# Patient Record
Sex: MALE | Race: WHITE | NOT HISPANIC OR LATINO | Employment: FULL TIME | URBAN - METROPOLITAN AREA
[De-identification: names, ages, dates, MRNs, and addresses within clinical notes are randomized per-mention and may not be internally consistent; named-entity substitution may affect disease eponyms.]

---

## 2017-01-10 RX ORDER — HYDROCODONE BITARTRATE AND ACETAMINOPHEN 5; 325 MG/1; MG/1
1 TABLET ORAL EVERY 6 HOURS PRN
Qty: 120 TABLET | Refills: 0 | Status: SHIPPED | OUTPATIENT
Start: 2017-01-10 | End: 2017-02-24 | Stop reason: SDUPTHER

## 2017-02-24 RX ORDER — HYDROCODONE BITARTRATE AND ACETAMINOPHEN 5; 325 MG/1; MG/1
1 TABLET ORAL EVERY 6 HOURS PRN
Qty: 120 TABLET | Refills: 0 | Status: SHIPPED | OUTPATIENT
Start: 2017-02-24 | End: 2017-04-04 | Stop reason: SDUPTHER

## 2017-02-28 RX ORDER — ZOLPIDEM TARTRATE 10 MG/1
TABLET ORAL
Qty: 30 TABLET | Refills: 1 | Status: SHIPPED | OUTPATIENT
Start: 2017-02-28 | End: 2017-04-27

## 2017-04-03 RX ORDER — ESCITALOPRAM OXALATE 20 MG/1
TABLET ORAL
Qty: 90 TABLET | Refills: 1 | Status: SHIPPED | OUTPATIENT
Start: 2017-04-03 | End: 2017-04-27 | Stop reason: SDUPTHER

## 2017-04-04 RX ORDER — HYDROCODONE BITARTRATE AND ACETAMINOPHEN 5; 325 MG/1; MG/1
1 TABLET ORAL EVERY 6 HOURS PRN
Qty: 120 TABLET | Refills: 0 | Status: SHIPPED | OUTPATIENT
Start: 2017-04-04 | End: 2017-04-27 | Stop reason: SDUPTHER

## 2017-04-27 ENCOUNTER — OFFICE VISIT (OUTPATIENT)
Dept: INTERNAL MEDICINE | Facility: CLINIC | Age: 42
End: 2017-04-27
Payer: COMMERCIAL

## 2017-04-27 VITALS
WEIGHT: 246.94 LBS | SYSTOLIC BLOOD PRESSURE: 110 MMHG | HEIGHT: 70 IN | BODY MASS INDEX: 35.35 KG/M2 | DIASTOLIC BLOOD PRESSURE: 74 MMHG | TEMPERATURE: 97 F | HEART RATE: 91 BPM

## 2017-04-27 DIAGNOSIS — F32.5 MAJOR DEPRESSIVE DISORDER WITH SINGLE EPISODE, IN REMISSION: Primary | ICD-10-CM

## 2017-04-27 DIAGNOSIS — R09.81 SINUS CONGESTION: ICD-10-CM

## 2017-04-27 DIAGNOSIS — G47.00 INSOMNIA, UNSPECIFIED TYPE: ICD-10-CM

## 2017-04-27 DIAGNOSIS — G89.4 CHRONIC PAIN SYNDROME: ICD-10-CM

## 2017-04-27 PROCEDURE — 1160F RVW MEDS BY RX/DR IN RCRD: CPT | Mod: S$GLB,,, | Performed by: FAMILY MEDICINE

## 2017-04-27 PROCEDURE — 99999 PR PBB SHADOW E&M-EST. PATIENT-LVL III: CPT | Mod: PBBFAC,,, | Performed by: FAMILY MEDICINE

## 2017-04-27 PROCEDURE — 99214 OFFICE O/P EST MOD 30 MIN: CPT | Mod: S$GLB,,, | Performed by: FAMILY MEDICINE

## 2017-04-27 RX ORDER — HYDROCODONE BITARTRATE AND ACETAMINOPHEN 5; 325 MG/1; MG/1
1 TABLET ORAL EVERY 6 HOURS PRN
Qty: 120 TABLET | Refills: 0 | Status: SHIPPED | OUTPATIENT
Start: 2017-04-27 | End: 2017-05-30 | Stop reason: SDUPTHER

## 2017-04-27 RX ORDER — ESCITALOPRAM OXALATE 20 MG/1
20 TABLET ORAL DAILY
Qty: 90 TABLET | Refills: 1 | Status: SHIPPED | OUTPATIENT
Start: 2017-04-27 | End: 2018-03-23 | Stop reason: SDUPTHER

## 2017-04-27 RX ORDER — ZOLPIDEM TARTRATE 10 MG/1
10 TABLET ORAL NIGHTLY PRN
Qty: 30 TABLET | Refills: 1 | Status: SHIPPED | OUTPATIENT
Start: 2017-04-27 | End: 2017-06-26 | Stop reason: SDUPTHER

## 2017-04-27 RX ORDER — ARIPIPRAZOLE 5 MG/1
5 TABLET ORAL DAILY
Qty: 90 TABLET | Refills: 1 | Status: SHIPPED | OUTPATIENT
Start: 2017-04-27 | End: 2017-10-23 | Stop reason: SDUPTHER

## 2017-04-27 RX ORDER — METHYLPREDNISOLONE 4 MG/1
TABLET ORAL
Qty: 1 PACKAGE | Refills: 0 | Status: SHIPPED | OUTPATIENT
Start: 2017-04-27 | End: 2017-05-18

## 2017-04-27 NOTE — MR AVS SNAPSHOT
[facility]  [address] [facility] [address]  [place] [address]  Phone: [phone]  Fax: [phone]                  Dax Carlisle   2017 8:40 AM   Office Visit    Description:  Male : 1975   Provider:  Pramod Nuñez MD   Department:  [facility]           Reason for Visit     Follow-up     Medication Refill           Diagnoses this Visit        Comments    Major depressive disorder with single episode, in remission    -  Primary will continue with the Lexapro 20 mg and abilify 5 mg    Insomnia, unspecified type     Will continue with the ambien 10mg     Chronic pain syndrome     Will continue with the hydrocodone as needed    Sinus congestion     Will start pt on medrol dose pack           To Do List           Goals (5 Years of Data)     None      Follow-Up and Disposition     Return in about 4 months (around 2017).       These Medications        Disp Refills Start End    aripiprazole (ABILIFY) 5 MG Tab 90 tablet 1 2017     Take 1 tablet (5 mg total) by mouth once daily. - Oral    Pharmacy: [facility]/pharmacy #[facility]I P[facility]an - [address] [address] Ph #: [phone]       hydrocodone-acetaminophen 5-325mg (NORCO) 5-325 mg per tablet 120 tablet 0 2017     Take 1 tablet by mouth every 6 (six) hours as needed for Pain. - Oral    Pharmacy: [facility]/pharmacy #[facility]I P[facility]an - [id number] [address] [facility] Ph #: [phone]       Notes to Pharmacy: Last script sent on  was not enough this is 1 month    escitalopram oxalate (LEXAPRO) 20 MG tablet 90 tablet 1 2017     Take 1 tablet (20 mg total) by mouth once daily. - Oral    Pharmacy: [facility]/pharmacy #[facility] SAMI Phelan - [id number] [address] Ph #: [phone]       zolpidem (AMBIEN) 10 mg Tab 30 tablet 1 2017 10/26/2017    Take 1 tablet (10 mg total) by mouth nightly as needed. - Oral    Pharmacy: [facility]/pharmacy #[facility]I Phelan - [address] [address] [facility] Ph #: [phone]        methylPREDNISolone (MEDROL DOSEPACK) 4 mg tablet 1 Package 0 4/27/2017 5/18/2017    use as directed    Pharmacy: [facility]/pharmacy #[id number] - [place], [place] - [address] [address] #: [phone]         [facility] On Call     [facility] On Call Nurse Care Line - 24/7 Assistance  Unless otherwise directed by your provider, please contact [facility] On[facility], our nurse care line that is available for 24/7 assistance.     Registered nurses in the [facility] provide: appointment scheduling, clinical advisement, health education, and other advisory services.  Call: [phone] (toll free)               Medications           Message regarding Medications     Verify the changes and/or additions to your medication regime listed below are the same as discussed with your clinician today.  If any of these changes or additions are incorrect, please notify your healthcare provider.        START taking these NEW medications        Refills    zolpidem (AMBIEN) 10 mg Tab 1    Sig: Take 1 tablet (10 mg total) by mouth nightly as needed.    Class: Normal    Route: Oral    methylPREDNISolone (MEDROL DOSEPACK) 4 mg tablet 0    Sig: use as directed    Class: Normal      CHANGE how you are taking these medications     Start Taking Instead of    aripiprazole (ABILIFY) 5 MG Tab aripiprazole (ABILIFY) 5 MG Tab    Dosage:  Take 1 tablet (5 mg total) by mouth once daily. Dosage:  TAKE 1 TABLET (5 MG TOTAL) BY MOUTH ONCE DAILY.    Reason for Change:  Reorder     escitalopram oxalate (LEXAPRO) 20 MG tablet escitalopram oxalate (LEXAPRO) 20 MG tablet    Dosage:  Take 1 tablet (20 mg total) by mouth once daily. Dosage:  TAKE 1 TABLET (20 MG TOTAL) BY MOUTH ONCE DAILY.    Reason for Change:  Reorder            Verify that the below list of medications is an accurate representation of the medications you are currently taking.  If none reported, the list may be blank. If incorrect, please contact your healthcare provider. Carry this list with you in  "case of emergency.           Current Medications     aripiprazole (ABILIFY) 5 MG Tab Take 1 tablet (5 mg total) by mouth once daily.    azelastine (ASTELIN) 137 mcg (0.1 %) nasal spray 1 spray by Nasal route 2 (two) times daily.    escitalopram oxalate (LEXAPRO) 20 MG tablet Take 1 tablet (20 mg total) by mouth once daily.    hydrocodone-acetaminophen 5-325mg (NORCO) 5-325 mg per tablet Take 1 tablet by mouth every 6 (six) hours as needed for Pain.    linaclotide 290 mcg Cap Take 1 capsule (290 mcg total) by mouth once daily.    loratadine (CLARITIN) 10 mg tablet Take 10 mg by mouth once daily.    methylPREDNISolone (MEDROL DOSEPACK) 4 mg tablet use as directed    zolpidem (AMBIEN) 10 mg Tab Take 1 tablet (10 mg total) by mouth nightly as needed.           Clinical Reference Information           Your Vitals Were     BP Pulse Temp    110/74 (BP Location: Right arm, Patient Position: Sitting, BP Method: Manual) 91 96.5 °F (35.8 °C) (Tympanic)    Height Weight BMI    5' 10" (1.778 m) 112 kg (246 lb 14.6 oz) 35.43 kg/m2      Blood Pressure          Most Recent Value    BP  110/74      Allergies as of 4/27/2017     Iodinated Contrast Media - Oral And Iv Dye    Codeine    Dairy Aid [Lactase]    Morphine    Nuts [Tree Nut]      Immunizations Administered on Date of Encounter - 4/27/2017     None      Language Assistance Services     ATTENTION: Language assistance services are available, free of charge. Please call [phone].      ATENCIÓN: Si audrey jamie, tiene a benson disposición servicios gratuitos de asistencia lingüística. Llame al [phone].     [facility] Ý: N?u b?n nói Ti?ng Vi?t, có các d?ch v? h? tr? ngôn ng? mi?n phí dành cho b?n. G?i s? [phone].         [facility] complies with applicable Federal civil rights laws and does not discriminate on the basis of race, color, national origin, age, disability, or sex.        "

## 2017-04-27 NOTE — PROGRESS NOTES
Subjective:       Patient ID: Dax Carlisle is a 41 y.o. male.    Chief Complaint: Follow-up and Medication Refill    Medication Refill   This is a chronic problem. The current episode started more than 1 year ago. The problem has been gradually improving. Pertinent negatives include no change in bowel habit, congestion, coughing, nausea, neck pain or sore throat. Nothing aggravates the symptoms. He has tried nothing for the symptoms. The treatment provided moderate relief.     Review of Systems   Constitutional: Negative.    HENT: Positive for postnasal drip, rhinorrhea and sinus pressure. Negative for congestion and sore throat.    Respiratory: Negative.  Negative for cough.    Cardiovascular: Negative.    Gastrointestinal: Negative.  Negative for change in bowel habit and nausea.   Musculoskeletal: Negative for neck pain.   Skin: Negative.    Neurological: Negative.    Psychiatric/Behavioral: Negative.        Objective:      Physical Exam   HENT:   Right Ear: Tympanic membrane is erythematous. A middle ear effusion is present.   Left Ear: Tympanic membrane is erythematous. A middle ear effusion is present.   Nose: Mucosal edema and rhinorrhea present. Right sinus exhibits no maxillary sinus tenderness and no frontal sinus tenderness. Left sinus exhibits no maxillary sinus tenderness and no frontal sinus tenderness.   Mouth/Throat: Posterior oropharyngeal erythema present.   Pulmonary/Chest: Effort normal and breath sounds normal.   Abdominal: Soft. Bowel sounds are normal.       Assessment:       1. Major depressive disorder with single episode, in remission    2. Insomnia, unspecified type    3. Chronic pain syndrome    4. Sinus congestion        Plan:       Major depressive disorder with single episode, in remission  Comments:  will continue with the Lexapro 20 mg and abilify 5 mg    Insomnia, unspecified type  Comments:  Will continue with the ambien 10mg     Chronic pain syndrome  Comments:  Will  continue with the hydrocodone as needed    Sinus congestion  Comments:  Will start pt on medrol dose pack    Other orders  -     aripiprazole (ABILIFY) 5 MG Tab; Take 1 tablet (5 mg total) by mouth once daily.  Dispense: 90 tablet; Refill: 1  -     hydrocodone-acetaminophen 5-325mg (NORCO) 5-325 mg per tablet; Take 1 tablet by mouth every 6 (six) hours as needed for Pain.  Dispense: 120 tablet; Refill: 0  -     escitalopram oxalate (LEXAPRO) 20 MG tablet; Take 1 tablet (20 mg total) by mouth once daily.  Dispense: 90 tablet; Refill: 1  -     zolpidem (AMBIEN) 10 mg Tab; Take 1 tablet (10 mg total) by mouth nightly as needed.  Dispense: 30 tablet; Refill: 1  -     methylPREDNISolone (MEDROL DOSEPACK) 4 mg tablet; use as directed  Dispense: 1 Package; Refill: 0

## 2017-05-30 RX ORDER — HYDROCODONE BITARTRATE AND ACETAMINOPHEN 5; 325 MG/1; MG/1
1 TABLET ORAL EVERY 6 HOURS PRN
Qty: 30 TABLET | Refills: 0 | Status: SHIPPED | OUTPATIENT
Start: 2017-05-30 | End: 2017-06-12 | Stop reason: SDUPTHER

## 2017-06-13 RX ORDER — HYDROCODONE BITARTRATE AND ACETAMINOPHEN 5; 325 MG/1; MG/1
1 TABLET ORAL EVERY 6 HOURS PRN
Qty: 30 TABLET | Refills: 0 | Status: SHIPPED | OUTPATIENT
Start: 2017-06-13 | End: 2017-06-20 | Stop reason: SDUPTHER

## 2017-06-21 RX ORDER — HYDROCODONE BITARTRATE AND ACETAMINOPHEN 5; 325 MG/1; MG/1
1 TABLET ORAL EVERY 6 HOURS PRN
Qty: 30 TABLET | Refills: 0 | Status: SHIPPED | OUTPATIENT
Start: 2017-06-21 | End: 2017-07-01 | Stop reason: SDUPTHER

## 2017-06-21 RX ORDER — HYDROCODONE BITARTRATE AND ACETAMINOPHEN 5; 325 MG/1; MG/1
1 TABLET ORAL EVERY 6 HOURS PRN
Qty: 30 TABLET | Refills: 0 | OUTPATIENT
Start: 2017-06-21

## 2017-06-27 RX ORDER — ZOLPIDEM TARTRATE 10 MG/1
TABLET ORAL
Qty: 30 TABLET | Refills: 1 | Status: SHIPPED | OUTPATIENT
Start: 2017-06-27 | End: 2017-08-25 | Stop reason: SDUPTHER

## 2017-07-01 ENCOUNTER — PATIENT MESSAGE (OUTPATIENT)
Dept: INTERNAL MEDICINE | Facility: CLINIC | Age: 42
End: 2017-07-01

## 2017-07-01 RX ORDER — HYDROCODONE BITARTRATE AND ACETAMINOPHEN 5; 325 MG/1; MG/1
1 TABLET ORAL EVERY 6 HOURS PRN
Qty: 120 TABLET | Refills: 0 | Status: SHIPPED | OUTPATIENT
Start: 2017-07-01 | End: 2017-07-03 | Stop reason: SDUPTHER

## 2017-07-03 RX ORDER — HYDROCODONE BITARTRATE AND ACETAMINOPHEN 5; 325 MG/1; MG/1
1 TABLET ORAL EVERY 6 HOURS PRN
Qty: 120 TABLET | Refills: 0 | Status: SHIPPED | OUTPATIENT
Start: 2017-07-03 | End: 2017-08-01 | Stop reason: SDUPTHER

## 2017-08-01 RX ORDER — HYDROCODONE BITARTRATE AND ACETAMINOPHEN 5; 325 MG/1; MG/1
1 TABLET ORAL EVERY 6 HOURS PRN
Qty: 120 TABLET | Refills: 0 | Status: SHIPPED | OUTPATIENT
Start: 2017-08-01 | End: 2017-08-31 | Stop reason: SDUPTHER

## 2017-08-27 RX ORDER — ZOLPIDEM TARTRATE 10 MG/1
TABLET ORAL
Qty: 30 TABLET | Refills: 1 | Status: SHIPPED | OUTPATIENT
Start: 2017-08-27 | End: 2017-09-26 | Stop reason: SDUPTHER

## 2017-09-01 RX ORDER — HYDROCODONE BITARTRATE AND ACETAMINOPHEN 5; 325 MG/1; MG/1
1 TABLET ORAL EVERY 6 HOURS PRN
Qty: 120 TABLET | Refills: 0 | Status: SHIPPED | OUTPATIENT
Start: 2017-09-01 | End: 2017-10-02 | Stop reason: SDUPTHER

## 2017-09-26 RX ORDER — ZOLPIDEM TARTRATE 10 MG/1
10 TABLET ORAL NIGHTLY
Qty: 90 TABLET | Refills: 0 | Status: SHIPPED | OUTPATIENT
Start: 2017-09-26 | End: 2018-01-16 | Stop reason: SDUPTHER

## 2017-10-03 RX ORDER — HYDROCODONE BITARTRATE AND ACETAMINOPHEN 5; 325 MG/1; MG/1
1 TABLET ORAL EVERY 6 HOURS PRN
Qty: 120 TABLET | Refills: 0 | Status: SHIPPED | OUTPATIENT
Start: 2017-10-03 | End: 2017-11-07 | Stop reason: SDUPTHER

## 2017-10-23 RX ORDER — ARIPIPRAZOLE 5 MG/1
TABLET ORAL
Qty: 90 TABLET | Refills: 1 | Status: SHIPPED | OUTPATIENT
Start: 2017-10-23 | End: 2018-04-18 | Stop reason: SDUPTHER

## 2017-11-07 RX ORDER — HYDROCODONE BITARTRATE AND ACETAMINOPHEN 5; 325 MG/1; MG/1
1 TABLET ORAL EVERY 6 HOURS PRN
Qty: 120 TABLET | Refills: 0 | Status: SHIPPED | OUTPATIENT
Start: 2017-11-07 | End: 2017-12-20 | Stop reason: SDUPTHER

## 2017-11-16 ENCOUNTER — LAB VISIT (OUTPATIENT)
Dept: LAB | Facility: HOSPITAL | Age: 42
End: 2017-11-16
Attending: FAMILY MEDICINE
Payer: COMMERCIAL

## 2017-11-16 ENCOUNTER — OFFICE VISIT (OUTPATIENT)
Dept: INTERNAL MEDICINE | Facility: CLINIC | Age: 42
End: 2017-11-16
Payer: COMMERCIAL

## 2017-11-16 ENCOUNTER — TELEPHONE (OUTPATIENT)
Dept: INTERNAL MEDICINE | Facility: CLINIC | Age: 42
End: 2017-11-16

## 2017-11-16 VITALS
SYSTOLIC BLOOD PRESSURE: 116 MMHG | WEIGHT: 254.88 LBS | HEIGHT: 70 IN | TEMPERATURE: 98 F | HEART RATE: 91 BPM | BODY MASS INDEX: 36.49 KG/M2 | DIASTOLIC BLOOD PRESSURE: 80 MMHG

## 2017-11-16 DIAGNOSIS — Z00.00 ANNUAL PHYSICAL EXAM: Primary | ICD-10-CM

## 2017-11-16 DIAGNOSIS — Z00.00 ANNUAL PHYSICAL EXAM: ICD-10-CM

## 2017-11-16 PROCEDURE — 99999 PR PBB SHADOW E&M-EST. PATIENT-LVL III: CPT | Mod: PBBFAC,,, | Performed by: FAMILY MEDICINE

## 2017-11-16 PROCEDURE — 80061 LIPID PANEL: CPT

## 2017-11-16 PROCEDURE — 36415 COLL VENOUS BLD VENIPUNCTURE: CPT | Mod: PO

## 2017-11-16 PROCEDURE — 99396 PREV VISIT EST AGE 40-64: CPT | Mod: S$GLB,,, | Performed by: FAMILY MEDICINE

## 2017-11-16 PROCEDURE — 80053 COMPREHEN METABOLIC PANEL: CPT

## 2017-11-16 NOTE — PROGRESS NOTES
Subjective:       Patient ID: Dax Carlisle is a 42 y.o. male.    Chief Complaint: Medication Refill    Annual Exam:       Pt is a 42 year old who is here for annual work-up. Pt is doing well on his general medications.       Review of Systems   Constitutional: Negative.    Respiratory: Negative.    Cardiovascular: Negative.    Gastrointestinal: Negative.    Genitourinary: Negative.    Musculoskeletal: Negative.    Skin: Negative.    Neurological: Negative.    Hematological: Negative.    Psychiatric/Behavioral: Negative.        Objective:      Physical Exam   Constitutional: He is oriented to person, place, and time. He appears well-developed and well-nourished.   HENT:   Head: Normocephalic.   Eyes: EOM are normal. Pupils are equal, round, and reactive to light.   Neck: Normal range of motion. Neck supple. No JVD present. No thyromegaly present.   Cardiovascular: Normal rate and regular rhythm.    Pulmonary/Chest: Effort normal and breath sounds normal.   Abdominal: Soft. Bowel sounds are normal.   Musculoskeletal: Normal range of motion.   Lymphadenopathy:     He has no cervical adenopathy.   Neurological: He is alert and oriented to person, place, and time. He has normal reflexes.   Skin: Skin is warm and dry.   Psychiatric: He has a normal mood and affect. His behavior is normal.       Assessment:       1. Annual physical exam        Plan:       Annual physical exam  Comments:  Will do Lipid and CMP  Orders:  -     Comprehensive metabolic panel; Future; Expected date: 11/16/2017  -     Lipid panel; Future; Expected date: 11/16/2017

## 2017-11-16 NOTE — TELEPHONE ENCOUNTER
Per [facility] compound pharm:      The highest gabapentin written can be 10% which can be equated to 100 mg per pump.  Maximum of 5 pumps applied to affected area per day.

## 2017-11-17 LAB
ALBUMIN SERPL BCP-MCNC: 4 G/DL
ALP SERPL-CCNC: 75 U/L
ALT SERPL W/O P-5'-P-CCNC: 74 U/L
ANION GAP SERPL CALC-SCNC: 9 MMOL/L
AST SERPL-CCNC: 46 U/L
BILIRUB SERPL-MCNC: 0.6 MG/DL
BUN SERPL-MCNC: 9 MG/DL
CALCIUM SERPL-MCNC: 9.5 MG/DL
CHLORIDE SERPL-SCNC: 106 MMOL/L
CHOLEST SERPL-MCNC: 230 MG/DL
CHOLEST/HDLC SERPL: 4.7 {RATIO}
CO2 SERPL-SCNC: 25 MMOL/L
CREAT SERPL-MCNC: 0.9 MG/DL
EST. GFR  (AFRICAN AMERICAN): >60 ML/MIN/1.73 M^2
EST. GFR  (NON AFRICAN AMERICAN): >60 ML/MIN/1.73 M^2
GLUCOSE SERPL-MCNC: 87 MG/DL
HDLC SERPL-MCNC: 49 MG/DL
HDLC SERPL: 21.3 %
LDLC SERPL CALC-MCNC: 156 MG/DL
NONHDLC SERPL-MCNC: 181 MG/DL
POTASSIUM SERPL-SCNC: 4.1 MMOL/L
PROT SERPL-MCNC: 7.2 G/DL
SODIUM SERPL-SCNC: 140 MMOL/L
TRIGL SERPL-MCNC: 125 MG/DL

## 2017-12-20 RX ORDER — HYDROCODONE BITARTRATE AND ACETAMINOPHEN 5; 325 MG/1; MG/1
1 TABLET ORAL EVERY 6 HOURS PRN
Qty: 120 TABLET | Refills: 0 | Status: SHIPPED | OUTPATIENT
Start: 2017-12-20 | End: 2018-01-16 | Stop reason: SDUPTHER

## 2018-01-04 ENCOUNTER — OFFICE VISIT (OUTPATIENT)
Dept: INTERNAL MEDICINE | Facility: CLINIC | Age: 43
End: 2018-01-04
Payer: COMMERCIAL

## 2018-01-04 ENCOUNTER — PATIENT OUTREACH (OUTPATIENT)
Dept: ADMINISTRATIVE | Facility: HOSPITAL | Age: 43
End: 2018-01-04

## 2018-01-04 VITALS
SYSTOLIC BLOOD PRESSURE: 112 MMHG | TEMPERATURE: 97 F | DIASTOLIC BLOOD PRESSURE: 86 MMHG | HEART RATE: 88 BPM | HEIGHT: 70 IN | BODY MASS INDEX: 35.92 KG/M2 | WEIGHT: 250.88 LBS

## 2018-01-04 DIAGNOSIS — H92.02 LEFT EAR PAIN: Primary | ICD-10-CM

## 2018-01-04 PROCEDURE — 99213 OFFICE O/P EST LOW 20 MIN: CPT | Mod: S$GLB,,, | Performed by: FAMILY MEDICINE

## 2018-01-04 PROCEDURE — 99999 PR PBB SHADOW E&M-EST. PATIENT-LVL III: CPT | Mod: PBBFAC,,, | Performed by: FAMILY MEDICINE

## 2018-01-04 NOTE — PROGRESS NOTES
Subjective:       Patient ID: Dax Carlisle is a 42 y.o. male.    Chief Complaint: Otalgia    Left ear pain:      Pt is a 42 year old who reports chronic ear infections. Pt has been seen only x1 in the last few years but has gone to urgent care off and on every 3-4 months.      Otalgia    There is pain in the left ear. This is a recurrent problem. The current episode started more than 1 year ago. The problem occurs constantly. The problem has been waxing and waning. There has been no fever. The fever has been present for less than 1 day. The pain is at a severity of 7/10. Associated symptoms include coughing. Pertinent negatives include no abdominal pain or sore throat. He has tried nothing for the symptoms. The treatment provided moderate relief. His past medical history is significant for a chronic ear infection.     Review of Systems   Constitutional: Negative.    HENT: Positive for ear pain (left greater than right), sinus pain and sinus pressure. Negative for postnasal drip, sore throat, tinnitus and trouble swallowing.    Respiratory: Positive for cough.    Cardiovascular: Negative.    Gastrointestinal: Negative.  Negative for abdominal pain.   Musculoskeletal: Negative.    Hematological: Negative.    Psychiatric/Behavioral: Negative.        Objective:      Physical Exam   Constitutional: He is oriented to person, place, and time. He appears well-developed and well-nourished.   HENT:   Right Ear: Hearing, external ear and ear canal normal.   Left Ear: Tympanic membrane is erythematous. Tympanic membrane is not scarred. A middle ear effusion is present.   Cardiovascular: Normal rate and regular rhythm.    Pulmonary/Chest: Effort normal and breath sounds normal.   Abdominal: Soft. Bowel sounds are normal.   Neurological: He is alert and oriented to person, place, and time.       Assessment:       1. Left ear pain        Plan:       Left ear pain  Comments:  Will send to ENT  Orders:  -     Ambulatory  referral to ENT

## 2018-01-05 ENCOUNTER — OFFICE VISIT (OUTPATIENT)
Dept: OTOLARYNGOLOGY | Facility: CLINIC | Age: 43
End: 2018-01-05
Payer: COMMERCIAL

## 2018-01-05 VITALS
HEART RATE: 95 BPM | BODY MASS INDEX: 35.85 KG/M2 | RESPIRATION RATE: 18 BRPM | HEIGHT: 70 IN | DIASTOLIC BLOOD PRESSURE: 82 MMHG | WEIGHT: 250.44 LBS | SYSTOLIC BLOOD PRESSURE: 116 MMHG

## 2018-01-05 DIAGNOSIS — H92.02 LEFT EAR PAIN: Primary | ICD-10-CM

## 2018-01-05 PROCEDURE — 92567 TYMPANOMETRY: CPT | Mod: S$GLB,,, | Performed by: OTOLARYNGOLOGY

## 2018-01-05 PROCEDURE — 99999 PR PBB SHADOW E&M-EST. PATIENT-LVL III: CPT | Mod: PBBFAC,,, | Performed by: OTOLARYNGOLOGY

## 2018-01-05 PROCEDURE — 92504 EAR MICROSCOPY EXAMINATION: CPT | Mod: S$GLB,,, | Performed by: OTOLARYNGOLOGY

## 2018-01-05 PROCEDURE — 99243 OFF/OP CNSLTJ NEW/EST LOW 30: CPT | Mod: 25,S$GLB,, | Performed by: OTOLARYNGOLOGY

## 2018-01-05 NOTE — PROGRESS NOTES
"Referring Provider:    Pramod Nuñez Md  [address] [place] [address]  Subjective:   Patient: Dax Carlisle [id number], :1975   Visit date:2018 9:25 AM    Chief Complaint:  Otalgia (left ear/patient has had 6 ear infections within the last year)    HPI:  Dax is a 42 y.o. male who I was asked to see in consultation for evaluation of the following issue(s):     6 infections of left ear over the past year.  Improves with abx and steroids.  Sx typically pain and HL.  No major allergy sx.  + Qtip use.  Usually has cerumen impaction when he has an "infection".     Review of Systems:  -     Allergic/Immunologic: is allergic to iodinated contrast- oral and iv dye; codeine; dairy aid [lactase]; morphine; and nuts [tree nut]..  -     Constitutional: Current temp:        His meds, allergies, medical, surgical, social & family histories were reviewed & updated:  -     He has a current medication list which includes the following prescription(s): aripiprazole, azelastine, escitalopram oxalate, hydrocodone-acetaminophen 5-325mg, linaclotide, loratadine, and zolpidem.  -     He  has a past medical history of Angioedema of lips (3/13/2015); Depression; and Kidney stones.   -     He  does not have any pertinent problems on file.   -     He  has a past surgical history that includes Back surgery; Pilonidal cyst drainage; Knee surgery (Right); Knee surgery (Left); Appendectomy; Cholecystectomy; anal fistula repair; and Colonoscopy (N/A, 3/10/2016).  -     He  reports that he has never smoked. He does not have any smokeless tobacco history on file. He reports that he does not drink alcohol or use drugs.  -     His family history includes Cancer in his father; Colon cancer in his maternal grandmother; Colon polyps in his mother and sister; Diabetes in his father, maternal grandfather, maternal grandmother, mother, paternal grandfather, and paternal grandmother; Heart disease in his father; Stroke in his " "paternal grandmother.  -     He is allergic to iodinated contrast- oral and iv dye; codeine; dairy aid [lactase]; morphine; and nuts [tree nut].    Objective:     Physical Exam:  Vitals:  /82   Pulse 95   Resp 18   Ht 5' 10" (1.778 m)   Wt 113.6 kg (250 lb 7.1 oz)   BMI 35.93 kg/m²   General appearance:  Well developed, well nourished    Eyes:  Extraocular motions intact, PERRL    Communication:  no hoarseness, no dysphonia    Ear exam was performed utilizing binocular otomicroscopy with the findings:  Right Ear:  No mass/lesion of auricle. The external auditory canals is without erythema or discharge. The tympanic membrane revealed no perforation and good mobility, with no fluid in middle ear. Clinical speech reception thresholds grossly normal.  Left Ear:  No mass/lesion of auricle. The external auditory canals is without erythema or discharge. The tympanic membrane revealed no perforation and good mobility, with no fluid in middle ear. Clinical speech reception thresholds grossly normal. Cerumen pressed against TM, improved comfort after removal      Nose:  No masses/lesions of external nose, nasal mucosa, septum, and turbinates were within normal limits.  Mouth:  No mass/lesion of lips, teeth, gums, hard/soft palate, tongue, tonsils, or oropharynx.  Significant pain to palpation of left TMJ    Cardiovascular:  No pedal edema; Radial Pulses +2     Neck & Lymphatics:  No cervical lymphadenopathy, no neck mass/crepitus/ asymmetry, trachea is midline, no thyroid enlargement/tenderness/mass.    Psych: Oriented x3,  Alert with normal mood and affect.     Respiration/Chest:  Symmetric expansion during respiration, normal respiratory effort.    Skin:  Warm and intact. No ulcerations of face, scalp, neck.            TYMPANOMETRY    Tympanometry revealed Type A in the right ear, and Type As in the left ear.     Right Ear: 0.7 ml  -15 daPa, with ear canal volume of: 1.4    Left Ear:  0.4 ml  -52 daPa, with ear " canal volume of: 1.5    Middle ear pressure values ranging from +50 daPa to -200 daPa for children, and +50  daPa to -50 daPa for adults is generally considered normal.    Normative ear canal volumes vary as a function of age. Typically for children a volume  range of 0.5 to 1.5 cc is typically considered normal, while for adults the range is 0.5 to  2.00 cc.     Type A tympanograms: normal middle ear pressure;  Peak between +50 daPa to -200 daPa for children & +50 daPa to -50 daPa      Type C tympanograms:   abnormally low middle ear pressure;  Peak less than -200daPa for children/-50 daPa for adults      Type B tympanograms no pressure peak; Peak less than -200daPa for children/-50 daPa for adults      Patient was counseled with results.      Assessment & Plan:   Unclear if he has recurrent OME, OE or cerumen + TMJ arthralgia.  Suggested means of safely cleaning of the ears.  Return if he has pain and HL to determine etiology.     We discussed his medical conditions, treatments and plan.  Dax should return to clinic if any issues arise (symptoms worsen or persist), otherwise we will see him back in the clinic only as needed.      Thank you for allowing me to participate in the care of Dax.    Neftali Neri MD

## 2018-01-05 NOTE — LETTER
January 5, 2018      Pramod Nuñez MD  [address] [place] [address]           [facility]  [address]  [place] [address]  Phone: [phone]  Fax: [phone]          Patient: Dax Carlisle   MR Number: [id number]   YOB: 1975   Date of Visit: 1/5/2018       Dear Dr. Pramod Nuñez:    Thank you for referring Dax Carlisle to me for evaluation. Attached you will find relevant portions of my assessment and plan of care.    If you have questions, please do not hesitate to call me. I look forward to following Dax Carlisle along with you.    Sincerely,    Neftali Neri MD    Enclosure  CC:  No Recipients    If you would like to receive this communication electronically, please contact [email or web address] or [phone] to request more information on [employer] Link access.    For providers and/or their staff who would like to refer a patient to [facility], please contact us through our one-stop-shop provider referral line, [facility] , at [phone].    If you feel you have received this communication in error or would no longer like to receive these types of communications, please e-mail [email or web address]

## 2018-01-16 RX ORDER — ZOLPIDEM TARTRATE 10 MG/1
10 TABLET ORAL NIGHTLY
Qty: 90 TABLET | Refills: 0 | Status: SHIPPED | OUTPATIENT
Start: 2018-01-16 | End: 2018-04-19 | Stop reason: SDUPTHER

## 2018-01-16 RX ORDER — HYDROCODONE BITARTRATE AND ACETAMINOPHEN 5; 325 MG/1; MG/1
1 TABLET ORAL EVERY 6 HOURS PRN
Qty: 120 TABLET | Refills: 0 | Status: SHIPPED | OUTPATIENT
Start: 2018-01-16 | End: 2018-02-22 | Stop reason: SDUPTHER

## 2018-02-06 ENCOUNTER — OFFICE VISIT (OUTPATIENT)
Dept: PSYCHIATRY | Facility: CLINIC | Age: 43
End: 2018-02-06
Payer: COMMERCIAL

## 2018-02-06 DIAGNOSIS — F33.1 MAJOR DEPRESSIVE DISORDER, RECURRENT EPISODE, MODERATE: Primary | ICD-10-CM

## 2018-02-06 PROCEDURE — 90834 PSYTX W PT 45 MINUTES: CPT | Mod: S$GLB,,, | Performed by: SOCIAL WORKER

## 2018-02-06 NOTE — PROGRESS NOTES
"Individual Psychotherapy (PhD/LCSW)    2018    Site:  [facility]         Therapeutic Intervention: Met with patient.  Outpatient - Insight oriented psychotherapy 45 min - CPT code 42361    Chief complaint/reason for encounter: depression     Interval history and content of current session:  Patient presents to ongoing individual therapy due to depression.  He has not been to session since 16.  He notes that for a year he and his wife tried to investigate fertility issues.  They discovered that they both had physical problems.  She has PCOS and his sperm does not swim correctly.  They decided to purchase a home in [place].  He feels that they were nena to get a "good deal."  A couple of months ago, he discovered that a friend from [employer]  in a murder/suicide.  His friend had been dating a girl that was "using him" for six months.  His friend and he had a connection through [employer] and fishing.  His friend had pawned all of his speakers and most of his fishing rods.  His friend was seldom angry, but when he became angry, it was intense.  He admits that after finding out about the death, he became severely depressed.  He "curled up in a ball" and he was not completing his work.  His supervisor had a discussion about the decrease in his work productivity.  He feels that he is beginning to "crawl out of the hole."  He remains frustrated with his job.  He did interview for a position in [place].  He feels he sabotaged the interview because he talked about too much negativity.  He enjoys fishing, but he has not been able to do much fishing due to the winter.  He had considered getting a boat, but his wife was not supportive.  She is considering becoming a writer, but she has not been writing.  She spends a good deal of her time reading.  He is tearful in session sharing about the death of his friend.    Treatment plan:  · Target symptoms: depression  · Why chosen therapy is appropriate versus another " modality: relevant to diagnosis  · Outcome monitoring methods: self-report, observation  · Therapeutic intervention type: insight oriented psychotherapy, behavior modifying psychotherapy, supportive psychotherapy, interactive psychotherapy    Risk parameters:  Patient reports no suicidal ideation  Patient reports no homicidal ideation  Patient reports no self-injurious behavior  Patient reports no violent behavior    Verbal deficits: None    Patient's response to intervention:  The patient's response to intervention is accepting, motivated.    Progress toward goals and other mental status changes:  The patient's progress toward goals is fair .    Diagnosis:   Major depression recurrent moderate    Plan:  individual psychotherapy and medication management by physician    Return to clinic: as scheduled    Length of Service (minutes): 45

## 2018-02-22 RX ORDER — HYDROCODONE BITARTRATE AND ACETAMINOPHEN 5; 325 MG/1; MG/1
1 TABLET ORAL EVERY 6 HOURS PRN
Qty: 120 TABLET | Refills: 0 | Status: SHIPPED | OUTPATIENT
Start: 2018-02-22 | End: 2018-03-19 | Stop reason: SDUPTHER

## 2018-03-14 ENCOUNTER — OFFICE VISIT (OUTPATIENT)
Dept: PSYCHIATRY | Facility: CLINIC | Age: 43
End: 2018-03-14
Payer: COMMERCIAL

## 2018-03-14 DIAGNOSIS — F33.1 MAJOR DEPRESSIVE DISORDER, RECURRENT EPISODE, MODERATE: Primary | ICD-10-CM

## 2018-03-14 PROCEDURE — 90834 PSYTX W PT 45 MINUTES: CPT | Mod: S$GLB,,, | Performed by: SOCIAL WORKER
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
## 2018-03-20 RX ORDER — HYDROCODONE BITARTRATE AND ACETAMINOPHEN 5; 325 MG/1; MG/1
1 TABLET ORAL EVERY 6 HOURS PRN
Qty: 120 TABLET | Refills: 0 | Status: SHIPPED | OUTPATIENT
Start: 2018-03-20 | End: 2018-04-19 | Stop reason: SDUPTHER

## 2018-03-23 RX ORDER — ESCITALOPRAM OXALATE 20 MG/1
20 TABLET ORAL DAILY
Qty: 90 TABLET | Refills: 1 | Status: SHIPPED | OUTPATIENT
Start: 2018-03-23 | End: 2018-05-18 | Stop reason: SDUPTHER

## 2018-04-09 ENCOUNTER — OFFICE VISIT (OUTPATIENT)
Dept: OTOLARYNGOLOGY | Facility: CLINIC | Age: 43
End: 2018-04-09
Payer: COMMERCIAL

## 2018-04-09 VITALS
DIASTOLIC BLOOD PRESSURE: 78 MMHG | HEART RATE: 76 BPM | WEIGHT: 249 LBS | SYSTOLIC BLOOD PRESSURE: 114 MMHG | BODY MASS INDEX: 35.65 KG/M2 | HEIGHT: 70 IN

## 2018-04-09 DIAGNOSIS — H65.492: Primary | ICD-10-CM

## 2018-04-09 PROCEDURE — 99999 PR PBB SHADOW E&M-EST. PATIENT-LVL III: CPT | Mod: PBBFAC,,, | Performed by: OTOLARYNGOLOGY

## 2018-04-09 PROCEDURE — 99214 OFFICE O/P EST MOD 30 MIN: CPT | Mod: 25,S$GLB,, | Performed by: OTOLARYNGOLOGY

## 2018-04-09 PROCEDURE — 69433 CREATE EARDRUM OPENING: CPT | Mod: LT,S$GLB,, | Performed by: OTOLARYNGOLOGY

## 2018-04-09 RX ORDER — OFLOXACIN 3 MG/ML
SOLUTION/ DROPS OPHTHALMIC
Qty: 10 ML | Refills: 0 | Status: SHIPPED | OUTPATIENT
Start: 2018-04-09 | End: 2018-04-27

## 2018-04-11 ENCOUNTER — PATIENT MESSAGE (OUTPATIENT)
Dept: OTOLARYNGOLOGY | Facility: CLINIC | Age: 43
End: 2018-04-11

## 2018-04-18 RX ORDER — ARIPIPRAZOLE 5 MG/1
TABLET ORAL
Qty: 90 TABLET | Refills: 1 | Status: SHIPPED | OUTPATIENT
Start: 2018-04-18 | End: 2018-05-18 | Stop reason: SDUPTHER

## 2018-04-20 RX ORDER — HYDROCODONE BITARTRATE AND ACETAMINOPHEN 5; 325 MG/1; MG/1
1 TABLET ORAL EVERY 6 HOURS PRN
Qty: 120 TABLET | Refills: 0 | Status: SHIPPED | OUTPATIENT
Start: 2018-04-20 | End: 2018-05-18 | Stop reason: SDUPTHER

## 2018-04-20 RX ORDER — ZOLPIDEM TARTRATE 10 MG/1
10 TABLET ORAL NIGHTLY
Qty: 90 TABLET | Refills: 0 | Status: SHIPPED | OUTPATIENT
Start: 2018-04-20 | End: 2018-05-18 | Stop reason: SDUPTHER

## 2018-04-23 NOTE — PROGRESS NOTES
Subjective:   Patient: Dax Carlisle [id number], :1975   Visit date:2018 8:45 AM    Chief Complaint:  Ear Fullness (left ear is not draining since middle of Feb)    HPI:  Dax is a 42 y.o. male who is here for follow-up. He was last here in January    Location: left ear  Severity: moderate  Quality: crampy  Duration: 2 month(s)  Modifying factors:  None  Associated signs and symptoms:  Hearing loss as          Review of Systems:  -     Allergic/Immunologic: is allergic to iodinated contrast- oral and iv dye; codeine; dairy aid [lactase]; morphine; and nuts [tree nut]..  -     Constitutional: Current temp:      His meds, allergies, medical, surgical, social & family histories were reviewed & updated:  -     He has a current medication list which includes the following prescription(s): azelastine, escitalopram oxalate, linaclotide, loratadine, aripiprazole, hydrocodone-acetaminophen 5-325mg, ofloxacin, and zolpidem.  -     He  has a past medical history of Angioedema of lips (3/13/2015); Depression; and Kidney stones.   -     He  does not have any pertinent problems on file.   -     He  has a past surgical history that includes Back surgery; Pilonidal cyst drainage; Knee surgery (Right); Knee surgery (Left); Appendectomy; Cholecystectomy; anal fistula repair; and Colonoscopy (N/A, 3/10/2016).  -     He  reports that he has never smoked. He does not have any smokeless tobacco history on file. He reports that he does not drink alcohol or use drugs.  -     His family history includes Cancer in his father; Colon cancer in his maternal grandmother; Colon polyps in his mother and sister; Diabetes in his father, maternal grandfather, maternal grandmother, mother, paternal grandfather, and paternal grandmother; Heart disease in his father; Stroke in his paternal grandmother.  -     He is allergic to iodinated contrast- oral and iv dye; codeine; dairy aid [lactase]; morphine; and nuts [tree  "nut].    Objective:     Physical Exam:  Vitals:  /78   Pulse 76   Ht 5' 10" (1.778 m)   Wt 112.9 kg (249 lb)   BMI 35.73 kg/m²   Communication:  Able to communicate, no hoarseness.  Head & Face:  Normocephalic, atraumatic, no sinus tenderness.  Eyes:  Extraocular motions intact.  Ears:  ABIMAEL AS, EAC's wnl.  RIght TM WNL  Nose:  No masses/lesions of external nose, nasal mucosa, septum, and turbinates were within normal limits.  Mouth:  No mass/lesion of lips, teeth, gums, hard/soft palate, tongue, tonsils, or oropharynx.  Neck & Lymphatics:  No cervical lymphadenopathy, no neck mass/crepitus/ asymmetry, trachea is midline, no thyroid enlargement/tenderness/mass.  Neuro/Psych: Alert with normal mood and affect.   Respiration/Chest:  Symmetric expansion during respiration, normal respiratory effort.  Skin:  Warm and intact.    Assessment & Plan:   Dax was seen today for ear fullness.    Diagnoses and all orders for this visit:    Chronic exudative otitis media, left    Other orders  -     ofloxacin (OCUFLOX) 0.3 % ophthalmic solution; 5 drops in left ear twice daily for 7 days      Tube AS. Follow up 3 months with audio        Patient: Dax Carlisle [id number], :1975  Procedure date:2018  Patient's medications, allergies, past medical, surgical, social and family histories were reviewed and updated as appropriate.  Chief Complaint:  Ear Fullness (left ear is not draining since middle of Feb)    HPI:  Dax is a 42 y.o. male with history of chronic otitis media with effusion on the left.      Procedure: Risks, benefits, and alternatives of the procedure were discussed with the patient, and consent was obtained to perform a tympanostomy, with ventilation tube placement for the left ear.  The procedure required a high level of expertise and use of an operating microscope and multiple micro-instruments.     With the patient in the supine position, the operating microscope was used to examine " both ears with the appropriate sized ear speculum.  A variety of sterile, micro-instruments were utilized to remove the cerumen atraumatically.  We applied phenol topically (local analgesic) to the tympanic membrane.  After adequate anesthesia was obtained, the tympanic membrane was incised radially in the appropriate location.  We suctioned the middle ear through the incision. A ventilation tube was placed (position & patency confirmed) and antibiotic ear drops were placed.  I performed the procedure. The patient tolerated the procedure well and there were no complications.    Findings:     Left ear  :   EAC was normal, and the middle ear had copious thin fluid present.

## 2018-04-27 ENCOUNTER — OFFICE VISIT (OUTPATIENT)
Dept: OTOLARYNGOLOGY | Facility: CLINIC | Age: 43
End: 2018-04-27
Payer: COMMERCIAL

## 2018-04-27 ENCOUNTER — CLINICAL SUPPORT (OUTPATIENT)
Dept: AUDIOLOGY | Facility: CLINIC | Age: 43
End: 2018-04-27
Payer: COMMERCIAL

## 2018-04-27 VITALS
WEIGHT: 249.56 LBS | HEART RATE: 76 BPM | DIASTOLIC BLOOD PRESSURE: 89 MMHG | BODY MASS INDEX: 35.81 KG/M2 | TEMPERATURE: 98 F | SYSTOLIC BLOOD PRESSURE: 124 MMHG

## 2018-04-27 DIAGNOSIS — Z96.22 PATENT PRESSURE EQUALIZATION (PE) TUBE, LEFT: Primary | ICD-10-CM

## 2018-04-27 DIAGNOSIS — H65.492: Primary | ICD-10-CM

## 2018-04-27 PROCEDURE — 99999 PR PBB SHADOW E&M-EST. PATIENT-LVL III: CPT | Mod: PBBFAC,,, | Performed by: OTOLARYNGOLOGY

## 2018-04-27 PROCEDURE — 99213 OFFICE O/P EST LOW 20 MIN: CPT | Mod: S$GLB,,, | Performed by: OTOLARYNGOLOGY

## 2018-04-27 PROCEDURE — 92557 COMPREHENSIVE HEARING TEST: CPT | Mod: S$GLB,,, | Performed by: AUDIOLOGIST

## 2018-04-27 PROCEDURE — 92567 TYMPANOMETRY: CPT | Mod: S$GLB,,, | Performed by: AUDIOLOGIST

## 2018-04-27 NOTE — PROGRESS NOTES
Subjective:   Patient: Dax Carlisle [id number], :1975   Visit date:2018 8:45 AM    Chief Complaint:  Follow-up    HPI:  Dax is a 42 y.o. male who is here for follow-up. He was last here in January:    Location: left ear  Severity: moderate  Quality: crampy  Duration: 2 month(s)  Modifying factors:  None  Associated signs and symptoms:  Hearing loss as    Today, pt returns to clinic after completing an audiogram which reveals normal hearing and a Type A TM on the R, PE tube present on the L.       Review of Systems:  -     Allergic/Immunologic: is allergic to iodinated contrast- oral and iv dye; codeine; dairy aid [lactase]; morphine; and nuts [tree nut]..  -     Constitutional: Current temp: 97.6 °F (36.4 °C) (Tympanic)    His meds, allergies, medical, surgical, social & family histories were reviewed & updated:  -     He has a current medication list which includes the following prescription(s): aripiprazole, azelastine, escitalopram oxalate, hydrocodone-acetaminophen 5-325mg, linaclotide, loratadine, and zolpidem.  -     He  has a past medical history of Angioedema of lips (3/13/2015); Depression; and Kidney stones.   -     He  does not have any pertinent problems on file.   -     He  has a past surgical history that includes Back surgery; Pilonidal cyst drainage; Knee surgery (Right); Knee surgery (Left); Appendectomy; Cholecystectomy; anal fistula repair; and Colonoscopy (N/A, 3/10/2016).  -     He  reports that he has never smoked. He does not have any smokeless tobacco history on file. He reports that he does not drink alcohol or use drugs.  -     His family history includes Cancer in his father; Colon cancer in his maternal grandmother; Colon polyps in his mother and sister; Diabetes in his father, maternal grandfather, maternal grandmother, mother, paternal grandfather, and paternal grandmother; Heart disease in his father; Stroke in his paternal grandmother.  -     He is allergic to  iodinated contrast- oral and iv dye; codeine; dairy aid [lactase]; morphine; and nuts [tree nut].    Audiogram:     Results reveal normal hearing sensitivity 250-8000 Hz bilaterally.  Speech Reception Thresholds were  10 dBHL for the right ear and 20 dBHL for the left ear.   Word recognition scores were excellent bilaterally.  Tympanograms were Type A, normal for the right ear and unable to obtain, PE tube for the left ear.     Patient was counseled on the above findings.     Recommendations include:     1.  ENT followup  2.  Wear hearing protective devices around loud noise            Objective:     Physical Exam:  Vitals:  /89   Pulse 76   Temp 97.6 °F (36.4 °C) (Tympanic)   Wt 113.2 kg (249 lb 9 oz)   BMI 35.81 kg/m²   Communication:  Able to communicate, no hoarseness.  Head & Face:  Normocephalic, atraumatic, no sinus tenderness.  Eyes:  Extraocular motions intact.  Ears:  ABIMAEL AS, EAC's wnl.  RIght TM WNL. L PE tube intact.   Nose:  No masses/lesions of external nose, nasal mucosa, septum, and turbinates were within normal limits.  Mouth:  No mass/lesion of lips, teeth, gums, hard/soft palate, tongue, tonsils, or oropharynx.  Neck & Lymphatics:  No cervical lymphadenopathy, no neck mass/crepitus/ asymmetry, trachea is midline, no thyroid enlargement/tenderness/mass.  Neuro/Psych: Alert with normal mood and affect.   Respiration/Chest:  Symmetric expansion during respiration, normal respiratory effort.  Skin:  Warm and intact.    Assessment & Plan:   Dax was seen today for follow-up.    Diagnoses and all orders for this visit:    Chronic exudative otitis media, left      Hearing has returned to normal bilaterally.  RTC within 6 months.

## 2018-04-27 NOTE — PROGRESS NOTES
Dax Carlisle was seen 04/27/2018 for an audiological evaluation.  Patient here for audiogram post LEFT PE tube placement 3 weeks ago.  He reports improvement in his hearing, but not quite to his baseline.    Results reveal normal hearing sensitivity 250-8000 Hz bilaterally.  Speech Reception Thresholds were  10 dBHL for the right ear and 20 dBHL for the left ear.   Word recognition scores were excellent bilaterally.  Tympanograms were Type A, normal for the right ear and unable to obtain, PE tube for the left ear.    Patient was counseled on the above findings.    Recommendations include:    1.  ENT followup  2.  Wear hearing protective devices around loud noise

## 2018-05-07 ENCOUNTER — PATIENT OUTREACH (OUTPATIENT)
Dept: ADMINISTRATIVE | Facility: HOSPITAL | Age: 43
End: 2018-05-07

## 2018-05-15 RX ORDER — ARIPIPRAZOLE 5 MG/1
5 TABLET ORAL DAILY
Qty: 90 TABLET | Refills: 1 | Status: CANCELLED | OUTPATIENT
Start: 2018-05-15

## 2018-05-18 ENCOUNTER — OFFICE VISIT (OUTPATIENT)
Dept: INTERNAL MEDICINE | Facility: CLINIC | Age: 43
End: 2018-05-18
Payer: COMMERCIAL

## 2018-05-18 VITALS
DIASTOLIC BLOOD PRESSURE: 86 MMHG | WEIGHT: 251.75 LBS | SYSTOLIC BLOOD PRESSURE: 116 MMHG | HEIGHT: 70 IN | OXYGEN SATURATION: 97 % | BODY MASS INDEX: 36.04 KG/M2 | HEART RATE: 90 BPM | TEMPERATURE: 97 F

## 2018-05-18 DIAGNOSIS — G89.4 CHRONIC PAIN SYNDROME: ICD-10-CM

## 2018-05-18 DIAGNOSIS — E66.9 OBESITY (BMI 30-39.9): ICD-10-CM

## 2018-05-18 DIAGNOSIS — G47.00 INSOMNIA, UNSPECIFIED TYPE: ICD-10-CM

## 2018-05-18 DIAGNOSIS — F32.5 MAJOR DEPRESSIVE DISORDER WITH SINGLE EPISODE, IN REMISSION: Primary | ICD-10-CM

## 2018-05-18 PROCEDURE — 99999 PR PBB SHADOW E&M-EST. PATIENT-LVL III: CPT | Mod: PBBFAC,,, | Performed by: FAMILY MEDICINE

## 2018-05-18 PROCEDURE — 99214 OFFICE O/P EST MOD 30 MIN: CPT | Mod: S$GLB,,, | Performed by: FAMILY MEDICINE

## 2018-05-18 PROCEDURE — 3008F BODY MASS INDEX DOCD: CPT | Mod: CPTII,S$GLB,, | Performed by: FAMILY MEDICINE

## 2018-05-18 RX ORDER — AZELASTINE 1 MG/ML
1 SPRAY, METERED NASAL 2 TIMES DAILY
Qty: 30 ML | Refills: 3 | Status: SHIPPED | OUTPATIENT
Start: 2018-05-18 | End: 2020-11-16

## 2018-05-18 RX ORDER — ZOLPIDEM TARTRATE 10 MG/1
10 TABLET ORAL NIGHTLY
Qty: 90 TABLET | Refills: 2 | Status: SHIPPED | OUTPATIENT
Start: 2018-05-18 | End: 2018-07-18 | Stop reason: SDUPTHER

## 2018-05-18 RX ORDER — BROMPHENIRAMINE MALEATE, PSEUDOEPHEDRINE HYDROCHLORIDE, AND DEXTROMETHORPHAN HYDROBROMIDE 2; 30; 10 MG/5ML; MG/5ML; MG/5ML
SYRUP ORAL
Refills: 0 | COMMUNITY
Start: 2018-04-15 | End: 2018-05-18

## 2018-05-18 RX ORDER — ARIPIPRAZOLE 5 MG/1
5 TABLET ORAL DAILY
Qty: 90 TABLET | Refills: 1 | Status: SHIPPED | OUTPATIENT
Start: 2018-05-18 | End: 2019-02-12 | Stop reason: SDUPTHER

## 2018-05-18 RX ORDER — AMOXICILLIN AND CLAVULANATE POTASSIUM 875; 125 MG/1; MG/1
TABLET, FILM COATED ORAL
Refills: 0 | COMMUNITY
Start: 2018-04-15 | End: 2018-05-18

## 2018-05-18 RX ORDER — HYDROCODONE BITARTRATE AND ACETAMINOPHEN 5; 325 MG/1; MG/1
1 TABLET ORAL EVERY 6 HOURS PRN
Qty: 120 TABLET | Refills: 0 | Status: SHIPPED | OUTPATIENT
Start: 2018-05-18 | End: 2018-06-19 | Stop reason: SDUPTHER

## 2018-05-18 RX ORDER — ESCITALOPRAM OXALATE 20 MG/1
20 TABLET ORAL DAILY
Qty: 90 TABLET | Refills: 1 | Status: SHIPPED | OUTPATIENT
Start: 2018-05-18 | End: 2018-09-19 | Stop reason: SDUPTHER

## 2018-06-21 RX ORDER — HYDROCODONE BITARTRATE AND ACETAMINOPHEN 5; 325 MG/1; MG/1
1 TABLET ORAL EVERY 6 HOURS PRN
Qty: 120 TABLET | Refills: 0 | Status: SHIPPED | OUTPATIENT
Start: 2018-06-21 | End: 2018-07-23 | Stop reason: SDUPTHER

## 2018-07-18 RX ORDER — ZOLPIDEM TARTRATE 10 MG/1
10 TABLET ORAL NIGHTLY
Qty: 90 TABLET | Refills: 2 | Status: SHIPPED | OUTPATIENT
Start: 2018-07-18 | End: 2018-10-11 | Stop reason: SDUPTHER

## 2018-07-23 RX ORDER — HYDROCODONE BITARTRATE AND ACETAMINOPHEN 5; 325 MG/1; MG/1
1 TABLET ORAL EVERY 6 HOURS PRN
Qty: 120 TABLET | Refills: 0 | Status: SHIPPED | OUTPATIENT
Start: 2018-07-23 | End: 2018-08-20 | Stop reason: SDUPTHER

## 2018-08-20 RX ORDER — HYDROCODONE BITARTRATE AND ACETAMINOPHEN 5; 325 MG/1; MG/1
1 TABLET ORAL EVERY 6 HOURS PRN
Qty: 120 TABLET | Refills: 0 | Status: SHIPPED | OUTPATIENT
Start: 2018-08-20 | End: 2018-09-20 | Stop reason: SDUPTHER

## 2018-09-16 RX ORDER — ESCITALOPRAM OXALATE 20 MG/1
20 TABLET ORAL DAILY
Qty: 90 TABLET | Refills: 1 | Status: SHIPPED | OUTPATIENT
Start: 2018-09-16 | End: 2019-03-15 | Stop reason: SDUPTHER

## 2018-09-19 RX ORDER — ESCITALOPRAM OXALATE 20 MG/1
20 TABLET ORAL DAILY
Qty: 90 TABLET | Refills: 1 | Status: SHIPPED | OUTPATIENT
Start: 2018-09-19 | End: 2018-10-26 | Stop reason: SDUPTHER

## 2018-09-21 RX ORDER — HYDROCODONE BITARTRATE AND ACETAMINOPHEN 5; 325 MG/1; MG/1
1 TABLET ORAL EVERY 6 HOURS PRN
Qty: 120 TABLET | Refills: 0 | Status: SHIPPED | OUTPATIENT
Start: 2018-09-21 | End: 2018-10-19 | Stop reason: SDUPTHER

## 2018-10-12 RX ORDER — ZOLPIDEM TARTRATE 10 MG/1
10 TABLET ORAL NIGHTLY
Qty: 90 TABLET | Refills: 2 | Status: SHIPPED | OUTPATIENT
Start: 2018-10-12 | End: 2019-06-18 | Stop reason: ALTCHOICE

## 2018-10-19 RX ORDER — HYDROCODONE BITARTRATE AND ACETAMINOPHEN 5; 325 MG/1; MG/1
1 TABLET ORAL EVERY 6 HOURS PRN
Qty: 120 TABLET | Refills: 0 | Status: SHIPPED | OUTPATIENT
Start: 2018-10-19 | End: 2018-11-20 | Stop reason: SDUPTHER

## 2018-10-26 ENCOUNTER — OFFICE VISIT (OUTPATIENT)
Dept: OTOLARYNGOLOGY | Facility: CLINIC | Age: 43
End: 2018-10-26
Payer: COMMERCIAL

## 2018-10-26 VITALS
HEART RATE: 78 BPM | SYSTOLIC BLOOD PRESSURE: 114 MMHG | BODY MASS INDEX: 32.65 KG/M2 | DIASTOLIC BLOOD PRESSURE: 70 MMHG | WEIGHT: 227.5 LBS | TEMPERATURE: 97 F

## 2018-10-26 DIAGNOSIS — H92.02 LEFT EAR PAIN: Primary | ICD-10-CM

## 2018-10-26 PROCEDURE — 99213 OFFICE O/P EST LOW 20 MIN: CPT | Mod: S$GLB,,, | Performed by: OTOLARYNGOLOGY

## 2018-10-26 PROCEDURE — 3008F BODY MASS INDEX DOCD: CPT | Mod: CPTII,S$GLB,, | Performed by: OTOLARYNGOLOGY

## 2018-10-26 PROCEDURE — 99999 PR PBB SHADOW E&M-EST. PATIENT-LVL II: CPT | Mod: PBBFAC,,, | Performed by: OTOLARYNGOLOGY

## 2018-10-26 NOTE — PROGRESS NOTES
Subjective:   Patient: Dax Carlisle [id number], :1975   Visit date:10/26/2018 9:11 AM    Chief Complaint:  Other (Pt is here for a follow up but also has complaints of itching in the left ear where the tube was placed.)    HPI:  Dax is a 42 y.o. male who is here for follow-up for a follow up. Hx of PET L in 2018. Denies otalgia, intermittent discomfort at times as well as drainage, this is clear. Denies recent fever/ illness. No other new symptoms.        Review of Systems:  -     Allergic/Immunologic: is allergic to iodinated contrast- oral and iv dye; codeine; dairy aid [lactase]; morphine; and nuts [tree nut]..  -     Constitutional: Current temp: 96.8 °F (36 °C) (Tympanic)    His meds, allergies, medical, surgical, social & family histories were reviewed & updated:  -     He has a current medication list which includes the following prescription(s): aripiprazole, azelastine, escitalopram oxalate, hydrocodone-acetaminophen, linaclotide, loratadine, and zolpidem.  -     He  has a past medical history of Angioedema of lips (3/13/2015), Depression, and Kidney stones.   -     He does not have any pertinent problems on file.   -     He  has a past surgical history that includes Back surgery; Pilonidal cyst drainage; Knee surgery (Right); Knee surgery (Left); Appendectomy; Cholecystectomy; anal fistula repair; and COLONOSCOPY (N/A, 3/10/2016).  -     He  reports that  has never smoked. he has never used smokeless tobacco. He reports that he does not drink alcohol or use drugs.  -     His family history includes Cancer in his father; Colon cancer in his maternal grandmother; Colon polyps in his mother and sister; Diabetes in his father, maternal grandfather, maternal grandmother, mother, paternal grandfather, and paternal grandmother; Heart disease in his father; Stroke in his paternal grandmother.  -     He is allergic to iodinated contrast- oral and iv dye; codeine; dairy aid [lactase]; morphine;  and nuts [tree nut].    Objective:     Physical Exam:  Vitals:  /70   Pulse 78   Temp 96.8 °F (36 °C) (Tympanic)   Wt 103.2 kg (227 lb 8.2 oz)   BMI 32.65 kg/m²   Appearance:  Well-developed, well-nourished.  Communication:  Able to communicate, no hoarseness.  Head & Face:  Normocephalic, atraumatic, no sinus tenderness, normal facial strength.  Eyes:  Extraocular motions intact.  Ears:   L TM PET in place,EAC WNL. R TM and EAC WNL.   Nose:  No masses/lesions of external nose, nasal mucosa, septum, and turbinates were within normal limits.  Mouth:  No mass/lesion of lips, teeth, gums, hard/soft palate, tongue, tonsils, or oropharynx.  Neck & Lymphatics:  No cervical lymphadenopathy, no neck mass/crepitus/ asymmetry, trachea is midline, no thyroid enlargement/tenderness/mass.  Neuro/Psych: Alert with normal mood and affect.   Abdominal: Normal appearance.   Respiration/Chest:  Symmetric expansion during respiration, normal respiratory effort.  Skin:  Warm and intact  Cardiovascular:  No peripheral vascular edema or varicosities.    Assessment & Plan:   Dax was seen today for other.    Diagnoses and all orders for this visit:    Left ear pain    Significantly improved since PET placement in April 2018.   No infection.   Will recheck in 1 year, advised pt to please contact us sooner if any new symptoms.     Neftali Neri MD.

## 2018-10-29 ENCOUNTER — OFFICE VISIT (OUTPATIENT)
Dept: INTERNAL MEDICINE | Facility: CLINIC | Age: 43
End: 2018-10-29
Payer: COMMERCIAL

## 2018-10-29 VITALS
OXYGEN SATURATION: 97 % | WEIGHT: 228.81 LBS | SYSTOLIC BLOOD PRESSURE: 120 MMHG | BODY MASS INDEX: 32.76 KG/M2 | HEART RATE: 64 BPM | DIASTOLIC BLOOD PRESSURE: 78 MMHG | HEIGHT: 70 IN | TEMPERATURE: 97 F

## 2018-10-29 DIAGNOSIS — G89.4 CHRONIC PAIN SYNDROME: Primary | ICD-10-CM

## 2018-10-29 DIAGNOSIS — S39.012A STRAIN OF LUMBAR REGION, INITIAL ENCOUNTER: ICD-10-CM

## 2018-10-29 PROCEDURE — 96372 THER/PROPH/DIAG INJ SC/IM: CPT | Mod: S$GLB,,, | Performed by: NURSE PRACTITIONER

## 2018-10-29 PROCEDURE — 99214 OFFICE O/P EST MOD 30 MIN: CPT | Mod: 25,S$GLB,, | Performed by: NURSE PRACTITIONER

## 2018-10-29 PROCEDURE — 3008F BODY MASS INDEX DOCD: CPT | Mod: CPTII,S$GLB,, | Performed by: NURSE PRACTITIONER

## 2018-10-29 PROCEDURE — 99999 PR PBB SHADOW E&M-EST. PATIENT-LVL IV: CPT | Mod: PBBFAC,,, | Performed by: NURSE PRACTITIONER

## 2018-10-29 RX ORDER — CYCLOBENZAPRINE HCL 10 MG
10 TABLET ORAL NIGHTLY PRN
Qty: 20 TABLET | Refills: 0 | Status: SHIPPED | OUTPATIENT
Start: 2018-10-29 | End: 2018-11-08

## 2018-10-29 RX ORDER — METHYLPREDNISOLONE ACETATE 40 MG/ML
60 INJECTION, SUSPENSION INTRA-ARTICULAR; INTRALESIONAL; INTRAMUSCULAR; SOFT TISSUE
Status: COMPLETED | OUTPATIENT
Start: 2018-10-29 | End: 2018-10-29

## 2018-10-29 RX ADMIN — METHYLPREDNISOLONE ACETATE 60 MG: 40 INJECTION, SUSPENSION INTRA-ARTICULAR; INTRALESIONAL; INTRAMUSCULAR; SOFT TISSUE at 02:10

## 2018-10-29 NOTE — PROGRESS NOTES
Subjective:       Patient ID: Dax Carlisle is a 42 y.o. male.    Chief Complaint: Back Pain    HPI    Pt reports working on a vehicle when he turned suddenly pulling a muscle in his left lower back. He reports that pain is 10/10. He has been taking hydrocodone, using ice/heat, and went to a chiropractor with little improvement. There is no leg weakness/numbness/tingling, no incont of bowel/bladder, no fever.    Review of Systems   Constitutional: Negative for activity change, appetite change, chills, diaphoresis, fatigue, fever and unexpected weight change.   HENT: Negative for congestion, ear pain, postnasal drip, rhinorrhea, sinus pressure, sinus pain, sneezing, sore throat, tinnitus, trouble swallowing and voice change.    Eyes: Negative for photophobia, pain and visual disturbance.   Respiratory: Negative for cough, chest tightness, shortness of breath and wheezing.    Cardiovascular: Negative for chest pain, palpitations and leg swelling.   Gastrointestinal: Negative for abdominal distention, abdominal pain, constipation, diarrhea, nausea and vomiting.   Genitourinary: Negative for decreased urine volume, difficulty urinating, dysuria, flank pain, frequency, hematuria and urgency.   Musculoskeletal: Positive for back pain. Negative for arthralgias, joint swelling, neck pain and neck stiffness.   Allergic/Immunologic: Negative for immunocompromised state.   Neurological: Negative for dizziness, tremors, seizures, syncope, facial asymmetry, speech difficulty, weakness, light-headedness, numbness and headaches.   Hematological: Negative for adenopathy. Does not bruise/bleed easily.   Psychiatric/Behavioral: Negative for confusion and sleep disturbance.       Objective:      Physical Exam   Constitutional: He is oriented to person, place, and time.   HENT:   Head: Normocephalic and atraumatic.   Right Ear: Tympanic membrane normal.   Left Ear: Tympanic membrane normal.   Eyes: Conjunctivae and EOM are  normal.   Neck: Normal range of motion. Neck supple.   Cardiovascular: Normal rate, regular rhythm, normal heart sounds and intact distal pulses.   Pulmonary/Chest: Effort normal and breath sounds normal.   Abdominal: Soft. Bowel sounds are normal.   Musculoskeletal:        Lumbar back: He exhibits decreased range of motion, tenderness, pain and spasm.        Back:    Neurological: He is alert and oriented to person, place, and time.   Skin: Skin is warm and dry.       Assessment:     Vitals:    10/29/18 1415   BP: 120/78   Pulse: 64   Temp: 97.1 °F (36.2 °C)         1. Chronic pain syndrome    2. Strain of lumbar region, initial encounter        Plan:   Chronic pain syndrome    Strain of lumbar region, initial encounter  -     cyclobenzaprine (FLEXERIL) 10 MG tablet; Take 1 tablet (10 mg total) by mouth nightly as needed for Muscle spasms.  Dispense: 20 tablet; Refill: 0  -     methylPREDNISolone acetate injection 60 mg    -heat/ice  -c/w norco, caution -take a few hours apart from flexeril due to CNS depression   -steroid IM now- SE discussed  -F/u with PCP if no improvement

## 2018-11-11 RX ORDER — ARIPIPRAZOLE 5 MG/1
TABLET ORAL
Qty: 90 TABLET | Refills: 1 | Status: SHIPPED | OUTPATIENT
Start: 2018-11-11 | End: 2018-11-20

## 2018-11-20 ENCOUNTER — LAB VISIT (OUTPATIENT)
Dept: LAB | Facility: HOSPITAL | Age: 43
End: 2018-11-20
Attending: FAMILY MEDICINE
Payer: COMMERCIAL

## 2018-11-20 ENCOUNTER — OFFICE VISIT (OUTPATIENT)
Dept: INTERNAL MEDICINE | Facility: CLINIC | Age: 43
End: 2018-11-20
Payer: COMMERCIAL

## 2018-11-20 VITALS
HEART RATE: 91 BPM | BODY MASS INDEX: 31.5 KG/M2 | SYSTOLIC BLOOD PRESSURE: 102 MMHG | HEIGHT: 70 IN | DIASTOLIC BLOOD PRESSURE: 80 MMHG | WEIGHT: 220 LBS | TEMPERATURE: 97 F

## 2018-11-20 DIAGNOSIS — Z00.00 ANNUAL PHYSICAL EXAM: ICD-10-CM

## 2018-11-20 DIAGNOSIS — Z00.00 ANNUAL PHYSICAL EXAM: Primary | ICD-10-CM

## 2018-11-20 DIAGNOSIS — G89.4 CHRONIC PAIN SYNDROME: ICD-10-CM

## 2018-11-20 LAB
ALBUMIN SERPL BCP-MCNC: 4.3 G/DL
ALP SERPL-CCNC: 76 U/L
ALT SERPL W/O P-5'-P-CCNC: 68 U/L
ANION GAP SERPL CALC-SCNC: 7 MMOL/L
AST SERPL-CCNC: 29 U/L
BASOPHILS # BLD AUTO: 0.05 K/UL
BASOPHILS NFR BLD: 0.7 %
BILIRUB SERPL-MCNC: 0.5 MG/DL
BUN SERPL-MCNC: 17 MG/DL
CALCIUM SERPL-MCNC: 9.8 MG/DL
CHLORIDE SERPL-SCNC: 106 MMOL/L
CHOLEST SERPL-MCNC: 185 MG/DL
CHOLEST/HDLC SERPL: 4.3 {RATIO}
CO2 SERPL-SCNC: 28 MMOL/L
CREAT SERPL-MCNC: 0.9 MG/DL
DIFFERENTIAL METHOD: ABNORMAL
EOSINOPHIL # BLD AUTO: 0.2 K/UL
EOSINOPHIL NFR BLD: 3.1 %
ERYTHROCYTE [DISTWIDTH] IN BLOOD BY AUTOMATED COUNT: 13.8 %
EST. GFR  (AFRICAN AMERICAN): >60 ML/MIN/1.73 M^2
EST. GFR  (NON AFRICAN AMERICAN): >60 ML/MIN/1.73 M^2
GLUCOSE SERPL-MCNC: 89 MG/DL
HCT VFR BLD AUTO: 43.4 %
HDLC SERPL-MCNC: 43 MG/DL
HDLC SERPL: 23.2 %
HGB BLD-MCNC: 14.2 G/DL
IMM GRANULOCYTES # BLD AUTO: 0.01 K/UL
IMM GRANULOCYTES NFR BLD AUTO: 0.1 %
LDLC SERPL CALC-MCNC: 125 MG/DL
LYMPHOCYTES # BLD AUTO: 1.8 K/UL
LYMPHOCYTES NFR BLD: 25.7 %
MCH RBC QN AUTO: 28.5 PG
MCHC RBC AUTO-ENTMCNC: 32.7 G/DL
MCV RBC AUTO: 87 FL
MONOCYTES # BLD AUTO: 0.6 K/UL
MONOCYTES NFR BLD: 9 %
NEUTROPHILS # BLD AUTO: 4.3 K/UL
NEUTROPHILS NFR BLD: 61.4 %
NONHDLC SERPL-MCNC: 142 MG/DL
NRBC BLD-RTO: 0 /100 WBC
PLATELET # BLD AUTO: 153 K/UL
PMV BLD AUTO: 13.1 FL
POTASSIUM SERPL-SCNC: 4.2 MMOL/L
PROT SERPL-MCNC: 7.3 G/DL
RBC # BLD AUTO: 4.99 M/UL
SODIUM SERPL-SCNC: 141 MMOL/L
TRIGL SERPL-MCNC: 85 MG/DL
VIT B12 SERPL-MCNC: >2000 PG/ML
WBC # BLD AUTO: 7.01 K/UL

## 2018-11-20 PROCEDURE — 80307 DRUG TEST PRSMV CHEM ANLYZR: CPT

## 2018-11-20 PROCEDURE — 99999 PR PBB SHADOW E&M-EST. PATIENT-LVL III: CPT | Mod: PBBFAC,,, | Performed by: FAMILY MEDICINE

## 2018-11-20 PROCEDURE — 82607 VITAMIN B-12: CPT

## 2018-11-20 PROCEDURE — 36415 COLL VENOUS BLD VENIPUNCTURE: CPT | Mod: PO

## 2018-11-20 PROCEDURE — 80061 LIPID PANEL: CPT

## 2018-11-20 PROCEDURE — 80053 COMPREHEN METABOLIC PANEL: CPT

## 2018-11-20 PROCEDURE — 85025 COMPLETE CBC W/AUTO DIFF WBC: CPT

## 2018-11-20 PROCEDURE — 99396 PREV VISIT EST AGE 40-64: CPT | Mod: S$GLB,,, | Performed by: FAMILY MEDICINE

## 2018-11-20 RX ORDER — HYDROCODONE BITARTRATE AND ACETAMINOPHEN 5; 325 MG/1; MG/1
1 TABLET ORAL EVERY 6 HOURS PRN
Qty: 120 TABLET | Refills: 0 | Status: SHIPPED | OUTPATIENT
Start: 2018-11-20 | End: 2018-11-20 | Stop reason: SDUPTHER

## 2018-11-20 RX ORDER — HYDROCODONE BITARTRATE AND ACETAMINOPHEN 5; 325 MG/1; MG/1
1 TABLET ORAL EVERY 6 HOURS PRN
Qty: 120 TABLET | Refills: 0 | Status: SHIPPED | OUTPATIENT
Start: 2018-12-20 | End: 2019-01-20 | Stop reason: SDUPTHER

## 2018-11-20 NOTE — PROGRESS NOTES
Subjective:       Patient ID: Dax Carlisle is a 43 y.o. male.    Chief Complaint: Follow-up    Annual exam:      Pt is a 43 year old here for his annual exam. Pt does have MDD and chronic pain syndrome all of which are controlled. He is up      Review of Systems   Constitutional: Negative.    HENT: Negative.    Respiratory: Negative.    Cardiovascular: Negative.    Gastrointestinal: Negative.    Skin: Negative.    Neurological: Negative.    Psychiatric/Behavioral: Negative.        Objective:      Physical Exam   Constitutional: He is oriented to person, place, and time. He appears well-developed and well-nourished.   HENT:   Head: Normocephalic.   Eyes: EOM are normal. Pupils are equal, round, and reactive to light.   Neck: Normal range of motion. Neck supple. No JVD present. No thyromegaly present.   Cardiovascular: Normal rate and regular rhythm.   Pulmonary/Chest: Effort normal and breath sounds normal.   Abdominal: Soft. Bowel sounds are normal.   Musculoskeletal: Normal range of motion.   Lymphadenopathy:     He has no cervical adenopathy.   Neurological: He is alert and oriented to person, place, and time. He has normal reflexes.   Skin: Skin is warm and dry.   Psychiatric: He has a normal mood and affect. His behavior is normal.       Assessment:       1. Annual physical exam    2. Chronic pain syndrome        Plan:       Annual physical exam  Comments:  Will do CBC, CMP and lipid  Orders:  -     CBC auto differential; Future; Expected date: 11/20/2018  -     Comprehensive metabolic panel; Future; Expected date: 11/20/2018  -     Lipid panel; Future; Expected date: 11/20/2018  -     Vitamin B12; Future; Expected date: 11/20/2018    Chronic pain syndrome  Comments:  Will continue on the norco and get drug testing  Orders:  -     [facility] Drug Screen    Other orders  -     Discontinue: HYDROcodone-acetaminophen (NORCO) 5-325 mg per tablet; Take 1 tablet by mouth every 6 (six) hours as needed for  Pain.  Dispense: 120 tablet; Refill: 0  -     HYDROcodone-acetaminophen (NORCO) 5-325 mg per tablet; Take 1 tablet by mouth every 6 (six) hours as needed for Pain.  Dispense: 120 tablet; Refill: 0

## 2018-11-24 LAB
6MAM UR QL: NOT DETECTED
7AMINOCLONAZEPAM UR QL: NOT DETECTED
A-OH ALPRAZ UR QL: NOT DETECTED
ALPRAZ UR QL: NOT DETECTED
AMPHET UR QL SCN: NOT DETECTED
ANNOTATION COMMENT IMP: NORMAL
ANNOTATION COMMENT IMP: NORMAL
BARBITURATES UR QL: NOT DETECTED
BUPRENORPHINE UR QL: NOT DETECTED
BZE UR QL: NOT DETECTED
CARBOXYTHC UR QL: NOT DETECTED
CARISOPRODOL UR QL: NOT DETECTED
CLONAZEPAM UR QL: NOT DETECTED
CODEINE UR QL: NOT DETECTED
CREAT UR-MCNC: 213.3 MG/DL (ref 20–400)
DIAZEPAM UR QL: NOT DETECTED
ETHYL GLUCURONIDE UR QL: NOT DETECTED
FENTANYL UR QL: NOT DETECTED
HYDROCODONE UR QL: PRESENT
HYDROMORPHONE UR QL: NOT DETECTED
LORAZEPAM UR QL: NOT DETECTED
MDA UR QL: NOT DETECTED
MDEA UR QL: NOT DETECTED
MDMA UR QL: NOT DETECTED
ME-PHENIDATE UR QL: NOT DETECTED
MEPERIDINE UR QL: NOT DETECTED
METHADONE UR QL: NOT DETECTED
METHAMPHET UR QL: NOT DETECTED
MIDAZOLAM UR QL SCN: NOT DETECTED
MORPHINE UR QL: NOT DETECTED
NORBUPRENORPHINE UR QL CFM: NOT DETECTED
NORDIAZEPAM UR QL: NOT DETECTED
NORFENTANYL UR QL: NOT DETECTED
NORHYDROCODONE UR QL CFM: PRESENT
NOROXYCODONE UR QL CFM: NOT DETECTED
NOROXYMORPHONE: NOT DETECTED
OXAZEPAM UR QL: NOT DETECTED
OXYCODONE UR QL: NOT DETECTED
OXYMORPHONE UR QL: NOT DETECTED
PATHOLOGY STUDY: NORMAL
PCP UR QL: NOT DETECTED
PHENTERMINE UR QL: NOT DETECTED
PROPOXYPH UR QL: NOT DETECTED
SERVICE CMNT-IMP: NORMAL
TAPENTADOL UR QL SCN: NOT DETECTED
TAPENTADOL-O-SULF: NOT DETECTED
TEMAZEPAM UR QL: NOT DETECTED
TRAMADOL UR QL: NOT DETECTED
ZOLPIDEM UR QL: PRESENT

## 2019-01-21 RX ORDER — HYDROCODONE BITARTRATE AND ACETAMINOPHEN 5; 325 MG/1; MG/1
1 TABLET ORAL EVERY 6 HOURS PRN
Qty: 120 TABLET | Refills: 0 | Status: SHIPPED | OUTPATIENT
Start: 2019-01-21 | End: 2019-02-21 | Stop reason: SDUPTHER

## 2019-02-13 RX ORDER — ARIPIPRAZOLE 5 MG/1
5 TABLET ORAL DAILY
Qty: 90 TABLET | Refills: 1 | Status: SHIPPED | OUTPATIENT
Start: 2019-02-13 | End: 2019-09-03 | Stop reason: SDUPTHER

## 2019-02-22 RX ORDER — HYDROCODONE BITARTRATE AND ACETAMINOPHEN 5; 325 MG/1; MG/1
1 TABLET ORAL EVERY 6 HOURS PRN
Qty: 120 TABLET | Refills: 0 | Status: SHIPPED | OUTPATIENT
Start: 2019-02-22 | End: 2019-03-20 | Stop reason: SDUPTHER

## 2019-03-15 RX ORDER — ESCITALOPRAM OXALATE 20 MG/1
20 TABLET ORAL DAILY
Qty: 90 TABLET | Refills: 1 | Status: SHIPPED | OUTPATIENT
Start: 2019-03-15 | End: 2019-09-12 | Stop reason: SDUPTHER

## 2019-03-20 RX ORDER — HYDROCODONE BITARTRATE AND ACETAMINOPHEN 5; 325 MG/1; MG/1
1 TABLET ORAL EVERY 6 HOURS PRN
Qty: 120 TABLET | Refills: 0 | Status: SHIPPED | OUTPATIENT
Start: 2019-03-20 | End: 2019-04-22 | Stop reason: SDUPTHER

## 2019-04-09 RX ORDER — ZOLPIDEM TARTRATE 10 MG/1
TABLET ORAL
Qty: 90 TABLET | Refills: 0 | Status: SHIPPED | OUTPATIENT
Start: 2019-04-09 | End: 2019-06-18 | Stop reason: ALTCHOICE

## 2019-04-23 RX ORDER — HYDROCODONE BITARTRATE AND ACETAMINOPHEN 5; 325 MG/1; MG/1
1 TABLET ORAL EVERY 6 HOURS PRN
Qty: 120 TABLET | Refills: 0 | Status: SHIPPED | OUTPATIENT
Start: 2019-04-23 | End: 2019-05-20 | Stop reason: SDUPTHER

## 2019-05-01 ENCOUNTER — PATIENT MESSAGE (OUTPATIENT)
Dept: INTERNAL MEDICINE | Facility: CLINIC | Age: 44
End: 2019-05-01

## 2019-05-01 ENCOUNTER — OFFICE VISIT (OUTPATIENT)
Dept: INTERNAL MEDICINE | Facility: CLINIC | Age: 44
End: 2019-05-01
Payer: COMMERCIAL

## 2019-05-01 VITALS
WEIGHT: 210.13 LBS | BODY MASS INDEX: 30.58 KG/M2 | DIASTOLIC BLOOD PRESSURE: 78 MMHG | SYSTOLIC BLOOD PRESSURE: 100 MMHG | TEMPERATURE: 97 F | HEART RATE: 95 BPM | OXYGEN SATURATION: 97 %

## 2019-05-01 DIAGNOSIS — M54.50 ACUTE BILATERAL LOW BACK PAIN WITHOUT SCIATICA: ICD-10-CM

## 2019-05-01 DIAGNOSIS — G89.4 CHRONIC PAIN SYNDROME: ICD-10-CM

## 2019-05-01 DIAGNOSIS — G47.30 SLEEP APNEA, UNSPECIFIED TYPE: ICD-10-CM

## 2019-05-01 DIAGNOSIS — F32.5 MAJOR DEPRESSIVE DISORDER WITH SINGLE EPISODE, IN REMISSION: Primary | ICD-10-CM

## 2019-05-01 DIAGNOSIS — E66.9 OBESITY (BMI 30-39.9): ICD-10-CM

## 2019-05-01 PROCEDURE — 99214 OFFICE O/P EST MOD 30 MIN: CPT | Mod: 25,S$GLB,, | Performed by: FAMILY MEDICINE

## 2019-05-01 PROCEDURE — 3008F BODY MASS INDEX DOCD: CPT | Mod: CPTII,S$GLB,, | Performed by: FAMILY MEDICINE

## 2019-05-01 PROCEDURE — 99999 PR PBB SHADOW E&M-EST. PATIENT-LVL IV: CPT | Mod: PBBFAC,,, | Performed by: FAMILY MEDICINE

## 2019-05-01 PROCEDURE — 99999 PR PBB SHADOW E&M-EST. PATIENT-LVL IV: ICD-10-PCS | Mod: PBBFAC,,, | Performed by: FAMILY MEDICINE

## 2019-05-01 PROCEDURE — 96372 THER/PROPH/DIAG INJ SC/IM: CPT | Mod: S$GLB,,, | Performed by: FAMILY MEDICINE

## 2019-05-01 PROCEDURE — 96372 PR INJECTION,THERAP/PROPH/DIAG2ST, IM OR SUBCUT: ICD-10-PCS | Mod: S$GLB,,, | Performed by: FAMILY MEDICINE

## 2019-05-01 PROCEDURE — 99214 PR OFFICE/OUTPT VISIT, EST, LEVL IV, 30-39 MIN: ICD-10-PCS | Mod: 25,S$GLB,, | Performed by: FAMILY MEDICINE

## 2019-05-01 PROCEDURE — 3008F PR BODY MASS INDEX (BMI) DOCUMENTED: ICD-10-PCS | Mod: CPTII,S$GLB,, | Performed by: FAMILY MEDICINE

## 2019-05-01 RX ORDER — DOXYLAMINE SUCCINATE AND PHENYLEPHRINE HYDROCHLORIDE 7.5; 1 MG/1; MG/1
TABLET ORAL
Refills: 0 | COMMUNITY
Start: 2019-03-17 | End: 2019-05-01

## 2019-05-01 RX ORDER — CYCLOBENZAPRINE HCL 10 MG
10 TABLET ORAL NIGHTLY
Qty: 30 TABLET | Refills: 0 | Status: SHIPPED | OUTPATIENT
Start: 2019-05-01 | End: 2019-05-11

## 2019-05-01 RX ORDER — AMOXICILLIN AND CLAVULANATE POTASSIUM 875; 125 MG/1; MG/1
TABLET, FILM COATED ORAL
Refills: 0 | COMMUNITY
Start: 2019-03-17 | End: 2019-09-03

## 2019-05-01 RX ORDER — FLUTICASONE PROPIONATE 50 MCG
SPRAY, SUSPENSION (ML) NASAL
Refills: 0 | COMMUNITY
Start: 2019-03-17 | End: 2019-05-01

## 2019-05-01 RX ORDER — TRIAMCINOLONE ACETONIDE 40 MG/ML
40 INJECTION, SUSPENSION INTRA-ARTICULAR; INTRAMUSCULAR ONCE
Status: COMPLETED | OUTPATIENT
Start: 2019-05-01 | End: 2019-05-01

## 2019-05-01 RX ORDER — METHOCARBAMOL 750 MG/1
750 TABLET, FILM COATED ORAL 3 TIMES DAILY
Qty: 45 TABLET | Refills: 0 | Status: SHIPPED | OUTPATIENT
Start: 2019-05-01 | End: 2019-05-11

## 2019-05-01 RX ADMIN — TRIAMCINOLONE ACETONIDE 40 MG: 40 INJECTION, SUSPENSION INTRA-ARTICULAR; INTRAMUSCULAR at 08:05

## 2019-05-01 NOTE — PROGRESS NOTES
Subjective:       Patient ID: Dax Carlisle is a 43 y.o. male.    Chief Complaint: Follow-up and Low-back Pain (x3 days)    F/U:      Pt is a 43 year old who is here for chronic pain. Pt is doing well on his medications. He is having difficulties with sleep getting to sleep and staying asleep. Pt was on CPAP machine for about 2 years. Pt epworth score is 12.Pt has had sleep issues of several years    Review of Systems   Constitutional: Negative for activity change and unexpected weight change.   HENT: Negative for hearing loss, rhinorrhea and trouble swallowing.    Eyes: Negative for discharge and visual disturbance.   Respiratory: Negative for chest tightness and wheezing.    Cardiovascular: Negative for chest pain and palpitations.   Gastrointestinal: Negative for blood in stool, constipation, diarrhea and vomiting.   Endocrine: Negative for polydipsia and polyuria.   Genitourinary: Negative for difficulty urinating, hematuria and urgency.   Musculoskeletal: Negative for arthralgias, joint swelling and neck pain.   Neurological: Negative for weakness and headaches.   Psychiatric/Behavioral: Negative for confusion and dysphoric mood.       Objective:      Physical Exam   Constitutional: He is oriented to person, place, and time. He appears well-developed and well-nourished.   Cardiovascular: Normal rate and regular rhythm. Exam reveals no friction rub.   No murmur heard.  Pulmonary/Chest: Effort normal and breath sounds normal. No stridor. No respiratory distress. He has no wheezes.   Abdominal: Soft. Bowel sounds are normal. There is no tenderness. There is no guarding.   Neurological: He is alert and oriented to person, place, and time.   Skin: Skin is warm and dry.   Psychiatric: He has a normal mood and affect. His behavior is normal.       Assessment:       1. Major depressive disorder with single episode, in remission    2. Chronic pain syndrome    3. Obesity (BMI 30-39.9)    4. Sleep apnea,  unspecified type        Plan:       Major depressive disorder with single episode, in remission  Comments:  Will continue with the lexapro and abilify    Chronic pain syndrome  Comments:  Will continue on norco    Obesity (BMI 30-39.9)  Comments:  encourage exericse and diet    Sleep apnea, unspecified type  Comments:  will do sleep study and pulmonary.  Orders:  -     Ambulatory referral to Sleep Disorders  -     Ambulatory consult to Pulmonology

## 2019-05-13 RX ORDER — ARIPIPRAZOLE 5 MG/1
TABLET ORAL
Qty: 90 TABLET | Refills: 1 | Status: SHIPPED | OUTPATIENT
Start: 2019-05-13 | End: 2019-10-31 | Stop reason: SDUPTHER

## 2019-05-20 NOTE — TELEPHONE ENCOUNTER
Confirmed patient's preferred pharmacy, allergies, and current medications in Epic.    Forwarded to Dr. Nuñez to review.

## 2019-05-21 RX ORDER — HYDROCODONE BITARTRATE AND ACETAMINOPHEN 5; 325 MG/1; MG/1
1 TABLET ORAL EVERY 6 HOURS PRN
Qty: 120 TABLET | Refills: 0 | Status: SHIPPED | OUTPATIENT
Start: 2019-05-21 | End: 2019-06-20 | Stop reason: SDUPTHER

## 2019-06-18 ENCOUNTER — OFFICE VISIT (OUTPATIENT)
Dept: PULMONOLOGY | Facility: CLINIC | Age: 44
End: 2019-06-18
Payer: COMMERCIAL

## 2019-06-18 ENCOUNTER — TELEPHONE (OUTPATIENT)
Dept: PULMONOLOGY | Facility: HOSPITAL | Age: 44
End: 2019-06-18

## 2019-06-18 ENCOUNTER — PATIENT MESSAGE (OUTPATIENT)
Dept: PULMONOLOGY | Facility: CLINIC | Age: 44
End: 2019-06-18

## 2019-06-18 ENCOUNTER — HOSPITAL ENCOUNTER (OUTPATIENT)
Dept: RADIOLOGY | Facility: HOSPITAL | Age: 44
Discharge: HOME | End: 2019-06-18
Attending: INTERNAL MEDICINE
Payer: COMMERCIAL

## 2019-06-18 VITALS
HEART RATE: 97 BPM | WEIGHT: 209.69 LBS | RESPIRATION RATE: 17 BRPM | OXYGEN SATURATION: 96 % | DIASTOLIC BLOOD PRESSURE: 70 MMHG | BODY MASS INDEX: 30.02 KG/M2 | SYSTOLIC BLOOD PRESSURE: 110 MMHG | HEIGHT: 70 IN

## 2019-06-18 DIAGNOSIS — R29.818 SUSPECTED SLEEP APNEA: Primary | ICD-10-CM

## 2019-06-18 DIAGNOSIS — R91.8 GROUND GLASS OPACITY PRESENT ON IMAGING OF LUNG: ICD-10-CM

## 2019-06-18 DIAGNOSIS — R06.83 SNORING: ICD-10-CM

## 2019-06-18 DIAGNOSIS — G47.8 NON-RESTORATIVE SLEEP: ICD-10-CM

## 2019-06-18 DIAGNOSIS — G47.19 EXCESSIVE DAYTIME SLEEPINESS: ICD-10-CM

## 2019-06-18 PROCEDURE — 71046 XR CHEST PA AND LATERAL: ICD-10-PCS | Mod: 26,,, | Performed by: RADIOLOGY

## 2019-06-18 PROCEDURE — 99204 OFFICE O/P NEW MOD 45 MIN: CPT | Mod: S$GLB,,, | Performed by: INTERNAL MEDICINE

## 2019-06-18 PROCEDURE — 71046 X-RAY EXAM CHEST 2 VIEWS: CPT | Mod: 26,,, | Performed by: RADIOLOGY

## 2019-06-18 PROCEDURE — 3008F BODY MASS INDEX DOCD: CPT | Mod: CPTII,S$GLB,, | Performed by: INTERNAL MEDICINE

## 2019-06-18 PROCEDURE — 99204 PR OFFICE/OUTPT VISIT, NEW, LEVL IV, 45-59 MIN: ICD-10-PCS | Mod: S$GLB,,, | Performed by: INTERNAL MEDICINE

## 2019-06-18 PROCEDURE — 71046 X-RAY EXAM CHEST 2 VIEWS: CPT | Mod: TC

## 2019-06-18 PROCEDURE — 3008F PR BODY MASS INDEX (BMI) DOCUMENTED: ICD-10-PCS | Mod: CPTII,S$GLB,, | Performed by: INTERNAL MEDICINE

## 2019-06-18 PROCEDURE — 99999 PR PBB SHADOW E&M-EST. PATIENT-LVL III: ICD-10-PCS | Mod: PBBFAC,,, | Performed by: INTERNAL MEDICINE

## 2019-06-18 PROCEDURE — 99999 PR PBB SHADOW E&M-EST. PATIENT-LVL III: CPT | Mod: PBBFAC,,, | Performed by: INTERNAL MEDICINE

## 2019-06-18 NOTE — PATIENT INSTRUCTIONS
Your provider has scheduled you for a sleep study.   You should be receiving a phone call from the sleep lab shortly after your sleep study had been approved by your insurance. Please make sure you have your current phone number in the [facility] system. If you do not hear from anyone in the next 10 business days, please call the sleep lab at [phone] to schedule your sleep study with [facility].   The sleep studies are performed at [facility] seven nights a week. When you are scheduling your sleep study, they will also make you a follow up appointment with your provider. This follow up appointment will be 10-14 days after your sleep study to review the results. If it is noted that you do not have sleep apnea on your initial sleep study, you may receive a call back for a second night sleep study with the CPAP before you come back to the office.

## 2019-06-18 NOTE — PROGRESS NOTES
Initial Outpatient Pulmonary Evaluation       SUBJECTIVE:     Chief Complaint   Patient presents with    Snoring       History of Present Illness:    Patient is a 43 y.o. male referred for evaluation of obstructive sleep apnea.    Patient states that he did have a sleep study more than a decade ago in a was diagnosed with slight obstructive sleep apnea.    Today he is referred for excessive daytime sleepiness, [place] Sleepiness Scale score today of 15, snoring, chronic fatigue and nonrestorative sleep.    He does work as an .    He does go to bed at 8:00 p.m. due to severe fatigue, falls asleep within 30 min.  Wakes up between 5 and 6:30 a.m..    Patient has history of childhood asthma that he outgrew.    Does have chronic history of depression currently on Lexapro for about 20 years, Abilify was added 5 years ago.    He has a history of chronic back pain status post back surgery for pilonidal cysts.        Review of Systems   Constitutional: Positive for weight loss. Negative for fever and chills.        Lost 55 lb over the last few months with diet   HENT: Negative for nosebleeds.    Eyes: Negative for redness.   Respiratory: Positive for apnea, snoring and somnolence. Negative for cough, sputum production, choking, shortness of breath and wheezing.    Cardiovascular: Negative for chest pain.   Genitourinary: Negative for hematuria.   Endocrine: Negative for cold intolerance.    Musculoskeletal: Positive for arthralgias and back pain.   Skin: Negative for rash.        History of angioedema   Gastrointestinal: Negative for vomiting.        History of esophageal ulcer   Neurological: Negative for syncope.   Hematological: Negative for adenopathy.   Psychiatric/Behavioral: Positive for sleep disturbance. Negative for confusion. The patient is nervous/anxious.        Review of patient's allergies indicates:   Allergen Reactions    Iodinated contrast- oral  and iv dye Swelling     Tongue, right eye, lips swelling. Rash on abdomen and axilla    Codeine     Dairy aid [lactase]     Morphine     Nuts [tree nut]        Current Outpatient Medications   Medication Sig Dispense Refill    ARIPiprazole (ABILIFY) 5 MG Tab Take 1 tablet (5 mg total) by mouth once daily. 90 tablet 1    azelastine (ASTELIN) 137 mcg (0.1 %) nasal spray 1 spray (137 mcg total) by Nasal route 2 (two) times daily. 30 mL 3    escitalopram oxalate (LEXAPRO) 20 MG tablet TAKE 1 TABLET (20 MG TOTAL) BY MOUTH ONCE DAILY. 90 tablet 1    HYDROcodone-acetaminophen (NORCO) 5-325 mg per tablet Take 1 tablet by mouth every 6 (six) hours as needed for Pain. 120 tablet 0    linaclotide 290 mcg Cap Take 1 capsule (290 mcg total) by mouth once daily. 90 capsule 1    loratadine (CLARITIN) 10 mg tablet Take 10 mg by mouth once daily.      amoxicillin-clavulanate 875-125mg (AUGMENTIN) 875-125 mg per tablet TK 1 T PO BID FOR 10 DAYS  0    ARIPiprazole (ABILIFY) 5 MG Tab TAKE 1 TABLET BY MOUTH DAILY 90 tablet 1     No current facility-administered medications for this visit.        Past Medical History:   Diagnosis Date    Angioedema of lips 3/13/2015    IVP dye allergy - swelling lips, eye, tongue    Depression     Kidney stones      Past Surgical History:   Procedure Laterality Date    anal fistula repair      APPENDECTOMY      BACK SURGERY      CHOLECYSTECTOMY      COLONOSCOPY N/A 3/10/2016    [facility]    gall bladder removal  2005    KNEE SURGERY Right     KNEE SURGERY Left     PILONIDAL CYST DRAINAGE      pt has had 6 of these adn has had revisions     Family History   Problem Relation Age of Onset    Heart disease Father         CABG age 66    Cancer Father         melanoma    Diabetes Father     Diabetes Mother     Colon polyps Mother     Colon polyps Sister     Diabetes Maternal Grandmother     Colon cancer Maternal Grandmother     Diabetes  "Maternal Grandfather     Diabetes Paternal Grandmother     Stroke Paternal Grandmother     Diabetes Paternal Grandfather      Social History     Tobacco Use    Smoking status: Never Smoker    Smokeless tobacco: Never Used   Substance Use Topics    Alcohol use: No     Frequency: Never     Binge frequency: Never    Drug use: No          OBJECTIVE:     Vital Signs (Most Recent)  Vital Signs  Pulse: 97  Resp: 17  SpO2: 96 %  BP: 110/70  Height and Weight  Height: 5' 9.5" (176.5 cm)  Weight: 95.1 kg (209 lb 10.5 oz)  BSA (Calculated - sq m): 2.16 sq meters  BMI (Calculated): 30.6  Weight in (lb) to have BMI = 25: 171.4]  Wt Readings from Last 2 Encounters:   06/18/19 95.1 kg (209 lb 10.5 oz)   05/01/19 95.3 kg (210 lb 1.6 oz)         Physical Exam:  Physical Exam   Constitutional: He is oriented to person, place, and time. He appears well-developed.   HENT:   Head: Normocephalic.   Mouth/Throat: Mallampati Score: I.   Neck: Neck supple.   Neck circumference 16 in   Cardiovascular: Normal rate, regular rhythm and normal heart sounds.   Pulmonary/Chest: Normal expansion and effort normal. No stridor. No respiratory distress. He has rhonchi. He exhibits no tenderness.   Right-sided rhonchi   Abdominal: Soft.   Musculoskeletal: He exhibits no tenderness.   Lymphadenopathy:     He has no cervical adenopathy.   Neurological: He is alert and oriented to person, place, and time. Gait normal.   Skin: Skin is warm. No cyanosis. Nails show no clubbing.   Psychiatric: He has a normal mood and affect. His behavior is normal. Judgment and thought content normal.   Nursing note and vitals reviewed.      Laboratory  Lab Results   Component Value Date    WBC 7.01 11/20/2018    RBC 4.99 11/20/2018    HGB 14.2 11/20/2018    HCT 43.4 11/20/2018    MCV 87 11/20/2018    MCH 28.5 11/20/2018    MCHC 32.7 11/20/2018    RDW 13.8 11/20/2018     11/20/2018    MPV 13.1 (H) 11/20/2018    GRAN 4.3 11/20/2018    GRAN 61.4 11/20/2018    " LYMPH 1.8 11/20/2018    LYMPH 25.7 11/20/2018    MONO 0.6 11/20/2018    MONO 9.0 11/20/2018    EOS 0.2 11/20/2018    BASO 0.05 11/20/2018    EOSINOPHIL 3.1 11/20/2018    BASOPHIL 0.7 11/20/2018       BMP  Lab Results   Component Value Date     11/20/2018    K 4.2 11/20/2018     11/20/2018    CO2 28 11/20/2018    BUN 17 11/20/2018    CREATININE 0.9 11/20/2018    CALCIUM 9.8 11/20/2018    ANIONGAP 7 (L) 11/20/2018    ESTGFRAFRICA >60.0 11/20/2018    EGFRNONAA >60.0 11/20/2018    AST 29 11/20/2018    ALT 68 (H) 11/20/2018    PROT 7.3 11/20/2018       No results found for: BNP    No results found for: TSH    No results found for: SEDRATE    Lab Results   Component Value Date    CRP 1.0 02/26/2016         Diagnostic Results:    I have personally reviewed today the following studies :    CT chest PE protocol 2015 showed ground-glass opacities in the lower lobes.    ASSESSMENT/PLAN:     Suspected sleep apnea  -     Home Sleep Studies; Future    Snoring  -     Home Sleep Studies; Future    Excessive daytime sleepiness  -     Home Sleep Studies; Future    Non-restorative sleep  -     Home Sleep Studies; Future    Ground glass opacity present on imaging of lung  -     X-Ray Chest PA And Lateral; Future; Expected date: 06/18/2019    Further recommendations to follow above workup.    Follow up in about 2 months (around 8/18/2019).    This note was prepared using voice recognition system and is likely to have sound alike errors that may have been overlooked even after proof reading.  Please call me with any questions    Discussed diagnosis, its evaluation, treatment and usual course. All questions answered.    Thank you for the courtesy of participating in the care of this patient    Leighann Ruffin MD

## 2019-06-18 NOTE — LETTER
June 18, 2019      Pramod Nuñez MD  [address]  [place] [address]           [facility]  [address] [facility] [address]  Phone: [phone]  Fax: [phone]          Patient: Dax Carlisle   MR Number: [id number]   YOB: 1975   Date of Visit: 6/18/2019       Dear Dr. Pramod Nuñez:    Thank you for referring Dax Carlisle to me for evaluation. Attached you will find relevant portions of my assessment and plan of care.    If you have questions, please do not hesitate to call me. I look forward to following Dax Carlisle along with you.    Sincerely,    Leighann Ruffin MD    Enclosure  CC:  No Recipients    If you would like to receive this communication electronically, please contact [email or web address] or [phone] to request more information on [employer] Link access.    For providers and/or their staff who would like to refer a patient to [facility], please contact us through our one-stop-shop provider referral line, [facility] [place], at [phone].    If you feel you have received this communication in error or would no longer like to receive these types of communications, please e-mail [email or web address]

## 2019-06-20 RX ORDER — HYDROCODONE BITARTRATE AND ACETAMINOPHEN 5; 325 MG/1; MG/1
1 TABLET ORAL EVERY 6 HOURS PRN
Qty: 120 TABLET | Refills: 0 | Status: SHIPPED | OUTPATIENT
Start: 2019-06-20 | End: 2019-07-19 | Stop reason: SDUPTHER

## 2019-07-01 ENCOUNTER — PROCEDURE VISIT (OUTPATIENT)
Dept: SLEEP MEDICINE | Facility: CLINIC | Age: 44
End: 2019-07-01
Payer: COMMERCIAL

## 2019-07-01 DIAGNOSIS — G47.8 NON-RESTORATIVE SLEEP: ICD-10-CM

## 2019-07-01 DIAGNOSIS — R06.83 PRIMARY SNORING: Primary | ICD-10-CM

## 2019-07-01 DIAGNOSIS — R29.818 SUSPECTED SLEEP APNEA: ICD-10-CM

## 2019-07-01 DIAGNOSIS — G47.19 EXCESSIVE DAYTIME SLEEPINESS: ICD-10-CM

## 2019-07-01 PROCEDURE — 95800 SLP STDY UNATTENDED: CPT | Mod: 26,,, | Performed by: INTERNAL MEDICINE

## 2019-07-01 PROCEDURE — 95800 PR SLEEP STUDY, UNATTENDED, RECORD HEART RATE/O2 SAT/RESP ANAL/SLEEP TIME: ICD-10-PCS | Mod: 26,,, | Performed by: INTERNAL MEDICINE

## 2019-07-01 PROCEDURE — 99499 UNLISTED E&M SERVICE: CPT | Mod: S$GLB,,, | Performed by: INTERNAL MEDICINE

## 2019-07-01 PROCEDURE — 95800 SLP STDY UNATTENDED: CPT

## 2019-07-01 PROCEDURE — 99499 NO LOS: ICD-10-PCS | Mod: S$GLB,,, | Performed by: INTERNAL MEDICINE

## 2019-07-01 NOTE — PROCEDURES
Home Sleep Studies  Date/Time: 7/1/2019 5:53 PM  Performed by: Patrice Connor MD  Authorized by: Leighann Ruffin MD       PHYSICIAN INTERPRETATION AND COMMENTS: Findings are consistent with Primary snoring. overall AHI 2.0  events per hour. Total sleep time was 6.2 hr. Moderate snoring was recorded. Please consider repeating study if suspicion for  sleep disorder breathing is high.  CLINICAL HISTORY: 43 year old male presented with: Snoring and daytime sleepiness. Clinical notes indicate patient has  had sleep evaluation in the past. This report is not available. Taking Lexapro and Abilify. Recent 55 lb weight loss through diet.  16.3 inch neck, BMI of 29, an [place] sleepiness score of 15, history of depression and symptoms of nocturnal snoring and  waking up choking. Based on the clinical history, the patient has a high pre-test probability of having moderate LUNA.  SLEEP STUDY FINDINGS: Patient underwent a one night Home Sleep Test and by behavioral criteria, slept for  approximately 6.2 hours, with a sleep latency of 5 minutes and a sleep efficiency of 88.9%. The patient slept supine 7.4% of the  night based on valid recording time of 6.23 hours. Snoring occurs for 22.4% (30 dB) of the study, 15.7% is very loud. The mean  pulse rate is 70 BPM, with frequent pulse rate variability (50 events with >= 6 BPM increase/decrease per hour).  TREATMENT CONSIDERATIONS: The high pre-test probability is inconsistent with the AHI severity. Consider further  clinical evaluation. A Mandibular Advancement Splint (MAS) will likely provide treatment benefit independent of LUNA severity.  DISEASE MANAGEMENT CONSIDERATIONS: None.

## 2019-07-01 NOTE — PROGRESS NOTES
PHYSICIAN INTERPRETATION AND COMMENTS: Findings are consistent with Primary snoring. overall AHI 2.0  events per hour. Total sleep time was 6.2 hr. Moderate snoring was recorded. Please consider repeating study if suspicion for  sleep disorder breathing is high.  CLINICAL HISTORY: 43 year old male presented with: Snoring and daytime sleepiness. Clinical notes indicate patient has  had sleep evaluation in the past. This report is not available. Taking Lexapro and Abilify. Recent 55 lb weight loss through diet.  16.3 inch neck, BMI of 29, an [place] sleepiness score of 15, history of depression and symptoms of nocturnal snoring and  waking up choking. Based on the clinical history, the patient has a high pre-test probability of having moderate LUNA.  SLEEP STUDY FINDINGS: Patient underwent a one night Home Sleep Test and by behavioral criteria, slept for  approximately 6.2 hours, with a sleep latency of 5 minutes and a sleep efficiency of 88.9%. The patient slept supine 7.4% of the  night based on valid recording time of 6.23 hours. Snoring occurs for 22.4% (30 dB) of the study, 15.7% is very loud. The mean  pulse rate is 70 BPM, with frequent pulse rate variability (50 events with >= 6 BPM increase/decrease per hour).  TREATMENT CONSIDERATIONS: The high pre-test probability is inconsistent with the AHI severity. Consider further  clinical evaluation. A Mandibular Advancement Splint (MAS) will likely provide treatment benefit independent of LUNA severity.  DISEASE MANAGEMENT CONSIDERATIONS: None.

## 2019-07-01 NOTE — Clinical Note
PHYSICIAN INTERPRETATION AND COMMENTS: Findings are consistent with Primary snoring. overall AHI 2.0events per hour. Total sleep time was 6.2 hr. Moderate snoring was recorded. Please consider repeating study if suspicion forsleep disorder breathing is high.CLINICAL HISTORY: 43 year old male presented with: Snoring and daytime sleepiness. Clinical notes indicate patient hashad sleep evaluation in the past. This report is not available. Taking Lexapro and Abilify. Recent 55 lb weight loss through diet.16.3 inch neck, BMI of 29, an [place] sleepiness score of 15, history of depression and symptoms of nocturnal snoring andwaking up choking. Based on the clinical history, the patient has a high pre-test probability of having moderate LUNA.SLEEP STUDY FINDINGS: Patient underwent a one night Home Sleep Test and by behavioral criteria, slept forapproximately 6.2 hours, with a sleep latency of 5 minutes and a sleep efficiency of 88.9%. The patient slept supine 7.4% of thenight based on valid recordin

## 2019-07-09 DIAGNOSIS — R06.83 SNORING: Primary | ICD-10-CM

## 2019-07-10 ENCOUNTER — PATIENT MESSAGE (OUTPATIENT)
Dept: PULMONOLOGY | Facility: CLINIC | Age: 44
End: 2019-07-10

## 2019-07-22 RX ORDER — HYDROCODONE BITARTRATE AND ACETAMINOPHEN 5; 325 MG/1; MG/1
1 TABLET ORAL EVERY 6 HOURS PRN
Qty: 120 TABLET | Refills: 0 | Status: SHIPPED | OUTPATIENT
Start: 2019-07-22 | End: 2019-08-20 | Stop reason: SDUPTHER

## 2019-08-20 RX ORDER — HYDROCODONE BITARTRATE AND ACETAMINOPHEN 5; 325 MG/1; MG/1
1 TABLET ORAL EVERY 6 HOURS PRN
Qty: 120 TABLET | Refills: 0 | Status: SHIPPED | OUTPATIENT
Start: 2019-08-20 | End: 2019-09-20 | Stop reason: SDUPTHER

## 2019-09-03 ENCOUNTER — OFFICE VISIT (OUTPATIENT)
Dept: INTERNAL MEDICINE | Facility: CLINIC | Age: 44
End: 2019-09-03
Payer: COMMERCIAL

## 2019-09-03 ENCOUNTER — HOSPITAL ENCOUNTER (OUTPATIENT)
Dept: RADIOLOGY | Facility: HOSPITAL | Age: 44
Discharge: HOME | End: 2019-09-03
Attending: FAMILY MEDICINE
Payer: COMMERCIAL

## 2019-09-03 VITALS
HEART RATE: 94 BPM | TEMPERATURE: 98 F | WEIGHT: 216.5 LBS | OXYGEN SATURATION: 98 % | SYSTOLIC BLOOD PRESSURE: 110 MMHG | DIASTOLIC BLOOD PRESSURE: 80 MMHG | BODY MASS INDEX: 30.99 KG/M2 | HEIGHT: 70 IN

## 2019-09-03 DIAGNOSIS — G89.4 CHRONIC PAIN SYNDROME: ICD-10-CM

## 2019-09-03 DIAGNOSIS — M54.6 ACUTE MIDLINE THORACIC BACK PAIN: ICD-10-CM

## 2019-09-03 DIAGNOSIS — F32.5 MAJOR DEPRESSIVE DISORDER WITH SINGLE EPISODE, IN REMISSION: Primary | ICD-10-CM

## 2019-09-03 PROCEDURE — 99214 PR OFFICE/OUTPT VISIT, EST, LEVL IV, 30-39 MIN: ICD-10-PCS | Mod: S$GLB,,, | Performed by: FAMILY MEDICINE

## 2019-09-03 PROCEDURE — 99999 PR PBB SHADOW E&M-EST. PATIENT-LVL III: ICD-10-PCS | Mod: PBBFAC,,, | Performed by: FAMILY MEDICINE

## 2019-09-03 PROCEDURE — 99214 OFFICE O/P EST MOD 30 MIN: CPT | Mod: S$GLB,,, | Performed by: FAMILY MEDICINE

## 2019-09-03 PROCEDURE — 99999 PR PBB SHADOW E&M-EST. PATIENT-LVL III: CPT | Mod: PBBFAC,,, | Performed by: FAMILY MEDICINE

## 2019-09-03 PROCEDURE — 72070 XR THORACIC SPINE AP LATERAL: ICD-10-PCS | Mod: 26,,, | Performed by: RADIOLOGY

## 2019-09-03 PROCEDURE — 3008F PR BODY MASS INDEX (BMI) DOCUMENTED: ICD-10-PCS | Mod: CPTII,S$GLB,, | Performed by: FAMILY MEDICINE

## 2019-09-03 PROCEDURE — 72070 X-RAY EXAM THORAC SPINE 2VWS: CPT | Mod: 26,,, | Performed by: RADIOLOGY

## 2019-09-03 PROCEDURE — 72070 X-RAY EXAM THORAC SPINE 2VWS: CPT | Mod: TC

## 2019-09-03 PROCEDURE — 3008F BODY MASS INDEX DOCD: CPT | Mod: CPTII,S$GLB,, | Performed by: FAMILY MEDICINE

## 2019-09-03 RX ORDER — ARIPIPRAZOLE 5 MG/1
5 TABLET ORAL DAILY
Qty: 90 TABLET | Refills: 1 | Status: SHIPPED | OUTPATIENT
Start: 2019-09-03 | End: 2020-11-16

## 2019-09-03 RX ORDER — MELOXICAM 15 MG/1
15 TABLET ORAL DAILY
Qty: 30 TABLET | Refills: 1 | Status: SHIPPED | OUTPATIENT
Start: 2019-09-03 | End: 2020-02-17 | Stop reason: SDUPTHER

## 2019-09-03 RX ORDER — CYCLOBENZAPRINE HCL 10 MG
10 TABLET ORAL NIGHTLY
Qty: 30 TABLET | Refills: 0 | Status: SHIPPED | OUTPATIENT
Start: 2019-09-03 | End: 2019-09-13

## 2019-09-03 RX ORDER — METHOCARBAMOL 750 MG/1
750 TABLET, FILM COATED ORAL 3 TIMES DAILY
Qty: 90 TABLET | Refills: 1 | Status: SHIPPED | OUTPATIENT
Start: 2019-09-03 | End: 2019-09-13

## 2019-09-03 NOTE — PROGRESS NOTES
Subjective:       Patient ID: Dax Carlisle is a 43 y.o. male.    Chief Complaint: Follow-up    F/U:      Pt is a 43 year old who pulled his back a few weeks ago not back to normal. [place] a pop. Pt went to [facility] and was placed on muscle relaxer. Pt reports it has hurt mid back pain    Review of Systems   Constitutional: Negative.    Respiratory: Negative.    Cardiovascular: Negative.    Genitourinary: Negative.    Musculoskeletal: Positive for back pain.   Neurological: Negative.    Psychiatric/Behavioral: Negative.        Objective:      Physical Exam   Constitutional: He is oriented to person, place, and time. He appears well-developed.   Cardiovascular: Normal rate and regular rhythm. Exam reveals no friction rub.   No murmur heard.  Pulmonary/Chest: Effort normal and breath sounds normal. No stridor. He has no wheezes.   Neurological: He is alert and oriented to person, place, and time.   Skin: Skin is warm and dry.       Assessment:       1. Major depressive disorder with single episode, in remission    2. Chronic pain syndrome    3. Acute midline thoracic back pain        Plan:       Major depressive disorder with single episode, in remission  Comments:  Continue with the abilify    Chronic pain syndrome  Comments:  Continue with the norco    Acute midline thoracic back pain  Comments:  Will get thoracic xray  Orders:  -     X-Ray Thoracic Spine AP Lateral; Future; Expected date: 09/03/2019  -     Ambulatory consult to Pain Clinic    Other orders  -     methocarbamol (ROBAXIN) 750 MG Tab; Take 1 tablet (750 mg total) by mouth 3 (three) times daily. for 10 days  Dispense: 90 tablet; Refill: 1  -     meloxicam (MOBIC) 15 MG tablet; Take 1 tablet (15 mg total) by mouth once daily.  Dispense: 30 tablet; Refill: 1  -     cyclobenzaprine (FLEXERIL) 10 MG tablet; Take 1 tablet (10 mg total) by mouth every evening. for 10 days  Dispense: 30 tablet; Refill: 0  -     ARIPiprazole (ABILIFY) 5 MG Tab; Take 1 tablet  (5 mg total) by mouth once daily.  Dispense: 90 tablet; Refill: 1

## 2019-09-05 ENCOUNTER — PATIENT MESSAGE (OUTPATIENT)
Dept: INTERNAL MEDICINE | Facility: CLINIC | Age: 44
End: 2019-09-05

## 2019-09-06 DIAGNOSIS — S22.000A CLOSED COMPRESSION FRACTURE OF THORACIC VERTEBRA, INITIAL ENCOUNTER: Primary | ICD-10-CM

## 2019-09-12 RX ORDER — ESCITALOPRAM OXALATE 20 MG/1
20 TABLET ORAL DAILY
Qty: 90 TABLET | Refills: 1 | Status: SHIPPED | OUTPATIENT
Start: 2019-09-12 | End: 2020-03-10 | Stop reason: SDUPTHER

## 2019-09-19 ENCOUNTER — OFFICE VISIT (OUTPATIENT)
Dept: PAIN MEDICINE | Facility: CLINIC | Age: 44
End: 2019-09-19
Payer: COMMERCIAL

## 2019-09-19 VITALS
WEIGHT: 216.5 LBS | SYSTOLIC BLOOD PRESSURE: 118 MMHG | DIASTOLIC BLOOD PRESSURE: 83 MMHG | BODY MASS INDEX: 30.99 KG/M2 | HEART RATE: 102 BPM | HEIGHT: 70 IN

## 2019-09-19 DIAGNOSIS — R10.2 PELVIC PAIN IN MALE: Primary | ICD-10-CM

## 2019-09-19 DIAGNOSIS — L05.91 PILONIDAL CYST: ICD-10-CM

## 2019-09-19 DIAGNOSIS — R93.7 ABNORMAL X-RAY OF THORACIC SPINE: ICD-10-CM

## 2019-09-19 DIAGNOSIS — S22.000A CLOSED COMPRESSION FRACTURE OF THORACIC VERTEBRA, INITIAL ENCOUNTER: ICD-10-CM

## 2019-09-19 PROCEDURE — 99204 PR OFFICE/OUTPT VISIT, NEW, LEVL IV, 45-59 MIN: ICD-10-PCS | Mod: S$GLB,,, | Performed by: ANESTHESIOLOGY

## 2019-09-19 PROCEDURE — 99999 PR PBB SHADOW E&M-EST. PATIENT-LVL IV: ICD-10-PCS | Mod: PBBFAC,,, | Performed by: ANESTHESIOLOGY

## 2019-09-19 PROCEDURE — 99999 PR PBB SHADOW E&M-EST. PATIENT-LVL IV: CPT | Mod: PBBFAC,,, | Performed by: ANESTHESIOLOGY

## 2019-09-19 PROCEDURE — 99204 OFFICE O/P NEW MOD 45 MIN: CPT | Mod: S$GLB,,, | Performed by: ANESTHESIOLOGY

## 2019-09-19 RX ORDER — CYCLOBENZAPRINE HCL 10 MG
10 TABLET ORAL 3 TIMES DAILY PRN
Qty: 90 TABLET | Refills: 0 | Status: CANCELLED
Start: 2019-09-19 | End: 2019-10-19

## 2019-09-19 RX ORDER — CYCLOBENZAPRINE HCL 10 MG
10 TABLET ORAL 3 TIMES DAILY PRN
Qty: 90 TABLET | Refills: 1 | Status: SHIPPED | OUTPATIENT
Start: 2019-09-19 | End: 2019-10-19

## 2019-09-19 NOTE — PROGRESS NOTES
Chief Pain Complaint:  Thoracic Back Pain   Sacral Pain       History of Present Illness:   Dax Carlisle is a 43 y.o. male  who is presenting with a chief complaint of Back Pain  . The patient began experiencing this problem abruptly, and the pain has been gradually worsening over the past 3 month(s). The pain is described as throbbing, cramping, aching and heavy and is located in the bilateral thoracic spine. Pain is intermittent and lasts hours. The  pain is nonradiating. The patient rates his pain a 8 out of ten and interferes with activities of daily living a 7 out of ten. Pain is exacerbated by extension/rotation of thoracic spine , and is improved by rest. Patient reports prior trauma, no prior spinal surgery. Recurrent Pilonidal cyst with multiple surgeries (>10).      - pertinent negatives: No fever, No chills, No weight loss, No bladder dysfunction, No bowel dysfunction, No saddle anesthesia  - pertinent positives: none    - medications, other therapies tried (physical therapy, injections):     >> NSAIDs, Tylenol, Tramadol, Norco, gabapentin and flexeril    >> Has previously undergone Physical Therapy    >> Has previously undergone spinal injection/s          Prior Ganglion Impar Block with excellent relief for 2 year       Imaging / Labs / Studies (reviewed on 9/19/2019):      Results for orders placed during the hospital encounter of 03/12/15   CT Cervical Spine Without Contrast    Narrative Exam: CT of the cervical spine without contrast.    History:  Neck pain.    Findings: Thin section axial images were acquired. Reformatted images were generated in the sagittal and coronal planes.    No fracture, precervical soft tissue swelling, or subluxation identified. No CT evidence of central canal stenosis or traumatic disc herniation.    Impression      Unremarkable exam.      Electronically signed by: DOLORES HERNANDEZ MD  Date:     03/12/15  Time:    18:56          Review of Systems:  CONSTITUTIONAL:  "patient denies any fever, chills, or weight loss  SKIN: patient denies any rash or itching  RESPIRATORY: patient denies having any shortness of breath  GASTROINTESTINAL: patient denies having any diarrhea, constipation, or bowel incontinence  GENITOURINARY: patient denies having any abnormal bladder function    MUSCULOSKELETAL:  - patient complains of the above noted pain/s (see chief pain complaint)    NEUROLOGICAL:   - pain as above  - strength in Lower extremities is intact, BILATERALLY  - sensation in Lower extremities is intact, BILATERALLY  - patient denies any loss of bowel or bladder control      PSYCHIATRIC: patient denies any change in mood    Other:  All other systems reviewed and are negative      Physical Exam:  /83 (BP Location: Right arm, Patient Position: Sitting)   Pulse 102   Ht 5' 9.5" (1.765 m)   Wt 98.2 kg (216 lb 7.9 oz)   BMI 31.51 kg/m²  (reviewed on 9/19/2019)  General: Alert and oriented, in no apparent distress.  Gait: normal gait.  Skin: No rashes, No discoloration, No obvious lesions  HEENT: Normocephalic, atraumatic. Pupils equal and round.  Cardiovascular: Regular rate and rhythm , no significant peripheral edema present  Respiratory: Without audible wheezing, without use of accessory muscles of respiration.    Musculoskeletal:    Cervical Spine    - Pain on flexion of cervical spine Absent  - Spurling's Test:  Absent    - Pain on extension of cervical spine Absent  - TTP over the cervical facet joints Absent  - Cervical facet loading Absent    Thoracic Spine     - Pain on extension of Thoracic spine Present  - TTP over the Thoracic facet joints Present T8   - Thoracic facet loading Present    Lumbar Spine    - Pain on flexion of lumbar spine Absent  - Straight Leg Raise:  Absent    - Pain on extension of lumbar spine Absent  - TTP over the lumbar facet joints Absent  - Lumbar facet loading Absent    -Pain on palpation over the SI joint  Absent  - MINOR: Absent    TTP over " coccyx       Neuro:    Strength:  UE R/L: D: 5/5; B: 5/5; T: 5/5; WF: 5/5; WE: 5/5; IO: 5/5;  LE R/L: HF: 5/5, HE: 5/5, KF: 5/5; KE: 5/5; FE: 5/5; FF: 5/5    Extremity Reflexes: Brisk and symmetric throughout.      Extremity Sensory: Sensation to pinprick and temperature symmetric. Proprioception intact.      Psych:  Mood and affect is appropriate      Assessment:    Dax Carlisle is a 43 y.o. year old male who is presenting with     Encounter Diagnoses   Name Primary?    Closed compression fracture of thoracic vertebra, initial encounter     Abnormal x-ray of thoracic spine     Pilonidal cyst     Pelvic pain in male Yes       Plan:    1. Interventional: Schedule the patient for Sacrococcygeal ligament injection with RN IV sedation.    2. Pharmacologic: Continue Flexeril 10 mg PO TID PRN. Patient on [place] from primary.     3. Rehabilitative:TSLO Brace.    4. Diagnostic: Thoracic MRI.     5. Consult: Neurosurgery for Thoracic compression fracture.     6. Follow up: Post Injection.    20 minutes were spent in this encounter with more than 50% of the time used for counseling and review of the plan.  Imaging / studies reviewed, detailed above.  I discussed in detail the risks, benefits, and alternatives to any and all potential treatment options.  All questions and concerns were fully addressed today in clinic. Medical decision making moderate.    Thank you for the opportunity to assist in the care of this patient.    Best wishes,    Signed:    Ap Joel MD          Disclaimer:  This note may have been prepared using voice recognition software, it may have not been extensively proofed, as such there could be errors within the text such as sound alike errors.

## 2019-09-19 NOTE — LETTER
September 19, 2019      Pramod Nuñez MD  [address] [facility] [address]           [facility]  [address] [facility] [address]  Phone: [phone]  Fax: [phone]          Patient: Dax Carlisle   MR Number: [id number]   YOB: 1975   Date of Visit: 9/19/2019       Dear Dr. Pramod Nuñez:    Thank you for referring Dax Carlisle to me for evaluation. Attached you will find relevant portions of my assessment and plan of care.    If you have questions, please do not hesitate to call me. I look forward to following Dax Carlisle along with you.    Sincerely,    Ap Joel MD    Enclosure  CC:  No Recipients    If you would like to receive this communication electronically, please contact [email or web address] or [phone] to request more information on [employer] Link access.    For providers and/or their staff who would like to refer a patient to [facility], please contact us through our one-stop-shop provider referral line, [facility], at [phone].    If you feel you have received this communication in error or would no longer like to receive these types of communications, please e-mail [email or web address]

## 2019-09-20 ENCOUNTER — HOSPITAL ENCOUNTER (OUTPATIENT)
Dept: RADIOLOGY | Facility: HOSPITAL | Age: 44
Discharge: HOME | End: 2019-09-20
Attending: ANESTHESIOLOGY
Payer: COMMERCIAL

## 2019-09-20 DIAGNOSIS — R93.7 ABNORMAL X-RAY OF THORACIC SPINE: ICD-10-CM

## 2019-09-20 PROCEDURE — 72146 MRI CHEST SPINE W/O DYE: CPT | Mod: TC

## 2019-09-23 RX ORDER — HYDROCODONE BITARTRATE AND ACETAMINOPHEN 5; 325 MG/1; MG/1
1 TABLET ORAL EVERY 6 HOURS PRN
Qty: 120 TABLET | Refills: 0 | Status: SHIPPED | OUTPATIENT
Start: 2019-09-23 | End: 2019-10-21 | Stop reason: SDUPTHER

## 2019-09-24 ENCOUNTER — OFFICE VISIT (OUTPATIENT)
Dept: NEUROSURGERY | Facility: CLINIC | Age: 44
End: 2019-09-24
Payer: COMMERCIAL

## 2019-09-24 VITALS
HEART RATE: 81 BPM | HEIGHT: 70 IN | WEIGHT: 216 LBS | BODY MASS INDEX: 30.92 KG/M2 | DIASTOLIC BLOOD PRESSURE: 84 MMHG | SYSTOLIC BLOOD PRESSURE: 112 MMHG

## 2019-09-24 DIAGNOSIS — S22.000A CLOSED COMPRESSION FRACTURE OF THORACIC VERTEBRA, INITIAL ENCOUNTER: Primary | ICD-10-CM

## 2019-09-24 PROCEDURE — 99999 PR PBB SHADOW E&M-EST. PATIENT-LVL III: CPT | Mod: PBBFAC,,, | Performed by: NEUROLOGICAL SURGERY

## 2019-09-24 PROCEDURE — 3008F PR BODY MASS INDEX (BMI) DOCUMENTED: ICD-10-PCS | Mod: CPTII,S$GLB,, | Performed by: NEUROLOGICAL SURGERY

## 2019-09-24 PROCEDURE — 99999 PR PBB SHADOW E&M-EST. PATIENT-LVL III: ICD-10-PCS | Mod: PBBFAC,,, | Performed by: NEUROLOGICAL SURGERY

## 2019-09-24 PROCEDURE — 3008F BODY MASS INDEX DOCD: CPT | Mod: CPTII,S$GLB,, | Performed by: NEUROLOGICAL SURGERY

## 2019-09-24 PROCEDURE — 99203 PR OFFICE/OUTPT VISIT, NEW, LEVL III, 30-44 MIN: ICD-10-PCS | Mod: S$GLB,,, | Performed by: NEUROLOGICAL SURGERY

## 2019-09-24 PROCEDURE — 99203 OFFICE O/P NEW LOW 30 MIN: CPT | Mod: S$GLB,,, | Performed by: NEUROLOGICAL SURGERY

## 2019-09-24 NOTE — LETTER
November 6, 2019      Ap Joel MD  [address] [facility] [address]           [facility]  [address]  [place] [address]  Phone: [phone]  Fax: [phone]          Patient: Dax Carlisle   MR Number: [id number]   YOB: 1975   Date of Visit: 9/24/2019       Dear Dr. Ap Joel:    Thank you for referring Dax Carlisle to me for evaluation. Attached you will find relevant portions of my assessment and plan of care.    If you have questions, please do not hesitate to call me. I look forward to following Dax Carlisle along with you.    Sincerely,    Mando Stephen MD    Enclosure  CC:  No Recipients    If you would like to receive this communication electronically, please contact [email or web address] or [phone] to request more information on [employer] Link access.    For providers and/or their staff who would like to refer a patient to [facility], please contact us through our one-stop-shop provider referral line, [facility], at [phone].    If you feel you have received this communication in error or would no longer like to receive these types of communications, please e-mail [email or web address]

## 2019-10-04 ENCOUNTER — HOSPITAL ENCOUNTER (OUTPATIENT)
Dept: RADIOLOGY | Facility: HOSPITAL | Age: 44
Discharge: HOME | DRG: 329 | End: 2019-10-04
Attending: FAMILY MEDICINE
Payer: COMMERCIAL

## 2019-10-04 ENCOUNTER — OFFICE VISIT (OUTPATIENT)
Dept: INTERNAL MEDICINE | Facility: CLINIC | Age: 44
End: 2019-10-04
Payer: COMMERCIAL

## 2019-10-04 VITALS
HEIGHT: 70 IN | HEART RATE: 126 BPM | TEMPERATURE: 100 F | WEIGHT: 211.88 LBS | BODY MASS INDEX: 30.33 KG/M2 | DIASTOLIC BLOOD PRESSURE: 84 MMHG | OXYGEN SATURATION: 95 % | SYSTOLIC BLOOD PRESSURE: 132 MMHG

## 2019-10-04 DIAGNOSIS — R50.9 FEVER, UNSPECIFIED FEVER CAUSE: ICD-10-CM

## 2019-10-04 DIAGNOSIS — R10.30 LOWER ABDOMINAL PAIN: Primary | ICD-10-CM

## 2019-10-04 DIAGNOSIS — R10.30 LOWER ABDOMINAL PAIN: ICD-10-CM

## 2019-10-04 PROCEDURE — 99999 PR PBB SHADOW E&M-EST. PATIENT-LVL III: ICD-10-PCS | Mod: PBBFAC,,, | Performed by: FAMILY MEDICINE

## 2019-10-04 PROCEDURE — 3008F PR BODY MASS INDEX (BMI) DOCUMENTED: ICD-10-PCS | Mod: CPTII,S$GLB,, | Performed by: FAMILY MEDICINE

## 2019-10-04 PROCEDURE — 99999 PR PBB SHADOW E&M-EST. PATIENT-LVL III: CPT | Mod: PBBFAC,,, | Performed by: FAMILY MEDICINE

## 2019-10-04 PROCEDURE — 3008F BODY MASS INDEX DOCD: CPT | Mod: CPTII,S$GLB,, | Performed by: FAMILY MEDICINE

## 2019-10-04 PROCEDURE — 74019 RADEX ABDOMEN 2 VIEWS: CPT | Mod: 26,,, | Performed by: RADIOLOGY

## 2019-10-04 PROCEDURE — 74019 RADEX ABDOMEN 2 VIEWS: CPT | Mod: TC

## 2019-10-04 PROCEDURE — 99215 PR OFFICE/OUTPT VISIT, EST, LEVL V, 40-54 MIN: ICD-10-PCS | Mod: S$GLB,,, | Performed by: FAMILY MEDICINE

## 2019-10-04 PROCEDURE — 74019 XR ABDOMEN FLAT AND ERECT: ICD-10-PCS | Mod: 26,,, | Performed by: RADIOLOGY

## 2019-10-04 PROCEDURE — 99215 OFFICE O/P EST HI 40 MIN: CPT | Mod: S$GLB,,, | Performed by: FAMILY MEDICINE

## 2019-10-04 RX ORDER — METRONIDAZOLE 500 MG/1
500 TABLET ORAL 3 TIMES DAILY
Qty: 30 TABLET | Refills: 0 | Status: ON HOLD | OUTPATIENT
Start: 2019-10-04 | End: 2019-10-17 | Stop reason: HOSPADM

## 2019-10-04 RX ORDER — CIPROFLOXACIN 500 MG/1
500 TABLET ORAL 2 TIMES DAILY
Qty: 20 TABLET | Refills: 0 | Status: ON HOLD | OUTPATIENT
Start: 2019-10-04 | End: 2019-10-17 | Stop reason: HOSPADM

## 2019-10-04 NOTE — PROGRESS NOTES
Subjective:   Patient ID: Dax Carlisle is a 43 y.o. male.  Chief Complaint:  Abdominal Pain and Fever      PCP Dr. Nuñez.    Presents for evaluation of abdominal pain.  Suprapubic and left lower quadrant.  Low-grade fever.    No nausea, vomiting, diarrhea.  Increased frequency of urination, but denies any dysuria or abnormal color / abnormal odor to urine.  2-3 day duration.  Has had increased constipation.  Similar episode 2016.  Colonoscopy with nonspecific inflammation.  Questionably resolved spontaneously.  Treated with antibiotics.  No obvious diverticulosis or diverticulitis on exam.    Abdominal Pain   This is a new problem. The current episode started in the past 7 days. The onset quality is gradual. The problem occurs constantly. The problem has been unchanged. The pain is located in the suprapubic region and LLQ. The pain is at a severity of 5/10. The pain is moderate. The quality of the pain is aching, cramping and sharp. The abdominal pain does not radiate. Associated symptoms include constipation, a fever and flatus. Pertinent negatives include no anorexia, belching, diarrhea, dysuria, frequency, headaches, hematochezia, hematuria, melena, myalgias, nausea, vomiting or weight loss. Nothing aggravates the pain. The pain is relieved by nothing. He has tried nothing for the symptoms. His past medical history is significant for abdominal surgery, gallstones and irritable bowel syndrome. There is no history of colon cancer, Crohn's disease, GERD, pancreatitis, PUD or ulcerative colitis. Patient's medical history does not include kidney stones and UTI.     Review of Systems   Constitutional: Positive for fever. Negative for chills, fatigue and weight loss.   HENT: Negative for congestion, ear pain, postnasal drip, rhinorrhea, sinus pressure, sneezing, sore throat, trouble swallowing and voice change.    Eyes: Negative for visual disturbance.   Respiratory: Negative for cough, shortness of breath and  "wheezing.    Cardiovascular: Negative for chest pain, palpitations and leg swelling.   Gastrointestinal: Positive for abdominal pain, constipation and flatus. Negative for anorexia, blood in stool, diarrhea, hematochezia, melena, nausea and vomiting.   Endocrine: Positive for polyuria.   Genitourinary: Negative for decreased urine volume, difficulty urinating, dysuria, flank pain, frequency, hematuria, testicular pain and urgency.   Musculoskeletal: Negative for myalgias.   Skin: Negative for rash.   Neurological: Negative for dizziness, weakness, light-headedness and headaches.     Objective:   /84 (BP Location: Left arm, Patient Position: Sitting, BP Method: Large (Manual))   Pulse (!) 126   Temp (!) 100.4 °F (38 °C) (Tympanic)   Ht 5' 9.5" (1.765 m)   Wt 96.1 kg (211 lb 13.8 oz)   SpO2 95%   BMI 30.84 kg/m²     Physical Exam   Constitutional: Vital signs are normal. He appears well-developed and well-nourished.  Non-toxic appearance. He does not have a sickly appearance. He does not appear ill. No distress.   HENT:   Mouth/Throat: Oropharynx is clear and moist.   Eyes: No scleral icterus.   Neck: Normal range of motion. Neck supple.   Cardiovascular: Regular rhythm and normal heart sounds. Tachycardia present. Exam reveals no gallop and no friction rub.   No murmur heard.  Pulmonary/Chest: Effort normal and breath sounds normal. He has no wheezes. He has no rhonchi. He has no rales.   Abdominal: Soft. Normal appearance and bowel sounds are normal. He exhibits no distension. There is no hepatosplenomegaly. There is generalized tenderness and tenderness in the suprapubic area. There is no rebound, no guarding and no CVA tenderness. No hernia.   Lymphadenopathy:     He has no cervical adenopathy.        Right: No inguinal adenopathy present.        Left: No inguinal adenopathy present.   Skin: Skin is warm, dry and intact. Capillary refill takes less than 2 seconds. No rash noted.   Psychiatric: His mood " appears anxious.   Nursing note and vitals reviewed.    Assessment:       ICD-10-CM ICD-9-CM   1. Lower abdominal pain R10.30 789.09   2. Fever, unspecified fever cause R50.9 780.60     Plan:   Lower abdominal pain  Fever, unspecified fever cause  -     Urinalysis; Future; Expected date: 10/04/2019  -     X-Ray Abdomen Flat And Erect; Future; Expected date: 10/04/2019  -     CBC auto differential; Future; Expected date: 10/04/2019  -     Comprehensive metabolic panel; Future; Expected date: 10/04/2019  -     ciprofloxacin HCl (CIPRO) 500 MG tablet; Take 1 tablet (500 mg total) by mouth 2 (two) times daily. for 10 days  Dispense: 20 tablet; Refill: 0  -     metroNIDAZOLE (FLAGYL) 500 MG tablet; Take 1 tablet (500 mg total) by mouth 3 (three) times daily. for 10 days  Dispense: 30 tablet; Refill: 0    Test results  CBC with elevated white blood cell count, mildly decreased hemoglobin hematocrit.    CMP with normal glucose, kidney, liver, electrolytes.    Urinalysis without suspicion for infection.    Abdomen flat and upright with no suspicion for obstruction.      Empiric treatment for diverticulitis / nonspecific colitis with Cipro and Flagyl.    If any worsening despite above go to the ER  Otherwise follow-up with all specialists and PCP as scheduled.

## 2019-10-07 ENCOUNTER — HOSPITAL ENCOUNTER (INPATIENT)
Facility: HOSPITAL | Age: 44
LOS: 10 days | Discharge: HOME HEALTH | DRG: 329 | End: 2019-10-17
Attending: EMERGENCY MEDICINE | Admitting: SURGERY
Payer: COMMERCIAL

## 2019-10-07 ENCOUNTER — ANESTHESIA EVENT (OUTPATIENT)
Dept: SURGERY | Facility: HOSPITAL | Age: 44
DRG: 329 | End: 2019-10-07
Payer: COMMERCIAL

## 2019-10-07 ENCOUNTER — ANESTHESIA (OUTPATIENT)
Dept: SURGERY | Facility: HOSPITAL | Age: 44
DRG: 329 | End: 2019-10-07
Payer: COMMERCIAL

## 2019-10-07 DIAGNOSIS — K63.1 BOWEL PERFORATION: Primary | ICD-10-CM

## 2019-10-07 DIAGNOSIS — K57.20 DIVERTICULITIS OF LARGE INTESTINE WITH PERFORATION WITHOUT ABSCESS OR BLEEDING: ICD-10-CM

## 2019-10-07 DIAGNOSIS — R00.0 TACHYCARDIA: ICD-10-CM

## 2019-10-07 DIAGNOSIS — R19.8 PERFORATED VISCUS: ICD-10-CM

## 2019-10-07 LAB
ALBUMIN SERPL BCP-MCNC: 3.5 G/DL (ref 3.5–5.2)
ALP SERPL-CCNC: 102 U/L (ref 55–135)
ALT SERPL W/O P-5'-P-CCNC: 34 U/L (ref 10–44)
ANION GAP SERPL CALC-SCNC: 12 MMOL/L (ref 8–16)
AST SERPL-CCNC: 15 U/L (ref 10–40)
BASOPHILS # BLD AUTO: 0.03 K/UL (ref 0–0.2)
BASOPHILS NFR BLD: 0.2 % (ref 0–1.9)
BILIRUB SERPL-MCNC: 0.8 MG/DL (ref 0.1–1)
BILIRUB UR QL STRIP: ABNORMAL
BUN SERPL-MCNC: 10 MG/DL (ref 6–20)
CALCIUM SERPL-MCNC: 10.1 MG/DL (ref 8.7–10.5)
CHLORIDE SERPL-SCNC: 95 MMOL/L (ref 95–110)
CLARITY UR: CLEAR
CO2 SERPL-SCNC: 28 MMOL/L (ref 23–29)
COLOR UR: YELLOW
CREAT SERPL-MCNC: 0.9 MG/DL (ref 0.5–1.4)
DIFFERENTIAL METHOD: ABNORMAL
EOSINOPHIL # BLD AUTO: 0.2 K/UL (ref 0–0.5)
EOSINOPHIL NFR BLD: 1.1 % (ref 0–8)
ERYTHROCYTE [DISTWIDTH] IN BLOOD BY AUTOMATED COUNT: 12 % (ref 11.5–14.5)
EST. GFR  (AFRICAN AMERICAN): >60 ML/MIN/1.73 M^2
EST. GFR  (NON AFRICAN AMERICAN): >60 ML/MIN/1.73 M^2
GLUCOSE SERPL-MCNC: 107 MG/DL (ref 70–110)
GLUCOSE UR QL STRIP: NEGATIVE
HCT VFR BLD AUTO: 40.1 % (ref 40–54)
HGB BLD-MCNC: 13.8 G/DL (ref 14–18)
HGB UR QL STRIP: ABNORMAL
HIV 1+2 AB+HIV1 P24 AG SERPL QL IA: NEGATIVE
HYALINE CASTS #/AREA URNS LPF: 9 /LPF
IMM GRANULOCYTES # BLD AUTO: 0.06 K/UL (ref 0–0.04)
IMM GRANULOCYTES NFR BLD AUTO: 0.4 % (ref 0–0.5)
KETONES UR QL STRIP: ABNORMAL
LACTATE SERPL-SCNC: 1 MMOL/L (ref 0.5–2.2)
LEUKOCYTE ESTERASE UR QL STRIP: NEGATIVE
LIPASE SERPL-CCNC: 15 U/L (ref 4–60)
LYMPHOCYTES # BLD AUTO: 1 K/UL (ref 1–4.8)
LYMPHOCYTES NFR BLD: 6.8 % (ref 18–48)
MCH RBC QN AUTO: 29.6 PG (ref 27–31)
MCHC RBC AUTO-ENTMCNC: 34.4 G/DL (ref 32–36)
MCV RBC AUTO: 86 FL (ref 82–98)
MICROSCOPIC COMMENT: ABNORMAL
MONOCYTES # BLD AUTO: 1.7 K/UL (ref 0.3–1)
MONOCYTES NFR BLD: 11.4 % (ref 4–15)
NEUTROPHILS # BLD AUTO: 11.6 K/UL (ref 1.8–7.7)
NEUTROPHILS NFR BLD: 80.1 % (ref 38–73)
NITRITE UR QL STRIP: NEGATIVE
NRBC BLD-RTO: 0 /100 WBC
PH UR STRIP: 6 [PH] (ref 5–8)
PLATELET # BLD AUTO: 209 K/UL (ref 150–350)
PMV BLD AUTO: 11.1 FL (ref 9.2–12.9)
POTASSIUM SERPL-SCNC: 3.8 MMOL/L (ref 3.5–5.1)
PROT SERPL-MCNC: 7.5 G/DL (ref 6–8.4)
PROT UR QL STRIP: NEGATIVE
RBC # BLD AUTO: 4.66 M/UL (ref 4.6–6.2)
RBC #/AREA URNS HPF: 3 /HPF (ref 0–4)
SODIUM SERPL-SCNC: 135 MMOL/L (ref 136–145)
SP GR UR STRIP: >=1.03 (ref 1–1.03)
URN SPEC COLLECT METH UR: ABNORMAL
UROBILINOGEN UR STRIP-ACNC: 1 EU/DL
WBC # BLD AUTO: 14.43 K/UL (ref 3.9–12.7)

## 2019-10-07 PROCEDURE — 94770 HC EXHALED C02 TEST: CPT

## 2019-10-07 PROCEDURE — 88307 TISSUE EXAM BY PATHOLOGIST: CPT | Mod: 26,,, | Performed by: PATHOLOGY

## 2019-10-07 PROCEDURE — 63600175 PHARM REV CODE 636 W HCPCS: Performed by: NURSE PRACTITIONER

## 2019-10-07 PROCEDURE — 88307 TISSUE SPECIMEN TO PATHOLOGY - SURGERY: ICD-10-PCS | Mod: 26,,, | Performed by: PATHOLOGY

## 2019-10-07 PROCEDURE — 96372 THER/PROPH/DIAG INJ SC/IM: CPT

## 2019-10-07 PROCEDURE — 80053 COMPREHEN METABOLIC PANEL: CPT

## 2019-10-07 PROCEDURE — 63600175 PHARM REV CODE 636 W HCPCS: Performed by: PHYSICIAN ASSISTANT

## 2019-10-07 PROCEDURE — 36000709 HC OR TIME LEV III EA ADD 15 MIN: Performed by: SURGERY

## 2019-10-07 PROCEDURE — 63600175 PHARM REV CODE 636 W HCPCS: Performed by: SURGERY

## 2019-10-07 PROCEDURE — 99285 EMERGENCY DEPT VISIT HI MDM: CPT | Mod: 25

## 2019-10-07 PROCEDURE — C1729 CATH, DRAINAGE: HCPCS | Performed by: SURGERY

## 2019-10-07 PROCEDURE — 25000003 PHARM REV CODE 250: Performed by: PHYSICIAN ASSISTANT

## 2019-10-07 PROCEDURE — 21400001 HC TELEMETRY ROOM

## 2019-10-07 PROCEDURE — 99222 PR INITIAL HOSPITAL CARE,LEVL II: ICD-10-PCS | Mod: 57,,, | Performed by: SURGERY

## 2019-10-07 PROCEDURE — 81000 URINALYSIS NONAUTO W/SCOPE: CPT

## 2019-10-07 PROCEDURE — 87040 BLOOD CULTURE FOR BACTERIA: CPT | Mod: 59

## 2019-10-07 PROCEDURE — 25000003 PHARM REV CODE 250: Performed by: SURGERY

## 2019-10-07 PROCEDURE — S0028 INJECTION, FAMOTIDINE, 20 MG: HCPCS | Performed by: SURGERY

## 2019-10-07 PROCEDURE — 99222 1ST HOSP IP/OBS MODERATE 55: CPT | Mod: 57,,, | Performed by: SURGERY

## 2019-10-07 PROCEDURE — 96365 THER/PROPH/DIAG IV INF INIT: CPT

## 2019-10-07 PROCEDURE — 63600175 PHARM REV CODE 636 W HCPCS: Performed by: NURSE ANESTHETIST, CERTIFIED REGISTERED

## 2019-10-07 PROCEDURE — 88307 TISSUE EXAM BY PATHOLOGIST: CPT | Performed by: PATHOLOGY

## 2019-10-07 PROCEDURE — 37000009 HC ANESTHESIA EA ADD 15 MINS: Performed by: SURGERY

## 2019-10-07 PROCEDURE — 36000708 HC OR TIME LEV III 1ST 15 MIN: Performed by: SURGERY

## 2019-10-07 PROCEDURE — 37000008 HC ANESTHESIA 1ST 15 MINUTES: Performed by: SURGERY

## 2019-10-07 PROCEDURE — 44143 PR PART REMOVAL COLON W END COLOSTOMY: ICD-10-PCS | Mod: ,,, | Performed by: SURGERY

## 2019-10-07 PROCEDURE — 83690 ASSAY OF LIPASE: CPT

## 2019-10-07 PROCEDURE — 71000033 HC RECOVERY, INTIAL HOUR: Performed by: SURGERY

## 2019-10-07 PROCEDURE — S0030 INJECTION, METRONIDAZOLE: HCPCS | Performed by: PHYSICIAN ASSISTANT

## 2019-10-07 PROCEDURE — 27201423 OPTIME MED/SURG SUP & DEVICES STERILE SUPPLY: Performed by: SURGERY

## 2019-10-07 PROCEDURE — 11000001 HC ACUTE MED/SURG PRIVATE ROOM

## 2019-10-07 PROCEDURE — 44143 PARTIAL REMOVAL OF COLON: CPT | Mod: ,,, | Performed by: SURGERY

## 2019-10-07 PROCEDURE — 99900035 HC TECH TIME PER 15 MIN (STAT)

## 2019-10-07 PROCEDURE — 71000039 HC RECOVERY, EACH ADD'L HOUR: Performed by: SURGERY

## 2019-10-07 PROCEDURE — 94799 UNLISTED PULMONARY SVC/PX: CPT

## 2019-10-07 PROCEDURE — 86703 HIV-1/HIV-2 1 RESULT ANTBDY: CPT

## 2019-10-07 PROCEDURE — 25000003 PHARM REV CODE 250: Performed by: NURSE ANESTHETIST, CERTIFIED REGISTERED

## 2019-10-07 PROCEDURE — 96375 TX/PRO/DX INJ NEW DRUG ADDON: CPT

## 2019-10-07 PROCEDURE — 85025 COMPLETE CBC W/AUTO DIFF WBC: CPT

## 2019-10-07 PROCEDURE — 83605 ASSAY OF LACTIC ACID: CPT

## 2019-10-07 RX ORDER — PROPOFOL 10 MG/ML
VIAL (ML) INTRAVENOUS
Status: DISCONTINUED | OUTPATIENT
Start: 2019-10-07 | End: 2019-10-07

## 2019-10-07 RX ORDER — HYDROMORPHONE HYDROCHLORIDE 2 MG/ML
0.2 INJECTION, SOLUTION INTRAMUSCULAR; INTRAVENOUS; SUBCUTANEOUS EVERY 5 MIN PRN
Status: DISCONTINUED | OUTPATIENT
Start: 2019-10-07 | End: 2019-10-07 | Stop reason: HOSPADM

## 2019-10-07 RX ORDER — ROCURONIUM BROMIDE 10 MG/ML
INJECTION, SOLUTION INTRAVENOUS
Status: DISCONTINUED | OUTPATIENT
Start: 2019-10-07 | End: 2019-10-07

## 2019-10-07 RX ORDER — CHLORHEXIDINE GLUCONATE ORAL RINSE 1.2 MG/ML
10 SOLUTION DENTAL 2 TIMES DAILY
Status: COMPLETED | OUTPATIENT
Start: 2019-10-07 | End: 2019-10-12

## 2019-10-07 RX ORDER — LIDOCAINE HYDROCHLORIDE 10 MG/ML
INJECTION, SOLUTION EPIDURAL; INFILTRATION; INTRACAUDAL; PERINEURAL
Status: DISCONTINUED | OUTPATIENT
Start: 2019-10-07 | End: 2019-10-07

## 2019-10-07 RX ORDER — ONDANSETRON 2 MG/ML
INJECTION INTRAMUSCULAR; INTRAVENOUS
Status: DISCONTINUED | OUTPATIENT
Start: 2019-10-07 | End: 2019-10-07

## 2019-10-07 RX ORDER — ONDANSETRON 2 MG/ML
4 INJECTION INTRAMUSCULAR; INTRAVENOUS
Status: COMPLETED | OUTPATIENT
Start: 2019-10-07 | End: 2019-10-07

## 2019-10-07 RX ORDER — DIPHENHYDRAMINE HYDROCHLORIDE 50 MG/ML
25 INJECTION INTRAMUSCULAR; INTRAVENOUS EVERY 4 HOURS PRN
Status: DISCONTINUED | OUTPATIENT
Start: 2019-10-07 | End: 2019-10-17 | Stop reason: HOSPADM

## 2019-10-07 RX ORDER — FAMOTIDINE 20 MG/50ML
20 INJECTION, SOLUTION INTRAVENOUS 2 TIMES DAILY
Status: DISCONTINUED | OUTPATIENT
Start: 2019-10-07 | End: 2019-10-17 | Stop reason: HOSPADM

## 2019-10-07 RX ORDER — SODIUM CHLORIDE 0.9 % (FLUSH) 0.9 %
3 SYRINGE (ML) INJECTION EVERY 8 HOURS
Status: DISCONTINUED | OUTPATIENT
Start: 2019-10-07 | End: 2019-10-07 | Stop reason: HOSPADM

## 2019-10-07 RX ORDER — CLONIDINE HYDROCHLORIDE 0.1 MG/1
0.1 TABLET ORAL EVERY 6 HOURS PRN
Status: DISCONTINUED | OUTPATIENT
Start: 2019-10-07 | End: 2019-10-17 | Stop reason: HOSPADM

## 2019-10-07 RX ORDER — CIPROFLOXACIN 2 MG/ML
400 INJECTION, SOLUTION INTRAVENOUS
Status: DISCONTINUED | OUTPATIENT
Start: 2019-10-07 | End: 2019-10-12

## 2019-10-07 RX ORDER — DIPHENHYDRAMINE HYDROCHLORIDE 50 MG/ML
25 INJECTION INTRAMUSCULAR; INTRAVENOUS EVERY 6 HOURS PRN
Status: DISCONTINUED | OUTPATIENT
Start: 2019-10-07 | End: 2019-10-07 | Stop reason: HOSPADM

## 2019-10-07 RX ORDER — MEPERIDINE HYDROCHLORIDE 50 MG/ML
12.5 INJECTION INTRAMUSCULAR; INTRAVENOUS; SUBCUTANEOUS ONCE AS NEEDED
Status: DISCONTINUED | OUTPATIENT
Start: 2019-10-07 | End: 2019-10-07 | Stop reason: HOSPADM

## 2019-10-07 RX ORDER — SODIUM CHLORIDE 0.9 % (FLUSH) 0.9 %
10 SYRINGE (ML) INJECTION
Status: DISCONTINUED | OUTPATIENT
Start: 2019-10-07 | End: 2019-10-17 | Stop reason: HOSPADM

## 2019-10-07 RX ORDER — METRONIDAZOLE 500 MG/100ML
500 INJECTION, SOLUTION INTRAVENOUS
Status: COMPLETED | OUTPATIENT
Start: 2019-10-07 | End: 2019-10-07

## 2019-10-07 RX ORDER — FENTANYL CITRATE 50 UG/ML
INJECTION, SOLUTION INTRAMUSCULAR; INTRAVENOUS
Status: DISCONTINUED | OUTPATIENT
Start: 2019-10-07 | End: 2019-10-07

## 2019-10-07 RX ORDER — ONDANSETRON 2 MG/ML
4 INJECTION INTRAMUSCULAR; INTRAVENOUS EVERY 12 HOURS PRN
Status: DISCONTINUED | OUTPATIENT
Start: 2019-10-07 | End: 2019-10-17 | Stop reason: HOSPADM

## 2019-10-07 RX ORDER — FAMOTIDINE 10 MG/ML
20 INJECTION INTRAVENOUS EVERY 12 HOURS
Status: DISCONTINUED | OUTPATIENT
Start: 2019-10-07 | End: 2019-10-07

## 2019-10-07 RX ORDER — NALOXONE HCL 0.4 MG/ML
0.02 VIAL (ML) INJECTION
Status: DISCONTINUED | OUTPATIENT
Start: 2019-10-07 | End: 2019-10-17 | Stop reason: HOSPADM

## 2019-10-07 RX ORDER — SODIUM CHLORIDE, SODIUM LACTATE, POTASSIUM CHLORIDE, CALCIUM CHLORIDE 600; 310; 30; 20 MG/100ML; MG/100ML; MG/100ML; MG/100ML
INJECTION, SOLUTION INTRAVENOUS CONTINUOUS
Status: DISCONTINUED | OUTPATIENT
Start: 2019-10-07 | End: 2019-10-17 | Stop reason: HOSPADM

## 2019-10-07 RX ORDER — HYDROMORPHONE HYDROCHLORIDE 2 MG/ML
1 INJECTION, SOLUTION INTRAMUSCULAR; INTRAVENOUS; SUBCUTANEOUS
Status: COMPLETED | OUTPATIENT
Start: 2019-10-07 | End: 2019-10-07

## 2019-10-07 RX ORDER — ACETAMINOPHEN 10 MG/ML
1000 INJECTION, SOLUTION INTRAVENOUS ONCE
Status: COMPLETED | OUTPATIENT
Start: 2019-10-07 | End: 2019-10-07

## 2019-10-07 RX ORDER — BUPIVACAINE HYDROCHLORIDE 2.5 MG/ML
INJECTION, SOLUTION EPIDURAL; INFILTRATION; INTRACAUDAL
Status: DISCONTINUED | OUTPATIENT
Start: 2019-10-07 | End: 2019-10-07 | Stop reason: HOSPADM

## 2019-10-07 RX ORDER — ACETAMINOPHEN 325 MG/1
325 TABLET ORAL EVERY 6 HOURS PRN
COMMUNITY

## 2019-10-07 RX ORDER — MIDAZOLAM HYDROCHLORIDE 1 MG/ML
INJECTION, SOLUTION INTRAMUSCULAR; INTRAVENOUS
Status: DISCONTINUED | OUTPATIENT
Start: 2019-10-07 | End: 2019-10-07

## 2019-10-07 RX ORDER — HYDROMORPHONE HCL IN 0.9% NACL 6 MG/30 ML
PATIENT CONTROLLED ANALGESIA SYRINGE INTRAVENOUS CONTINUOUS
Status: DISCONTINUED | OUTPATIENT
Start: 2019-10-07 | End: 2019-10-11

## 2019-10-07 RX ORDER — METOCLOPRAMIDE HYDROCHLORIDE 5 MG/ML
10 INJECTION INTRAMUSCULAR; INTRAVENOUS EVERY 10 MIN PRN
Status: DISCONTINUED | OUTPATIENT
Start: 2019-10-07 | End: 2019-10-07 | Stop reason: HOSPADM

## 2019-10-07 RX ORDER — ENOXAPARIN SODIUM 100 MG/ML
40 INJECTION SUBCUTANEOUS EVERY 24 HOURS
Status: DISCONTINUED | OUTPATIENT
Start: 2019-10-07 | End: 2019-10-14

## 2019-10-07 RX ADMIN — SODIUM CHLORIDE, SODIUM LACTATE, POTASSIUM CHLORIDE, AND CALCIUM CHLORIDE: .6; .31; .03; .02 INJECTION, SOLUTION INTRAVENOUS at 03:10

## 2019-10-07 RX ADMIN — CIPROFLOXACIN 400 MG: 2 INJECTION, SOLUTION INTRAVENOUS at 09:10

## 2019-10-07 RX ADMIN — Medication: at 05:10

## 2019-10-07 RX ADMIN — Medication: at 09:10

## 2019-10-07 RX ADMIN — DIPHENHYDRAMINE HYDROCHLORIDE 25 MG: 50 INJECTION, SOLUTION INTRAMUSCULAR; INTRAVENOUS at 09:10

## 2019-10-07 RX ADMIN — HYDROMORPHONE HYDROCHLORIDE 1 MG: 2 INJECTION INTRAMUSCULAR; INTRAVENOUS; SUBCUTANEOUS at 09:10

## 2019-10-07 RX ADMIN — ONDANSETRON 4 MG: 2 INJECTION INTRAMUSCULAR; INTRAVENOUS at 09:10

## 2019-10-07 RX ADMIN — MIDAZOLAM 2 MG: 1 INJECTION INTRAMUSCULAR; INTRAVENOUS at 10:10

## 2019-10-07 RX ADMIN — SODIUM CHLORIDE 500 ML: 0.9 INJECTION, SOLUTION INTRAVENOUS at 08:10

## 2019-10-07 RX ADMIN — PROPOFOL 150 MG: 10 INJECTION, EMULSION INTRAVENOUS at 10:10

## 2019-10-07 RX ADMIN — ROCURONIUM BROMIDE 50 MG: 10 INJECTION, SOLUTION INTRAVENOUS at 10:10

## 2019-10-07 RX ADMIN — ACETAMINOPHEN 1000 MG: 10 INJECTION, SOLUTION INTRAVENOUS at 03:10

## 2019-10-07 RX ADMIN — Medication: at 01:10

## 2019-10-07 RX ADMIN — CHLORHEXIDINE GLUCONATE 0.12% ORAL RINSE 10 ML: 1.2 LIQUID ORAL at 09:10

## 2019-10-07 RX ADMIN — ENOXAPARIN SODIUM 40 MG: 100 INJECTION SUBCUTANEOUS at 03:10

## 2019-10-07 RX ADMIN — CIPROFLOXACIN 400 MG: 2 INJECTION, SOLUTION INTRAVENOUS at 10:10

## 2019-10-07 RX ADMIN — SODIUM CHLORIDE 1000 ML: 0.9 INJECTION, SOLUTION INTRAVENOUS at 08:10

## 2019-10-07 RX ADMIN — FENTANYL CITRATE 100 MCG: 50 INJECTION, SOLUTION INTRAMUSCULAR; INTRAVENOUS at 10:10

## 2019-10-07 RX ADMIN — LIDOCAINE HYDROCHLORIDE 50 MG: 10 INJECTION, SOLUTION EPIDURAL; INFILTRATION; INTRACAUDAL; PERINEURAL at 10:10

## 2019-10-07 RX ADMIN — ONDANSETRON 4 MG: 2 INJECTION, SOLUTION INTRAMUSCULAR; INTRAVENOUS at 11:10

## 2019-10-07 RX ADMIN — METRONIDAZOLE 500 MG: 500 INJECTION, SOLUTION INTRAVENOUS at 09:10

## 2019-10-07 RX ADMIN — FAMOTIDINE 20 MG: 20 INJECTION, SOLUTION INTRAVENOUS at 09:10

## 2019-10-07 NOTE — TRANSFER OF CARE
"Anesthesia Transfer of Care Note    Patient: Dax Carlisle    Procedure(s) Performed: Procedure(s) (LRB):  LAPAROTOMY, EXPLORATORY (N/A)  LAVAGE, PERITONEAL, THERAPEUTIC  SIVAKUMAR PROCEDURE    Patient location: PACU    Anesthesia Type: general    Transport from OR: Transported from OR on room air with adequate spontaneous ventilation    Post pain: adequate analgesia    Post assessment: no apparent anesthetic complications    Post vital signs: stable    Level of consciousness: awake    Nausea/Vomiting: no nausea/vomiting    Complications: none    Transfer of care protocol was followed      Last vitals:   Visit Vitals  /78 (BP Location: Left arm, Patient Position: Lying)   Pulse 103   Temp 37.6 °C (99.6 °F) (Oral)   Resp (!) 22   Ht 5' 10" (1.778 m)   Wt 101.3 kg (223 lb 5.2 oz)   SpO2 99%   BMI 32.04 kg/m²     "

## 2019-10-07 NOTE — ANESTHESIA POSTPROCEDURE EVALUATION
Anesthesia Post Evaluation    Patient: Dax Carlisle    Procedure(s) Performed: Procedure(s) (LRB):  LAPAROTOMY, EXPLORATORY (N/A)  LAVAGE, PERITONEAL, THERAPEUTIC  SIVAKUMAR PROCEDURE  CREATION, COLOSTOMY    Final Anesthesia Type: general  Patient location during evaluation: PACU  Patient participation: Yes- Able to Participate  Level of consciousness: awake and alert, oriented and awake  Post-procedure vital signs: reviewed and stable  Pain management: adequate  Airway patency: patent  PONV status at discharge: No PONV  Anesthetic complications: no      Cardiovascular status: blood pressure returned to baseline  Respiratory status: unassisted and spontaneous ventilation  Hydration status: euvolemic  Follow-up not needed.          Vitals Value Taken Time   /73 10/7/2019  1:51 PM   Temp 36.8 °C (98.2 °F) 10/7/2019 12:30 PM   Pulse 119 10/7/2019  1:54 PM   Resp 29 10/7/2019  1:54 PM   SpO2 97 % 10/7/2019  1:54 PM   Vitals shown include unvalidated device data.      No case tracking events are documented in the log.      Pain/Corie Score: Pain Rating Prior to Med Admin: 10 (10/7/2019  1:02 PM)  Pain Rating Post Med Admin: 8 (10/7/2019  1:15 PM)  Corie Score: 10 (10/7/2019  1:15 PM)

## 2019-10-07 NOTE — ASSESSMENT & PLAN NOTE
IV antibiotics initiated.  Admit to surgery.  To OR urgently for exploratory laparotomy with possible bowel resection and possible ostomy.  Discuss risks and benefits including bleeding, scarring, infection, hematoma, seroma, damage to surrounding structures, bowel leak, or need for further procedures.

## 2019-10-07 NOTE — H&P
[facility] -   General Surgery  History & Physical    Patient Name: Dax Carlisle  MRN: [id number]  Admission Date: 10/7/2019  Attending Physician: Zully Bowers MD   Primary Care Provider: Pramod Nuñez MD    Patient information was obtained from patient, spouse/SO and ER records.     Subjective:     Chief Complaint/Reason for Admission: abdominal pain    History of Present Illness: 43-year-old male with history of opioid induced chronic constipation presented to the ER with complaints of lower abdominal pain and fever with nausea and emesis.  He was seen in the ER 3 days ago and treated with Cipro and Flagyl.  The pain persisted and he returned to the ER today with continued complaints.  He has a pertinent family history of colon cancer, his paternal grandmother  of colon cancer in her 50s.  Last colonoscopy in  showed no acute findings. He attempted an enema over the weekend with acute worsening of his pain. Workup showed leukocytosis and CT abdomen and pelvis confirmed pneumoperitoneum with evidence of perforated sigmoid colon.       No current facility-administered medications on file prior to encounter.      Current Outpatient Medications on File Prior to Encounter   Medication Sig    ARIPiprazole (ABILIFY) 5 MG Tab Take 1 tablet (5 mg total) by mouth once daily.    ciprofloxacin HCl (CIPRO) 500 MG tablet Take 1 tablet (500 mg total) by mouth 2 (two) times daily. for 10 days    cyclobenzaprine (FLEXERIL) 10 MG tablet Take 1 tablet (10 mg total) by mouth 3 (three) times daily as needed for Muscle spasms.    escitalopram oxalate (LEXAPRO) 20 MG tablet TAKE 1 TABLET (20 MG TOTAL) BY MOUTH ONCE DAILY.    HYDROcodone-acetaminophen (NORCO) 5-325 mg per tablet Take 1 tablet by mouth every 6 (six) hours as needed for Pain.    linaclotide 290 mcg Cap Take 1 capsule (290 mcg total) by mouth once daily.    loratadine (CLARITIN) 10 mg tablet Take 10 mg by mouth once daily.     metroNIDAZOLE (FLAGYL) 500 MG tablet Take 1 tablet (500 mg total) by mouth 3 (three) times daily. for 10 days    promethazine HCl (PHENERGAN ORAL) Take by mouth.    ARIPiprazole (ABILIFY) 5 MG Tab TAKE 1 TABLET BY MOUTH DAILY    azelastine (ASTELIN) 137 mcg (0.1 %) nasal spray 1 spray (137 mcg total) by Nasal route 2 (two) times daily.    meloxicam (MOBIC) 15 MG tablet Take 1 tablet (15 mg total) by mouth once daily.       Review of patient's allergies indicates:   Allergen Reactions    Iodinated contrast media Swelling     Tongue, right eye, lips swelling. Rash on abdomen and axilla    Codeine     Dairy aid [lactase]     Morphine     Nuts [tree nut]        Past Medical History:   Diagnosis Date    Angioedema of lips 3/13/2015    IVP dye allergy - swelling lips, eye, tongue    Depression     Kidney stones      Past Surgical History:   Procedure Laterality Date    anal fistula repair      APPENDECTOMY      BACK SURGERY      CHOLECYSTECTOMY      COLONOSCOPY N/A 3/10/2016    Procedure: COLONOSCOPY;  Surgeon: Juvenal Dinero MD;  Location: [facility];  Service: Endoscopy;  Laterality: N/A;    gall bladder removal  2005    KNEE SURGERY Right     KNEE SURGERY Left     PILONIDAL CYST DRAINAGE      pt has had 6 of these adn has had revisions     Family History     Problem Relation (Age of Onset)    Cancer Father    Colon cancer Maternal Grandmother    Colon polyps Mother, Sister    Diabetes Father, Mother, Maternal Grandmother, Maternal Grandfather, Paternal Grandmother, Paternal Grandfather    Heart disease Father    Stroke Paternal Grandmother        Tobacco Use    Smoking status: Never Smoker    Smokeless tobacco: Never Used   Substance and Sexual Activity    Alcohol use: No     Frequency: Never     Binge frequency: Never    Drug use: No    Sexual activity: Yes     Partners: Female     Review of Systems   Constitutional: Negative for unexpected weight change.   HENT: Negative for congestion.     Eyes: Negative for visual disturbance.   Respiratory: Negative for shortness of breath.    Cardiovascular: Negative for chest pain.   Gastrointestinal: Positive for abdominal distention, abdominal pain, constipation, nausea and vomiting.   Genitourinary: Positive for difficulty urinating.   Skin: Negative for rash.   Allergic/Immunologic: Negative for immunocompromised state.   Neurological: Negative for weakness.   Psychiatric/Behavioral: The patient is nervous/anxious.      Objective:     Vital Signs (Most Recent):  Temp: 99.6 °F (37.6 °C) (10/07/19 0817)  Pulse: 103 (10/07/19 0944)  Resp: (!) 22 (10/07/19 0944)  BP: 128/78 (10/07/19 0944)  SpO2: 99 % (10/07/19 0944) Vital Signs (24h Range):  Temp:  [99.6 °F (37.6 °C)] 99.6 °F (37.6 °C)  Pulse:  [103-122] 103  Resp:  [20-22] 22  SpO2:  [97 %-99 %] 99 %  BP: (124-128)/(78) 128/78     Weight: 101.3 kg (223 lb 5.2 oz)  Body mass index is 32.04 kg/m².    Physical Exam   Constitutional: He is oriented to person, place, and time. He appears well-developed and well-nourished. No distress.   HENT:   Head: Normocephalic and atraumatic.   Eyes: EOM are normal.   Neck: Neck supple.   Cardiovascular: Normal rate and regular rhythm.   Pulmonary/Chest: Effort normal.   Abdominal: Soft. He exhibits distension. There is tenderness (Lower abdominal). There is rebound and guarding.   Focal peritonitis present   Musculoskeletal: Normal range of motion.   Neurological: He is alert and oriented to person, place, and time.   Skin: Skin is warm.   Vitals reviewed.      Significant Labs:  CBC:   Recent Labs   Lab 10/07/19  0825   WBC 14.43*   RBC 4.66   HGB 13.8*   HCT 40.1      MCV 86   MCH 29.6   MCHC 34.4     BMP:   Recent Labs   Lab 10/07/19  0825      *   K 3.8   CL 95   CO2 28   BUN 10   CREATININE 0.9   CALCIUM 10.1       Significant Diagnostics:  CT: I have reviewed all pertinent results/findings within the past 24 hours and my personal findings are:   Perforation of sigmoid colon with pneumoperitoneum    Assessment/Plan:     Bowel perforation  IV antibiotics initiated.  Admit to surgery.  To OR urgently for exploratory laparotomy with possible bowel resection and possible ostomy.  Discuss risks and benefits including bleeding, scarring, infection, hematoma, seroma, damage to surrounding structures, bowel leak, or need for further procedures.    Chronic pain syndrome  Will monitor for withdrawal and treat postop pain appropriately.     Family history of colonic polyps  Recent colonoscopy reviewed    Obesity (BMI 30-39.9)  Informed of increased incidence of wound infection.       VTE Risk Mitigation (From admission, onward)    None          Mindy Goff MD  General Surgery  [facility] -

## 2019-10-07 NOTE — SUBJECTIVE & OBJECTIVE
No current facility-administered medications on file prior to encounter.      Current Outpatient Medications on File Prior to Encounter   Medication Sig    ARIPiprazole (ABILIFY) 5 MG Tab Take 1 tablet (5 mg total) by mouth once daily.    ciprofloxacin HCl (CIPRO) 500 MG tablet Take 1 tablet (500 mg total) by mouth 2 (two) times daily. for 10 days    cyclobenzaprine (FLEXERIL) 10 MG tablet Take 1 tablet (10 mg total) by mouth 3 (three) times daily as needed for Muscle spasms.    escitalopram oxalate (LEXAPRO) 20 MG tablet TAKE 1 TABLET (20 MG TOTAL) BY MOUTH ONCE DAILY.    HYDROcodone-acetaminophen (NORCO) 5-325 mg per tablet Take 1 tablet by mouth every 6 (six) hours as needed for Pain.    linaclotide 290 mcg Cap Take 1 capsule (290 mcg total) by mouth once daily.    loratadine (CLARITIN) 10 mg tablet Take 10 mg by mouth once daily.    metroNIDAZOLE (FLAGYL) 500 MG tablet Take 1 tablet (500 mg total) by mouth 3 (three) times daily. for 10 days    promethazine HCl (PHENERGAN ORAL) Take by mouth.    ARIPiprazole (ABILIFY) 5 MG Tab TAKE 1 TABLET BY MOUTH DAILY    azelastine (ASTELIN) 137 mcg (0.1 %) nasal spray 1 spray (137 mcg total) by Nasal route 2 (two) times daily.    meloxicam (MOBIC) 15 MG tablet Take 1 tablet (15 mg total) by mouth once daily.       Review of patient's allergies indicates:   Allergen Reactions    Iodinated contrast media Swelling     Tongue, right eye, lips swelling. Rash on abdomen and axilla    Codeine     Dairy aid [lactase]     Morphine     Nuts [tree nut]        Past Medical History:   Diagnosis Date    Angioedema of lips 3/13/2015    IVP dye allergy - swelling lips, eye, tongue    Depression     Kidney stones      Past Surgical History:   Procedure Laterality Date    anal fistula repair      APPENDECTOMY      BACK SURGERY      CHOLECYSTECTOMY      COLONOSCOPY N/A 3/10/2016    Procedure: COLONOSCOPY;  Surgeon: Juvenal Dinero MD;  Location: [facility];  Service:  Endoscopy;  Laterality: N/A;    gall bladder removal  2005    KNEE SURGERY Right     KNEE SURGERY Left     PILONIDAL CYST DRAINAGE      pt has had 6 of these adn has had revisions     Family History     Problem Relation (Age of Onset)    Cancer Father    Colon cancer Maternal Grandmother    Colon polyps Mother, Sister    Diabetes Father, Mother, Maternal Grandmother, Maternal Grandfather, Paternal Grandmother, Paternal Grandfather    Heart disease Father    Stroke Paternal Grandmother        Tobacco Use    Smoking status: Never Smoker    Smokeless tobacco: Never Used   Substance and Sexual Activity    Alcohol use: No     Frequency: Never     Binge frequency: Never    Drug use: No    Sexual activity: Yes     Partners: Female     Review of Systems   Constitutional: Negative for unexpected weight change.   HENT: Negative for congestion.    Eyes: Negative for visual disturbance.   Respiratory: Negative for shortness of breath.    Cardiovascular: Negative for chest pain.   Gastrointestinal: Positive for abdominal distention, abdominal pain, constipation, nausea and vomiting.   Genitourinary: Positive for difficulty urinating.   Skin: Negative for rash.   Allergic/Immunologic: Negative for immunocompromised state.   Neurological: Negative for weakness.   Psychiatric/Behavioral: The patient is nervous/anxious.      Objective:     Vital Signs (Most Recent):  Temp: 99.6 °F (37.6 °C) (10/07/19 0817)  Pulse: 103 (10/07/19 0944)  Resp: (!) 22 (10/07/19 0944)  BP: 128/78 (10/07/19 0944)  SpO2: 99 % (10/07/19 0944) Vital Signs (24h Range):  Temp:  [99.6 °F (37.6 °C)] 99.6 °F (37.6 °C)  Pulse:  [103-122] 103  Resp:  [20-22] 22  SpO2:  [97 %-99 %] 99 %  BP: (124-128)/(78) 128/78     Weight: 101.3 kg (223 lb 5.2 oz)  Body mass index is 32.04 kg/m².    Physical Exam   Constitutional: He is oriented to person, place, and time. He appears well-developed and well-nourished. No distress.   HENT:   Head: Normocephalic and  atraumatic.   Eyes: EOM are normal.   Neck: Neck supple.   Cardiovascular: Normal rate and regular rhythm.   Pulmonary/Chest: Effort normal.   Abdominal: Soft. He exhibits distension. There is tenderness (Lower abdominal). There is rebound and guarding.   Focal peritonitis present   Musculoskeletal: Normal range of motion.   Neurological: He is alert and oriented to person, place, and time.   Skin: Skin is warm.   Vitals reviewed.      Significant Labs:  CBC:   Recent Labs   Lab 10/07/19  0825   WBC 14.43*   RBC 4.66   HGB 13.8*   HCT 40.1      MCV 86   MCH 29.6   MCHC 34.4     BMP:   Recent Labs   Lab 10/07/19  0825      *   K 3.8   CL 95   CO2 28   BUN 10   CREATININE 0.9   CALCIUM 10.1       Significant Diagnostics:  CT: I have reviewed all pertinent results/findings within the past 24 hours and my personal findings are:  Perforation of sigmoid colon with pneumoperitoneum

## 2019-10-07 NOTE — HPI
Dax Carlisle is a 43 year male who was admitted to [facility] for bowel perforation. He underwent exploratory laparotomy with raf procedure and colostomy placement. Surgery without acute complication. He is doing well postoperatively. Currently on dilaudid PCA infusion. Hospital medicine has been consulted for medical management.

## 2019-10-07 NOTE — OP NOTE
[facility] -   General Surgery  Operative Note    SUMMARY     Date of Procedure: 10/7/2019     Procedure: Procedure(s) (LRB):  LAPAROTOMY, EXPLORATORY (N/A)  ABDOMINAL WASHOUT  SIVAKUMAR PROCEDURE  CREATION, COLOSTOMY       Surgeon(s) and Role:     * Mindy Goff MD - Primary    Assisting Surgeon: Naldo Hightower MD - Assisting    Pre-Operative Diagnosis: Perforated viscus [R19.8]    Post-Operative Diagnosis: Post-Op Diagnosis Codes:     * Perforated sigmoid colon    Anesthesia: General    Drains, Packs, Catheters: 10 VARSHA    Estimated Blood Loss (EBL): 50 cc           Implants: * No implants in log *    Specimens:   Specimen (12h ago, onward)     Start     Ordered    10/07/19 1128  Specimen to Pathology - Surgery  Once     Comments:  Pre-op Diagnosis: Perforated viscus [R19.8]Procedure(s):LAPAROTOMY, EXPLORATORY Number of specimens: 1Name of specimens: sigmoid colon (perm) Stitch arielle -colon perforation      10/07/19 1129    10/07/19 1123  Specimen to Pathology - Surgery  Once,   Status:  Canceled     Comments:  Pre-op Diagnosis: Perforated viscus [R19.8]Procedure(s):LAPAROTOMY, EXPLORATORY Number of specimens: 1Name of specimens: sigmoid colon (perm)      10/07/19 1123                 Description of the Findings of the Procedure: 3 cm sigmoid perforation into the mesentery with tita ischemia.     Significant Surgical Tasks Conducted by the Assistant(s), if Applicable: surgical assistance     Complications: No    Procedure in detail: The patient was taken to the operating room and laid on the operating table in supine position. General endotracheal anesthesia was administered. Preoperative NGT, SCDs, and abdi were placed. His abdomen was prepped and draped in sterile fashion. A time-out was performed verifying patient identification, correct surgical site, IV antibiotic administration, and other pertinent details to the case.    A lower midline celiotomy was performed deepened through subcutaneous  tissue until fascia was encountered.  The fascia was incised and peritoneum was entered.  There gross contamination with stool identified in the left lower quadrant. An Eric wound protector was placed. Patient was placed in Trendelenburg position. He was noted to have crepitus with air and stool dissecting through his sigmoid mesentery. The left colon was mobilized along the white line of Toldt.  He is in of dense inflammation in the left lower quadrant with sigmoid colon redundant looping into the pelvis.  There was noted to be a perforation approximately 3 cm in width along the posterior aspect of the distal sigmoid colon.  The plane of the superior portion of the rectum was created and was transected using a blue load contour stapler. Superior rectal vessels were suture ligated to control bleeding.  The mesentery was resected using an EnSeal device up to normal descending colon.  Proximal colon was transected using a cutting stapler reload.      The left lower quadrant was thoroughly irrigated and all feculent material was evacuated. The edematous mesentery had oozing along transected margin. surgicel ws placed for hemostasis. A 10 flat VARSHA was left within the space. A skin incision in the left lower quadrant was created to mature the ostomy.  Dissection through subcutaneous tissues was created until a cruciate incision was made in the anterior fascia. The rectus muscle was spread and the posterior fascia was incised.  The descending colon ostomy was with drawn through the stoma site. The fascia was anesthetized with Exparel.  The abdomen was closed with loop PDS and skin was loosely reapproximated with staples.     Our attention was drawn to maturing the stoma.  It was circumferentially secured in a Anne fashion with Vicryl sutures. Ostomy appliance was applied and the celiotomy was dressed in sterile fashion.    Condition: Stable    Disposition: PACU - hemodynamically stable.    Attestation: I performed the  procedure.    Mindy Goff

## 2019-10-07 NOTE — ANESTHESIA RELEASE NOTE
"Anesthesia Release from PACU Note    Patient: Dax Carlisle    Procedure(s) Performed: Procedure(s) (LRB):  LAPAROTOMY, EXPLORATORY (N/A)  LAVAGE, PERITONEAL, THERAPEUTIC  SIVAKUMAR PROCEDURE    Anesthesia type: general    Post pain: Adequate analgesia    Post assessment: no apparent anesthetic complications    Last Vitals:   Visit Vitals  /78 (BP Location: Left arm, Patient Position: Lying)   Pulse 103   Temp 37.6 °C (99.6 °F) (Oral)   Resp (!) 22   Ht 5' 10" (1.778 m)   Wt 101.3 kg (223 lb 5.2 oz)   SpO2 99%   BMI 32.04 kg/m²       Post vital signs: stable    Level of consciousness: awake    Nausea/Vomiting: no nausea/no vomiting    Complications: none    Airway Patency: patent    Respiratory: unassisted    Cardiovascular: stable and blood pressure at baseline    Hydration: euvolemic  "

## 2019-10-07 NOTE — ASSESSMENT & PLAN NOTE
---per Primary team  --monitor electrolytes, and H/H  --analgesia  --monitor stoma output  --resume Ciprofloxacin

## 2019-10-07 NOTE — PLAN OF CARE
Patient arrived from surgery this afternoon. Colostomy to the left abdomen with some serosanguineous drainage noted.NGT LIWS. NPO. IVF infusing as ordered. Hernandez secure with yellow urine. VARSHA drain to the right lower abdomen. Chart check completed.

## 2019-10-07 NOTE — SUBJECTIVE & OBJECTIVE
Past Medical History:   Diagnosis Date    Angioedema of lips 3/13/2015    IVP dye allergy - swelling lips, eye, tongue    Depression     Kidney stones        Past Surgical History:   Procedure Laterality Date    anal fistula repair      APPENDECTOMY      BACK SURGERY      CHOLECYSTECTOMY      COLONOSCOPY N/A 3/10/2016    Procedure: COLONOSCOPY;  Surgeon: Juvenal Dinero MD;  Location: [facility];  Service: Endoscopy;  Laterality: N/A;    gall bladder removal  2005    KNEE SURGERY Right     KNEE SURGERY Left     PILONIDAL CYST DRAINAGE      pt has had 6 of these adn has had revisions       Review of patient's allergies indicates:   Allergen Reactions    Iodinated contrast media Swelling     Tongue, right eye, lips swelling. Rash on abdomen and axilla    Codeine     Dairy aid [lactase]     Morphine      Tolerated hydromorphone in October 2019.     Nuts [tree nut]        No current facility-administered medications on file prior to encounter.      Current Outpatient Medications on File Prior to Encounter   Medication Sig    acetaminophen (TYLENOL) 325 MG tablet Take 325 mg by mouth every 6 (six) hours as needed for Pain.    ARIPiprazole (ABILIFY) 5 MG Tab Take 1 tablet (5 mg total) by mouth once daily.    ciprofloxacin HCl (CIPRO) 500 MG tablet Take 1 tablet (500 mg total) by mouth 2 (two) times daily. for 10 days    cyclobenzaprine (FLEXERIL) 10 MG tablet Take 1 tablet (10 mg total) by mouth 3 (three) times daily as needed for Muscle spasms.    escitalopram oxalate (LEXAPRO) 20 MG tablet TAKE 1 TABLET (20 MG TOTAL) BY MOUTH ONCE DAILY.    HYDROcodone-acetaminophen (NORCO) 5-325 mg per tablet Take 1 tablet by mouth every 6 (six) hours as needed for Pain.    linaclotide 290 mcg Cap Take 1 capsule (290 mcg total) by mouth once daily.    loratadine (CLARITIN) 10 mg tablet Take 10 mg by mouth once daily.    metroNIDAZOLE (FLAGYL) 500 MG tablet Take 1 tablet (500 mg total) by mouth 3 (three) times  daily. for 10 days    promethazine HCl (PHENERGAN ORAL) Take by mouth.    ARIPiprazole (ABILIFY) 5 MG Tab TAKE 1 TABLET BY MOUTH DAILY    azelastine (ASTELIN) 137 mcg (0.1 %) nasal spray 1 spray (137 mcg total) by Nasal route 2 (two) times daily.    meloxicam (MOBIC) 15 MG tablet Take 1 tablet (15 mg total) by mouth once daily.     Family History     Problem Relation (Age of Onset)    Cancer Father    Colon cancer Maternal Grandmother    Colon polyps Mother, Sister    Diabetes Father, Mother, Maternal Grandmother, Maternal Grandfather, Paternal Grandmother, Paternal Grandfather    Heart disease Father    Stroke Paternal Grandmother        Tobacco Use    Smoking status: Never Smoker    Smokeless tobacco: Never Used   Substance and Sexual Activity    Alcohol use: No     Frequency: Never     Binge frequency: Never    Drug use: No    Sexual activity: Yes     Partners: Female     Review of Systems  Objective:     Vital Signs (Most Recent):  Temp: 98.6 °F (37 °C) (10/07/19 1748)  Pulse: (!) 119 (10/07/19 1748)  Resp: 20 (10/07/19 1748)  BP: 122/71 (10/07/19 1748)  SpO2: (!) 93 % (10/07/19 1646) Vital Signs (24h Range):  Temp:  [98.2 °F (36.8 °C)-99.7 °F (37.6 °C)] 98.6 °F (37 °C)  Pulse:  [100-126] 119  Resp:  [11-23] 20  SpO2:  [93 %-100 %] 93 %  BP: (118-139)/(68-82) 122/71     Weight: 101.3 kg (223 lb 5.2 oz)  Body mass index is 32.04 kg/m².    Physical Exam   Constitutional: He is oriented to person, place, and time. He appears well-developed and well-nourished. No distress.   HENT:   Head: Normocephalic and atraumatic.   Eyes: EOM are normal.   Neck: Normal range of motion. Neck supple.   Cardiovascular: Normal rate, regular rhythm and normal heart sounds.   Pulmonary/Chest: Effort normal and breath sounds normal. No respiratory distress.   Abdominal: Soft. Bowel sounds are normal. He exhibits no distension. There is no tenderness.       Musculoskeletal: Normal range of motion. He exhibits edema.    Neurological: He is alert and oriented to person, place, and time.   Skin: Skin is dry.   Nursing note and vitals reviewed.          Significant Labs:   CBC:   Recent Labs   Lab 10/07/19  0825   WBC 14.43*   HGB 13.8*   HCT 40.1        CMP:   Recent Labs   Lab 10/07/19  0825   *   K 3.8   CL 95   CO2 28      BUN 10   CREATININE 0.9   CALCIUM 10.1   PROT 7.5   ALBUMIN 3.5   BILITOT 0.8   ALKPHOS 102   AST 15   ALT 34   ANIONGAP 12   EGFRNONAA >60       Significant Imaging:  Imaging Results          CT Abdomen Pelvis  Without Contrast (Final result)  Result time 10/07/19 09:01:50    Final result by Dax Peralta MD (10/07/19 09:01:50)                 Impression:      Sigmoidal diverticulosis and diverticulitis with perforated sigmoid colon and extensive gas tracking in the retroperitoneum as well is in the perisigmoid fat.  The largest gas collection measures 7.3 x 3.8 x 6.1 cm.  No discrete abscess at this time.  Extensive inflammatory changes adjacent to the sigmoid colon.  Report called to the ER to KADEEM Friedman 10/07/2019 at 08:59.    All CT scans at this facility are performed  using dose modulation techniques as appropriate to performed exam including the following:  automated exposure control; adjustment of mA and/or kV according to the patients size (this includes techniques or standardized protocols for targeted exams where dose is matched to indication/reason for exam: i.e. extremities or head);  iterative reconstruction technique.      Electronically signed by: Dax Peralta MD  Date:    10/07/2019  Time:    09:01             Narrative:    EXAMINATION:  CT ABDOMEN PELVIS WITHOUT CONTRAST    CLINICAL HISTORY:  Abd pain, fever, abscess suspected;Abdominal pain, unspecified;    TECHNIQUE:  Low dose axial images, sagittal and coronal reformations were obtained from the lung bases to the pubic symphysis, Oral contrast was not administered.    COMPARISON:  None    FINDINGS:  Heart:  Normal in size. No pericardial effusion.    Lung Bases: Well aerated, without consolidation or pleural fluid.    Liver: Normal in size and attenuation, with no focal hepatic lesions.    Gallbladder: Remote cholecystectomy.    Bile Ducts: No evidence of dilated ducts.    Pancreas: Normal.    Spleen: Unremarkable.    Adrenals: Unremarkable.    Kidneys/ Ureters: Kidneys appear normal.  There is gas in the retroperitoneum.  Following this gas into the pelvis shows evidence of sigmoidal diverticulosis with diverticulitis and evidence of bowel perforation.    Bladder: No evidence of wall thickening.    Reproductive organs: Unremarkable.    GI Tract/Mesentery: Sigmoidal diverticulosis.  Inflammatory changes adjacent to the sigmoid colon with a large gas collection adjacent to the sigmoid colon measuring up to 7.3 x 3.8 by 6.1 cm.  Scattered free air can be seen in the mesentery and retroperitoneum tracking to the diaphragmatic incisura.    Peritoneal Space: No ascites. Free air prominent can DYN to the pelvis and retroperitoneum.    Retroperitoneum: No significant adenopathy.    Abdominal wall: Unremarkable.    Vasculature: No significant atherosclerosis or aneurysm.    Bones: No acute fracture.

## 2019-10-07 NOTE — ED PROVIDER NOTES
Encounter Date: 10/7/2019       History     Chief Complaint   Patient presents with    Abdominal Pain     c/o abdominal pain, N/V, constipation, low back pain and diff urinating      The history is provided by the patient.   Abdominal Pain   The current episode started just prior to arrival. The onset of the illness was abrupt. The problem has been rapidly worsening. The abdominal pain is generalized. The abdominal pain does not radiate. The severity of the abdominal pain is 5/10. The abdominal pain is relieved by nothing. The other symptoms of the illness include fever, nausea and vomiting. The other symptoms of the illness do not include jaundice, melena, shortness of breath, diarrhea, dysuria, hematemesis or hematochezia.   The emesis contains stomach contents.   Symptoms associated with the illness do not include chills, anorexia, diaphoresis, heartburn, constipation, urgency, hematuria, frequency or back pain.     Review of patient's allergies indicates:   Allergen Reactions    Iodinated contrast media Swelling     Tongue, right eye, lips swelling. Rash on abdomen and axilla    Codeine     Dairy aid [lactase]     Morphine     Nuts [tree nut]      Past Medical History:   Diagnosis Date    Angioedema of lips 3/13/2015    IVP dye allergy - swelling lips, eye, tongue    Depression     Kidney stones      Past Surgical History:   Procedure Laterality Date    anal fistula repair      APPENDECTOMY      BACK SURGERY      CHOLECYSTECTOMY      COLONOSCOPY N/A 3/10/2016    Procedure: COLONOSCOPY;  Surgeon: Juvenal Dinero MD;  Location: [facility];  Service: Endoscopy;  Laterality: N/A;    gall bladder removal  2005    KNEE SURGERY Right     KNEE SURGERY Left     PILONIDAL CYST DRAINAGE      pt has had 6 of these adn has had revisions     Family History   Problem Relation Age of Onset    Heart disease Father         CABG age 66    Cancer Father         melanoma    Diabetes Father     Diabetes Mother      Colon polyps Mother     Colon polyps Sister     Diabetes Maternal Grandmother     Colon cancer Maternal Grandmother     Diabetes Maternal Grandfather     Diabetes Paternal Grandmother     Stroke Paternal Grandmother     Diabetes Paternal Grandfather      Social History     Tobacco Use    Smoking status: Never Smoker    Smokeless tobacco: Never Used   Substance Use Topics    Alcohol use: No     Frequency: Never     Binge frequency: Never    Drug use: No     Review of Systems   Constitutional: Positive for fever. Negative for chills and diaphoresis.   HENT: Negative for sore throat.    Eyes: Negative for photophobia and redness.   Respiratory: Negative for shortness of breath.    Cardiovascular: Negative for chest pain.   Gastrointestinal: Positive for abdominal pain, nausea and vomiting. Negative for anorexia, constipation, diarrhea, heartburn, hematemesis, hematochezia, jaundice and melena.   Endocrine: Negative for polydipsia and polyphagia.   Genitourinary: Negative for dysuria, frequency, hematuria and urgency.   Musculoskeletal: Negative for back pain.   Skin: Negative for rash.   Neurological: Negative for weakness.   Hematological: Does not bruise/bleed easily.   Psychiatric/Behavioral: The patient is not nervous/anxious.    All other systems reviewed and are negative.      Physical Exam     Initial Vitals [10/07/19 0817]   BP Pulse Resp Temp SpO2   124/78 (!) 122 20 99.6 °F (37.6 °C) 97 %      MAP       --         Physical Exam    Nursing note and vitals reviewed.  Constitutional: He appears well-developed and well-nourished. He appears ill. He appears distressed (secondary to pain).   HENT:   Head: Normocephalic and atraumatic.   Right Ear: External ear normal.   Left Ear: External ear normal.   Nose: Nose normal.   Mouth/Throat: Oropharynx is clear and moist.   Eyes: Conjunctivae and EOM are normal. Pupils are equal, round, and reactive to light.   Neck: Normal range of motion. Neck supple.    Cardiovascular: Normal rate, regular rhythm, normal heart sounds and intact distal pulses.   Pulmonary/Chest: Breath sounds normal. No respiratory distress. He has no wheezes. He has no rhonchi. He has no rales.   Abdominal: Soft. Bowel sounds are normal. He exhibits no distension. There is generalized tenderness. There is no rigidity, no rebound and no guarding.   Musculoskeletal: Normal range of motion.   Neurological: He is alert and oriented to person, place, and time. He has normal strength.   Skin: Skin is warm and dry.   Psychiatric: He has a normal mood and affect. His behavior is normal. Judgment and thought content normal.         ED Course   Procedures  Labs Reviewed   CBC W/ AUTO DIFFERENTIAL - Abnormal; Notable for the following components:       Result Value    WBC 14.43 (*)     Hemoglobin 13.8 (*)     Gran # (ANC) 11.6 (*)     Immature Grans (Abs) 0.06 (*)     Mono # 1.7 (*)     Gran% 80.1 (*)     Lymph% 6.8 (*)     All other components within normal limits   COMPREHENSIVE METABOLIC PANEL - Abnormal; Notable for the following components:    Sodium 135 (*)     All other components within normal limits   CULTURE, BLOOD   CULTURE, BLOOD   LIPASE   LACTIC ACID, PLASMA   HIV 1 / 2 ANTIBODY   URINALYSIS, REFLEX TO URINE CULTURE          Imaging Results          CT Abdomen Pelvis  Without Contrast (Final result)  Result time 10/07/19 09:01:50    Final result by Dax Peralta MD (10/07/19 09:01:50)                 Impression:      Sigmoidal diverticulosis and diverticulitis with perforated sigmoid colon and extensive gas tracking in the retroperitoneum as well is in the perisigmoid fat.  The largest gas collection measures 7.3 x 3.8 x 6.1 cm.  No discrete abscess at this time.  Extensive inflammatory changes adjacent to the sigmoid colon.  Report called to the ER to KADEEM Friedman 10/07/2019 at 08:59.    All CT scans at this facility are performed  using dose modulation techniques as appropriate to  performed exam including the following:  automated exposure control; adjustment of mA and/or kV according to the patients size (this includes techniques or standardized protocols for targeted exams where dose is matched to indication/reason for exam: i.e. extremities or head);  iterative reconstruction technique.      Electronically signed by: Dax Peralta MD  Date:    10/07/2019  Time:    09:01             Narrative:    EXAMINATION:  CT ABDOMEN PELVIS WITHOUT CONTRAST    CLINICAL HISTORY:  Abd pain, fever, abscess suspected;Abdominal pain, unspecified;    TECHNIQUE:  Low dose axial images, sagittal and coronal reformations were obtained from the lung bases to the pubic symphysis, Oral contrast was not administered.    COMPARISON:  None    FINDINGS:  Heart: Normal in size. No pericardial effusion.    Lung Bases: Well aerated, without consolidation or pleural fluid.    Liver: Normal in size and attenuation, with no focal hepatic lesions.    Gallbladder: Remote cholecystectomy.    Bile Ducts: No evidence of dilated ducts.    Pancreas: Normal.    Spleen: Unremarkable.    Adrenals: Unremarkable.    Kidneys/ Ureters: Kidneys appear normal.  There is gas in the retroperitoneum.  Following this gas into the pelvis shows evidence of sigmoidal diverticulosis with diverticulitis and evidence of bowel perforation.    Bladder: No evidence of wall thickening.    Reproductive organs: Unremarkable.    GI Tract/Mesentery: Sigmoidal diverticulosis.  Inflammatory changes adjacent to the sigmoid colon with a large gas collection adjacent to the sigmoid colon measuring up to 7.3 x 3.8 by 6.1 cm.  Scattered free air can be seen in the mesentery and retroperitoneum tracking to the diaphragmatic incisura.    Peritoneal Space: No ascites. Free air prominent can DYN to the pelvis and retroperitoneum.    Retroperitoneum: No significant adenopathy.    Abdominal wall: Unremarkable.    Vasculature: No significant atherosclerosis or  aneurysm.    Bones: No acute fracture.                                                      Clinical Impression:       ICD-10-CM ICD-9-CM   1. Perforated viscus R19.8 799.89   2. Bowel perforation K63.1 569.83   3. Diverticulitis of large intestine with perforation without abscess or bleeding K57.20 562.11     569.83         Disposition:   Disposition: Admitted  Condition: Stable                        KADEEM Cam  10/07/19 0948

## 2019-10-07 NOTE — HPI
43-year-old male with history of opioid induced chronic constipation presented to the ER with complaints of lower abdominal pain and fever with nausea and emesis.  He was seen in the ER 3 days ago and treated with Cipro and Flagyl.  The pain persisted and he returned to the ER today with continued complaints.  He has a pertinent family history of colon cancer, his paternal grandmother  of colon cancer in her 50s.  Last colonoscopy in 2016 showed no acute findings. He attempted an enema over the weekend with acute worsening of his pain. Workup showed leukocytosis and CT abdomen and pelvis confirmed pneumoperitoneum with evidence of perforated sigmoid colon.

## 2019-10-07 NOTE — CONSULTS
[facility] Medicine  Consult Note    Patient Name: Dax Carlisle  MRN: [id number]  Admission Date: 10/7/2019  Hospital Length of Stay: 0 days  Attending Physician: Mindy Goff MD   Primary Care Provider: Pramod Nuñez MD           Patient information was obtained from patient and ER records.     Consults  Subjective:     Principal Problem: Bowel perforation    Chief Complaint:   Chief Complaint   Patient presents with    Abdominal Pain     c/o abdominal pain, N/V, constipation, low back pain and diff urinating         HPI: Dax Carlisle is a 43 year male who was admitted to [facility] for bowel perforation. He underwent exploratory laparotomy with raf procedure and colostomy placement. Surgery without acute complication. He is doing well postoperatively. Currently on dilaudid PCA infusion. Hospital medicine has been consulted for medical management.       Past Medical History:   Diagnosis Date    Angioedema of lips 3/13/2015    IVP dye allergy - swelling lips, eye, tongue    Depression     Kidney stones        Past Surgical History:   Procedure Laterality Date    anal fistula repair      APPENDECTOMY      BACK SURGERY      CHOLECYSTECTOMY      COLONOSCOPY N/A 3/10/2016    Procedure: COLONOSCOPY;  Surgeon: Juvenal Dinero MD;  Location[facility];  Service: Endoscopy;  Laterality: N/A;    gall bladder removal  2005    KNEE SURGERY Right     KNEE SURGERY Left     PILONIDAL CYST DRAINAGE      pt has had 6 of these adn has had revisions       Review of patient's allergies indicates:   Allergen Reactions    Iodinated contrast media Swelling     Tongue, right eye, lips swelling. Rash on abdomen and axilla    Codeine     Dairy aid [lactase]     Morphine      Tolerated hydromorphone in October 2019.     Nuts [tree nut]        No current facility-administered medications on file prior to encounter.      Current Outpatient Medications on File Prior  to Encounter   Medication Sig    acetaminophen (TYLENOL) 325 MG tablet Take 325 mg by mouth every 6 (six) hours as needed for Pain.    ARIPiprazole (ABILIFY) 5 MG Tab Take 1 tablet (5 mg total) by mouth once daily.    ciprofloxacin HCl (CIPRO) 500 MG tablet Take 1 tablet (500 mg total) by mouth 2 (two) times daily. for 10 days    cyclobenzaprine (FLEXERIL) 10 MG tablet Take 1 tablet (10 mg total) by mouth 3 (three) times daily as needed for Muscle spasms.    escitalopram oxalate (LEXAPRO) 20 MG tablet TAKE 1 TABLET (20 MG TOTAL) BY MOUTH ONCE DAILY.    HYDROcodone-acetaminophen (NORCO) 5-325 mg per tablet Take 1 tablet by mouth every 6 (six) hours as needed for Pain.    linaclotide 290 mcg Cap Take 1 capsule (290 mcg total) by mouth once daily.    loratadine (CLARITIN) 10 mg tablet Take 10 mg by mouth once daily.    metroNIDAZOLE (FLAGYL) 500 MG tablet Take 1 tablet (500 mg total) by mouth 3 (three) times daily. for 10 days    promethazine HCl (PHENERGAN ORAL) Take by mouth.    ARIPiprazole (ABILIFY) 5 MG Tab TAKE 1 TABLET BY MOUTH DAILY    azelastine (ASTELIN) 137 mcg (0.1 %) nasal spray 1 spray (137 mcg total) by Nasal route 2 (two) times daily.    meloxicam (MOBIC) 15 MG tablet Take 1 tablet (15 mg total) by mouth once daily.     Family History     Problem Relation (Age of Onset)    Cancer Father    Colon cancer Maternal Grandmother    Colon polyps Mother, Sister    Diabetes Father, Mother, Maternal Grandmother, Maternal Grandfather, Paternal Grandmother, Paternal Grandfather    Heart disease Father    Stroke Paternal Grandmother        Tobacco Use    Smoking status: Never Smoker    Smokeless tobacco: Never Used   Substance and Sexual Activity    Alcohol use: No     Frequency: Never     Binge frequency: Never    Drug use: No    Sexual activity: Yes     Partners: Female     Review of Systems  Objective:     Vital Signs (Most Recent):  Temp: 98.6 °F (37 °C) (10/07/19 1748)  Pulse: (!) 119  (10/07/19 1748)  Resp: 20 (10/07/19 1748)  BP: 122/71 (10/07/19 1748)  SpO2: (!) 93 % (10/07/19 1646) Vital Signs (24h Range):  Temp:  [98.2 °F (36.8 °C)-99.7 °F (37.6 °C)] 98.6 °F (37 °C)  Pulse:  [100-126] 119  Resp:  [11-23] 20  SpO2:  [93 %-100 %] 93 %  BP: (118-139)/(68-82) 122/71     Weight: 101.3 kg (223 lb 5.2 oz)  Body mass index is 32.04 kg/m².    Physical Exam   Constitutional: He is oriented to person, place, and time. He appears well-developed and well-nourished. No distress.   HENT:   Head: Normocephalic and atraumatic.   Eyes: EOM are normal.   Neck: Normal range of motion. Neck supple.   Cardiovascular: Normal rate, regular rhythm and normal heart sounds.   Pulmonary/Chest: Effort normal and breath sounds normal. No respiratory distress.   Abdominal: Soft. Bowel sounds are normal. He exhibits no distension. There is no tenderness.       Musculoskeletal: Normal range of motion. He exhibits edema.   Neurological: He is alert and oriented to person, place, and time.   Skin: Skin is dry.   Nursing note and vitals reviewed.          Significant Labs:   CBC:   Recent Labs   Lab 10/07/19  0825   WBC 14.43*   HGB 13.8*   HCT 40.1        CMP:   Recent Labs   Lab 10/07/19  0825   *   K 3.8   CL 95   CO2 28      BUN 10   CREATININE 0.9   CALCIUM 10.1   PROT 7.5   ALBUMIN 3.5   BILITOT 0.8   ALKPHOS 102   AST 15   ALT 34   ANIONGAP 12   EGFRNONAA >60       Significant Imaging:  Imaging Results          CT Abdomen Pelvis  Without Contrast (Final result)  Result time 10/07/19 09:01:50    Final result by Dax Peralta MD (10/07/19 09:01:50)                 Impression:      Sigmoidal diverticulosis and diverticulitis with perforated sigmoid colon and extensive gas tracking in the retroperitoneum as well is in the perisigmoid fat.  The largest gas collection measures 7.3 x 3.8 x 6.1 cm.  No discrete abscess at this time.  Extensive inflammatory changes adjacent to the sigmoid colon.  Report  called to the ER to KADEEM Friedman 10/07/2019 at 08:59.    All CT scans at this facility are performed  using dose modulation techniques as appropriate to performed exam including the following:  automated exposure control; adjustment of mA and/or kV according to the patients size (this includes techniques or standardized protocols for targeted exams where dose is matched to indication/reason for exam: i.e. extremities or head);  iterative reconstruction technique.      Electronically signed by: Dax Peralta MD  Date:    10/07/2019  Time:    09:01             Narrative:    EXAMINATION:  CT ABDOMEN PELVIS WITHOUT CONTRAST    CLINICAL HISTORY:  Abd pain, fever, abscess suspected;Abdominal pain, unspecified;    TECHNIQUE:  Low dose axial images, sagittal and coronal reformations were obtained from the lung bases to the pubic symphysis, Oral contrast was not administered.    COMPARISON:  None    FINDINGS:  Heart: Normal in size. No pericardial effusion.    Lung Bases: Well aerated, without consolidation or pleural fluid.    Liver: Normal in size and attenuation, with no focal hepatic lesions.    Gallbladder: Remote cholecystectomy.    Bile Ducts: No evidence of dilated ducts.    Pancreas: Normal.    Spleen: Unremarkable.    Adrenals: Unremarkable.    Kidneys/ Ureters: Kidneys appear normal.  There is gas in the retroperitoneum.  Following this gas into the pelvis shows evidence of sigmoidal diverticulosis with diverticulitis and evidence of bowel perforation.    Bladder: No evidence of wall thickening.    Reproductive organs: Unremarkable.    GI Tract/Mesentery: Sigmoidal diverticulosis.  Inflammatory changes adjacent to the sigmoid colon with a large gas collection adjacent to the sigmoid colon measuring up to 7.3 x 3.8 by 6.1 cm.  Scattered free air can be seen in the mesentery and retroperitoneum tracking to the diaphragmatic incisura.    Peritoneal Space: No ascites. Free air prominent can DYN to the pelvis  and retroperitoneum.    Retroperitoneum: No significant adenopathy.    Abdominal wall: Unremarkable.    Vasculature: No significant atherosclerosis or aneurysm.    Bones: No acute fracture.                                  Assessment/Plan:     * Bowel perforation  ---per Primary team  --monitor electrolytes, and H/H  --analgesia  --monitor stoma output  --resume Ciprofloxacin      Chronic pain syndrome  --currently on PCA infusion       MDD (major depressive disorder)  ---hold abilify and Lexapro as patient is NPO  --Ativan q 12 hours prn        VTE Risk Mitigation (From admission, onward)         Ordered     enoxaparin injection 40 mg  Daily      10/07/19 1445     Place YINA hose  Until discontinued      10/07/19 1445     IP VTE HIGH RISK PATIENT  Once      10/07/19 1445     Place sequential compression device  Until discontinued      10/07/19 1445              Thank you for your consult. I will follow-up with patient. Please contact us if you have any additional questions.    May Mueller NP  Department of Hospital Medicine   [facility]

## 2019-10-08 PROBLEM — R00.0 SINUS TACHYCARDIA: Status: ACTIVE | Noted: 2019-10-08

## 2019-10-08 LAB
ANION GAP SERPL CALC-SCNC: 11 MMOL/L (ref 8–16)
ANION GAP SERPL CALC-SCNC: 12 MMOL/L (ref 8–16)
BASOPHILS # BLD AUTO: 0.08 K/UL (ref 0–0.2)
BASOPHILS NFR BLD: 0.4 % (ref 0–1.9)
BUN SERPL-MCNC: 9 MG/DL (ref 6–20)
BUN SERPL-MCNC: 9 MG/DL (ref 6–20)
CALCIUM SERPL-MCNC: 9.3 MG/DL (ref 8.7–10.5)
CALCIUM SERPL-MCNC: 9.8 MG/DL (ref 8.7–10.5)
CHLORIDE SERPL-SCNC: 98 MMOL/L (ref 95–110)
CHLORIDE SERPL-SCNC: 99 MMOL/L (ref 95–110)
CO2 SERPL-SCNC: 24 MMOL/L (ref 23–29)
CO2 SERPL-SCNC: 25 MMOL/L (ref 23–29)
CREAT SERPL-MCNC: 0.8 MG/DL (ref 0.5–1.4)
CREAT SERPL-MCNC: 0.8 MG/DL (ref 0.5–1.4)
DIASTOLIC DYSFUNCTION: NO
DIFFERENTIAL METHOD: ABNORMAL
EOSINOPHIL # BLD AUTO: 0.1 K/UL (ref 0–0.5)
EOSINOPHIL NFR BLD: 0.3 % (ref 0–8)
ERYTHROCYTE [DISTWIDTH] IN BLOOD BY AUTOMATED COUNT: 12.3 % (ref 11.5–14.5)
ERYTHROCYTE [DISTWIDTH] IN BLOOD BY AUTOMATED COUNT: 12.3 % (ref 11.5–14.5)
EST. GFR  (AFRICAN AMERICAN): >60 ML/MIN/1.73 M^2
EST. GFR  (AFRICAN AMERICAN): >60 ML/MIN/1.73 M^2
EST. GFR  (NON AFRICAN AMERICAN): >60 ML/MIN/1.73 M^2
EST. GFR  (NON AFRICAN AMERICAN): >60 ML/MIN/1.73 M^2
GLUCOSE SERPL-MCNC: 106 MG/DL (ref 70–110)
GLUCOSE SERPL-MCNC: 115 MG/DL (ref 70–110)
HCT VFR BLD AUTO: 36.3 % (ref 40–54)
HCT VFR BLD AUTO: 36.4 % (ref 40–54)
HCT VFR BLD AUTO: 36.6 % (ref 40–54)
HGB BLD-MCNC: 12.4 G/DL (ref 14–18)
HGB BLD-MCNC: 12.4 G/DL (ref 14–18)
HGB BLD-MCNC: 12.6 G/DL (ref 14–18)
IMM GRANULOCYTES # BLD AUTO: 0.16 K/UL (ref 0–0.04)
IMM GRANULOCYTES NFR BLD AUTO: 0.9 % (ref 0–0.5)
LYMPHOCYTES # BLD AUTO: 2.1 K/UL (ref 1–4.8)
LYMPHOCYTES NFR BLD: 11 % (ref 18–48)
MAGNESIUM SERPL-MCNC: 1.7 MG/DL (ref 1.6–2.6)
MCH RBC QN AUTO: 29 PG (ref 27–31)
MCH RBC QN AUTO: 29.4 PG (ref 27–31)
MCHC RBC AUTO-ENTMCNC: 34.1 G/DL (ref 32–36)
MCHC RBC AUTO-ENTMCNC: 34.2 G/DL (ref 32–36)
MCV RBC AUTO: 85 FL (ref 82–98)
MCV RBC AUTO: 86 FL (ref 82–98)
MONOCYTES # BLD AUTO: 3.1 K/UL (ref 0.3–1)
MONOCYTES NFR BLD: 16.6 % (ref 4–15)
NEUTROPHILS # BLD AUTO: 13.3 K/UL (ref 1.8–7.7)
NEUTROPHILS NFR BLD: 70.8 % (ref 38–73)
NRBC BLD-RTO: 0 /100 WBC
PLATELET # BLD AUTO: 241 K/UL (ref 150–350)
PLATELET # BLD AUTO: 288 K/UL (ref 150–350)
PMV BLD AUTO: 10.7 FL (ref 9.2–12.9)
PMV BLD AUTO: 11 FL (ref 9.2–12.9)
POTASSIUM SERPL-SCNC: 4.4 MMOL/L (ref 3.5–5.1)
POTASSIUM SERPL-SCNC: 4.5 MMOL/L (ref 3.5–5.1)
POTASSIUM SERPL-SCNC: 4.7 MMOL/L (ref 3.5–5.1)
RBC # BLD AUTO: 4.22 M/UL (ref 4.6–6.2)
RBC # BLD AUTO: 4.27 M/UL (ref 4.6–6.2)
RETIRED EF AND QEF - SEE NOTES: 60 (ref 55–65)
SODIUM SERPL-SCNC: 134 MMOL/L (ref 136–145)
SODIUM SERPL-SCNC: 135 MMOL/L (ref 136–145)
WBC # BLD AUTO: 14.38 K/UL (ref 3.9–12.7)
WBC # BLD AUTO: 18.8 K/UL (ref 3.9–12.7)

## 2019-10-08 PROCEDURE — 85025 COMPLETE CBC W/AUTO DIFF WBC: CPT

## 2019-10-08 PROCEDURE — 93306 2D ECHO WITH COLOR FLOW DOPPLER: ICD-10-PCS | Mod: 26,,, | Performed by: INTERNAL MEDICINE

## 2019-10-08 PROCEDURE — 85027 COMPLETE CBC AUTOMATED: CPT

## 2019-10-08 PROCEDURE — 99900035 HC TECH TIME PER 15 MIN (STAT)

## 2019-10-08 PROCEDURE — 93005 ELECTROCARDIOGRAM TRACING: CPT

## 2019-10-08 PROCEDURE — 97162 PT EVAL MOD COMPLEX 30 MIN: CPT

## 2019-10-08 PROCEDURE — 80048 BASIC METABOLIC PNL TOTAL CA: CPT | Mod: 91

## 2019-10-08 PROCEDURE — 93010 ELECTROCARDIOGRAM REPORT: CPT | Mod: ,,, | Performed by: INTERNAL MEDICINE

## 2019-10-08 PROCEDURE — 83735 ASSAY OF MAGNESIUM: CPT

## 2019-10-08 PROCEDURE — 25000003 PHARM REV CODE 250: Performed by: SURGERY

## 2019-10-08 PROCEDURE — 63600175 PHARM REV CODE 636 W HCPCS: Performed by: SURGERY

## 2019-10-08 PROCEDURE — 93010 EKG 12-LEAD: ICD-10-PCS | Mod: ,,, | Performed by: INTERNAL MEDICINE

## 2019-10-08 PROCEDURE — 94799 UNLISTED PULMONARY SVC/PX: CPT

## 2019-10-08 PROCEDURE — 25000003 PHARM REV CODE 250: Performed by: NURSE PRACTITIONER

## 2019-10-08 PROCEDURE — 63600175 PHARM REV CODE 636 W HCPCS: Performed by: NURSE PRACTITIONER

## 2019-10-08 PROCEDURE — 99222 PR INITIAL HOSPITAL CARE,LEVL II: ICD-10-PCS | Mod: 25,,, | Performed by: INTERNAL MEDICINE

## 2019-10-08 PROCEDURE — 93306 TTE W/DOPPLER COMPLETE: CPT | Mod: 26,,, | Performed by: INTERNAL MEDICINE

## 2019-10-08 PROCEDURE — 84132 ASSAY OF SERUM POTASSIUM: CPT

## 2019-10-08 PROCEDURE — 93306 TTE W/DOPPLER COMPLETE: CPT

## 2019-10-08 PROCEDURE — 11000001 HC ACUTE MED/SURG PRIVATE ROOM

## 2019-10-08 PROCEDURE — 27000221 HC OXYGEN, UP TO 24 HOURS

## 2019-10-08 PROCEDURE — 99222 1ST HOSP IP/OBS MODERATE 55: CPT | Mod: 25,,, | Performed by: INTERNAL MEDICINE

## 2019-10-08 PROCEDURE — 85014 HEMATOCRIT: CPT

## 2019-10-08 PROCEDURE — 94770 HC EXHALED C02 TEST: CPT

## 2019-10-08 PROCEDURE — 99024 POSTOP FOLLOW-UP VISIT: CPT | Mod: ,,, | Performed by: SURGERY

## 2019-10-08 PROCEDURE — 99024 PR POST-OP FOLLOW-UP VISIT: ICD-10-PCS | Mod: ,,, | Performed by: SURGERY

## 2019-10-08 PROCEDURE — 21400001 HC TELEMETRY ROOM

## 2019-10-08 PROCEDURE — 97116 GAIT TRAINING THERAPY: CPT

## 2019-10-08 PROCEDURE — 94761 N-INVAS EAR/PLS OXIMETRY MLT: CPT

## 2019-10-08 PROCEDURE — 36415 COLL VENOUS BLD VENIPUNCTURE: CPT

## 2019-10-08 PROCEDURE — S0028 INJECTION, FAMOTIDINE, 20 MG: HCPCS | Performed by: SURGERY

## 2019-10-08 PROCEDURE — 85018 HEMOGLOBIN: CPT

## 2019-10-08 RX ORDER — METOPROLOL TARTRATE 1 MG/ML
10 INJECTION, SOLUTION INTRAVENOUS ONCE
Status: COMPLETED | OUTPATIENT
Start: 2019-10-08 | End: 2019-10-08

## 2019-10-08 RX ORDER — METOPROLOL TARTRATE 1 MG/ML
5 INJECTION, SOLUTION INTRAVENOUS EVERY 6 HOURS PRN
Status: DISCONTINUED | OUTPATIENT
Start: 2019-10-08 | End: 2019-10-17 | Stop reason: HOSPADM

## 2019-10-08 RX ORDER — METOPROLOL TARTRATE 1 MG/ML
5 INJECTION, SOLUTION INTRAVENOUS ONCE
Status: COMPLETED | OUTPATIENT
Start: 2019-10-08 | End: 2019-10-08

## 2019-10-08 RX ADMIN — CHLORHEXIDINE GLUCONATE 0.12% ORAL RINSE 10 ML: 1.2 LIQUID ORAL at 08:10

## 2019-10-08 RX ADMIN — Medication: at 06:10

## 2019-10-08 RX ADMIN — ENOXAPARIN SODIUM 40 MG: 100 INJECTION SUBCUTANEOUS at 09:10

## 2019-10-08 RX ADMIN — SODIUM CHLORIDE, SODIUM LACTATE, POTASSIUM CHLORIDE, AND CALCIUM CHLORIDE 500 ML: .6; .31; .03; .02 INJECTION, SOLUTION INTRAVENOUS at 03:10

## 2019-10-08 RX ADMIN — FAMOTIDINE 20 MG: 20 INJECTION, SOLUTION INTRAVENOUS at 09:10

## 2019-10-08 RX ADMIN — CIPROFLOXACIN 400 MG: 2 INJECTION, SOLUTION INTRAVENOUS at 10:10

## 2019-10-08 RX ADMIN — SODIUM CHLORIDE, SODIUM LACTATE, POTASSIUM CHLORIDE, AND CALCIUM CHLORIDE: .6; .31; .03; .02 INJECTION, SOLUTION INTRAVENOUS at 01:10

## 2019-10-08 RX ADMIN — METOPROLOL TARTRATE 5 MG: 5 INJECTION, SOLUTION INTRAVENOUS at 02:10

## 2019-10-08 RX ADMIN — SODIUM CHLORIDE 500 ML: 0.9 INJECTION, SOLUTION INTRAVENOUS at 01:10

## 2019-10-08 RX ADMIN — METOPROLOL TARTRATE 10 MG: 5 INJECTION INTRAVENOUS at 01:10

## 2019-10-08 RX ADMIN — Medication: at 11:10

## 2019-10-08 RX ADMIN — CHLORHEXIDINE GLUCONATE 0.12% ORAL RINSE 10 ML: 1.2 LIQUID ORAL at 09:10

## 2019-10-08 RX ADMIN — FAMOTIDINE 20 MG: 20 INJECTION, SOLUTION INTRAVENOUS at 08:10

## 2019-10-08 NOTE — SUBJECTIVE & OBJECTIVE
Interval History: doing well. Pain is controlled. ST on monitor    Review of Systems   Constitutional: Negative for chills, diaphoresis, fatigue and fever.   HENT: Negative for drooling, ear pain, rhinorrhea and sore throat.    Eyes: Negative.    Respiratory: Negative for cough, shortness of breath and wheezing.    Cardiovascular: Negative for palpitations and leg swelling.   Gastrointestinal: Negative for abdominal pain, constipation, diarrhea and nausea.   Endocrine: Negative.    Genitourinary: Negative for dysuria, hematuria and urgency.   Musculoskeletal: Negative.    Skin: Negative for color change and wound.   Allergic/Immunologic: Negative.    Neurological: Negative for dizziness, syncope and speech difficulty.   Hematological: Negative.    Psychiatric/Behavioral: Negative.       Objective:     Vital Signs (Most Recent):  Temp: 98.7 °F (37.1 °C) (10/08/19 1315)  Pulse: (!) 148 (10/08/19 1315)  Resp: 18 (10/08/19 1315)  BP: 134/74 (10/08/19 1315)  SpO2: 95 % (10/08/19 1315) Vital Signs (24h Range):  Temp:  [98.6 °F (37 °C)-99.7 °F (37.6 °C)] 98.7 °F (37.1 °C)  Pulse:  [102-148] 148  Resp:  [15-22] 18  SpO2:  [90 %-98 %] 95 %  BP: (112-135)/(63-87) 134/74     Weight: 101.3 kg (223 lb 5.2 oz)  Body mass index is 32.04 kg/m².    Intake/Output Summary (Last 24 hours) at 10/8/2019 1409  Last data filed at 10/8/2019 1304  Gross per 24 hour   Intake 2438.24 ml   Output 1085 ml   Net 1353.24 ml      Physical Exam   Constitutional: He is oriented to person, place, and time. He appears well-developed and well-nourished. No distress.   HENT:   Head: Normocephalic and atraumatic.   Eyes: EOM are normal.   Neck: Normal range of motion. Neck supple.   Cardiovascular: Regular rhythm and normal heart sounds. Tachycardia present.   Pulmonary/Chest: Effort normal and breath sounds normal. No respiratory distress.   Abdominal: Soft. Bowel sounds are normal. He exhibits no distension. There is no tenderness.        Musculoskeletal: Normal range of motion. He exhibits no edema.   Neurological: He is alert and oriented to person, place, and time.   Skin: Skin is dry.   Nursing note and vitals reviewed.    Significant Labs:   CBC:   Recent Labs   Lab 10/07/19  0825 10/08/19  0106 10/08/19  0635   WBC 14.43*  --  14.38*   HGB 13.8* 12.6* 12.4*   HCT 40.1 36.6* 36.4*     --  241     CMP:   Recent Labs   Lab 10/07/19  0825 10/08/19  0106 10/08/19  0635   *  --  134*   K 3.8 4.4 4.7   CL 95  --  99   CO2 28  --  24     --  106   BUN 10  --  9   CREATININE 0.9  --  0.8   CALCIUM 10.1  --  9.3   PROT 7.5  --   --    ALBUMIN 3.5  --   --    BILITOT 0.8  --   --    ALKPHOS 102  --   --    AST 15  --   --    ALT 34  --   --    ANIONGAP 12  --  11   EGFRNONAA >60  --  >60       Significant Imaging:   Imaging Results          CT Abdomen Pelvis  Without Contrast (Final result)  Result time 10/07/19 09:01:50    Final result by Dax Peralta MD (10/07/19 09:01:50)                 Impression:      Sigmoidal diverticulosis and diverticulitis with perforated sigmoid colon and extensive gas tracking in the retroperitoneum as well is in the perisigmoid fat.  The largest gas collection measures 7.3 x 3.8 x 6.1 cm.  No discrete abscess at this time.  Extensive inflammatory changes adjacent to the sigmoid colon.  Report called to the ER to KADEEM Friedman 10/07/2019 at 08:59.    All CT scans at this facility are performed  using dose modulation techniques as appropriate to performed exam including the following:  automated exposure control; adjustment of mA and/or kV according to the patients size (this includes techniques or standardized protocols for targeted exams where dose is matched to indication/reason for exam: i.e. extremities or head);  iterative reconstruction technique.      Electronically signed by: Dax Peralta MD  Date:    10/07/2019  Time:    09:01             Narrative:    EXAMINATION:  CT ABDOMEN  PELVIS WITHOUT CONTRAST    CLINICAL HISTORY:  Abd pain, fever, abscess suspected;Abdominal pain, unspecified;    TECHNIQUE:  Low dose axial images, sagittal and coronal reformations were obtained from the lung bases to the pubic symphysis, Oral contrast was not administered.    COMPARISON:  None    FINDINGS:  Heart: Normal in size. No pericardial effusion.    Lung Bases: Well aerated, without consolidation or pleural fluid.    Liver: Normal in size and attenuation, with no focal hepatic lesions.    Gallbladder: Remote cholecystectomy.    Bile Ducts: No evidence of dilated ducts.    Pancreas: Normal.    Spleen: Unremarkable.    Adrenals: Unremarkable.    Kidneys/ Ureters: Kidneys appear normal.  There is gas in the retroperitoneum.  Following this gas into the pelvis shows evidence of sigmoidal diverticulosis with diverticulitis and evidence of bowel perforation.    Bladder: No evidence of wall thickening.    Reproductive organs: Unremarkable.    GI Tract/Mesentery: Sigmoidal diverticulosis.  Inflammatory changes adjacent to the sigmoid colon with a large gas collection adjacent to the sigmoid colon measuring up to 7.3 x 3.8 by 6.1 cm.  Scattered free air can be seen in the mesentery and retroperitoneum tracking to the diaphragmatic incisura.    Peritoneal Space: No ascites. Free air prominent can DYN to the pelvis and retroperitoneum.    Retroperitoneum: No significant adenopathy.    Abdominal wall: Unremarkable.    Vasculature: No significant atherosclerosis or aneurysm.    Bones: No acute fracture.

## 2019-10-08 NOTE — PLAN OF CARE
Preference obtained for [facility].  Referral sent.         10/08/19 0492   Post-Acute Status   Post-Acute Authorization Home Health/Hospice   Home Health/Hospice Status Referrals Sent

## 2019-10-08 NOTE — HOSPITAL COURSE
Postop day 1:  Tachycardic with low grade temps overnight.  Complains of auditory hallucinations.  Minimal sleep overnight.  Pain control with PCA use.  Ostomy viable.  NG tube with bilious output.  10/09/2019: pod 2: ostomy edematous but viable, no output. Pain controlled. Cardiology consulted for tachycardia. NG output bilious but less output.   10/10/2019: POD 3.  Continues to have tachycardia in the low 100s.  Minimal NG tube output, more clear.  Ambulating with PT.  10/11/2019: POD4. continues to have tachycardia in 100's. Ostomy viable. No ostomy output.   10/12/2019: POD 5. C/o lower abdominal soreness. Ostomy viable with some gas to appliance. Ambulating. WBC 12,000. H/H stable.  10/13/2019: POD 6.  Much improved with nightly Robaxin.  Ostomy with small amount of gas to appliance.  Remains afebrile.  DON MADDOX.  Plan for CT abdomen and pelvis tomorrow.  10/14/2019: c/o abdominal pain. No nausea or vomiting. Afebrile wbc increased to 16k. CT abd/pelvis today. Restarted antibiotics.   10/15/2019: underwent IR intraabdominal abscess drainage.   10/16/2019; s/p IR abscess drainage. Drain with old blood tinged purulent fluid. Pain imrpoved. Wbc normal. No ostomy output

## 2019-10-08 NOTE — PLAN OF CARE
Remains free from injury. PCA in use for pain control. Fluids running as ordered. Notified hospital medicine of sustained tachycardia, heart monitor placed and received orders for stat labs. Incision CDI. VARSHA drain producing serosanguineous drainage. Colostomy producing neither flatus or stool. NG to LIWS producing a small amount of dark green liquid. Chart reviewed. Will continue to monitor.      SYNCOPE

## 2019-10-08 NOTE — ASSESSMENT & PLAN NOTE
-Secondary to stress of recent surgery, possible dehydration/IVVD, pain  -Continue IV fluids  -IV lopressor 5 mg q 6 prn for HR > 110  -Check 2D echo  -Suspect HR will improve as patient progresses post-operatively    Mame Raygoza needs to be seen for an appointment before further refills are given.

## 2019-10-08 NOTE — SUBJECTIVE & OBJECTIVE
Interval History: Tachycardic with low grade temps overnight.  Complains of auditory hallucinations.  Minimal sleep overnight.  Pain control with PCA use.     Medications:  Continuous Infusions:   hydromorphone in 0.9 % NaCl 6 mg/30 ml      lactated ringers 100 mL/hr at 10/07/19 1505     Scheduled Meds:   chlorhexidine  10 mL Mouth/Throat BID    ciprofloxacin (CIPRO)400mg/200ml D5W IVPB  400 mg Intravenous Q12H    enoxaparin  40 mg Subcutaneous Daily    famotidine  20 mg Intravenous BID    nozaseptin   Each Nostril BID     PRN Meds:cloNIDine, diphenhydrAMINE, naloxone, ondansetron, promethazine (PHENERGAN) IVPB, sodium chloride 0.9%     Review of patient's allergies indicates:   Allergen Reactions    Iodinated contrast media Swelling     Tongue, right eye, lips swelling. Rash on abdomen and axilla    Codeine     Dairy aid [lactase]     Morphine      Tolerated hydromorphone in October 2019.     Nuts [tree nut]      Objective:     Vital Signs (Most Recent):  Temp: 98.6 °F (37 °C) (10/08/19 0741)  Pulse: (!) 130 (10/08/19 0741)  Resp: 16 (10/08/19 0741)  BP: 129/87 (10/08/19 0741)  SpO2: 95 % (10/08/19 0741) Vital Signs (24h Range):  Temp:  [98.2 °F (36.8 °C)-99.7 °F (37.6 °C)] 98.6 °F (37 °C)  Pulse:  [100-137] 130  Resp:  [11-23] 16  SpO2:  [90 %-100 %] 95 %  BP: (112-139)/(63-87) 129/87     Weight: 101.3 kg (223 lb 5.2 oz)  Body mass index is 32.04 kg/m².    Intake/Output - Last 3 Shifts       10/06 0700 - 10/07 0659 10/07 0700 - 10/08 0659 10/08 0700 - 10/09 0659    P.O.  0     I.V. (mL/kg)  194.6 (1.9)     IV Piggyback  100     Total Intake(mL/kg)  294.6 (2.9)     Urine (mL/kg/hr)  650     Drains  495     Stool  0     Total Output  1145     Net  -850.4                  Physical Exam   Constitutional: He is oriented to person, place, and time. He appears well-developed and well-nourished. No distress.   HENT:   Head: Normocephalic and atraumatic.   Eyes: EOM are normal.   Neck: Neck supple.    Cardiovascular: Normal rate and regular rhythm.   Pulmonary/Chest: Effort normal.   Abdominal: Soft. He exhibits no distension. There is no tenderness.   Musculoskeletal: Normal range of motion.   Neurological: He is alert and oriented to person, place, and time.   Skin: Skin is warm.   Vitals reviewed.      Significant Labs:  CBC:   Recent Labs   Lab 10/08/19  0635   WBC 14.38*   RBC 4.27*   HGB 12.4*   HCT 36.4*      MCV 85   MCH 29.0   MCHC 34.1     BMP:   Recent Labs   Lab 10/08/19  0106 10/08/19  0635   GLU  --  106   NA  --  134*   K 4.4 4.7   CL  --  99   CO2  --  24   BUN  --  9   CREATININE  --  0.8   CALCIUM  --  9.3   MG 1.7  --        Significant Diagnostics:  none

## 2019-10-08 NOTE — SUBJECTIVE & OBJECTIVE
Past Medical History:   Diagnosis Date    Angioedema of lips 3/13/2015    IVP dye allergy - swelling lips, eye, tongue    Depression     Kidney stones        Past Surgical History:   Procedure Laterality Date    ABDOMINAL WASHOUT N/A 10/7/2019    Procedure: LAVAGE, PERITONEAL, THERAPEUTIC;  Surgeon: Mindy Goff MD;  Location: [facility] OR;  Service: General;  Laterality: N/A;  abdomen washout    anal fistula repair      APPENDECTOMY      BACK SURGERY      CHOLECYSTECTOMY      COLONOSCOPY N/A 3/10/2016    Procedure: COLONOSCOPY;  Surgeon: Juvenal Dinero MD;  Location: [facility] ENDO;  Service: Endoscopy;  Laterality: N/A;    COLOSTOMY N/A 10/7/2019    Procedure: CREATION, COLOSTOMY;  Surgeon: Mindy Goff MD;  Location: [facility] OR;  Service: General;  Laterality: N/A;    gall bladder removal  2005    SIVAKUMAR PROCEDURE N/A 10/7/2019    Procedure: SIVAKUMAR PROCEDURE;  Surgeon: Mindy Goff MD;  Location: [facility] OR;  Service: General;  Laterality: N/A;    KNEE SURGERY Right     KNEE SURGERY Left     PILONIDAL CYST DRAINAGE      pt has had 6 of these adn has had revisions       Review of patient's allergies indicates:   Allergen Reactions    Iodinated contrast media Swelling     Tongue, right eye, lips swelling. Rash on abdomen and axilla    Codeine     Dairy aid [lactase]     Morphine      Tolerated hydromorphone in October 2019.     Nuts [tree nut]        No current facility-administered medications on file prior to encounter.      Current Outpatient Medications on File Prior to Encounter   Medication Sig    acetaminophen (TYLENOL) 325 MG tablet Take 325 mg by mouth every 6 (six) hours as needed for Pain.    ARIPiprazole (ABILIFY) 5 MG Tab Take 1 tablet (5 mg total) by mouth once daily.    ciprofloxacin HCl (CIPRO) 500 MG tablet Take 1 tablet (500 mg total) by mouth 2 (two) times daily. for 10 days    cyclobenzaprine (FLEXERIL) 10 MG tablet Take 1 tablet (10 mg total) by mouth 3  (three) times daily as needed for Muscle spasms.    escitalopram oxalate (LEXAPRO) 20 MG tablet TAKE 1 TABLET (20 MG TOTAL) BY MOUTH ONCE DAILY.    HYDROcodone-acetaminophen (NORCO) 5-325 mg per tablet Take 1 tablet by mouth every 6 (six) hours as needed for Pain.    linaclotide 290 mcg Cap Take 1 capsule (290 mcg total) by mouth once daily.    loratadine (CLARITIN) 10 mg tablet Take 10 mg by mouth once daily.    metroNIDAZOLE (FLAGYL) 500 MG tablet Take 1 tablet (500 mg total) by mouth 3 (three) times daily. for 10 days    promethazine HCl (PHENERGAN ORAL) Take by mouth.    ARIPiprazole (ABILIFY) 5 MG Tab TAKE 1 TABLET BY MOUTH DAILY    azelastine (ASTELIN) 137 mcg (0.1 %) nasal spray 1 spray (137 mcg total) by Nasal route 2 (two) times daily.    meloxicam (MOBIC) 15 MG tablet Take 1 tablet (15 mg total) by mouth once daily.     Family History     Problem Relation (Age of Onset)    Cancer Father    Colon cancer Maternal Grandmother    Colon polyps Mother, Sister    Diabetes Father, Mother, Maternal Grandmother, Maternal Grandfather, Paternal Grandmother, Paternal Grandfather    Heart disease Father    Stroke Paternal Grandmother        Tobacco Use    Smoking status: Never Smoker    Smokeless tobacco: Never Used   Substance and Sexual Activity    Alcohol use: No     Frequency: Never     Binge frequency: Never    Drug use: No    Sexual activity: Yes     Partners: Female     Review of Systems   Constitution: Positive for malaise/fatigue.   HENT: Negative.    Eyes: Negative.    Cardiovascular: Negative.    Respiratory: Negative.    Endocrine: Negative.    Hematologic/Lymphatic: Negative.    Skin: Negative.    Musculoskeletal: Negative.    Gastrointestinal: Positive for abdominal pain.   Genitourinary: Negative.    Neurological: Negative.    Psychiatric/Behavioral: Negative.    Allergic/Immunologic: Negative.      Objective:     Vital Signs (Most Recent):  Temp: 98.7 °F (37.1 °C) (10/08/19 1315)  Pulse:  (!) 112 (10/08/19 1409)  Resp: 18 (10/08/19 1409)  BP: 106/75 (10/08/19 1409)  SpO2: 96 % (10/08/19 1409) Vital Signs (24h Range):  Temp:  [98.6 °F (37 °C)-99.7 °F (37.6 °C)] 98.7 °F (37.1 °C)  Pulse:  [108-148] 112  Resp:  [15-20] 18  SpO2:  [90 %-96 %] 96 %  BP: (106-135)/(63-87) 106/75     Weight: 101.3 kg (223 lb 5.2 oz)  Body mass index is 32.04 kg/m².    SpO2: 96 %  O2 Device (Oxygen Therapy): nasal cannula      Intake/Output Summary (Last 24 hours) at 10/8/2019 1514  Last data filed at 10/8/2019 1304  Gross per 24 hour   Intake 2426.24 ml   Output 1085 ml   Net 1341.24 ml       Lines/Drains/Airways     Drain                 Closed/Suction Drain 10/07/19 1120 Abdomen Bulb 10 Fr. 1 day         NG/OG Tube 10/07/19 1021 nasogastric 18 Fr. Right nostril 1 day         Urethral Catheter 10/07/19 1000 Straight-tip 1 day         Colostomy 10/07/19 1900 LLQ less than 1 day          Peripheral Intravenous Line                 Peripheral IV - Single Lumen 10/07/19 0825 20 G Left Antecubital 1 day         Peripheral IV - Single Lumen 10/07/19 0914 18 G Right Antecubital 1 day                Physical Exam   Constitutional: He is oriented to person, place, and time. He appears well-developed and well-nourished. No distress.   NG tube in place   HENT:   Head: Normocephalic and atraumatic.   Eyes: Pupils are equal, round, and reactive to light. Right eye exhibits no discharge. Left eye exhibits no discharge.   Neck: Neck supple. No JVD present.   Cardiovascular: Normal rate, regular rhythm, S1 normal, S2 normal and normal heart sounds.   No murmur heard.  Pulmonary/Chest: Effort normal and breath sounds normal. No respiratory distress. He has no wheezes. He has no rales.   Abdominal: He exhibits distension.   Incision C/D/I; no bleeding erythema or drainage    +colostomy   Musculoskeletal: He exhibits no edema.   Neurological: He is alert and oriented to person, place, and time.   Skin: Skin is warm and dry. He is not  diaphoretic. No erythema.   Psychiatric: He has a normal mood and affect. His behavior is normal. Thought content normal.   Nursing note and vitals reviewed.      Significant Labs:   CMP   Recent Labs   Lab 10/07/19  0825 10/08/19  0106 10/08/19  0635 10/08/19  1351   *  --  134* 135*   K 3.8 4.4 4.7 4.5   CL 95  --  99 98   CO2 28  --  24 25     --  106 115*   BUN 10  --  9 9   CREATININE 0.9  --  0.8 0.8   CALCIUM 10.1  --  9.3 9.8   PROT 7.5  --   --   --    ALBUMIN 3.5  --   --   --    BILITOT 0.8  --   --   --    ALKPHOS 102  --   --   --    AST 15  --   --   --    ALT 34  --   --   --    ANIONGAP 12  --  11 12   ESTGFRAFRICA >60  --  >60 >60   EGFRNONAA >60  --  >60 >60   , CBC   Recent Labs   Lab 10/07/19  0825 10/08/19  0106 10/08/19  0635 10/08/19  1351   WBC 14.43*  --  14.38* 18.80*   HGB 13.8* 12.6* 12.4* 12.4*   HCT 40.1 36.6* 36.4* 36.3*     --  241 288    and Troponin No results for input(s): TROPONINI in the last 48 hours.    Significant Imaging: Echocardiogram: 2D echo with color flow doppler: No results found for this or any previous visit., EKG: Reviewed and X-Ray: CXR: X-Ray Chest 1 View (CXR): No results found for this visit on 10/07/19. and X-Ray Chest PA and Lateral (CXR): No results found for this visit on 10/07/19.

## 2019-10-08 NOTE — H&P (VIEW-ONLY)
[facility]  Cardiology  Consult Note    Patient Name: Dax Carlisle  MRN: [id number]  Admission Date: 10/7/2019  Hospital Length of Stay: 1 days  Code Status: Full Code   Attending Provider: Mindy Goff MD   Consulting Provider: Mattie Estrada PA-C  Primary Care Physician: Pramod Nuñez MD  Principal Problem:Bowel perforation    Patient information was obtained from patient, past medical records and ER records.     Inpatient consult to Cardiology  Consult performed by: Mattie Estrada PA-C  Consult ordered by: Mindy Goff MD        Subjective:     Chief Complaint: Abdominal pain    HPI:   Mr. Carlisle is a 43 year old male patient whose current medical conditions include MDD and chronic pain syndrome who presented to [facility] ED yesterday with a chief complaint of worsening lower abdominal pain over the past 3-4 days. Associated symptoms included fever, chills, nausea, and emesis. Patient denied any associated chest pain, SOB, PND, orthopnea, palpitations, near syncope, or syncope. Initial workup in ED revealed pneumoperitoneum with evidence of perforated sigmoid colon and patient was subsequently admitted and underwent exploratory laparotomy with with raf procedure and colostomy placement. This afternoon, patient was noted to be persistently tachycardic and cardiology was consulted to assist with management. Patient seen and examined today, resting in bed. Complains of post-op pain and episodes of profuse sweating. No cardiac complaints. Denies any chest pain or SOB. No prior cardiac history. HR in low 100's after receiving IV lopressor earlier. Chart reviewed. Labs stable. EKG shows sinus tachycardia without any acute ischemic changes. 2D echo pending.      Past Medical History:   Diagnosis Date    Angioedema of lips 3/13/2015    IVP dye allergy - swelling lips, eye, tongue    Depression     Kidney stones        Past Surgical History:   Procedure Laterality Date     ABDOMINAL WASHOUT N/A 10/7/2019    Procedure: LAVAGE, PERITONEAL, THERAPEUTIC;  Surgeon: Mindy Goff MD;  Location: [facility] OR;  Service: General;  Laterality: N/A;  abdomen washout    anal fistula repair      APPENDECTOMY      BACK SURGERY      CHOLECYSTECTOMY      COLONOSCOPY N/A 3/10/2016    Procedure: COLONOSCOPY;  Surgeon: Juvenal Dinero MD;  Location: [facility] ENDO;  Service: Endoscopy;  Laterality: N/A;    COLOSTOMY N/A 10/7/2019    Procedure: CREATION, COLOSTOMY;  Surgeon: Mindy Goff MD;  Location: [facility] OR;  Service: General;  Laterality: N/A;    gall bladder removal  2005    SIVAKUMAR PROCEDURE N/A 10/7/2019    Procedure: SIVAKUMAR PROCEDURE;  Surgeon: Mindy Goff MD;  Location: [facility] OR;  Service: General;  Laterality: N/A;    KNEE SURGERY Right     KNEE SURGERY Left     PILONIDAL CYST DRAINAGE      pt has had 6 of these adn has had revisions       Review of patient's allergies indicates:   Allergen Reactions    Iodinated contrast media Swelling     Tongue, right eye, lips swelling. Rash on abdomen and axilla    Codeine     Dairy aid [lactase]     Morphine      Tolerated hydromorphone in October 2019.     Nuts [tree nut]        No current facility-administered medications on file prior to encounter.      Current Outpatient Medications on File Prior to Encounter   Medication Sig    acetaminophen (TYLENOL) 325 MG tablet Take 325 mg by mouth every 6 (six) hours as needed for Pain.    ARIPiprazole (ABILIFY) 5 MG Tab Take 1 tablet (5 mg total) by mouth once daily.    ciprofloxacin HCl (CIPRO) 500 MG tablet Take 1 tablet (500 mg total) by mouth 2 (two) times daily. for 10 days    cyclobenzaprine (FLEXERIL) 10 MG tablet Take 1 tablet (10 mg total) by mouth 3 (three) times daily as needed for Muscle spasms.    escitalopram oxalate (LEXAPRO) 20 MG tablet TAKE 1 TABLET (20 MG TOTAL) BY MOUTH ONCE DAILY.    HYDROcodone-acetaminophen (NORCO) 5-325 mg per tablet Take 1 tablet  by mouth every 6 (six) hours as needed for Pain.    linaclotide 290 mcg Cap Take 1 capsule (290 mcg total) by mouth once daily.    loratadine (CLARITIN) 10 mg tablet Take 10 mg by mouth once daily.    metroNIDAZOLE (FLAGYL) 500 MG tablet Take 1 tablet (500 mg total) by mouth 3 (three) times daily. for 10 days    promethazine HCl (PHENERGAN ORAL) Take by mouth.    ARIPiprazole (ABILIFY) 5 MG Tab TAKE 1 TABLET BY MOUTH DAILY    azelastine (ASTELIN) 137 mcg (0.1 %) nasal spray 1 spray (137 mcg total) by Nasal route 2 (two) times daily.    meloxicam (MOBIC) 15 MG tablet Take 1 tablet (15 mg total) by mouth once daily.     Family History     Problem Relation (Age of Onset)    Cancer Father    Colon cancer Maternal Grandmother    Colon polyps Mother, Sister    Diabetes Father, Mother, Maternal Grandmother, Maternal Grandfather, Paternal Grandmother, Paternal Grandfather    Heart disease Father    Stroke Paternal Grandmother        Tobacco Use    Smoking status: Never Smoker    Smokeless tobacco: Never Used   Substance and Sexual Activity    Alcohol use: No     Frequency: Never     Binge frequency: Never    Drug use: No    Sexual activity: Yes     Partners: Female     Review of Systems   Constitution: Positive for malaise/fatigue.   HENT: Negative.    Eyes: Negative.    Cardiovascular: Negative.    Respiratory: Negative.    Endocrine: Negative.    Hematologic/Lymphatic: Negative.    Skin: Negative.    Musculoskeletal: Negative.    Gastrointestinal: Positive for abdominal pain.   Genitourinary: Negative.    Neurological: Negative.    Psychiatric/Behavioral: Negative.    Allergic/Immunologic: Negative.      Objective:     Vital Signs (Most Recent):  Temp: 98.7 °F (37.1 °C) (10/08/19 1315)  Pulse: (!) 112 (10/08/19 1409)  Resp: 18 (10/08/19 1409)  BP: 106/75 (10/08/19 1409)  SpO2: 96 % (10/08/19 1409) Vital Signs (24h Range):  Temp:  [98.6 °F (37 °C)-99.7 °F (37.6 °C)] 98.7 °F (37.1 °C)  Pulse:  [108-148]  112  Resp:  [15-20] 18  SpO2:  [90 %-96 %] 96 %  BP: (106-135)/(63-87) 106/75     Weight: 101.3 kg (223 lb 5.2 oz)  Body mass index is 32.04 kg/m².    SpO2: 96 %  O2 Device (Oxygen Therapy): nasal cannula      Intake/Output Summary (Last 24 hours) at 10/8/2019 1514  Last data filed at 10/8/2019 1304  Gross per 24 hour   Intake 2426.24 ml   Output 1085 ml   Net 1341.24 ml       Lines/Drains/Airways     Drain                 Closed/Suction Drain 10/07/19 1120 Abdomen Bulb 10 Fr. 1 day         NG/OG Tube 10/07/19 1021 nasogastric 18 Fr. Right nostril 1 day         Urethral Catheter 10/07/19 1000 Straight-tip 1 day         Colostomy 10/07/19 1900 LLQ less than 1 day          Peripheral Intravenous Line                 Peripheral IV - Single Lumen 10/07/19 0825 20 G Left Antecubital 1 day         Peripheral IV - Single Lumen 10/07/19 0914 18 G Right Antecubital 1 day                Physical Exam   Constitutional: He is oriented to person, place, and time. He appears well-developed and well-nourished. No distress.   NG tube in place   HENT:   Head: Normocephalic and atraumatic.   Eyes: Pupils are equal, round, and reactive to light. Right eye exhibits no discharge. Left eye exhibits no discharge.   Neck: Neck supple. No JVD present.   Cardiovascular: Normal rate, regular rhythm, S1 normal, S2 normal and normal heart sounds.   No murmur heard.  Pulmonary/Chest: Effort normal and breath sounds normal. No respiratory distress. He has no wheezes. He has no rales.   Abdominal: He exhibits distension.   Incision C/D/I; no bleeding erythema or drainage    +colostomy   Musculoskeletal: He exhibits no edema.   Neurological: He is alert and oriented to person, place, and time.   Skin: Skin is warm and dry. He is not diaphoretic. No erythema.   Psychiatric: He has a normal mood and affect. His behavior is normal. Thought content normal.   Nursing note and vitals reviewed.      Significant Labs:   CMP   Recent Labs   Lab  10/07/19  0825 10/08/19  0106 10/08/19  0635 10/08/19  1351   *  --  134* 135*   K 3.8 4.4 4.7 4.5   CL 95  --  99 98   CO2 28  --  24 25     --  106 115*   BUN 10  --  9 9   CREATININE 0.9  --  0.8 0.8   CALCIUM 10.1  --  9.3 9.8   PROT 7.5  --   --   --    ALBUMIN 3.5  --   --   --    BILITOT 0.8  --   --   --    ALKPHOS 102  --   --   --    AST 15  --   --   --    ALT 34  --   --   --    ANIONGAP 12  --  11 12   ESTGFRAFRICA >60  --  >60 >60   EGFRNONAA >60  --  >60 >60   , CBC   Recent Labs   Lab 10/07/19  0825 10/08/19  0106 10/08/19  0635 10/08/19  1351   WBC 14.43*  --  14.38* 18.80*   HGB 13.8* 12.6* 12.4* 12.4*   HCT 40.1 36.6* 36.4* 36.3*     --  241 288    and Troponin No results for input(s): TROPONINI in the last 48 hours.    Significant Imaging: Echocardiogram: 2D echo with color flow doppler: No results found for this or any previous visit., EKG: Reviewed and X-Ray: CXR: X-Ray Chest 1 View (CXR): No results found for this visit on 10/07/19. and X-Ray Chest PA and Lateral (CXR): No results found for this visit on 10/07/19.    Assessment and Plan:   Patient who presents with bowel perforation s/p ex-lap and colostomy placement. Sinus tachycardia is reactive in nature secondary to stress of surgery, possible IVVD, pain. IV Lopressor prn. Check 2D echo.    * Bowel perforation  -s/p ex-lap with colostomy  -Mgmt as per general surgery and hospital medicine    Sinus tachycardia  -Secondary to stress of recent surgery, possible dehydration/IVVD, pain  -Continue IV fluids  -IV lopressor 5 mg q 6 prn for HR > 110  -Check 2D echo  -Suspect HR will improve as patient progresses post-operatively     MDD (major depressive disorder)  -Mgmt as per hospital medicine        VTE Risk Mitigation (From admission, onward)         Ordered     enoxaparin injection 40 mg  Daily      10/07/19 1445     Place YINA hose  Until discontinued      10/07/19 1445     IP VTE HIGH RISK PATIENT  Once      10/07/19 1445      Place sequential compression device  Until discontinued      10/07/19 1492                Thank you for your consult. I will follow-up with patient. Please contact us if you have any additional questions.    Mattie Estrada PA-C  Cardiology   [facility]

## 2019-10-08 NOTE — ASSESSMENT & PLAN NOTE
---per Primary team  --monitor electrolytes, and H/H  --analgesia  --monitor stoma output  --resume Ciprofloxacin    10/8/19  H/H down, but stable  Stoma care per wound care nurse  Primary team following

## 2019-10-08 NOTE — PLAN OF CARE
10/08/19 1357   Discharge Assessment   Assessment Type Discharge Planning Assessment   Confirmed/corrected address and phone number on facesheet? Yes   Assessment information obtained from? Patient;Caregiver   Expected Length of Stay (days) 7   Communicated expected length of stay with patient/caregiver yes   Prior to hospitilization cognitive status: Alert/Oriented   Prior to hospitalization functional status: Independent;Assistive Equipment   Current cognitive status: Alert/Oriented   Current Functional Status: Needs Assistance   Lives With spouse   Able to Return to Prior Arrangements yes   Is patient able to care for self after discharge? Yes   Who are your caregiver(s) and their phone number(s)? Mary Alice Carlisle   Patient's perception of discharge disposition home health   Readmission Within the Last 30 Days no previous admission in last 30 days   Patient currently being followed by outpatient case management? No   Patient currently receives any other outside agency services? No   Equipment Currently Used at Home none   Do you have any problems affording any of your prescribed medications? No   Is the patient taking medications as prescribed? yes   Does the patient have transportation home? Yes   Transportation Anticipated family or friend will provide   Does the patient receive services at the [facility]? No   Discharge Plan A Home Health   DME Needed Upon Discharge    (TBD)   Patient/Family in Agreement with Plan yes     Met with pt and his wife at bedside to perform DC assessment. Pt immediately complained that his pulse was 150. SWer observed on the telemetry monitor that pt's pulse was 155 bpm. SWer reported information to pt's RN who consulted pt's attending MD and returned to pt's room. Pt's RN returned moments later to inform pt of new medical intervention to control pt's pulse and inform of a cardiology consult. Pt will likely need HH on DC. DME pending appropriate evaluations. Pt reported that he  recently broke his back but was already receiving pain medication and did not request any more when recovering from his back surgery. Pt reported that he wants to take as little opioid pain medication as possible to make his pain bearable. SWer provided a transitional care folder, information on advanced directives, information on pharmacy bedside delivery, and discharge planning begins on admission with contact information for any needs/questions. Pt opted for bedside medication delivery. [facility]. Information below.    [address], [place] [address]  [phone]  Santos Morocho LMSW 10/8/2019 2:08 PM

## 2019-10-08 NOTE — PROGRESS NOTES
[facility] Hospital Medicine  Progress Note    Patient Name: Dax Carlisle  MRN: [id number]  Patient Class: IP- Inpatient   Admission Date: 10/7/2019  Length of Stay: 1 days  Attending Physician: Mindy Goff MD  Primary Care Provider: Pramod Nuñez MD  \  Subjective:     Principal Problem:Bowel perforation        HPI:  Dax Carlisle is a 43 year male who was admitted to [facility] for bowel perforation. He underwent exploratory laparotomy with raf procedure and colostomy placement. Surgery without acute complication. He is doing well postoperatively. Currently on dilaudid PCA infusion. Hospital medicine has been consulted for medical management.       Overview/Hospital Course:  No notes on file    Interval History: doing well. Pain is controlled. ST on monitor    Review of Systems   Constitutional: Negative for chills, diaphoresis, fatigue and fever.   HENT: Negative for drooling, ear pain, rhinorrhea and sore throat.    Eyes: Negative.    Respiratory: Negative for cough, shortness of breath and wheezing.    Cardiovascular: Negative for palpitations and leg swelling.   Gastrointestinal: Negative for abdominal pain, constipation, diarrhea and nausea.   Endocrine: Negative.    Genitourinary: Negative for dysuria, hematuria and urgency.   Musculoskeletal: Negative.    Skin: Negative for color change and wound.   Allergic/Immunologic: Negative.    Neurological: Negative for dizziness, syncope and speech difficulty.   Hematological: Negative.    Psychiatric/Behavioral: Negative.       Objective:     Vital Signs (Most Recent):  Temp: 98.7 °F (37.1 °C) (10/08/19 1315)  Pulse: (!) 148 (10/08/19 1315)  Resp: 18 (10/08/19 1315)  BP: 134/74 (10/08/19 1315)  SpO2: 95 % (10/08/19 1315) Vital Signs (24h Range):  Temp:  [98.6 °F (37 °C)-99.7 °F (37.6 °C)] 98.7 °F (37.1 °C)  Pulse:  [102-148] 148  Resp:  [15-22] 18  SpO2:  [90 %-98 %] 95 %  BP: (112-135)/(63-87) 134/74     Weight:  101.3 kg (223 lb 5.2 oz)  Body mass index is 32.04 kg/m².    Intake/Output Summary (Last 24 hours) at 10/8/2019 1409  Last data filed at 10/8/2019 1304  Gross per 24 hour   Intake 2438.24 ml   Output 1085 ml   Net 1353.24 ml      Physical Exam   Constitutional: He is oriented to person, place, and time. He appears well-developed and well-nourished. No distress.   HENT:   Head: Normocephalic and atraumatic.   Eyes: EOM are normal.   Neck: Normal range of motion. Neck supple.   Cardiovascular: Regular rhythm and normal heart sounds. Tachycardia present.   Pulmonary/Chest: Effort normal and breath sounds normal. No respiratory distress.   Abdominal: Soft. Bowel sounds are normal. He exhibits no distension. There is no tenderness.       Musculoskeletal: Normal range of motion. He exhibits no edema.   Neurological: He is alert and oriented to person, place, and time.   Skin: Skin is dry.   Nursing note and vitals reviewed.    Significant Labs:   CBC:   Recent Labs   Lab 10/07/19  0825 10/08/19  0106 10/08/19  0635   WBC 14.43*  --  14.38*   HGB 13.8* 12.6* 12.4*   HCT 40.1 36.6* 36.4*     --  241     CMP:   Recent Labs   Lab 10/07/19  0825 10/08/19  0106 10/08/19  0635   *  --  134*   K 3.8 4.4 4.7   CL 95  --  99   CO2 28  --  24     --  106   BUN 10  --  9   CREATININE 0.9  --  0.8   CALCIUM 10.1  --  9.3   PROT 7.5  --   --    ALBUMIN 3.5  --   --    BILITOT 0.8  --   --    ALKPHOS 102  --   --    AST 15  --   --    ALT 34  --   --    ANIONGAP 12  --  11   EGFRNONAA >60  --  >60       Significant Imaging:   Imaging Results          CT Abdomen Pelvis  Without Contrast (Final result)  Result time 10/07/19 09:01:50    Final result by Dax Peralta MD (10/07/19 09:01:50)                 Impression:      Sigmoidal diverticulosis and diverticulitis with perforated sigmoid colon and extensive gas tracking in the retroperitoneum as well is in the perisigmoid fat.  The largest gas collection measures 7.3 x  3.8 x 6.1 cm.  No discrete abscess at this time.  Extensive inflammatory changes adjacent to the sigmoid colon.  Report called to the ER to KADEEM Friedman 10/07/2019 at 08:59.    All CT scans at this facility are performed  using dose modulation techniques as appropriate to performed exam including the following:  automated exposure control; adjustment of mA and/or kV according to the patients size (this includes techniques or standardized protocols for targeted exams where dose is matched to indication/reason for exam: i.e. extremities or head);  iterative reconstruction technique.      Electronically signed by: Dax Peralta MD  Date:    10/07/2019  Time:    09:01             Narrative:    EXAMINATION:  CT ABDOMEN PELVIS WITHOUT CONTRAST    CLINICAL HISTORY:  Abd pain, fever, abscess suspected;Abdominal pain, unspecified;    TECHNIQUE:  Low dose axial images, sagittal and coronal reformations were obtained from the lung bases to the pubic symphysis, Oral contrast was not administered.    COMPARISON:  None    FINDINGS:  Heart: Normal in size. No pericardial effusion.    Lung Bases: Well aerated, without consolidation or pleural fluid.    Liver: Normal in size and attenuation, with no focal hepatic lesions.    Gallbladder: Remote cholecystectomy.    Bile Ducts: No evidence of dilated ducts.    Pancreas: Normal.    Spleen: Unremarkable.    Adrenals: Unremarkable.    Kidneys/ Ureters: Kidneys appear normal.  There is gas in the retroperitoneum.  Following this gas into the pelvis shows evidence of sigmoidal diverticulosis with diverticulitis and evidence of bowel perforation.    Bladder: No evidence of wall thickening.    Reproductive organs: Unremarkable.    GI Tract/Mesentery: Sigmoidal diverticulosis.  Inflammatory changes adjacent to the sigmoid colon with a large gas collection adjacent to the sigmoid colon measuring up to 7.3 x 3.8 by 6.1 cm.  Scattered free air can be seen in the mesentery and  retroperitoneum tracking to the diaphragmatic incisura.    Peritoneal Space: No ascites. Free air prominent can DYN to the pelvis and retroperitoneum.    Retroperitoneum: No significant adenopathy.    Abdominal wall: Unremarkable.    Vasculature: No significant atherosclerosis or aneurysm.    Bones: No acute fracture.                                    Assessment/Plan:      * Bowel perforation  ---per Primary team  --monitor electrolytes, and H/H  --analgesia  --monitor stoma output  --resume Ciprofloxacin    10/8/19  H/H down, but stable  Stoma care per wound care nurse  Primary team following      Sinus tachycardia  --likely related to pain or possibly drug withdrawal  --received Metoprolol IV and fluids by primary team.   --closely montior    Chronic pain syndrome  --currently on PCA infusion       MDD (major depressive disorder)  ---hold abilify and Lexapro as patient is NPO  --Ativan q 12 hours prn        VTE Risk Mitigation (From admission, onward)         Ordered     enoxaparin injection 40 mg  Daily      10/07/19 1445     Place YINA hose  Until discontinued      10/07/19 1445     IP VTE HIGH RISK PATIENT  Once      10/07/19 1445     Place sequential compression device  Until discontinued      10/07/19 1445                May Mueller NP  Department of Hospital Medicine   [facility]

## 2019-10-08 NOTE — ASSESSMENT & PLAN NOTE
--likely related to pain or possibly drug withdrawal  --received Metoprolol IV and fluids by primary team.   --frances jaime

## 2019-10-08 NOTE — CONSULTS
"   10/08/19 1005        Colostomy 10/07/19 1900 LLQ   Placement Date/Time: 10/07/19 (c) 1900   Location: LLQ   Stomal Appliance Changed;1 piece;Per protocol/policy   Stoma Appearance oval;red;moist;protruding above skin level;pink   Stoma Size (in) [id number]   Site Assessment Clean;Intact   Peristomal Assessment Intact   Accessories/Skin Care skin sealant;skin barrier ring;cleansed w/ water   Stoma Function bowel sweat   Treatment Bag change;Site care   Tolerance no signs/symptoms of discomfort       Consulted to this 43 year old male patient today who is s/p colostomy placement due to bowel perforation. PMH of chronic pain syndrome, depression, kidney stones. He is awake and alert, family is present at the bedside. He does report pain that is controlled with PCA pump. Abdomen assessed. LUQ ostomy noted with surgical pouch that is beginning to lift up on the edges. Pouch removed. Stoma is moist and pink, protrudes from abdomen, measures 11r10ol. Bowel sweat noted in pouch. Cleansed with water and gauze and patted dry. Cavilon skin barrier film sprayed on van stomal skin. Brava moldable ring then applied. One piece flat JESSY pouch cut to size and applied, pressing gently to seal. Patient tolerated care well, and all steps explained as care performed. Initiated colostomy teaching including dietary, hygiene, and activity guidelines as well as reviewed emptying and changing pouch utilizing the teach back method. Answered all questions. Will continue to follow.    Please review [place]'s procedure "Pouching : Colostomy or Ileostomy" for instructions on ostomy.stoma care. Change ostomy appliance weekly or IMMEDIATELY IF LEAKING. All ostomy care supplies can be requested from materials management.    OSTOMY CARE SUPPLIES:  One Piece Flat [place] Pouch #[id number]   Brava Stoma Ring #[id number]    Cavilon Spray #[id number]          "

## 2019-10-08 NOTE — PT/OT/SLP EVAL
Physical Therapy Evaluation    Patient Name:  Dax Carlisle   MRN:  [id number]    Recommendations:     Discharge Recommendations:  home   Discharge Equipment Recommendations: none   Barriers to discharge: None    Assessment:     Dax Carlisle is a 43 y.o. male admitted with a medical diagnosis of Bowel perforation.  He presents with the following impairments/functional limitations:  weakness, impaired balance, gait instability, impaired functional mobilty, pain, impaired self care skills, impaired endurance .    Rehab Prognosis: Good; patient would benefit from acute skilled PT services to address these deficits and reach maximum level of function.    Recent Surgery: Procedure(s) (LRB):  LAPAROTOMY, EXPLORATORY (N/A)  LAVAGE, PERITONEAL, THERAPEUTIC (N/A)  SIVAKUMAR PROCEDURE (N/A)  CREATION, COLOSTOMY (N/A) 1 Day Post-Op    Plan:     During this hospitalization, patient to be seen   to address the identified rehab impairments via therapeutic exercises, therapeutic activities, gait training and progress toward the following goals:    · Plan of Care Expires:  10/15/19    Subjective     Chief Complaint: PAIN  Patient/Family Comments/goals: INC MOBILITY   Pain/Comfort:  · Pain Rating 1: 7/10  · Location 1: abdomen  · Pain Rating Post-Intervention 1: 7/10    Patients cultural, spiritual, Christianity conflicts given the current situation:      Living Environment:  PT LIVES AT HOME WITH WIFE AND HAS NO STEPS TO ENTER HOME   Prior to admission, patients level of function was IND AND WORKS.  Equipment used at home: none.  DME owned (not currently used): none.  Upon discharge, patient will have assistance from FAMILY.    Objective:     Communicated with NURSE BAPTISTE AND [facility] CHART REVIEW  prior to session.  Patient found supine with peripheral IV, telemetry, PCA, abdi catheter, oxygen, colostomy, NG tube, VARSHA drain  upon PT entry to room.    General Precautions: Standard, fall   Orthopedic Precautions:N/A   Braces:  N/A     Exams:  · Cognitive Exam:  Patient is oriented to Person, Place, Time and Situation  · RLE ROM: WFL  · RLE Strength: WFL  · LLE ROM: WFL  · LLE Strength: WFL    Functional Mobility:  PT MET IN RM LOG ROLLED TO LEFT SEATED EOB WITH MIN A. PT SCOOTED TO EOB AND STOOD WITH NO AD AND GT TRAINED X 170' WITH SLOW PACE GT AND CGA. PT RETURNED TO RM T/F TO CHAIR WITH CGA. PT LEFT SEATED WITH ALL NEEDS MET AND NURSE AWARE IV LEAKING. PT LEFT WITH ALL NEEDS MET.     AM-PAC 6 CLICK MOBILITY  Total Score:16     Patient left up in chair with call button in reach and chair alarm on.    GOALS:   Multidisciplinary Problems     Physical Therapy Goals     Not on file                History:     Past Medical History:   Diagnosis Date    Angioedema of lips 3/13/2015    IVP dye allergy - swelling lips, eye, tongue    Depression     Kidney stones        Past Surgical History:   Procedure Laterality Date    ABDOMINAL WASHOUT N/A 10/7/2019    Procedure: LAVAGE, PERITONEAL, THERAPEUTIC;  Surgeon: Mindy Goff MD;  Location: [facility] OR;  Service: General;  Laterality: N/A;  abdomen washout    anal fistula repair      APPENDECTOMY      BACK SURGERY      CHOLECYSTECTOMY      COLONOSCOPY N/A 3/10/2016    Procedure: COLONOSCOPY;  Surgeon: Juvenal Dinero MD;  Location: [facility] ENDO;  Service: Endoscopy;  Laterality: N/A;    COLOSTOMY N/A 10/7/2019    Procedure: CREATION, COLOSTOMY;  Surgeon: Mindy Goff MD;  Location: [facility] OR;  Service: General;  Laterality: N/A;    gall bladder removal  2005    SIVAKUMAR PROCEDURE N/A 10/7/2019    Procedure: SIVAKUMAR PROCEDURE;  Surgeon: Mindy Goff MD;  Location: [facility] OR;  Service: General;  Laterality: N/A;    KNEE SURGERY Right     KNEE SURGERY Left     PILONIDAL CYST DRAINAGE      pt has had 6 of these adn has had revisions       Time Tracking:     PT Received On: 10/08/19  PT Start Time: 0759     PT Stop Time: 0824  PT Total Time (min): 25 min     Billable Minutes:  Evaluation 15 and Gait Training 10      Jeny Quiles, PT  10/08/2019

## 2019-10-08 NOTE — SUBJECTIVE & OBJECTIVE
Interval History: doing well. Pain has adequately controlled. ST on monitor, in no distress.     Review of Systems   Constitutional: Negative for chills, diaphoresis, fatigue and fever.   HENT: Negative for drooling, ear pain, rhinorrhea and sore throat.    Eyes: Negative.    Respiratory: Negative for cough, shortness of breath and wheezing.    Cardiovascular: Negative for palpitations and leg swelling.   Gastrointestinal: Positive for abdominal pain. Negative for constipation, diarrhea and nausea.   Endocrine: Negative.    Genitourinary: Negative for dysuria, hematuria and urgency.   Musculoskeletal: Negative.    Skin: Negative for color change and wound.   Allergic/Immunologic: Negative.    Neurological: Negative for dizziness, syncope and speech difficulty.   Hematological: Negative.    Psychiatric/Behavioral: Negative.       Objective:     Vital Signs (Most Recent):  Temp: 98.6 °F (37 °C) (10/08/19 0741)  Pulse: (!) 130 (10/08/19 0741)  Resp: 16 (10/08/19 0741)  BP: 129/87 (10/08/19 0741)  SpO2: 96 % (10/08/19 0819) Vital Signs (24h Range):  Temp:  [98.2 °F (36.8 °C)-99.7 °F (37.6 °C)] 98.6 °F (37 °C)  Pulse:  [100-137] 130  Resp:  [11-23] 16  SpO2:  [90 %-100 %] 96 %  BP: (112-139)/(63-87) 129/87     Weight: 101.3 kg (223 lb 5.2 oz)  Body mass index is 32.04 kg/m².    Intake/Output Summary (Last 24 hours) at 10/8/2019 1158  Last data filed at 10/8/2019 1103  Gross per 24 hour   Intake 320.57 ml   Output 1145 ml   Net -824.43 ml      Physical Exam   Constitutional: He is oriented to person, place, and time. He appears well-developed and well-nourished. No distress.   HENT:   Head: Normocephalic and atraumatic.   Eyes: EOM are normal.   Neck: Normal range of motion. Neck supple.   Cardiovascular: Normal rate, regular rhythm and normal heart sounds.   Pulmonary/Chest: Effort normal and breath sounds normal. No respiratory distress.   Abdominal: Soft. Bowel sounds are normal. He exhibits no distension. There is no  tenderness.       Musculoskeletal: Normal range of motion. He exhibits no edema.   Neurological: He is alert and oriented to person, place, and time.   Skin: Skin is dry.   Nursing note and vitals reviewed.    Significant Labs:   CBC:   Recent Labs   Lab 10/07/19  0825 10/08/19  0106 10/08/19  0635   WBC 14.43*  --  14.38*   HGB 13.8* 12.6* 12.4*   HCT 40.1 36.6* 36.4*     --  241     CMP:   Recent Labs   Lab 10/07/19  0825 10/08/19  0106 10/08/19  0635   *  --  134*   K 3.8 4.4 4.7   CL 95  --  99   CO2 28  --  24     --  106   BUN 10  --  9   CREATININE 0.9  --  0.8   CALCIUM 10.1  --  9.3   PROT 7.5  --   --    ALBUMIN 3.5  --   --    BILITOT 0.8  --   --    ALKPHOS 102  --   --    AST 15  --   --    ALT 34  --   --    ANIONGAP 12  --  11   EGFRNONAA >60  --  >60       Significant Imaging:  Imaging Results          CT Abdomen Pelvis  Without Contrast (Final result)  Result time 10/07/19 09:01:50    Final result by Dax Peralta MD (10/07/19 09:01:50)                 Impression:      Sigmoidal diverticulosis and diverticulitis with perforated sigmoid colon and extensive gas tracking in the retroperitoneum as well is in the perisigmoid fat.  The largest gas collection measures 7.3 x 3.8 x 6.1 cm.  No discrete abscess at this time.  Extensive inflammatory changes adjacent to the sigmoid colon.  Report called to the ER to KADEEM Friedman 10/07/2019 at 08:59.    All CT scans at this facility are performed  using dose modulation techniques as appropriate to performed exam including the following:  automated exposure control; adjustment of mA and/or kV according to the patients size (this includes techniques or standardized protocols for targeted exams where dose is matched to indication/reason for exam: i.e. extremities or head);  iterative reconstruction technique.      Electronically signed by: Dax Peralta MD  Date:    10/07/2019  Time:    09:01             Narrative:    EXAMINATION:  CT  ABDOMEN PELVIS WITHOUT CONTRAST    CLINICAL HISTORY:  Abd pain, fever, abscess suspected;Abdominal pain, unspecified;    TECHNIQUE:  Low dose axial images, sagittal and coronal reformations were obtained from the lung bases to the pubic symphysis, Oral contrast was not administered.    COMPARISON:  None    FINDINGS:  Heart: Normal in size. No pericardial effusion.    Lung Bases: Well aerated, without consolidation or pleural fluid.    Liver: Normal in size and attenuation, with no focal hepatic lesions.    Gallbladder: Remote cholecystectomy.    Bile Ducts: No evidence of dilated ducts.    Pancreas: Normal.    Spleen: Unremarkable.    Adrenals: Unremarkable.    Kidneys/ Ureters: Kidneys appear normal.  There is gas in the retroperitoneum.  Following this gas into the pelvis shows evidence of sigmoidal diverticulosis with diverticulitis and evidence of bowel perforation.    Bladder: No evidence of wall thickening.    Reproductive organs: Unremarkable.    GI Tract/Mesentery: Sigmoidal diverticulosis.  Inflammatory changes adjacent to the sigmoid colon with a large gas collection adjacent to the sigmoid colon measuring up to 7.3 x 3.8 by 6.1 cm.  Scattered free air can be seen in the mesentery and retroperitoneum tracking to the diaphragmatic incisura.    Peritoneal Space: No ascites. Free air prominent can DYN to the pelvis and retroperitoneum.    Retroperitoneum: No significant adenopathy.    Abdominal wall: Unremarkable.    Vasculature: No significant atherosclerosis or aneurysm.    Bones: No acute fracture.                            '

## 2019-10-08 NOTE — ASSESSMENT & PLAN NOTE
S/p ex lap, raf's, abdominal washout for sigmoid perforation on 10/7/19    Continue resuscitation.  H/H stable.  Continue IV abx for minimum 4 days for intraabdominal sepsis.  PT to mobilize  DVT and GI prophy  Awaiting bowel function

## 2019-10-08 NOTE — CONSULTS
[facility]  Cardiology  Consult Note    Patient Name: Dax Carlisle  MRN: [id number]  Admission Date: 10/7/2019  Hospital Length of Stay: 1 days  Code Status: Full Code   Attending Provider: Mindy Goff MD   Consulting Provider: Mattie Estrada PA-C  Primary Care Physician: Pramod Nuñez MD  Principal Problem:Bowel perforation    Patient information was obtained from patient, past medical records and ER records.     Inpatient consult to Cardiology  Consult performed by: Mattie Estrada PA-C  Consult ordered by: Mindy Goff MD        Subjective:     Chief Complaint: Abdominal pain    HPI:   Mr. Carlisle is a 43 year old male patient whose current medical conditions include MDD and chronic pain syndrome who presented to [facility] ED yesterday with a chief complaint of worsening lower abdominal pain over the past 3-4 days. Associated symptoms included fever, chills, nausea, and emesis. Patient denied any associated chest pain, SOB, PND, orthopnea, palpitations, near syncope, or syncope. Initial workup in ED revealed pneumoperitoneum with evidence of perforated sigmoid colon and patient was subsequently admitted and underwent exploratory laparotomy with with raf procedure and colostomy placement. This afternoon, patient was noted to be persistently tachycardic and cardiology was consulted to assist with management. Patient seen and examined today, resting in bed. Complains of post-op pain and episodes of profuse sweating. No cardiac complaints. Denies any chest pain or SOB. No prior cardiac history. HR in low 100's after receiving IV lopressor earlier. Chart reviewed. Labs stable. EKG shows sinus tachycardia without any acute ischemic changes. 2D echo pending.      Past Medical History:   Diagnosis Date    Angioedema of lips 3/13/2015    IVP dye allergy - swelling lips, eye, tongue    Depression     Kidney stones        Past Surgical History:   Procedure Laterality Date     ABDOMINAL WASHOUT N/A 10/7/2019    Procedure: LAVAGE, PERITONEAL, THERAPEUTIC;  Surgeon: Mindy Goff MD;  Location: [facility] OR;  Service: General;  Laterality: N/A;  abdomen washout    anal fistula repair      APPENDECTOMY      BACK SURGERY      CHOLECYSTECTOMY      COLONOSCOPY N/A 3/10/2016    Procedure: COLONOSCOPY;  Surgeon: Juvenal Dinero MD;  Location: [facility] ENDO;  Service: Endoscopy;  Laterality: N/A;    COLOSTOMY N/A 10/7/2019    Procedure: CREATION, COLOSTOMY;  Surgeon: Mindy Goff MD;  Location: [facility] OR;  Service: General;  Laterality: N/A;    gall bladder removal  2005    SIVAKUMAR PROCEDURE N/A 10/7/2019    Procedure: SIVAKUMAR PROCEDURE;  Surgeon: Mindy Goff MD;  Location: [facility] OR;  Service: General;  Laterality: N/A;    KNEE SURGERY Right     KNEE SURGERY Left     PILONIDAL CYST DRAINAGE      pt has had 6 of these adn has had revisions       Review of patient's allergies indicates:   Allergen Reactions    Iodinated contrast media Swelling     Tongue, right eye, lips swelling. Rash on abdomen and axilla    Codeine     Dairy aid [lactase]     Morphine      Tolerated hydromorphone in October 2019.     Nuts [tree nut]        No current facility-administered medications on file prior to encounter.      Current Outpatient Medications on File Prior to Encounter   Medication Sig    acetaminophen (TYLENOL) 325 MG tablet Take 325 mg by mouth every 6 (six) hours as needed for Pain.    ARIPiprazole (ABILIFY) 5 MG Tab Take 1 tablet (5 mg total) by mouth once daily.    ciprofloxacin HCl (CIPRO) 500 MG tablet Take 1 tablet (500 mg total) by mouth 2 (two) times daily. for 10 days    cyclobenzaprine (FLEXERIL) 10 MG tablet Take 1 tablet (10 mg total) by mouth 3 (three) times daily as needed for Muscle spasms.    escitalopram oxalate (LEXAPRO) 20 MG tablet TAKE 1 TABLET (20 MG TOTAL) BY MOUTH ONCE DAILY.    HYDROcodone-acetaminophen (NORCO) 5-325 mg per tablet Take 1 tablet  by mouth every 6 (six) hours as needed for Pain.    linaclotide 290 mcg Cap Take 1 capsule (290 mcg total) by mouth once daily.    loratadine (CLARITIN) 10 mg tablet Take 10 mg by mouth once daily.    metroNIDAZOLE (FLAGYL) 500 MG tablet Take 1 tablet (500 mg total) by mouth 3 (three) times daily. for 10 days    promethazine HCl (PHENERGAN ORAL) Take by mouth.    ARIPiprazole (ABILIFY) 5 MG Tab TAKE 1 TABLET BY MOUTH DAILY    azelastine (ASTELIN) 137 mcg (0.1 %) nasal spray 1 spray (137 mcg total) by Nasal route 2 (two) times daily.    meloxicam (MOBIC) 15 MG tablet Take 1 tablet (15 mg total) by mouth once daily.     Family History     Problem Relation (Age of Onset)    Cancer Father    Colon cancer Maternal Grandmother    Colon polyps Mother, Sister    Diabetes Father, Mother, Maternal Grandmother, Maternal Grandfather, Paternal Grandmother, Paternal Grandfather    Heart disease Father    Stroke Paternal Grandmother        Tobacco Use    Smoking status: Never Smoker    Smokeless tobacco: Never Used   Substance and Sexual Activity    Alcohol use: No     Frequency: Never     Binge frequency: Never    Drug use: No    Sexual activity: Yes     Partners: Female     Review of Systems   Constitution: Positive for malaise/fatigue.   HENT: Negative.    Eyes: Negative.    Cardiovascular: Negative.    Respiratory: Negative.    Endocrine: Negative.    Hematologic/Lymphatic: Negative.    Skin: Negative.    Musculoskeletal: Negative.    Gastrointestinal: Positive for abdominal pain.   Genitourinary: Negative.    Neurological: Negative.    Psychiatric/Behavioral: Negative.    Allergic/Immunologic: Negative.      Objective:     Vital Signs (Most Recent):  Temp: 98.7 °F (37.1 °C) (10/08/19 1315)  Pulse: (!) 112 (10/08/19 1409)  Resp: 18 (10/08/19 1409)  BP: 106/75 (10/08/19 1409)  SpO2: 96 % (10/08/19 1409) Vital Signs (24h Range):  Temp:  [98.6 °F (37 °C)-99.7 °F (37.6 °C)] 98.7 °F (37.1 °C)  Pulse:  [108-148]  112  Resp:  [15-20] 18  SpO2:  [90 %-96 %] 96 %  BP: (106-135)/(63-87) 106/75     Weight: 101.3 kg (223 lb 5.2 oz)  Body mass index is 32.04 kg/m².    SpO2: 96 %  O2 Device (Oxygen Therapy): nasal cannula      Intake/Output Summary (Last 24 hours) at 10/8/2019 1514  Last data filed at 10/8/2019 1304  Gross per 24 hour   Intake 2426.24 ml   Output 1085 ml   Net 1341.24 ml       Lines/Drains/Airways     Drain                 Closed/Suction Drain 10/07/19 1120 Abdomen Bulb 10 Fr. 1 day         NG/OG Tube 10/07/19 1021 nasogastric 18 Fr. Right nostril 1 day         Urethral Catheter 10/07/19 1000 Straight-tip 1 day         Colostomy 10/07/19 1900 LLQ less than 1 day          Peripheral Intravenous Line                 Peripheral IV - Single Lumen 10/07/19 0825 20 G Left Antecubital 1 day         Peripheral IV - Single Lumen 10/07/19 0914 18 G Right Antecubital 1 day                Physical Exam   Constitutional: He is oriented to person, place, and time. He appears well-developed and well-nourished. No distress.   NG tube in place   HENT:   Head: Normocephalic and atraumatic.   Eyes: Pupils are equal, round, and reactive to light. Right eye exhibits no discharge. Left eye exhibits no discharge.   Neck: Neck supple. No JVD present.   Cardiovascular: Normal rate, regular rhythm, S1 normal, S2 normal and normal heart sounds.   No murmur heard.  Pulmonary/Chest: Effort normal and breath sounds normal. No respiratory distress. He has no wheezes. He has no rales.   Abdominal: He exhibits distension.   Incision C/D/I; no bleeding erythema or drainage    +colostomy   Musculoskeletal: He exhibits no edema.   Neurological: He is alert and oriented to person, place, and time.   Skin: Skin is warm and dry. He is not diaphoretic. No erythema.   Psychiatric: He has a normal mood and affect. His behavior is normal. Thought content normal.   Nursing note and vitals reviewed.      Significant Labs:   CMP   Recent Labs   Lab  10/07/19  0825 10/08/19  0106 10/08/19  0635 10/08/19  1351   *  --  134* 135*   K 3.8 4.4 4.7 4.5   CL 95  --  99 98   CO2 28  --  24 25     --  106 115*   BUN 10  --  9 9   CREATININE 0.9  --  0.8 0.8   CALCIUM 10.1  --  9.3 9.8   PROT 7.5  --   --   --    ALBUMIN 3.5  --   --   --    BILITOT 0.8  --   --   --    ALKPHOS 102  --   --   --    AST 15  --   --   --    ALT 34  --   --   --    ANIONGAP 12  --  11 12   ESTGFRAFRICA >60  --  >60 >60   EGFRNONAA >60  --  >60 >60   , CBC   Recent Labs   Lab 10/07/19  0825 10/08/19  0106 10/08/19  0635 10/08/19  1351   WBC 14.43*  --  14.38* 18.80*   HGB 13.8* 12.6* 12.4* 12.4*   HCT 40.1 36.6* 36.4* 36.3*     --  241 288    and Troponin No results for input(s): TROPONINI in the last 48 hours.    Significant Imaging: Echocardiogram: 2D echo with color flow doppler: No results found for this or any previous visit., EKG: Reviewed and X-Ray: CXR: X-Ray Chest 1 View (CXR): No results found for this visit on 10/07/19. and X-Ray Chest PA and Lateral (CXR): No results found for this visit on 10/07/19.    Assessment and Plan:   Patient who presents with bowel perforation s/p ex-lap and colostomy placement. Sinus tachycardia is reactive in nature secondary to stress of surgery, possible IVVD, pain. IV Lopressor prn. Check 2D echo.    * Bowel perforation  -s/p ex-lap with colostomy  -Mgmt as per general surgery and hospital medicine    Sinus tachycardia  -Secondary to stress of recent surgery, possible dehydration/IVVD, pain  -Continue IV fluids  -IV lopressor 5 mg q 6 prn for HR > 110  -Check 2D echo  -Suspect HR will improve as patient progresses post-operatively     MDD (major depressive disorder)  -Mgmt as per hospital medicine        VTE Risk Mitigation (From admission, onward)         Ordered     enoxaparin injection 40 mg  Daily      10/07/19 1445     Place YINA hose  Until discontinued      10/07/19 1445     IP VTE HIGH RISK PATIENT  Once      10/07/19 1445      Place sequential compression device  Until discontinued      10/07/19 0063                Thank you for your consult. I will follow-up with patient. Please contact us if you have any additional questions.    Mattie Estrada PA-C  Cardiology   [facility]

## 2019-10-08 NOTE — PROGRESS NOTES
[facility] -   General Surgery  Progress Note    Subjective:     History of Present Illness:  43-year-old male with history of opioid induced chronic constipation presented to the ER with complaints of lower abdominal pain and fever with nausea and emesis.  He was seen in the ER 3 days ago and treated with Cipro and Flagyl.  The pain persisted and he returned to the ER today with continued complaints.  He has a pertinent family history of colon cancer, his paternal grandmother  of colon cancer in her 50s.  Last colonoscopy in  showed no acute findings. He attempted an enema over the weekend with acute worsening of his pain. Workup showed leukocytosis and CT abdomen and pelvis confirmed pneumoperitoneum with evidence of perforated sigmoid colon.       Post-Op Info:  Procedure(s) (LRB):  LAPAROTOMY, EXPLORATORY (N/A)  LAVAGE, PERITONEAL, THERAPEUTIC  SIVAKUMAR PROCEDURE  CREATION, COLOSTOMY   1 Day Post-Op     Interval History: Tachycardic with low grade temps overnight.  Complains of auditory hallucinations.  Minimal sleep overnight.  Pain control with PCA use.     Medications:  Continuous Infusions:   hydromorphone in 0.9 % NaCl 6 mg/30 ml      lactated ringers 100 mL/hr at 10/07/19 1505     Scheduled Meds:   chlorhexidine  10 mL Mouth/Throat BID    ciprofloxacin (CIPRO)400mg/200ml D5W IVPB  400 mg Intravenous Q12H    enoxaparin  40 mg Subcutaneous Daily    famotidine  20 mg Intravenous BID    nozaseptin   Each Nostril BID     PRN Meds:cloNIDine, diphenhydrAMINE, naloxone, ondansetron, promethazine (PHENERGAN) IVPB, sodium chloride 0.9%     Review of patient's allergies indicates:   Allergen Reactions    Iodinated contrast media Swelling     Tongue, right eye, lips swelling. Rash on abdomen and axilla    Codeine     Dairy aid [lactase]     Morphine      Tolerated hydromorphone in 2019.     Nuts [tree nut]      Objective:     Vital Signs (Most Recent):  Temp: 98.6 °F (37 °C)  (10/08/19 0741)  Pulse: (!) 130 (10/08/19 0741)  Resp: 16 (10/08/19 0741)  BP: 129/87 (10/08/19 0741)  SpO2: 95 % (10/08/19 0741) Vital Signs (24h Range):  Temp:  [98.2 °F (36.8 °C)-99.7 °F (37.6 °C)] 98.6 °F (37 °C)  Pulse:  [100-137] 130  Resp:  [11-23] 16  SpO2:  [90 %-100 %] 95 %  BP: (112-139)/(63-87) 129/87     Weight: 101.3 kg (223 lb 5.2 oz)  Body mass index is 32.04 kg/m².    Intake/Output - Last 3 Shifts       10/06 0700 - 10/07 0659 10/07 0700 - 10/08 0659 10/08 0700 - 10/09 0659    P.O.  0     I.V. (mL/kg)  194.6 (1.9)     IV Piggyback  100     Total Intake(mL/kg)  294.6 (2.9)     Urine (mL/kg/hr)  650     Drains  495     Stool  0     Total Output  1145     Net  -850.4                  Physical Exam   Constitutional: He is oriented to person, place, and time. He appears well-developed and well-nourished. No distress.   HENT:   Head: Normocephalic and atraumatic.   Eyes: EOM are normal.   Neck: Neck supple.   Cardiovascular: Normal rate and regular rhythm.   Pulmonary/Chest: Effort normal.   Abdominal: Soft. He exhibits no distension. There is no tenderness.   Musculoskeletal: Normal range of motion.   Neurological: He is alert and oriented to person, place, and time.   Skin: Skin is warm.   Vitals reviewed.      Significant Labs:  CBC:   Recent Labs   Lab 10/08/19  0635   WBC 14.38*   RBC 4.27*   HGB 12.4*   HCT 36.4*      MCV 85   MCH 29.0   MCHC 34.1     BMP:   Recent Labs   Lab 10/08/19  0106 10/08/19  0635   GLU  --  106   NA  --  134*   K 4.4 4.7   CL  --  99   CO2  --  24   BUN  --  9   CREATININE  --  0.8   CALCIUM  --  9.3   MG 1.7  --        Significant Diagnostics:  none    Assessment/Plan:     * Bowel perforation  S/p ex lap, raf's, abdominal washout for sigmoid perforation on 10/7/19    Continue resuscitation.  H/H stable.  Continue IV abx for minimum 4 days for intraabdominal sepsis.  PT to mobilize  DVT and GI prophy  Awaiting bowel function    Chronic pain syndrome  Will  monitor for withdrawal and treat postop pain appropriately.   Continue PCA.    Family history of colonic polyps  Recent colonoscopy reviewed    Obesity (BMI 30-39.9)  Informed of increased incidence of wound infection.         Mindy Goff MD  General Surgery  [facility]

## 2019-10-09 LAB
ANION GAP SERPL CALC-SCNC: 9 MMOL/L (ref 8–16)
BASOPHILS # BLD AUTO: 0.05 K/UL (ref 0–0.2)
BASOPHILS NFR BLD: 0.4 % (ref 0–1.9)
BUN SERPL-MCNC: 8 MG/DL (ref 6–20)
CALCIUM SERPL-MCNC: 9.1 MG/DL (ref 8.7–10.5)
CHLORIDE SERPL-SCNC: 97 MMOL/L (ref 95–110)
CO2 SERPL-SCNC: 27 MMOL/L (ref 23–29)
CREAT SERPL-MCNC: 0.7 MG/DL (ref 0.5–1.4)
DIFFERENTIAL METHOD: ABNORMAL
EOSINOPHIL # BLD AUTO: 0.2 K/UL (ref 0–0.5)
EOSINOPHIL NFR BLD: 1.2 % (ref 0–8)
ERYTHROCYTE [DISTWIDTH] IN BLOOD BY AUTOMATED COUNT: 12.3 % (ref 11.5–14.5)
EST. GFR  (AFRICAN AMERICAN): >60 ML/MIN/1.73 M^2
EST. GFR  (NON AFRICAN AMERICAN): >60 ML/MIN/1.73 M^2
GLUCOSE SERPL-MCNC: 105 MG/DL (ref 70–110)
HCT VFR BLD AUTO: 32.3 % (ref 40–54)
HGB BLD-MCNC: 10.5 G/DL (ref 14–18)
IMM GRANULOCYTES # BLD AUTO: 0.15 K/UL (ref 0–0.04)
IMM GRANULOCYTES NFR BLD AUTO: 1.1 % (ref 0–0.5)
LYMPHOCYTES # BLD AUTO: 1.2 K/UL (ref 1–4.8)
LYMPHOCYTES NFR BLD: 8.7 % (ref 18–48)
MCH RBC QN AUTO: 28.5 PG (ref 27–31)
MCHC RBC AUTO-ENTMCNC: 32.5 G/DL (ref 32–36)
MCV RBC AUTO: 88 FL (ref 82–98)
MONOCYTES # BLD AUTO: 1.8 K/UL (ref 0.3–1)
MONOCYTES NFR BLD: 13.9 % (ref 4–15)
NEUTROPHILS # BLD AUTO: 9.8 K/UL (ref 1.8–7.7)
NEUTROPHILS NFR BLD: 74.7 % (ref 38–73)
NRBC BLD-RTO: 0 /100 WBC
PLATELET # BLD AUTO: 212 K/UL (ref 150–350)
PMV BLD AUTO: 10.5 FL (ref 9.2–12.9)
POTASSIUM SERPL-SCNC: 4.1 MMOL/L (ref 3.5–5.1)
RBC # BLD AUTO: 3.68 M/UL (ref 4.6–6.2)
SODIUM SERPL-SCNC: 133 MMOL/L (ref 136–145)
WBC # BLD AUTO: 13.15 K/UL (ref 3.9–12.7)

## 2019-10-09 PROCEDURE — 96372 THER/PROPH/DIAG INJ SC/IM: CPT

## 2019-10-09 PROCEDURE — 25000003 PHARM REV CODE 250: Performed by: PHYSICIAN ASSISTANT

## 2019-10-09 PROCEDURE — 99024 POSTOP FOLLOW-UP VISIT: CPT | Mod: ,,, | Performed by: PHYSICIAN ASSISTANT

## 2019-10-09 PROCEDURE — 97110 THERAPEUTIC EXERCISES: CPT

## 2019-10-09 PROCEDURE — 94761 N-INVAS EAR/PLS OXIMETRY MLT: CPT

## 2019-10-09 PROCEDURE — 94770 HC EXHALED C02 TEST: CPT

## 2019-10-09 PROCEDURE — 99900035 HC TECH TIME PER 15 MIN (STAT)

## 2019-10-09 PROCEDURE — 63600175 PHARM REV CODE 636 W HCPCS: Performed by: SURGERY

## 2019-10-09 PROCEDURE — 25000003 PHARM REV CODE 250: Performed by: SURGERY

## 2019-10-09 PROCEDURE — 85025 COMPLETE CBC W/AUTO DIFF WBC: CPT

## 2019-10-09 PROCEDURE — S0028 INJECTION, FAMOTIDINE, 20 MG: HCPCS | Performed by: SURGERY

## 2019-10-09 PROCEDURE — 21400001 HC TELEMETRY ROOM

## 2019-10-09 PROCEDURE — 94799 UNLISTED PULMONARY SVC/PX: CPT

## 2019-10-09 PROCEDURE — 99024 PR POST-OP FOLLOW-UP VISIT: ICD-10-PCS | Mod: ,,, | Performed by: PHYSICIAN ASSISTANT

## 2019-10-09 PROCEDURE — 80048 BASIC METABOLIC PNL TOTAL CA: CPT

## 2019-10-09 PROCEDURE — 36415 COLL VENOUS BLD VENIPUNCTURE: CPT

## 2019-10-09 PROCEDURE — 97116 GAIT TRAINING THERAPY: CPT

## 2019-10-09 RX ADMIN — ENOXAPARIN SODIUM 40 MG: 100 INJECTION SUBCUTANEOUS at 08:10

## 2019-10-09 RX ADMIN — CHLORHEXIDINE GLUCONATE 0.12% ORAL RINSE 10 ML: 1.2 LIQUID ORAL at 09:10

## 2019-10-09 RX ADMIN — SODIUM CHLORIDE, SODIUM LACTATE, POTASSIUM CHLORIDE, AND CALCIUM CHLORIDE: .6; .31; .03; .02 INJECTION, SOLUTION INTRAVENOUS at 06:10

## 2019-10-09 RX ADMIN — CHLORHEXIDINE GLUCONATE 0.12% ORAL RINSE 10 ML: 1.2 LIQUID ORAL at 08:10

## 2019-10-09 RX ADMIN — METOPROLOL TARTRATE 5 MG: 5 INJECTION INTRAVENOUS at 05:10

## 2019-10-09 RX ADMIN — SODIUM CHLORIDE, SODIUM LACTATE, POTASSIUM CHLORIDE, AND CALCIUM CHLORIDE: .6; .31; .03; .02 INJECTION, SOLUTION INTRAVENOUS at 05:10

## 2019-10-09 RX ADMIN — CIPROFLOXACIN 400 MG: 2 INJECTION, SOLUTION INTRAVENOUS at 10:10

## 2019-10-09 RX ADMIN — Medication: at 02:10

## 2019-10-09 RX ADMIN — FAMOTIDINE 20 MG: 20 INJECTION, SOLUTION INTRAVENOUS at 09:10

## 2019-10-09 RX ADMIN — METOPROLOL TARTRATE 5 MG: 5 INJECTION INTRAVENOUS at 01:10

## 2019-10-09 RX ADMIN — FAMOTIDINE 20 MG: 20 INJECTION, SOLUTION INTRAVENOUS at 08:10

## 2019-10-09 RX ADMIN — DIPHENHYDRAMINE HYDROCHLORIDE 25 MG: 50 INJECTION, SOLUTION INTRAMUSCULAR; INTRAVENOUS at 08:10

## 2019-10-09 NOTE — PROGRESS NOTES
[facility] Hospital Medicine  Progress Note    Patient Name: Dax Carlisle  MRN: [id number]  Patient Class: IP- Inpatient   Admission Date: 10/7/2019  Length of Stay: 2 days  Attending Physician: Mindy Goff MD  Primary Care Provider: Pramod Nuñez MD    Subjective:     Principal Problem:Bowel perforation        HPI:  Dax Carlisle is a 43 year male who was admitted to [facility] for bowel perforation. He underwent exploratory laparotomy with raf procedure and colostomy placement. Surgery without acute complication. He is doing well postoperatively. Currently on dilaudid PCA infusion. Hospital medicine has been consulted for medical management.       Overview/Hospital Course:  No notes on file    Interval History: ST improving. He is currently sitting up in chair. Not producing gas yet.     Review of Systems   Constitutional: Negative for chills, diaphoresis, fatigue and fever.   HENT: Negative for drooling, ear pain, rhinorrhea and sore throat.    Eyes: Negative.    Respiratory: Negative for cough, shortness of breath and wheezing.    Cardiovascular: Negative for palpitations and leg swelling.   Gastrointestinal: Positive for abdominal pain. Negative for constipation, diarrhea and nausea.   Endocrine: Negative.    Genitourinary: Negative for dysuria, hematuria and urgency.   Musculoskeletal: Negative.    Skin: Negative for color change and wound.   Allergic/Immunologic: Negative.    Neurological: Negative for dizziness, syncope and speech difficulty.   Hematological: Negative.    Psychiatric/Behavioral: Negative.           Objective:     Vital Signs (Most Recent):  Temp: 99.3 °F (37.4 °C) (10/09/19 1311)  Pulse: (!) 124 (10/09/19 1311)  Resp: 18 (10/09/19 1311)  BP: 132/76 (10/09/19 1311)  SpO2: 96 % (10/09/19 1311) Vital Signs (24h Range):  Temp:  [98.9 °F (37.2 °C)-99.6 °F (37.6 °C)] 99.3 °F (37.4 °C)  Pulse:  [105-131] 124  Resp:  [15-26] 18  SpO2:  [93 %-98 %]  96 %  BP: (106-133)/(73-94) 132/76     Weight: 101.3 kg (223 lb 5.2 oz)  Body mass index is 32.04 kg/m².    Intake/Output Summary (Last 24 hours) at 10/9/2019 1349  Last data filed at 10/9/2019 1300  Gross per 24 hour   Intake 3275.33 ml   Output 1915 ml   Net 1360.33 ml      Physical Exam   Constitutional: He is oriented to person, place, and time. He appears well-developed and well-nourished. No distress.   HENT:   Head: Normocephalic and atraumatic.   NGT in place   Eyes: EOM are normal.   Neck: Normal range of motion. Neck supple.   Cardiovascular: Regular rhythm and normal heart sounds. Tachycardia present.   Pulmonary/Chest: Effort normal and breath sounds normal. No respiratory distress.   Abdominal: Soft. He exhibits no distension. There is no tenderness.       Musculoskeletal: Normal range of motion. He exhibits no edema.   Neurological: He is alert and oriented to person, place, and time.   Skin: Skin is dry.   Nursing note and vitals reviewed.      Significant Labs:   CBC:   Recent Labs   Lab 10/08/19  0635 10/08/19  1351 10/09/19  0528   WBC 14.38* 18.80* 13.15*   HGB 12.4* 12.4* 10.5*   HCT 36.4* 36.3* 32.3*    288 212     CMP:   Recent Labs   Lab 10/08/19  0635 10/08/19  1351 10/09/19  0528   * 135* 133*   K 4.7 4.5 4.1   CL 99 98 97   CO2 24 25 27    115* 105   BUN 9 9 8   CREATININE 0.8 0.8 0.7   CALCIUM 9.3 9.8 9.1   ANIONGAP 11 12 9   EGFRNONAA >60 >60 >60       Significant Imaging: I have reviewed all pertinent imaging results/findings within the past 24 hours.      Assessment/Plan:      * Bowel perforation  ---per Primary team  --monitor electrolytes, and H/H  --analgesia  --monitor stoma output  --resume Ciprofloxacin    10/8/19  H/H down, but stable  Stoma care per wound care nurse  Primary team following    10/9/19  Per General Surgery  IVF's; pain managment  Encouraged mobility  Reactive leukocytosis improving    Sinus tachycardia  --likely related to pain or possibly drug  withdrawal  --received Metoprolol IV and fluids by primary team.   --closely montior    10/9/19  Cardiology following.   Metoprolol IV prn  Expect to improve with IV hydration    Chronic pain syndrome  --currently on PCA infusion       MDD (major depressive disorder)  ---hold abilify and Lexapro as patient is NPO  --Ativan q 12 hours prn        VTE Risk Mitigation (From admission, onward)         Ordered     enoxaparin injection 40 mg  Daily      10/07/19 1445     Place YINA hose  Until discontinued      10/07/19 1445     IP VTE HIGH RISK PATIENT  Once      10/07/19 1445     Place sequential compression device  Until discontinued      10/07/19 1445            Will sign off. Will follow prn    May Mueller NP  Department of Hospital Medicine   [facility]

## 2019-10-09 NOTE — PROGRESS NOTES
Follow up on Mr. Carlisle today. He is awake and alert, spouse is present at the bedside. Patient still NPO.  Abdomen assessed. LUQ ostomy noted with pouch intact, stoma is pink and moist. Scant amount of bowel sweat noted in pouch.Continued colostomy teaching including dietary, hygiene, and activity guidelines. Had patient and spouse return demonstrate the process of pouch emptying. All questions answered. Plan to have patient and spouse return demonstrate stoma care and pouch change this week.

## 2019-10-09 NOTE — SUBJECTIVE & OBJECTIVE
Interval History: ST improving. He is currently sitting up in chair. Not producing gas yet.     Review of Systems   Constitutional: Negative for chills, diaphoresis, fatigue and fever.   HENT: Negative for drooling, ear pain, rhinorrhea and sore throat.    Eyes: Negative.    Respiratory: Negative for cough, shortness of breath and wheezing.    Cardiovascular: Negative for palpitations and leg swelling.   Gastrointestinal: Positive for abdominal pain. Negative for constipation, diarrhea and nausea.   Endocrine: Negative.    Genitourinary: Negative for dysuria, hematuria and urgency.   Musculoskeletal: Negative.    Skin: Negative for color change and wound.   Allergic/Immunologic: Negative.    Neurological: Negative for dizziness, syncope and speech difficulty.   Hematological: Negative.    Psychiatric/Behavioral: Negative.           Objective:     Vital Signs (Most Recent):  Temp: 99.3 °F (37.4 °C) (10/09/19 1311)  Pulse: (!) 124 (10/09/19 1311)  Resp: 18 (10/09/19 1311)  BP: 132/76 (10/09/19 1311)  SpO2: 96 % (10/09/19 1311) Vital Signs (24h Range):  Temp:  [98.9 °F (37.2 °C)-99.6 °F (37.6 °C)] 99.3 °F (37.4 °C)  Pulse:  [105-131] 124  Resp:  [15-26] 18  SpO2:  [93 %-98 %] 96 %  BP: (106-133)/(73-94) 132/76     Weight: 101.3 kg (223 lb 5.2 oz)  Body mass index is 32.04 kg/m².    Intake/Output Summary (Last 24 hours) at 10/9/2019 1349  Last data filed at 10/9/2019 1300  Gross per 24 hour   Intake 3275.33 ml   Output 1915 ml   Net 1360.33 ml      Physical Exam   Constitutional: He is oriented to person, place, and time. He appears well-developed and well-nourished. No distress.   HENT:   Head: Normocephalic and atraumatic.   NGT in place   Eyes: EOM are normal.   Neck: Normal range of motion. Neck supple.   Cardiovascular: Regular rhythm and normal heart sounds. Tachycardia present.   Pulmonary/Chest: Effort normal and breath sounds normal. No respiratory distress.   Abdominal: Soft. He exhibits no distension. There  is no tenderness.       Musculoskeletal: Normal range of motion. He exhibits no edema.   Neurological: He is alert and oriented to person, place, and time.   Skin: Skin is dry.   Nursing note and vitals reviewed.      Significant Labs:   CBC:   Recent Labs   Lab 10/08/19  0635 10/08/19  1351 10/09/19  0528   WBC 14.38* 18.80* 13.15*   HGB 12.4* 12.4* 10.5*   HCT 36.4* 36.3* 32.3*    288 212     CMP:   Recent Labs   Lab 10/08/19  0635 10/08/19  1351 10/09/19  0528   * 135* 133*   K 4.7 4.5 4.1   CL 99 98 97   CO2 24 25 27    115* 105   BUN 9 9 8   CREATININE 0.8 0.8 0.7   CALCIUM 9.3 9.8 9.1   ANIONGAP 11 12 9   EGFRNONAA >60 >60 >60       Significant Imaging: I have reviewed all pertinent imaging results/findings within the past 24 hours.

## 2019-10-09 NOTE — ASSESSMENT & PLAN NOTE
S/p ex lap, raf's, abdominal washout for sigmoid perforation on 10/7/19    Continue resuscitation.  H/H stable.  Continue IV abx for minimum 4 days for intraabdominal sepsis.  Continue drain care  Continue NG tube  Ostomy nurse consulted for teaching and care.  PT to mobilize  DVT and GI prophy  Awaiting bowel function

## 2019-10-09 NOTE — PROGRESS NOTES
[facility] -   General Surgery  Progress Note    Subjective:     History of Present Illness:  43-year-old male with history of opioid induced chronic constipation presented to the ER with complaints of lower abdominal pain and fever with nausea and emesis.  He was seen in the ER 3 days ago and treated with Cipro and Flagyl.  The pain persisted and he returned to the ER today with continued complaints.  He has a pertinent family history of colon cancer, his paternal grandmother  of colon cancer in her 50s.  Last colonoscopy in  showed no acute findings. He attempted an enema over the weekend with acute worsening of his pain. Workup showed leukocytosis and CT abdomen and pelvis confirmed pneumoperitoneum with evidence of perforated sigmoid colon.       Post-Op Info:  Procedure(s) (LRB):  LAPAROTOMY, EXPLORATORY (N/A)  LAVAGE, PERITONEAL, THERAPEUTIC (N/A)  SIVAKUMAR PROCEDURE (N/A)  CREATION, COLOSTOMY (N/A)   2 Days Post-Op     Interval History: no new complaints. Pain controlled    Medications:  Continuous Infusions:   hydromorphone in 0.9 % NaCl 6 mg/30 ml      lactated ringers 150 mL/hr at 10/09/19 0547     Scheduled Meds:   chlorhexidine  10 mL Mouth/Throat BID    ciprofloxacin (CIPRO)400mg/200ml D5W IVPB  400 mg Intravenous Q12H    enoxaparin  40 mg Subcutaneous Daily    famotidine  20 mg Intravenous BID    nozaseptin   Each Nostril BID     PRN Meds:cloNIDine, diphenhydrAMINE, metoprolol, naloxone, ondansetron, promethazine (PHENERGAN) IVPB, sodium chloride 0.9%     Review of patient's allergies indicates:   Allergen Reactions    Iodinated contrast media Swelling     Tongue, right eye, lips swelling. Rash on abdomen and axilla    Codeine     Dairy aid [lactase]     Morphine      Tolerated hydromorphone in 2019.     Nuts [tree nut]      Objective:     Vital Signs (Most Recent):  Temp: 98.9 °F (37.2 °C) (10/09/19 0744)  Pulse: (!) 116 (10/09/19 0744)  Resp: (!) 26 (10/09/19  0744)  BP: 119/75 (10/09/19 0744)  SpO2: (!) 94 % (10/09/19 0728) Vital Signs (24h Range):  Temp:  [98.7 °F (37.1 °C)-99.6 °F (37.6 °C)] 98.9 °F (37.2 °C)  Pulse:  [105-148] 116  Resp:  [15-26] 26  SpO2:  [93 %-98 %] 94 %  BP: (106-134)/(73-94) 119/75     Weight: 101.3 kg (223 lb 5.2 oz)  Body mass index is 32.04 kg/m².    Intake/Output - Last 3 Shifts       10/07 0700 - 10/08 0659 10/08 0700 - 10/09 0659 10/09 0700 - 10/10 0659    P.O. 0 0     I.V. (mL/kg) 194.6 (1.9) 4709 (46.5) 2 (0)    IV Piggyback 100 1600     Total Intake(mL/kg) 294.6 (2.9) 6309 (62.3) 2 (0)    Urine (mL/kg/hr) 650 1150 (0.5)     Drains 495 565     Stool 0 0     Total Output 1145 1715     Net -850.4 +4594 +2                 Physical Exam   Constitutional: He is oriented to person, place, and time. He appears well-developed and well-nourished.   HENT:   Head: Normocephalic and atraumatic.   Eyes: EOM are normal.   Cardiovascular:   tachycardic   Pulmonary/Chest: Effort normal. No respiratory distress.   Abdominal: Soft. He exhibits no distension. There is tenderness (incisional).   Ostomy edematous, viable.   Bilious NG output   Musculoskeletal: Normal range of motion.   Neurological: He is alert and oriented to person, place, and time.   Skin: Skin is warm and dry.   Psychiatric: He has a normal mood and affect. Thought content normal.   Vitals reviewed.      Significant Labs:  CBC:   Recent Labs   Lab 10/09/19  0528   WBC 13.15*   RBC 3.68*   HGB 10.5*   HCT 32.3*      MCV 88   MCH 28.5   MCHC 32.5     CMP:   Recent Labs   Lab 10/07/19  0825  10/09/19  0528      < > 105   CALCIUM 10.1   < > 9.1   ALBUMIN 3.5  --   --    PROT 7.5  --   --    *   < > 133*   K 3.8   < > 4.1   CO2 28   < > 27   CL 95   < > 97   BUN 10   < > 8   CREATININE 0.9   < > 0.7   ALKPHOS 102  --   --    ALT 34  --   --    AST 15  --   --    BILITOT 0.8  --   --     < > = values in this interval not displayed.       Significant Diagnostics:  I have  reviewed all pertinent imaging results/findings within the past 24 hours.    Assessment/Plan:     * Bowel perforation  S/p ex lap, raf's, abdominal washout for sigmoid perforation on 10/7/19    Continue resuscitation.  H/H stable.  Continue IV abx for minimum 4 days for intraabdominal sepsis.  Continue drain care  Continue NG tube  Ostomy nurse consulted for teaching and care.  PT to mobilize  DVT and GI prophy  Awaiting bowel function    Sinus tachycardia  Cardiology was consulted.   Continue IV lopressor per recs  Appreciate Cardiology input.     Chronic pain syndrome  Will monitor for withdrawal and treat postop pain appropriately.   Continue PCA.    Family history of colonic polyps  Recent colonoscopy reviewed    MDD (major depressive disorder)  Resume home meds when ng removed    Obesity (BMI 30-39.9)  Informed of increased incidence of wound infection.         Promise Fajardo PA-C  General Surgery  [facility] [place]

## 2019-10-09 NOTE — ASSESSMENT & PLAN NOTE
---per Primary team  --monitor electrolytes, and H/H  --analgesia  --monitor stoma output  --resume Ciprofloxacin    10/8/19  H/H down, but stable  Stoma care per wound care nurse  Primary team following    10/9/19  Per General Surgery  IVF's; pain managment  Encouraged mobility  Reactive leukocytosis improving

## 2019-10-09 NOTE — PLAN OF CARE
Pt complains of generalized abdominal pain. PCA pump is in use. IV fluids are infusing. Dressing is dry and intact. Luis Angel drain care performed. Colostomy is intact with no output. NG tube in place at low intermittent suction. IV Abx given per order. No injuries. Will continue to monitor. 12 hour chart check is completed.

## 2019-10-09 NOTE — PLAN OF CARE
PCA pump in use to help manage pain. Gauze and medipore tape to midline incision C/D/I. Colostomy to LLQ, no stool in bag @ this time. NG tube to right nare, green drainage noted. Remains NPO. VARSHA drain to RLQ, serosanguinous drainage. NS @ 100mL/hr. Heart monitor 8568. Call light in reach. Remains free from fall/injury.

## 2019-10-09 NOTE — SUBJECTIVE & OBJECTIVE
Interval History: no new complaints. Pain controlled    Medications:  Continuous Infusions:   hydromorphone in 0.9 % NaCl 6 mg/30 ml      lactated ringers 150 mL/hr at 10/09/19 0547     Scheduled Meds:   chlorhexidine  10 mL Mouth/Throat BID    ciprofloxacin (CIPRO)400mg/200ml D5W IVPB  400 mg Intravenous Q12H    enoxaparin  40 mg Subcutaneous Daily    famotidine  20 mg Intravenous BID    nozaseptin   Each Nostril BID     PRN Meds:cloNIDine, diphenhydrAMINE, metoprolol, naloxone, ondansetron, promethazine (PHENERGAN) IVPB, sodium chloride 0.9%     Review of patient's allergies indicates:   Allergen Reactions    Iodinated contrast media Swelling     Tongue, right eye, lips swelling. Rash on abdomen and axilla    Codeine     Dairy aid [lactase]     Morphine      Tolerated hydromorphone in October 2019.     Nuts [tree nut]      Objective:     Vital Signs (Most Recent):  Temp: 98.9 °F (37.2 °C) (10/09/19 0744)  Pulse: (!) 116 (10/09/19 0744)  Resp: (!) 26 (10/09/19 0744)  BP: 119/75 (10/09/19 0744)  SpO2: (!) 94 % (10/09/19 0728) Vital Signs (24h Range):  Temp:  [98.7 °F (37.1 °C)-99.6 °F (37.6 °C)] 98.9 °F (37.2 °C)  Pulse:  [105-148] 116  Resp:  [15-26] 26  SpO2:  [93 %-98 %] 94 %  BP: (106-134)/(73-94) 119/75     Weight: 101.3 kg (223 lb 5.2 oz)  Body mass index is 32.04 kg/m².    Intake/Output - Last 3 Shifts       10/07 0700 - 10/08 0659 10/08 0700 - 10/09 0659 10/09 0700 - 10/10 0659    P.O. 0 0     I.V. (mL/kg) 194.6 (1.9) 4709 (46.5) 2 (0)    IV Piggyback 100 1600     Total Intake(mL/kg) 294.6 (2.9) 6309 (62.3) 2 (0)    Urine (mL/kg/hr) 650 1150 (0.5)     Drains 495 565     Stool 0 0     Total Output 1145 1715     Net -850.4 +4594 +2                 Physical Exam   Constitutional: He is oriented to person, place, and time. He appears well-developed and well-nourished.   HENT:   Head: Normocephalic and atraumatic.   Eyes: EOM are normal.   Cardiovascular:   tachycardic   Pulmonary/Chest: Effort  normal. No respiratory distress.   Abdominal: Soft. He exhibits no distension. There is tenderness (incisional).   Ostomy edematous, viable.   Bilious NG output   Musculoskeletal: Normal range of motion.   Neurological: He is alert and oriented to person, place, and time.   Skin: Skin is warm and dry.   Psychiatric: He has a normal mood and affect. Thought content normal.   Vitals reviewed.      Significant Labs:  CBC:   Recent Labs   Lab 10/09/19  0528   WBC 13.15*   RBC 3.68*   HGB 10.5*   HCT 32.3*      MCV 88   MCH 28.5   MCHC 32.5     CMP:   Recent Labs   Lab 10/07/19  0825  10/09/19  0528      < > 105   CALCIUM 10.1   < > 9.1   ALBUMIN 3.5  --   --    PROT 7.5  --   --    *   < > 133*   K 3.8   < > 4.1   CO2 28   < > 27   CL 95   < > 97   BUN 10   < > 8   CREATININE 0.9   < > 0.7   ALKPHOS 102  --   --    ALT 34  --   --    AST 15  --   --    BILITOT 0.8  --   --     < > = values in this interval not displayed.       Significant Diagnostics:  I have reviewed all pertinent imaging results/findings within the past 24 hours.

## 2019-10-09 NOTE — ASSESSMENT & PLAN NOTE
--likely related to pain or possibly drug withdrawal  --received Metoprolol IV and fluids by primary team.   --closely christopherior    10/9/19  Cardiology following.   Metoprolol IV prn  Expect to improve with IV hydration

## 2019-10-09 NOTE — PT/OT/SLP PROGRESS
Physical Therapy  Treatment    Dax Carlisle   MRN: [id number]   Admitting Diagnosis: Bowel perforation    PT Received On: 10/09/19  PT Start Time: 1006     PT Stop Time: 1030    PT Total Time (min): 24 min       Billable Minutes:  Gait Training 14 and Therapeutic Exercise 10    Treatment Type: Treatment  PT/PTA: PT             General Precautions: Standard, fall  Orthopedic Precautions: N/A   Braces: N/A         Subjective:  Communicated with NURSE AND Epic CHART REVIEW  prior to session.   PT AGREED TO TX     Pain/Comfort  Pain Rating 1: 10/10  Location 1: abdomen  Pain Rating Post-Intervention 1: 10/10    Objective:   Patient found with: peripheral IV, telemetry, colostomy, abdi catheter, oxygen, VARSHA drain, NG tube    Functional Mobility:  PT MET IN  SEATED IN CHAIR. PT STOOD WITH NO AD AND GT TRAINED X 150' WITH NO AD AND SBA,. PT RETURNED TO  T/F TO CHAIR WITH SBA. PT SEATED IN CHAIR FOR B LE TE X 20 REPS OF AP, TKE AND GLUT SETS. PT LEFT SEATED WITH ALL NEEDS MET AND CALL BELL IN REACH.     AM-PAC 6 CLICK MOBILITY  How much help from another person does this patient currently need?   1 = Unable, Total/Dependent Assistance  2 = A lot, Maximum/Moderate Assistance  3 = A little, Minimum/Contact Guard/Supervision  4 = None, Modified [place]/Independent    Turning over in bed (including adjusting bedclothes, sheets and blankets)?: 1  Sitting down on and standing up from a chair with arms (e.g., wheelchair, bedside commode, etc.): 4  Moving from lying on back to sitting on the side of the bed?: 1  Moving to and from a bed to a chair (including a wheelchair)?: 4  Need to walk in hospital room?: 4  Climbing 3-5 steps with a railing?: 1  Basic Mobility Total Score: 15    AM-PAC Raw Score CMS G-Code Modifier Level of Impairment Assistance   6 % Total / Unable   7 - 9 CM 80 - 100% Maximal Assist   10 - 14 CL 60 - 80% Moderate Assist   15 - 19 CK 40 - 60% Moderate Assist   20 - 22 CJ 20 - 40% Minimal  Assist   23 CI 1-20% SBA / CGA   24 CH 0% Independent/ Mod I     Patient left up in chair with call button in reach and chair alarm on.    Assessment:  PT MARKOS TX WITH INC PAIN     Rehab identified problem list/impairments: Rehab identified problem list/impairments: weakness, impaired self care skills, impaired functional mobilty, impaired endurance, gait instability, decreased lower extremity function, impaired balance, decreased safety awareness, pain    Rehab potential is good.    Activity tolerance: Fair    Discharge recommendations: Discharge Facility/Level of Care Needs: home     Barriers to discharge:      Equipment recommendations: Equipment Needed After Discharge: none     GOALS:   Multidisciplinary Problems     Physical Therapy Goals        Problem: Physical Therapy Goal    Goal Priority Disciplines Outcome Goal Variances Interventions   Physical Therapy Goal     PT, PT/OT Ongoing, Progressing     Description:  PT WILL BE SEEN FOR P.T. FOR A MIN OF 5 OUT  OF 7 DAYS A WEEK  LT19  1. PT WILL COMPLETE B LE TE X 20 REPS  2. PT WILL COMPLETE BED MOBILITY WITH SBA  3. PT WILL GT TRAIN X 500' WITH NO AD IND  4. PT WILL T/F TO CHAIR IND                    PLAN:    Patient to be seen to address the above listed problems via gait training, therapeutic activities, therapeutic exercises  Plan of Care expires: 10/15/19  Plan of Care reviewed with: patient, spouse         Jeny Quiles, PT  10/09/2019

## 2019-10-10 PROCEDURE — S0028 INJECTION, FAMOTIDINE, 20 MG: HCPCS | Performed by: SURGERY

## 2019-10-10 PROCEDURE — 25000003 PHARM REV CODE 250: Performed by: SURGERY

## 2019-10-10 PROCEDURE — 97530 THERAPEUTIC ACTIVITIES: CPT

## 2019-10-10 PROCEDURE — 63600175 PHARM REV CODE 636 W HCPCS: Performed by: SURGERY

## 2019-10-10 PROCEDURE — 99024 POSTOP FOLLOW-UP VISIT: CPT | Mod: ,,, | Performed by: SURGERY

## 2019-10-10 PROCEDURE — 94799 UNLISTED PULMONARY SVC/PX: CPT

## 2019-10-10 PROCEDURE — 21400001 HC TELEMETRY ROOM

## 2019-10-10 PROCEDURE — 99900035 HC TECH TIME PER 15 MIN (STAT)

## 2019-10-10 PROCEDURE — 94770 HC EXHALED C02 TEST: CPT

## 2019-10-10 PROCEDURE — 94761 N-INVAS EAR/PLS OXIMETRY MLT: CPT

## 2019-10-10 PROCEDURE — 97116 GAIT TRAINING THERAPY: CPT

## 2019-10-10 PROCEDURE — 99024 PR POST-OP FOLLOW-UP VISIT: ICD-10-PCS | Mod: ,,, | Performed by: SURGERY

## 2019-10-10 RX ORDER — ARIPIPRAZOLE 5 MG/1
5 TABLET ORAL DAILY
Status: DISCONTINUED | OUTPATIENT
Start: 2019-10-10 | End: 2019-10-11

## 2019-10-10 RX ORDER — ESCITALOPRAM OXALATE 10 MG/1
20 TABLET ORAL DAILY
Status: DISCONTINUED | OUTPATIENT
Start: 2019-10-10 | End: 2019-10-11

## 2019-10-10 RX ADMIN — ARIPIPRAZOLE 5 MG: 5 TABLET ORAL at 11:10

## 2019-10-10 RX ADMIN — DIPHENHYDRAMINE HYDROCHLORIDE 25 MG: 50 INJECTION, SOLUTION INTRAMUSCULAR; INTRAVENOUS at 10:10

## 2019-10-10 RX ADMIN — SODIUM CHLORIDE, SODIUM LACTATE, POTASSIUM CHLORIDE, AND CALCIUM CHLORIDE: .6; .31; .03; .02 INJECTION, SOLUTION INTRAVENOUS at 09:10

## 2019-10-10 RX ADMIN — CIPROFLOXACIN 400 MG: 2 INJECTION, SOLUTION INTRAVENOUS at 11:10

## 2019-10-10 RX ADMIN — Medication: at 05:10

## 2019-10-10 RX ADMIN — CHLORHEXIDINE GLUCONATE 0.12% ORAL RINSE 10 ML: 1.2 LIQUID ORAL at 09:10

## 2019-10-10 RX ADMIN — CIPROFLOXACIN 400 MG: 2 INJECTION, SOLUTION INTRAVENOUS at 10:10

## 2019-10-10 RX ADMIN — Medication: at 04:10

## 2019-10-10 RX ADMIN — FAMOTIDINE 20 MG: 20 INJECTION, SOLUTION INTRAVENOUS at 09:10

## 2019-10-10 RX ADMIN — ENOXAPARIN SODIUM 40 MG: 100 INJECTION SUBCUTANEOUS at 09:10

## 2019-10-10 RX ADMIN — ESCITALOPRAM OXALATE 20 MG: 10 TABLET ORAL at 09:10

## 2019-10-10 NOTE — ASSESSMENT & PLAN NOTE
S/p ex lap, raf's, abdominal washout for sigmoid perforation on 10/7/19    Continue hydration for persistent tachycardia  H/H downtrending but stable, VARSHA drain serosanguinious output. No evidence of bleeding  Continue IV abx for minimum 4 days for intraabdominal sepsis.  Continue drain care  Removed ng tube this AM.  Trial clear liquid diet  Ostomy nurse consulted for teaching and care.  PT to mobilize  DVT and GI prophy  Awaiting bowel function

## 2019-10-10 NOTE — PROGRESS NOTES
Follow up on Mr. Carlisle today. He is awake and alert, family members are present at the bedside. Patient just started clear liquids this AM, NG tube has been removed.  Abdomen assessed. LUQ colostomy noted with pouch intact, stoma is pink and moist. Scant amount of bowel sweat noted in pouch. Continued colostomy teaching including dietary, hygiene, and activity guidelines. All questions answered. Plan to have patient and spouse return demonstrate stoma care and pouch change tomorrow.

## 2019-10-10 NOTE — PLAN OF CARE
Fall precautions implemented. Pt remained free from falls/injuries.  IVF/abx infusing per orders. Sat in chair for 35 minutes. PCA pump in use. Sinus tachy on monitor. Luis Angel drain remains intact and releasing serosanguinous drainage. Abd incision dressing CDI. No stool noticed in colostomy bag. NPO. R nare NG tube to LIWS releasing light green drainage. Safety precautions implemented. Chart reviewed. Will continue to monitor.

## 2019-10-10 NOTE — PROGRESS NOTES
[facility] -   General Surgery  Progress Note    Subjective:     History of Present Illness:  43-year-old male with history of opioid induced chronic constipation presented to the ER with complaints of lower abdominal pain and fever with nausea and emesis.  He was seen in the ER 3 days ago and treated with Cipro and Flagyl.  The pain persisted and he returned to the ER today with continued complaints.  He has a pertinent family history of colon cancer, his paternal grandmother  of colon cancer in her 50s.  Last colonoscopy in  showed no acute findings. He attempted an enema over the weekend with acute worsening of his pain. Workup showed leukocytosis and CT abdomen and pelvis confirmed pneumoperitoneum with evidence of perforated sigmoid colon.       Post-Op Info:  Procedure(s) (LRB):  LAPAROTOMY, EXPLORATORY (N/A)  LAVAGE, PERITONEAL, THERAPEUTIC (N/A)  SIVAKUMAR PROCEDURE (N/A)  CREATION, COLOSTOMY (N/A)   3 Days Post-Op     Interval History: Continues to have tachycardia in the low 100s.  Minimal NG tube output, more clear.  Ambulating with PT.    Medications:  Continuous Infusions:   hydromorphone in 0.9 % NaCl 6 mg/30 ml      lactated ringers 150 mL/hr at 10/09/19 1802     Scheduled Meds:   ARIPiprazole  5 mg Oral Daily    chlorhexidine  10 mL Mouth/Throat BID    ciprofloxacin (CIPRO)400mg/200ml D5W IVPB  400 mg Intravenous Q12H    enoxaparin  40 mg Subcutaneous Daily    escitalopram oxalate  20 mg Oral Daily    famotidine  20 mg Intravenous BID    nozaseptin   Each Nostril BID     PRN Meds:cloNIDine, diphenhydrAMINE, metoprolol, naloxone, ondansetron, promethazine (PHENERGAN) IVPB, sodium chloride 0.9%     Review of patient's allergies indicates:   Allergen Reactions    Iodinated contrast media Swelling     Tongue, right eye, lips swelling. Rash on abdomen and axilla    Codeine     Dairy aid [lactase]     Morphine      Tolerated hydromorphone in 2019.     Nuts [tree  nut]      Objective:     Vital Signs (Most Recent):  Temp: 98.4 °F (36.9 °C) (10/10/19 0737)  Pulse: (!) 117 (10/10/19 0737)  Resp: 16 (10/10/19 0737)  BP: 136/86 (10/10/19 0737)  SpO2: 95 % (10/10/19 0737) Vital Signs (24h Range):  Temp:  [98.4 °F (36.9 °C)-99.4 °F (37.4 °C)] 98.4 °F (36.9 °C)  Pulse:  [108-124] 117  Resp:  [16-19] 16  SpO2:  [95 %-96 %] 95 %  BP: (132-136)/(76-86) 136/86     Weight: 101.3 kg (223 lb 5.2 oz)  Body mass index is 32.04 kg/m².    Intake/Output - Last 3 Shifts       10/08 0700 - 10/09 0659 10/09 0700 - 10/10 0659 10/10 0700 - 10/11 0659    P.O. 0 0     I.V. (mL/kg) 4709 (46.5) 3609.5 (35.6)     IV Piggyback 1600 550     Total Intake(mL/kg) 6309 (62.3) 4159.5 (41.1)     Urine (mL/kg/hr) 1150 (0.5) 1700 (0.7) 300 (2.4)    Drains 565 555     Stool 0 0     Total Output 1715 2255 300    Net +4594 +1904.5 -300                 Physical Exam   Constitutional: He is oriented to person, place, and time. He appears well-developed and well-nourished. No distress.   HENT:   Head: Normocephalic and atraumatic.   Eyes: EOM are normal.   Neck: Neck supple.   Cardiovascular: Normal rate and regular rhythm.   Pulmonary/Chest: Effort normal.   Abdominal: Soft. He exhibits no distension. There is tenderness (appropriate incisional).   Ostomy viable, bowel sweat in appliance  Incision c/d/i with staples  bethanie with serosanguinous output   Musculoskeletal: Normal range of motion.   Neurological: He is alert and oriented to person, place, and time.   Skin: Skin is warm.   Vitals reviewed.      Significant Labs:  CBC:   Recent Labs   Lab 10/09/19  0528   WBC 13.15*   RBC 3.68*   HGB 10.5*   HCT 32.3*      MCV 88   MCH 28.5   MCHC 32.5     BMP:   Recent Labs   Lab 10/08/19  0106  10/09/19  0528   GLU  --    < > 105   NA  --    < > 133*   K 4.4   < > 4.1   CL  --    < > 97   CO2  --    < > 27   BUN  --    < > 8   CREATININE  --    < > 0.7   CALCIUM  --    < > 9.1   MG 1.7  --   --     < > = values in this  interval not displayed.       Significant Diagnostics:  none    Assessment/Plan:     * Bowel perforation  S/p ex lap, raf's, abdominal washout for sigmoid perforation on 10/7/19    Continue hydration for persistent tachycardia  H/H downtrending but stable, VARSHA drain serosanguinious output. No evidence of bleeding  Continue IV abx for minimum 4 days for intraabdominal sepsis.  Continue drain care  Removed ng tube this AM.  Trial clear liquid diet  Ostomy nurse consulted for teaching and care.  PT to mobilize  DVT and GI prophy  Awaiting bowel function    Sinus tachycardia  Cardiology was consulted.   Continue IV lopressor per recs  Medicine following.   If continues to have low grade temps and tachycardia does not resolve, will evaluate for intraabdominal abscess with imaging on pod 7    Chronic pain syndrome  If tolerates clears, will restart home regimen and d/c pca    Family history of colonic polyps  Recent colonoscopy reviewed    MDD (major depressive disorder)  Resume home meds today    Obesity (BMI 30-39.9)  Informed of increased incidence of wound infection.         Mindy Goff MD  General Surgery  [facility]

## 2019-10-10 NOTE — PT/OT/SLP PROGRESS
Physical Therapy  Treatment    Dax Carlisle   MRN: [id number]   Admitting Diagnosis: Bowel perforation    PT Received On: 10/10/19  PT Start Time: 0805     PT Stop Time: 0830    PT Total Time (min): 25 min       Billable Minutes:  Gait Training 15 and Therapeutic Activity 10    Treatment Type: Treatment  PT/PTA: PT             General Precautions: Standard, fall  Orthopedic Precautions: N/A   Braces: N/A         Subjective:  Communicated with NURSE AND Epic CHART REVIEW  prior to session.  PT AGREED TO TX     Pain/Comfort  Pain Rating 1: 7/10  Location 1: abdomen  Pain Addressed 1: Pre-medicate for activity  Pain Rating Post-Intervention 1: 7/10    Objective:   Patient found with: peripheral IV, telemetry, VARSHA drain, colostomy    Functional Mobility:  PT MET IN RM SUP.SIT EOB WITH MIN A. PT SCOOTED TO EOB AND SEATED TO BRUSH TEETH WITH SET UP. PT STOOD FROM BED AND GT TRAINED X 300' WITH NO AD AND S. PT RETURNED TO  T/F TO CHAIR. PT EDUCATED TO AMBULATE AGAIN TODAY AND PT AGREED TO DO TE LATER. PT LEFT WITH ALL NEEDS MET     AM-PAC 6 CLICK MOBILITY  How much help from another person does this patient currently need?   1 = Unable, Total/Dependent Assistance  2 = A lot, Maximum/Moderate Assistance  3 = A little, Minimum/Contact Guard/Supervision  4 = None, Modified [place]/Independent    Turning over in bed (including adjusting bedclothes, sheets and blankets)?: 3  Sitting down on and standing up from a chair with arms (e.g., wheelchair, bedside commode, etc.): 4  Moving from lying on back to sitting on the side of the bed?: 3  Moving to and from a bed to a chair (including a wheelchair)?: 4  Need to walk in hospital room?: 4  Climbing 3-5 steps with a railing?: 1  Basic Mobility Total Score: 19    AM-PAC Raw Score CMS G-Code Modifier Level of Impairment Assistance   6 % Total / Unable   7 - 9 CM 80 - 100% Maximal Assist   10 - 14 CL 60 - 80% Moderate Assist   15 - 19 CK 40 - 60% Moderate Assist    20 - 22 CJ 20 - 40% Minimal Assist   23 CI 1-20% SBA / CGA   24 CH 0% Independent/ Mod I     Patient left up in chair with call button in reach and chair alarm on.    Assessment:  PT PROGRESSING WITH GT     Rehab identified problem list/impairments: Rehab identified problem list/impairments: weakness, impaired functional mobilty, impaired endurance, gait instability, decreased lower extremity function, impaired balance, decreased ROM    Rehab potential is excellent.    Activity tolerance: Fair    Discharge recommendations: Discharge Facility/Level of Care Needs: home     Barriers to discharge:      Equipment recommendations: Equipment Needed After Discharge: none     GOALS:   Multidisciplinary Problems     Physical Therapy Goals        Problem: Physical Therapy Goal    Goal Priority Disciplines Outcome Goal Variances Interventions   Physical Therapy Goal     PT, PT/OT Ongoing, Progressing     Description:  PT WILL BE SEEN FOR P.T. FOR A MIN OF 5 OUT  OF 7 DAYS A WEEK  LT19  1. PT WILL COMPLETE B LE TE X 20 REPS  2. PT WILL COMPLETE BED MOBILITY WITH SBA  3. PT WILL GT TRAIN X 500' WITH NO AD IND  4. PT WILL T/F TO CHAIR IND                    PLAN:    Patient to be seen    to address the above listed problems via gait training, therapeutic activities, therapeutic exercises  Plan of Care expires: 10/15/19  Plan of Care reviewed with: patient         Jeny Quiles, PT  10/10/2019

## 2019-10-10 NOTE — SUBJECTIVE & OBJECTIVE
Interval History: Continues to have tachycardia in the low 100s.  Minimal NG tube output, more clear.  Ambulating with PT.    Medications:  Continuous Infusions:   hydromorphone in 0.9 % NaCl 6 mg/30 ml      lactated ringers 150 mL/hr at 10/09/19 1802     Scheduled Meds:   ARIPiprazole  5 mg Oral Daily    chlorhexidine  10 mL Mouth/Throat BID    ciprofloxacin (CIPRO)400mg/200ml D5W IVPB  400 mg Intravenous Q12H    enoxaparin  40 mg Subcutaneous Daily    escitalopram oxalate  20 mg Oral Daily    famotidine  20 mg Intravenous BID    nozaseptin   Each Nostril BID     PRN Meds:cloNIDine, diphenhydrAMINE, metoprolol, naloxone, ondansetron, promethazine (PHENERGAN) IVPB, sodium chloride 0.9%     Review of patient's allergies indicates:   Allergen Reactions    Iodinated contrast media Swelling     Tongue, right eye, lips swelling. Rash on abdomen and axilla    Codeine     Dairy aid [lactase]     Morphine      Tolerated hydromorphone in October 2019.     Nuts [tree nut]      Objective:     Vital Signs (Most Recent):  Temp: 98.4 °F (36.9 °C) (10/10/19 0737)  Pulse: (!) 117 (10/10/19 0737)  Resp: 16 (10/10/19 0737)  BP: 136/86 (10/10/19 0737)  SpO2: 95 % (10/10/19 0737) Vital Signs (24h Range):  Temp:  [98.4 °F (36.9 °C)-99.4 °F (37.4 °C)] 98.4 °F (36.9 °C)  Pulse:  [108-124] 117  Resp:  [16-19] 16  SpO2:  [95 %-96 %] 95 %  BP: (132-136)/(76-86) 136/86     Weight: 101.3 kg (223 lb 5.2 oz)  Body mass index is 32.04 kg/m².    Intake/Output - Last 3 Shifts       10/08 0700 - 10/09 0659 10/09 0700 - 10/10 0659 10/10 0700 - 10/11 0659    P.O. 0 0     I.V. (mL/kg) 4709 (46.5) 3609.5 (35.6)     IV Piggyback 1600 550     Total Intake(mL/kg) 6309 (62.3) 4159.5 (41.1)     Urine (mL/kg/hr) 1150 (0.5) 1700 (0.7) 300 (2.4)    Drains 565 555     Stool 0 0     Total Output 1715 2255 300    Net +4594 +1904.5 -300                 Physical Exam   Constitutional: He is oriented to person, place, and time. He appears well-developed  and well-nourished. No distress.   HENT:   Head: Normocephalic and atraumatic.   Eyes: EOM are normal.   Neck: Neck supple.   Cardiovascular: Normal rate and regular rhythm.   Pulmonary/Chest: Effort normal.   Abdominal: Soft. He exhibits no distension. There is tenderness (appropriate incisional).   Ostomy viable, bowel sweat in appliance  Incision c/d/i with staples  bethanie with serosanguinous output   Musculoskeletal: Normal range of motion.   Neurological: He is alert and oriented to person, place, and time.   Skin: Skin is warm.   Vitals reviewed.      Significant Labs:  CBC:   Recent Labs   Lab 10/09/19  0528   WBC 13.15*   RBC 3.68*   HGB 10.5*   HCT 32.3*      MCV 88   MCH 28.5   MCHC 32.5     BMP:   Recent Labs   Lab 10/08/19  0106  10/09/19  0528   GLU  --    < > 105   NA  --    < > 133*   K 4.4   < > 4.1   CL  --    < > 97   CO2  --    < > 27   BUN  --    < > 8   CREATININE  --    < > 0.7   CALCIUM  --    < > 9.1   MG 1.7  --   --     < > = values in this interval not displayed.       Significant Diagnostics:  none

## 2019-10-10 NOTE — ASSESSMENT & PLAN NOTE
Cardiology was consulted.   Continue IV lopressor per recs  Medicine following.   If continues to have low grade temps and tachycardia does not resolve, will evaluate for intraabdominal abscess with imaging on pod 7

## 2019-10-10 NOTE — PLAN OF CARE
NG tube removed this AM by Dr. Goff. Tolerating clear liquid diet with no c/o nausea or vomiting.

## 2019-10-10 NOTE — CHAPLAIN
Provided ministries of listening, presence, and prayer.  Pt's nurse mentioned that pt could use a visit.  I went to his room and took time to listen to him.  He is struggling with all that has happened to him within his health but he is working through it.  I prayed with pt before leaving and will follow up while he is here.    Chaplain Ralf Lee M.Div., [facility]

## 2019-10-11 PROCEDURE — 63600175 PHARM REV CODE 636 W HCPCS: Performed by: SURGERY

## 2019-10-11 PROCEDURE — 21400001 HC TELEMETRY ROOM

## 2019-10-11 PROCEDURE — 94761 N-INVAS EAR/PLS OXIMETRY MLT: CPT

## 2019-10-11 PROCEDURE — S0028 INJECTION, FAMOTIDINE, 20 MG: HCPCS | Performed by: SURGERY

## 2019-10-11 PROCEDURE — 94799 UNLISTED PULMONARY SVC/PX: CPT

## 2019-10-11 PROCEDURE — 25000003 PHARM REV CODE 250: Performed by: SURGERY

## 2019-10-11 PROCEDURE — 25000003 PHARM REV CODE 250: Performed by: PHYSICIAN ASSISTANT

## 2019-10-11 PROCEDURE — 63600175 PHARM REV CODE 636 W HCPCS: Performed by: PHYSICIAN ASSISTANT

## 2019-10-11 PROCEDURE — 99900035 HC TECH TIME PER 15 MIN (STAT)

## 2019-10-11 PROCEDURE — 97116 GAIT TRAINING THERAPY: CPT

## 2019-10-11 PROCEDURE — 94770 HC EXHALED C02 TEST: CPT

## 2019-10-11 PROCEDURE — 97110 THERAPEUTIC EXERCISES: CPT

## 2019-10-11 RX ORDER — HYDROCODONE BITARTRATE AND ACETAMINOPHEN 5; 325 MG/1; MG/1
1 TABLET ORAL EVERY 4 HOURS PRN
Status: DISCONTINUED | OUTPATIENT
Start: 2019-10-11 | End: 2019-10-17 | Stop reason: HOSPADM

## 2019-10-11 RX ORDER — ARIPIPRAZOLE 5 MG/1
5 TABLET ORAL NIGHTLY
Status: DISCONTINUED | OUTPATIENT
Start: 2019-10-11 | End: 2019-10-17 | Stop reason: HOSPADM

## 2019-10-11 RX ORDER — HYDROCODONE BITARTRATE AND ACETAMINOPHEN 10; 325 MG/1; MG/1
1 TABLET ORAL EVERY 6 HOURS PRN
Status: DISCONTINUED | OUTPATIENT
Start: 2019-10-11 | End: 2019-10-17 | Stop reason: HOSPADM

## 2019-10-11 RX ORDER — ESCITALOPRAM OXALATE 10 MG/1
20 TABLET ORAL NIGHTLY
Status: DISCONTINUED | OUTPATIENT
Start: 2019-10-11 | End: 2019-10-17 | Stop reason: HOSPADM

## 2019-10-11 RX ORDER — HYDROMORPHONE HYDROCHLORIDE 1 MG/ML
0.5 INJECTION, SOLUTION INTRAMUSCULAR; INTRAVENOUS; SUBCUTANEOUS EVERY 6 HOURS PRN
Status: DISCONTINUED | OUTPATIENT
Start: 2019-10-11 | End: 2019-10-17 | Stop reason: HOSPADM

## 2019-10-11 RX ADMIN — ESCITALOPRAM OXALATE 20 MG: 10 TABLET ORAL at 08:10

## 2019-10-11 RX ADMIN — CIPROFLOXACIN 400 MG: 2 INJECTION, SOLUTION INTRAVENOUS at 11:10

## 2019-10-11 RX ADMIN — CHLORHEXIDINE GLUCONATE 0.12% ORAL RINSE 10 ML: 1.2 LIQUID ORAL at 08:10

## 2019-10-11 RX ADMIN — HYDROMORPHONE HYDROCHLORIDE 0.5 MG: 1 INJECTION, SOLUTION INTRAMUSCULAR; INTRAVENOUS; SUBCUTANEOUS at 12:10

## 2019-10-11 RX ADMIN — CIPROFLOXACIN 400 MG: 2 INJECTION, SOLUTION INTRAVENOUS at 10:10

## 2019-10-11 RX ADMIN — HYDROCODONE BITARTRATE AND ACETAMINOPHEN 1 TABLET: 10; 325 TABLET ORAL at 10:10

## 2019-10-11 RX ADMIN — CHLORHEXIDINE GLUCONATE 0.12% ORAL RINSE 10 ML: 1.2 LIQUID ORAL at 09:10

## 2019-10-11 RX ADMIN — ARIPIPRAZOLE 5 MG: 5 TABLET ORAL at 08:10

## 2019-10-11 RX ADMIN — FAMOTIDINE 20 MG: 20 INJECTION, SOLUTION INTRAVENOUS at 10:10

## 2019-10-11 RX ADMIN — SODIUM CHLORIDE, SODIUM LACTATE, POTASSIUM CHLORIDE, AND CALCIUM CHLORIDE: .6; .31; .03; .02 INJECTION, SOLUTION INTRAVENOUS at 10:10

## 2019-10-11 RX ADMIN — HYDROCODONE BITARTRATE AND ACETAMINOPHEN 1 TABLET: 10; 325 TABLET ORAL at 04:10

## 2019-10-11 RX ADMIN — Medication: at 06:10

## 2019-10-11 RX ADMIN — ENOXAPARIN SODIUM 40 MG: 100 INJECTION SUBCUTANEOUS at 09:10

## 2019-10-11 RX ADMIN — FAMOTIDINE 20 MG: 20 INJECTION, SOLUTION INTRAVENOUS at 08:10

## 2019-10-11 RX ADMIN — HYDROMORPHONE HYDROCHLORIDE 0.5 MG: 1 INJECTION, SOLUTION INTRAMUSCULAR; INTRAVENOUS; SUBCUTANEOUS at 06:10

## 2019-10-11 NOTE — SUBJECTIVE & OBJECTIVE
Interval History: c/o frequent urination. Pain controlled. No nausea.     Medications:  Continuous Infusions:   hydromorphone in 0.9 % NaCl 6 mg/30 ml      lactated ringers 150 mL/hr at 10/10/19 0954     Scheduled Meds:   ARIPiprazole  5 mg Oral QHS    chlorhexidine  10 mL Mouth/Throat BID    ciprofloxacin (CIPRO)400mg/200ml D5W IVPB  400 mg Intravenous Q12H    enoxaparin  40 mg Subcutaneous Daily    escitalopram oxalate  20 mg Oral QHS    famotidine  20 mg Intravenous BID    nozaseptin   Each Nostril BID     PRN Meds:cloNIDine, diphenhydrAMINE, metoprolol, naloxone, ondansetron, promethazine (PHENERGAN) IVPB, sodium chloride 0.9%     Review of patient's allergies indicates:   Allergen Reactions    Iodinated contrast media Swelling     Tongue, right eye, lips swelling. Rash on abdomen and axilla    Codeine     Dairy aid [lactase]     Morphine      Tolerated hydromorphone in October 2019.     Nuts [tree nut]      Objective:     Vital Signs (Most Recent):  Temp: 98.1 °F (36.7 °C) (10/11/19 0712)  Pulse: 109 (10/11/19 0712)  Resp: 14 (10/11/19 0733)  BP: 127/84 (10/11/19 0712)  SpO2: 95 % (10/11/19 0733) Vital Signs (24h Range):  Temp:  [97.1 °F (36.2 °C)-99.2 °F (37.3 °C)] 98.1 °F (36.7 °C)  Pulse:  [104-120] 109  Resp:  [14-20] 14  SpO2:  [91 %-96 %] 95 %  BP: (119-137)/(74-87) 127/84     Weight: 101.3 kg (223 lb 5.2 oz)  Body mass index is 32.04 kg/m².    Intake/Output - Last 3 Shifts       10/09 0700 - 10/10 0659 10/10 0700 - 10/11 0659 10/11 0700 - 10/12 0659    P.O. 0 720     I.V. (mL/kg) 3609.5 (35.6) 3723.5 (36.8)     IV Piggyback 550 500     Total Intake(mL/kg) 4159.5 (41.1) 4943.5 (48.8)     Urine (mL/kg/hr) 1700 (0.7) 1800 (0.7) 200 (1.2)    Drains 555 15     Stool 0 5     Total Output 2255 1820 200    Net +1904.5 +3123.5 -200                 Physical Exam   Constitutional: He is oriented to person, place, and time. He appears well-developed and well-nourished.   HENT:   Head: Normocephalic and  atraumatic.   Eyes: EOM are normal.   Cardiovascular: Normal rate and regular rhythm.   Pulmonary/Chest: Effort normal. No respiratory distress.   Abdominal: Soft. He exhibits no distension. There is tenderness (incisional).   Incision is clean with serous drianage. Ostomy pink, viable. Drain with minimal SS output.    Musculoskeletal: Normal range of motion.   Neurological: He is alert and oriented to person, place, and time.   Skin: Skin is warm and dry.   Psychiatric: He has a normal mood and affect. Thought content normal.   Vitals reviewed.      Significant Labs:  CBC:   Recent Labs   Lab 10/09/19  0528   WBC 13.15*   RBC 3.68*   HGB 10.5*   HCT 32.3*      MCV 88   MCH 28.5   MCHC 32.5     CMP:   Recent Labs   Lab 10/07/19  0825  10/09/19  0528      < > 105   CALCIUM 10.1   < > 9.1   ALBUMIN 3.5  --   --    PROT 7.5  --   --    *   < > 133*   K 3.8   < > 4.1   CO2 28   < > 27   CL 95   < > 97   BUN 10   < > 8   CREATININE 0.9   < > 0.7   ALKPHOS 102  --   --    ALT 34  --   --    AST 15  --   --    BILITOT 0.8  --   --     < > = values in this interval not displayed.       Significant Diagnostics:  I have reviewed all pertinent imaging results/findings within the past 24 hours.

## 2019-10-11 NOTE — PLAN OF CARE
POC reviewed with patient. Pt verbalized understanding. Pt remains free of injuries and falls; fall precaution in place.   Frequent complaints of pain on this shift. Pain moderately tolerated with PRN medication. Dressing clean,dry, and intact. Colostomy care performed. Continuous IVF per MAR. Tolerating diet. Bed low, side rails x2, call light in reach, personal belongings at bedside. Reminded to call for assistance. Chart check complete. Will continue to monitor.

## 2019-10-11 NOTE — PROGRESS NOTES
[facility] -   General Surgery  Progress Note    Subjective:     History of Present Illness:  43-year-old male with history of opioid induced chronic constipation presented to the ER with complaints of lower abdominal pain and fever with nausea and emesis.  He was seen in the ER 3 days ago and treated with Cipro and Flagyl.  The pain persisted and he returned to the ER today with continued complaints.  He has a pertinent family history of colon cancer, his paternal grandmother  of colon cancer in her 50s.  Last colonoscopy in  showed no acute findings. He attempted an enema over the weekend with acute worsening of his pain. Workup showed leukocytosis and CT abdomen and pelvis confirmed pneumoperitoneum with evidence of perforated sigmoid colon.       Post-Op Info:  Procedure(s) (LRB):  LAPAROTOMY, EXPLORATORY (N/A)  LAVAGE, PERITONEAL, THERAPEUTIC (N/A)  SIVAKUMAR PROCEDURE (N/A)  CREATION, COLOSTOMY (N/A)   4 Days Post-Op     Interval History: c/o frequent urination. Pain controlled. No nausea.     Medications:  Continuous Infusions:   hydromorphone in 0.9 % NaCl 6 mg/30 ml      lactated ringers 150 mL/hr at 10/10/19 0954     Scheduled Meds:   ARIPiprazole  5 mg Oral QHS    chlorhexidine  10 mL Mouth/Throat BID    ciprofloxacin (CIPRO)400mg/200ml D5W IVPB  400 mg Intravenous Q12H    enoxaparin  40 mg Subcutaneous Daily    escitalopram oxalate  20 mg Oral QHS    famotidine  20 mg Intravenous BID    nozaseptin   Each Nostril BID     PRN Meds:cloNIDine, diphenhydrAMINE, metoprolol, naloxone, ondansetron, promethazine (PHENERGAN) IVPB, sodium chloride 0.9%     Review of patient's allergies indicates:   Allergen Reactions    Iodinated contrast media Swelling     Tongue, right eye, lips swelling. Rash on abdomen and axilla    Codeine     Dairy aid [lactase]     Morphine      Tolerated hydromorphone in 2019.     Nuts [tree nut]      Objective:     Vital Signs (Most  Recent):  Temp: 98.1 °F (36.7 °C) (10/11/19 0712)  Pulse: 109 (10/11/19 0712)  Resp: 14 (10/11/19 0733)  BP: 127/84 (10/11/19 0712)  SpO2: 95 % (10/11/19 0733) Vital Signs (24h Range):  Temp:  [97.1 °F (36.2 °C)-99.2 °F (37.3 °C)] 98.1 °F (36.7 °C)  Pulse:  [104-120] 109  Resp:  [14-20] 14  SpO2:  [91 %-96 %] 95 %  BP: (119-137)/(74-87) 127/84     Weight: 101.3 kg (223 lb 5.2 oz)  Body mass index is 32.04 kg/m².    Intake/Output - Last 3 Shifts       10/09 0700 - 10/10 0659 10/10 0700 - 10/11 0659 10/11 0700 - 10/12 0659    P.O. 0 720     I.V. (mL/kg) 3609.5 (35.6) 3723.5 (36.8)     IV Piggyback 550 500     Total Intake(mL/kg) 4159.5 (41.1) 4943.5 (48.8)     Urine (mL/kg/hr) 1700 (0.7) 1800 (0.7) 200 (1.2)    Drains 555 15     Stool 0 5     Total Output 2255 1820 200    Net +1904.5 +3123.5 -200                 Physical Exam   Constitutional: He is oriented to person, place, and time. He appears well-developed and well-nourished.   HENT:   Head: Normocephalic and atraumatic.   Eyes: EOM are normal.   Cardiovascular: Normal rate and regular rhythm.   Pulmonary/Chest: Effort normal. No respiratory distress.   Abdominal: Soft. He exhibits no distension. There is tenderness (incisional).   Incision is clean with serous drianage. Ostomy pink, viable. Drain with minimal SS output.    Musculoskeletal: Normal range of motion.   Neurological: He is alert and oriented to person, place, and time.   Skin: Skin is warm and dry.   Psychiatric: He has a normal mood and affect. Thought content normal.   Vitals reviewed.      Significant Labs:  CBC:   Recent Labs   Lab 10/09/19  0528   WBC 13.15*   RBC 3.68*   HGB 10.5*   HCT 32.3*      MCV 88   MCH 28.5   MCHC 32.5     CMP:   Recent Labs   Lab 10/07/19  0825  10/09/19  0528      < > 105   CALCIUM 10.1   < > 9.1   ALBUMIN 3.5  --   --    PROT 7.5  --   --    *   < > 133*   K 3.8   < > 4.1   CO2 28   < > 27   CL 95   < > 97   BUN 10   < > 8   CREATININE 0.9   < >  0.7   ALKPHOS 102  --   --    ALT 34  --   --    AST 15  --   --    BILITOT 0.8  --   --     < > = values in this interval not displayed.       Significant Diagnostics:  I have reviewed all pertinent imaging results/findings within the past 24 hours.    Assessment/Plan:     * Bowel perforation  S/p ex lap, raf's, abdominal washout for sigmoid perforation on 10/7/19    Continue hydration for persistent tachycardia  H/H downtrending but stable, VARSHA drain serosanguinious output. No evidence of bleeding  Continue IV abx for minimum 4 days for intraabdominal sepsis.  Continue drain care  Sips of clears   Ostomy nurse consulted for teaching and care.  PT to mobilize  DVT and GI prophy  Awaiting bowel function.   Dc PCA and resume home pain medications.     Sinus tachycardia  Cardiology was consulted.   Continue IV lopressor per recs  Medicine following.   If continues to have low grade temps and tachycardia does not resolve, will evaluate for intraabdominal abscess with imaging on pod 7    Chronic pain syndrome  If tolerates clears, will restart home regimen and d/c pca    Family history of colonic polyps  Recent colonoscopy reviewed    MDD (major depressive disorder)  Resume home meds today    Obesity (BMI 30-39.9)  Informed of increased incidence of wound infection.         Promise Fajardo PA-C  General Surgery  [facility]

## 2019-10-11 NOTE — PT/OT/SLP PROGRESS
Physical Therapy  Treatment    Dax Carlisle   MRN: [id number]   Admitting Diagnosis: Bowel perforation    PT Received On: 10/11/19  PT Start Time: 1145     PT Stop Time: 1210    PT Total Time (min): 25 min       Billable Minutes:  Gait Training 15 and Therapeutic Exercise 10    Treatment Type: Treatment  PT/PTA: PTA     PTA Visit Number: 1       General Precautions: Standard, fall  Orthopedic Precautions: N/A   Braces: N/A    Spiritual, Cultural Beliefs, Quaker Practices, Values that Affect Care: no    Subjective:  Communicated with NURSEROB AND [place] CHART REVIEW DONE prior to session.  PATIENT AGREE TO TX NOW.    Pain/Comfort  Pain Rating 1: 10/10  Location - Side 1: Bilateral  Location - Orientation 1: anterior  Location 1: abdomen  Pain Addressed 1: Pre-medicate for activity, Cessation of Activity, Reposition, Distraction  Pain Rating Post-Intervention 1: 10/10    Objective:   Patient found with: peripheral IV, telemetry, VARSHA drain, colostomy, SUPINE INBED. ASSISTED PATIENT WITH OOB T/FS TO B/S CHAIR, CLINTON LE  EXERCISE INSTRUCTION, GT IN HALLWAY.    Functional Mobility:  Bed Mobility:    SUPINE TO SIT AT CGA TO MIN ASSIST X1 , PATIENT UTILIZING RAISED HOB FOR T/FS.    Transfers:   SIT TO STAND AT CGA TO CLOSE SBA X1 .    Gait:      100'X2, GOOD STEADY PACE , CUES FOR INCREASING UPRIGHT POSTURE AT CLOSE SBA X1.  Stairs:    Balance:   Static Sit: FAIR+: Able to take MINIMAL challenges from all directions  Dynamic Sit: FAIR+: Maintains balance through MINIMAL excursions of active trunk motion  Static Stand: FAIR+: Takes MINIMAL challenges from all directions  Dynamic stand: FAIR+: Needs CLOSE SUPERVISION during gait and is able to right self with minor LOB     Therapeutic Activities and Exercises:  CLINTON LE EXERCISE INSTRUCTION , OOB T/FS TO B/S CHAIR, GT INTO HALLWAY.    AM-PAC 6 CLICK MOBILITY  How much help from another person does this patient currently need?   1 = Unable, Total/Dependent Assistance  2  = A lot, Maximum/Moderate Assistance  3 = A little, Minimum/Contact Guard/Supervision  4 = None, Modified [place]/Independent    Turning over in bed (including adjusting bedclothes, sheets and blankets)?: 3  Sitting down on and standing up from a chair with arms (e.g., wheelchair, bedside commode, etc.): 3  Moving from lying on back to sitting on the side of the bed?: 3  Moving to and from a bed to a chair (including a wheelchair)?: 1  Need to walk in hospital room?: 2  Climbing 3-5 steps with a railing?: 1  Basic Mobility Total Score: 13    AM-PAC Raw Score CMS G-Code Modifier Level of Impairment Assistance   6 % Total / Unable   7 - 9 CM 80 - 100% Maximal Assist   10 - 14 CL 60 - 80% Moderate Assist   15 - 19 CK 40 - 60% Moderate Assist   20 - 22 CJ 20 - 40% Minimal Assist   23 CI 1-20% SBA / CGA   24 CH 0% Independent/ Mod I     Patient left up in chair with all lines intact, call button in reach and chair alarm on.    Assessment:  Dax Carlisle is a 43 y.o. male with a medical diagnosis of Bowel perforation . PATIENT PARTICIPATING WELL WITH ALL THERPEUTIC ACTIVITIES. PATIENT APPEAR TO REMAIN MOTIVATED TO INCREASE ACTIVITIES AS TOLERATED.    Rehab identified problem list/impairments: Rehab identified problem list/impairments: weakness, impaired self care skills, impaired balance, decreased coordination, impaired endurance, impaired functional mobilty, pain, gait instability    Rehab potential is good.    Activity tolerance: Good    Discharge recommendations: Discharge Facility/Level of Care Needs: home     Barriers to discharge:      Equipment recommendations: Equipment Needed After Discharge: none     GOALS:   Multidisciplinary Problems     Physical Therapy Goals        Problem: Physical Therapy Goal    Goal Priority Disciplines Outcome Goal Variances Interventions   Physical Therapy Goal     PT, PT/OT Ongoing, Progressing     Description:  PT WILL BE SEEN FOR P.T. FOR A MIN OF 5 OUT  OF 7  DAYS A WEEK  LT19  1. PT WILL COMPLETE B LE TE X 20 REPS  2. PT WILL COMPLETE BED MOBILITY WITH SBA  3. PT WILL GT TRAIN X 500' WITH NO AD IND  4. PT WILL T/F TO CHAIR IND                    PLAN:    Patient to be seen 5 x/week  to address the above listed problems via therapeutic activities, therapeutic exercises, gait training  Plan of Care expires: 10/15/19  Plan of Care reviewed with: patient, spouse         Kenyatta Leon, PTA  10/11/2019

## 2019-10-11 NOTE — PHYSICIAN QUERY
PT Name: Dax Carlisle  MR #: [id number]     Physician Query Form - Documentation Clarification      CDS: Brandy Capley, RN  Email: [email or web address]  Spectralink:  [phone] (Tues-Thurs)    This form is a permanent document in the medical record.     Query Date: October 11, 2019    By submitting this query, we are merely seeking further clarification of documentation. Please utilize your independent clinical judgment when addressing the question(s) below.    The Medical record reflects the following:    Supporting Clinical Findings Location in Medical Record     Date of Procedure: 10/7/2019      Procedure: Procedure(s) (LRB):  LAPAROTOMY, EXPLORATORY (N/A)  ABDOMINAL WASHOUT  SIVAKUMAR PROCEDURE  CREATION, COLOSTOMY        Description of the Findings of the Procedure:   3 cm sigmoid perforation into the mesentery with tita ischemia.      Op note 10/7   FINAL PATHOLOGIC DIAGNOSIS  1. Sigmoid colon (stitch marks perforation), segmental resection (20 cm in length):  - Gross and histologic evidence of transmural perforation (4 cm from nearest surgical margin)  - Associated acute chronic transmural inflammation with extension into pericolic fat resulting in abscessformation  - Serosal adhesion formation with acute chronic serositis  - Margins histologically viable  - Negative for dysplasia or malignancy   Surgical pathology 10/7                                                                            Doctor, Please specify diagnosis or diagnoses associated with above clinical findings.  please specify the acuity of ___ischemia_______    Provider Use Only     ( x )  Acute (please further specify):          ( x )  Focal          (  )  Diffuse          (  )  Other/unspecified     (  )  Chronic     (  )  Other (please specify):  _________________________________                                                                                                               [  ] Clinically Undetermined

## 2019-10-11 NOTE — PLAN OF CARE
Fall precautions implemented. Pt remained free from falls/injuries.  Tachy on monitor 104-118 bpm. IVF/abx infusing per orders. Tolerating clear liquids.  Midline dressing CDI. Colostomy bag intact with no flatus or stool scant amount of mucous in bag. Luis Angel drain intact with serosanguinous drainage. Encouraged pt to ambulate the halls states the pain was too much will try again tomorrow. PCA pump in use. Safety precautions implemented. Chart reviewed. Will continue to monitor.

## 2019-10-11 NOTE — PROGRESS NOTES
10/11/19 0900   Pain/Comfort/Sleep   Pain Body Location - Orientation generalized   Pain Body Location abdomen   Pain Rating (0-10): Rest 4   Frequency constant   Quality aching   Pain Management Interventions care clustered;pain management plan reviewed with patient/caregiver   POSS ([place] Opioid-Induced Sed Scale) 1 - Awake and alert   Pain Reassessment   Pain Rating Prior to Med Admin 4   Cognitive   Level of Consciousness (AVPU) alert   ECG   Lead Monitored Lead II   Rhythm sinus tachycardia        Colostomy 10/07/19 1900 LLQ   Placement Date/Time: 10/07/19 (c) 1900   Location: LLQ   Wound Image     Stomal Appliance 1 piece   Stoma Appearance round;rosebud appearance   Stoma Size (in) 38x50   Site Assessment Oval;Moist;Red   Peristomal Assessment Intact   Accessories/Skin Care cleansed w/ water   Stoma Function bowel sweat   Treatment Bag change;Site care   Tolerance no signs/symptoms of discomfort   Safety Management   Patient Rounds bed in low position;bed wheels locked;call light in patient/parent reach;clutter free environment maintained;ID band on;placement of personal items at bedside;toileting offered;visualized patient   Safety Promotion/Fall Prevention assistive device/personal item within reach;Fall Risk reviewed with patient/family;lighting adjusted;medications reviewed;nonskid shoes/socks when out of bed;side rails raised x 2;instructed to call staff for mobility   Daily Care   Activity Management activity clustered for rest period;activity adjusted per tolerance   Positioning   Body Position supine   Head of Bed (HOB) HOB at 45 degrees     Follow up with Mr. Carlisle today. He is awake and alert, his wife is at the bedside. LUQ colostomy with pouch intact. Pouch removed per patient with some assistance and coaching. Stoma is moist and pink, protrudes from abdomen, measures 02m74po. Bowel sweat noted in pouch. Patient became lightheaded at this point and unable to perform stoma care and pouch  change. Instructed his wife on each step while she observed. Cleansed peristomal skin with water and gauze and patted dry. Cavilon skin barrier film sprayed on van stomal skin. Brava moldable ring then applied. One piece flat JESSY pouch cut to size and applied, pressing gently to seal. Initiated colostomy teaching including dietary, hygiene, and activity guidelines as well as reviewed emptying and changing pouch utilizing the teach back method. Answered all questions. Will continue to follow and plan to have patient participate during next pouch change.

## 2019-10-11 NOTE — ASSESSMENT & PLAN NOTE
S/p ex lap, raf's, abdominal washout for sigmoid perforation on 10/7/19    Continue hydration for persistent tachycardia  H/H downtrending but stable, VARSHA drain serosanguinious output. No evidence of bleeding  Continue IV abx for minimum 4 days for intraabdominal sepsis.  Continue drain care  Sips of clears   Ostomy nurse consulted for teaching and care.  PT to mobilize  DVT and GI prophy  Awaiting bowel function

## 2019-10-12 LAB
BACTERIA BLD CULT: NORMAL
BACTERIA BLD CULT: NORMAL
BASOPHILS # BLD AUTO: 0.06 K/UL (ref 0–0.2)
BASOPHILS NFR BLD: 0.5 % (ref 0–1.9)
DIFFERENTIAL METHOD: ABNORMAL
EOSINOPHIL # BLD AUTO: 0.3 K/UL (ref 0–0.5)
EOSINOPHIL NFR BLD: 2.5 % (ref 0–8)
ERYTHROCYTE [DISTWIDTH] IN BLOOD BY AUTOMATED COUNT: 12.3 % (ref 11.5–14.5)
HCT VFR BLD AUTO: 32.5 % (ref 40–54)
HGB BLD-MCNC: 10.9 G/DL (ref 14–18)
IMM GRANULOCYTES # BLD AUTO: 0.52 K/UL (ref 0–0.04)
IMM GRANULOCYTES NFR BLD AUTO: 4.1 % (ref 0–0.5)
LYMPHOCYTES # BLD AUTO: 1.3 K/UL (ref 1–4.8)
LYMPHOCYTES NFR BLD: 10.2 % (ref 18–48)
MCH RBC QN AUTO: 29.2 PG (ref 27–31)
MCHC RBC AUTO-ENTMCNC: 33.5 G/DL (ref 32–36)
MCV RBC AUTO: 87 FL (ref 82–98)
MONOCYTES # BLD AUTO: 1.5 K/UL (ref 0.3–1)
MONOCYTES NFR BLD: 11.6 % (ref 4–15)
NEUTROPHILS # BLD AUTO: 9.1 K/UL (ref 1.8–7.7)
NEUTROPHILS NFR BLD: 71.1 % (ref 38–73)
NRBC BLD-RTO: 0 /100 WBC
PLATELET # BLD AUTO: 356 K/UL (ref 150–350)
PMV BLD AUTO: 9.9 FL (ref 9.2–12.9)
RBC # BLD AUTO: 3.73 M/UL (ref 4.6–6.2)
WBC # BLD AUTO: 12.76 K/UL (ref 3.9–12.7)

## 2019-10-12 PROCEDURE — 94761 N-INVAS EAR/PLS OXIMETRY MLT: CPT

## 2019-10-12 PROCEDURE — 85025 COMPLETE CBC W/AUTO DIFF WBC: CPT

## 2019-10-12 PROCEDURE — 21400001 HC TELEMETRY ROOM

## 2019-10-12 PROCEDURE — 36415 COLL VENOUS BLD VENIPUNCTURE: CPT

## 2019-10-12 PROCEDURE — S0028 INJECTION, FAMOTIDINE, 20 MG: HCPCS | Performed by: SURGERY

## 2019-10-12 PROCEDURE — 25000003 PHARM REV CODE 250: Performed by: PHYSICIAN ASSISTANT

## 2019-10-12 PROCEDURE — 99024 PR POST-OP FOLLOW-UP VISIT: ICD-10-PCS | Mod: ,,, | Performed by: SURGERY

## 2019-10-12 PROCEDURE — 94799 UNLISTED PULMONARY SVC/PX: CPT

## 2019-10-12 PROCEDURE — 25000003 PHARM REV CODE 250: Performed by: SURGERY

## 2019-10-12 PROCEDURE — 63600175 PHARM REV CODE 636 W HCPCS: Performed by: SURGERY

## 2019-10-12 PROCEDURE — 63600175 PHARM REV CODE 636 W HCPCS: Performed by: PHYSICIAN ASSISTANT

## 2019-10-12 PROCEDURE — 99024 POSTOP FOLLOW-UP VISIT: CPT | Mod: ,,, | Performed by: SURGERY

## 2019-10-12 RX ORDER — METHOCARBAMOL 500 MG/1
500 TABLET, FILM COATED ORAL ONCE
Status: COMPLETED | OUTPATIENT
Start: 2019-10-12 | End: 2019-10-12

## 2019-10-12 RX ADMIN — ONDANSETRON 4 MG: 2 INJECTION INTRAMUSCULAR; INTRAVENOUS at 12:10

## 2019-10-12 RX ADMIN — FAMOTIDINE 20 MG: 20 INJECTION, SOLUTION INTRAVENOUS at 08:10

## 2019-10-12 RX ADMIN — FAMOTIDINE 20 MG: 20 INJECTION, SOLUTION INTRAVENOUS at 09:10

## 2019-10-12 RX ADMIN — HYDROCODONE BITARTRATE AND ACETAMINOPHEN 1 TABLET: 10; 325 TABLET ORAL at 04:10

## 2019-10-12 RX ADMIN — CIPROFLOXACIN 400 MG: 2 INJECTION, SOLUTION INTRAVENOUS at 10:10

## 2019-10-12 RX ADMIN — HYDROMORPHONE HYDROCHLORIDE 0.5 MG: 1 INJECTION, SOLUTION INTRAMUSCULAR; INTRAVENOUS; SUBCUTANEOUS at 01:10

## 2019-10-12 RX ADMIN — HYDROMORPHONE HYDROCHLORIDE 0.5 MG: 1 INJECTION, SOLUTION INTRAMUSCULAR; INTRAVENOUS; SUBCUTANEOUS at 07:10

## 2019-10-12 RX ADMIN — HYDROCODONE BITARTRATE AND ACETAMINOPHEN 1 TABLET: 10; 325 TABLET ORAL at 10:10

## 2019-10-12 RX ADMIN — CHLORHEXIDINE GLUCONATE 0.12% ORAL RINSE 10 ML: 1.2 LIQUID ORAL at 09:10

## 2019-10-12 RX ADMIN — ESCITALOPRAM OXALATE 20 MG: 10 TABLET ORAL at 08:10

## 2019-10-12 RX ADMIN — ARIPIPRAZOLE 5 MG: 5 TABLET ORAL at 08:10

## 2019-10-12 RX ADMIN — ENOXAPARIN SODIUM 40 MG: 100 INJECTION SUBCUTANEOUS at 09:10

## 2019-10-12 RX ADMIN — HYDROMORPHONE HYDROCHLORIDE 0.5 MG: 1 INJECTION, SOLUTION INTRAMUSCULAR; INTRAVENOUS; SUBCUTANEOUS at 06:10

## 2019-10-12 RX ADMIN — METHOCARBAMOL TABLETS 500 MG: 500 TABLET, COATED ORAL at 09:10

## 2019-10-12 RX ADMIN — HYDROMORPHONE HYDROCHLORIDE 0.5 MG: 1 INJECTION, SOLUTION INTRAMUSCULAR; INTRAVENOUS; SUBCUTANEOUS at 12:10

## 2019-10-12 NOTE — PROGRESS NOTES
[facility] -   General Surgery  Progress Note    Subjective:     History of Present Illness:  43-year-old male with history of opioid induced chronic constipation presented to the ER with complaints of lower abdominal pain and fever with nausea and emesis.  He was seen in the ER 3 days ago and treated with Cipro and Flagyl.  The pain persisted and he returned to the ER today with continued complaints.  He has a pertinent family history of colon cancer, his paternal grandmother  of colon cancer in her 50s.  Last colonoscopy in 2016 showed no acute findings. He attempted an enema over the weekend with acute worsening of his pain. Workup showed leukocytosis and CT abdomen and pelvis confirmed pneumoperitoneum with evidence of perforated sigmoid colon.       Post-Op Info:  Procedure(s) (LRB):  LAPAROTOMY, EXPLORATORY (N/A)  LAVAGE, PERITONEAL, THERAPEUTIC (N/A)  SIVAKUMAR PROCEDURE (N/A)  CREATION, COLOSTOMY (N/A)   5 Days Post-Op     Interval History: POD 5. C/o lower abdominal soreness. Ostomy viable with some gas to appliance. Ambulating.     Medications:  Continuous Infusions:   lactated ringers 50 mL/hr at 10/11/19 1027     Scheduled Meds:   ARIPiprazole  5 mg Oral QHS    enoxaparin  40 mg Subcutaneous Daily    escitalopram oxalate  20 mg Oral QHS    famotidine  20 mg Intravenous BID    nozaseptin   Each Nostril BID     PRN Meds:cloNIDine, diphenhydrAMINE, HYDROcodone-acetaminophen, HYDROcodone-acetaminophen, HYDROmorphone, metoprolol, naloxone, ondansetron, promethazine (PHENERGAN) IVPB, sodium chloride 0.9%     Review of patient's allergies indicates:   Allergen Reactions    Iodinated contrast media Swelling     Tongue, right eye, lips swelling. Rash on abdomen and axilla    Codeine     Dairy aid [lactase]     Morphine      Tolerated hydromorphone in 2019.     Nuts [tree nut]      Objective:     Vital Signs (Most Recent):  Temp: 97.8 °F (36.6 °C) (10/12/19 1237)  Pulse: 102  (10/12/19 1315)  Resp: 18 (10/12/19 1237)  BP: 130/85 (10/12/19 1237)  SpO2: 98 % (10/12/19 1237) Vital Signs (24h Range):  Temp:  [97.8 °F (36.6 °C)-98.9 °F (37.2 °C)] 97.8 °F (36.6 °C)  Pulse:  [] 102  Resp:  [15-18] 18  SpO2:  [94 %-98 %] 98 %  BP: (120-143)/(76-90) 130/85     Weight: 101.3 kg (223 lb 5.2 oz)  Body mass index is 32.04 kg/m².    Intake/Output - Last 3 Shifts       10/10 0700 - 10/11 0659 10/11 0700 - 10/12 0659 10/12 0700 - 10/13 0659    P.O. 720 1160     I.V. (mL/kg) 3723.5 (36.8) 1505.6 (14.9) 310 (3.1)    IV Piggyback 500 500 250    Total Intake(mL/kg) 4943.5 (48.8) 3165.6 (31.2) 560 (5.5)    Urine (mL/kg/hr) 1800 (0.7) 1400 (0.6) 450 (0.6)    Drains 15      Stool 5      Total Output 1820 1400 450    Net +3123.5 +1765.6 +110                 Physical Exam   Constitutional: He is oriented to person, place, and time. He appears well-developed and well-nourished. No distress.   HENT:   Head: Normocephalic and atraumatic.   Eyes: EOM are normal.   Neck: Neck supple.   Cardiovascular: Normal rate and regular rhythm.   Pulmonary/Chest: Effort normal.   Abdominal: Soft. He exhibits no distension. There is tenderness (appropriate incisional).   Ostomy viable with gas in appliance  Incision c/d/i  VARSHA drain with serosanguinous drainage   Musculoskeletal: Normal range of motion.   Neurological: He is alert and oriented to person, place, and time.   Skin: Skin is warm.   Vitals reviewed.      Significant Labs:  CBC:   Recent Labs   Lab 10/12/19  1102   WBC 12.76*   RBC 3.73*   HGB 10.9*   HCT 32.5*   *   MCV 87   MCH 29.2   MCHC 33.5     BMP:   Recent Labs   Lab 10/08/19  0106  10/09/19  0528   GLU  --    < > 105   NA  --    < > 133*   K 4.4   < > 4.1   CL  --    < > 97   CO2  --    < > 27   BUN  --    < > 8   CREATININE  --    < > 0.7   CALCIUM  --    < > 9.1   MG 1.7  --   --     < > = values in this interval not displayed.       Significant Diagnostics:  none    Assessment/Plan:     * Bowel  perforation  S/p ex lap, raf's, abdominal washout for sigmoid perforation on 10/7/19    Continue diet as tolerated  Pain control  D/c IV abx. Will monitor for fever, worsening of tachycardia, or change in drain output.   Patient has high likelihood of developing LLQ abscess given intraabdominal contamination.   Follow WBC, if continues to slowly progress will CT abdomen on Monday.  Continue drain care  Ostomy nurse consulted for teaching and care.  PT to mobilize  DVT and GI prophy  Awaiting bowel function    Sinus tachycardia  Cardiology was consulted.   Continue IV lopressor per recs  Medicine following.   If continues to have low grade temps and tachycardia does not resolve, will evaluate for intraabdominal abscess with imaging on pod 7    Chronic pain syndrome  On home regimen pain control now that tolerating diet.    Family history of colonic polyps  Recent colonoscopy reviewed    MDD (major depressive disorder)  Resume home meds today    Obesity (BMI 30-39.9)  Informed of increased incidence of wound infection.         Mindy Goff MD  General Surgery  [facility] -

## 2019-10-12 NOTE — PLAN OF CARE
Fall prevention precautions maintained, pt remained free of falls throughout shift, call bell and personal items within reach, pt's pain moderately controlled by prn pain medication, passing flatus through colostomy no stool, bethanie drain in place, pt ambulated in hallway. 24 hour chart check completed. Will continue to monitor

## 2019-10-12 NOTE — ASSESSMENT & PLAN NOTE
S/p ex lap, raf's, abdominal washout for sigmoid perforation on 10/7/19    Continue diet as tolerated  Pain control  D/c IV abx. Will monitor for fever, worsening of tachycardia, or change in drain output.   Patient has high likelihood of developing LLQ abscess given intraabdominal contamination.   Follow WBC, if continues to slowly progress will CT abdomen on Monday.  Continue drain care  Ostomy nurse consulted for teaching and care.  PT to mobilize  DVT and GI prophy  Awaiting bowel function

## 2019-10-12 NOTE — NURSING
"Encourage patient to ambulate. Patient stated "I do not want to go for a walk right now, it is too painful." Discussed with patient the importance of frequent ambulation following GI surgery and the risks of remaining in bed. Patient verbalizes understanding and states he will attempt to ambulate later.  "

## 2019-10-12 NOTE — PT/OT/SLP PROGRESS
Physical Therapy      Patient Name:  Dax Carlisle   MRN:  [id number]    Patient not seen today secondary to Patient unwilling to participate. Will follow-up tomorrow.    Gavin Avery, PTA

## 2019-10-12 NOTE — NURSING
"Encourage patient to ambulate. Patient stated "I do not want to go for a walk right now, it is too painful and I am nauseated." Reiterated the importance of frequent ambulation following GI surgery and the risks of remaining in bed to patient. Patient verbalizes understanding and states he will attempt to ambulate once nausea has subsided and after breakthrough pain medication.    "

## 2019-10-12 NOTE — SUBJECTIVE & OBJECTIVE
Interval History: POD 5. C/o lower abdominal soreness. Ostomy viable with some gas to appliance. Ambulating.     Medications:  Continuous Infusions:   lactated ringers 50 mL/hr at 10/11/19 1027     Scheduled Meds:   ARIPiprazole  5 mg Oral QHS    enoxaparin  40 mg Subcutaneous Daily    escitalopram oxalate  20 mg Oral QHS    famotidine  20 mg Intravenous BID    nozaseptin   Each Nostril BID     PRN Meds:cloNIDine, diphenhydrAMINE, HYDROcodone-acetaminophen, HYDROcodone-acetaminophen, HYDROmorphone, metoprolol, naloxone, ondansetron, promethazine (PHENERGAN) IVPB, sodium chloride 0.9%     Review of patient's allergies indicates:   Allergen Reactions    Iodinated contrast media Swelling     Tongue, right eye, lips swelling. Rash on abdomen and axilla    Codeine     Dairy aid [lactase]     Morphine      Tolerated hydromorphone in October 2019.     Nuts [tree nut]      Objective:     Vital Signs (Most Recent):  Temp: 97.8 °F (36.6 °C) (10/12/19 1237)  Pulse: 102 (10/12/19 1315)  Resp: 18 (10/12/19 1237)  BP: 130/85 (10/12/19 1237)  SpO2: 98 % (10/12/19 1237) Vital Signs (24h Range):  Temp:  [97.8 °F (36.6 °C)-98.9 °F (37.2 °C)] 97.8 °F (36.6 °C)  Pulse:  [] 102  Resp:  [15-18] 18  SpO2:  [94 %-98 %] 98 %  BP: (120-143)/(76-90) 130/85     Weight: 101.3 kg (223 lb 5.2 oz)  Body mass index is 32.04 kg/m².    Intake/Output - Last 3 Shifts       10/10 0700 - 10/11 0659 10/11 0700 - 10/12 0659 10/12 0700 - 10/13 0659    P.O. 720 1160     I.V. (mL/kg) 3723.5 (36.8) 1505.6 (14.9) 310 (3.1)    IV Piggyback 500 500 250    Total Intake(mL/kg) 4943.5 (48.8) 3165.6 (31.2) 560 (5.5)    Urine (mL/kg/hr) 1800 (0.7) 1400 (0.6) 450 (0.6)    Drains 15      Stool 5      Total Output 1820 1400 450    Net +3123.5 +1765.6 +110                 Physical Exam   Constitutional: He is oriented to person, place, and time. He appears well-developed and well-nourished. No distress.   HENT:   Head: Normocephalic and atraumatic.    Eyes: EOM are normal.   Neck: Neck supple.   Cardiovascular: Normal rate and regular rhythm.   Pulmonary/Chest: Effort normal.   Abdominal: Soft. He exhibits no distension. There is tenderness (appropriate incisional).   Ostomy viable with gas in appliance  Incision c/d/i  VARSHA drain with serosanguinous drainage   Musculoskeletal: Normal range of motion.   Neurological: He is alert and oriented to person, place, and time.   Skin: Skin is warm.   Vitals reviewed.      Significant Labs:  CBC:   Recent Labs   Lab 10/12/19  1102   WBC 12.76*   RBC 3.73*   HGB 10.9*   HCT 32.5*   *   MCV 87   MCH 29.2   MCHC 33.5     BMP:   Recent Labs   Lab 10/08/19  0106  10/09/19  0528   GLU  --    < > 105   NA  --    < > 133*   K 4.4   < > 4.1   CL  --    < > 97   CO2  --    < > 27   BUN  --    < > 8   CREATININE  --    < > 0.7   CALCIUM  --    < > 9.1   MG 1.7  --   --     < > = values in this interval not displayed.       Significant Diagnostics:  none

## 2019-10-13 PROCEDURE — 97116 GAIT TRAINING THERAPY: CPT

## 2019-10-13 PROCEDURE — 99024 POSTOP FOLLOW-UP VISIT: CPT | Mod: ,,, | Performed by: SURGERY

## 2019-10-13 PROCEDURE — 25000003 PHARM REV CODE 250: Performed by: SURGERY

## 2019-10-13 PROCEDURE — 94760 N-INVAS EAR/PLS OXIMETRY 1: CPT

## 2019-10-13 PROCEDURE — 21400001 HC TELEMETRY ROOM

## 2019-10-13 PROCEDURE — S0028 INJECTION, FAMOTIDINE, 20 MG: HCPCS | Performed by: SURGERY

## 2019-10-13 PROCEDURE — 99024 PR POST-OP FOLLOW-UP VISIT: ICD-10-PCS | Mod: ,,, | Performed by: SURGERY

## 2019-10-13 PROCEDURE — 63600175 PHARM REV CODE 636 W HCPCS: Performed by: PHYSICIAN ASSISTANT

## 2019-10-13 PROCEDURE — 96372 THER/PROPH/DIAG INJ SC/IM: CPT

## 2019-10-13 PROCEDURE — 63600175 PHARM REV CODE 636 W HCPCS: Performed by: SURGERY

## 2019-10-13 PROCEDURE — 25000003 PHARM REV CODE 250: Performed by: PHYSICIAN ASSISTANT

## 2019-10-13 RX ORDER — DIPHENHYDRAMINE HCL 25 MG
50 CAPSULE ORAL
Status: DISCONTINUED | OUTPATIENT
Start: 2019-10-13 | End: 2019-10-14

## 2019-10-13 RX ORDER — METHOCARBAMOL 500 MG/1
500 TABLET, FILM COATED ORAL NIGHTLY PRN
Status: DISCONTINUED | OUTPATIENT
Start: 2019-10-13 | End: 2019-10-17 | Stop reason: HOSPADM

## 2019-10-13 RX ADMIN — METHOCARBAMOL TABLETS 500 MG: 500 TABLET, COATED ORAL at 08:10

## 2019-10-13 RX ADMIN — HYDROMORPHONE HYDROCHLORIDE 0.5 MG: 1 INJECTION, SOLUTION INTRAMUSCULAR; INTRAVENOUS; SUBCUTANEOUS at 08:10

## 2019-10-13 RX ADMIN — SODIUM CHLORIDE, SODIUM LACTATE, POTASSIUM CHLORIDE, AND CALCIUM CHLORIDE: .6; .31; .03; .02 INJECTION, SOLUTION INTRAVENOUS at 08:10

## 2019-10-13 RX ADMIN — FAMOTIDINE 20 MG: 20 INJECTION, SOLUTION INTRAVENOUS at 08:10

## 2019-10-13 RX ADMIN — ARIPIPRAZOLE 5 MG: 5 TABLET ORAL at 08:10

## 2019-10-13 RX ADMIN — HYDROCODONE BITARTRATE AND ACETAMINOPHEN 1 TABLET: 10; 325 TABLET ORAL at 11:10

## 2019-10-13 RX ADMIN — ENOXAPARIN SODIUM 40 MG: 100 INJECTION SUBCUTANEOUS at 08:10

## 2019-10-13 RX ADMIN — HYDROCODONE BITARTRATE AND ACETAMINOPHEN 1 TABLET: 10; 325 TABLET ORAL at 05:10

## 2019-10-13 RX ADMIN — ESCITALOPRAM OXALATE 20 MG: 10 TABLET ORAL at 08:10

## 2019-10-13 RX ADMIN — PREDNISONE 50 MG: 5 TABLET ORAL at 08:10

## 2019-10-13 RX ADMIN — HYDROMORPHONE HYDROCHLORIDE 0.5 MG: 1 INJECTION, SOLUTION INTRAMUSCULAR; INTRAVENOUS; SUBCUTANEOUS at 07:10

## 2019-10-13 RX ADMIN — HYDROMORPHONE HYDROCHLORIDE 0.5 MG: 1 INJECTION, SOLUTION INTRAMUSCULAR; INTRAVENOUS; SUBCUTANEOUS at 01:10

## 2019-10-13 RX ADMIN — ONDANSETRON 4 MG: 2 INJECTION INTRAMUSCULAR; INTRAVENOUS at 03:10

## 2019-10-13 NOTE — PROGRESS NOTES
[facility] -   General Surgery  Progress Note    Subjective:     History of Present Illness:  43-year-old male with history of opioid induced chronic constipation presented to the ER with complaints of lower abdominal pain and fever with nausea and emesis.  He was seen in the ER 3 days ago and treated with Cipro and Flagyl.  The pain persisted and he returned to the ER today with continued complaints.  He has a pertinent family history of colon cancer, his paternal grandmother  of colon cancer in her 50s.  Last colonoscopy in  showed no acute findings. He attempted an enema over the weekend with acute worsening of his pain. Workup showed leukocytosis and CT abdomen and pelvis confirmed pneumoperitoneum with evidence of perforated sigmoid colon.       Post-Op Info:  Procedure(s) (LRB):  LAPAROTOMY, EXPLORATORY (N/A)  LAVAGE, PERITONEAL, THERAPEUTIC (N/A)  SIVAKUMAR PROCEDURE (N/A)  CREATION, COLOSTOMY (N/A)   6 Days Post-Op     Interval History: POD 6.  Much improved with nightly Robaxin.  Ostomy with small amount of gas to appliance.  Remains afebrile.     Medications:  Continuous Infusions:   lactated ringers 50 mL/hr at 10/11/19 1027     Scheduled Meds:   ARIPiprazole  5 mg Oral QHS    enoxaparin  40 mg Subcutaneous Daily    escitalopram oxalate  20 mg Oral QHS    famotidine  20 mg Intravenous BID    nozaseptin   Each Nostril BID     PRN Meds:cloNIDine, diphenhydrAMINE, HYDROcodone-acetaminophen, HYDROcodone-acetaminophen, HYDROmorphone, metoprolol, naloxone, ondansetron, promethazine (PHENERGAN) IVPB, sodium chloride 0.9%     Review of patient's allergies indicates:   Allergen Reactions    Iodinated contrast media Swelling     Tongue, right eye, lips swelling. Rash on abdomen and axilla    Codeine     Dairy aid [lactase]     Morphine      Tolerated hydromorphone in 2019.     Nuts [tree nut]      Objective:     Vital Signs (Most Recent):  Temp: 97.9 °F (36.6 °C) (10/13/19  0741)  Pulse: 105 (10/13/19 0741)  Resp: 18 (10/13/19 0741)  BP: (!) 140/83 (10/13/19 0741)  SpO2: 95 % (10/13/19 0741) Vital Signs (24h Range):  Temp:  [97.8 °F (36.6 °C)-98.5 °F (36.9 °C)] 97.9 °F (36.6 °C)  Pulse:  [] 105  Resp:  [17-18] 18  SpO2:  [94 %-98 %] 95 %  BP: (127-140)/(76-89) 140/83     Weight: 101.3 kg (223 lb 5.2 oz)  Body mass index is 32.04 kg/m².    Intake/Output - Last 3 Shifts       10/11 0700 - 10/12 0659 10/12 0700 - 10/13 0659 10/13 0700 - 10/14 0659    P.O. 1160 720     I.V. (mL/kg) 1505.6 (14.9) 1233.3 (12.2)     IV Piggyback 500 300     Total Intake(mL/kg) 3165.6 (31.2) 2253.3 (22.2)     Urine (mL/kg/hr) 1400 (0.6) 2375 (1)     Drains  5     Stool       Total Output 1400 2380     Net +1765.6 -126.7                  Physical Exam   Constitutional: He is oriented to person, place, and time. He appears well-developed and well-nourished. No distress.   HENT:   Head: Normocephalic and atraumatic.   Eyes: EOM are normal.   Neck: Neck supple.   Cardiovascular: Normal rate and regular rhythm.   Pulmonary/Chest: Effort normal.   Abdominal: Soft. He exhibits no distension. There is no tenderness (appropriate incisional).   Incision c/d/i with staples  Ostomy viable with gas in appliance   Musculoskeletal: Normal range of motion.   Neurological: He is alert and oriented to person, place, and time.   Skin: Skin is warm.   Vitals reviewed.      Significant Labs:  none    Significant Diagnostics:  none    Assessment/Plan:     * Bowel perforation  S/p ex lap, raf's, abdominal washout for sigmoid perforation on 10/7/19    Continue diet as tolerated  Pain control  D/c IV abx. Will monitor for fever, worsening of tachycardia.  Patient has high likelihood of developing LLQ abscess given intraabdominal contamination.   Plan for CT abdomen on Monday given slow progression to return of bowel function and tolerance of diet.   Continue drain care  Ostomy nurse consulted for teaching and care.  PT to  mobilize  DVT and GI prophy  Awaiting bowel function    Sinus tachycardia  Cardiology was consulted.   Continue IV lopressor per recs  Medicine following.   If continues to have low grade temps and tachycardia does not resolve, will evaluate for intraabdominal abscess with imaging on pod 7    Chronic pain syndrome  On home regimen    Family history of colonic polyps  Recent colonoscopy reviewed    MDD (major depressive disorder)  Resume home meds     Obesity (BMI 30-39.9)  Informed of increased incidence of wound infection.         Mindy Goff MD  General Surgery  [facility] [place]

## 2019-10-13 NOTE — PROGRESS NOTES
Changed dressing to midline abdomen, incision from umbilical down is coming apart, not quite as wide as the staples in place, no drainage at this time, 4x4 and tegaderm placed and Dr. Goff notified

## 2019-10-13 NOTE — PLAN OF CARE
Staples intact to midline incision, dressing changed. Tolerated well. LR @ 50mL/hr. Heart monitor 8568. VARSHA drain to RLQ. Ileostomy  to LLQ. No stool in colostomy bag. Tolerated shower and ambulating in grant well.PRN pain meds adm to control pain level. Remains free from fall/injury. Call light in reach.

## 2019-10-13 NOTE — SUBJECTIVE & OBJECTIVE
Interval History: POD 6.  Much improved with nightly Robaxin.  Ostomy with small amount of gas to appliance.  Remains afebrile.     Medications:  Continuous Infusions:   lactated ringers 50 mL/hr at 10/11/19 1027     Scheduled Meds:   ARIPiprazole  5 mg Oral QHS    enoxaparin  40 mg Subcutaneous Daily    escitalopram oxalate  20 mg Oral QHS    famotidine  20 mg Intravenous BID    nozaseptin   Each Nostril BID     PRN Meds:cloNIDine, diphenhydrAMINE, HYDROcodone-acetaminophen, HYDROcodone-acetaminophen, HYDROmorphone, metoprolol, naloxone, ondansetron, promethazine (PHENERGAN) IVPB, sodium chloride 0.9%     Review of patient's allergies indicates:   Allergen Reactions    Iodinated contrast media Swelling     Tongue, right eye, lips swelling. Rash on abdomen and axilla    Codeine     Dairy aid [lactase]     Morphine      Tolerated hydromorphone in October 2019.     Nuts [tree nut]      Objective:     Vital Signs (Most Recent):  Temp: 97.9 °F (36.6 °C) (10/13/19 0741)  Pulse: 105 (10/13/19 0741)  Resp: 18 (10/13/19 0741)  BP: (!) 140/83 (10/13/19 0741)  SpO2: 95 % (10/13/19 0741) Vital Signs (24h Range):  Temp:  [97.8 °F (36.6 °C)-98.5 °F (36.9 °C)] 97.9 °F (36.6 °C)  Pulse:  [] 105  Resp:  [17-18] 18  SpO2:  [94 %-98 %] 95 %  BP: (127-140)/(76-89) 140/83     Weight: 101.3 kg (223 lb 5.2 oz)  Body mass index is 32.04 kg/m².    Intake/Output - Last 3 Shifts       10/11 0700 - 10/12 0659 10/12 0700 - 10/13 0659 10/13 0700 - 10/14 0659    P.O. 1160 720     I.V. (mL/kg) 1505.6 (14.9) 1233.3 (12.2)     IV Piggyback 500 300     Total Intake(mL/kg) 3165.6 (31.2) 2253.3 (22.2)     Urine (mL/kg/hr) 1400 (0.6) 2375 (1)     Drains  5     Stool       Total Output 1400 2380     Net +1765.6 -126.7                  Physical Exam   Constitutional: He is oriented to person, place, and time. He appears well-developed and well-nourished. No distress.   HENT:   Head: Normocephalic and atraumatic.   Eyes: EOM are normal.    Neck: Neck supple.   Cardiovascular: Normal rate and regular rhythm.   Pulmonary/Chest: Effort normal.   Abdominal: Soft. He exhibits no distension. There is no tenderness (appropriate incisional).   Incision c/d/i with staples  Ostomy viable with gas in appliance   Musculoskeletal: Normal range of motion.   Neurological: He is alert and oriented to person, place, and time.   Skin: Skin is warm.   Vitals reviewed.      Significant Labs:  none    Significant Diagnostics:  none

## 2019-10-13 NOTE — PLAN OF CARE
Fall prevention precautions maintained, pt remained free of falls throughout shift, call bell and personal items within reach, pt's pain moderately controlled by prn pain medication, no stool to colostomy but passing flatus, dressing changed to midline incision, incision partially open Dr. Goff is aware. 24 hour chart check completed. Will continue to monitor

## 2019-10-13 NOTE — ASSESSMENT & PLAN NOTE
S/p ex lap, raf's, abdominal washout for sigmoid perforation on 10/7/19    Continue diet as tolerated  Pain control  D/c IV abx. Will monitor for fever, worsening of tachycardia.  Patient has high likelihood of developing LLQ abscess given intraabdominal contamination.   Plan for CT abdomen on Monday given slow progression to return of bowel function and tolerance of diet.   Continue drain care  Ostomy nurse consulted for teaching and care.  PT to mobilize  DVT and GI prophy  Awaiting bowel function

## 2019-10-13 NOTE — PT/OT/SLP PROGRESS
Physical Therapy Treatment    Patient Name:  Dax Carlisle   MRN:  [id number]    Recommendations:     Discharge Recommendations:  home   Discharge Equipment Recommendations: none   Barriers to discharge: None    Assessment:   Improved participation and tolerance to all interventions today    Rehab Prognosis: Good; patient would benefit from acute skilled PT services to address these deficits and reach maximum level of function.    Recent Surgery: Procedure(s) (LRB):  LAPAROTOMY, EXPLORATORY (N/A)  LAVAGE, PERITONEAL, THERAPEUTIC (N/A)  SIVAKUMAR PROCEDURE (N/A)  CREATION, COLOSTOMY (N/A) 6 Days Post-Op    Plan:     During this hospitalization, patient to be seen 5 x/week to address the identified rehab impairments via therapeutic activities, therapeutic exercises and progress toward the following goals:    · Plan of Care Expires:  10/15/19    Subjective     Chief Complaint: abdominal pain and muscle spasms  Patient/Family Comments/goals: decrease pain  Pain/Comfort:  · Pain Rating 1: 8/10  · Location 1: abdomen  · Pain Addressed 1: Reposition, Distraction      Objective:     Communicated with nurse Ascencio prior to session.  Patient found supine with peripheral IV upon PT entry to room.     General Precautions: Standard, fall   Orthopedic Precautions:N/A   Braces:       Functional Mobility:  · Bed Mobility:     · Supine to Sit: stand by assistance  · Transfers:     · Bed to Chair: stand by assistance with  no AD  using  Stand Pivot  · Gait: 2 x 125 feet without AD      AM-PAC 6 CLICK MOBILITY  Turning over in bed (including adjusting bedclothes, sheets and blankets)?: 4  Sitting down on and standing up from a chair with arms (e.g., wheelchair, bedside commode, etc.): 4  Moving from lying on back to sitting on the side of the bed?: 4  Moving to and from a bed to a chair (including a wheelchair)?: 4  Need to walk in hospital room?: 3  Climbing 3-5 steps with a railing?: 2  Basic Mobility Total Score:  21       Therapeutic Activities and Exercises:   Slow but steady gait. Needed brief standing rest break for pain.  VC for upright posture and arm swing    Patient left up in chair with all lines intact, call button in reach and wife present..    GOALS:   Multidisciplinary Problems     Physical Therapy Goals        Problem: Physical Therapy Goal    Goal Priority Disciplines Outcome Goal Variances Interventions   Physical Therapy Goal     PT, PT/OT Ongoing, Progressing     Description:  PT WILL BE SEEN FOR P.T. FOR A MIN OF 5 OUT  OF 7 DAYS A WEEK  LT19  1. PT WILL COMPLETE B LE TE X 20 REPS  2. PT WILL COMPLETE BED MOBILITY WITH SBA  3. PT WILL GT TRAIN X 500' WITH NO AD IND  4. PT WILL T/F TO CHAIR IND                    Time Tracking:     PT Received On: 10/13/19  PT Start Time: 1100     PT Stop Time: 1125  PT Total Time (min): 25 min     Billable Minutes: Gait Training 25             PTA Visit Number: 1     Ruben Quiles, PT  10/13/2019

## 2019-10-14 ENCOUNTER — TELEPHONE (OUTPATIENT)
Dept: NEUROSURGERY | Facility: CLINIC | Age: 44
End: 2019-10-14

## 2019-10-14 LAB
BASOPHILS # BLD AUTO: 0.06 K/UL (ref 0–0.2)
BASOPHILS NFR BLD: 0.4 % (ref 0–1.9)
DIFFERENTIAL METHOD: ABNORMAL
EOSINOPHIL # BLD AUTO: 0.2 K/UL (ref 0–0.5)
EOSINOPHIL NFR BLD: 1.4 % (ref 0–8)
ERYTHROCYTE [DISTWIDTH] IN BLOOD BY AUTOMATED COUNT: 12.2 % (ref 11.5–14.5)
HCT VFR BLD AUTO: 34.6 % (ref 40–54)
HGB BLD-MCNC: 11.2 G/DL (ref 14–18)
IMM GRANULOCYTES # BLD AUTO: 0.5 K/UL (ref 0–0.04)
IMM GRANULOCYTES NFR BLD AUTO: 3.1 % (ref 0–0.5)
LYMPHOCYTES # BLD AUTO: 1.3 K/UL (ref 1–4.8)
LYMPHOCYTES NFR BLD: 7.6 % (ref 18–48)
MCH RBC QN AUTO: 28.2 PG (ref 27–31)
MCHC RBC AUTO-ENTMCNC: 32.4 G/DL (ref 32–36)
MCV RBC AUTO: 87 FL (ref 82–98)
MONOCYTES # BLD AUTO: 1.3 K/UL (ref 0.3–1)
MONOCYTES NFR BLD: 8.1 % (ref 4–15)
NEUTROPHILS # BLD AUTO: 13 K/UL (ref 1.8–7.7)
NEUTROPHILS NFR BLD: 79.4 % (ref 38–73)
NRBC BLD-RTO: 0 /100 WBC
PLATELET # BLD AUTO: 451 K/UL (ref 150–350)
PMV BLD AUTO: 9.5 FL (ref 9.2–12.9)
RBC # BLD AUTO: 3.97 M/UL (ref 4.6–6.2)
WBC # BLD AUTO: 16.37 K/UL (ref 3.9–12.7)

## 2019-10-14 PROCEDURE — 25000003 PHARM REV CODE 250: Performed by: PHYSICIAN ASSISTANT

## 2019-10-14 PROCEDURE — S0030 INJECTION, METRONIDAZOLE: HCPCS | Performed by: SURGERY

## 2019-10-14 PROCEDURE — 97116 GAIT TRAINING THERAPY: CPT

## 2019-10-14 PROCEDURE — 25000003 PHARM REV CODE 250: Performed by: SURGERY

## 2019-10-14 PROCEDURE — 25500020 PHARM REV CODE 255: Performed by: SURGERY

## 2019-10-14 PROCEDURE — 63600175 PHARM REV CODE 636 W HCPCS: Performed by: PHYSICIAN ASSISTANT

## 2019-10-14 PROCEDURE — 36415 COLL VENOUS BLD VENIPUNCTURE: CPT

## 2019-10-14 PROCEDURE — 63600175 PHARM REV CODE 636 W HCPCS: Performed by: SURGERY

## 2019-10-14 PROCEDURE — 96372 THER/PROPH/DIAG INJ SC/IM: CPT | Mod: 59

## 2019-10-14 PROCEDURE — 21400001 HC TELEMETRY ROOM

## 2019-10-14 PROCEDURE — 11000001 HC ACUTE MED/SURG PRIVATE ROOM

## 2019-10-14 PROCEDURE — S0028 INJECTION, FAMOTIDINE, 20 MG: HCPCS | Performed by: SURGERY

## 2019-10-14 PROCEDURE — 85025 COMPLETE CBC W/AUTO DIFF WBC: CPT

## 2019-10-14 RX ORDER — CIPROFLOXACIN 2 MG/ML
400 INJECTION, SOLUTION INTRAVENOUS
Status: DISCONTINUED | OUTPATIENT
Start: 2019-10-14 | End: 2019-10-16

## 2019-10-14 RX ORDER — METRONIDAZOLE 500 MG/100ML
500 INJECTION, SOLUTION INTRAVENOUS
Status: DISCONTINUED | OUTPATIENT
Start: 2019-10-14 | End: 2019-10-16

## 2019-10-14 RX ORDER — DIPHENHYDRAMINE HCL 25 MG
50 CAPSULE ORAL ONCE
Status: COMPLETED | OUTPATIENT
Start: 2019-10-14 | End: 2019-10-14

## 2019-10-14 RX ADMIN — HYDROMORPHONE HYDROCHLORIDE 0.5 MG: 1 INJECTION, SOLUTION INTRAMUSCULAR; INTRAVENOUS; SUBCUTANEOUS at 08:10

## 2019-10-14 RX ADMIN — PREDNISONE 50 MG: 20 TABLET ORAL at 02:10

## 2019-10-14 RX ADMIN — METRONIDAZOLE 500 MG: 500 INJECTION, SOLUTION INTRAVENOUS at 08:10

## 2019-10-14 RX ADMIN — ESCITALOPRAM OXALATE 20 MG: 10 TABLET ORAL at 08:10

## 2019-10-14 RX ADMIN — IOHEXOL 30 ML: 350 INJECTION, SOLUTION INTRAVENOUS at 08:10

## 2019-10-14 RX ADMIN — PREDNISONE 50 MG: 20 TABLET ORAL at 08:10

## 2019-10-14 RX ADMIN — ARIPIPRAZOLE 5 MG: 5 TABLET ORAL at 08:10

## 2019-10-14 RX ADMIN — HYDROMORPHONE HYDROCHLORIDE 0.5 MG: 1 INJECTION, SOLUTION INTRAMUSCULAR; INTRAVENOUS; SUBCUTANEOUS at 02:10

## 2019-10-14 RX ADMIN — DIPHENHYDRAMINE HYDROCHLORIDE 25 MG: 50 INJECTION, SOLUTION INTRAMUSCULAR; INTRAVENOUS at 09:10

## 2019-10-14 RX ADMIN — FAMOTIDINE 20 MG: 20 INJECTION, SOLUTION INTRAVENOUS at 09:10

## 2019-10-14 RX ADMIN — ENOXAPARIN SODIUM 40 MG: 100 INJECTION SUBCUTANEOUS at 08:10

## 2019-10-14 RX ADMIN — FAMOTIDINE 20 MG: 20 INJECTION, SOLUTION INTRAVENOUS at 08:10

## 2019-10-14 RX ADMIN — DIPHENHYDRAMINE HYDROCHLORIDE 25 MG: 50 INJECTION, SOLUTION INTRAMUSCULAR; INTRAVENOUS at 05:10

## 2019-10-14 RX ADMIN — METRONIDAZOLE 500 MG: 500 INJECTION, SOLUTION INTRAVENOUS at 01:10

## 2019-10-14 RX ADMIN — HYDROCODONE BITARTRATE AND ACETAMINOPHEN 1 TABLET: 10; 325 TABLET ORAL at 05:10

## 2019-10-14 RX ADMIN — CIPROFLOXACIN 400 MG: 2 INJECTION, SOLUTION INTRAVENOUS at 12:10

## 2019-10-14 RX ADMIN — ONDANSETRON 4 MG: 2 INJECTION INTRAMUSCULAR; INTRAVENOUS at 08:10

## 2019-10-14 RX ADMIN — IOHEXOL 100 ML: 350 INJECTION, SOLUTION INTRAVENOUS at 09:10

## 2019-10-14 RX ADMIN — HYDROCODONE BITARTRATE AND ACETAMINOPHEN 1 TABLET: 10; 325 TABLET ORAL at 06:10

## 2019-10-14 RX ADMIN — DIPHENHYDRAMINE HYDROCHLORIDE 25 MG: 50 INJECTION, SOLUTION INTRAMUSCULAR; INTRAVENOUS at 12:10

## 2019-10-14 RX ADMIN — HYDROCODONE BITARTRATE AND ACETAMINOPHEN 1 TABLET: 10; 325 TABLET ORAL at 12:10

## 2019-10-14 RX ADMIN — DIPHENHYDRAMINE HYDROCHLORIDE 50 MG: 25 CAPSULE ORAL at 08:10

## 2019-10-14 NOTE — PLAN OF CARE
PT GT TRAINED X 350' IND AND WILL BE D/C FROM P.T. TO MOBILITY PROGRAM FOR WALKING AS PT IS IND WITH All GROSS FUNC MOBILITY

## 2019-10-14 NOTE — TELEPHONE ENCOUNTER
Spoke with pt regarding rescheduling upcoming appt with Winston QUAN 10-22-19-pt is currently admitted and will call to reschedule when he is released.Gerardo BOSCH

## 2019-10-14 NOTE — PROGRESS NOTES
[facility] -   General Surgery  Progress Note    Subjective:     History of Present Illness:  43-year-old male with history of opioid induced chronic constipation presented to the ER with complaints of lower abdominal pain and fever with nausea and emesis.  He was seen in the ER 3 days ago and treated with Cipro and Flagyl.  The pain persisted and he returned to the ER today with continued complaints.  He has a pertinent family history of colon cancer, his paternal grandmother  of colon cancer in her 50s.  Last colonoscopy in  showed no acute findings. He attempted an enema over the weekend with acute worsening of his pain. Workup showed leukocytosis and CT abdomen and pelvis confirmed pneumoperitoneum with evidence of perforated sigmoid colon.       Post-Op Info:  Procedure(s) (LRB):  LAPAROTOMY, EXPLORATORY (N/A)  LAVAGE, PERITONEAL, THERAPEUTIC (N/A)  SIVAKUMAR PROCEDURE (N/A)  CREATION, COLOSTOMY (N/A)   7 Days Post-Op     Interval History: c/o pain. No ostomy output.  CT today    Medications:  Continuous Infusions:   lactated ringers 50 mL/hr at 10/13/19 2010     Scheduled Meds:   ARIPiprazole  5 mg Oral QHS    enoxaparin  40 mg Subcutaneous Daily    escitalopram oxalate  20 mg Oral QHS    famotidine  20 mg Intravenous BID     PRN Meds:cloNIDine, diphenhydrAMINE, HYDROcodone-acetaminophen, HYDROcodone-acetaminophen, HYDROmorphone, methocarbamol, metoprolol, naloxone, ondansetron, predniSONE, predniSONE, promethazine (PHENERGAN) IVPB, sodium chloride 0.9%     Review of patient's allergies indicates:   Allergen Reactions    Iodinated contrast media Swelling     Tongue, right eye, lips swelling. Rash on abdomen and axilla    Codeine     Dairy aid [lactase]     Morphine      Tolerated hydromorphone in 2019.     Nuts [tree nut]      Objective:     Vital Signs (Most Recent):  Temp: 98.4 °F (36.9 °C) (10/14/19 0725)  Pulse: 103 (10/14/19 0725)  Resp: 15 (10/14/19 0725)  BP:  132/81 (10/14/19 0725)  SpO2: (!) 93 % (10/14/19 0725) Vital Signs (24h Range):  Temp:  [98.3 °F (36.8 °C)-98.7 °F (37.1 °C)] 98.4 °F (36.9 °C)  Pulse:  [] 103  Resp:  [15-18] 15  SpO2:  [93 %-97 %] 93 %  BP: (126-137)/(79-85) 132/81     Weight: 101.3 kg (223 lb 5.2 oz)  Body mass index is 32.04 kg/m².    Intake/Output - Last 3 Shifts       10/12 0700 - 10/13 0659 10/13 0700 - 10/14 0659 10/14 0700 - 10/15 0659    P.O. 720 1260 0    I.V. (mL/kg) 1233.3 (12.2) 1097.5 (10.8)     IV Piggyback 300 100     Total Intake(mL/kg) 2253.3 (22.2) 2457.5 (24.3) 0 (0)    Urine (mL/kg/hr) 2375 (1) 3450 (1.4)     Drains 5      Total Output 2380 3450     Net -126.7 -992.5 0                 Physical Exam   Constitutional: He is oriented to person, place, and time. He appears well-developed and well-nourished.   HENT:   Head: Normocephalic and atraumatic.   Eyes: EOM are normal.   Cardiovascular: Normal rate and regular rhythm.   Pulmonary/Chest: Effort normal. No respiratory distress.   Abdominal: Soft.   Tender, incision partially closed with staples, ss drainage.    Musculoskeletal: Normal range of motion.   Neurological: He is alert and oriented to person, place, and time.   Skin: Skin is warm and dry.   Psychiatric: He has a normal mood and affect. Thought content normal.   Vitals reviewed.      Significant Labs:  CBC:   Recent Labs   Lab 10/14/19  0512   WBC 16.37*   RBC 3.97*   HGB 11.2*   HCT 34.6*   *   MCV 87   MCH 28.2   MCHC 32.4     CMP:   Recent Labs   Lab 10/09/19  0528      CALCIUM 9.1   *   K 4.1   CO2 27   CL 97   BUN 8   CREATININE 0.7       Significant Diagnostics:  I have reviewed all pertinent imaging results/findings within the past 24 hours.    Assessment/Plan:     * Bowel perforation  S/p ex lap, raf's, abdominal washout for sigmoid perforation on 10/7/19    Continue diet as tolerated  Pain control  WBC 16.37k. Monitor for fever, worsening of tachycardia.  Patient has high  likelihood of developing LLQ abscess given intraabdominal contamination.   Plan for CT abdomen today given slow progression to return of bowel function and tolerance of diet.   Ostomy nurse consulted for teaching and care.  PT to mobilize  DVT and GI prophy  Awaiting bowel function    Sinus tachycardia  Cardiology was consulted.   Continue IV lopressor per recs  Medicine following.   If continues to have low grade temps and tachycardia does not resolve, will evaluate for intraabdominal abscess with imaging on pod 7    Chronic pain syndrome  If tolerates clears, will restart home regimen and d/c pca    Family history of colonic polyps  Recent colonoscopy reviewed    MDD (major depressive disorder)  Resume home meds today    Obesity (BMI 30-39.9)  Informed of increased incidence of wound infection.         Promise Fajardo PA-C  General Surgery  [facility]

## 2019-10-14 NOTE — PLAN OF CARE
Fall precautions maintained. IV fluids maintained. IV antibiotics tolerated. NPO at MN for CT guided abscess drainage tomorrow 10/15. Pain is well controlled with prn meds. Incisions dry and intact. Colostomy with no output. Will continue to monitor.

## 2019-10-14 NOTE — PROGRESS NOTES
Removed pt's VARSHA drain to RLQ abdomen, upon removal a large amount (approximately 50-75mls) of malodorous purulent drainage came out of insertion site, abd pad and paper tape placed over and Dr. Goff notified

## 2019-10-14 NOTE — ASSESSMENT & PLAN NOTE
S/p ex lap, raf's, abdominal washout for sigmoid perforation on 10/7/19    Continue diet as tolerated  Pain control  WBC 16.37k. Monitor for fever, worsening of tachycardia.  Patient has high likelihood of developing LLQ abscess given intraabdominal contamination.   Plan for CT abdomen today given slow progression to return of bowel function and tolerance of diet.   Ostomy nurse consulted for teaching and care.  PT to mobilize  DVT and GI prophy  Awaiting bowel function

## 2019-10-14 NOTE — PT/OT/SLP PROGRESS
Physical Therapy  Treatment    Dax Carlisle   MRN: [id number]   Admitting Diagnosis: Bowel perforation    PT Received On: 10/14/19  PT Start Time: 1205     PT Stop Time: 1220    PT Total Time (min): 15 min       Billable Minutes:  Gait Training 15    Treatment Type: Treatment  PT/PTA: PT     PTA Visit Number: 1       General Precautions: Standard, fall  Orthopedic Precautions: N/A   Braces: N/A    Spiritual, Cultural Beliefs, Catholic Practices, Values that Affect Care: no    Subjective:  Communicated with NURSE HO AND Epic CHART REVIEW prior to session.  PT AGREED TO TX     Pain/Comfort  Pain Rating 1: 0/10  Pain Rating Post-Intervention 1: 0/10    Objective:   Patient found with: peripheral IV    Functional Mobility:  PT MET IN RM SUP.SIT EOB IND AND STOOD WITH NO AD AND GT TRAINED X 350' WITH NO AD AND NO LOB. PT RETURNED TO  T/F TO CHAIR IND AND LEFT SEATED WITH ALL NEEDS MET AND WIFE PRESENT.     AM-PAC 6 CLICK MOBILITY  How much help from another person does this patient currently need?   1 = Unable, Total/Dependent Assistance  2 = A lot, Maximum/Moderate Assistance  3 = A little, Minimum/Contact Guard/Supervision  4 = None, Modified [place]/Independent    Turning over in bed (including adjusting bedclothes, sheets and blankets)?: 4  Sitting down on and standing up from a chair with arms (e.g., wheelchair, bedside commode, etc.): 4  Moving from lying on back to sitting on the side of the bed?: 4  Moving to and from a bed to a chair (including a wheelchair)?: 4  Need to walk in hospital room?: 4  Climbing 3-5 steps with a railing?: 1  Basic Mobility Total Score: 21    AM-PAC Raw Score CMS G-Code Modifier Level of Impairment Assistance   6 % Total / Unable   7 - 9 CM 80 - 100% Maximal Assist   10 - 14 CL 60 - 80% Moderate Assist   15 - 19 CK 40 - 60% Moderate Assist   20 - 22 CJ 20 - 40% Minimal Assist   23 CI 1-20% SBA / CGA   24 CH 0% Independent/ Mod I     Patient left up in chair with  WIFE present.    Assessment:  PT IS IND WITH GROSS FUNC MOBILITY AND WILL BE D/C FROM P.T. TO MOBILITY PROGRAM FOR WALKING    Rehab identified problem list/impairments: Rehab identified problem list/impairments: weakness, impaired functional mobilty, impaired endurance, gait instability, decreased lower extremity function, impaired balance, decreased ROM      Discharge recommendations: Discharge Facility/Level of Care Needs: home       Equipment recommendations: Equipment Needed After Discharge: none     GOALS:   Multidisciplinary Problems     Physical Therapy Goals     Not on file          Multidisciplinary Problems (Resolved)        Problem: Physical Therapy Goal    Goal Priority Disciplines Outcome Goal Variances Interventions   Physical Therapy Goal   (Resolved)     PT, PT/OT Met     Description:  PT WILL BE SEEN FOR P.T. FOR A MIN OF 5 OUT  OF 7 DAYS A WEEK  LT19  1. PT WILL COMPLETE B LE TE X 20 REPS  2. PT WILL COMPLETE BED MOBILITY WITH SBA  3. PT WILL GT TRAIN X 500' WITH NO AD IND  4. PT WILL T/F TO CHAIR IND                    PLAN:    Patient to be seen 5 x/week  to address the above listed problems via therapeutic activities, therapeutic exercises  Plan of Care expires: 10/15/19  Plan of Care reviewed with: patient         Jeny Quiles, PT  10/14/2019

## 2019-10-14 NOTE — PROGRESS NOTES
Follow up on Mr. Carlisle today. He is awake and alert, family members are present at the bedside.  Abdomen assessed. LUQ colostomy noted with pouch intact, stoma is pink and moist. Scant amount of bowel sweat noted in pouch. Patient had ct last night and will have drain put in for abdominal abscess tomorrow. Still not having any output.  Continued colostomy teaching including dietary, hygiene, and activity guidelines. All questions answered. Plan to have patient and spouse return demonstrate stoma care and pouch change this week.

## 2019-10-14 NOTE — SUBJECTIVE & OBJECTIVE
Interval History: c/o pain. No ostomy output.  CT today    Medications:  Continuous Infusions:   lactated ringers 50 mL/hr at 10/13/19 2010     Scheduled Meds:   ARIPiprazole  5 mg Oral QHS    enoxaparin  40 mg Subcutaneous Daily    escitalopram oxalate  20 mg Oral QHS    famotidine  20 mg Intravenous BID     PRN Meds:cloNIDine, diphenhydrAMINE, HYDROcodone-acetaminophen, HYDROcodone-acetaminophen, HYDROmorphone, methocarbamol, metoprolol, naloxone, ondansetron, predniSONE, predniSONE, promethazine (PHENERGAN) IVPB, sodium chloride 0.9%     Review of patient's allergies indicates:   Allergen Reactions    Iodinated contrast media Swelling     Tongue, right eye, lips swelling. Rash on abdomen and axilla    Codeine     Dairy aid [lactase]     Morphine      Tolerated hydromorphone in October 2019.     Nuts [tree nut]      Objective:     Vital Signs (Most Recent):  Temp: 98.4 °F (36.9 °C) (10/14/19 0725)  Pulse: 103 (10/14/19 0725)  Resp: 15 (10/14/19 0725)  BP: 132/81 (10/14/19 0725)  SpO2: (!) 93 % (10/14/19 0725) Vital Signs (24h Range):  Temp:  [98.3 °F (36.8 °C)-98.7 °F (37.1 °C)] 98.4 °F (36.9 °C)  Pulse:  [] 103  Resp:  [15-18] 15  SpO2:  [93 %-97 %] 93 %  BP: (126-137)/(79-85) 132/81     Weight: 101.3 kg (223 lb 5.2 oz)  Body mass index is 32.04 kg/m².    Intake/Output - Last 3 Shifts       10/12 0700 - 10/13 0659 10/13 0700 - 10/14 0659 10/14 0700 - 10/15 0659    P.O. 720 1260 0    I.V. (mL/kg) 1233.3 (12.2) 1097.5 (10.8)     IV Piggyback 300 100     Total Intake(mL/kg) 2253.3 (22.2) 2457.5 (24.3) 0 (0)    Urine (mL/kg/hr) 2375 (1) 3450 (1.4)     Drains 5      Total Output 2380 3450     Net -126.7 -992.5 0                 Physical Exam   Constitutional: He is oriented to person, place, and time. He appears well-developed and well-nourished.   HENT:   Head: Normocephalic and atraumatic.   Eyes: EOM are normal.   Cardiovascular: Normal rate and regular rhythm.   Pulmonary/Chest: Effort normal. No  respiratory distress.   Abdominal: Soft.   Tender, incision partially closed with staples, ss drainage.    Musculoskeletal: Normal range of motion.   Neurological: He is alert and oriented to person, place, and time.   Skin: Skin is warm and dry.   Psychiatric: He has a normal mood and affect. Thought content normal.   Vitals reviewed.      Significant Labs:  CBC:   Recent Labs   Lab 10/14/19  0512   WBC 16.37*   RBC 3.97*   HGB 11.2*   HCT 34.6*   *   MCV 87   MCH 28.2   MCHC 32.4     CMP:   Recent Labs   Lab 10/09/19  0528      CALCIUM 9.1   *   K 4.1   CO2 27   CL 97   BUN 8   CREATININE 0.7       Significant Diagnostics:  I have reviewed all pertinent imaging results/findings within the past 24 hours.

## 2019-10-14 NOTE — PLAN OF CARE
Fall prevention precautions maintained, pt remained free of falls throughout shift, call bell and personal items within reach, pt's pain mildly controlled by prn pain medication. 24 hour chart check completed. Will continue to monitor

## 2019-10-15 LAB
GRAM STN SPEC: NORMAL
GRAM STN SPEC: NORMAL

## 2019-10-15 PROCEDURE — S0030 INJECTION, METRONIDAZOLE: HCPCS | Performed by: SURGERY

## 2019-10-15 PROCEDURE — 25000003 PHARM REV CODE 250: Performed by: RADIOLOGY

## 2019-10-15 PROCEDURE — 25000003 PHARM REV CODE 250: Performed by: SURGERY

## 2019-10-15 PROCEDURE — 25000003 PHARM REV CODE 250: Performed by: PHYSICIAN ASSISTANT

## 2019-10-15 PROCEDURE — S0028 INJECTION, FAMOTIDINE, 20 MG: HCPCS | Performed by: SURGERY

## 2019-10-15 PROCEDURE — 63600175 PHARM REV CODE 636 W HCPCS: Performed by: SURGERY

## 2019-10-15 PROCEDURE — 87070 CULTURE OTHR SPECIMN AEROBIC: CPT

## 2019-10-15 PROCEDURE — 87076 CULTURE ANAEROBE IDENT EACH: CPT

## 2019-10-15 PROCEDURE — 87075 CULTR BACTERIA EXCEPT BLOOD: CPT

## 2019-10-15 PROCEDURE — 87205 SMEAR GRAM STAIN: CPT

## 2019-10-15 PROCEDURE — 21400001 HC TELEMETRY ROOM

## 2019-10-15 PROCEDURE — 63600175 PHARM REV CODE 636 W HCPCS: Performed by: RADIOLOGY

## 2019-10-15 PROCEDURE — 63600175 PHARM REV CODE 636 W HCPCS: Performed by: PHYSICIAN ASSISTANT

## 2019-10-15 RX ORDER — FENTANYL CITRATE 50 UG/ML
INJECTION, SOLUTION INTRAMUSCULAR; INTRAVENOUS CODE/TRAUMA/SEDATION MEDICATION
Status: COMPLETED | OUTPATIENT
Start: 2019-10-15 | End: 2019-10-15

## 2019-10-15 RX ORDER — MIDAZOLAM HYDROCHLORIDE 1 MG/ML
INJECTION INTRAMUSCULAR; INTRAVENOUS CODE/TRAUMA/SEDATION MEDICATION
Status: COMPLETED | OUTPATIENT
Start: 2019-10-15 | End: 2019-10-15

## 2019-10-15 RX ORDER — LIDOCAINE HYDROCHLORIDE 10 MG/ML
INJECTION INFILTRATION; PERINEURAL CODE/TRAUMA/SEDATION MEDICATION
Status: COMPLETED | OUTPATIENT
Start: 2019-10-15 | End: 2019-10-15

## 2019-10-15 RX ORDER — POLYETHYLENE GLYCOL 3350 17 G/17G
17 POWDER, FOR SOLUTION ORAL ONCE
Status: COMPLETED | OUTPATIENT
Start: 2019-10-15 | End: 2019-10-15

## 2019-10-15 RX ADMIN — MIDAZOLAM HYDROCHLORIDE 0.5 MG: 1 INJECTION, SOLUTION INTRAMUSCULAR; INTRAVENOUS at 10:10

## 2019-10-15 RX ADMIN — ARIPIPRAZOLE 5 MG: 5 TABLET ORAL at 08:10

## 2019-10-15 RX ADMIN — PROMETHAZINE HYDROCHLORIDE 6.25 MG: 25 INJECTION INTRAMUSCULAR; INTRAVENOUS at 11:10

## 2019-10-15 RX ADMIN — CIPROFLOXACIN 400 MG: 2 INJECTION, SOLUTION INTRAVENOUS at 01:10

## 2019-10-15 RX ADMIN — FENTANYL CITRATE 25 MCG: 50 INJECTION, SOLUTION INTRAMUSCULAR; INTRAVENOUS at 10:10

## 2019-10-15 RX ADMIN — LIDOCAINE HYDROCHLORIDE 10 ML: 10 INJECTION, SOLUTION INFILTRATION; PERINEURAL at 10:10

## 2019-10-15 RX ADMIN — HYDROMORPHONE HYDROCHLORIDE 0.5 MG: 1 INJECTION, SOLUTION INTRAMUSCULAR; INTRAVENOUS; SUBCUTANEOUS at 05:10

## 2019-10-15 RX ADMIN — HYDROCODONE BITARTRATE AND ACETAMINOPHEN 1 TABLET: 10; 325 TABLET ORAL at 08:10

## 2019-10-15 RX ADMIN — FENTANYL CITRATE 50 MCG: 50 INJECTION, SOLUTION INTRAMUSCULAR; INTRAVENOUS at 10:10

## 2019-10-15 RX ADMIN — HYDROMORPHONE HYDROCHLORIDE 0.5 MG: 1 INJECTION, SOLUTION INTRAMUSCULAR; INTRAVENOUS; SUBCUTANEOUS at 11:10

## 2019-10-15 RX ADMIN — POLYETHYLENE GLYCOL 3350 17 G: 17 POWDER, FOR SOLUTION ORAL at 08:10

## 2019-10-15 RX ADMIN — HYDROCODONE BITARTRATE AND ACETAMINOPHEN 1 TABLET: 10; 325 TABLET ORAL at 02:10

## 2019-10-15 RX ADMIN — METRONIDAZOLE 500 MG: 500 INJECTION, SOLUTION INTRAVENOUS at 05:10

## 2019-10-15 RX ADMIN — FAMOTIDINE 20 MG: 20 INJECTION, SOLUTION INTRAVENOUS at 08:10

## 2019-10-15 RX ADMIN — ESCITALOPRAM OXALATE 20 MG: 10 TABLET ORAL at 08:10

## 2019-10-15 RX ADMIN — MIDAZOLAM HYDROCHLORIDE 1 MG: 1 INJECTION, SOLUTION INTRAMUSCULAR; INTRAVENOUS at 10:10

## 2019-10-15 RX ADMIN — ONDANSETRON 4 MG: 2 INJECTION INTRAMUSCULAR; INTRAVENOUS at 01:10

## 2019-10-15 RX ADMIN — METHOCARBAMOL TABLETS 500 MG: 500 TABLET, COATED ORAL at 01:10

## 2019-10-15 RX ADMIN — METRONIDAZOLE 500 MG: 500 INJECTION, SOLUTION INTRAVENOUS at 08:10

## 2019-10-15 RX ADMIN — DIPHENHYDRAMINE HYDROCHLORIDE 25 MG: 50 INJECTION, SOLUTION INTRAMUSCULAR; INTRAVENOUS at 10:10

## 2019-10-15 RX ADMIN — HYDROCODONE BITARTRATE AND ACETAMINOPHEN 1 TABLET: 10; 325 TABLET ORAL at 12:10

## 2019-10-15 RX ADMIN — HYDROMORPHONE HYDROCHLORIDE 0.5 MG: 1 INJECTION, SOLUTION INTRAMUSCULAR; INTRAVENOUS; SUBCUTANEOUS at 03:10

## 2019-10-15 RX ADMIN — CIPROFLOXACIN 400 MG: 2 INJECTION, SOLUTION INTRAVENOUS at 12:10

## 2019-10-15 RX ADMIN — FAMOTIDINE 20 MG: 20 INJECTION, SOLUTION INTRAVENOUS at 10:10

## 2019-10-15 RX ADMIN — METRONIDAZOLE 500 MG: 500 INJECTION, SOLUTION INTRAVENOUS at 12:10

## 2019-10-15 NOTE — PLAN OF CARE
POC reviewed with patient. Pt verbalized understanding. Pt remains free of injuries and falls; fall precaution in place. Frequent complaints of pain on this shift.. Pain moderately tolerated with PRN medications per MAR. Encouraged frequent ambulation. Continuous heart monitor. NS-ST throughout shift. Drain care performed. Output documented to flowsheet. Bed low, side rails x2, call light in reach, personal belongings at bedside. Reminded to call for assistance.    Chart check complete. Will continue to monitor.

## 2019-10-15 NOTE — ASSESSMENT & PLAN NOTE
S/p ex lap, raf's, abdominal washout for sigmoid perforation on 10/7/19    IR abscess drainage today  Continue IV antibiotics  Ostomy nurse consulted for teaching and care.  PT to mobilize  DVT and GI prophy  Awaiting bowel function

## 2019-10-15 NOTE — PLAN OF CARE
POC reviewed, including indications and possible side effects of administered medications. Patient verbalized understanding and teach back. No adverse reactions noted. Patient c/o abdominal and back pain. Administered medications per order. Dressing remains intact with dried drainage noted. Colostomy care performed. NPO at midnight and tolerating well. Repositioning, ambulation and IS use encouraged. NSR/ST on heart monitor. VSS. No s/s of acute distress noted. Patient remains free of falls and injuries during shift. Wife at bedside. Will continue to monitor.    Chart check complete.

## 2019-10-15 NOTE — INTERVAL H&P NOTE
The patient has been examined and the H&P has been reviewed:    I concur with the findings and no changes have occurred since H&P was written.    Anesthesia/Surgery risks, benefits and alternative options discussed and understood by patient/family.          Active Hospital Problems    Diagnosis  POA    *Bowel perforation [K63.1]  Yes    Sinus tachycardia [R00.0]  Yes    Chronic pain syndrome [G89.4]  Yes     Will continue with the hydrocodone as needed      Obesity (BMI 30-39.9) [E66.9]  Yes    MDD (major depressive disorder) [F32.9]  Yes      Resolved Hospital Problems   No resolved problems to display.

## 2019-10-15 NOTE — OP NOTE
Pre Op Diagnosis: intra-abdominal abscess      Post Op Diagnosis: same     Procedure:  Intra-abdominal abscess drainage      Procedure performed by: Dorene TEIXEIRA, Ender ARCOS     Written Informed Consent Obtained: Yes     Specimen Removed:  yes     Estimated Blood Loss:  minimal     Findings: Local anesthesia and moderate sedation were used.     The patient tolerated the procedure well and there were no complications.      Sterile technique was performed in the LLQ, lidocaine was used as a local anesthetic.  8.5 fr catheter inserted into LLQ abscess. 100 ccs of purulent material removed. Catheter sutured into place and covered with Tegaderm. Ordersplaced to flush catheter q12h until less than 12 ccs of drainage present, then repeat CT scan recommended. Pt tolerated the procedure well without immediate complications.  Please see radiologist report for details. F/u with PCP and/or ordering physician.

## 2019-10-15 NOTE — SEDATION DOCUMENTATION
Pt in ct on table lying on side with right down and bilateral arms crossed over chest. Vs  Monitor in place, vss.  Pt verbalized understanding of procedure.

## 2019-10-15 NOTE — SUBJECTIVE & OBJECTIVE
Interval History: pt will undergo IR abscess drainage this morning. No new issues. Continues to have lower abdominal pain. Passing flatus. Tolerating diet.     Medications:  Continuous Infusions:   lactated ringers 50 mL/hr at 10/13/19 2010     Scheduled Meds:   ARIPiprazole  5 mg Oral QHS    ciprofloxacin (CIPRO)400mg/200ml D5W IVPB  400 mg Intravenous Q12H    escitalopram oxalate  20 mg Oral QHS    famotidine  20 mg Intravenous BID    metronidazole  500 mg Intravenous Q8H     PRN Meds:cloNIDine, diphenhydrAMINE, HYDROcodone-acetaminophen, HYDROcodone-acetaminophen, HYDROmorphone, methocarbamol, metoprolol, naloxone, ondansetron, predniSONE, predniSONE, promethazine (PHENERGAN) IVPB, sodium chloride 0.9%     Review of patient's allergies indicates:   Allergen Reactions    Iodinated contrast media Swelling     Tongue, right eye, lips swelling. Rash on abdomen and axilla    Codeine     Dairy aid [lactase]     Morphine      Tolerated hydromorphone in October 2019.     Nuts [tree nut]      Objective:     Vital Signs (Most Recent):  Temp: 98.3 °F (36.8 °C) (10/15/19 1111)  Pulse: 98 (10/15/19 1111)  Resp: 14 (10/15/19 1111)  BP: 120/77 (10/15/19 1111)  SpO2: 97 % (10/15/19 1111) Vital Signs (24h Range):  Temp:  [97.8 °F (36.6 °C)-98.3 °F (36.8 °C)] 98.3 °F (36.8 °C)  Pulse:  [] 98  Resp:  [12-20] 14  SpO2:  [94 %-100 %] 97 %  BP: (100-136)/(49-84) 120/77     Weight: 101.3 kg (223 lb 5.2 oz)  Body mass index is 32.04 kg/m².    Intake/Output - Last 3 Shifts       10/13 0700 - 10/14 0659 10/14 0700 - 10/15 0659 10/15 0700 - 10/16 0659    P.O. 1260 0 0    I.V. (mL/kg) 1097.5 (10.8) 1275 (12.6)     IV Piggyback 100 850     Total Intake(mL/kg) 2457.5 (24.3) 2125 (21) 0 (0)    Urine (mL/kg/hr) 3450 (1.4) 825 (0.3)     Drains   25    Stool  0     Total Output 3450 825 25    Net -992.5 +1300 -25           Urine Occurrence  5 x     Stool Occurrence  0 x           Physical Exam   Constitutional: He is oriented to  person, place, and time. He appears well-developed and well-nourished.   HENT:   Head: Normocephalic and atraumatic.   Eyes: EOM are normal.   Cardiovascular: Normal rate and regular rhythm.   Pulmonary/Chest: Effort normal. No respiratory distress.   Abdominal: Soft. He exhibits no distension. There is tenderness.   Ostomy viable. Air in bag. Previous VARSHA drain site is well healed.    Musculoskeletal: Normal range of motion.   Neurological: He is alert and oriented to person, place, and time.   Skin: Skin is warm and dry.   Psychiatric: He has a normal mood and affect. Thought content normal.   Vitals reviewed.      Significant Labs:  CBC:   Recent Labs   Lab 10/14/19  0512   WBC 16.37*   RBC 3.97*   HGB 11.2*   HCT 34.6*   *   MCV 87   MCH 28.2   MCHC 32.4     CMP:   Recent Labs   Lab 10/09/19  0528      CALCIUM 9.1   *   K 4.1   CO2 27   CL 97   BUN 8   CREATININE 0.7       Significant Diagnostics:  I have reviewed all pertinent imaging results/findings within the past 24 hours.

## 2019-10-15 NOTE — SEDATION DOCUMENTATION
Procedure complete, pt tolerated well.  Vss.  100 ml of purulent fluid removed from left lower quadrant abdomen puncture site, site WDL.  bethanie bulb in place, secured with sutures, stat lock gauze and tegaderm.

## 2019-10-15 NOTE — PROGRESS NOTES
[facility] -   General Surgery  Progress Note    Subjective:     History of Present Illness:  43-year-old male with history of opioid induced chronic constipation presented to the ER with complaints of lower abdominal pain and fever with nausea and emesis.  He was seen in the ER 3 days ago and treated with Cipro and Flagyl.  The pain persisted and he returned to the ER today with continued complaints.  He has a pertinent family history of colon cancer, his paternal grandmother  of colon cancer in her 50s.  Last colonoscopy in  showed no acute findings. He attempted an enema over the weekend with acute worsening of his pain. Workup showed leukocytosis and CT abdomen and pelvis confirmed pneumoperitoneum with evidence of perforated sigmoid colon.       Post-Op Info:  Procedure(s) (LRB):  LAPAROTOMY, EXPLORATORY (N/A)  LAVAGE, PERITONEAL, THERAPEUTIC (N/A)  SIVAKUMAR PROCEDURE (N/A)  CREATION, COLOSTOMY (N/A)   8 Days Post-Op     Interval History: pt will undergo IR abscess drainage this morning. No new issues. Continues to have lower abdominal pain. Passing flatus. Tolerating diet.     Medications:  Continuous Infusions:   lactated ringers 50 mL/hr at 10/13/19 2010     Scheduled Meds:   ARIPiprazole  5 mg Oral QHS    ciprofloxacin (CIPRO)400mg/200ml D5W IVPB  400 mg Intravenous Q12H    escitalopram oxalate  20 mg Oral QHS    famotidine  20 mg Intravenous BID    metronidazole  500 mg Intravenous Q8H     PRN Meds:cloNIDine, diphenhydrAMINE, HYDROcodone-acetaminophen, HYDROcodone-acetaminophen, HYDROmorphone, methocarbamol, metoprolol, naloxone, ondansetron, predniSONE, predniSONE, promethazine (PHENERGAN) IVPB, sodium chloride 0.9%     Review of patient's allergies indicates:   Allergen Reactions    Iodinated contrast media Swelling     Tongue, right eye, lips swelling. Rash on abdomen and axilla    Codeine     Dairy aid [lactase]     Morphine      Tolerated hydromorphone in 2019.      Nuts [tree nut]      Objective:     Vital Signs (Most Recent):  Temp: 98.3 °F (36.8 °C) (10/15/19 1111)  Pulse: 98 (10/15/19 1111)  Resp: 14 (10/15/19 1111)  BP: 120/77 (10/15/19 1111)  SpO2: 97 % (10/15/19 1111) Vital Signs (24h Range):  Temp:  [97.8 °F (36.6 °C)-98.3 °F (36.8 °C)] 98.3 °F (36.8 °C)  Pulse:  [] 98  Resp:  [12-20] 14  SpO2:  [94 %-100 %] 97 %  BP: (100-136)/(49-84) 120/77     Weight: 101.3 kg (223 lb 5.2 oz)  Body mass index is 32.04 kg/m².    Intake/Output - Last 3 Shifts       10/13 0700 - 10/14 0659 10/14 0700 - 10/15 0659 10/15 0700 - 10/16 0659    P.O. 1260 0 0    I.V. (mL/kg) 1097.5 (10.8) 1275 (12.6)     IV Piggyback 100 850     Total Intake(mL/kg) 2457.5 (24.3) 2125 (21) 0 (0)    Urine (mL/kg/hr) 3450 (1.4) 825 (0.3)     Drains   25    Stool  0     Total Output 3450 825 25    Net -992.5 +1300 -25           Urine Occurrence  5 x     Stool Occurrence  0 x           Physical Exam   Constitutional: He is oriented to person, place, and time. He appears well-developed and well-nourished.   HENT:   Head: Normocephalic and atraumatic.   Eyes: EOM are normal.   Cardiovascular: Normal rate and regular rhythm.   Pulmonary/Chest: Effort normal. No respiratory distress.   Abdominal: Soft. He exhibits no distension. There is tenderness.   Ostomy viable. Air in bag. Previous VARSHA drain site is well healed.    Musculoskeletal: Normal range of motion.   Neurological: He is alert and oriented to person, place, and time.   Skin: Skin is warm and dry.   Psychiatric: He has a normal mood and affect. Thought content normal.   Vitals reviewed.      Significant Labs:  CBC:   Recent Labs   Lab 10/14/19  0512   WBC 16.37*   RBC 3.97*   HGB 11.2*   HCT 34.6*   *   MCV 87   MCH 28.2   MCHC 32.4     CMP:   Recent Labs   Lab 10/09/19  0528      CALCIUM 9.1   *   K 4.1   CO2 27   CL 97   BUN 8   CREATININE 0.7       Significant Diagnostics:  I have reviewed all pertinent imaging  results/findings within the past 24 hours.    Assessment/Plan:     * Bowel perforation  S/p ex lap, raf's, abdominal washout for sigmoid perforation on 10/7/19    IR abscess drainage today  Continue IV antibiotics  Ostomy nurse consulted for teaching and care.  PT to mobilize  DVT and GI prophy  Awaiting bowel function    Sinus tachycardia  Cardiology was consulted.   Continue IV lopressor per recs  Medicine following.   If continues to have low grade temps and tachycardia does not resolve, will evaluate for intraabdominal abscess with imaging on pod 7    Chronic pain syndrome  If tolerates clears, will restart home regimen and d/c pca    Family history of colonic polyps  Recent colonoscopy reviewed    MDD (major depressive disorder)  Resume home meds today    Obesity (BMI 30-39.9)  Informed of increased incidence of wound infection.         Promise Fajardo PA-C  General Surgery  [facility]

## 2019-10-16 LAB
ANION GAP SERPL CALC-SCNC: 8 MMOL/L (ref 8–16)
BASOPHILS # BLD AUTO: 0.04 K/UL (ref 0–0.2)
BASOPHILS NFR BLD: 0.4 % (ref 0–1.9)
BUN SERPL-MCNC: 10 MG/DL (ref 6–20)
CALCIUM SERPL-MCNC: 9.1 MG/DL (ref 8.7–10.5)
CHLORIDE SERPL-SCNC: 101 MMOL/L (ref 95–110)
CO2 SERPL-SCNC: 28 MMOL/L (ref 23–29)
CREAT SERPL-MCNC: 0.8 MG/DL (ref 0.5–1.4)
DIFFERENTIAL METHOD: ABNORMAL
EOSINOPHIL # BLD AUTO: 0.2 K/UL (ref 0–0.5)
EOSINOPHIL NFR BLD: 2 % (ref 0–8)
ERYTHROCYTE [DISTWIDTH] IN BLOOD BY AUTOMATED COUNT: 12.5 % (ref 11.5–14.5)
EST. GFR  (AFRICAN AMERICAN): >60 ML/MIN/1.73 M^2
EST. GFR  (NON AFRICAN AMERICAN): >60 ML/MIN/1.73 M^2
GLUCOSE SERPL-MCNC: 102 MG/DL (ref 70–110)
HCT VFR BLD AUTO: 32.7 % (ref 40–54)
HGB BLD-MCNC: 10.5 G/DL (ref 14–18)
IMM GRANULOCYTES # BLD AUTO: 0.29 K/UL (ref 0–0.04)
IMM GRANULOCYTES NFR BLD AUTO: 2.7 % (ref 0–0.5)
LYMPHOCYTES # BLD AUTO: 2 K/UL (ref 1–4.8)
LYMPHOCYTES NFR BLD: 18.1 % (ref 18–48)
MCH RBC QN AUTO: 28.2 PG (ref 27–31)
MCHC RBC AUTO-ENTMCNC: 32.1 G/DL (ref 32–36)
MCV RBC AUTO: 88 FL (ref 82–98)
MONOCYTES # BLD AUTO: 1 K/UL (ref 0.3–1)
MONOCYTES NFR BLD: 9.4 % (ref 4–15)
NEUTROPHILS # BLD AUTO: 7.3 K/UL (ref 1.8–7.7)
NEUTROPHILS NFR BLD: 67.4 % (ref 38–73)
NRBC BLD-RTO: 0 /100 WBC
PLATELET # BLD AUTO: 448 K/UL (ref 150–350)
PMV BLD AUTO: 9.8 FL (ref 9.2–12.9)
POTASSIUM SERPL-SCNC: 4.2 MMOL/L (ref 3.5–5.1)
RBC # BLD AUTO: 3.72 M/UL (ref 4.6–6.2)
SODIUM SERPL-SCNC: 137 MMOL/L (ref 136–145)
WBC # BLD AUTO: 10.81 K/UL (ref 3.9–12.7)

## 2019-10-16 PROCEDURE — S0030 INJECTION, METRONIDAZOLE: HCPCS | Performed by: SURGERY

## 2019-10-16 PROCEDURE — 36415 COLL VENOUS BLD VENIPUNCTURE: CPT

## 2019-10-16 PROCEDURE — 94760 N-INVAS EAR/PLS OXIMETRY 1: CPT

## 2019-10-16 PROCEDURE — 63600175 PHARM REV CODE 636 W HCPCS: Performed by: SURGERY

## 2019-10-16 PROCEDURE — 63600175 PHARM REV CODE 636 W HCPCS: Performed by: PHYSICIAN ASSISTANT

## 2019-10-16 PROCEDURE — 80048 BASIC METABOLIC PNL TOTAL CA: CPT

## 2019-10-16 PROCEDURE — 21400001 HC TELEMETRY ROOM

## 2019-10-16 PROCEDURE — 99024 POSTOP FOLLOW-UP VISIT: CPT | Mod: ,,, | Performed by: PHYSICIAN ASSISTANT

## 2019-10-16 PROCEDURE — 99024 PR POST-OP FOLLOW-UP VISIT: ICD-10-PCS | Mod: ,,, | Performed by: PHYSICIAN ASSISTANT

## 2019-10-16 PROCEDURE — 25000003 PHARM REV CODE 250: Performed by: SURGERY

## 2019-10-16 PROCEDURE — 85025 COMPLETE CBC W/AUTO DIFF WBC: CPT

## 2019-10-16 PROCEDURE — S0028 INJECTION, FAMOTIDINE, 20 MG: HCPCS | Performed by: SURGERY

## 2019-10-16 PROCEDURE — 25000003 PHARM REV CODE 250: Performed by: PHYSICIAN ASSISTANT

## 2019-10-16 RX ORDER — CIPROFLOXACIN 500 MG/1
500 TABLET ORAL EVERY 12 HOURS
Status: DISCONTINUED | OUTPATIENT
Start: 2019-10-16 | End: 2019-10-17 | Stop reason: HOSPADM

## 2019-10-16 RX ORDER — METRONIDAZOLE 500 MG/1
500 TABLET ORAL EVERY 8 HOURS
Status: DISCONTINUED | OUTPATIENT
Start: 2019-10-16 | End: 2019-10-17 | Stop reason: HOSPADM

## 2019-10-16 RX ADMIN — SODIUM CHLORIDE, SODIUM LACTATE, POTASSIUM CHLORIDE, AND CALCIUM CHLORIDE: .6; .31; .03; .02 INJECTION, SOLUTION INTRAVENOUS at 09:10

## 2019-10-16 RX ADMIN — CIPROFLOXACIN 400 MG: 2 INJECTION, SOLUTION INTRAVENOUS at 12:10

## 2019-10-16 RX ADMIN — HYDROCODONE BITARTRATE AND ACETAMINOPHEN 1 TABLET: 10; 325 TABLET ORAL at 03:10

## 2019-10-16 RX ADMIN — ESCITALOPRAM OXALATE 20 MG: 10 TABLET ORAL at 09:10

## 2019-10-16 RX ADMIN — HYDROCODONE BITARTRATE AND ACETAMINOPHEN 1 TABLET: 10; 325 TABLET ORAL at 11:10

## 2019-10-16 RX ADMIN — CIPROFLOXACIN HYDROCHLORIDE 500 MG: 500 TABLET, FILM COATED ORAL at 12:10

## 2019-10-16 RX ADMIN — HYDROCODONE BITARTRATE AND ACETAMINOPHEN 1 TABLET: 10; 325 TABLET ORAL at 09:10

## 2019-10-16 RX ADMIN — DIPHENHYDRAMINE HYDROCHLORIDE 25 MG: 50 INJECTION, SOLUTION INTRAMUSCULAR; INTRAVENOUS at 09:10

## 2019-10-16 RX ADMIN — METRONIDAZOLE 500 MG: 500 TABLET ORAL at 04:10

## 2019-10-16 RX ADMIN — HYDROMORPHONE HYDROCHLORIDE 0.5 MG: 1 INJECTION, SOLUTION INTRAMUSCULAR; INTRAVENOUS; SUBCUTANEOUS at 07:10

## 2019-10-16 RX ADMIN — CIPROFLOXACIN HYDROCHLORIDE 500 MG: 500 TABLET, FILM COATED ORAL at 09:10

## 2019-10-16 RX ADMIN — FAMOTIDINE 20 MG: 20 INJECTION, SOLUTION INTRAVENOUS at 09:10

## 2019-10-16 RX ADMIN — HYDROCODONE BITARTRATE AND ACETAMINOPHEN 1 TABLET: 10; 325 TABLET ORAL at 04:10

## 2019-10-16 RX ADMIN — DIPHENHYDRAMINE HYDROCHLORIDE 25 MG: 50 INJECTION, SOLUTION INTRAMUSCULAR; INTRAVENOUS at 02:10

## 2019-10-16 RX ADMIN — HYDROMORPHONE HYDROCHLORIDE 0.5 MG: 1 INJECTION, SOLUTION INTRAMUSCULAR; INTRAVENOUS; SUBCUTANEOUS at 12:10

## 2019-10-16 RX ADMIN — HYDROMORPHONE HYDROCHLORIDE 0.5 MG: 1 INJECTION, SOLUTION INTRAMUSCULAR; INTRAVENOUS; SUBCUTANEOUS at 06:10

## 2019-10-16 RX ADMIN — ARIPIPRAZOLE 5 MG: 5 TABLET ORAL at 09:10

## 2019-10-16 RX ADMIN — METRONIDAZOLE 500 MG: 500 TABLET ORAL at 09:10

## 2019-10-16 RX ADMIN — METRONIDAZOLE 500 MG: 500 INJECTION, SOLUTION INTRAVENOUS at 03:10

## 2019-10-16 NOTE — PROGRESS NOTES
[facility] -   General Surgery  Progress Note    Subjective:     History of Present Illness:  43-year-old male with history of opioid induced chronic constipation presented to the ER with complaints of lower abdominal pain and fever with nausea and emesis.  He was seen in the ER 3 days ago and treated with Cipro and Flagyl.  The pain persisted and he returned to the ER today with continued complaints.  He has a pertinent family history of colon cancer, his paternal grandmother  of colon cancer in her 50s.  Last colonoscopy in  showed no acute findings. He attempted an enema over the weekend with acute worsening of his pain. Workup showed leukocytosis and CT abdomen and pelvis confirmed pneumoperitoneum with evidence of perforated sigmoid colon.       Post-Op Info:  Procedure(s) (LRB):  LAPAROTOMY, EXPLORATORY (N/A)  LAVAGE, PERITONEAL, THERAPEUTIC (N/A)  SIVAKUMAR PROCEDURE (N/A)  CREATION, COLOSTOMY (N/A)   9 Days Post-Op     Interval History: feeling better. Pain improved now just with drain site discomfort. No nausea. Tolerates diet. No stool from ostomy.     Medications:  Continuous Infusions:   lactated ringers 50 mL/hr at 10/13/19 2010     Scheduled Meds:   ARIPiprazole  5 mg Oral QHS    ciprofloxacin (CIPRO)400mg/200ml D5W IVPB  400 mg Intravenous Q12H    escitalopram oxalate  20 mg Oral QHS    famotidine  20 mg Intravenous BID    metronidazole  500 mg Intravenous Q8H     PRN Meds:cloNIDine, diphenhydrAMINE, HYDROcodone-acetaminophen, HYDROcodone-acetaminophen, HYDROmorphone, methocarbamol, metoprolol, naloxone, ondansetron, predniSONE, predniSONE, promethazine (PHENERGAN) IVPB, sodium chloride 0.9%     Review of patient's allergies indicates:   Allergen Reactions    Iodinated contrast media Swelling     Tongue, right eye, lips swelling. Rash on abdomen and axilla    Codeine     Dairy aid [lactase]     Morphine      Tolerated hydromorphone in 2019.     Nuts [tree nut]       Objective:     Vital Signs (Most Recent):  Temp: 97 °F (36.1 °C) (10/16/19 0752)  Pulse: 102 (10/16/19 0752)  Resp: 14 (10/16/19 0752)  BP: 125/73 (10/16/19 0752)  SpO2: (!) 92 % (10/16/19 0752) Vital Signs (24h Range):  Temp:  [97 °F (36.1 °C)-98.6 °F (37 °C)] 97 °F (36.1 °C)  Pulse:  [] 102  Resp:  [12-20] 14  SpO2:  [92 %-100 %] 92 %  BP: (100-125)/(49-82) 125/73     Weight: 101.3 kg (223 lb 5.2 oz)  Body mass index is 32.04 kg/m².    Intake/Output - Last 3 Shifts       10/14 0700 - 10/15 0659 10/15 0700 - 10/16 0659 10/16 0700 - 10/17 0659    P.O. 0 360     I.V. (mL/kg) 1275 (12.6) 924.2 (9.1)     IV Piggyback 850 550     Total Intake(mL/kg) 2125 (21) 1834.2 (18.1)     Urine (mL/kg/hr) 825 (0.3) 175 (0.1)     Drains  25     Stool 0 0     Total Output 825 200     Net +1300 +1634.2            Urine Occurrence 5 x 1 x     Stool Occurrence 0 x            Physical Exam   Constitutional: He is oriented to person, place, and time. He appears well-developed and well-nourished.   HENT:   Head: Normocephalic and atraumatic.   Eyes: EOM are normal.   Cardiovascular: Normal rate and regular rhythm.   Pulmonary/Chest: Effort normal. No respiratory distress.   Abdominal: Soft.   Softly distended. Ostomy viable. LLQ drain with purulent drainage.    Musculoskeletal: Normal range of motion.   Neurological: He is alert and oriented to person, place, and time.   Skin: Skin is warm and dry.   Psychiatric: He has a normal mood and affect. Thought content normal.   Vitals reviewed.      Significant Labs:  CBC:   Recent Labs   Lab 10/16/19  0605   WBC 10.81   RBC 3.72*   HGB 10.5*   HCT 32.7*   *   MCV 88   MCH 28.2   MCHC 32.1     CMP:   Recent Labs   Lab 10/16/19  0605      CALCIUM 9.1      K 4.2   CO2 28      BUN 10   CREATININE 0.8       Significant Diagnostics:  I have reviewed all pertinent imaging results/findings within the past 24 hours.    Assessment/Plan:     * Bowel perforation  S/p ex  lap, raf's, abdominal washout for sigmoid perforation on 10/7/19    S/p IR abscess drainage. Flush drain per radiology recs  Wbc normal  Continue IV antibiotics  Ostomy nurse consulted for teaching and care.  PT to mobilize  DVT and GI prophy  Awaiting bowel function, laxatives    Sinus tachycardia  Cardiology was consulted.   Medicine following.       Chronic pain syndrome  Home meds    Family history of colonic polyps  Recent colonoscopy reviewed    MDD (major depressive disorder)  Resume home meds     Obesity (BMI 30-39.9)  Informed of increased incidence of wound infection.         Promise Fajardo PA-C  General Surgery  [facility]

## 2019-10-16 NOTE — PROGRESS NOTES
Follow up on Mr. Carlisle today. He is awake and alert, family members are present at the bedside.  Abdomen assessed. LUQ colostomy noted with pouch intact, stoma is pink and moist. Patient now with small firm, solid stool in pouch. Dr. Goff at the bedside and is now discussing discharge this week as patient is producing stool. Discussed with patient changing pouch tomorrow, he is agreeable.Continued colostomy teaching including dietary, hygiene, and activity guidelines. All questions answered.

## 2019-10-16 NOTE — SUBJECTIVE & OBJECTIVE
Interval History: feeling better. Pain improved now just with drain site discomfort. No nausea. Tolerates diet. No stool from ostomy.     Medications:  Continuous Infusions:   lactated ringers 50 mL/hr at 10/13/19 2010     Scheduled Meds:   ARIPiprazole  5 mg Oral QHS    ciprofloxacin (CIPRO)400mg/200ml D5W IVPB  400 mg Intravenous Q12H    escitalopram oxalate  20 mg Oral QHS    famotidine  20 mg Intravenous BID    metronidazole  500 mg Intravenous Q8H     PRN Meds:cloNIDine, diphenhydrAMINE, HYDROcodone-acetaminophen, HYDROcodone-acetaminophen, HYDROmorphone, methocarbamol, metoprolol, naloxone, ondansetron, predniSONE, predniSONE, promethazine (PHENERGAN) IVPB, sodium chloride 0.9%     Review of patient's allergies indicates:   Allergen Reactions    Iodinated contrast media Swelling     Tongue, right eye, lips swelling. Rash on abdomen and axilla    Codeine     Dairy aid [lactase]     Morphine      Tolerated hydromorphone in October 2019.     Nuts [tree nut]      Objective:     Vital Signs (Most Recent):  Temp: 97 °F (36.1 °C) (10/16/19 0752)  Pulse: 102 (10/16/19 0752)  Resp: 14 (10/16/19 0752)  BP: 125/73 (10/16/19 0752)  SpO2: (!) 92 % (10/16/19 0752) Vital Signs (24h Range):  Temp:  [97 °F (36.1 °C)-98.6 °F (37 °C)] 97 °F (36.1 °C)  Pulse:  [] 102  Resp:  [12-20] 14  SpO2:  [92 %-100 %] 92 %  BP: (100-125)/(49-82) 125/73     Weight: 101.3 kg (223 lb 5.2 oz)  Body mass index is 32.04 kg/m².    Intake/Output - Last 3 Shifts       10/14 0700 - 10/15 0659 10/15 0700 - 10/16 0659 10/16 0700 - 10/17 0659    P.O. 0 360     I.V. (mL/kg) 1275 (12.6) 924.2 (9.1)     IV Piggyback 850 550     Total Intake(mL/kg) 2125 (21) 1834.2 (18.1)     Urine (mL/kg/hr) 825 (0.3) 175 (0.1)     Drains  25     Stool 0 0     Total Output 825 200     Net +1300 +1634.2            Urine Occurrence 5 x 1 x     Stool Occurrence 0 x            Physical Exam   Constitutional: He is oriented to person, place, and time. He appears  well-developed and well-nourished.   HENT:   Head: Normocephalic and atraumatic.   Eyes: EOM are normal.   Cardiovascular: Normal rate and regular rhythm.   Pulmonary/Chest: Effort normal. No respiratory distress.   Abdominal: Soft.   Softly distended. Ostomy viable. LLQ drain with purulent drainage.    Musculoskeletal: Normal range of motion.   Neurological: He is alert and oriented to person, place, and time.   Skin: Skin is warm and dry.   Psychiatric: He has a normal mood and affect. Thought content normal.   Vitals reviewed.      Significant Labs:  CBC:   Recent Labs   Lab 10/16/19  0605   WBC 10.81   RBC 3.72*   HGB 10.5*   HCT 32.7*   *   MCV 88   MCH 28.2   MCHC 32.1     CMP:   Recent Labs   Lab 10/16/19  0605      CALCIUM 9.1      K 4.2   CO2 28      BUN 10   CREATININE 0.8       Significant Diagnostics:  I have reviewed all pertinent imaging results/findings within the past 24 hours.

## 2019-10-16 NOTE — ASSESSMENT & PLAN NOTE
S/p ex lap, raf's, abdominal washout for sigmoid perforation on 10/7/19    S/p IR abscess drainage. Flush drain per radiology recs  Wbc normal  Continue IV antibiotics  Ostomy nurse consulted for teaching and care.  PT to mobilize  DVT and GI prophy  Awaiting bowel function, laxatives

## 2019-10-16 NOTE — PLAN OF CARE
Remains on ABT therapy, no adverse reactions noted. Remains afebrile. Staples intact to midline incision, dressing changed. Tolerated well. LR @ 50mL/hr. Heart monitor 8568. VARSHA drain to LLQ. Ileostomy  to LLQ. Small amount of soft stool in colostomy bag. Tolerated shower and ambulating in grant well.PRN pain meds adm to control pain level. Remains free from fall/injury. Call light in reach

## 2019-10-16 NOTE — PLAN OF CARE
Problem: Fall Injury Risk  Goal: Absence of Fall and Fall-Related Injury  Outcome: Ongoing, Progressing     Problem: Adult Inpatient Plan of Care  Goal: Plan of Care Review  Outcome: Ongoing, Progressing  Goal: Patient-Specific Goal (Individualization)  Outcome: Ongoing, Progressing  Goal: Absence of Hospital-Acquired Illness or Injury  Outcome: Ongoing, Progressing  Goal: Optimal Comfort and Wellbeing  Outcome: Ongoing, Progressing  Goal: Readiness for Transition of Care  Outcome: Ongoing, Progressing  Goal: Rounds/Family Conference  Outcome: Ongoing, Progressing     Problem: Infection  Goal: Infection Symptom Resolution  Outcome: Ongoing, Progressing     Problem: Pain Acute  Goal: Optimal Pain Control  Outcome: Ongoing, Progressing     Problem: Adjustment to Surgery (Colostomy)  Goal: Psychosocial Adjustment Initiation  Outcome: Ongoing, Progressing

## 2019-10-16 NOTE — NURSING
Pt has not had BM since sx, passing flatus, appetite improving. Insisted that surgery be contacted for laxative. Sent secure chat to Dr. Sewell on call. Educated pt about narcotics use affecting bowel function.

## 2019-10-17 VITALS
BODY MASS INDEX: 31.97 KG/M2 | TEMPERATURE: 98 F | SYSTOLIC BLOOD PRESSURE: 117 MMHG | RESPIRATION RATE: 16 BRPM | HEIGHT: 70 IN | WEIGHT: 223.31 LBS | HEART RATE: 103 BPM | DIASTOLIC BLOOD PRESSURE: 77 MMHG | OXYGEN SATURATION: 94 %

## 2019-10-17 LAB — BACTERIA SPEC AEROBE CULT: ABNORMAL

## 2019-10-17 PROCEDURE — S0028 INJECTION, FAMOTIDINE, 20 MG: HCPCS | Performed by: SURGERY

## 2019-10-17 PROCEDURE — 63600175 PHARM REV CODE 636 W HCPCS: Performed by: PHYSICIAN ASSISTANT

## 2019-10-17 PROCEDURE — 94760 N-INVAS EAR/PLS OXIMETRY 1: CPT

## 2019-10-17 PROCEDURE — 25000003 PHARM REV CODE 250: Performed by: SURGERY

## 2019-10-17 PROCEDURE — 63600175 PHARM REV CODE 636 W HCPCS: Performed by: SURGERY

## 2019-10-17 PROCEDURE — 25000003 PHARM REV CODE 250: Performed by: PHYSICIAN ASSISTANT

## 2019-10-17 RX ORDER — METRONIDAZOLE 500 MG/1
500 TABLET ORAL EVERY 8 HOURS
Qty: 30 TABLET | Refills: 0 | Status: SHIPPED | OUTPATIENT
Start: 2019-10-17 | End: 2019-10-27

## 2019-10-17 RX ORDER — HYDROCODONE BITARTRATE AND ACETAMINOPHEN 5; 325 MG/1; MG/1
1 TABLET ORAL EVERY 6 HOURS PRN
Qty: 30 TABLET | Refills: 0 | Status: SHIPPED | OUTPATIENT
Start: 2019-10-17 | End: 2019-12-05

## 2019-10-17 RX ORDER — CIPROFLOXACIN 500 MG/1
500 TABLET ORAL EVERY 12 HOURS
Qty: 20 TABLET | Refills: 0 | Status: SHIPPED | OUTPATIENT
Start: 2019-10-17 | End: 2019-10-27

## 2019-10-17 RX ADMIN — CIPROFLOXACIN HYDROCHLORIDE 500 MG: 500 TABLET, FILM COATED ORAL at 09:10

## 2019-10-17 RX ADMIN — HYDROCODONE BITARTRATE AND ACETAMINOPHEN 1 TABLET: 10; 325 TABLET ORAL at 12:10

## 2019-10-17 RX ADMIN — HYDROMORPHONE HYDROCHLORIDE 0.5 MG: 1 INJECTION, SOLUTION INTRAMUSCULAR; INTRAVENOUS; SUBCUTANEOUS at 08:10

## 2019-10-17 RX ADMIN — DIPHENHYDRAMINE HYDROCHLORIDE 25 MG: 50 INJECTION, SOLUTION INTRAMUSCULAR; INTRAVENOUS at 12:10

## 2019-10-17 RX ADMIN — HYDROMORPHONE HYDROCHLORIDE 0.5 MG: 1 INJECTION, SOLUTION INTRAMUSCULAR; INTRAVENOUS; SUBCUTANEOUS at 01:10

## 2019-10-17 RX ADMIN — HYDROCODONE BITARTRATE AND ACETAMINOPHEN 1 TABLET: 10; 325 TABLET ORAL at 05:10

## 2019-10-17 RX ADMIN — FAMOTIDINE 20 MG: 20 INJECTION, SOLUTION INTRAVENOUS at 09:10

## 2019-10-17 RX ADMIN — METHOCARBAMOL TABLETS 500 MG: 500 TABLET, COATED ORAL at 03:10

## 2019-10-17 RX ADMIN — METRONIDAZOLE 500 MG: 500 TABLET ORAL at 05:10

## 2019-10-17 NOTE — PLAN OF CARE
[facility] accepted patient and has obtained discharge orders. DEENA Chase has ordered the ostomy suppliers.     Disposition: [facility]    Transportation: POV         10/17/19 1520   Post-Acute Status   Post-Acute Authorization Home Health/Hospice   Home Health/Hospice Status Set-up Complete

## 2019-10-17 NOTE — PROGRESS NOTES
"[facility]  Adult Nutrition  Progress Note    SUMMARY   Interventions: General, healthful diet    Recommendations    1) Recommend changing to low fiber, soft diet, 2000 calorie diet.  2) Follow up with colostomy nutrition education  Goals: 1) PO intake >=85% estimated needs during admit.   Nutrition Goal Status: new  Communication of RD Recs: (POC, sticky note)    Reason for Assessment    Reason For Assessment: length of stay  Diagnosis: gastrointestinal disease, chronic pain syndrome with h/o hydrocodone use.   Relevant Medical History: depression, kidney stones  Interdisciplinary Rounds: did not attend  General Information Comments: RD covering remotely.  Chart review shows pt has been on regular diet x 2 days. Currently with abdominal distention.  Prior to that was on NPO/clear/full, briefly on regular.  Wt gain noted. see below.  NFPE, colostomy nutrition education pending.   Nutrition Discharge Planning: to be determined - recommend low fiber, soft diet    Nutrition Risk Screen    Nutrition Risk Screen: no indicators present    Nutrition/Diet History    Spiritual, Cultural Beliefs, Hindu Practices, Values that Affect Care: no  Food Allergies: (nuts and dairy)    Anthropometrics    Temp: 98.1 °F (36.7 °C)  Height Method: Stated  Height: 5' 10"  Height (inches): 70 in  Weight Method: Bed Scale  Weight: 101.3 kg (223 lb 5.2 oz)  Weight (lb): 223.33 lb  Ideal Body Weight (IBW), Male: 166 lb  % Ideal Body Weight, Male (lb): 134.54 lb  BMI (Calculated): 32.1  BMI Grade: 30 - 34.9- obesity - grade I  Usual Body Weight (UBW), k.3 kg(chart review 19)  % Usual Body Weight: 106.52  % Weight Change From Usual Weight: 6.3 %       Lab/Procedures/Meds    Pertinent Labs Reviewed: reviewed  Pertinent Labs Comments: albumin 3.5, hgb 10.5, hct 32.7  Pertinent Medications Reviewed: reviewed  Pertinent Medications Comments: LR @ 50 mls/hr, cipro, famotidine    Physical Findings/Assessment     "     Estimated/Assessed Needs    Weight Used For Calorie Calculations: 101.3 kg (223 lb 5.2 oz)  Energy Calorie Requirements (kcal): 1924  Energy Need Method: [place]-St Jeor  Protein Requirements:  (0.8-1.0 g/kg/day, age)  Weight Used For Protein Calculations: 101.3 kg (223 lb 5.2 oz)     Estimated Fluid Requirement Method: RDA Method(or per MD)  RDA Method (mL): 1924  CHO Requirement: n/a      Nutrition Prescription Ordered    Current Diet Order: regular    Evaluation of Received Nutrient/Fluid Intake    Energy Calories Required: not meeting needs  Protein Required: not meeting needs  Comments: Energy/Protein evaluation based on h/o minimal nutrition from 10/07 to 10/15. Mostly on NPO/clear.     Fluid Required: meeting needs  Comments: currently has abdominal distention  % Intake of Estimated Energy Needs: YAO (currently)  % Meal Intake: YAO (currently)    Nutrition Risk    Level of Risk/Frequency of Follow-up: (2 x wkly)     Assessment and Plan    Altered GI function r/t h/o bowel perforation and currently has colostomy as evidenced by current diagnosis.     Obesity (BMI 30-39.9)  Contributing Nutrition Diagnosis  obesity    Related to (etiology):   H/O excess energy intake    Signs and Symptoms (as evidenced by):   BMI >30    Interventions/Recommendations (treatment strategy):  See RD note    Nutrition Diagnosis Status:   New           Monitor and Evaluation    Food and Nutrient Intake: energy intake  Food and Nutrient Adminstration: diet order  Biochemical Data, Medical Tests and Procedures: electrolyte and renal panel, inflammatory profile(h/h)  Nutrition-Focused Physical Findings: overall appearance     Malnutrition Assessment     Skin (Micronutrient): (Usman score 20, with incision)  Teeth (Micronutrient): (WDL)       Subcutaneous Fat (Malnutrition): (NFPE pending)                         Nutrition Follow-Up  yes

## 2019-10-17 NOTE — PROGRESS NOTES
Follow up with Mr. Carlisle today. He is awake and alert, his wife is at the bedside. Abdomen assessed. LUQ colostomy noted with pouch intact, Patient now with some firm, some soft solid brown stool in pouch.  Pouch removed per patient with coaching. Stoma is moist and pink, protrudes from abdomen. He cleansed peristomal skin with water and gauze and patted dry, then sprayed cavilon skin barrier film on van stomal skin. He independently measured stoma- measures [id number]. Brava moldable ring then applied. One piece flat JESSY pouch cut to size and applied, pressing gently to seal. Patient only needed minimal coaching. Colostomy teaching including dietary, hygiene, and activity guidelines as well as reviewed emptying. Answered all questions. Patient discharging home today with home health. Patient and wife anxious, lengthy time spent providing encouragement, education, and resources.

## 2019-10-17 NOTE — PROGRESS NOTES
Discharge instructions given, patient verbalized understanding. Cardiac monitor removed, IV removed, tip intact. Wife return demonstrated flushing VARSHA drain with NS. Supplies for that and ostomy packed up with patient. No other concerns. Patient to be discharged once rx at bedside.

## 2019-10-17 NOTE — PLAN OF CARE
No acute distress noted. IV fluids infusing. Colostomy in place. VARSHA drain in place. Safety measures are in place. Call bell within reach. Pain being managed. Pt free of falls. Will continue to monitor. Chart check complete.

## 2019-10-17 NOTE — ASSESSMENT & PLAN NOTE
Contributing Nutrition Diagnosis  obesity    Related to (etiology):   H/O excess energy intake    Signs and Symptoms (as evidenced by):   BMI >30    Interventions/Recommendations (treatment strategy):  See RD note    Nutrition Diagnosis Status:   New

## 2019-10-17 NOTE — PLAN OF CARE
Patient remains free of falls and safety precautions maintained. Afebrile. Pain controlled. NSR. Ostomy passing stool and flatus. Ostomy and dressing changed per wound care. VARSHA drain care as needed. IV fluids and abx per order. Will continue to monitor. 12 hour chart check.

## 2019-10-17 NOTE — PLAN OF CARE
Interventions: General, healthful diet    Recommendations    1) Recommend changing to low fiber, soft diet, 2000 calorie diet.  2) Follow up with colostomy nutrition education  Goals: 1) PO intake >=85% estimated needs during admit.   Nutrition Goal Status: new  Communication of RD Recs: (POC, sticky note)

## 2019-10-18 PROCEDURE — G0180 MD CERTIFICATION HHA PATIENT: HCPCS | Mod: ,,, | Performed by: SURGERY

## 2019-10-18 PROCEDURE — G0180 PR HOME HEALTH MD CERTIFICATION: ICD-10-PCS | Mod: ,,, | Performed by: SURGERY

## 2019-10-18 NOTE — DISCHARGE SUMMARY
[facility] -   General Surgery  Discharge Summary      Patient Name: Dax Carlisle  MRN: [id number]  Admission Date: 10/7/2019  Hospital Length of Stay: 10 days  Discharge Date and Time: 10/17/2019  3:31 PM  Attending Physician: Yanelis att. providers found   Discharging Provider: Promise Fajardo PA-C  Primary Care Provider: Pramod Nuñez MD    HPI:   43-year-old male with history of opioid induced chronic constipation presented to the ER with complaints of lower abdominal pain and fever with nausea and emesis.  He was seen in the ER 3 days ago and treated with Cipro and Flagyl.  The pain persisted and he returned to the ER today with continued complaints.  He has a pertinent family history of colon cancer, his paternal grandmother  of colon cancer in her 50s.  Last colonoscopy in  showed no acute findings. He attempted an enema over the weekend with acute worsening of his pain. Workup showed leukocytosis and CT abdomen and pelvis confirmed pneumoperitoneum with evidence of perforated sigmoid colon.       Procedure(s) (LRB):  LAPAROTOMY, EXPLORATORY (N/A)  LAVAGE, PERITONEAL, THERAPEUTIC (N/A)  SIVAKUMAR PROCEDURE (N/A)  CREATION, COLOSTOMY (N/A)      Indwelling Lines/Drains at time of discharge:   Lines/Drains/Airways     Drain                 Colostomy 10/07/19 1900 LLQ 10 days         Closed/Suction Drain 10/15/19 1043 Left Abdomen Bulb 8.5 Fr. 2 days              Hospital Course: Postop day 1:  Tachycardic with low grade temps overnight.  Complains of auditory hallucinations.  Minimal sleep overnight.  Pain control with PCA use.  Ostomy viable.  NG tube with bilious output.  10/09/2019: pod 2: ostomy edematous but viable, no output. Pain controlled. Cardiology consulted for tachycardia. NG output bilious but less output.   10/10/2019: POD 3.  Continues to have tachycardia in the low 100s.  Minimal NG tube output, more clear.  Ambulating with PT.  10/11/2019: POD4. continues to have  tachycardia in 100's. Ostomy viable. No ostomy output.   10/12/2019: POD 5. C/o lower abdominal soreness. Ostomy viable with some gas to appliance. Ambulating. WBC 12,000. H/H stable.  10/13/2019: POD 6.  Much improved with nightly Robaxin.  Ostomy with small amount of gas to appliance.  Remains afebrile.  DON MADDOX.  Plan for CT abdomen and pelvis tomorrow.  10/14/2019: c/o abdominal pain. No nausea or vomiting. Afebrile wbc increased to 16k. CT abd/pelvis today. Restarted antibiotics.   10/15/2019: underwent IR intraabdominal abscess drainage.   10/16/2019; s/p IR abscess drainage. Drain with old blood tinged purulent fluid. Pain imrpoved. Wbc normal. No ostomy output  10/17/19: he had ostomy output, he tolerated a regular diet. His WBC was normal and he was afebrile. He was examined and found to be fit for discharge.   Consults:   Consults (From admission, onward)        Status Ordering Provider     Consult to Case Management/Social Work  Once     Provider:  (Not yet assigned)    Completed LISA BILLINGSLEY     Inpatient consult to Cardiology  Once     Provider:  Itz Braun MD    Completed LISA BILLINGSLEY     Inpatient consult to Social Work/Case Management  Once     Provider:  (Not yet assigned)    LISA De La Cruz            Pending Diagnostic Studies:     None        Final Active Diagnoses:    Diagnosis Date Noted POA    PRINCIPAL PROBLEM:  Bowel perforation [K63.1] 10/07/2019 Yes    Sinus tachycardia [R00.0] 10/08/2019 Yes    Chronic pain syndrome [G89.4] 04/27/2017 Yes    Obesity (BMI 30-39.9) [E66.9] 03/26/2015 Yes    MDD (major depressive disorder) [F32.9] 03/26/2015 Yes      Problems Resolved During this Admission:      Discharged Condition: good    Disposition: Home-Health Care [facility]    Follow Up:  Follow-up Information     [facility].    Specialty:  Home Health Services  Why:  Home Health  Contact information:  [address] [facility] [address]  [facility]  "[place] [address]  [phone]             Mindy Goff MD Today.    Specialty:  General Surgery  Why:  post op, For wound re-check- on 10/29  Contact information:  [address]  [place] [address]  [phone]                 Patient Instructions:      OSTOMY SUPPLIES FOR HOME USE   Order Comments: OSTOMY CARE SUPPLIES:  Coloplast  Sensura Kash One Piece Pouch #[id number]  Brava Moldable Ring #[id number]   Brava Skin Barrier Spray #[id number]  Ostomy Scissors #[id number]   Brava adhesive remover spray #[id number]  Brava lubricating deodorant #[id number]     Order Specific Question Answer Comments   Height: 5' 10" (1.778 m)    Weight: 101.3 kg (223 lb 5.2 oz)    Length of need (1-99 months): 99    Diagnosis Colostomy    Does patient have medical equipment at home? none    Vendor: Other (use comments) faxed to [employer])   Expected Date of Delivery: 10/17/2019      Diet Adult Regular     Lifting restrictions   Order Comments: 20lb limit     No driving until:   Order Comments: No longer taking narcotic pain medication     Notify your health care provider if you experience any of the following:  temperature >100.4     Notify your health care provider if you experience any of the following:  persistent nausea and vomiting or diarrhea     Notify your health care provider if you experience any of the following:  severe uncontrolled pain     Notify your health care provider if you experience any of the following:  redness, tenderness, or signs of infection (pain, swelling, redness, odor or green/yellow discharge around incision site)     Notify your health care provider if you experience any of the following:  difficulty breathing or increased cough     Change dressing (specify)   Order Comments: Dressing change: 1 times per day using gauze.     Activity as tolerated     Medications:  Reconciled Home Medications:      Medication List      CHANGE how you take these medications    ciprofloxacin HCl 500 MG " tablet  Commonly known as:  CIPRO  Take 1 tablet (500 mg total) by mouth every 12 (twelve) hours. for 10 days  What changed:  when to take this     * HYDROcodone-acetaminophen 5-325 mg per tablet  Commonly known as:  NORCO  Take 1 tablet by mouth every 6 (six) hours as needed for Pain.  What changed:  Another medication with the same name was added. Make sure you understand how and when to take each.     * HYDROcodone-acetaminophen 5-325 mg per tablet  Commonly known as:  NORCO  Take 1 tablet by mouth every 6 (six) hours as needed for Pain.  What changed:  You were already taking a medication with the same name, and this prescription was added. Make sure you understand how and when to take each.     metroNIDAZOLE 500 MG tablet  Commonly known as:  FLAGYL  Take 1 tablet (500 mg total) by mouth every 8 (eight) hours. for 10 days  What changed:  when to take this         * This list has 2 medication(s) that are the same as other medications prescribed for you. Read the directions carefully, and ask your doctor or other care provider to review them with you.            CONTINUE taking these medications    acetaminophen 325 MG tablet  Commonly known as:  TYLENOL  Take 325 mg by mouth every 6 (six) hours as needed for Pain.     * ARIPiprazole 5 MG Tab  Commonly known as:  ABILIFY  TAKE 1 TABLET BY MOUTH DAILY     * ARIPiprazole 5 MG Tab  Commonly known as:  ABILIFY  Take 1 tablet (5 mg total) by mouth once daily.     azelastine 137 mcg (0.1 %) nasal spray  Commonly known as:  ASTELIN  1 spray (137 mcg total) by Nasal route 2 (two) times daily.     cyclobenzaprine 10 MG tablet  Commonly known as:  FLEXERIL  Take 1 tablet (10 mg total) by mouth 3 (three) times daily as needed for Muscle spasms.     escitalopram oxalate 20 MG tablet  Commonly known as:  LEXAPRO  TAKE 1 TABLET (20 MG TOTAL) BY MOUTH ONCE DAILY.     linaCLOtide 290 mcg Cap capsule  Commonly known as:  LINZESS  Take 1 capsule (290 mcg total) by mouth once  daily.     loratadine 10 mg tablet  Commonly known as:  CLARITIN  Take 10 mg by mouth once daily.     meloxicam 15 MG tablet  Commonly known as:  MOBIC  Take 1 tablet (15 mg total) by mouth once daily.     PHENERGAN ORAL  Take by mouth.         * This list has 2 medication(s) that are the same as other medications prescribed for you. Read the directions carefully, and ask your doctor or other care provider to review them with you.              Time spent on the discharge of patient: 35 minutes    Promise Fajardo PA-C  General Surgery  [facility]

## 2019-10-18 NOTE — PLAN OF CARE
10/18/19 1323   Final Note   Assessment Type Final Discharge Note   Anticipated Discharge Disposition Home-Health   Right Care Referral Info   Post Acute Recommendation Home-care   Facility Name [facility]

## 2019-10-21 LAB
BACTERIA SPEC ANAEROBE CULT: ABNORMAL

## 2019-10-22 RX ORDER — HYDROCODONE BITARTRATE AND ACETAMINOPHEN 5; 325 MG/1; MG/1
1 TABLET ORAL EVERY 6 HOURS PRN
Qty: 120 TABLET | Refills: 0 | Status: SHIPPED | OUTPATIENT
Start: 2019-10-22 | End: 2019-11-19 | Stop reason: SDUPTHER

## 2019-10-25 ENCOUNTER — EXTERNAL HOME HEALTH (OUTPATIENT)
Dept: HOME HEALTH SERVICES | Facility: HOSPITAL | Age: 44
End: 2019-10-25
Payer: COMMERCIAL

## 2019-10-25 ENCOUNTER — TELEPHONE (OUTPATIENT)
Dept: ADMINISTRATIVE | Facility: CLINIC | Age: 44
End: 2019-10-25

## 2019-10-29 ENCOUNTER — LAB VISIT (OUTPATIENT)
Dept: LAB | Facility: HOSPITAL | Age: 44
End: 2019-10-29
Attending: SURGERY
Payer: COMMERCIAL

## 2019-10-29 ENCOUNTER — OFFICE VISIT (OUTPATIENT)
Dept: SURGERY | Facility: CLINIC | Age: 44
End: 2019-10-29
Payer: COMMERCIAL

## 2019-10-29 VITALS
BODY MASS INDEX: 28.47 KG/M2 | SYSTOLIC BLOOD PRESSURE: 115 MMHG | HEIGHT: 70 IN | WEIGHT: 198.88 LBS | TEMPERATURE: 99 F | DIASTOLIC BLOOD PRESSURE: 83 MMHG | HEART RATE: 100 BPM

## 2019-10-29 DIAGNOSIS — Z93.3 STATUS POST HARTMANN PROCEDURE: ICD-10-CM

## 2019-10-29 DIAGNOSIS — T81.43XA POSTPROCEDURAL INTRAABDOMINAL ABSCESS: Primary | ICD-10-CM

## 2019-10-29 DIAGNOSIS — T81.43XA POSTPROCEDURAL INTRAABDOMINAL ABSCESS: ICD-10-CM

## 2019-10-29 PROCEDURE — 99999 PR PBB SHADOW E&M-EST. PATIENT-LVL III: CPT | Mod: PBBFAC,,, | Performed by: SURGERY

## 2019-10-29 PROCEDURE — 99024 POSTOP FOLLOW-UP VISIT: CPT | Mod: S$GLB,,, | Performed by: SURGERY

## 2019-10-29 PROCEDURE — 99024 PR POST-OP FOLLOW-UP VISIT: ICD-10-PCS | Mod: S$GLB,,, | Performed by: SURGERY

## 2019-10-29 PROCEDURE — 99999 PR PBB SHADOW E&M-EST. PATIENT-LVL III: ICD-10-PCS | Mod: PBBFAC,,, | Performed by: SURGERY

## 2019-10-29 PROCEDURE — 80048 BASIC METABOLIC PNL TOTAL CA: CPT

## 2019-10-29 PROCEDURE — 36415 COLL VENOUS BLD VENIPUNCTURE: CPT

## 2019-10-29 RX ORDER — PREDNISONE 50 MG/1
50 TABLET ORAL
Qty: 3 TABLET | Refills: 0 | Status: SHIPPED | OUTPATIENT
Start: 2019-10-29 | End: 2019-12-17 | Stop reason: SDUPTHER

## 2019-10-29 RX ORDER — DIPHENHYDRAMINE HCL 50 MG
50 CAPSULE ORAL
Qty: 1 CAPSULE | Refills: 0 | Status: SHIPPED | OUTPATIENT
Start: 2019-10-29 | End: 2019-12-17 | Stop reason: SDUPTHER

## 2019-10-29 NOTE — PROGRESS NOTES
General Surgery     Postop Visit Note      Dax Christiano Orestes  43 y.o.    Review of patient's allergies indicates:   Allergen Reactions    Iodinated contrast media Swelling     Tongue, right eye, lips swelling. Rash on abdomen and axilla    Codeine     Dairy aid [lactase]     Morphine      Tolerated hydromorphone in October 2019.     Nuts [tree nut]        Vitals:    10/29/19 1254   BP: 115/83   Pulse: 100   Temp: 98.6 °F (37 °C)       SUBJECTIVE:  43 y.o. male presents s/p exploratory laparotomy with Kirsten's for perforated sigmoid diverticulitis complicated with postoperative abscess requiring IR drainage.  Doing well. Tolerating p.o. with 2 bowel movements to ostomy daily.  Denies fevers or chills.  Completed antibiotic therapy.  VARSHA drain in place with less than 30 cc daily of serous output.    OBJECTIVE:  Midline incision healing well. No evidence of infection.  Ostomy patent and productive with stool to colostomy bag  IR drain with serous output.  Staples removed    Pathology- reviewed  FINAL PATHOLOGIC DIAGNOSIS  1. Sigmoid colon (stitch marks perforation), segmental resection (20 cm in length):  - Gross and histologic evidence of transmural perforation (4 cm from nearest surgical margin)  - Associated acute chronic transmural inflammation with extension into pericolic fat resulting in abscess  formation  - Serosal adhesion formation with acute chronic serositis  - Margins histologically viable  - Negative for dysplasia or malignancy    ASSESSMENT:  Postprocedural intraabdominal abscess  -     CT Abdomen Pelvis With Contrast; Future; Expected date: 10/29/2019  -     Basic metabolic panel; Future; Expected date: 10/29/2019    Status post Kirsten procedure    Other orders  -     predniSONE (DELTASONE) 50 MG Tab; Take 1 tablet (50 mg total) by mouth On call Procedure (at 13 hours, 7 hours, and 1 hour before CT).  Dispense: 3 tablet; Refill: 0  -     diphenhydrAMINE (BENADRYL) 50 MG capsule; Take 1  capsule (50 mg total) by mouth On call Procedure for Allergies (1 hour prior to CT).  Dispense: 1 capsule; Refill: 0      Scheduled CT abdomen and pelvis to ensure resolution of pelvic abscess prior to removing drain.  Patient's contrast allergy.  Premedication prednisone and Benadryl sent to pharmacy.  BMP ordered.  FMLA on short-term disability paperwork filled out today.  Return to clinic in 1 week after CT    Overall plan for interval endoscopy with operative reversal by Dr. Choi.     Follow up in about 1 week (around 11/5/2019).    Mindy Goff

## 2019-10-29 NOTE — LETTER
October 29, 2019    Dax Carlisle  [address] [facility] [address]         [facility] - General Surgery  [address] [place] [address]  Phone: [phone]  Fax: [phone] October 29, 2019     Patient: Dax Carlisle   YOB: 1975   Date of Visit: 10/29/2019       To Whom It May Concern:    It is my medical opinion that Dax Carlisle may return to work on 11/18/19 after full 8 week recovery..    If you have any questions or concerns, please don't hesitate to call.    Sincerely,          Mindy Goff MD

## 2019-10-30 LAB
ANION GAP SERPL CALC-SCNC: 8 MMOL/L (ref 8–16)
BUN SERPL-MCNC: 7 MG/DL (ref 6–20)
CALCIUM SERPL-MCNC: 9.9 MG/DL (ref 8.7–10.5)
CHLORIDE SERPL-SCNC: 101 MMOL/L (ref 95–110)
CO2 SERPL-SCNC: 29 MMOL/L (ref 23–29)
CREAT SERPL-MCNC: 0.7 MG/DL (ref 0.5–1.4)
EST. GFR  (AFRICAN AMERICAN): >60 ML/MIN/1.73 M^2
EST. GFR  (NON AFRICAN AMERICAN): >60 ML/MIN/1.73 M^2
GLUCOSE SERPL-MCNC: 85 MG/DL (ref 70–110)
POTASSIUM SERPL-SCNC: 4.2 MMOL/L (ref 3.5–5.1)
SODIUM SERPL-SCNC: 138 MMOL/L (ref 136–145)

## 2019-10-31 ENCOUNTER — HOSPITAL ENCOUNTER (OUTPATIENT)
Dept: RADIOLOGY | Facility: HOSPITAL | Age: 44
Discharge: HOME | End: 2019-10-31
Attending: SURGERY
Payer: COMMERCIAL

## 2019-10-31 DIAGNOSIS — T81.43XA POSTPROCEDURAL INTRAABDOMINAL ABSCESS: ICD-10-CM

## 2019-10-31 PROCEDURE — 74177 CT ABD & PELVIS W/CONTRAST: CPT | Mod: TC

## 2019-10-31 PROCEDURE — 25500020 PHARM REV CODE 255: Performed by: SURGERY

## 2019-10-31 RX ADMIN — IOHEXOL 30 ML: 350 INJECTION, SOLUTION INTRAVENOUS at 11:10

## 2019-10-31 RX ADMIN — IOHEXOL 100 ML: 350 INJECTION, SOLUTION INTRAVENOUS at 11:10

## 2019-11-01 RX ORDER — ARIPIPRAZOLE 5 MG/1
TABLET ORAL
Qty: 90 TABLET | Refills: 1 | Status: SHIPPED | OUTPATIENT
Start: 2019-11-01 | End: 2020-04-27 | Stop reason: SDUPTHER

## 2019-11-05 ENCOUNTER — OFFICE VISIT (OUTPATIENT)
Dept: SURGERY | Facility: CLINIC | Age: 44
End: 2019-11-05
Payer: COMMERCIAL

## 2019-11-05 VITALS
HEART RATE: 99 BPM | DIASTOLIC BLOOD PRESSURE: 78 MMHG | SYSTOLIC BLOOD PRESSURE: 108 MMHG | BODY MASS INDEX: 28.56 KG/M2 | WEIGHT: 199.06 LBS | TEMPERATURE: 98 F

## 2019-11-05 DIAGNOSIS — Z93.3 STATUS POST HARTMANN PROCEDURE: Primary | ICD-10-CM

## 2019-11-05 PROBLEM — K63.1 BOWEL PERFORATION: Status: RESOLVED | Noted: 2019-10-07 | Resolved: 2019-11-05

## 2019-11-05 PROCEDURE — 99999 PR PBB SHADOW E&M-EST. PATIENT-LVL III: ICD-10-PCS | Mod: PBBFAC,,, | Performed by: SURGERY

## 2019-11-05 PROCEDURE — 99024 PR POST-OP FOLLOW-UP VISIT: ICD-10-PCS | Mod: S$GLB,,, | Performed by: SURGERY

## 2019-11-05 PROCEDURE — 99999 PR PBB SHADOW E&M-EST. PATIENT-LVL III: CPT | Mod: PBBFAC,,, | Performed by: SURGERY

## 2019-11-05 PROCEDURE — 99024 POSTOP FOLLOW-UP VISIT: CPT | Mod: S$GLB,,, | Performed by: SURGERY

## 2019-11-05 NOTE — PROGRESS NOTES
General Surgery     Postop Visit Note      Dax Alvarado Orestes  44 y.o.    Review of patient's allergies indicates:   Allergen Reactions    Iodinated contrast media Swelling     Tongue, right eye, lips swelling. Rash on abdomen and axilla    Codeine     Dairy aid [lactase]     Morphine      Tolerated hydromorphone in October 2019.     Nuts [tree nut]        Vitals:    11/05/19 1143   BP: 108/78   Pulse: 99   Temp: 97.9 °F (36.6 °C)       SUBJECTIVE:  44 y.o. male presents s/p ex lap, raf's for perforated diverticulitis.  CT scan showed resolution of pelvic abscess.  Here for drain removal. Reports mild amount of constipation with 1 bowel movement a day.  Given his history of chronic constipation, he plans to start MiraLax.  Has already incorporated fiber in his diet.    OBJECTIVE:  Midline incision healing well, no erythema, no drainage, no tenderness with palpation.   Ostomy viable  VARSHA drain removed    ASSESSMENT:  Status post Raf procedure     Will follow continually until planned endoscopy in January with referral to Dr. Choi for reversal.    Follow up in about 6 weeks (around 12/17/2019).    Mindy Goff

## 2019-11-05 NOTE — LETTER
November 5, 2019    Dax Carlisle  [address] [facility] [address]         [facility] - General Surgery  [address] [place] [address]  Phone: [phone]  Fax: [phone] November 5, 2019     Patient: Dax Carlisle   YOB: 1975   Date of Visit: 11/5/2019       To Whom It May Concern:    It is my medical opinion that Dax Carlisle may return to work on 12/2/19.    If you have any questions or concerns, please don't hesitate to call.    Sincerely,        Mindy Goff MD

## 2019-11-06 NOTE — PROGRESS NOTES
Subjective:      Patient ID: Dax Carlisle is a 44 y.o. male.    Chief Complaint: Pain of the Spine    Patient is here today as a referral from Pain Management for thoracic compression fracture    Patient does not remember any inciting events  Pain today is rated as 5/10 in severity radiating across the middle of his thoracic spine  No radiation to lower extremities  No numbness and tingling or any weakness is noted  Symptoms are worse with activity and better with rest  He describes it as a burning and aching sensation to the middle of his thoracic spine  No previous surgery  Ambulates unassisted    Review of Systems   Constitutional: Negative for activity change, appetite change and chills.   HENT: Negative for hearing loss, sore throat and tinnitus.    Eyes: Negative for pain, discharge and itching.   Cardiovascular: Negative for chest pain.   Gastrointestinal: Negative for abdominal pain.   Endocrine: Negative for cold intolerance and heat intolerance.   Genitourinary: Negative for difficulty urinating and dysuria.   Musculoskeletal: Positive for back pain and gait problem.   Allergic/Immunologic: Negative for environmental allergies.   Neurological: Negative for dizziness, tremors, light-headedness and headaches.   Hematological: Negative for adenopathy.   Psychiatric/Behavioral: Negative for agitation, behavioral problems and confusion.         Objective:       Neurosurgery Physical Exam  Ortho/SPM Exam    Nursing note and vitals reviewed  Gen:Oriented to person, place, and time.             Appears stated age   Skin: no rashes or lesions identified   Head:Normocephalic and atraumatic.  Nose: Nose normal.    Eyes: EOM are normal. Pupils are equal, round, and reactive to light.   Neck: Neck supple. No masses or lesions palpated  Cardiovascular: Intact distal pulses.    Abdominal: Soft.   Neurological: Alert and oriented to person, place, and time.  No cranial nerve deficit.  Coordination normal. Normal  muscle tone  Psychiatric: Normal mood and affect. Behavior is normal.      Back:   Tender to palpation bilaterally Paraspinal muscle spasms    Pain with extension better with flexion Pain with flexion and extention    Limited secondary to pain Range of motion    Neg  Straight leg raise     Motor   Right Right Left Left  Level Group   5  5  L2 Hip flexor (Psoas)   5  5  L3 Leg extension (Quads)   5  5  L4 Dorsiflexion & foot inversion (Tibialis Anterior)   5  5  L5 Great toe extension ( EHL)   5  5  S1 Foot eversion (Gastroc, PL & PB)     Sensation  NL Decreased (R/L/BL) Level Sensation    X  L2 Anterio-medial thigh   X  L3 Medial thigh around knee   X  L4 Medial foot   X  L5 Dorsum foot   X  S1 Lateral foot     Reflex  2+  Patellar tendon (L4)   2+  Achilles tendon (S1)     MRI thoracic spine  Acute to subacute compression fracture sequela 10th thoracic vertebral body with superior endplate impaction with minimal loss of height/anterior wedging with marrow edema.  Assessment:     1. Closed compression fracture of thoracic vertebra, initial encounter      Plan:     Closed compression fracture of thoracic vertebra, initial encounter  -     X-Ray Thoracolumbar Spine AP Lateral; Future; Expected date: 09/24/2019     Patient with compression fracture T10 and mid axial back pain  States that his pain continues to improve  Will follow-up back up in 4 weeks with an x-ray of the T and L-spine for review    Thank you for the referral   Please call with any questions    Mando Stephen MD  Neurosurgery

## 2019-11-19 RX ORDER — HYDROCODONE BITARTRATE AND ACETAMINOPHEN 5; 325 MG/1; MG/1
1 TABLET ORAL EVERY 6 HOURS PRN
Qty: 120 TABLET | Refills: 0 | Status: SHIPPED | OUTPATIENT
Start: 2019-11-19 | End: 2019-12-05

## 2019-12-01 ENCOUNTER — PATIENT MESSAGE (OUTPATIENT)
Dept: SURGERY | Facility: CLINIC | Age: 44
End: 2019-12-01

## 2019-12-05 ENCOUNTER — LAB VISIT (OUTPATIENT)
Dept: LAB | Facility: HOSPITAL | Age: 44
End: 2019-12-05
Attending: FAMILY MEDICINE
Payer: COMMERCIAL

## 2019-12-05 ENCOUNTER — OFFICE VISIT (OUTPATIENT)
Dept: INTERNAL MEDICINE | Facility: CLINIC | Age: 44
End: 2019-12-05
Payer: COMMERCIAL

## 2019-12-05 ENCOUNTER — HOSPITAL ENCOUNTER (OUTPATIENT)
Dept: RADIOLOGY | Facility: HOSPITAL | Age: 44
Discharge: HOME | End: 2019-12-05
Attending: FAMILY MEDICINE
Payer: COMMERCIAL

## 2019-12-05 VITALS
BODY MASS INDEX: 29.54 KG/M2 | SYSTOLIC BLOOD PRESSURE: 124 MMHG | HEART RATE: 104 BPM | HEIGHT: 70 IN | WEIGHT: 206.38 LBS | DIASTOLIC BLOOD PRESSURE: 80 MMHG | TEMPERATURE: 96 F

## 2019-12-05 DIAGNOSIS — M54.6 ACUTE MIDLINE THORACIC BACK PAIN: ICD-10-CM

## 2019-12-05 DIAGNOSIS — Z00.00 ANNUAL PHYSICAL EXAM: ICD-10-CM

## 2019-12-05 DIAGNOSIS — M54.6 ACUTE MIDLINE THORACIC BACK PAIN: Primary | ICD-10-CM

## 2019-12-05 DIAGNOSIS — K57.21 DIVERTICULITIS OF LARGE INTESTINE WITH ABSCESS WITH BLEEDING: ICD-10-CM

## 2019-12-05 LAB
CHOLEST SERPL-MCNC: 278 MG/DL (ref 120–199)
CHOLEST/HDLC SERPL: 4.4 {RATIO} (ref 2–5)
HDLC SERPL-MCNC: 63 MG/DL (ref 40–75)
HDLC SERPL: 22.7 % (ref 20–50)
LDLC SERPL CALC-MCNC: 186 MG/DL (ref 63–159)
NONHDLC SERPL-MCNC: 215 MG/DL
TRIGL SERPL-MCNC: 145 MG/DL (ref 30–150)

## 2019-12-05 PROCEDURE — 99214 OFFICE O/P EST MOD 30 MIN: CPT | Mod: 25,S$GLB,, | Performed by: FAMILY MEDICINE

## 2019-12-05 PROCEDURE — 90686 FLU VACCINE (QUAD) GREATER THAN OR EQUAL TO 3YO PRESERVATIVE FREE IM: ICD-10-PCS | Mod: S$GLB,,, | Performed by: FAMILY MEDICINE

## 2019-12-05 PROCEDURE — 99999 PR PBB SHADOW E&M-EST. PATIENT-LVL III: CPT | Mod: PBBFAC,,, | Performed by: FAMILY MEDICINE

## 2019-12-05 PROCEDURE — 90686 IIV4 VACC NO PRSV 0.5 ML IM: CPT | Mod: S$GLB,,, | Performed by: FAMILY MEDICINE

## 2019-12-05 PROCEDURE — 90471 IMMUNIZATION ADMIN: CPT | Mod: S$GLB,,, | Performed by: FAMILY MEDICINE

## 2019-12-05 PROCEDURE — 3008F BODY MASS INDEX DOCD: CPT | Mod: CPTII,S$GLB,, | Performed by: FAMILY MEDICINE

## 2019-12-05 PROCEDURE — 80061 LIPID PANEL: CPT

## 2019-12-05 PROCEDURE — 99214 PR OFFICE/OUTPT VISIT, EST, LEVL IV, 30-39 MIN: ICD-10-PCS | Mod: 25,S$GLB,, | Performed by: FAMILY MEDICINE

## 2019-12-05 PROCEDURE — 72070 X-RAY EXAM THORAC SPINE 2VWS: CPT | Mod: TC

## 2019-12-05 PROCEDURE — 72070 X-RAY EXAM THORAC SPINE 2VWS: CPT | Mod: 26,,, | Performed by: RADIOLOGY

## 2019-12-05 PROCEDURE — 36415 COLL VENOUS BLD VENIPUNCTURE: CPT

## 2019-12-05 PROCEDURE — 99999 PR PBB SHADOW E&M-EST. PATIENT-LVL III: ICD-10-PCS | Mod: PBBFAC,,, | Performed by: FAMILY MEDICINE

## 2019-12-05 PROCEDURE — 72070 XR THORACIC SPINE AP LATERAL: ICD-10-PCS | Mod: 26,,, | Performed by: RADIOLOGY

## 2019-12-05 PROCEDURE — 3008F PR BODY MASS INDEX (BMI) DOCUMENTED: ICD-10-PCS | Mod: CPTII,S$GLB,, | Performed by: FAMILY MEDICINE

## 2019-12-05 PROCEDURE — 90471 FLU VACCINE (QUAD) GREATER THAN OR EQUAL TO 3YO PRESERVATIVE FREE IM: ICD-10-PCS | Mod: S$GLB,,, | Performed by: FAMILY MEDICINE

## 2019-12-05 RX ORDER — HYDROCODONE BITARTRATE AND ACETAMINOPHEN 7.5; 325 MG/1; MG/1
1 TABLET ORAL EVERY 6 HOURS PRN
Qty: 120 TABLET | Refills: 0 | Status: SHIPPED | OUTPATIENT
Start: 2019-12-05 | End: 2020-01-06 | Stop reason: SDUPTHER

## 2019-12-05 RX ORDER — METHOCARBAMOL 750 MG/1
750 TABLET, FILM COATED ORAL 4 TIMES DAILY
Qty: 120 TABLET | Refills: 1 | Status: SHIPPED | OUTPATIENT
Start: 2019-12-05 | End: 2019-12-15

## 2019-12-05 NOTE — PROGRESS NOTES
Subjective:       Patient ID: Dax Carlisle is a 44 y.o. male.    Chief Complaint: Follow-up    Pt is a 44 year old here post hospitalization after having a perforated diverticulitis. Pt has a colostomy bag in place. Pt is feeling week and in considerable pain. He has follow-up for possible reversal. No fever.    Review of Systems   Constitutional: Negative.    Respiratory: Negative.    Cardiovascular: Negative.    Gastrointestinal: Negative.    Genitourinary: Negative.    Musculoskeletal: Positive for back pain.   Neurological: Negative.    Hematological: Negative.    Psychiatric/Behavioral: Negative.        Objective:      Physical Exam   Constitutional: He is oriented to person, place, and time.   Cardiovascular: Normal rate and regular rhythm.   Pulmonary/Chest: Effort normal and breath sounds normal. No stridor. He has no wheezes.   Neurological: He is alert and oriented to person, place, and time.   Skin: Skin is warm and dry.   Psychiatric: He has a normal mood and affect. His behavior is normal.       Assessment:       1. Acute midline thoracic back pain    2. Diverticulitis of large intestine with abscess with bleeding    3. Annual physical exam        Plan:       Acute midline thoracic back pain  -     X-Ray Lumbar Spine Complete 5 View; Future; Expected date: 12/05/2019  -     X-Ray Thoracic Spine AP Lateral; Future; Expected date: 12/05/2019    Diverticulitis of large intestine with abscess with bleeding    Annual physical exam  -     Lipid panel; Future; Expected date: 12/05/2019    Other orders  -     Influenza - Quadrivalent (PF)  -     HYDROcodone-acetaminophen (NORCO) 7.5-325 mg per tablet; Take 1 tablet by mouth every 6 (six) hours as needed for Pain.  Dispense: 120 tablet; Refill: 0  -     methocarbamol (ROBAXIN) 750 MG Tab; Take 1 tablet (750 mg total) by mouth 4 (four) times daily. for 10 days  Dispense: 120 tablet; Refill: 1

## 2019-12-06 ENCOUNTER — PATIENT MESSAGE (OUTPATIENT)
Dept: INTERNAL MEDICINE | Facility: CLINIC | Age: 44
End: 2019-12-06

## 2019-12-17 ENCOUNTER — OFFICE VISIT (OUTPATIENT)
Dept: SURGERY | Facility: CLINIC | Age: 44
End: 2019-12-17
Payer: COMMERCIAL

## 2019-12-17 VITALS
SYSTOLIC BLOOD PRESSURE: 121 MMHG | BODY MASS INDEX: 30.4 KG/M2 | WEIGHT: 211.88 LBS | TEMPERATURE: 98 F | DIASTOLIC BLOOD PRESSURE: 77 MMHG | HEART RATE: 80 BPM

## 2019-12-17 DIAGNOSIS — R10.32 LEFT LOWER QUADRANT ABDOMINAL PAIN: Primary | ICD-10-CM

## 2019-12-17 PROBLEM — K57.21 DIVERTICULITIS OF LARGE INTESTINE WITH ABSCESS WITH BLEEDING: Status: RESOLVED | Noted: 2019-12-05 | Resolved: 2019-12-17

## 2019-12-17 PROCEDURE — 99024 POSTOP FOLLOW-UP VISIT: CPT | Mod: S$GLB,,, | Performed by: SURGERY

## 2019-12-17 PROCEDURE — 99999 PR PBB SHADOW E&M-EST. PATIENT-LVL III: ICD-10-PCS | Mod: PBBFAC,,, | Performed by: SURGERY

## 2019-12-17 PROCEDURE — 99999 PR PBB SHADOW E&M-EST. PATIENT-LVL III: CPT | Mod: PBBFAC,,, | Performed by: SURGERY

## 2019-12-17 PROCEDURE — 99024 PR POST-OP FOLLOW-UP VISIT: ICD-10-PCS | Mod: S$GLB,,, | Performed by: SURGERY

## 2019-12-17 RX ORDER — PREDNISONE 50 MG/1
50 TABLET ORAL
Qty: 3 TABLET | Refills: 0 | Status: SHIPPED | OUTPATIENT
Start: 2019-12-17 | End: 2020-11-16

## 2019-12-17 RX ORDER — DIPHENHYDRAMINE HCL 50 MG
50 CAPSULE ORAL
Qty: 1 CAPSULE | Refills: 0 | Status: SHIPPED | OUTPATIENT
Start: 2019-12-17 | End: 2020-11-16

## 2019-12-17 NOTE — PROGRESS NOTES
General Surgery     Postop Visit Note      Dax Alvarado Orestes  44 y.o.    Review of patient's allergies indicates:   Allergen Reactions    Iodinated contrast media Swelling     Tongue, right eye, lips swelling. Rash on abdomen and axilla    Codeine     Dairy aid [lactase]     Morphine      Tolerated hydromorphone in October 2019.     Nuts [tree nut]        Vitals:    12/17/19 1029   BP: 121/77   Pulse: 80   Temp: 98.3 °F (36.8 °C)       SUBJECTIVE:  44 y.o. male presents s/p exploratory laparotomy with Kirsten's for perforated sigmoid diverticulitis complicated with postoperative abscess requiring IR drainage.    Doing well overall.  Tolerating diet with daily ostomy function.  Complained 24 hr onset of pain to the left lower quadrant last week.  He denies any associated fevers, chills, nausea, emesis, diarrhea, or skin changes.    OBJECTIVE:  Midline incision healing well  Ostomy patent and productive, mucosa viable  Abdomen soft, nontender  VARSHA drain site healed.      ASSESSMENT:  Left lower quadrant abdominal pain  -     CT Abdomen Pelvis With Contrast; Future; Expected date: 12/17/2019  -     Case request GI: COLONOSCOPY    Other orders  -     predniSONE (DELTASONE) 50 MG Tab; Take 1 tablet (50 mg total) by mouth On call Procedure (at 13 hours, 7 hours, and 1 hour before CT).  Dispense: 3 tablet; Refill: 0  -     diphenhydrAMINE (BENADRYL) 50 MG capsule; Take 1 capsule (50 mg total) by mouth On call Procedure for Allergies (1 hour prior to CT).  Dispense: 1 capsule; Refill: 0      Given the extent of his abscess postoperatively, will reimage due to this new pain to ensure abscess cavity has not reaccumulated.   Premedications for CT sent to pharmacy.  Will schedule to see Dr. Choi in January.  Case request placed for colonoscopy in January.  Will call with results of CT.    Follow up in about 4 weeks (around 1/14/2020).    Mindy Goff

## 2019-12-18 ENCOUNTER — OFFICE VISIT (OUTPATIENT)
Dept: NEUROSURGERY | Facility: CLINIC | Age: 44
End: 2019-12-18
Payer: COMMERCIAL

## 2019-12-18 VITALS
WEIGHT: 211.63 LBS | HEART RATE: 98 BPM | SYSTOLIC BLOOD PRESSURE: 129 MMHG | BODY MASS INDEX: 30.3 KG/M2 | HEIGHT: 70 IN | RESPIRATION RATE: 18 BRPM | DIASTOLIC BLOOD PRESSURE: 90 MMHG

## 2019-12-18 DIAGNOSIS — S22.000D CLOSED COMPRESSION FRACTURE OF THORACIC VERTEBRA WITH ROUTINE HEALING, SUBSEQUENT ENCOUNTER: Primary | ICD-10-CM

## 2019-12-18 PROCEDURE — 3008F BODY MASS INDEX DOCD: CPT | Mod: CPTII,S$GLB,, | Performed by: NEUROLOGICAL SURGERY

## 2019-12-18 PROCEDURE — 99999 PR PBB SHADOW E&M-EST. PATIENT-LVL III: CPT | Mod: PBBFAC,,, | Performed by: NEUROLOGICAL SURGERY

## 2019-12-18 PROCEDURE — 99213 OFFICE O/P EST LOW 20 MIN: CPT | Mod: S$GLB,,, | Performed by: NEUROLOGICAL SURGERY

## 2019-12-18 PROCEDURE — 99213 PR OFFICE/OUTPT VISIT, EST, LEVL III, 20-29 MIN: ICD-10-PCS | Mod: S$GLB,,, | Performed by: NEUROLOGICAL SURGERY

## 2019-12-18 PROCEDURE — 3008F PR BODY MASS INDEX (BMI) DOCUMENTED: ICD-10-PCS | Mod: CPTII,S$GLB,, | Performed by: NEUROLOGICAL SURGERY

## 2019-12-18 PROCEDURE — 99999 PR PBB SHADOW E&M-EST. PATIENT-LVL III: ICD-10-PCS | Mod: PBBFAC,,, | Performed by: NEUROLOGICAL SURGERY

## 2019-12-18 NOTE — PROGRESS NOTES
Subjective:      Patient ID: Dax Carlisle is a 44 y.o. male.    Chief Complaint: Mid-back Pain (Closed compression fracture of thoracic vertebra)    Patient is here today as a referral from Pain Management for thoracic compression fracture    Patient is here today with an x-ray for follow-up  He had a thoracic compression fracture neurologically intact  Since the last visit his pain has continued to improve  Denies any radiation down the lower extremities or any numbness and tingling  Denies any bowel bladder symptom  Ambulates unassisted    Mid-back Pain   Pertinent negatives include no abdominal pain, chest pain, chills, headaches or sore throat.     Review of Systems   Constitutional: Negative for activity change, appetite change and chills.   HENT: Negative for hearing loss, sore throat and tinnitus.    Eyes: Negative for pain, discharge and itching.   Cardiovascular: Negative for chest pain.   Gastrointestinal: Negative for abdominal pain.   Endocrine: Negative for cold intolerance and heat intolerance.   Genitourinary: Negative for difficulty urinating and dysuria.   Musculoskeletal: Positive for back pain and gait problem.   Allergic/Immunologic: Negative for environmental allergies.   Neurological: Negative for dizziness, tremors, light-headedness and headaches.   Hematological: Negative for adenopathy.   Psychiatric/Behavioral: Negative for agitation, behavioral problems and confusion.         Objective:       Neurosurgery Physical Exam  Ortho/SPM Exam    Nursing note and vitals reviewed  Gen:Oriented to person, place, and time.             Appears stated age   Skin: no rashes or lesions identified   Head:Normocephalic and atraumatic.  Nose: Nose normal.    Eyes: EOM are normal. Pupils are equal, round, and reactive to light.   Neck: Neck supple. No masses or lesions palpated  Cardiovascular: Intact distal pulses.    Abdominal: Soft.   Neurological: Alert and oriented to person, place, and time.   No cranial nerve deficit.  Coordination normal. Normal muscle tone  Psychiatric: Normal mood and affect. Behavior is normal.      Back:    Paraspinal muscle spasms     Pain with flexion and extention     Range of motion    Neg  Straight leg raise     Motor   Right Right Left Left  Level Group   5  5  L2 Hip flexor (Psoas)   5  5  L3 Leg extension (Quads)   5  5  L4 Dorsiflexion & foot inversion (Tibialis Anterior)   5  5  L5 Great toe extension ( EHL)   5  5  S1 Foot eversion (Gastroc, PL & PB)     Sensation  NL Decreased (R/L/BL) Level Sensation    X  L2 Anterio-medial thigh   X  L3 Medial thigh around knee   X  L4 Medial foot   X  L5 Dorsum foot   X  S1 Lateral foot     Reflex  2+  Patellar tendon (L4)   2+  Achilles tendon (S1)     X-ray done December 5th shows stable fracture without any change from previous images  There is also a CT chest abdomen pelvis done which you can incidentally see the fracture as well    Assessment:     1. Closed compression fracture of thoracic vertebra with routine healing, subsequent encounter      Plan:   Patient has had much improved lower back pain  Fracture appear stable  No acute neurosurgical intervention at this time  He will call us if symptoms change or has any new neurologic symptoms    Thank you for the referral   Please call with any questions    Mando Stephen MD  Neurosurgery

## 2019-12-19 ENCOUNTER — HOSPITAL ENCOUNTER (OUTPATIENT)
Dept: RADIOLOGY | Facility: HOSPITAL | Age: 44
Discharge: HOME | End: 2019-12-19
Attending: SURGERY
Payer: COMMERCIAL

## 2019-12-19 DIAGNOSIS — R10.32 LEFT LOWER QUADRANT ABDOMINAL PAIN: ICD-10-CM

## 2019-12-19 PROCEDURE — 74177 CT ABD & PELVIS W/CONTRAST: CPT | Mod: TC

## 2019-12-19 PROCEDURE — 25500020 PHARM REV CODE 255: Performed by: SURGERY

## 2019-12-19 RX ADMIN — IOHEXOL 75 ML: 350 INJECTION, SOLUTION INTRAVENOUS at 11:12

## 2019-12-25 ENCOUNTER — PATIENT MESSAGE (OUTPATIENT)
Dept: SURGERY | Facility: CLINIC | Age: 44
End: 2019-12-25

## 2020-01-06 RX ORDER — HYDROCODONE BITARTRATE AND ACETAMINOPHEN 7.5; 325 MG/1; MG/1
1 TABLET ORAL EVERY 6 HOURS PRN
Qty: 120 TABLET | Refills: 0 | Status: SHIPPED | OUTPATIENT
Start: 2020-01-06 | End: 2020-02-06 | Stop reason: SDUPTHER

## 2020-01-13 ENCOUNTER — OFFICE VISIT (OUTPATIENT)
Dept: SURGERY | Facility: CLINIC | Age: 45
End: 2020-01-13
Payer: COMMERCIAL

## 2020-01-13 ENCOUNTER — TELEPHONE (OUTPATIENT)
Dept: SURGERY | Facility: CLINIC | Age: 45
End: 2020-01-13

## 2020-01-13 ENCOUNTER — TELEPHONE (OUTPATIENT)
Dept: ENDOSCOPY | Facility: HOSPITAL | Age: 45
End: 2020-01-13

## 2020-01-13 ENCOUNTER — HOSPITAL ENCOUNTER (OUTPATIENT)
Dept: RADIOLOGY | Facility: HOSPITAL | Age: 45
Discharge: HOME | End: 2020-01-13
Attending: COLON & RECTAL SURGERY
Payer: COMMERCIAL

## 2020-01-13 ENCOUNTER — CLINICAL SUPPORT (OUTPATIENT)
Dept: CARDIOLOGY | Facility: CLINIC | Age: 45
End: 2020-01-13
Payer: COMMERCIAL

## 2020-01-13 DIAGNOSIS — K57.20 DIVERTICULITIS OF LARGE INTESTINE WITH PERFORATION AND ABSCESS WITHOUT BLEEDING: Primary | ICD-10-CM

## 2020-01-13 DIAGNOSIS — Z83.719 FAMILY HISTORY OF COLONIC POLYPS: ICD-10-CM

## 2020-01-13 DIAGNOSIS — K57.20 DIVERTICULITIS OF LARGE INTESTINE WITH PERFORATION AND ABSCESS WITHOUT BLEEDING: ICD-10-CM

## 2020-01-13 DIAGNOSIS — Z93.3 STATUS POST HARTMANN PROCEDURE: ICD-10-CM

## 2020-01-13 PROCEDURE — 93005 EKG 12-LEAD: ICD-10-PCS | Mod: S$GLB,,, | Performed by: COLON & RECTAL SURGERY

## 2020-01-13 PROCEDURE — 71045 X-RAY EXAM CHEST 1 VIEW: CPT | Mod: 26,,, | Performed by: RADIOLOGY

## 2020-01-13 PROCEDURE — 71045 XR CHEST 1 VIEW: ICD-10-PCS | Mod: 26,,, | Performed by: RADIOLOGY

## 2020-01-13 PROCEDURE — 99999 PR PBB SHADOW E&M-EST. PATIENT-LVL III: ICD-10-PCS | Mod: PBBFAC,,, | Performed by: COLON & RECTAL SURGERY

## 2020-01-13 PROCEDURE — 99214 OFFICE O/P EST MOD 30 MIN: CPT | Mod: S$GLB,,, | Performed by: COLON & RECTAL SURGERY

## 2020-01-13 PROCEDURE — 93005 ELECTROCARDIOGRAM TRACING: CPT | Mod: S$GLB,,, | Performed by: COLON & RECTAL SURGERY

## 2020-01-13 PROCEDURE — 93010 ELECTROCARDIOGRAM REPORT: CPT | Mod: S$GLB,,, | Performed by: INTERNAL MEDICINE

## 2020-01-13 PROCEDURE — 99214 PR OFFICE/OUTPT VISIT, EST, LEVL IV, 30-39 MIN: ICD-10-PCS | Mod: S$GLB,,, | Performed by: COLON & RECTAL SURGERY

## 2020-01-13 PROCEDURE — 93010 EKG 12-LEAD: ICD-10-PCS | Mod: S$GLB,,, | Performed by: INTERNAL MEDICINE

## 2020-01-13 PROCEDURE — 71045 X-RAY EXAM CHEST 1 VIEW: CPT | Mod: TC

## 2020-01-13 PROCEDURE — 99999 PR PBB SHADOW E&M-EST. PATIENT-LVL III: CPT | Mod: PBBFAC,,, | Performed by: COLON & RECTAL SURGERY

## 2020-01-13 RX ORDER — LIDOCAINE HYDROCHLORIDE 10 MG/ML
1 INJECTION, SOLUTION EPIDURAL; INFILTRATION; INTRACAUDAL; PERINEURAL ONCE
Status: DISCONTINUED | OUTPATIENT
Start: 2020-01-13 | End: 2020-02-05 | Stop reason: HOSPADM

## 2020-01-13 RX ORDER — GABAPENTIN 100 MG/1
800 CAPSULE ORAL
Status: CANCELLED | OUTPATIENT
Start: 2020-01-13 | End: 2020-01-13

## 2020-01-13 RX ORDER — ACETAMINOPHEN 325 MG/1
1000 TABLET ORAL ONCE
Status: CANCELLED | OUTPATIENT
Start: 2020-01-13 | End: 2020-01-13

## 2020-01-13 RX ORDER — SODIUM CHLORIDE, SODIUM LACTATE, POTASSIUM CHLORIDE, CALCIUM CHLORIDE 600; 310; 30; 20 MG/100ML; MG/100ML; MG/100ML; MG/100ML
INJECTION, SOLUTION INTRAVENOUS CONTINUOUS
Status: CANCELLED | OUTPATIENT
Start: 2020-01-13

## 2020-01-13 RX ORDER — METRONIDAZOLE 500 MG/100ML
500 INJECTION, SOLUTION INTRAVENOUS
Status: CANCELLED | OUTPATIENT
Start: 2020-01-13

## 2020-01-13 RX ORDER — NEOMYCIN SULFATE 500 MG/1
1000 TABLET ORAL 3 TIMES DAILY
Qty: 6 TABLET | Refills: 0 | Status: ON HOLD | OUTPATIENT
Start: 2020-01-13 | End: 2020-01-30

## 2020-01-13 RX ORDER — METRONIDAZOLE 500 MG/1
500 TABLET ORAL 3 TIMES DAILY
Qty: 3 TABLET | Refills: 0 | Status: ON HOLD | OUTPATIENT
Start: 2020-01-13 | End: 2020-01-30

## 2020-01-13 RX ORDER — HEPARIN SODIUM 5000 [USP'U]/ML
5000 INJECTION, SOLUTION INTRAVENOUS; SUBCUTANEOUS
Status: CANCELLED | OUTPATIENT
Start: 2020-01-13 | End: 2020-01-13

## 2020-01-13 RX ORDER — CELECOXIB 100 MG/1
400 CAPSULE ORAL
Status: CANCELLED | OUTPATIENT
Start: 2020-01-13 | End: 2020-01-13

## 2020-01-13 RX ORDER — SODIUM, POTASSIUM,MAG SULFATES 17.5-3.13G
1 SOLUTION, RECONSTITUTED, ORAL ORAL DAILY
Qty: 1 KIT | Refills: 0 | Status: SHIPPED | OUTPATIENT
Start: 2020-01-13 | End: 2020-01-15

## 2020-01-13 RX ORDER — ONDANSETRON 2 MG/ML
4 INJECTION INTRAMUSCULAR; INTRAVENOUS EVERY 12 HOURS PRN
Status: CANCELLED | OUTPATIENT
Start: 2020-01-13

## 2020-01-13 RX ORDER — POLYETHYLENE GLYCOL 3350 17 G/17G
238 POWDER, FOR SOLUTION ORAL DAILY
Qty: 1 BOTTLE | Refills: 0 | Status: ON HOLD | OUTPATIENT
Start: 2020-01-13 | End: 2020-01-30

## 2020-01-13 NOTE — TELEPHONE ENCOUNTER
----- Message from Kadeem Choi MD sent at 1/13/2020  8:00 AM CST -----  I added this pt on for a colonoscopy this Friday 1/17 at 9am. Have called in his Suprep. Please add to schedule.    Thanks,  SEAN

## 2020-01-13 NOTE — LETTER
January 13, 2020      Mindy Goff MD  [address]  [place] [address]            [facility]  [address], [address]  [place] [address]  Phone: [phone]  Fax: [phone]          Patient: Dax Carlisle   MR Number: [id number]   YOB: 1975   Date of Visit: 1/13/2020       Dear Dr. Mindy Goff:    Thank you for referring Dax Carlisle to me for evaluation. Attached you will find relevant portions of my assessment and plan of care.    If you have questions, please do not hesitate to call me. I look forward to following Dax Carlisle along with you.    Sincerely,    Kadeem Choi MD    Enclosure  CC:  No Recipients    If you would like to receive this communication electronically, please contact [email or web address] or [phone] to request more information on [employer] access.    For providers and/or their staff who would like to refer a patient to [facility], please contact us through our one-stop-shop provider referral line, [facility], at [phone].    If you feel you have received this communication in error or would no longer like to receive these types of communications, please e-mail [email or web address]

## 2020-01-13 NOTE — PROGRESS NOTES
History & Physical    SUBJECTIVE:     CC: Discuss colostomy reversal  Ref: Mindy Goff MD    History of Present Illness:  Patient is a 44 y.o. male presents for evaluation colostomy reversal.  Patient had perforated sigmoid diverticulitis in October 2019 requiring Kirsten's procedure. He did have a postoperative abscess or required IR drainage but has recovered well since that time.  He is currently tolerating a regular diet and having good colostomy function.  He denies any leaks.  His last colonoscopy was in 2016 and he has no history of polyps.  He does have a positive family history of colorectal cancer in his grandmother as well as colon polyps in his mother.  He denies any current bleeding per rectum or from his ostomy.  He currently denies any fever, chills, nausea, vomiting.  He does endorse low transit constipation reports a history of IBS-C. Reports weak urine stream currently since last operation, as well as some lateral abdominal wall pain since surgery. I have discussed this case personally with his referring provider and reviewed all his previous medical records.      Review of patient's allergies indicates:   Allergen Reactions    Iodinated contrast media Swelling     Tongue, right eye, lips swelling. Rash on abdomen and axilla    Codeine     Dairy aid [lactase]     Morphine      Tolerated hydromorphone in October 2019.     Nuts [tree nut]        Current Outpatient Medications   Medication Sig Dispense Refill    acetaminophen (TYLENOL) 325 MG tablet Take 325 mg by mouth every 6 (six) hours as needed for Pain.      ARIPiprazole (ABILIFY) 5 MG Tab Take 1 tablet (5 mg total) by mouth once daily. 90 tablet 1    ARIPiprazole (ABILIFY) 5 MG Tab TAKE 1 TABLET BY MOUTH DAILY 90 tablet 1    azelastine (ASTELIN) 137 mcg (0.1 %) nasal spray 1 spray (137 mcg total) by Nasal route 2 (two) times daily. 30 mL 3    diphenhydrAMINE (BENADRYL) 50 MG capsule Take 1 capsule (50 mg total) by mouth On call  Procedure for Allergies (1 hour prior to CT). 1 capsule 0    escitalopram oxalate (LEXAPRO) 20 MG tablet TAKE 1 TABLET (20 MG TOTAL) BY MOUTH ONCE DAILY. 90 tablet 1    HYDROcodone-acetaminophen (NORCO) 7.5-325 mg per tablet Take 1 tablet by mouth every 6 (six) hours as needed for Pain. 120 tablet 0    linaclotide 290 mcg Cap Take 1 capsule (290 mcg total) by mouth once daily. 90 capsule 1    loratadine (CLARITIN) 10 mg tablet Take 10 mg by mouth once daily.      meloxicam (MOBIC) 15 MG tablet Take 1 tablet (15 mg total) by mouth once daily. 30 tablet 1    metroNIDAZOLE (FLAGYL) 500 MG tablet Take 1 tablet (500 mg total) by mouth 3 (three) times daily. Take initial dose@2pm, second dose@3pm and final dose@10pm day before surgery 3 tablet 0    neomycin (MYCIFRADIN) 500 mg Tab Take 2 tablets (1,000 mg total) by mouth 3 (three) times daily. Take 1st dose@2pm,take 2nd dose@3pm, take last dose@10pm day before surgery 6 tablet 0    polyethylene glycol (GLYCOLAX) 17 gram/dose powder Take 238 g by mouth once daily. Mix with 2L of gatorade, drink all mixed solution starting@12pm day before surgery, finish by 10pm 1 Bottle 0    predniSONE (DELTASONE) 50 MG Tab Take 1 tablet (50 mg total) by mouth On call Procedure (at 13 hours, 7 hours, and 1 hour before CT). 3 tablet 0    promethazine HCl (PHENERGAN ORAL) Take by mouth.      sodium,potassium,mag sulfates (SUPREP BOWEL PREP KIT) 17.5-3.13-1.6 gram SolR Take 177 mLs by mouth once daily. for 2 days 1 kit 0     Current Facility-Administered Medications   Medication Dose Route Frequency Provider Last Rate Last Dose    lidocaine (PF) 10 mg/ml (1%) injection 10 mg  1 mL Intradermal Once Kadeem Choi MD           Past Medical History:   Diagnosis Date    Angioedema of lips 3/13/2015    IVP dye allergy - swelling lips, eye, tongue    Depression     Kidney stones      Past Surgical History:   Procedure Laterality Date    ABDOMINAL WASHOUT N/A 10/7/2019     Procedure: LAVAGE, PERITONEAL, THERAPEUTIC;  Surgeon: Mindy Goff MD;  Location: [facility] OR;  Service: General;  Laterality: N/A;  abdomen washout    anal fistula repair      APPENDECTOMY      BACK SURGERY      CHOLECYSTECTOMY      COLONOSCOPY N/A 3/10/2016    Procedure: COLONOSCOPY;  Surgeon: Juvenal Dinero MD;  Location: [facility] ENDO;  Service: Endoscopy;  Laterality: N/A;    COLOSTOMY N/A 10/7/2019    Procedure: CREATION, COLOSTOMY;  Surgeon: Mindy Goff MD;  Location: [facility] OR;  Service: General;  Laterality: N/A;    gall bladder removal  2005    SIVAKUMAR PROCEDURE N/A 10/7/2019    Procedure: SIVAKUMAR PROCEDURE;  Surgeon: Mindy Goff MD;  Location: [facility] OR;  Service: General;  Laterality: N/A;    KNEE SURGERY Right     KNEE SURGERY Left     PILONIDAL CYST DRAINAGE      pt has had 6 of these adn has had revisions     Family History   Problem Relation Age of Onset    Heart disease Father         CABG age 66    Cancer Father         melanoma    Diabetes Father     Diabetes Mother     Colon polyps Mother     Colon polyps Sister     Diabetes Maternal Grandmother     Colon cancer Maternal Grandmother     Diabetes Maternal Grandfather     Diabetes Paternal Grandmother     Stroke Paternal Grandmother     Diabetes Paternal Grandfather      Social History     Tobacco Use    Smoking status: Never Smoker    Smokeless tobacco: Never Used   Substance Use Topics    Alcohol use: No     Frequency: Never     Binge frequency: Never    Drug use: No        Review of Systems:  Review of Systems   Constitutional: Negative for activity change, appetite change, chills, fatigue, fever and unexpected weight change.   HENT: Negative for congestion, ear pain, sore throat and trouble swallowing.    Eyes: Negative for pain, redness and itching.   Respiratory: Negative for cough, shortness of breath and wheezing.    Cardiovascular: Negative for chest pain, palpitations and leg swelling.    Gastrointestinal: Positive for abdominal pain. Negative for abdominal distention, anal bleeding, blood in stool, constipation, diarrhea, nausea, rectal pain and vomiting.   Endocrine: Negative for cold intolerance, heat intolerance and polyuria.   Genitourinary: Negative for dysuria, flank pain, frequency and hematuria.   Musculoskeletal: Positive for back pain. Negative for gait problem, joint swelling and neck pain.   Skin: Negative for color change, rash and wound.   Allergic/Immunologic: Negative for environmental allergies and immunocompromised state.   Neurological: Negative for dizziness, speech difficulty, weakness and numbness.   Psychiatric/Behavioral: Negative for agitation, confusion and hallucinations.       OBJECTIVE:     Vital Signs (Most Recent)              Physical Exam:  Physical Exam   Constitutional: He is oriented to person, place, and time. He appears well-developed.   HENT:   Head: Normocephalic and atraumatic.   Eyes: Conjunctivae and EOM are normal.   Neck: Normal range of motion. No thyromegaly present.   Cardiovascular: Normal rate and regular rhythm.   Pulmonary/Chest: Effort normal. No respiratory distress.   Abdominal: Soft. He exhibits no distension and no mass.   Mild LLQ TTP; midline incision well-healed; ostomy pink and viable with stool in bag in LLQ   Musculoskeletal: Normal range of motion. He exhibits no edema or tenderness.   Neurological: He is alert and oriented to person, place, and time.   Skin: Skin is warm and dry. Capillary refill takes less than 2 seconds. No rash noted.   Psychiatric: He has a normal mood and affect.       Laboratory  Lab Results   Component Value Date    WBC 10.81 10/16/2019    HGB 10.5 (L) 10/16/2019    HCT 32.7 (L) 10/16/2019     (H) 10/16/2019    CHOL 278 (H) 12/05/2019    TRIG 145 12/05/2019    HDL 63 12/05/2019    ALT 34 10/07/2019    AST 15 10/07/2019     10/29/2019    K 4.2 10/29/2019     10/29/2019    CREATININE 0.7  10/29/2019    BUN 7 10/29/2019    CO2 29 10/29/2019    INR 1.0 01/10/2016    HGBA1C 5.0 03/26/2015         Pathology Results:  FINAL PATHOLOGIC DIAGNOSIS  1. Sigmoid colon (stitch marks perforation), segmental resection (20 cm in length):  - Gross and histologic evidence of transmural perforation (4 cm from nearest surgical margin)  - Associated acute chronic transmural inflammation with extension into pericolic fat resulting in abscess  formation  - Serosal adhesion formation with acute chronic serositis  - Margins histologically viable  - Negative for dysplasia or malignancy    ASSESSMENT/PLAN:     43yo F with previous perforated sigmoid diverticulitis requiring Kirsten's procedure and October 2019 who presents to discuss colostomy reversal    - discussed that given his high risk nature given his family history as well as last colonoscopy being greater than 1 year ago, we should proceed with colonoscopy prior to surgical intervention to evaluate the rest of the colon to ensure there is no proximal or distal lesion that would need to be addressed at the same time.  He is agreeable to do this and we will perform this within the next 1-2 weeks with Suprep  - assuming colonoscopy is negative, will plan for Kirsten's reversal with colorectal anastomosis in the near future. This will be an open procedure.  - All risks, benefits and alternatives fully explained to patient. Risks include, but are not limited to, bleeding, infection, anastomotic leak, damage to ureter, damage to other intra-abdominal organs such as colon, rectum, small bowel, stomach, liver, bladder, reproductive organs, sexual dysfunction, urinary dysfunction, postoperative abscess, perioperative MI, CVA and death.  All questions field and appropriately answered to patient's satisfaction.  Consent signed and placed on chart.  - Oral abx and mechanical bowel prep day of surgery  - labs, ekg and cxr preop today    Diverticulitis of large intestine with  perforation and abscess without bleeding  -     Case request GI: COLONOSCOPY  -     sodium,potassium,mag sulfates (SUPREP BOWEL PREP KIT) 17.5-3.13-1.6 gram SolR; Take 177 mLs by mouth once daily. for 2 days  Dispense: 1 kit; Refill: 0  -     metroNIDAZOLE (FLAGYL) 500 MG tablet; Take 1 tablet (500 mg total) by mouth 3 (three) times daily. Take initial dose@2pm, second dose@3pm and final dose@10pm day before surgery  Dispense: 3 tablet; Refill: 0  -     neomycin (MYCIFRADIN) 500 mg Tab; Take 2 tablets (1,000 mg total) by mouth 3 (three) times daily. Take 1st dose@2pm,take 2nd dose@3pm, take last dose@10pm day before surgery  Dispense: 6 tablet; Refill: 0  -     polyethylene glycol (GLYCOLAX) 17 gram/dose powder; Take 238 g by mouth once daily. Mix with 2L of gatorade, drink all mixed solution starting@12pm day before surgery, finish by 10pm  Dispense: 1 Bottle; Refill: 0  -     Case Request Operating Room: CLOSURE, COLOSTOMY (open, lithotomy)  -     EKG 12-lead; Future  -     X-Ray Chest 1 View; Future; Expected date: 01/13/2020  -     CBC auto differential; Future; Expected date: 01/13/2020  -     COMPREHENSIVE METABOLIC PANEL; Future; Expected date: 01/13/2020    Family history of colonic polyps  -     Case request GI: COLONOSCOPY  -     sodium,potassium,mag sulfates (SUPREP BOWEL PREP KIT) 17.5-3.13-1.6 gram SolR; Take 177 mLs by mouth once daily. for 2 days  Dispense: 1 kit; Refill: 0  -     metroNIDAZOLE (FLAGYL) 500 MG tablet; Take 1 tablet (500 mg total) by mouth 3 (three) times daily. Take initial dose@2pm, second dose@3pm and final dose@10pm day before surgery  Dispense: 3 tablet; Refill: 0  -     neomycin (MYCIFRADIN) 500 mg Tab; Take 2 tablets (1,000 mg total) by mouth 3 (three) times daily. Take 1st dose@2pm,take 2nd dose@3pm, take last dose@10pm day before surgery  Dispense: 6 tablet; Refill: 0  -     polyethylene glycol (GLYCOLAX) 17 gram/dose powder; Take 238 g by mouth once daily. Mix with 2L of  gatorade, drink all mixed solution starting@12pm day before surgery, finish by 10pm  Dispense: 1 Bottle; Refill: 0  -     Case Request Operating Room: CLOSURE, COLOSTOMY (open, lithotomy)  -     EKG 12-lead; Future  -     X-Ray Chest 1 View; Future; Expected date: 01/13/2020  -     CBC auto differential; Future; Expected date: 01/13/2020  -     COMPREHENSIVE METABOLIC PANEL; Future; Expected date: 01/13/2020    Other orders  -     Progressive Mobility Protocol (mobilize patient to their highest level of functioning at least twice daily); Standing  -     Insert peripheral IV; Standing  -     lidocaine (PF) 10 mg/ml (1%) injection 10 mg  -     Full code; Standing  -     Ambulate; Standing  -     Up in chair; Standing    Kadeem Choi MD  Colon and Rectal Surgery  [facility]

## 2020-01-13 NOTE — H&P (VIEW-ONLY)
History & Physical    SUBJECTIVE:     CC: Discuss colostomy reversal  Ref: Mindy Goff MD    History of Present Illness:  Patient is a 44 y.o. male presents for evaluation colostomy reversal.  Patient had perforated sigmoid diverticulitis in October 2019 requiring Kirsten's procedure. He did have a postoperative abscess or required IR drainage but has recovered well since that time.  He is currently tolerating a regular diet and having good colostomy function.  He denies any leaks.  His last colonoscopy was in 2016 and he has no history of polyps.  He does have a positive family history of colorectal cancer in his grandmother as well as colon polyps in his mother.  He denies any current bleeding per rectum or from his ostomy.  He currently denies any fever, chills, nausea, vomiting.  He does endorse low transit constipation reports a history of IBS-C. Reports weak urine stream currently since last operation, as well as some lateral abdominal wall pain since surgery. I have discussed this case personally with his referring provider and reviewed all his previous medical records.      Review of patient's allergies indicates:   Allergen Reactions    Iodinated contrast media Swelling     Tongue, right eye, lips swelling. Rash on abdomen and axilla    Codeine     Dairy aid [lactase]     Morphine      Tolerated hydromorphone in October 2019.     Nuts [tree nut]        Current Outpatient Medications   Medication Sig Dispense Refill    acetaminophen (TYLENOL) 325 MG tablet Take 325 mg by mouth every 6 (six) hours as needed for Pain.      ARIPiprazole (ABILIFY) 5 MG Tab Take 1 tablet (5 mg total) by mouth once daily. 90 tablet 1    ARIPiprazole (ABILIFY) 5 MG Tab TAKE 1 TABLET BY MOUTH DAILY 90 tablet 1    azelastine (ASTELIN) 137 mcg (0.1 %) nasal spray 1 spray (137 mcg total) by Nasal route 2 (two) times daily. 30 mL 3    diphenhydrAMINE (BENADRYL) 50 MG capsule Take 1 capsule (50 mg total) by mouth On call  Procedure for Allergies (1 hour prior to CT). 1 capsule 0    escitalopram oxalate (LEXAPRO) 20 MG tablet TAKE 1 TABLET (20 MG TOTAL) BY MOUTH ONCE DAILY. 90 tablet 1    HYDROcodone-acetaminophen (NORCO) 7.5-325 mg per tablet Take 1 tablet by mouth every 6 (six) hours as needed for Pain. 120 tablet 0    linaclotide 290 mcg Cap Take 1 capsule (290 mcg total) by mouth once daily. 90 capsule 1    loratadine (CLARITIN) 10 mg tablet Take 10 mg by mouth once daily.      meloxicam (MOBIC) 15 MG tablet Take 1 tablet (15 mg total) by mouth once daily. 30 tablet 1    metroNIDAZOLE (FLAGYL) 500 MG tablet Take 1 tablet (500 mg total) by mouth 3 (three) times daily. Take initial dose@2pm, second dose@3pm and final dose@10pm day before surgery 3 tablet 0    neomycin (MYCIFRADIN) 500 mg Tab Take 2 tablets (1,000 mg total) by mouth 3 (three) times daily. Take 1st dose@2pm,take 2nd dose@3pm, take last dose@10pm day before surgery 6 tablet 0    polyethylene glycol (GLYCOLAX) 17 gram/dose powder Take 238 g by mouth once daily. Mix with 2L of gatorade, drink all mixed solution starting@12pm day before surgery, finish by 10pm 1 Bottle 0    predniSONE (DELTASONE) 50 MG Tab Take 1 tablet (50 mg total) by mouth On call Procedure (at 13 hours, 7 hours, and 1 hour before CT). 3 tablet 0    promethazine HCl (PHENERGAN ORAL) Take by mouth.      sodium,potassium,mag sulfates (SUPREP BOWEL PREP KIT) 17.5-3.13-1.6 gram SolR Take 177 mLs by mouth once daily. for 2 days 1 kit 0     Current Facility-Administered Medications   Medication Dose Route Frequency Provider Last Rate Last Dose    lidocaine (PF) 10 mg/ml (1%) injection 10 mg  1 mL Intradermal Once Kadeem Choi MD           Past Medical History:   Diagnosis Date    Angioedema of lips 3/13/2015    IVP dye allergy - swelling lips, eye, tongue    Depression     Kidney stones      Past Surgical History:   Procedure Laterality Date    ABDOMINAL WASHOUT N/A 10/7/2019     Procedure: LAVAGE, PERITONEAL, THERAPEUTIC;  Surgeon: Mindy Goff MD;  Location: [facility] OR;  Service: General;  Laterality: N/A;  abdomen washout    anal fistula repair      APPENDECTOMY      BACK SURGERY      CHOLECYSTECTOMY      COLONOSCOPY N/A 3/10/2016    Procedure: COLONOSCOPY;  Surgeon: Juvenal Dinero MD;  Location: [facility] ENDO;  Service: Endoscopy;  Laterality: N/A;    COLOSTOMY N/A 10/7/2019    Procedure: CREATION, COLOSTOMY;  Surgeon: Mindy Goff MD;  Location: [facility] OR;  Service: General;  Laterality: N/A;    gall bladder removal  2005    SIVAKUMAR PROCEDURE N/A 10/7/2019    Procedure: SIVAKUMAR PROCEDURE;  Surgeon: Mindy Goff MD;  Location: [facility] OR;  Service: General;  Laterality: N/A;    KNEE SURGERY Right     KNEE SURGERY Left     PILONIDAL CYST DRAINAGE      pt has had 6 of these adn has had revisions     Family History   Problem Relation Age of Onset    Heart disease Father         CABG age 66    Cancer Father         melanoma    Diabetes Father     Diabetes Mother     Colon polyps Mother     Colon polyps Sister     Diabetes Maternal Grandmother     Colon cancer Maternal Grandmother     Diabetes Maternal Grandfather     Diabetes Paternal Grandmother     Stroke Paternal Grandmother     Diabetes Paternal Grandfather      Social History     Tobacco Use    Smoking status: Never Smoker    Smokeless tobacco: Never Used   Substance Use Topics    Alcohol use: No     Frequency: Never     Binge frequency: Never    Drug use: No        Review of Systems:  Review of Systems   Constitutional: Negative for activity change, appetite change, chills, fatigue, fever and unexpected weight change.   HENT: Negative for congestion, ear pain, sore throat and trouble swallowing.    Eyes: Negative for pain, redness and itching.   Respiratory: Negative for cough, shortness of breath and wheezing.    Cardiovascular: Negative for chest pain, palpitations and leg swelling.    Gastrointestinal: Positive for abdominal pain. Negative for abdominal distention, anal bleeding, blood in stool, constipation, diarrhea, nausea, rectal pain and vomiting.   Endocrine: Negative for cold intolerance, heat intolerance and polyuria.   Genitourinary: Negative for dysuria, flank pain, frequency and hematuria.   Musculoskeletal: Positive for back pain. Negative for gait problem, joint swelling and neck pain.   Skin: Negative for color change, rash and wound.   Allergic/Immunologic: Negative for environmental allergies and immunocompromised state.   Neurological: Negative for dizziness, speech difficulty, weakness and numbness.   Psychiatric/Behavioral: Negative for agitation, confusion and hallucinations.       OBJECTIVE:     Vital Signs (Most Recent)              Physical Exam:  Physical Exam   Constitutional: He is oriented to person, place, and time. He appears well-developed.   HENT:   Head: Normocephalic and atraumatic.   Eyes: Conjunctivae and EOM are normal.   Neck: Normal range of motion. No thyromegaly present.   Cardiovascular: Normal rate and regular rhythm.   Pulmonary/Chest: Effort normal. No respiratory distress.   Abdominal: Soft. He exhibits no distension and no mass.   Mild LLQ TTP; midline incision well-healed; ostomy pink and viable with stool in bag in LLQ   Musculoskeletal: Normal range of motion. He exhibits no edema or tenderness.   Neurological: He is alert and oriented to person, place, and time.   Skin: Skin is warm and dry. Capillary refill takes less than 2 seconds. No rash noted.   Psychiatric: He has a normal mood and affect.       Laboratory  Lab Results   Component Value Date    WBC 10.81 10/16/2019    HGB 10.5 (L) 10/16/2019    HCT 32.7 (L) 10/16/2019     (H) 10/16/2019    CHOL 278 (H) 12/05/2019    TRIG 145 12/05/2019    HDL 63 12/05/2019    ALT 34 10/07/2019    AST 15 10/07/2019     10/29/2019    K 4.2 10/29/2019     10/29/2019    CREATININE 0.7  10/29/2019    BUN 7 10/29/2019    CO2 29 10/29/2019    INR 1.0 01/10/2016    HGBA1C 5.0 03/26/2015         Pathology Results:  FINAL PATHOLOGIC DIAGNOSIS  1. Sigmoid colon (stitch marks perforation), segmental resection (20 cm in length):  - Gross and histologic evidence of transmural perforation (4 cm from nearest surgical margin)  - Associated acute chronic transmural inflammation with extension into pericolic fat resulting in abscess  formation  - Serosal adhesion formation with acute chronic serositis  - Margins histologically viable  - Negative for dysplasia or malignancy    ASSESSMENT/PLAN:     45yo F with previous perforated sigmoid diverticulitis requiring Kirsten's procedure and October 2019 who presents to discuss colostomy reversal    - discussed that given his high risk nature given his family history as well as last colonoscopy being greater than 1 year ago, we should proceed with colonoscopy prior to surgical intervention to evaluate the rest of the colon to ensure there is no proximal or distal lesion that would need to be addressed at the same time.  He is agreeable to do this and we will perform this within the next 1-2 weeks with Suprep  - assuming colonoscopy is negative, will plan for Kirsten's reversal with colorectal anastomosis in the near future. This will be an open procedure.  - All risks, benefits and alternatives fully explained to patient. Risks include, but are not limited to, bleeding, infection, anastomotic leak, damage to ureter, damage to other intra-abdominal organs such as colon, rectum, small bowel, stomach, liver, bladder, reproductive organs, sexual dysfunction, urinary dysfunction, postoperative abscess, perioperative MI, CVA and death.  All questions field and appropriately answered to patient's satisfaction.  Consent signed and placed on chart.  - Oral abx and mechanical bowel prep day of surgery  - labs, ekg and cxr preop today    Diverticulitis of large intestine with  perforation and abscess without bleeding  -     Case request GI: COLONOSCOPY  -     sodium,potassium,mag sulfates (SUPREP BOWEL PREP KIT) 17.5-3.13-1.6 gram SolR; Take 177 mLs by mouth once daily. for 2 days  Dispense: 1 kit; Refill: 0  -     metroNIDAZOLE (FLAGYL) 500 MG tablet; Take 1 tablet (500 mg total) by mouth 3 (three) times daily. Take initial dose@2pm, second dose@3pm and final dose@10pm day before surgery  Dispense: 3 tablet; Refill: 0  -     neomycin (MYCIFRADIN) 500 mg Tab; Take 2 tablets (1,000 mg total) by mouth 3 (three) times daily. Take 1st dose@2pm,take 2nd dose@3pm, take last dose@10pm day before surgery  Dispense: 6 tablet; Refill: 0  -     polyethylene glycol (GLYCOLAX) 17 gram/dose powder; Take 238 g by mouth once daily. Mix with 2L of gatorade, drink all mixed solution starting@12pm day before surgery, finish by 10pm  Dispense: 1 Bottle; Refill: 0  -     Case Request Operating Room: CLOSURE, COLOSTOMY (open, lithotomy)  -     EKG 12-lead; Future  -     X-Ray Chest 1 View; Future; Expected date: 01/13/2020  -     CBC auto differential; Future; Expected date: 01/13/2020  -     COMPREHENSIVE METABOLIC PANEL; Future; Expected date: 01/13/2020    Family history of colonic polyps  -     Case request GI: COLONOSCOPY  -     sodium,potassium,mag sulfates (SUPREP BOWEL PREP KIT) 17.5-3.13-1.6 gram SolR; Take 177 mLs by mouth once daily. for 2 days  Dispense: 1 kit; Refill: 0  -     metroNIDAZOLE (FLAGYL) 500 MG tablet; Take 1 tablet (500 mg total) by mouth 3 (three) times daily. Take initial dose@2pm, second dose@3pm and final dose@10pm day before surgery  Dispense: 3 tablet; Refill: 0  -     neomycin (MYCIFRADIN) 500 mg Tab; Take 2 tablets (1,000 mg total) by mouth 3 (three) times daily. Take 1st dose@2pm,take 2nd dose@3pm, take last dose@10pm day before surgery  Dispense: 6 tablet; Refill: 0  -     polyethylene glycol (GLYCOLAX) 17 gram/dose powder; Take 238 g by mouth once daily. Mix with 2L of  gatorade, drink all mixed solution starting@12pm day before surgery, finish by 10pm  Dispense: 1 Bottle; Refill: 0  -     Case Request Operating Room: CLOSURE, COLOSTOMY (open, lithotomy)  -     EKG 12-lead; Future  -     X-Ray Chest 1 View; Future; Expected date: 01/13/2020  -     CBC auto differential; Future; Expected date: 01/13/2020  -     COMPREHENSIVE METABOLIC PANEL; Future; Expected date: 01/13/2020    Other orders  -     Progressive Mobility Protocol (mobilize patient to their highest level of functioning at least twice daily); Standing  -     Insert peripheral IV; Standing  -     lidocaine (PF) 10 mg/ml (1%) injection 10 mg  -     Full code; Standing  -     Ambulate; Standing  -     Up in chair; Standing    Kadeem Choi MD  Colon and Rectal Surgery  [facility]

## 2020-01-13 NOTE — TELEPHONE ENCOUNTER
Spoke to Mona - Confirmed the 4 e-scripts Dr Choi sent over this morning. Mona verbalized that she just needed to confirm the 4 sent today were correct. I confirmed they are.     ----- Message from Michael Lyon sent at 1/13/2020  2:50 PM CST -----  Contact: anna  Type:  Pharmacy Calling to Clarify an RX    Name of Caller:mona   [facility] Name[facility]  Prescription Name: glycolax  What do they need to clarify?:verifiy prescription due to failed e-script  Best Call Back Number:[phone]  Additional Information: none    Thanks,  Michael Lyon

## 2020-01-13 NOTE — TELEPHONE ENCOUNTER
Endoscopy Scheduling Questionnaire:    1. Have you been admitted overnight to the hospital in the past 3 months? yes patient had an appt. with dr huma gaona on 1-.  2. Have you had a cardiac stent placed in the past 12 months? no  3. Have you had a stroke or heart attack in the past 6 months? no  4. Have you had any chest pain in the past 3 months? no      If so, have you been evaluated by your PCP or Cardiologist? no  5. Do you take prescription weight loss medication? no  6. Have you been diagnosed with Diverticulitis within the past 3 months? yes  7. Are you on dialysis? no  8. Are you diabetic? no Do you have an insulin pump? no  9. Do you have any other health issues that you feel might limit your ability to safely have the procedure and/or sedation? no  10. Is the patient over 79 yo? no        If so, has the patient been seen by their PCP or GI in the last 6 months? N/A       -I have reviewed the last colonoscopy for recommendations regarding surveillance and bowel prep  Yes  -I have reviewed the patient's medications and allergies. He is not on high risk medication, A clearance request NA  -I have verified the pharmacy information. The prep being used is Suprep. The patient's prep instructions were sent via [facility] patient portal..    Date Endoscopy Scheduled: Date: 1- alves

## 2020-01-13 NOTE — H&P (VIEW-ONLY)
History & Physical    SUBJECTIVE:     CC: Discuss colostomy reversal  Ref: Mindy Goff MD    History of Present Illness:  Patient is a 44 y.o. male presents for evaluation colostomy reversal.  Patient had perforated sigmoid diverticulitis in October 2019 requiring Kirsten's procedure. He did have a postoperative abscess or required IR drainage but has recovered well since that time.  He is currently tolerating a regular diet and having good colostomy function.  He denies any leaks.  His last colonoscopy was in 2016 and he has no history of polyps.  He does have a positive family history of colorectal cancer in his grandmother as well as colon polyps in his mother.  He denies any current bleeding per rectum or from his ostomy.  He currently denies any fever, chills, nausea, vomiting.  He does endorse low transit constipation reports a history of IBS-C. Reports weak urine stream currently since last operation, as well as some lateral abdominal wall pain since surgery. I have discussed this case personally with his referring provider and reviewed all his previous medical records.      Review of patient's allergies indicates:   Allergen Reactions    Iodinated contrast media Swelling     Tongue, right eye, lips swelling. Rash on abdomen and axilla    Codeine     Dairy aid [lactase]     Morphine      Tolerated hydromorphone in October 2019.     Nuts [tree nut]        Current Outpatient Medications   Medication Sig Dispense Refill    acetaminophen (TYLENOL) 325 MG tablet Take 325 mg by mouth every 6 (six) hours as needed for Pain.      ARIPiprazole (ABILIFY) 5 MG Tab Take 1 tablet (5 mg total) by mouth once daily. 90 tablet 1    ARIPiprazole (ABILIFY) 5 MG Tab TAKE 1 TABLET BY MOUTH DAILY 90 tablet 1    azelastine (ASTELIN) 137 mcg (0.1 %) nasal spray 1 spray (137 mcg total) by Nasal route 2 (two) times daily. 30 mL 3    diphenhydrAMINE (BENADRYL) 50 MG capsule Take 1 capsule (50 mg total) by mouth On call  Procedure for Allergies (1 hour prior to CT). 1 capsule 0    escitalopram oxalate (LEXAPRO) 20 MG tablet TAKE 1 TABLET (20 MG TOTAL) BY MOUTH ONCE DAILY. 90 tablet 1    HYDROcodone-acetaminophen (NORCO) 7.5-325 mg per tablet Take 1 tablet by mouth every 6 (six) hours as needed for Pain. 120 tablet 0    linaclotide 290 mcg Cap Take 1 capsule (290 mcg total) by mouth once daily. 90 capsule 1    loratadine (CLARITIN) 10 mg tablet Take 10 mg by mouth once daily.      meloxicam (MOBIC) 15 MG tablet Take 1 tablet (15 mg total) by mouth once daily. 30 tablet 1    metroNIDAZOLE (FLAGYL) 500 MG tablet Take 1 tablet (500 mg total) by mouth 3 (three) times daily. Take initial dose@2pm, second dose@3pm and final dose@10pm day before surgery 3 tablet 0    neomycin (MYCIFRADIN) 500 mg Tab Take 2 tablets (1,000 mg total) by mouth 3 (three) times daily. Take 1st dose@2pm,take 2nd dose@3pm, take last dose@10pm day before surgery 6 tablet 0    polyethylene glycol (GLYCOLAX) 17 gram/dose powder Take 238 g by mouth once daily. Mix with 2L of gatorade, drink all mixed solution starting@12pm day before surgery, finish by 10pm 1 Bottle 0    predniSONE (DELTASONE) 50 MG Tab Take 1 tablet (50 mg total) by mouth On call Procedure (at 13 hours, 7 hours, and 1 hour before CT). 3 tablet 0    promethazine HCl (PHENERGAN ORAL) Take by mouth.      sodium,potassium,mag sulfates (SUPREP BOWEL PREP KIT) 17.5-3.13-1.6 gram SolR Take 177 mLs by mouth once daily. for 2 days 1 kit 0     Current Facility-Administered Medications   Medication Dose Route Frequency Provider Last Rate Last Dose    lidocaine (PF) 10 mg/ml (1%) injection 10 mg  1 mL Intradermal Once Kadeem Choi MD           Past Medical History:   Diagnosis Date    Angioedema of lips 3/13/2015    IVP dye allergy - swelling lips, eye, tongue    Depression     Kidney stones      Past Surgical History:   Procedure Laterality Date    ABDOMINAL WASHOUT N/A 10/7/2019     Procedure: LAVAGE, PERITONEAL, THERAPEUTIC;  Surgeon: Mindy Goff MD;  Location: [facility] OR;  Service: General;  Laterality: N/A;  abdomen washout    anal fistula repair      APPENDECTOMY      BACK SURGERY      CHOLECYSTECTOMY      COLONOSCOPY N/A 3/10/2016    Procedure: COLONOSCOPY;  Surgeon: Juvenal Dinero MD;  Location: [facility] ENDO;  Service: Endoscopy;  Laterality: N/A;    COLOSTOMY N/A 10/7/2019    Procedure: CREATION, COLOSTOMY;  Surgeon: Mindy Goff MD;  Location: [facility] OR;  Service: General;  Laterality: N/A;    gall bladder removal  2005    SIVAKUMAR PROCEDURE N/A 10/7/2019    Procedure: SIVAKUMAR PROCEDURE;  Surgeon: Mindy Goff MD;  Location: [facility] OR;  Service: General;  Laterality: N/A;    KNEE SURGERY Right     KNEE SURGERY Left     PILONIDAL CYST DRAINAGE      pt has had 6 of these adn has had revisions     Family History   Problem Relation Age of Onset    Heart disease Father         CABG age 66    Cancer Father         melanoma    Diabetes Father     Diabetes Mother     Colon polyps Mother     Colon polyps Sister     Diabetes Maternal Grandmother     Colon cancer Maternal Grandmother     Diabetes Maternal Grandfather     Diabetes Paternal Grandmother     Stroke Paternal Grandmother     Diabetes Paternal Grandfather      Social History     Tobacco Use    Smoking status: Never Smoker    Smokeless tobacco: Never Used   Substance Use Topics    Alcohol use: No     Frequency: Never     Binge frequency: Never    Drug use: No        Review of Systems:  Review of Systems   Constitutional: Negative for activity change, appetite change, chills, fatigue, fever and unexpected weight change.   HENT: Negative for congestion, ear pain, sore throat and trouble swallowing.    Eyes: Negative for pain, redness and itching.   Respiratory: Negative for cough, shortness of breath and wheezing.    Cardiovascular: Negative for chest pain, palpitations and leg swelling.    Gastrointestinal: Positive for abdominal pain. Negative for abdominal distention, anal bleeding, blood in stool, constipation, diarrhea, nausea, rectal pain and vomiting.   Endocrine: Negative for cold intolerance, heat intolerance and polyuria.   Genitourinary: Negative for dysuria, flank pain, frequency and hematuria.   Musculoskeletal: Positive for back pain. Negative for gait problem, joint swelling and neck pain.   Skin: Negative for color change, rash and wound.   Allergic/Immunologic: Negative for environmental allergies and immunocompromised state.   Neurological: Negative for dizziness, speech difficulty, weakness and numbness.   Psychiatric/Behavioral: Negative for agitation, confusion and hallucinations.       OBJECTIVE:     Vital Signs (Most Recent)              Physical Exam:  Physical Exam   Constitutional: He is oriented to person, place, and time. He appears well-developed.   HENT:   Head: Normocephalic and atraumatic.   Eyes: Conjunctivae and EOM are normal.   Neck: Normal range of motion. No thyromegaly present.   Cardiovascular: Normal rate and regular rhythm.   Pulmonary/Chest: Effort normal. No respiratory distress.   Abdominal: Soft. He exhibits no distension and no mass.   Mild LLQ TTP; midline incision well-healed; ostomy pink and viable with stool in bag in LLQ   Musculoskeletal: Normal range of motion. He exhibits no edema or tenderness.   Neurological: He is alert and oriented to person, place, and time.   Skin: Skin is warm and dry. Capillary refill takes less than 2 seconds. No rash noted.   Psychiatric: He has a normal mood and affect.       Laboratory  Lab Results   Component Value Date    WBC 10.81 10/16/2019    HGB 10.5 (L) 10/16/2019    HCT 32.7 (L) 10/16/2019     (H) 10/16/2019    CHOL 278 (H) 12/05/2019    TRIG 145 12/05/2019    HDL 63 12/05/2019    ALT 34 10/07/2019    AST 15 10/07/2019     10/29/2019    K 4.2 10/29/2019     10/29/2019    CREATININE 0.7  10/29/2019    BUN 7 10/29/2019    CO2 29 10/29/2019    INR 1.0 01/10/2016    HGBA1C 5.0 03/26/2015         Pathology Results:  FINAL PATHOLOGIC DIAGNOSIS  1. Sigmoid colon (stitch marks perforation), segmental resection (20 cm in length):  - Gross and histologic evidence of transmural perforation (4 cm from nearest surgical margin)  - Associated acute chronic transmural inflammation with extension into pericolic fat resulting in abscess  formation  - Serosal adhesion formation with acute chronic serositis  - Margins histologically viable  - Negative for dysplasia or malignancy    ASSESSMENT/PLAN:     43yo F with previous perforated sigmoid diverticulitis requiring Kirsten's procedure and October 2019 who presents to discuss colostomy reversal    - discussed that given his high risk nature given his family history as well as last colonoscopy being greater than 1 year ago, we should proceed with colonoscopy prior to surgical intervention to evaluate the rest of the colon to ensure there is no proximal or distal lesion that would need to be addressed at the same time.  He is agreeable to do this and we will perform this within the next 1-2 weeks with Suprep  - assuming colonoscopy is negative, will plan for Kirsten's reversal with colorectal anastomosis in the near future. This will be an open procedure.  - All risks, benefits and alternatives fully explained to patient. Risks include, but are not limited to, bleeding, infection, anastomotic leak, damage to ureter, damage to other intra-abdominal organs such as colon, rectum, small bowel, stomach, liver, bladder, reproductive organs, sexual dysfunction, urinary dysfunction, postoperative abscess, perioperative MI, CVA and death.  All questions field and appropriately answered to patient's satisfaction.  Consent signed and placed on chart.  - Oral abx and mechanical bowel prep day of surgery  - labs, ekg and cxr preop today    Diverticulitis of large intestine with  perforation and abscess without bleeding  -     Case request GI: COLONOSCOPY  -     sodium,potassium,mag sulfates (SUPREP BOWEL PREP KIT) 17.5-3.13-1.6 gram SolR; Take 177 mLs by mouth once daily. for 2 days  Dispense: 1 kit; Refill: 0  -     metroNIDAZOLE (FLAGYL) 500 MG tablet; Take 1 tablet (500 mg total) by mouth 3 (three) times daily. Take initial dose@2pm, second dose@3pm and final dose@10pm day before surgery  Dispense: 3 tablet; Refill: 0  -     neomycin (MYCIFRADIN) 500 mg Tab; Take 2 tablets (1,000 mg total) by mouth 3 (three) times daily. Take 1st dose@2pm,take 2nd dose@3pm, take last dose@10pm day before surgery  Dispense: 6 tablet; Refill: 0  -     polyethylene glycol (GLYCOLAX) 17 gram/dose powder; Take 238 g by mouth once daily. Mix with 2L of gatorade, drink all mixed solution starting@12pm day before surgery, finish by 10pm  Dispense: 1 Bottle; Refill: 0  -     Case Request Operating Room: CLOSURE, COLOSTOMY (open, lithotomy)  -     EKG 12-lead; Future  -     X-Ray Chest 1 View; Future; Expected date: 01/13/2020  -     CBC auto differential; Future; Expected date: 01/13/2020  -     COMPREHENSIVE METABOLIC PANEL; Future; Expected date: 01/13/2020    Family history of colonic polyps  -     Case request GI: COLONOSCOPY  -     sodium,potassium,mag sulfates (SUPREP BOWEL PREP KIT) 17.5-3.13-1.6 gram SolR; Take 177 mLs by mouth once daily. for 2 days  Dispense: 1 kit; Refill: 0  -     metroNIDAZOLE (FLAGYL) 500 MG tablet; Take 1 tablet (500 mg total) by mouth 3 (three) times daily. Take initial dose@2pm, second dose@3pm and final dose@10pm day before surgery  Dispense: 3 tablet; Refill: 0  -     neomycin (MYCIFRADIN) 500 mg Tab; Take 2 tablets (1,000 mg total) by mouth 3 (three) times daily. Take 1st dose@2pm,take 2nd dose@3pm, take last dose@10pm day before surgery  Dispense: 6 tablet; Refill: 0  -     polyethylene glycol (GLYCOLAX) 17 gram/dose powder; Take 238 g by mouth once daily. Mix with 2L of  gatorade, drink all mixed solution starting@12pm day before surgery, finish by 10pm  Dispense: 1 Bottle; Refill: 0  -     Case Request Operating Room: CLOSURE, COLOSTOMY (open, lithotomy)  -     EKG 12-lead; Future  -     X-Ray Chest 1 View; Future; Expected date: 01/13/2020  -     CBC auto differential; Future; Expected date: 01/13/2020  -     COMPREHENSIVE METABOLIC PANEL; Future; Expected date: 01/13/2020    Other orders  -     Progressive Mobility Protocol (mobilize patient to their highest level of functioning at least twice daily); Standing  -     Insert peripheral IV; Standing  -     lidocaine (PF) 10 mg/ml (1%) injection 10 mg  -     Full code; Standing  -     Ambulate; Standing  -     Up in chair; Standing    Kadeem Choi MD  Colon and Rectal Surgery  [facility]

## 2020-01-14 ENCOUNTER — PATIENT MESSAGE (OUTPATIENT)
Dept: ENDOSCOPY | Facility: HOSPITAL | Age: 45
End: 2020-01-14

## 2020-01-17 ENCOUNTER — ANESTHESIA (OUTPATIENT)
Dept: ENDOSCOPY | Facility: HOSPITAL | Age: 45
End: 2020-01-17
Payer: COMMERCIAL

## 2020-01-17 ENCOUNTER — ANESTHESIA EVENT (OUTPATIENT)
Dept: ENDOSCOPY | Facility: HOSPITAL | Age: 45
End: 2020-01-17
Payer: COMMERCIAL

## 2020-01-17 ENCOUNTER — HOSPITAL ENCOUNTER (OUTPATIENT)
Facility: HOSPITAL | Age: 45
Discharge: HOME | End: 2020-01-17
Attending: COLON & RECTAL SURGERY | Admitting: COLON & RECTAL SURGERY
Payer: COMMERCIAL

## 2020-01-17 VITALS
HEART RATE: 80 BPM | HEIGHT: 70 IN | SYSTOLIC BLOOD PRESSURE: 129 MMHG | OXYGEN SATURATION: 100 % | RESPIRATION RATE: 18 BRPM | BODY MASS INDEX: 29.82 KG/M2 | DIASTOLIC BLOOD PRESSURE: 83 MMHG | WEIGHT: 208.31 LBS | TEMPERATURE: 99 F

## 2020-01-17 DIAGNOSIS — Z83.719 FAMILY HISTORY OF POLYPS IN THE COLON: Primary | ICD-10-CM

## 2020-01-17 DIAGNOSIS — Z12.11 SCREENING FOR COLON CANCER: ICD-10-CM

## 2020-01-17 DIAGNOSIS — Z93.3 STATUS POST HARTMANN PROCEDURE: ICD-10-CM

## 2020-01-17 PROCEDURE — 37000008 HC ANESTHESIA 1ST 15 MINUTES: Performed by: COLON & RECTAL SURGERY

## 2020-01-17 PROCEDURE — 63600175 PHARM REV CODE 636 W HCPCS: Performed by: NURSE ANESTHETIST, CERTIFIED REGISTERED

## 2020-01-17 PROCEDURE — 44389 PR COLONOSCOPY THRU STOMA,BIOPSY: ICD-10-PCS | Mod: 33,,, | Performed by: COLON & RECTAL SURGERY

## 2020-01-17 PROCEDURE — 37000009 HC ANESTHESIA EA ADD 15 MINS: Performed by: COLON & RECTAL SURGERY

## 2020-01-17 PROCEDURE — 44389 COLONOSCOPY WITH BIOPSY: CPT | Performed by: COLON & RECTAL SURGERY

## 2020-01-17 PROCEDURE — 44389 COLONOSCOPY WITH BIOPSY: CPT | Mod: 33,,, | Performed by: COLON & RECTAL SURGERY

## 2020-01-17 PROCEDURE — 27201012 HC FORCEPS, HOT/COLD, DISP: Performed by: COLON & RECTAL SURGERY

## 2020-01-17 RX ORDER — SODIUM CHLORIDE, SODIUM LACTATE, POTASSIUM CHLORIDE, CALCIUM CHLORIDE 600; 310; 30; 20 MG/100ML; MG/100ML; MG/100ML; MG/100ML
INJECTION, SOLUTION INTRAVENOUS CONTINUOUS PRN
Status: DISCONTINUED | OUTPATIENT
Start: 2020-01-17 | End: 2020-01-17

## 2020-01-17 RX ORDER — PROPOFOL 10 MG/ML
VIAL (ML) INTRAVENOUS
Status: DISCONTINUED | OUTPATIENT
Start: 2020-01-17 | End: 2020-01-17

## 2020-01-17 RX ORDER — SODIUM CHLORIDE, SODIUM LACTATE, POTASSIUM CHLORIDE, CALCIUM CHLORIDE 600; 310; 30; 20 MG/100ML; MG/100ML; MG/100ML; MG/100ML
INJECTION, SOLUTION INTRAVENOUS CONTINUOUS
Status: DISCONTINUED | OUTPATIENT
Start: 2020-01-17 | End: 2020-01-17 | Stop reason: HOSPADM

## 2020-01-17 RX ADMIN — PROPOFOL 30 MG: 10 INJECTION, EMULSION INTRAVENOUS at 09:01

## 2020-01-17 RX ADMIN — PROPOFOL 20 MG: 10 INJECTION, EMULSION INTRAVENOUS at 09:01

## 2020-01-17 RX ADMIN — PROPOFOL 50 MG: 10 INJECTION, EMULSION INTRAVENOUS at 09:01

## 2020-01-17 RX ADMIN — SODIUM CHLORIDE, SODIUM LACTATE, POTASSIUM CHLORIDE, AND CALCIUM CHLORIDE: .6; .31; .03; .02 INJECTION, SOLUTION INTRAVENOUS at 09:01

## 2020-01-17 RX ADMIN — PROPOFOL 80 MG: 10 INJECTION, EMULSION INTRAVENOUS at 09:01

## 2020-01-17 NOTE — ANESTHESIA POSTPROCEDURE EVALUATION
Anesthesia Post Evaluation    Patient: Dax Carlisle    Procedure(s) Performed: Procedure(s) (LRB):  COLONOSCOPY (N/A)    Final Anesthesia Type: MAC    Patient location during evaluation: PACU  Patient participation: Yes- Able to Participate  Level of consciousness: awake and alert and oriented  Post-procedure vital signs: reviewed and stable  Pain management: adequate  Airway patency: patent  LUNA mitigation strategies: Multimodal analgesia  PONV status at discharge: No PONV  Anesthetic complications: no      Cardiovascular status: blood pressure returned to baseline and hemodynamically stable  Respiratory status: unassisted and room air  Hydration status: euvolemic  Follow-up not needed.          Vitals Value Taken Time   /74 1/17/2020 10:03 AM   Temp 36.9 °C (98.5 °F) 1/17/2020 10:03 AM   Pulse 91 1/17/2020 10:03 AM   Resp 15 1/17/2020 10:03 AM   SpO2 100 % 1/17/2020 10:03 AM         No case tracking events are documented in the log.      Pain/Corie Score: Corie Score: 10 (1/17/2020 10:00 AM)

## 2020-01-17 NOTE — DISCHARGE INSTRUCTIONS

## 2020-01-17 NOTE — TRANSFER OF CARE
"Anesthesia Transfer of Care Note    Patient: Dax Carlisle    Procedure(s) Performed: Procedure(s) (LRB):  COLONOSCOPY (N/A)    Patient location: PACU    Anesthesia Type: MAC    Transport from OR: Transported from OR on room air with adequate spontaneous ventilation    Post pain: adequate analgesia    Post assessment: no apparent anesthetic complications    Post vital signs: stable    Level of consciousness: awake, alert and oriented    Nausea/Vomiting: no nausea/vomiting    Complications: none    Transfer of care protocol was followed      Last vitals:   Visit Vitals  /74 (BP Location: Left arm, Patient Position: Lying)   Pulse 91   Temp 36.9 °C (98.5 °F) (Temporal)   Resp 15   Ht 5' 10" (1.778 m)   Wt 94.5 kg (208 lb 5.4 oz)   SpO2 100%   BMI 29.89 kg/m²     "

## 2020-01-17 NOTE — BRIEF OP NOTE
[facility] -   Brief Operative Note     SUMMARY     Surgery Date: 1/17/2020     Surgeon(s) and Role:     * Kadeem Choi MD - Primary    Assisting Surgeon: None    Pre-op Diagnosis:  Diverticulitis of large intestine with perforation and abscess without bleeding [K57.20]  Family history of colonic polyps [Z83.71]    Post-op Diagnosis:  Post-Op Diagnosis Codes:     * Diverticulitis of large intestine with perforation and abscess without bleeding [K57.20]     * Family history of colonic polyps [Z83.71]    Procedure(s) (LRB):  COLONOSCOPY (N/A)    Anesthesia: Choice    Description of the findings of the procedure: See Op Note    Findings/Key Components: See Op Note    Estimated Blood Loss: 0 mL         Specimens:   Specimen (12h ago, onward)    None          Discharge Note    SUMMARY     Admit Date: 1/17/2020    Discharge Date and Time: 1/17/2020 10:00 AM    Hospital Course Patient was seen in the preoperative area by both myself and anesthesia. All consents were verified and all questions appropriately answered. All risks, benefits and alternatives explained to patient. Patient proceeded to endoscopy suite for colonoscopy and was discharged home postoperative once cleared by anesthesia.    Final Diagnosis: Post-Op Diagnosis Codes:     * Diverticulitis of large intestine with perforation and abscess without bleeding [K57.20]     * Family history of colonic polyps [Z83.71]    Disposition: Home or Self Care    Follow Up/Patient Instructions: See Provation report    Medications:  Reconciled Home Medications:      Medication List      CONTINUE taking these medications    acetaminophen 325 MG tablet  Commonly known as:  TYLENOL  Take 325 mg by mouth every 6 (six) hours as needed for Pain.     * ARIPiprazole 5 MG Tab  Commonly known as:  ABILIFY  Take 1 tablet (5 mg total) by mouth once daily.     * ARIPiprazole 5 MG Tab  Commonly known as:  ABILIFY  TAKE 1 TABLET BY MOUTH DAILY     azelastine 137 mcg (0.1  %) nasal spray  Commonly known as:  ASTELIN  1 spray (137 mcg total) by Nasal route 2 (two) times daily.     diphenhydrAMINE 50 MG capsule  Commonly known as:  BENADRYL  Take 1 capsule (50 mg total) by mouth On call Procedure for Allergies (1 hour prior to CT).     escitalopram oxalate 20 MG tablet  Commonly known as:  LEXAPRO  TAKE 1 TABLET (20 MG TOTAL) BY MOUTH ONCE DAILY.     HYDROcodone-acetaminophen 7.5-325 mg per tablet  Commonly known as:  NORCO  Take 1 tablet by mouth every 6 (six) hours as needed for Pain.     linaCLOtide 290 mcg Cap capsule  Commonly known as:  LINZESS  Take 1 capsule (290 mcg total) by mouth once daily.     loratadine 10 mg tablet  Commonly known as:  CLARITIN  Take 10 mg by mouth once daily.     meloxicam 15 MG tablet  Commonly known as:  MOBIC  Take 1 tablet (15 mg total) by mouth once daily.     metroNIDAZOLE 500 MG tablet  Commonly known as:  FLAGYL  Take 1 tablet (500 mg total) by mouth 3 (three) times daily. Take initial dose@2pm, second dose@3pm and final dose@10pm day before surgery     neomycin 500 mg Tab  Commonly known as:  MYCIFRADIN  Take 2 tablets (1,000 mg total) by mouth 3 (three) times daily. Take 1st dose@2pm,take 2nd dose@3pm, take last dose@10pm day before surgery     PHENERGAN ORAL  Take by mouth.     polyethylene glycol 17 gram/dose powder  Commonly known as:  GLYCOLAX  Take 238 g by mouth once daily. Mix with 2L of gatorade, drink all mixed solution starting@12pm day before surgery, finish by 10pm     predniSONE 50 MG Tab  Commonly known as:  DELTASONE  Take 1 tablet (50 mg total) by mouth On call Procedure (at 13 hours, 7 hours, and 1 hour before CT).         * This list has 2 medication(s) that are the same as other medications prescribed for you. Read the directions carefully, and ask your doctor or other care provider to review them with you.              Discharge Procedure Orders   Diet general     Call MD for:  temperature >100.4     Call MD for:  persistent  nausea and vomiting     Call MD for:  severe uncontrolled pain     Call MD for:  difficulty breathing, headache or visual disturbances     Call MD for:  redness, tenderness, or signs of infection (pain, swelling, redness, odor or green/yellow discharge around incision site)     Call MD for:  hives     Call MD for:  persistent dizziness or light-headedness     Call MD for:  extreme fatigue     Activity as tolerated     Follow-up Information     Pramod Nuñez MD.    Specialty:  Family Medicine  Why:  As needed  Contact information:  [address]  [place] [address] [phone]

## 2020-01-17 NOTE — [employer] PATIENT INSTRUCTIONS
Discharge Summary/Instructions after an Endoscopic Procedure  Patient Name: Dax Carlisle  Patient MRN: [id number]  Patient YOB: 1975  Friday, January 17, 2020 Kadeem Choi MD  RESTRICTIONS:  During your procedure today, you received medications for sedation.  These   medications may affect your judgment, balance and coordination.  Therefore,   for 24 hours, you have the following restrictions:   - DO NOT drive a car, operate machinery, make legal/financial decisions,   sign important papers or drink alcohol.    ACTIVITY:  Today: no heavy lifting, straining or running due to procedural   sedation/anesthesia.  The following day: return to full activity including work.  DIET:  Eat and drink normally unless instructed otherwise.     TREATMENT FOR COMMON SIDE EFFECTS:  - Mild abdominal pain, nausea, belching, bloating or excessive gas:  rest,   eat lightly and use a heating pad.  - Sore Throat: treat with throat lozenges and/or gargle with warm salt   water.  - Because air was used during the procedure, expelling large amounts of air   from your rectum or belching is normal.  - If a bowel prep was taken, you may not have a bowel movement for 1-3 days.    This is normal.  SYMPTOMS TO WATCH FOR AND REPORT TO YOUR PHYSICIAN:  1. Abdominal pain or bloating, other than gas cramps.  2. Chest pain.  3. Back pain.  4. Signs of infection such as: chills or fever occurring within 24 hours   after the procedure.  5. Rectal bleeding, which would show as bright red, maroon, or black stools.   (A tablespoon of blood from the rectum is not serious, especially if   hemorrhoids are present.)  6. Vomiting.  7. Weakness or dizziness.  GO DIRECTLY TO THE NEAREST EMERGENCY ROOM IF YOU HAVE ANY OF THE FOLLOWING:      Difficulty breathing              Chills and/or fever over 101 F   Persistent vomiting and/or vomiting blood   Severe abdominal pain   Severe chest pain   Black, tarry stools   Bleeding- more than one  tablespoon   Any other symptom or condition that you feel may need urgent attention  Your doctor recommends these additional instructions:  If any biopsies were taken, your doctors clinic will contact you in 1 to 2   weeks with any results.  - Discharge patient to home.   - Resume previous diet.   - Continue present medications.   - Await pathology results.   - Repeat colonoscopy in 5 years for surveillance.   - Return to primary care physician PRN.  For questions, problems or results please call your physician Kadeem Choi MD at Work:  [phone]  If you have any questions about the above instructions, call the GI   department at [phone] or call the endoscopy unit at [phone]   from 7am until 3 pm.  [facility], EMERGENCY ROOM PHONE NUMBER:   [phone]  IF A COMPLICATION OR EMERGENCY SITUATION ARISES AND YOU ARE UNABLE TO REACH   YOUR PHYSICIAN - GO DIRECTLY TO THE EMERGENCY ROOM.  I have read or have had read to me these discharge instructions for my   procedure and have received a written copy.  I understand these   instructions and will follow-up with my physician if I have any questions.     __________________________________       _____________________________________  Nurse Signature                                          Patient/Designated   Responsible Party Signature  MD Kadeem Longoria MD  1/17/2020 10:00:21 AM  This report has been verified and signed electronically.  [employer]

## 2020-01-17 NOTE — ANESTHESIA PREPROCEDURE EVALUATION
01/17/2020  Dax Carlisle is a 44 y.o., male.    Anesthesia Evaluation    I have reviewed the Patient Summary Reports.    I have reviewed the Nursing Notes.   I have reviewed the Medications.     Review of Systems  Anesthesia Hx:  No problems with previous Anesthesia    Social:  Non-Smoker    Cardiovascular:  Cardiovascular Normal  CONCLUSIONS     1 - Normal left ventricular systolic function (EF 60-65%).     2 - Normal left ventricular diastolic function.     3 - Normal right ventricular systolic function .    Renal/:   Kidney stones   Hepatic/GI:   PUD, Has colostomy. Hx of diverticulitis   Musculoskeletal:  Musculoskeletal Normal    Psych:   depression          Physical Exam  General:  Obesity    Airway/Jaw/Neck:  Airway Findings: Mouth Opening: Normal Tongue: Normal  General Airway Assessment: Adult  Mallampati: II  Improves to II with phonation.  TM Distance: Normal, at least 6 cm      Dental:  Dental Findings: In tact   Chest/Lungs:  Chest/Lungs Findings: Clear to auscultation     Heart/Vascular:  Heart Findings: Rate: Normal        Mental Status:  Mental Status Findings:  Alert and Oriented         Anesthesia Plan  Type of Anesthesia, risks & benefits discussed:  Anesthesia Type:  MAC, general  Patient's Preference:   Intra-op Monitoring Plan: standard ASA monitors  Intra-op Monitoring Plan Comments:   Post Op Pain Control Plan:   Post Op Pain Control Plan Comments:   Induction:   IV  Beta Blocker:  Patient is not currently on a Beta-Blocker (No further documentation required).       Informed Consent: Patient understands risks and agrees with Anesthesia plan.  Questions answered.   ASA Score: 2     Day of Surgery Review of History & Physical: I have interviewed and examined the patient. I have reviewed the patient's H&P dated:  There are no significant changes.          Ready For Surgery From  Anesthesia Perspective.

## 2020-01-20 ENCOUNTER — PATIENT MESSAGE (OUTPATIENT)
Dept: SURGERY | Facility: HOSPITAL | Age: 45
End: 2020-01-20

## 2020-01-23 DIAGNOSIS — Z93.3 STATUS POST HARTMANN PROCEDURE: Primary | ICD-10-CM

## 2020-01-23 RX ORDER — POLYETHYLENE GLYCOL 3350 17 G/17G
238 POWDER, FOR SOLUTION ORAL DAILY
Qty: 1 BOTTLE | Refills: 0 | Status: ON HOLD | OUTPATIENT
Start: 2020-01-23 | End: 2020-01-30

## 2020-01-23 RX ORDER — NEOMYCIN SULFATE 500 MG/1
1000 TABLET ORAL 3 TIMES DAILY
Qty: 6 TABLET | Refills: 0 | Status: ON HOLD | OUTPATIENT
Start: 2020-01-23 | End: 2020-01-30

## 2020-01-23 RX ORDER — METRONIDAZOLE 500 MG/1
500 TABLET ORAL 3 TIMES DAILY
Qty: 3 TABLET | Refills: 0 | Status: ON HOLD | OUTPATIENT
Start: 2020-01-23 | End: 2020-01-30

## 2020-01-26 ENCOUNTER — PATIENT MESSAGE (OUTPATIENT)
Dept: SURGERY | Facility: HOSPITAL | Age: 45
End: 2020-01-26

## 2020-01-28 NOTE — PRE ADMISSION SCREENING
Pre op instructions reviewed with patient per phone:    To confirm, Your surgeon has instructed you:  Surgery is scheduled 01/30/20 at 0700.      Please report to [facility] 1st floor main lobby by 0530.   Pre admit office to call afternoon prior to surgery with final arrival time      INSTRUCTIONS IMPORTANT!!!  ¨ Do not eat, drink, or smoke after 12 midnight-including water. OK to brush teeth, no gum, candy or mints!    ¨ Take only these medicines with a small swallow of water-morning of surgery.  N/A    Instructed pt take Flagel 1 tab @1400,1500 and 2200 and Neomycin 2 tabs @1400,1500, and 2200 evening of 01/29/20.  Further instructed to mix Glycolax with 2L Gatorade, (avoid red and purple color), begin drinking at 1200 and finish drinking by 2200. Pt was very familiar with instructions and verbalized understanding.     ____  Do not wear makeup, including mascara.  ____  No powder, lotions or creams to surgical area.  ____  Please remove all jewelry, including piercings and leave at home.  ____  No money or valuables needed. Please leave at home.  ____  Please bring identification and insurance information to hospital.  ____  If going home the same day, arrange for a ride home. You will not be able to   drive if Anesthesia was used.  ____  Children, under 12 years old, must remain in the waiting room with an adult.  They are not allowed in patient areas.  ____  Wear loose fitting clothing. Allow for dressings, bandages.  ____  Stop Aspirin, Ibuprofen, Motrin and Aleve at least 5-7 days before surgery, unless otherwise instructed by your doctor, or the nurse.   You MAY use Tylenol/acetaminophen until day of surgery.  ____  If you take diabetic medication, do not take am of surgery unless instructed by   Doctor.  ____ Stop taking any Fish Oil supplement or any Vitamins that contain Vitamin E at least 5 days prior to surgery.          Bathing Instructions-- The night before surgery and the  morning prior to coming to the hospital:   -Do not shave the surgical area.   -Shower and wash your hair and body as usual with anti-bacterial  soap and shampoo.   -Rinse your hair and body completely.   -Use one packet of hibiclens to wash the surgical site (using your hand) gently for 5 minutes.  Do not scrub you skin too hard.   -Do not use hibiclens on your head, face, or genitals.   -Do not wash with anti-bacterial soap after you use the hibiclens.   -Rinse your body thoroughly.   -Dry with clean, soft towel.  Do not use lotion, cream, deodorant, or powders on   the surgical site.    Use antibacterial soap in place of hibiclens if your surgery is on the head, face or genitals.         Surgical Site Infection    Prevention of surgical site infections:     -Keep incisions clean and dry.   -Do not soak/submerge incisions in water until completely healed.   -Do not apply lotions, powders, creams, or deodorants to site.   -Always make sure hands are cleaned with antibacterial soap/ alcohol-based   prior to touching the surgical site.  (This includes doctors, nurses, staff, and yourself.)    Signs and symptoms:   -Redness and pain around the area where you had surgery   -Drainage of cloudy fluid from your surgical wound   -Fever over 100.4  I have read or had read and explained to me, and understand the above information.

## 2020-01-30 ENCOUNTER — HOSPITAL ENCOUNTER (INPATIENT)
Facility: HOSPITAL | Age: 45
LOS: 6 days | Discharge: HOME | DRG: 331 | End: 2020-02-05
Attending: COLON & RECTAL SURGERY | Admitting: COLON & RECTAL SURGERY
Payer: COMMERCIAL

## 2020-01-30 ENCOUNTER — ANESTHESIA (OUTPATIENT)
Dept: SURGERY | Facility: HOSPITAL | Age: 45
DRG: 331 | End: 2020-01-30
Payer: COMMERCIAL

## 2020-01-30 ENCOUNTER — ANESTHESIA EVENT (OUTPATIENT)
Dept: SURGERY | Facility: HOSPITAL | Age: 45
DRG: 331 | End: 2020-01-30
Payer: COMMERCIAL

## 2020-01-30 DIAGNOSIS — K57.20 DIVERTICULITIS OF LARGE INTESTINE WITH PERFORATION AND ABSCESS WITHOUT BLEEDING: ICD-10-CM

## 2020-01-30 DIAGNOSIS — R00.0 TACHYCARDIA: ICD-10-CM

## 2020-01-30 DIAGNOSIS — R33.8 ACUTE URINARY RETENTION: Primary | ICD-10-CM

## 2020-01-30 PROCEDURE — 44145 PARTIAL REMOVAL OF COLON: CPT | Mod: 22,,, | Performed by: COLON & RECTAL SURGERY

## 2020-01-30 PROCEDURE — 25000003 PHARM REV CODE 250: Performed by: NURSE ANESTHETIST, CERTIFIED REGISTERED

## 2020-01-30 PROCEDURE — 88307 TISSUE EXAM BY PATHOLOGIST: CPT | Mod: 26,,, | Performed by: PATHOLOGY

## 2020-01-30 PROCEDURE — 63600175 PHARM REV CODE 636 W HCPCS: Performed by: COLON & RECTAL SURGERY

## 2020-01-30 PROCEDURE — 49905 PR OMENTAL FLAP,INTRA-ABDOMINAL: ICD-10-PCS | Mod: ,,, | Performed by: COLON & RECTAL SURGERY

## 2020-01-30 PROCEDURE — 63600175 PHARM REV CODE 636 W HCPCS: Performed by: NURSE ANESTHETIST, CERTIFIED REGISTERED

## 2020-01-30 PROCEDURE — 88304 PR  SURG PATH,LEVEL III: ICD-10-PCS | Mod: 26,,, | Performed by: PATHOLOGY

## 2020-01-30 PROCEDURE — 25000003 PHARM REV CODE 250: Performed by: COLON & RECTAL SURGERY

## 2020-01-30 PROCEDURE — 94799 UNLISTED PULMONARY SVC/PX: CPT

## 2020-01-30 PROCEDURE — 71000033 HC RECOVERY, INTIAL HOUR: Performed by: COLON & RECTAL SURGERY

## 2020-01-30 PROCEDURE — 37000009 HC ANESTHESIA EA ADD 15 MINS: Performed by: COLON & RECTAL SURGERY

## 2020-01-30 PROCEDURE — 27000221 HC OXYGEN, UP TO 24 HOURS

## 2020-01-30 PROCEDURE — 49905 OMENTAL FLAP INTRA-ABDOM: CPT | Mod: ,,, | Performed by: COLON & RECTAL SURGERY

## 2020-01-30 PROCEDURE — 37000008 HC ANESTHESIA 1ST 15 MINUTES: Performed by: COLON & RECTAL SURGERY

## 2020-01-30 PROCEDURE — 88304 TISSUE EXAM BY PATHOLOGIST: CPT | Performed by: PATHOLOGY

## 2020-01-30 PROCEDURE — 94770 HC EXHALED C02 TEST: CPT

## 2020-01-30 PROCEDURE — 88304 TISSUE EXAM BY PATHOLOGIST: CPT | Mod: 26,,, | Performed by: PATHOLOGY

## 2020-01-30 PROCEDURE — 44620 PR CLOSE ENTEROSTOMY: ICD-10-PCS | Mod: 59,,, | Performed by: COLON & RECTAL SURGERY

## 2020-01-30 PROCEDURE — 11000001 HC ACUTE MED/SURG PRIVATE ROOM

## 2020-01-30 PROCEDURE — 88307 TISSUE EXAM BY PATHOLOGIST: CPT | Mod: 59 | Performed by: PATHOLOGY

## 2020-01-30 PROCEDURE — 99900035 HC TECH TIME PER 15 MIN (STAT)

## 2020-01-30 PROCEDURE — 94761 N-INVAS EAR/PLS OXIMETRY MLT: CPT

## 2020-01-30 PROCEDURE — S0030 INJECTION, METRONIDAZOLE: HCPCS | Performed by: COLON & RECTAL SURGERY

## 2020-01-30 PROCEDURE — 44620 REPAIR BOWEL OPENING: CPT | Mod: 59,,, | Performed by: COLON & RECTAL SURGERY

## 2020-01-30 PROCEDURE — 88307 PR  SURG PATH,LEVEL V: ICD-10-PCS | Mod: 26,,, | Performed by: PATHOLOGY

## 2020-01-30 PROCEDURE — 27201423 OPTIME MED/SURG SUP & DEVICES STERILE SUPPLY: Performed by: COLON & RECTAL SURGERY

## 2020-01-30 PROCEDURE — C9290 INJ, BUPIVACAINE LIPOSOME: HCPCS | Performed by: COLON & RECTAL SURGERY

## 2020-01-30 PROCEDURE — 36000708 HC OR TIME LEV III 1ST 15 MIN: Performed by: COLON & RECTAL SURGERY

## 2020-01-30 PROCEDURE — 44145 PR PART REMOVAL COLON W COLOPROCTOSTOMY: ICD-10-PCS | Mod: 22,,, | Performed by: COLON & RECTAL SURGERY

## 2020-01-30 PROCEDURE — 36000709 HC OR TIME LEV III EA ADD 15 MIN: Performed by: COLON & RECTAL SURGERY

## 2020-01-30 RX ORDER — SODIUM CHLORIDE, SODIUM LACTATE, POTASSIUM CHLORIDE, CALCIUM CHLORIDE 600; 310; 30; 20 MG/100ML; MG/100ML; MG/100ML; MG/100ML
INJECTION, SOLUTION INTRAVENOUS CONTINUOUS
Status: DISCONTINUED | OUTPATIENT
Start: 2020-01-30 | End: 2020-02-02

## 2020-01-30 RX ORDER — SODIUM CHLORIDE, SODIUM LACTATE, POTASSIUM CHLORIDE, CALCIUM CHLORIDE 600; 310; 30; 20 MG/100ML; MG/100ML; MG/100ML; MG/100ML
INJECTION, SOLUTION INTRAVENOUS CONTINUOUS PRN
Status: DISCONTINUED | OUTPATIENT
Start: 2020-01-30 | End: 2020-01-30

## 2020-01-30 RX ORDER — PANTOPRAZOLE SODIUM 40 MG/1
40 TABLET, DELAYED RELEASE ORAL
Status: DISCONTINUED | OUTPATIENT
Start: 2020-01-31 | End: 2020-02-05 | Stop reason: HOSPADM

## 2020-01-30 RX ORDER — NALOXONE HCL 0.4 MG/ML
0.02 VIAL (ML) INJECTION
Status: DISCONTINUED | OUTPATIENT
Start: 2020-01-30 | End: 2020-02-05 | Stop reason: HOSPADM

## 2020-01-30 RX ORDER — ROCURONIUM BROMIDE 10 MG/ML
INJECTION, SOLUTION INTRAVENOUS
Status: DISCONTINUED | OUTPATIENT
Start: 2020-01-30 | End: 2020-01-30

## 2020-01-30 RX ORDER — ACETAMINOPHEN 500 MG
1000 TABLET ORAL ONCE
Status: COMPLETED | OUTPATIENT
Start: 2020-01-30 | End: 2020-01-30

## 2020-01-30 RX ORDER — ENOXAPARIN SODIUM 100 MG/ML
40 INJECTION SUBCUTANEOUS EVERY 24 HOURS
Status: DISCONTINUED | OUTPATIENT
Start: 2020-01-31 | End: 2020-02-05 | Stop reason: HOSPADM

## 2020-01-30 RX ORDER — HEPARIN SODIUM 5000 [USP'U]/ML
5000 INJECTION, SOLUTION INTRAVENOUS; SUBCUTANEOUS
Status: COMPLETED | OUTPATIENT
Start: 2020-01-30 | End: 2020-01-30

## 2020-01-30 RX ORDER — ONDANSETRON 2 MG/ML
4 INJECTION INTRAMUSCULAR; INTRAVENOUS DAILY PRN
Status: DISCONTINUED | OUTPATIENT
Start: 2020-01-30 | End: 2020-01-30 | Stop reason: HOSPADM

## 2020-01-30 RX ORDER — CHLORHEXIDINE GLUCONATE ORAL RINSE 1.2 MG/ML
10 SOLUTION DENTAL 2 TIMES DAILY
Status: COMPLETED | OUTPATIENT
Start: 2020-01-30 | End: 2020-02-04

## 2020-01-30 RX ORDER — ONDANSETRON 2 MG/ML
4 INJECTION INTRAMUSCULAR; INTRAVENOUS EVERY 6 HOURS PRN
Status: DISCONTINUED | OUTPATIENT
Start: 2020-01-30 | End: 2020-02-05 | Stop reason: HOSPADM

## 2020-01-30 RX ORDER — AMOXICILLIN 250 MG
1 CAPSULE ORAL 2 TIMES DAILY
Status: DISCONTINUED | OUTPATIENT
Start: 2020-01-30 | End: 2020-02-05 | Stop reason: HOSPADM

## 2020-01-30 RX ORDER — ESCITALOPRAM OXALATE 10 MG/1
20 TABLET ORAL DAILY
Status: DISCONTINUED | OUTPATIENT
Start: 2020-01-30 | End: 2020-02-05 | Stop reason: HOSPADM

## 2020-01-30 RX ORDER — GABAPENTIN 300 MG/1
300 CAPSULE ORAL 3 TIMES DAILY
Status: DISCONTINUED | OUTPATIENT
Start: 2020-01-30 | End: 2020-02-05 | Stop reason: HOSPADM

## 2020-01-30 RX ORDER — PHENYLEPHRINE HYDROCHLORIDE 10 MG/ML
INJECTION INTRAVENOUS
Status: DISCONTINUED | OUTPATIENT
Start: 2020-01-30 | End: 2020-01-30

## 2020-01-30 RX ORDER — DIPHENHYDRAMINE HYDROCHLORIDE 50 MG/ML
12.5 INJECTION INTRAMUSCULAR; INTRAVENOUS EVERY 6 HOURS PRN
Status: DISCONTINUED | OUTPATIENT
Start: 2020-01-30 | End: 2020-02-05 | Stop reason: HOSPADM

## 2020-01-30 RX ORDER — HYDROMORPHONE HYDROCHLORIDE 2 MG/ML
INJECTION, SOLUTION INTRAMUSCULAR; INTRAVENOUS; SUBCUTANEOUS
Status: DISCONTINUED | OUTPATIENT
Start: 2020-01-30 | End: 2020-01-30

## 2020-01-30 RX ORDER — LIDOCAINE HYDROCHLORIDE 10 MG/ML
INJECTION, SOLUTION EPIDURAL; INFILTRATION; INTRACAUDAL; PERINEURAL
Status: DISCONTINUED | OUTPATIENT
Start: 2020-01-30 | End: 2020-01-30

## 2020-01-30 RX ORDER — GABAPENTIN 400 MG/1
800 CAPSULE ORAL
Status: COMPLETED | OUTPATIENT
Start: 2020-01-30 | End: 2020-01-30

## 2020-01-30 RX ORDER — ACETAMINOPHEN 10 MG/ML
1000 INJECTION, SOLUTION INTRAVENOUS EVERY 8 HOURS
Status: COMPLETED | OUTPATIENT
Start: 2020-01-30 | End: 2020-01-31

## 2020-01-30 RX ORDER — ONDANSETRON 2 MG/ML
INJECTION INTRAMUSCULAR; INTRAVENOUS
Status: DISCONTINUED | OUTPATIENT
Start: 2020-01-30 | End: 2020-01-30

## 2020-01-30 RX ORDER — NEOSTIGMINE METHYLSULFATE 1 MG/ML
INJECTION, SOLUTION INTRAVENOUS
Status: DISCONTINUED | OUTPATIENT
Start: 2020-01-30 | End: 2020-01-30

## 2020-01-30 RX ORDER — VECURONIUM BROMIDE FOR INJECTION 1 MG/ML
INJECTION, POWDER, LYOPHILIZED, FOR SOLUTION INTRAVENOUS
Status: DISCONTINUED | OUTPATIENT
Start: 2020-01-30 | End: 2020-01-30

## 2020-01-30 RX ORDER — SODIUM CHLORIDE, SODIUM LACTATE, POTASSIUM CHLORIDE, CALCIUM CHLORIDE 600; 310; 30; 20 MG/100ML; MG/100ML; MG/100ML; MG/100ML
INJECTION, SOLUTION INTRAVENOUS CONTINUOUS
Status: DISCONTINUED | OUTPATIENT
Start: 2020-01-30 | End: 2020-01-30

## 2020-01-30 RX ORDER — HYDROMORPHONE HCL IN 0.9% NACL 6 MG/30 ML
PATIENT CONTROLLED ANALGESIA SYRINGE INTRAVENOUS CONTINUOUS
Status: DISCONTINUED | OUTPATIENT
Start: 2020-01-30 | End: 2020-02-03

## 2020-01-30 RX ORDER — CELECOXIB 100 MG/1
400 CAPSULE ORAL
Status: COMPLETED | OUTPATIENT
Start: 2020-01-30 | End: 2020-01-30

## 2020-01-30 RX ORDER — ARIPIPRAZOLE 5 MG/1
5 TABLET ORAL DAILY
Status: DISCONTINUED | OUTPATIENT
Start: 2020-01-30 | End: 2020-02-05 | Stop reason: HOSPADM

## 2020-01-30 RX ORDER — MIDAZOLAM HYDROCHLORIDE 1 MG/ML
INJECTION, SOLUTION INTRAMUSCULAR; INTRAVENOUS
Status: DISCONTINUED | OUTPATIENT
Start: 2020-01-30 | End: 2020-01-30

## 2020-01-30 RX ORDER — ONDANSETRON 2 MG/ML
4 INJECTION INTRAMUSCULAR; INTRAVENOUS EVERY 12 HOURS PRN
Status: DISCONTINUED | OUTPATIENT
Start: 2020-01-30 | End: 2020-01-30 | Stop reason: HOSPADM

## 2020-01-30 RX ORDER — PROPOFOL 10 MG/ML
VIAL (ML) INTRAVENOUS
Status: DISCONTINUED | OUTPATIENT
Start: 2020-01-30 | End: 2020-01-30

## 2020-01-30 RX ORDER — METRONIDAZOLE 500 MG/100ML
500 INJECTION, SOLUTION INTRAVENOUS
Status: COMPLETED | OUTPATIENT
Start: 2020-01-30 | End: 2020-01-30

## 2020-01-30 RX ORDER — METHYLENE BLUE 5 MG/ML
INJECTION INTRAVENOUS
Status: DISCONTINUED | OUTPATIENT
Start: 2020-01-30 | End: 2020-01-30

## 2020-01-30 RX ORDER — BUPIVACAINE HYDROCHLORIDE 2.5 MG/ML
INJECTION, SOLUTION EPIDURAL; INFILTRATION; INTRACAUDAL
Status: DISCONTINUED | OUTPATIENT
Start: 2020-01-30 | End: 2020-01-30 | Stop reason: HOSPADM

## 2020-01-30 RX ORDER — FENTANYL CITRATE 50 UG/ML
INJECTION, SOLUTION INTRAMUSCULAR; INTRAVENOUS
Status: DISCONTINUED | OUTPATIENT
Start: 2020-01-30 | End: 2020-01-30

## 2020-01-30 RX ORDER — GLYCOPYRROLATE 0.2 MG/ML
INJECTION INTRAMUSCULAR; INTRAVENOUS
Status: DISCONTINUED | OUTPATIENT
Start: 2020-01-30 | End: 2020-01-30

## 2020-01-30 RX ORDER — FENTANYL CITRATE 50 UG/ML
25 INJECTION, SOLUTION INTRAMUSCULAR; INTRAVENOUS EVERY 5 MIN PRN
Status: DISCONTINUED | OUTPATIENT
Start: 2020-01-30 | End: 2020-01-30 | Stop reason: HOSPADM

## 2020-01-30 RX ORDER — SUCCINYLCHOLINE CHLORIDE 20 MG/ML
INJECTION INTRAMUSCULAR; INTRAVENOUS
Status: DISCONTINUED | OUTPATIENT
Start: 2020-01-30 | End: 2020-01-30

## 2020-01-30 RX ORDER — ACETAMINOPHEN 10 MG/ML
INJECTION, SOLUTION INTRAVENOUS
Status: DISCONTINUED | OUTPATIENT
Start: 2020-01-30 | End: 2020-01-30

## 2020-01-30 RX ADMIN — ROCURONIUM BROMIDE 5 MG: 10 INJECTION, SOLUTION INTRAVENOUS at 07:01

## 2020-01-30 RX ADMIN — SODIUM CHLORIDE, SODIUM LACTATE, POTASSIUM CHLORIDE, AND CALCIUM CHLORIDE: 600; 310; 30; 20 INJECTION, SOLUTION INTRAVENOUS at 09:01

## 2020-01-30 RX ADMIN — ROCURONIUM BROMIDE 10 MG: 10 INJECTION, SOLUTION INTRAVENOUS at 08:01

## 2020-01-30 RX ADMIN — ARIPIPRAZOLE 5 MG: 5 TABLET ORAL at 03:01

## 2020-01-30 RX ADMIN — METHYLENE BLUE 25 MG: 5 INJECTION INTRAVENOUS at 09:01

## 2020-01-30 RX ADMIN — PROPOFOL 150 MG: 10 INJECTION, EMULSION INTRAVENOUS at 07:01

## 2020-01-30 RX ADMIN — SODIUM CHLORIDE, SODIUM LACTATE, POTASSIUM CHLORIDE, AND CALCIUM CHLORIDE: 600; 310; 30; 20 INJECTION, SOLUTION INTRAVENOUS at 07:01

## 2020-01-30 RX ADMIN — HYDROMORPHONE HYDROCHLORIDE 0.6 MG: 2 INJECTION INTRAMUSCULAR; INTRAVENOUS; SUBCUTANEOUS at 01:01

## 2020-01-30 RX ADMIN — ROCURONIUM BROMIDE 20 MG: 10 INJECTION, SOLUTION INTRAVENOUS at 09:01

## 2020-01-30 RX ADMIN — MIDAZOLAM 2 MG: 1 INJECTION INTRAMUSCULAR; INTRAVENOUS at 07:01

## 2020-01-30 RX ADMIN — FENTANYL CITRATE 100 MCG: 50 INJECTION, SOLUTION INTRAMUSCULAR; INTRAVENOUS at 07:01

## 2020-01-30 RX ADMIN — ROCURONIUM BROMIDE 25 MG: 10 INJECTION, SOLUTION INTRAVENOUS at 07:01

## 2020-01-30 RX ADMIN — NEOSTIGMINE METHYLSULFATE 5 MG: 1 INJECTION INTRAVENOUS at 12:01

## 2020-01-30 RX ADMIN — GABAPENTIN 300 MG: 300 CAPSULE ORAL at 08:01

## 2020-01-30 RX ADMIN — GABAPENTIN 300 MG: 300 CAPSULE ORAL at 03:01

## 2020-01-30 RX ADMIN — HYDROMORPHONE HYDROCHLORIDE 0.6 MG: 2 INJECTION INTRAMUSCULAR; INTRAVENOUS; SUBCUTANEOUS at 12:01

## 2020-01-30 RX ADMIN — FENTANYL CITRATE 50 MCG: 50 INJECTION, SOLUTION INTRAMUSCULAR; INTRAVENOUS at 08:01

## 2020-01-30 RX ADMIN — LIDOCAINE HYDROCHLORIDE 50 MG: 10 INJECTION, SOLUTION EPIDURAL; INFILTRATION; INTRACAUDAL; PERINEURAL at 07:01

## 2020-01-30 RX ADMIN — ROCURONIUM BROMIDE 10 MG: 10 INJECTION, SOLUTION INTRAVENOUS at 10:01

## 2020-01-30 RX ADMIN — Medication: at 01:01

## 2020-01-30 RX ADMIN — ROCURONIUM BROMIDE 10 MG: 10 INJECTION, SOLUTION INTRAVENOUS at 07:01

## 2020-01-30 RX ADMIN — SODIUM CHLORIDE, SODIUM LACTATE, POTASSIUM CHLORIDE, AND CALCIUM CHLORIDE: 600; 310; 30; 20 INJECTION, SOLUTION INTRAVENOUS at 08:01

## 2020-01-30 RX ADMIN — SENNOSIDES,DOCUSATE SODIUM 1 TABLET: 8.6; 5 TABLET, FILM COATED ORAL at 08:01

## 2020-01-30 RX ADMIN — ROBINUL 0.8 MG: 0.2 INJECTION INTRAMUSCULAR; INTRAVENOUS at 12:01

## 2020-01-30 RX ADMIN — PHENYLEPHRINE HYDROCHLORIDE 200 MCG: 10 INJECTION INTRAVENOUS at 12:01

## 2020-01-30 RX ADMIN — VECURONIUM BROMIDE 7 MG: 10 INJECTION, POWDER, LYOPHILIZED, FOR SOLUTION INTRAVENOUS at 11:01

## 2020-01-30 RX ADMIN — ONDANSETRON 4 MG: 2 INJECTION, SOLUTION INTRAMUSCULAR; INTRAVENOUS at 12:01

## 2020-01-30 RX ADMIN — GABAPENTIN 800 MG: 400 CAPSULE ORAL at 06:01

## 2020-01-30 RX ADMIN — ACETAMINOPHEN 1000 MG: 500 TABLET ORAL at 06:01

## 2020-01-30 RX ADMIN — HEPARIN SODIUM 5000 UNITS: 5000 INJECTION INTRAVENOUS; SUBCUTANEOUS at 06:01

## 2020-01-30 RX ADMIN — HYDROMORPHONE HYDROCHLORIDE 0.8 MG: 2 INJECTION INTRAMUSCULAR; INTRAVENOUS; SUBCUTANEOUS at 12:01

## 2020-01-30 RX ADMIN — CHLORHEXIDINE GLUCONATE 0.12% ORAL RINSE 10 ML: 1.2 LIQUID ORAL at 08:01

## 2020-01-30 RX ADMIN — VECURONIUM BROMIDE 3 MG: 10 INJECTION, POWDER, LYOPHILIZED, FOR SOLUTION INTRAVENOUS at 10:01

## 2020-01-30 RX ADMIN — ROCURONIUM BROMIDE 10 MG: 10 INJECTION, SOLUTION INTRAVENOUS at 09:01

## 2020-01-30 RX ADMIN — ESCITALOPRAM OXALATE 20 MG: 10 TABLET ORAL at 03:01

## 2020-01-30 RX ADMIN — CEFTRIAXONE SODIUM 2 G: 2 INJECTION, SOLUTION INTRAVENOUS at 07:01

## 2020-01-30 RX ADMIN — SODIUM CHLORIDE, SODIUM LACTATE, POTASSIUM CHLORIDE, AND CALCIUM CHLORIDE: .6; .31; .03; .02 INJECTION, SOLUTION INTRAVENOUS at 01:01

## 2020-01-30 RX ADMIN — SUCCINYLCHOLINE CHLORIDE 120 MG: 20 INJECTION, SOLUTION INTRAMUSCULAR; INTRAVENOUS at 07:01

## 2020-01-30 RX ADMIN — BUPIVACAINE 266 MG: 13.3 INJECTION, SUSPENSION, LIPOSOMAL INFILTRATION at 07:01

## 2020-01-30 RX ADMIN — ACETAMINOPHEN 1000 MG: 10 INJECTION, SOLUTION INTRAVENOUS at 08:01

## 2020-01-30 RX ADMIN — CELECOXIB 400 MG: 100 CAPSULE ORAL at 06:01

## 2020-01-30 RX ADMIN — ACETAMINOPHEN 1000 MG: 10 INJECTION, SOLUTION INTRAVENOUS at 12:01

## 2020-01-30 RX ADMIN — METRONIDAZOLE 500 MG: 500 SOLUTION INTRAVENOUS at 07:01

## 2020-01-30 NOTE — ANESTHESIA PREPROCEDURE EVALUATION
01/30/2020  Dax Carlisle is a 44 y.o., male.    Anesthesia Evaluation    I have reviewed the Patient Summary Reports.    I have reviewed the Nursing Notes.   I have reviewed the Medications.     Review of Systems  Anesthesia Hx:  No problems with previous Anesthesia Denies Hx of Anesthetic complications  Denies Family Hx of Anesthesia complications.   Denies Personal Hx of Anesthesia complications.   Social:  Non-Smoker, No Alcohol Use    Cardiovascular:  Cardiovascular Normal  ECG has been reviewed.    Pulmonary:   Sleep Apnea    Renal/:   Chronic Renal Disease, renal hypertension    Hepatic/GI:   PUD,    Neurological:  Neurology Normal    Endocrine:  Endocrine Normal    Psych:   depression        Patient Active Problem List   Diagnosis    Angioedema of lips    Stomach ulcer    Obesity (BMI 30-39.9)    MDD (major depressive disorder)    Family history of polyps in the colon    Family history of colonic polyps    Paresthesia of buttock    Annual physical exam    Insomnia    Chronic pain syndrome    Left ear pain    Sleep apnea    Acute midline thoracic back pain    Sinus tachycardia    Status post Kirsten procedure    Diverticulitis of large intestine with perforation and abscess without bleeding         Physical Exam  General:  Well nourished    Airway/Jaw/Neck:  Airway Findings: Mouth Opening: Normal Tongue: Normal  General Airway Assessment: Adult  Mallampati: II  TM Distance: 4 - 6 cm  Jaw/Neck Findings:  Neck ROM: Normal ROM      Dental:  Dental Findings: In tact   Chest/Lungs:  Chest/Lungs Findings: Clear to auscultation, Normal Respiratory Rate     Heart/Vascular:  Heart Findings: Rate: Normal  Rhythm: Regular Rhythm  Sounds: Normal        Mental Status:  Mental Status Findings:  Cooperative, Alert and Oriented       Lab Results   Component Value Date    WBC 6.76 01/13/2020     HGB 15.0 01/13/2020    HCT 46.3 01/13/2020    MCV 88 01/13/2020     01/13/2020         Chemistry        Component Value Date/Time     01/13/2020 0908    K 4.0 01/13/2020 0908     01/13/2020 0908    CO2 29 01/13/2020 0908    BUN 16 01/13/2020 0908    CREATININE 0.9 01/13/2020 0908    GLU 89 01/13/2020 0908        Component Value Date/Time    CALCIUM 9.9 01/13/2020 0908    ALKPHOS 59 01/13/2020 0908    AST 25 01/13/2020 0908    ALT 35 01/13/2020 0908    BILITOT 0.3 01/13/2020 0908    ESTGFRAFRICA >60.0 01/13/2020 0908    EGFRNONAA >60.0 01/13/2020 0908        ECG:  Normal sinus rhythm  Normal ECG  When compared with ECG of 08-OCT-2019 02:22,  Vent. rate has decreased BY 51 BPM  T wave inversion less evident in Inferior leads  Nonspecific T wave abnormality has replaced inverted T waves in Anterior leads  Confirmed by YOJANA BUCK MD ([id number]) on 1/14/2020 4:58:40 PM      Anesthesia Plan  Type of Anesthesia, risks & benefits discussed:  Anesthesia Type:  general  Patient's Preference:   Intra-op Monitoring Plan: standard ASA monitors  Intra-op Monitoring Plan Comments:   Post Op Pain Control Plan: per primary service following discharge from PACU  Post Op Pain Control Plan Comments:   Induction:   IV  Beta Blocker:  Patient is not currently on a Beta-Blocker (No further documentation required).       Informed Consent: Patient understands risks and agrees with Anesthesia plan.  Questions answered. Anesthesia consent signed with patient.  ASA Score: 2     Day of Surgery Review of History & Physical: I have interviewed and examined the patient. I have reviewed the patient's H&P dated:  There are no significant changes.  H&P update referred to the surgeon.         Ready For Surgery From Anesthesia Perspective.

## 2020-01-30 NOTE — PLAN OF CARE
AAO x 4. Ambulatory. PCA controlled. Free from injury. Aseptic technique maintained. Hernandez secured. NADN. Call light in reach. Chart check completed.

## 2020-01-30 NOTE — OP NOTE
[facility]  Surgery Department  Operative Note    SUMMARY     Date of Procedure: 1/30/2020     Procedure:  1. Colostomy takedown  2. Partial colectomy  3. Partial proctectomy with colorectal anastomosis  4. Omental pedicle flap  5. Extensive lysis of adhesions  6. Flexible sigmoidoscopy  7. Transversus abdominal plan block    Surgeon(s) and Role:     * Kadeem Choi MD - Primary    Assisting Surgeon: None    Pre-Operative Diagnosis: Diverticulitis of large intestine with perforation and abscess without bleeding [K57.20]  Family history of colonic polyps [Z83.71]    Post-Operative Diagnosis: Post-Op Diagnosis Codes:     * Diverticulitis of large intestine with perforation and abscess without bleeding [K57.20]     * Family history of colonic polyps [Z83.71]    Anesthesia: General    Technical Procedures Used:   1. Colostomy takedown  2. Partial colectomy  3. Partial proctectomy with colorectal anastomosis  4. Omental pedicle flap  5. Extensive lysis of adhesions  6. Flexible sigmoidoscopy  7. Transversus abdominal plan block    Indications for Procedure:  44-year-old male with previous perforated sigmoid diverticulitis who required Kirsten's procedure who presents for colostomy reversal    Findings of the Procedure:  Dense adhesions of the omentum, small intestine to the anterior abdominal wall as well as adhesions between the small intestine and omentum to the cecum as well as to the rectum/sigmoid stump with intrapelvic adhesions as well, as well as dense adhesions of the left lateral pericolic gutter and van stomal area. Mucosal/submucosal tear dissection during rectal Sizer use in the mid/upper rectum    Description of the Procedure:  Patient was brought to the operating placed supine on table. General tracheal anesthesia was induced.  Hernandez catheter was then sterilely placed.  The patient was admitted lithotomy position. The abdomen was then prepped and draped usual sterile fashion. A  preop surgical time-out was then performed confirming the correct patient, procedure and preoperative medications given.  The ostomy site was covered with a Tegaderm and Ray-Liz to protect it during the initial skin incision.    A midline laparotomy was then made through the previous laparotomy site below the umbilicus. Dissection was carried down to level of the fascia using a 10 blade sharply.  The fascia was sharply incised with a 10 blade while being elevated and the peritoneal cavity was entered sharply with a knife.  Immediately after entering, there was found adhesions of the omentum to the undersurface of the anterior midline. There was no bowel adhered to the anterior midline where the abdomen was entered and no underlying structures were injured during the entry.  The skin incision fascial incision were lengthened both inferiorly and superiorly.  Care was taken not to injure any of the omentum underlying structures at this time.  Once the incision was completely opened and the adhesions were lysed in the anterior midline, attention was turned to the left lower quadrant where there was a large amount of omentum and small intestine stuck to the anterior abdominal wall. The right side was also evaluated and found to have a similar amount of adhesions between the anterior abdominal wall and the omentum and small intestine.  A greater than 1.5 hr lysis of adhesion was undertaken to free the omentum and small intestine from its attachment to the anterior abdominal wall as well as to the colon that was coming towards the ostomy site.    Once all adhesions had been lysed, the descending colon was identified going to the colostomy site. All the adhesions were lysed until the colon and the mesentery were easily identified. Dissection was carried from the abdominal side 1st taking care to lyse all the adhesions and the hernia sac that was present. Once this was safely done as far as could be easily done from the  abdominal side, attention was turned to the external site of the colostomy.  The Tegaderm and Ray-Liz were removed. The mucocutaneous junction was incised sharply and the colostomy was taken down from its adhesions to the underlying subcutaneous tissues with a mixture of blunt and sharp dissection. Care was taken not to injure the mesentery of the colon during this process.  Dissection was carried down to the level of the fascia to free the colon its mesentery from these tissues.  Once the fascia was encountered, the adhesions were sharply lysed between the fascia and the colon and mesentery. Once this was circumferentially dissected, the colostomy was placed back through the defect back into abdominal cavity and the final adhesiolysis between the colostomy and the underlying surface of the abdominal cavity were completed using Metzenbaum scissors. Once the colostomy was completely freed in the abdominal cavity the edge was clamped with a bowel clamp to prevent any stool spillage.  The ostomy site was found to be hemostatic.    Attention was then turned to the pelvis to identify the sigmoid rectal stump.  There were still adhesions of the small intestine and the cecum to the rectal stump and the pelvis.  A lysis of adhesion was then performed on this area to completely free the small intestine and the cecum.  Once this was completed, the sigmoid staple line was encountered.  The junction of the rectosigmoid was identified where the tenia coalesced and the sigmoid colon was mobilized to where it could be safely resected.  Care was taken to identify the ureter along the left pericolic gutter where the stump was and countered.  The left ureter was identified and safely protected throughout the dissection and for the remainder of the case. Given the redo nature of the operation, methylene blue was given by Anesthesia via IV route until the urine turned blue green.  There was no extravasation of blue green urine into the  field with multiple clean laparotomy plus placed throughout different parts of the procedure to indicate a likely negative ureter injury as well as direct visualization of the ureter during the case.  There was still small amount of mesentery from the sigmoid that had not been taken and this was taken with the open EnSeal device. At this point, a 25 mm rectal Sizer was brought up through the anus to ensure that a stapler could reach to the rectosigmoid junction where stapler with pass.  While there was no tension during the insertion process, upon withdrawal of the EEA Sizer, there was noted to be a hole in the anterior surface of the rectum just at the peritoneal reflection.  Due to this hole, it was felt best to proceed with a more extensive resection to resect the area of tearing of the rectum to not include this in any anastomosis or downstream from the anastomosis. Therefore the sigmoid rectum were grasped and the retrorectal plane was entered posteriorly to the rectum.  This was dissected down to the level just below the peritoneal reflection.  The lateral surfaces were then dissected in usual fashion and the anterior surface dissected just below the peritoneal reflection.  This was just below the area of the proctotomy.  A mesenteric window was made beneath the rectal wall at this location and the EnSeal device was used to take the remaining mesentery at this location.  A TA 60 blue load was then used to transect the rectum at this location just below the obvious hole in the rectum.  With this completed, the upper rectum and sigmoid colon stump were then passed off the field for final pathology.     Given the concern of the rectal Sizer injury, a flexible sigmoidoscope was then inserted into the rectum to check the rest of the rectum.  There was an obvious mucosal defect just distal to the staple line were it was obvious there was a likely mucosal injury in submucosal dissection of the rectum during the Sizer  insertion. Therefore was felt best to resect an additional margin of the rectum to include this mucosal defect prior to any anastomosis being performed. Therefore the rectum was desufflated and additional 1-2 cm of rectum was mobilized in usual fashion and the mesentery taken in a similar fashion as before.  The pelvis was narrow at this location and therefore decision was made to change to a TA 30.  At this point, a TA 30 blue load was used to staple across the rectum just below the peritoneal reflection.  Prior to firing the stapler, the flexible sigmoidoscope was inserted and the mucosal defect had been obliterated and closed and included in the staple line. The stapler was then fired and the additional rectum was resected.  Flexible sigmoidoscope was inserted back through the rectum to the staple line where the mucosa all appeared normal and there was a negative leak test with the rectum submerged underneath water.    At this point, attention was turned to the descending colon.  Due to the large area of resection of the sigmoid colon rectum that was plan, additional descending colon mobilization was performed all way to the level of the spleen along the white line of Toldt.  Dissection was then carried up along the white line of Toldt and all retroperitoneal attachments between the colon and the retroperitoneum were sharply incised to fully mobilize the colon.  The PAIGE was taken high ligation with the EnSeal device. The descending colon reached easily into the pelvis where the colorectal anastomosis would be performed. Attention was then turned to the descending colon stump.  A point 5 cm from the end of the colostomy was chosen and marked with a marking pen on the descending colon.  The anvil of a 29 mm EEA stapler was then brought onto the field and through this location on the anti mesenteric border of the colon.  The end of the colostomy was freshened up and the mesentery was flashed and found to have  pulsatile blood flow.  The mesentery was then taken with a 0 silk suture. The end of the colostomy was then stapled using a TA 60 blue load. The staple line was then oversewn with a 2-0 Vicryl suture. A 2-0 Prolene suture was used to pursestring around the anvil.  At this point, the 29 mm EEA stapler was brought through the anus to the rectal stump and a side-to-end colorectal anastomosis was performed in usual fashion. The colon reached without tension and the mesentery was straight.  Once the stapler had been fired and performed, the anastomotic donuts were checked and found to be completely intact. The pelvis was filled with irrigation and the proximal colon was clamped.  A flexible sigmoidoscope was then inserted into the anus and the anastomosis found be widely patent and hemostatic.  Mucosa appeared normal on both sides.  There was a negative leak test as well. The colon and rectum were then desufflated.  The pelvis was copiously irrigated and suctioned and found to be hemostatic as well.  The colon was again checked for any signs of tension which there was none.    The small bowel was then checked for any signs of injury during the lysis of adhesion which there was none.  The omentum was then grasped along the transverse colon and the right side of the omentum was mobilized completely off the transverse colon in usual fashion. A tongue of omentum was then fashioned from this complete mobilization of the omentum and reach easily into the pelvis to cover the colon and rectum anastomosis, as well as to separate it from the undersurface of the bladder as well as the small intestine.  This was sutured into place using a 3-0 Vicryl suture for the omental pedicle flap.    The abdomen was then copiously irrigated all 4 quadrants and found to be hemostatic. A transverse abdominis plane block was then performed using a mixture of 20 cc of Exparel, 30 cc of 0.25% bupivacaine plain and 10 cc of injectable saline. 25 cc  injected bilaterally into the transverse abdominis plane under direct visualization for a total of 50 cc given for the block.  The remaining 10 cc of this mixture was used for local infiltration of the skin incision as well as the colostomy closure site at the end of the case. The fascia of the colostomy site was then closed with interrupted 0 Prolene sutures. The midline incision was then closed with running #1 looped PDS suture. The subcutaneous tissue was then copiously irrigated. The midline incision skin was then loosely approximated with skin staplers.  The colostomy site was then pursestring partially closed with a 2-0 Vicryl suture in usual fashion. In between each skin staple, a chlorhexidine soaked Telfa wick was placed in the midline to prevent primary closure. A chlorhexidine soaked Telfa wick was also placed within the colostomy closure site as well. Surgical dressings were applied. The patient was then moved back in lithotomy position. He was woken from general endotracheal anesthesia and taken to the postanesthesia care in stable condition.  He tolerated procedure well.  All sponge, needle and instrument counts were correct at the end of the case.    Significant Surgical Tasks Conducted by the Assistant(s), if Applicable: N/A    Complications: Yes - rectal injury from EEA Sizer insertion requiring more extensive proctectomy than originally planned    Estimated Blood Loss (EBL): 200 mL           Implants: * No implants in log *    Specimens:   Specimen (12h ago, onward)    None                  Condition: Good    Disposition: PACU - hemodynamically stable.    Attestation: I performed the procedure.

## 2020-01-30 NOTE — INTERVAL H&P NOTE
The patient has been examined and the H&P has been reviewed:    I concur with the findings and no changes have occurred since H&P was written.    Anesthesia/Surgery risks, benefits and alternative options discussed and understood by patient/family.          Active Hospital Problems    Diagnosis  POA    Diverticulitis of large intestine with perforation and abscess without bleeding [K57.20]  Yes      Resolved Hospital Problems   No resolved problems to display.

## 2020-01-30 NOTE — TRANSFER OF CARE
"Anesthesia Transfer of Care Note    Patient: Dax Carlisle    Procedure(s) Performed: Procedure(s) (LRB):  CLOSURE, COLOSTOMY (open, lithotomy) (N/A)  BLOCK, TRANSVERSUS ABDOMINIS PLANE (N/A)  COLECTOMY, PARTIAL  PROCTECTOMY (N/A)  LYSIS, ADHESIONS (N/A)    Patient location: PACU    Anesthesia Type: general    Transport from OR: Transported from OR on room air with adequate spontaneous ventilation    Post pain: adequate analgesia    Post assessment: no apparent anesthetic complications    Post vital signs: stable    Level of consciousness: awake    Nausea/Vomiting: no nausea/vomiting    Complications: none    Transfer of care protocol was followed      Last vitals:   Visit Vitals  /72 (BP Location: Right arm, Patient Position: Sitting)   Pulse 93   Temp 36.8 °C (98.2 °F) (Temporal)   Resp 18   Ht 5' 10" (1.778 m)   Wt 87.8 kg (193 lb 9 oz)   SpO2 96%   BMI 27.77 kg/m²     "

## 2020-01-31 LAB
ANION GAP SERPL CALC-SCNC: 8 MMOL/L (ref 8–16)
BASOPHILS # BLD AUTO: 0.02 K/UL (ref 0–0.2)
BASOPHILS NFR BLD: 0.1 % (ref 0–1.9)
BUN SERPL-MCNC: 9 MG/DL (ref 6–20)
CALCIUM SERPL-MCNC: 8.7 MG/DL (ref 8.7–10.5)
CHLORIDE SERPL-SCNC: 104 MMOL/L (ref 95–110)
CO2 SERPL-SCNC: 24 MMOL/L (ref 23–29)
CREAT SERPL-MCNC: 0.7 MG/DL (ref 0.5–1.4)
DIFFERENTIAL METHOD: ABNORMAL
EOSINOPHIL # BLD AUTO: 0 K/UL (ref 0–0.5)
EOSINOPHIL NFR BLD: 0.2 % (ref 0–8)
ERYTHROCYTE [DISTWIDTH] IN BLOOD BY AUTOMATED COUNT: 13 % (ref 11.5–14.5)
EST. GFR  (AFRICAN AMERICAN): >60 ML/MIN/1.73 M^2
EST. GFR  (NON AFRICAN AMERICAN): >60 ML/MIN/1.73 M^2
GLUCOSE SERPL-MCNC: 128 MG/DL (ref 70–110)
HCT VFR BLD AUTO: 40.9 % (ref 40–54)
HGB BLD-MCNC: 13.7 G/DL (ref 14–18)
IMM GRANULOCYTES # BLD AUTO: 0.05 K/UL (ref 0–0.04)
IMM GRANULOCYTES NFR BLD AUTO: 0.3 % (ref 0–0.5)
LYMPHOCYTES # BLD AUTO: 1.6 K/UL (ref 1–4.8)
LYMPHOCYTES NFR BLD: 11 % (ref 18–48)
MAGNESIUM SERPL-MCNC: 1.6 MG/DL (ref 1.6–2.6)
MCH RBC QN AUTO: 28.1 PG (ref 27–31)
MCHC RBC AUTO-ENTMCNC: 33.5 G/DL (ref 32–36)
MCV RBC AUTO: 84 FL (ref 82–98)
MONOCYTES # BLD AUTO: 1.3 K/UL (ref 0.3–1)
MONOCYTES NFR BLD: 8.8 % (ref 4–15)
NEUTROPHILS # BLD AUTO: 11.7 K/UL (ref 1.8–7.7)
NEUTROPHILS NFR BLD: 79.6 % (ref 38–73)
NRBC BLD-RTO: 0 /100 WBC
PHOSPHATE SERPL-MCNC: 2.4 MG/DL (ref 2.7–4.5)
PLATELET # BLD AUTO: 216 K/UL (ref 150–350)
PMV BLD AUTO: 10.6 FL (ref 9.2–12.9)
POTASSIUM SERPL-SCNC: 4.2 MMOL/L (ref 3.5–5.1)
RBC # BLD AUTO: 4.87 M/UL (ref 4.6–6.2)
SODIUM SERPL-SCNC: 136 MMOL/L (ref 136–145)
WBC # BLD AUTO: 14.67 K/UL (ref 3.9–12.7)

## 2020-01-31 PROCEDURE — 25000003 PHARM REV CODE 250: Performed by: COLON & RECTAL SURGERY

## 2020-01-31 PROCEDURE — 27000221 HC OXYGEN, UP TO 24 HOURS

## 2020-01-31 PROCEDURE — 94799 UNLISTED PULMONARY SVC/PX: CPT

## 2020-01-31 PROCEDURE — 63600175 PHARM REV CODE 636 W HCPCS: Performed by: PHYSICIAN ASSISTANT

## 2020-01-31 PROCEDURE — 11000001 HC ACUTE MED/SURG PRIVATE ROOM

## 2020-01-31 PROCEDURE — 63600175 PHARM REV CODE 636 W HCPCS: Performed by: COLON & RECTAL SURGERY

## 2020-01-31 PROCEDURE — 99900035 HC TECH TIME PER 15 MIN (STAT)

## 2020-01-31 PROCEDURE — 83735 ASSAY OF MAGNESIUM: CPT

## 2020-01-31 PROCEDURE — 84100 ASSAY OF PHOSPHORUS: CPT

## 2020-01-31 PROCEDURE — 36415 COLL VENOUS BLD VENIPUNCTURE: CPT

## 2020-01-31 PROCEDURE — 93010 ELECTROCARDIOGRAM REPORT: CPT | Mod: ,,, | Performed by: INTERNAL MEDICINE

## 2020-01-31 PROCEDURE — 80048 BASIC METABOLIC PNL TOTAL CA: CPT

## 2020-01-31 PROCEDURE — 94761 N-INVAS EAR/PLS OXIMETRY MLT: CPT

## 2020-01-31 PROCEDURE — 63600175 PHARM REV CODE 636 W HCPCS: Performed by: SURGERY

## 2020-01-31 PROCEDURE — 21400001 HC TELEMETRY ROOM

## 2020-01-31 PROCEDURE — 94770 HC EXHALED C02 TEST: CPT

## 2020-01-31 PROCEDURE — 93005 ELECTROCARDIOGRAM TRACING: CPT

## 2020-01-31 PROCEDURE — 85025 COMPLETE CBC W/AUTO DIFF WBC: CPT

## 2020-01-31 PROCEDURE — 93010 EKG 12-LEAD: ICD-10-PCS | Mod: ,,, | Performed by: INTERNAL MEDICINE

## 2020-01-31 PROCEDURE — 96372 THER/PROPH/DIAG INJ SC/IM: CPT

## 2020-01-31 RX ORDER — AMOXICILLIN AND CLAVULANATE POTASSIUM 875; 125 MG/1; MG/1
1 TABLET, FILM COATED ORAL EVERY 12 HOURS
Status: COMPLETED | OUTPATIENT
Start: 2020-01-31 | End: 2020-02-04

## 2020-01-31 RX ORDER — ACETAMINOPHEN 325 MG/1
650 TABLET ORAL EVERY 6 HOURS
Status: DISCONTINUED | OUTPATIENT
Start: 2020-01-31 | End: 2020-02-03

## 2020-01-31 RX ADMIN — Medication: at 02:01

## 2020-01-31 RX ADMIN — AMOXICILLIN AND CLAVULANATE POTASSIUM 1 TABLET: 875; 125 TABLET, FILM COATED ORAL at 08:01

## 2020-01-31 RX ADMIN — GABAPENTIN 300 MG: 300 CAPSULE ORAL at 09:01

## 2020-01-31 RX ADMIN — SENNOSIDES,DOCUSATE SODIUM 1 TABLET: 8.6; 5 TABLET, FILM COATED ORAL at 09:01

## 2020-01-31 RX ADMIN — ACETAMINOPHEN 650 MG: 325 TABLET ORAL at 04:01

## 2020-01-31 RX ADMIN — Medication: at 08:01

## 2020-01-31 RX ADMIN — CHLORHEXIDINE GLUCONATE 0.12% ORAL RINSE 10 ML: 1.2 LIQUID ORAL at 08:01

## 2020-01-31 RX ADMIN — CHLORHEXIDINE GLUCONATE 0.12% ORAL RINSE 10 ML: 1.2 LIQUID ORAL at 09:01

## 2020-01-31 RX ADMIN — GABAPENTIN 300 MG: 300 CAPSULE ORAL at 02:01

## 2020-01-31 RX ADMIN — ACETAMINOPHEN 650 MG: 325 TABLET ORAL at 11:01

## 2020-01-31 RX ADMIN — SODIUM CHLORIDE, SODIUM LACTATE, POTASSIUM CHLORIDE, AND CALCIUM CHLORIDE 1000 ML: .6; .31; .03; .02 INJECTION, SOLUTION INTRAVENOUS at 08:01

## 2020-01-31 RX ADMIN — ARIPIPRAZOLE 5 MG: 5 TABLET ORAL at 08:01

## 2020-01-31 RX ADMIN — PANTOPRAZOLE SODIUM 40 MG: 40 TABLET, DELAYED RELEASE ORAL at 05:01

## 2020-01-31 RX ADMIN — Medication: at 07:01

## 2020-01-31 RX ADMIN — ESCITALOPRAM OXALATE 20 MG: 10 TABLET ORAL at 08:01

## 2020-01-31 RX ADMIN — ONDANSETRON 4 MG: 2 INJECTION INTRAMUSCULAR; INTRAVENOUS at 11:01

## 2020-01-31 RX ADMIN — SENNOSIDES,DOCUSATE SODIUM 1 TABLET: 8.6; 5 TABLET, FILM COATED ORAL at 08:01

## 2020-01-31 RX ADMIN — SODIUM CHLORIDE, SODIUM LACTATE, POTASSIUM CHLORIDE, AND CALCIUM CHLORIDE 500 ML: .6; .31; .03; .02 INJECTION, SOLUTION INTRAVENOUS at 01:01

## 2020-01-31 RX ADMIN — ENOXAPARIN SODIUM 40 MG: 100 INJECTION SUBCUTANEOUS at 09:01

## 2020-01-31 RX ADMIN — ACETAMINOPHEN 1000 MG: 10 INJECTION, SOLUTION INTRAVENOUS at 05:01

## 2020-01-31 RX ADMIN — GABAPENTIN 300 MG: 300 CAPSULE ORAL at 08:01

## 2020-01-31 RX ADMIN — Medication: at 12:01

## 2020-01-31 NOTE — ANESTHESIA POSTPROCEDURE EVALUATION
Anesthesia Post Evaluation    Patient: Dax Carlisle    Procedure(s) Performed: Procedure(s) (LRB):  CLOSURE, COLOSTOMY (open, lithotomy) (N/A)  BLOCK, TRANSVERSUS ABDOMINIS PLANE (N/A)  COLECTOMY, PARTIAL  PROCTECTOMY (N/A)  LYSIS, ADHESIONS (N/A)    Final Anesthesia Type: general    Patient location during evaluation: PACU  Patient participation: Yes- Able to Participate  Level of consciousness: awake and alert  Post-procedure vital signs: reviewed and stable  Pain management: adequate  Airway patency: patent    PONV status at discharge: No PONV  Anesthetic complications: no      Cardiovascular status: hemodynamically stable  Respiratory status: spontaneous ventilation  Hydration status: euvolemic  Follow-up not needed.          Vitals Value Taken Time   /78 1/31/2020  7:27 AM   Temp 36.7 °C (98.1 °F) 1/31/2020  7:27 AM   Pulse 138 1/31/2020  7:27 AM   Resp 17 1/31/2020  7:27 AM   SpO2 93 % 1/31/2020  8:00 AM         Event Time     Out of Recovery 14:20:00          Pain/Corie Score: Pain Rating Prior to Med Admin: 9 (1/31/2020  7:05 AM)  Corie Score: 9 (1/30/2020  2:00 PM)

## 2020-01-31 NOTE — NURSING
Pt hrt rate has been in the 130s to 140s. offered to abulate pt in room early this shift. pt refused stating he is in so much pain. 500 mL bolus of LR was administered per Laurence NP order. hrt rate went from 130s to 150s. currently 145 beats per minute. DR. Choi notified, Ekg and CXR ordered.

## 2020-01-31 NOTE — SUBJECTIVE & OBJECTIVE
Interval History: sdfg    Medications:  Continuous Infusions:   hydromorphone in 0.9 % NaCl 6 mg/30 ml      lactated ringers 100 mL/hr at 01/31/20 0839     Scheduled Meds:   ARIPiprazole  5 mg Oral Daily    chlorhexidine  10 mL Mouth/Throat BID    enoxaparin  40 mg Subcutaneous Daily    escitalopram oxalate  20 mg Oral Daily    gabapentin  300 mg Oral TID    nozaseptin   Each Nostril BID    pantoprazole  40 mg Oral Before breakfast    senna-docusate 8.6-50 mg  1 tablet Oral BID     PRN Meds:diphenhydrAMINE, naloxone, ondansetron, promethazine (PHENERGAN) IVPB     Review of patient's allergies indicates:   Allergen Reactions    Codeine Other (See Comments)     violent    Iodinated contrast media Swelling     Tongue, right eye, lips swelling. Rash on abdomen and axilla    Nuts [tree nut] Anaphylaxis    Dairy aid [lactase] Diarrhea and Nausea And Vomiting    Morphine      Tolerated hydromorphone in October 2019.      Objective:     Vital Signs (Most Recent):  Temp: 98.1 °F (36.7 °C) (01/31/20 0727)  Pulse: (!) 138 (01/31/20 0727)  Resp: 17 (01/31/20 0727)  BP: 136/78 (01/31/20 0727)  SpO2: (!) 93 % (01/31/20 0800) Vital Signs (24h Range):  Temp:  [97.5 °F (36.4 °C)-98.7 °F (37.1 °C)] 98.1 °F (36.7 °C)  Pulse:  [] 138  Resp:  [12-22] 17  SpO2:  [84 %-100 %] 93 %  BP: (123-158)/(76-92) 136/78     Weight: 87.8 kg (193 lb 9 oz)  Body mass index is 27.77 kg/m².    Intake/Output - Last 3 Shifts       01/29 0700 - 01/30 0659 01/30 0700 - 01/31 0659 01/31 0700 - 02/01 0659    I.V. (mL/kg)  2523 (28.7) 270 (3.1)    IV Piggyback   200    Total Intake(mL/kg)  2523 (28.7) 470 (5.4)    Urine (mL/kg/hr)  950 (0.5)     Blood  200     Total Output  1150     Net  +1373 +470                 Physical Exam   Constitutional: He appears well-developed and well-nourished.   HENT:   Head: Normocephalic.   Neck: Normal range of motion.   Cardiovascular: Normal rate and regular rhythm.   Pulmonary/Chest: Effort normal and  breath sounds normal.   Abdominal: Soft. Bowel sounds are normal. He exhibits distension. There is tenderness (incisional).   Incision is dressed and dry   Genitourinary:   Genitourinary Comments: abdi   Musculoskeletal: Normal range of motion. He exhibits no edema.   Skin: Skin is warm. Capillary refill takes less than 2 seconds.   Psychiatric: He has a normal mood and affect. His behavior is normal. Judgment and thought content normal.   Nursing note and vitals reviewed.      Significant Labs:  CBC:   Recent Labs   Lab 01/31/20 0454   WBC 14.67*   RBC 4.87   HGB 13.7*   HCT 40.9      MCV 84   MCH 28.1   MCHC 33.5     BMP:   Recent Labs   Lab 01/31/20 0453   *      K 4.2      CO2 24   BUN 9   CREATININE 0.7   CALCIUM 8.7   MG 1.6     CMP:   Recent Labs   Lab 01/31/20 0453   *   CALCIUM 8.7      K 4.2   CO2 24      BUN 9   CREATININE 0.7     LFTs: No results for input(s): ALT, AST, ALKPHOS, BILITOT, PROT, ALBUMIN in the last 168 hours.  Coagulation: No results for input(s): LABPROT, INR, APTT in the last 168 hours.  Microbiology Results (last 7 days)     ** No results found for the last 168 hours. **          Significant Diagnostics:  CXR: I have reviewed all pertinent results/findings within the past 24 hours and my personal findings are:        Dax King III, MD 1/31/2020 STAT      Narrative     EXAMINATION:  XR CHEST AP PORTABLE    CLINICAL HISTORY:  tachycardia;    COMPARISON:  January 13th    FINDINGS:  Apical lordotic view.  The heart size remains normal with mild tortuosity of the thoracic aorta.  Lung fields show no consolidation or effusion.  Can not exclude a small band of atelectasis laterally at the left base.      Impression       Question minimal left lateral basilar atelectatic change.  Lungs otherwise clear.

## 2020-01-31 NOTE — PLAN OF CARE
Pt AAOX4, VSS. Pain controlled via PCA. Hernandez secured. Safety precaution inplace. POC reviewed with pt and wife, verbalized understanding. Will continue to monitor.

## 2020-01-31 NOTE — HOSPITAL COURSE
01/31/2020: pod1 s/p open colostomy reversal. Tachycardic. C/o Abdominal pain which is poorly controlled. No nausea. No chest pain or SOB. CXR normal EKG done. Fluid bolused. PCA dose increased.   02/01/2020 postop day 2 minimal appetite, no flatus or bowel movement, remains tachycardic  02/02/2020 postop day 3 pain control improving, small appetite, no flatus or bowel movement, leukocytosis improved, tachycardic  02/03/2020: pod4. Pain controleld, appetite decreased, no bm/flatus. Leukocytosis improved and tachycardia improving.   02/04/2020: pod5 +bm, remains tachycardic. Tolerating diet  02/05/2020: pod6: pain controlled, tolerating diet, + bowel function. Remains tachycardic but asymptomatic

## 2020-01-31 NOTE — SUBJECTIVE & OBJECTIVE
Interval History: tachycardic. C/o abdominal pain which is poorly controlled. Tolerating clear liquids without nausea. No CP or SOB.     Medications:  Continuous Infusions:   hydromorphone in 0.9 % NaCl 6 mg/30 ml      lactated ringers 100 mL/hr at 01/31/20 0839     Scheduled Meds:   ARIPiprazole  5 mg Oral Daily    chlorhexidine  10 mL Mouth/Throat BID    enoxaparin  40 mg Subcutaneous Daily    escitalopram oxalate  20 mg Oral Daily    gabapentin  300 mg Oral TID    nozaseptin   Each Nostril BID    pantoprazole  40 mg Oral Before breakfast    senna-docusate 8.6-50 mg  1 tablet Oral BID     PRN Meds:diphenhydrAMINE, naloxone, ondansetron, promethazine (PHENERGAN) IVPB     Review of patient's allergies indicates:   Allergen Reactions    Codeine Other (See Comments)     violent    Iodinated contrast media Swelling     Tongue, right eye, lips swelling. Rash on abdomen and axilla    Nuts [tree nut] Anaphylaxis    Dairy aid [lactase] Diarrhea and Nausea And Vomiting    Morphine      Tolerated hydromorphone in October 2019.      Objective:     Vital Signs (Most Recent):  Temp: 98.1 °F (36.7 °C) (01/31/20 0727)  Pulse: (!) 138 (01/31/20 0727)  Resp: 17 (01/31/20 0727)  BP: 136/78 (01/31/20 0727)  SpO2: (!) 93 % (01/31/20 0800) Vital Signs (24h Range):  Temp:  [97.5 °F (36.4 °C)-98.7 °F (37.1 °C)] 98.1 °F (36.7 °C)  Pulse:  [] 138  Resp:  [12-22] 17  SpO2:  [84 %-100 %] 93 %  BP: (123-158)/(76-92) 136/78     Weight: 87.8 kg (193 lb 9 oz)  Body mass index is 27.77 kg/m².    Intake/Output - Last 3 Shifts       01/29 0700 - 01/30 0659 01/30 0700 - 01/31 0659 01/31 0700 - 02/01 0659    I.V. (mL/kg)  2523 (28.7) 270 (3.1)    IV Piggyback   200    Total Intake(mL/kg)  2523 (28.7) 470 (5.4)    Urine (mL/kg/hr)  950 (0.5)     Blood  200     Total Output  1150     Net  +1373 +470                 Physical Exam   Constitutional: He is oriented to person, place, and time. He appears well-developed and  well-nourished.   HENT:   Head: Normocephalic and atraumatic.   Eyes: EOM are normal.   Cardiovascular: Regular rhythm.   Tachy   Pulmonary/Chest: Effort normal. No respiratory distress.   Abdominal: Soft. He exhibits distension. There is tenderness.   Surgical dressings in place. Hernandez in place   Musculoskeletal: Normal range of motion.   Neurological: He is alert and oriented to person, place, and time.   Skin: Skin is warm and dry.   Psychiatric: He has a normal mood and affect. Thought content normal.   Vitals reviewed.      Significant Labs:  CBC:   Recent Labs   Lab 01/31/20  0454   WBC 14.67*   RBC 4.87   HGB 13.7*   HCT 40.9      MCV 84   MCH 28.1   MCHC 33.5     CMP:   Recent Labs   Lab 01/31/20  0453   *   CALCIUM 8.7      K 4.2   CO2 24      BUN 9   CREATININE 0.7       Significant Diagnostics:  I have reviewed all pertinent imaging results/findings within the past 24 hours.

## 2020-01-31 NOTE — ASSESSMENT & PLAN NOTE
S/p colostomy reversal  POD1  Tachycardic - EKG, tele-monitor. IV hydration  CXR clear   Post op pain - increased PCA dose  Clear liquids   OOB, mobilize.   IS  DVT prophylaxis

## 2020-01-31 NOTE — PLAN OF CARE
01/31/20 1110   Discharge Assessment   Assessment Type Discharge Planning Assessment   Confirmed/corrected address and phone number on facesheet? Yes   Assessment information obtained from? Patient   Prior to hospitilization cognitive status: Alert/Oriented   Prior to hospitalization functional status: Independent   Current cognitive status: Alert/Oriented   Current Functional Status: Independent   Lives With spouse   Able to Return to Prior Arrangements yes   Is patient able to care for self after discharge? Yes   Who are your caregiver(s) and their phone number(s)? Mary Alice Lopes (wife) [phone]   Patient's perception of discharge disposition home or selfcare   Readmission Within the Last 30 Days no previous admission in last 30 days   Patient currently being followed by outpatient case management? No   Patient currently receives any other outside agency services? No   Equipment Currently Used at Home none   Do you have any problems affording any of your prescribed medications? No   Is the patient taking medications as prescribed? yes   Does the patient have transportation home? Yes   Transportation Anticipated family or friend will provide   Does the patient receive services at the [facility]? No   Discharge Plan A Home with family   DME Needed Upon Discharge  none   Patient/Family in Agreement with Plan yes     Met with pt and his wife at bedside for DC assessment. Pt lives at home with his wife and previously used ostomy supplies. Pt has had ostomy reversal and is unlikely to need any DME. No other CM DC needs identified at this time. SWer provided a transitional care folder, information on advanced directives, information on pharmacy bedside delivery, and discharge planning begins on admission with contact information for any needs/questions. Pt opted for bedside medication delivery. His typical pharmacy is [facility] on [address].     [id number] [address] AT [facility] [address]  [address]  [facility] [address]  Phone: [phone] Fax: [phone]    [facility]/pharmacy #[id number] - [place] - [address]  [address]  [place] [address]  Phone: [phone] Fax: [phone]    PCP: MD Santos James LMSW 1/31/2020 11:15 AM

## 2020-01-31 NOTE — NURSING
pt hrt rate is in the 150s. and he states he feels like he is gonna die. ADALBERTO Dang notified. 500mL of LR ordered and administered.

## 2020-01-31 NOTE — ASSESSMENT & PLAN NOTE
S/p colostomy reversal  POD2  Tachycardic/leukocytosis - continue hydration and close monitoring if no improvement or patient spikes fevers will further evaluate with CT scan.  Initial imaging and workup negative  Post op pain - increased PCA dose  Clear liquids   OOB, mobilize.   IS  DVT prophylaxis

## 2020-01-31 NOTE — PLAN OF CARE
PCA pump in use to manage pain level. Heart monitor 8618, sinus tachycardia. LR @ 100mL/hr. Clear liquid diet. Dressing to ABD C/D/I. ABD binder on & in place. Remains free from injury/incident. Call light in reach.

## 2020-01-31 NOTE — PROGRESS NOTES
[facility] -   General Surgery  Progress Note    Subjective:     History of Present Illness:  He presented for colostomy reversal.       Post-Op Info:  Procedure(s) (LRB):  CLOSURE, COLOSTOMY (open, lithotomy) (N/A)  BLOCK, TRANSVERSUS ABDOMINIS PLANE (N/A)  COLECTOMY, PARTIAL  PROCTECTOMY (N/A)  LYSIS, ADHESIONS (N/A)   1 Day Post-Op     Interval History: tachycardic. C/o abdominal pain which is poorly controlled. Tolerating clear liquids without nausea. No CP or SOB.     Medications:  Continuous Infusions:   hydromorphone in 0.9 % NaCl 6 mg/30 ml      lactated ringers 100 mL/hr at 01/31/20 0839     Scheduled Meds:   ARIPiprazole  5 mg Oral Daily    chlorhexidine  10 mL Mouth/Throat BID    enoxaparin  40 mg Subcutaneous Daily    escitalopram oxalate  20 mg Oral Daily    gabapentin  300 mg Oral TID    nozaseptin   Each Nostril BID    pantoprazole  40 mg Oral Before breakfast    senna-docusate 8.6-50 mg  1 tablet Oral BID     PRN Meds:diphenhydrAMINE, naloxone, ondansetron, promethazine (PHENERGAN) IVPB     Review of patient's allergies indicates:   Allergen Reactions    Codeine Other (See Comments)     violent    Iodinated contrast media Swelling     Tongue, right eye, lips swelling. Rash on abdomen and axilla    Nuts [tree nut] Anaphylaxis    Dairy aid [lactase] Diarrhea and Nausea And Vomiting    Morphine      Tolerated hydromorphone in October 2019.      Objective:     Vital Signs (Most Recent):  Temp: 98.1 °F (36.7 °C) (01/31/20 0727)  Pulse: (!) 138 (01/31/20 0727)  Resp: 17 (01/31/20 0727)  BP: 136/78 (01/31/20 0727)  SpO2: (!) 93 % (01/31/20 0800) Vital Signs (24h Range):  Temp:  [97.5 °F (36.4 °C)-98.7 °F (37.1 °C)] 98.1 °F (36.7 °C)  Pulse:  [] 138  Resp:  [12-22] 17  SpO2:  [84 %-100 %] 93 %  BP: (123-158)/(76-92) 136/78     Weight: 87.8 kg (193 lb 9 oz)  Body mass index is 27.77 kg/m².    Intake/Output - Last 3 Shifts       01/29 0700 - 01/30 0659 01/30 0700 - 01/31 0659  01/31 0700 - 02/01 0659    I.V. (mL/kg)  2523 (28.7) 270 (3.1)    IV Piggyback   200    Total Intake(mL/kg)  2523 (28.7) 470 (5.4)    Urine (mL/kg/hr)  950 (0.5)     Blood  200     Total Output  1150     Net  +1373 +470                 Physical Exam   Constitutional: He is oriented to person, place, and time. He appears well-developed and well-nourished.   HENT:   Head: Normocephalic and atraumatic.   Eyes: EOM are normal.   Cardiovascular: Regular rhythm.   Tachy   Pulmonary/Chest: Effort normal. No respiratory distress.   Abdominal: Soft. He exhibits distension. There is tenderness.   Surgical dressings in place. Hernandez in place   Musculoskeletal: Normal range of motion.   Neurological: He is alert and oriented to person, place, and time.   Skin: Skin is warm and dry.   Psychiatric: He has a normal mood and affect. Thought content normal.   Vitals reviewed.      Significant Labs:  CBC:   Recent Labs   Lab 01/31/20  0454   WBC 14.67*   RBC 4.87   HGB 13.7*   HCT 40.9      MCV 84   MCH 28.1   MCHC 33.5     CMP:   Recent Labs   Lab 01/31/20  0453   *   CALCIUM 8.7      K 4.2   CO2 24      BUN 9   CREATININE 0.7       Significant Diagnostics:  I have reviewed all pertinent imaging results/findings within the past 24 hours.    Assessment/Plan:     Diverticulitis of large intestine with perforation and abscess without bleeding  S/p colostomy reversal  POD1  Tachycardic - EKG, tele-monitor. IV hydration  CXR clear   Post op pain - increased PCA dose  Clear liquids   OOB, mobilize.   IS  DVT prophylaxis      Acute midline thoracic back pain  Chronic        Promise Fajardo PA-C  General Surgery  [facility]

## 2020-02-01 LAB
ANION GAP SERPL CALC-SCNC: 11 MMOL/L (ref 8–16)
BASOPHILS # BLD AUTO: 0.04 K/UL (ref 0–0.2)
BASOPHILS NFR BLD: 0.3 % (ref 0–1.9)
BUN SERPL-MCNC: 7 MG/DL (ref 6–20)
CALCIUM SERPL-MCNC: 9.1 MG/DL (ref 8.7–10.5)
CHLORIDE SERPL-SCNC: 99 MMOL/L (ref 95–110)
CO2 SERPL-SCNC: 26 MMOL/L (ref 23–29)
CREAT SERPL-MCNC: 0.7 MG/DL (ref 0.5–1.4)
DIFFERENTIAL METHOD: ABNORMAL
EOSINOPHIL # BLD AUTO: 0.1 K/UL (ref 0–0.5)
EOSINOPHIL NFR BLD: 0.7 % (ref 0–8)
ERYTHROCYTE [DISTWIDTH] IN BLOOD BY AUTOMATED COUNT: 13.2 % (ref 11.5–14.5)
EST. GFR  (AFRICAN AMERICAN): >60 ML/MIN/1.73 M^2
EST. GFR  (NON AFRICAN AMERICAN): >60 ML/MIN/1.73 M^2
GLUCOSE SERPL-MCNC: 119 MG/DL (ref 70–110)
HCT VFR BLD AUTO: 36.4 % (ref 40–54)
HGB BLD-MCNC: 11.9 G/DL (ref 14–18)
IMM GRANULOCYTES # BLD AUTO: 0.06 K/UL (ref 0–0.04)
IMM GRANULOCYTES NFR BLD AUTO: 0.4 % (ref 0–0.5)
LYMPHOCYTES # BLD AUTO: 1.7 K/UL (ref 1–4.8)
LYMPHOCYTES NFR BLD: 11 % (ref 18–48)
MAGNESIUM SERPL-MCNC: 1.6 MG/DL (ref 1.6–2.6)
MCH RBC QN AUTO: 28.7 PG (ref 27–31)
MCHC RBC AUTO-ENTMCNC: 32.7 G/DL (ref 32–36)
MCV RBC AUTO: 88 FL (ref 82–98)
MONOCYTES # BLD AUTO: 1.3 K/UL (ref 0.3–1)
MONOCYTES NFR BLD: 8.3 % (ref 4–15)
NEUTROPHILS # BLD AUTO: 12.2 K/UL (ref 1.8–7.7)
NEUTROPHILS NFR BLD: 79.3 % (ref 38–73)
NRBC BLD-RTO: 0 /100 WBC
PHOSPHATE SERPL-MCNC: 1.4 MG/DL (ref 2.7–4.5)
PLATELET # BLD AUTO: 201 K/UL (ref 150–350)
PMV BLD AUTO: 10.7 FL (ref 9.2–12.9)
POTASSIUM SERPL-SCNC: 4.2 MMOL/L (ref 3.5–5.1)
RBC # BLD AUTO: 4.15 M/UL (ref 4.6–6.2)
SODIUM SERPL-SCNC: 136 MMOL/L (ref 136–145)
WBC # BLD AUTO: 15.35 K/UL (ref 3.9–12.7)

## 2020-02-01 PROCEDURE — 94770 HC EXHALED C02 TEST: CPT

## 2020-02-01 PROCEDURE — 94761 N-INVAS EAR/PLS OXIMETRY MLT: CPT

## 2020-02-01 PROCEDURE — 25000003 PHARM REV CODE 250: Performed by: SURGERY

## 2020-02-01 PROCEDURE — 63600175 PHARM REV CODE 636 W HCPCS: Performed by: SURGERY

## 2020-02-01 PROCEDURE — 27000221 HC OXYGEN, UP TO 24 HOURS

## 2020-02-01 PROCEDURE — 80048 BASIC METABOLIC PNL TOTAL CA: CPT

## 2020-02-01 PROCEDURE — 36415 COLL VENOUS BLD VENIPUNCTURE: CPT

## 2020-02-01 PROCEDURE — 25000003 PHARM REV CODE 250: Performed by: COLON & RECTAL SURGERY

## 2020-02-01 PROCEDURE — 94799 UNLISTED PULMONARY SVC/PX: CPT

## 2020-02-01 PROCEDURE — 21400001 HC TELEMETRY ROOM

## 2020-02-01 PROCEDURE — 11000001 HC ACUTE MED/SURG PRIVATE ROOM

## 2020-02-01 PROCEDURE — 83735 ASSAY OF MAGNESIUM: CPT

## 2020-02-01 PROCEDURE — 99900035 HC TECH TIME PER 15 MIN (STAT)

## 2020-02-01 PROCEDURE — 63600175 PHARM REV CODE 636 W HCPCS: Performed by: COLON & RECTAL SURGERY

## 2020-02-01 PROCEDURE — 85025 COMPLETE CBC W/AUTO DIFF WBC: CPT

## 2020-02-01 PROCEDURE — 84100 ASSAY OF PHOSPHORUS: CPT

## 2020-02-01 RX ORDER — LANOLIN ALCOHOL/MO/W.PET/CERES
800 CREAM (GRAM) TOPICAL 2 TIMES DAILY
Status: COMPLETED | OUTPATIENT
Start: 2020-02-01 | End: 2020-02-01

## 2020-02-01 RX ORDER — SODIUM,POTASSIUM PHOSPHATES 280-250MG
2 POWDER IN PACKET (EA) ORAL 2 TIMES DAILY
Status: COMPLETED | OUTPATIENT
Start: 2020-02-01 | End: 2020-02-01

## 2020-02-01 RX ORDER — TAMSULOSIN HYDROCHLORIDE 0.4 MG/1
0.4 CAPSULE ORAL DAILY
Status: DISCONTINUED | OUTPATIENT
Start: 2020-02-01 | End: 2020-02-05 | Stop reason: HOSPADM

## 2020-02-01 RX ORDER — SODIUM,POTASSIUM PHOSPHATES 280-250MG
2 POWDER IN PACKET (EA) ORAL ONCE
Status: DISCONTINUED | OUTPATIENT
Start: 2020-02-01 | End: 2020-02-02

## 2020-02-01 RX ADMIN — Medication: at 02:02

## 2020-02-01 RX ADMIN — SODIUM CHLORIDE, SODIUM LACTATE, POTASSIUM CHLORIDE, AND CALCIUM CHLORIDE 1000 ML: .6; .31; .03; .02 INJECTION, SOLUTION INTRAVENOUS at 02:02

## 2020-02-01 RX ADMIN — ACETAMINOPHEN 650 MG: 325 TABLET ORAL at 11:02

## 2020-02-01 RX ADMIN — TAMSULOSIN HYDROCHLORIDE 0.4 MG: 0.4 CAPSULE ORAL at 02:02

## 2020-02-01 RX ADMIN — AMOXICILLIN AND CLAVULANATE POTASSIUM 1 TABLET: 875; 125 TABLET, FILM COATED ORAL at 08:02

## 2020-02-01 RX ADMIN — POTASSIUM & SODIUM PHOSPHATES POWDER PACK 280-160-250 MG 2 PACKET: 280-160-250 PACK at 08:02

## 2020-02-01 RX ADMIN — ARIPIPRAZOLE 5 MG: 5 TABLET ORAL at 08:02

## 2020-02-01 RX ADMIN — PANTOPRAZOLE SODIUM 40 MG: 40 TABLET, DELAYED RELEASE ORAL at 05:02

## 2020-02-01 RX ADMIN — SENNOSIDES,DOCUSATE SODIUM 1 TABLET: 8.6; 5 TABLET, FILM COATED ORAL at 08:02

## 2020-02-01 RX ADMIN — GABAPENTIN 300 MG: 300 CAPSULE ORAL at 08:02

## 2020-02-01 RX ADMIN — ESCITALOPRAM OXALATE 20 MG: 10 TABLET ORAL at 08:02

## 2020-02-01 RX ADMIN — ACETAMINOPHEN 650 MG: 325 TABLET ORAL at 05:02

## 2020-02-01 RX ADMIN — ENOXAPARIN SODIUM 40 MG: 100 INJECTION SUBCUTANEOUS at 05:02

## 2020-02-01 RX ADMIN — SODIUM PHOSPHATE, MONOBASIC, MONOHYDRATE 39.99 MMOL: 276; 142 INJECTION, SOLUTION INTRAVENOUS at 08:02

## 2020-02-01 RX ADMIN — GABAPENTIN 300 MG: 300 CAPSULE ORAL at 02:02

## 2020-02-01 RX ADMIN — Medication 800 MG: at 08:02

## 2020-02-01 RX ADMIN — ACETAMINOPHEN 650 MG: 325 TABLET ORAL at 12:02

## 2020-02-01 RX ADMIN — Medication: at 04:02

## 2020-02-01 RX ADMIN — Medication: at 08:02

## 2020-02-01 RX ADMIN — SODIUM CHLORIDE, SODIUM LACTATE, POTASSIUM CHLORIDE, AND CALCIUM CHLORIDE: .6; .31; .03; .02 INJECTION, SOLUTION INTRAVENOUS at 08:02

## 2020-02-01 RX ADMIN — CHLORHEXIDINE GLUCONATE 0.12% ORAL RINSE 10 ML: 1.2 LIQUID ORAL at 08:02

## 2020-02-01 RX ADMIN — Medication: at 09:02

## 2020-02-01 NOTE — PLAN OF CARE
Remains free from harm, pain partially controlled via PCA, Refuses to position or ambulate, will continue to monitor. 24 hour chart check complete.

## 2020-02-01 NOTE — SUBJECTIVE & OBJECTIVE
Interval History: minimal appetite though no nausea, no flatus or bowel movement, remains tachycardic, pain control improved after PCA adjustment    Medications:  Continuous Infusions:   hydromorphone in 0.9 % NaCl 6 mg/30 ml      lactated ringers 100 mL/hr at 01/31/20 0839     Scheduled Meds:   acetaminophen  650 mg Oral Q6H    amoxicillin-clavulanate 875-125mg  1 tablet Oral Q12H    ARIPiprazole  5 mg Oral Daily    chlorhexidine  10 mL Mouth/Throat BID    enoxaparin  40 mg Subcutaneous Daily    escitalopram oxalate  20 mg Oral Daily    gabapentin  300 mg Oral TID    magnesium oxide  800 mg Oral BID    nozaseptin   Each Nostril BID    pantoprazole  40 mg Oral Before breakfast    potassium, sodium phosphates  2 packet Oral BID    senna-docusate 8.6-50 mg  1 tablet Oral BID    sodium phosphate IVPB  39.99 mmol Intravenous Once     PRN Meds:diphenhydrAMINE, naloxone, ondansetron, promethazine (PHENERGAN) IVPB     Review of patient's allergies indicates:   Allergen Reactions    Codeine Other (See Comments)     violent    Iodinated contrast media Swelling     Tongue, right eye, lips swelling. Rash on abdomen and axilla    Nuts [tree nut] Anaphylaxis    Dairy aid [lactase] Diarrhea and Nausea And Vomiting    Morphine      Tolerated hydromorphone in October 2019.      Objective:     Vital Signs (Most Recent):  Temp: 98.3 °F (36.8 °C) (02/01/20 0706)  Pulse: (!) 124 (02/01/20 0736)  Resp: (!) 21 (02/01/20 0736)  BP: 137/82 (02/01/20 0706)  SpO2: 96 % (02/01/20 0736) Vital Signs (24h Range):  Temp:  [97.6 °F (36.4 °C)-98.8 °F (37.1 °C)] 98.3 °F (36.8 °C)  Pulse:  [124-141] 124  Resp:  [16-21] 21  SpO2:  [92 %-96 %] 96 %  BP: (125-154)/(78-89) 137/82     Weight: 87.8 kg (193 lb 9 oz)  Body mass index is 27.77 kg/m².    Intake/Output - Last 3 Shifts       01/30 0700 - 01/31 0659 01/31 0700 - 02/01 0659 02/01 0700 - 02/02 0659    P.O.  1020     I.V. (mL/kg) 2523 (28.7) 2444.9 (27.8) 26 (0.3)    IV Piggyback   200     Total Intake(mL/kg) 2523 (28.7) 3664.9 (41.7) 26 (0.3)    Urine (mL/kg/hr) 950 (0.5) 1250 (0.6)     Blood 200      Total Output 1150 1250     Net +1373 +2414.9 +26                 Physical Exam   Constitutional: He is oriented to person, place, and time. He appears well-developed and well-nourished.   HENT:   Head: Normocephalic and atraumatic.   Eyes: EOM are normal.   Cardiovascular: Regular rhythm.   Tachy   Pulmonary/Chest: Effort normal. No respiratory distress.   Abdominal: Soft. He exhibits distension. There is tenderness.   Incision/ostomy site antonio removed clean and dry, staples with intentional gaps for drainage, gauze placed for drainage   Musculoskeletal: Normal range of motion.   Neurological: He is alert and oriented to person, place, and time.   Skin: Skin is warm and dry.   Psychiatric: He has a normal mood and affect. Thought content normal.   Vitals reviewed.      Significant Labs:  CBC:   Recent Labs   Lab 02/01/20  0545   WBC 15.35*   RBC 4.15*   HGB 11.9*   HCT 36.4*      MCV 88   MCH 28.7   MCHC 32.7     CMP:   Recent Labs   Lab 02/01/20  0545   *   CALCIUM 9.1      K 4.2   CO2 26   CL 99   BUN 7   CREATININE 0.7       Significant Diagnostics:  I have reviewed all pertinent imaging results/findings within the past 24 hours.

## 2020-02-01 NOTE — PROGRESS NOTES
[facility] -   General Surgery  Progress Note    Subjective:     History of Present Illness:  He presented for colostomy reversal.       Post-Op Info:  Procedure(s) (LRB):  CLOSURE, COLOSTOMY (open, lithotomy) (N/A)  BLOCK, TRANSVERSUS ABDOMINIS PLANE (N/A)  COLECTOMY, PARTIAL  PROCTECTOMY (N/A)  LYSIS, ADHESIONS (N/A)   2 Days Post-Op     Interval History: minimal appetite though no nausea, no flatus or bowel movement, remains tachycardic, pain control improved after PCA adjustment    Medications:  Continuous Infusions:   hydromorphone in 0.9 % NaCl 6 mg/30 ml      lactated ringers 100 mL/hr at 01/31/20 0839     Scheduled Meds:   acetaminophen  650 mg Oral Q6H    amoxicillin-clavulanate 875-125mg  1 tablet Oral Q12H    ARIPiprazole  5 mg Oral Daily    chlorhexidine  10 mL Mouth/Throat BID    enoxaparin  40 mg Subcutaneous Daily    escitalopram oxalate  20 mg Oral Daily    gabapentin  300 mg Oral TID    magnesium oxide  800 mg Oral BID    nozaseptin   Each Nostril BID    pantoprazole  40 mg Oral Before breakfast    potassium, sodium phosphates  2 packet Oral BID    senna-docusate 8.6-50 mg  1 tablet Oral BID    sodium phosphate IVPB  39.99 mmol Intravenous Once     PRN Meds:diphenhydrAMINE, naloxone, ondansetron, promethazine (PHENERGAN) IVPB     Review of patient's allergies indicates:   Allergen Reactions    Codeine Other (See Comments)     violent    Iodinated contrast media Swelling     Tongue, right eye, lips swelling. Rash on abdomen and axilla    Nuts [tree nut] Anaphylaxis    Dairy aid [lactase] Diarrhea and Nausea And Vomiting    Morphine      Tolerated hydromorphone in October 2019.      Objective:     Vital Signs (Most Recent):  Temp: 98.3 °F (36.8 °C) (02/01/20 0706)  Pulse: (!) 124 (02/01/20 0736)  Resp: (!) 21 (02/01/20 0736)  BP: 137/82 (02/01/20 0706)  SpO2: 96 % (02/01/20 0736) Vital Signs (24h Range):  Temp:  [97.6 °F (36.4 °C)-98.8 °F (37.1 °C)] 98.3 °F (36.8  °C)  Pulse:  [124-141] 124  Resp:  [16-21] 21  SpO2:  [92 %-96 %] 96 %  BP: (125-154)/(78-89) 137/82     Weight: 87.8 kg (193 lb 9 oz)  Body mass index is 27.77 kg/m².    Intake/Output - Last 3 Shifts       01/30 0700 - 01/31 0659 01/31 0700 - 02/01 0659 02/01 0700 - 02/02 0659    P.O.  1020     I.V. (mL/kg) 2523 (28.7) 2444.9 (27.8) 26 (0.3)    IV Piggyback  200     Total Intake(mL/kg) 2523 (28.7) 3664.9 (41.7) 26 (0.3)    Urine (mL/kg/hr) 950 (0.5) 1250 (0.6)     Blood 200      Total Output 1150 1250     Net +1373 +2414.9 +26                 Physical Exam   Constitutional: He is oriented to person, place, and time. He appears well-developed and well-nourished.   HENT:   Head: Normocephalic and atraumatic.   Eyes: EOM are normal.   Cardiovascular: Regular rhythm.   Tachy   Pulmonary/Chest: Effort normal. No respiratory distress.   Abdominal: Soft. He exhibits distension. There is tenderness.   Incision/ostomy site antonio removed clean and dry, staples with intentional gaps for drainage, gauze placed for drainage   Musculoskeletal: Normal range of motion.   Neurological: He is alert and oriented to person, place, and time.   Skin: Skin is warm and dry.   Psychiatric: He has a normal mood and affect. Thought content normal.   Vitals reviewed.      Significant Labs:  CBC:   Recent Labs   Lab 02/01/20  0545   WBC 15.35*   RBC 4.15*   HGB 11.9*   HCT 36.4*      MCV 88   MCH 28.7   MCHC 32.7     CMP:   Recent Labs   Lab 02/01/20  0545   *   CALCIUM 9.1      K 4.2   CO2 26   CL 99   BUN 7   CREATININE 0.7       Significant Diagnostics:  I have reviewed all pertinent imaging results/findings within the past 24 hours.    Assessment/Plan:     Diverticulitis of large intestine with perforation and abscess without bleeding  S/p colostomy reversal  POD2  Tachycardic/leukocytosis - continue hydration and close monitoring if no improvement or patient spikes fevers will further evaluate with CT scan.  Initial  imaging and workup negative  Post op pain - increased PCA dose  Clear liquids   OOB, mobilize.   IS  DVT prophylaxis      Acute midline thoracic back pain  Chronic        Cecilio Sewell MD  General Surgery  [facility]

## 2020-02-01 NOTE — PLAN OF CARE
Pt found on RA with a saturation of 80%. RT placed pt back on NC 3L and oxygen saturation recovered to 96%.

## 2020-02-01 NOTE — PLAN OF CARE
Pt AAO x 3. Ambulatory. IV fluids. Telemetry active. Free from injury. PCA for pain. Aseptic techniqued. NADN. Call light in reach. chart check completed.

## 2020-02-02 LAB
ANION GAP SERPL CALC-SCNC: 7 MMOL/L (ref 8–16)
BASOPHILS # BLD AUTO: 0.02 K/UL (ref 0–0.2)
BASOPHILS NFR BLD: 0.2 % (ref 0–1.9)
BUN SERPL-MCNC: 5 MG/DL (ref 6–20)
CALCIUM SERPL-MCNC: 8.8 MG/DL (ref 8.7–10.5)
CHLORIDE SERPL-SCNC: 98 MMOL/L (ref 95–110)
CO2 SERPL-SCNC: 32 MMOL/L (ref 23–29)
CREAT SERPL-MCNC: 0.6 MG/DL (ref 0.5–1.4)
DIFFERENTIAL METHOD: ABNORMAL
EOSINOPHIL # BLD AUTO: 0.3 K/UL (ref 0–0.5)
EOSINOPHIL NFR BLD: 3.8 % (ref 0–8)
ERYTHROCYTE [DISTWIDTH] IN BLOOD BY AUTOMATED COUNT: 13.1 % (ref 11.5–14.5)
EST. GFR  (AFRICAN AMERICAN): >60 ML/MIN/1.73 M^2
EST. GFR  (NON AFRICAN AMERICAN): >60 ML/MIN/1.73 M^2
GLUCOSE SERPL-MCNC: 98 MG/DL (ref 70–110)
HCT VFR BLD AUTO: 27.7 % (ref 40–54)
HGB BLD-MCNC: 9.1 G/DL (ref 14–18)
IMM GRANULOCYTES # BLD AUTO: 0.04 K/UL (ref 0–0.04)
IMM GRANULOCYTES NFR BLD AUTO: 0.5 % (ref 0–0.5)
LYMPHOCYTES # BLD AUTO: 1.1 K/UL (ref 1–4.8)
LYMPHOCYTES NFR BLD: 12.6 % (ref 18–48)
MAGNESIUM SERPL-MCNC: 1.8 MG/DL (ref 1.6–2.6)
MCH RBC QN AUTO: 28.3 PG (ref 27–31)
MCHC RBC AUTO-ENTMCNC: 32.9 G/DL (ref 32–36)
MCV RBC AUTO: 86 FL (ref 82–98)
MONOCYTES # BLD AUTO: 0.7 K/UL (ref 0.3–1)
MONOCYTES NFR BLD: 8.3 % (ref 4–15)
NEUTROPHILS # BLD AUTO: 6.6 K/UL (ref 1.8–7.7)
NEUTROPHILS NFR BLD: 74.6 % (ref 38–73)
NRBC BLD-RTO: 0 /100 WBC
PHOSPHATE SERPL-MCNC: 1.8 MG/DL (ref 2.7–4.5)
PLATELET # BLD AUTO: 142 K/UL (ref 150–350)
PMV BLD AUTO: 11 FL (ref 9.2–12.9)
POTASSIUM SERPL-SCNC: 3.5 MMOL/L (ref 3.5–5.1)
RBC # BLD AUTO: 3.21 M/UL (ref 4.6–6.2)
SODIUM SERPL-SCNC: 137 MMOL/L (ref 136–145)
WBC # BLD AUTO: 8.78 K/UL (ref 3.9–12.7)

## 2020-02-02 PROCEDURE — 25000003 PHARM REV CODE 250: Performed by: SURGERY

## 2020-02-02 PROCEDURE — 84100 ASSAY OF PHOSPHORUS: CPT

## 2020-02-02 PROCEDURE — 80048 BASIC METABOLIC PNL TOTAL CA: CPT

## 2020-02-02 PROCEDURE — 94761 N-INVAS EAR/PLS OXIMETRY MLT: CPT

## 2020-02-02 PROCEDURE — 94640 AIRWAY INHALATION TREATMENT: CPT

## 2020-02-02 PROCEDURE — 63600175 PHARM REV CODE 636 W HCPCS: Performed by: SURGERY

## 2020-02-02 PROCEDURE — 27000221 HC OXYGEN, UP TO 24 HOURS

## 2020-02-02 PROCEDURE — 63600175 PHARM REV CODE 636 W HCPCS: Performed by: COLON & RECTAL SURGERY

## 2020-02-02 PROCEDURE — 83735 ASSAY OF MAGNESIUM: CPT

## 2020-02-02 PROCEDURE — 21400001 HC TELEMETRY ROOM

## 2020-02-02 PROCEDURE — 36415 COLL VENOUS BLD VENIPUNCTURE: CPT

## 2020-02-02 PROCEDURE — 94770 HC EXHALED C02 TEST: CPT

## 2020-02-02 PROCEDURE — 94799 UNLISTED PULMONARY SVC/PX: CPT

## 2020-02-02 PROCEDURE — 25000242 PHARM REV CODE 250 ALT 637 W/ HCPCS: Performed by: SURGERY

## 2020-02-02 PROCEDURE — 25000003 PHARM REV CODE 250: Performed by: COLON & RECTAL SURGERY

## 2020-02-02 PROCEDURE — 99900035 HC TECH TIME PER 15 MIN (STAT)

## 2020-02-02 PROCEDURE — 85025 COMPLETE CBC W/AUTO DIFF WBC: CPT

## 2020-02-02 PROCEDURE — 11000001 HC ACUTE MED/SURG PRIVATE ROOM

## 2020-02-02 RX ORDER — IPRATROPIUM BROMIDE AND ALBUTEROL SULFATE 2.5; .5 MG/3ML; MG/3ML
3 SOLUTION RESPIRATORY (INHALATION) EVERY 6 HOURS
Status: DISCONTINUED | OUTPATIENT
Start: 2020-02-02 | End: 2020-02-05

## 2020-02-02 RX ORDER — SODIUM,POTASSIUM PHOSPHATES 280-250MG
2 POWDER IN PACKET (EA) ORAL 2 TIMES DAILY
Status: COMPLETED | OUTPATIENT
Start: 2020-02-02 | End: 2020-02-02

## 2020-02-02 RX ORDER — DEXTROSE MONOHYDRATE, SODIUM CHLORIDE, AND POTASSIUM CHLORIDE 50; 1.49; 4.5 G/1000ML; G/1000ML; G/1000ML
INJECTION, SOLUTION INTRAVENOUS CONTINUOUS
Status: DISCONTINUED | OUTPATIENT
Start: 2020-02-02 | End: 2020-02-03

## 2020-02-02 RX ORDER — LANOLIN ALCOHOL/MO/W.PET/CERES
800 CREAM (GRAM) TOPICAL 2 TIMES DAILY
Status: COMPLETED | OUTPATIENT
Start: 2020-02-02 | End: 2020-02-02

## 2020-02-02 RX ADMIN — Medication: at 10:02

## 2020-02-02 RX ADMIN — ACETAMINOPHEN 650 MG: 325 TABLET ORAL at 05:02

## 2020-02-02 RX ADMIN — AMOXICILLIN AND CLAVULANATE POTASSIUM 1 TABLET: 875; 125 TABLET, FILM COATED ORAL at 08:02

## 2020-02-02 RX ADMIN — CHLORHEXIDINE GLUCONATE 0.12% ORAL RINSE 10 ML: 1.2 LIQUID ORAL at 08:02

## 2020-02-02 RX ADMIN — SODIUM CHLORIDE, SODIUM LACTATE, POTASSIUM CHLORIDE, AND CALCIUM CHLORIDE: .6; .31; .03; .02 INJECTION, SOLUTION INTRAVENOUS at 02:02

## 2020-02-02 RX ADMIN — SENNOSIDES,DOCUSATE SODIUM 1 TABLET: 8.6; 5 TABLET, FILM COATED ORAL at 08:02

## 2020-02-02 RX ADMIN — Medication: at 02:02

## 2020-02-02 RX ADMIN — IPRATROPIUM BROMIDE AND ALBUTEROL SULFATE 3 ML: .5; 3 SOLUTION RESPIRATORY (INHALATION) at 07:02

## 2020-02-02 RX ADMIN — DIPHENHYDRAMINE HYDROCHLORIDE 12.5 MG: 50 INJECTION, SOLUTION INTRAMUSCULAR; INTRAVENOUS at 05:02

## 2020-02-02 RX ADMIN — ESCITALOPRAM OXALATE 20 MG: 10 TABLET ORAL at 08:02

## 2020-02-02 RX ADMIN — Medication 800 MG: at 08:02

## 2020-02-02 RX ADMIN — Medication: at 03:02

## 2020-02-02 RX ADMIN — TAMSULOSIN HYDROCHLORIDE 0.4 MG: 0.4 CAPSULE ORAL at 08:02

## 2020-02-02 RX ADMIN — GABAPENTIN 300 MG: 300 CAPSULE ORAL at 03:02

## 2020-02-02 RX ADMIN — HYPROMELLOSE 2910 1 DROP: 5 SOLUTION OPHTHALMIC at 08:02

## 2020-02-02 RX ADMIN — ACETAMINOPHEN 650 MG: 325 TABLET ORAL at 11:02

## 2020-02-02 RX ADMIN — DEXTROSE, SODIUM CHLORIDE, AND POTASSIUM CHLORIDE: 5; .45; .15 INJECTION INTRAVENOUS at 08:02

## 2020-02-02 RX ADMIN — Medication: at 09:02

## 2020-02-02 RX ADMIN — GABAPENTIN 300 MG: 300 CAPSULE ORAL at 08:02

## 2020-02-02 RX ADMIN — PANTOPRAZOLE SODIUM 40 MG: 40 TABLET, DELAYED RELEASE ORAL at 05:02

## 2020-02-02 RX ADMIN — DEXTROSE, SODIUM CHLORIDE, AND POTASSIUM CHLORIDE: 5; .45; .15 INJECTION INTRAVENOUS at 11:02

## 2020-02-02 RX ADMIN — SODIUM PHOSPHATE, MONOBASIC, MONOHYDRATE 39.99 MMOL: 276; 142 INJECTION, SOLUTION INTRAVENOUS at 10:02

## 2020-02-02 RX ADMIN — ARIPIPRAZOLE 5 MG: 5 TABLET ORAL at 08:02

## 2020-02-02 RX ADMIN — ENOXAPARIN SODIUM 40 MG: 100 INJECTION SUBCUTANEOUS at 05:02

## 2020-02-02 RX ADMIN — POTASSIUM & SODIUM PHOSPHATES POWDER PACK 280-160-250 MG 2 PACKET: 280-160-250 PACK at 08:02

## 2020-02-02 NOTE — ASSESSMENT & PLAN NOTE
S/p colostomy reversal  POD3  Tachycardic - initial studies negative, continue to monitor may need further evaluation with CT scan if no improvement  leukocytosis - improved continue  Post op pain - continue PCA today discussed transitioning to oral medications with IV for breakthrough as pain improves  Clear liquids advance once bowel function returning and appetite returned  OOB, mobilize.   IS  DVT prophylaxis

## 2020-02-02 NOTE — PLAN OF CARE
Pt AAO x 3. Ambulatory. IV fluids. Free from injury. Hernandez placed. PCA for pain. Telemetry active. Dressing changed to abdomen. VSS. NADN. Call light in reach. chart check completed.

## 2020-02-02 NOTE — SUBJECTIVE & OBJECTIVE
Interval History: minimal appetite though no nausea, no flatus or bowel movement, remains tachycardic, pain control improved after PCA adjustment    Medications:  Continuous Infusions:   dextrose 5 % and 0.45 % NaCl with KCl 20 mEq 100 mL/hr at 02/02/20 0841    hydromorphone in 0.9 % NaCl 6 mg/30 ml       Scheduled Meds:   acetaminophen  650 mg Oral Q6H    amoxicillin-clavulanate 875-125mg  1 tablet Oral Q12H    ARIPiprazole  5 mg Oral Daily    chlorhexidine  10 mL Mouth/Throat BID    enoxaparin  40 mg Subcutaneous Daily    escitalopram oxalate  20 mg Oral Daily    gabapentin  300 mg Oral TID    magnesium oxide  800 mg Oral BID    nozaseptin   Each Nostril BID    pantoprazole  40 mg Oral Before breakfast    potassium, sodium phosphates  2 packet Oral BID    senna-docusate 8.6-50 mg  1 tablet Oral BID    sodium phosphate IVPB  39.99 mmol Intravenous Once    tamsulosin  0.4 mg Oral Daily     PRN Meds:diphenhydrAMINE, naloxone, ondansetron, promethazine (PHENERGAN) IVPB     Review of patient's allergies indicates:   Allergen Reactions    Codeine Other (See Comments)     violent    Iodinated contrast media Swelling     Tongue, right eye, lips swelling. Rash on abdomen and axilla    Nuts [tree nut] Anaphylaxis    Dairy aid [lactase] Diarrhea and Nausea And Vomiting    Morphine      Tolerated hydromorphone in October 2019.      Objective:     Vital Signs (Most Recent):  Temp: 98 °F (36.7 °C) (02/02/20 0715)  Pulse: (!) 120 (02/02/20 0755)  Resp: 19 (02/02/20 0755)  BP: 133/80 (02/02/20 0715)  SpO2: 99 % (02/02/20 0755) Vital Signs (24h Range):  Temp:  [97.6 °F (36.4 °C)-98.2 °F (36.8 °C)] 98 °F (36.7 °C)  Pulse:  [105-134] 120  Resp:  [15-20] 19  SpO2:  [95 %-99 %] 99 %  BP: (121-142)/(77-94) 133/80     Weight: 87.8 kg (193 lb 9 oz)  Body mass index is 27.77 kg/m².    Intake/Output - Last 3 Shifts       01/31 0700 - 02/01 0659 02/01 0700 - 02/02 0659 02/02 0700 - 02/03 0659    P.O. 1277 213 086     I.V. (mL/kg) 2444.9 (27.8) 2882 (32.8)     IV Piggyback 200      Total Intake(mL/kg) 3664.9 (41.7) 3602 (41) 360 (4.1)    Urine (mL/kg/hr) 1250 (0.6) 3350 (1.6)     Blood       Total Output 1250 3350     Net +2414.9 +252 +360                 Physical Exam   Constitutional: He is oriented to person, place, and time. He appears well-developed and well-nourished.   HENT:   Head: Normocephalic and atraumatic.   Eyes: EOM are normal.   Cardiovascular: Regular rhythm.   Tachy   Pulmonary/Chest: Effort normal. No respiratory distress.   Abdominal: Soft. He exhibits distension. There is tenderness.   Incision/ostomy site  clean and dry, staples with intentional gaps for drainage, minimal drainage on gauze   Musculoskeletal: Normal range of motion.   Neurological: He is alert and oriented to person, place, and time.   Skin: Skin is warm and dry.   Psychiatric: He has a normal mood and affect. Thought content normal.   Vitals reviewed.      Significant Labs:  CBC:   Recent Labs   Lab 02/02/20  0518   WBC 8.78   RBC 3.21*   HGB 9.1*   HCT 27.7*   *   MCV 86   MCH 28.3   MCHC 32.9     CMP:   Recent Labs   Lab 02/02/20  0518   GLU 98   CALCIUM 8.8      K 3.5   CO2 32*   CL 98   BUN 5*   CREATININE 0.6       Significant Diagnostics:  I have reviewed all pertinent imaging results/findings within the past 24 hours.

## 2020-02-02 NOTE — PLAN OF CARE
Pt one 1L NC, weaned from 3L, with no distress at this time. Pt states he is ambulating often and using IS on his own. Proper technique demonstrated.

## 2020-02-02 NOTE — PROGRESS NOTES
[facility] -   General Surgery  Progress Note    Subjective:     History of Present Illness:  He presented for colostomy reversal.       Post-Op Info:  Procedure(s) (LRB):  CLOSURE, COLOSTOMY (open, lithotomy) (N/A)  BLOCK, TRANSVERSUS ABDOMINIS PLANE (N/A)  COLECTOMY, PARTIAL  PROCTECTOMY (N/A)  LYSIS, ADHESIONS (N/A)   3 Days Post-Op     Interval History: minimal appetite though no nausea, no flatus or bowel movement, remains tachycardic, pain control improved after PCA adjustment    Medications:  Continuous Infusions:   dextrose 5 % and 0.45 % NaCl with KCl 20 mEq 100 mL/hr at 02/02/20 0841    hydromorphone in 0.9 % NaCl 6 mg/30 ml       Scheduled Meds:   acetaminophen  650 mg Oral Q6H    amoxicillin-clavulanate 875-125mg  1 tablet Oral Q12H    ARIPiprazole  5 mg Oral Daily    chlorhexidine  10 mL Mouth/Throat BID    enoxaparin  40 mg Subcutaneous Daily    escitalopram oxalate  20 mg Oral Daily    gabapentin  300 mg Oral TID    magnesium oxide  800 mg Oral BID    nozaseptin   Each Nostril BID    pantoprazole  40 mg Oral Before breakfast    potassium, sodium phosphates  2 packet Oral BID    senna-docusate 8.6-50 mg  1 tablet Oral BID    sodium phosphate IVPB  39.99 mmol Intravenous Once    tamsulosin  0.4 mg Oral Daily     PRN Meds:diphenhydrAMINE, naloxone, ondansetron, promethazine (PHENERGAN) IVPB     Review of patient's allergies indicates:   Allergen Reactions    Codeine Other (See Comments)     violent    Iodinated contrast media Swelling     Tongue, right eye, lips swelling. Rash on abdomen and axilla    Nuts [tree nut] Anaphylaxis    Dairy aid [lactase] Diarrhea and Nausea And Vomiting    Morphine      Tolerated hydromorphone in October 2019.      Objective:     Vital Signs (Most Recent):  Temp: 98 °F (36.7 °C) (02/02/20 0715)  Pulse: (!) 120 (02/02/20 0755)  Resp: 19 (02/02/20 0755)  BP: 133/80 (02/02/20 0715)  SpO2: 99 % (02/02/20 0755) Vital Signs (24h Range):  Temp:   [97.6 °F (36.4 °C)-98.2 °F (36.8 °C)] 98 °F (36.7 °C)  Pulse:  [105-134] 120  Resp:  [15-20] 19  SpO2:  [95 %-99 %] 99 %  BP: (121-142)/(77-94) 133/80     Weight: 87.8 kg (193 lb 9 oz)  Body mass index is 27.77 kg/m².    Intake/Output - Last 3 Shifts       01/31 0700 - 02/01 0659 02/01 0700 - 02/02 0659 02/02 0700 - 02/03 0659    P.O. 1020 720 360    I.V. (mL/kg) 2444.9 (27.8) 2882 (32.8)     IV Piggyback 200      Total Intake(mL/kg) 3664.9 (41.7) 3602 (41) 360 (4.1)    Urine (mL/kg/hr) 1250 (0.6) 3350 (1.6)     Blood       Total Output 1250 3350     Net +2414.9 +252 +360                 Physical Exam   Constitutional: He is oriented to person, place, and time. He appears well-developed and well-nourished.   HENT:   Head: Normocephalic and atraumatic.   Eyes: EOM are normal.   Cardiovascular: Regular rhythm.   Tachy   Pulmonary/Chest: Effort normal. No respiratory distress.   Abdominal: Soft. He exhibits distension. There is tenderness.   Incision/ostomy site  clean and dry, staples with intentional gaps for drainage, minimal drainage on gauze   Musculoskeletal: Normal range of motion.   Neurological: He is alert and oriented to person, place, and time.   Skin: Skin is warm and dry.   Psychiatric: He has a normal mood and affect. Thought content normal.   Vitals reviewed.      Significant Labs:  CBC:   Recent Labs   Lab 02/02/20  0518   WBC 8.78   RBC 3.21*   HGB 9.1*   HCT 27.7*   *   MCV 86   MCH 28.3   MCHC 32.9     CMP:   Recent Labs   Lab 02/02/20  0518   GLU 98   CALCIUM 8.8      K 3.5   CO2 32*   CL 98   BUN 5*   CREATININE 0.6       Significant Diagnostics:  I have reviewed all pertinent imaging results/findings within the past 24 hours.    Assessment/Plan:     Diverticulitis of large intestine with perforation and abscess without bleeding  S/p colostomy reversal  POD3  Tachycardic - initial studies negative, continue to monitor may need further evaluation with CT scan if no  improvement  leukocytosis - improved continue  Post op pain - continue PCA today discussed transitioning to oral medications with IV for breakthrough as pain improves  Clear liquids advance once bowel function returning and appetite returned  OOB, mobilize.   IS  DVT prophylaxis      Acute midline thoracic back pain  Chronic        Cecilio Sewell MD  General Surgery  [facility]

## 2020-02-02 NOTE — PLAN OF CARE
Bladder training during shift. Hernandez will be removed this am. Pt ambulated in grant twice during shift. Sinus tach in 110s during shift. Sinus tach in 120s while ambulating. PCA for pain control. Abd. Binder in place.   Fall precautions in place. Call light and personal items within reach. Educated patient on side effects of medication administered. Pt verbalized understanding. 24 hour order check done.  Will continue to monitor.

## 2020-02-03 LAB
ANION GAP SERPL CALC-SCNC: 6 MMOL/L (ref 8–16)
BASOPHILS # BLD AUTO: 0.01 K/UL (ref 0–0.2)
BASOPHILS NFR BLD: 0.2 % (ref 0–1.9)
BUN SERPL-MCNC: 3 MG/DL (ref 6–20)
CALCIUM SERPL-MCNC: 8.5 MG/DL (ref 8.7–10.5)
CHLORIDE SERPL-SCNC: 101 MMOL/L (ref 95–110)
CO2 SERPL-SCNC: 32 MMOL/L (ref 23–29)
CREAT SERPL-MCNC: 0.6 MG/DL (ref 0.5–1.4)
DIFFERENTIAL METHOD: ABNORMAL
EOSINOPHIL # BLD AUTO: 0.4 K/UL (ref 0–0.5)
EOSINOPHIL NFR BLD: 6.4 % (ref 0–8)
ERYTHROCYTE [DISTWIDTH] IN BLOOD BY AUTOMATED COUNT: 13.5 % (ref 11.5–14.5)
EST. GFR  (AFRICAN AMERICAN): >60 ML/MIN/1.73 M^2
EST. GFR  (NON AFRICAN AMERICAN): >60 ML/MIN/1.73 M^2
GLUCOSE SERPL-MCNC: 114 MG/DL (ref 70–110)
HCT VFR BLD AUTO: 25.4 % (ref 40–54)
HGB BLD-MCNC: 8.2 G/DL (ref 14–18)
IMM GRANULOCYTES # BLD AUTO: 0.04 K/UL (ref 0–0.04)
IMM GRANULOCYTES NFR BLD AUTO: 0.7 % (ref 0–0.5)
LYMPHOCYTES # BLD AUTO: 1 K/UL (ref 1–4.8)
LYMPHOCYTES NFR BLD: 18.2 % (ref 18–48)
MAGNESIUM SERPL-MCNC: 1.9 MG/DL (ref 1.6–2.6)
MCH RBC QN AUTO: 28.3 PG (ref 27–31)
MCHC RBC AUTO-ENTMCNC: 32.3 G/DL (ref 32–36)
MCV RBC AUTO: 88 FL (ref 82–98)
MONOCYTES # BLD AUTO: 0.5 K/UL (ref 0.3–1)
MONOCYTES NFR BLD: 9.3 % (ref 4–15)
NEUTROPHILS # BLD AUTO: 3.6 K/UL (ref 1.8–7.7)
NEUTROPHILS NFR BLD: 65.2 % (ref 38–73)
NRBC BLD-RTO: 0 /100 WBC
PHOSPHATE SERPL-MCNC: 2.8 MG/DL (ref 2.7–4.5)
PLATELET # BLD AUTO: 175 K/UL (ref 150–350)
PMV BLD AUTO: 9.8 FL (ref 9.2–12.9)
POTASSIUM SERPL-SCNC: 3.5 MMOL/L (ref 3.5–5.1)
RBC # BLD AUTO: 2.9 M/UL (ref 4.6–6.2)
SODIUM SERPL-SCNC: 139 MMOL/L (ref 136–145)
WBC # BLD AUTO: 5.59 K/UL (ref 3.9–12.7)

## 2020-02-03 PROCEDURE — 85025 COMPLETE CBC W/AUTO DIFF WBC: CPT

## 2020-02-03 PROCEDURE — 63600175 PHARM REV CODE 636 W HCPCS: Performed by: COLON & RECTAL SURGERY

## 2020-02-03 PROCEDURE — 63600175 PHARM REV CODE 636 W HCPCS: Performed by: PHYSICIAN ASSISTANT

## 2020-02-03 PROCEDURE — 80048 BASIC METABOLIC PNL TOTAL CA: CPT

## 2020-02-03 PROCEDURE — 94799 UNLISTED PULMONARY SVC/PX: CPT

## 2020-02-03 PROCEDURE — 25000003 PHARM REV CODE 250: Performed by: SURGERY

## 2020-02-03 PROCEDURE — 99900035 HC TECH TIME PER 15 MIN (STAT)

## 2020-02-03 PROCEDURE — 21400001 HC TELEMETRY ROOM

## 2020-02-03 PROCEDURE — 83735 ASSAY OF MAGNESIUM: CPT

## 2020-02-03 PROCEDURE — 25000003 PHARM REV CODE 250: Performed by: COLON & RECTAL SURGERY

## 2020-02-03 PROCEDURE — 63600175 PHARM REV CODE 636 W HCPCS: Performed by: SURGERY

## 2020-02-03 PROCEDURE — 25000242 PHARM REV CODE 250 ALT 637 W/ HCPCS: Performed by: SURGERY

## 2020-02-03 PROCEDURE — 94761 N-INVAS EAR/PLS OXIMETRY MLT: CPT

## 2020-02-03 PROCEDURE — 94770 HC EXHALED C02 TEST: CPT

## 2020-02-03 PROCEDURE — 36415 COLL VENOUS BLD VENIPUNCTURE: CPT

## 2020-02-03 PROCEDURE — 84100 ASSAY OF PHOSPHORUS: CPT

## 2020-02-03 PROCEDURE — 25000003 PHARM REV CODE 250: Performed by: PHYSICIAN ASSISTANT

## 2020-02-03 PROCEDURE — 94640 AIRWAY INHALATION TREATMENT: CPT

## 2020-02-03 RX ORDER — OXYCODONE AND ACETAMINOPHEN 5; 325 MG/1; MG/1
1 TABLET ORAL EVERY 4 HOURS PRN
Status: DISCONTINUED | OUTPATIENT
Start: 2020-02-03 | End: 2020-02-03

## 2020-02-03 RX ORDER — HYDROCODONE BITARTRATE AND ACETAMINOPHEN 10; 325 MG/1; MG/1
1 TABLET ORAL EVERY 4 HOURS PRN
Status: DISCONTINUED | OUTPATIENT
Start: 2020-02-03 | End: 2020-02-03

## 2020-02-03 RX ORDER — SODIUM CHLORIDE 450 MG/100ML
INJECTION, SOLUTION INTRAVENOUS CONTINUOUS
Status: DISCONTINUED | OUTPATIENT
Start: 2020-02-03 | End: 2020-02-04

## 2020-02-03 RX ORDER — OXYCODONE AND ACETAMINOPHEN 10; 325 MG/1; MG/1
1 TABLET ORAL EVERY 4 HOURS PRN
Status: DISCONTINUED | OUTPATIENT
Start: 2020-02-03 | End: 2020-02-03

## 2020-02-03 RX ORDER — HYDROCODONE BITARTRATE AND ACETAMINOPHEN 10; 325 MG/1; MG/1
1 TABLET ORAL EVERY 4 HOURS PRN
Status: DISCONTINUED | OUTPATIENT
Start: 2020-02-03 | End: 2020-02-05 | Stop reason: HOSPADM

## 2020-02-03 RX ORDER — HYDROMORPHONE HYDROCHLORIDE 1 MG/ML
0.2 INJECTION, SOLUTION INTRAMUSCULAR; INTRAVENOUS; SUBCUTANEOUS EVERY 6 HOURS PRN
Status: DISCONTINUED | OUTPATIENT
Start: 2020-02-03 | End: 2020-02-05 | Stop reason: HOSPADM

## 2020-02-03 RX ADMIN — HYDROCODONE BITARTRATE AND ACETAMINOPHEN 1 TABLET: 10; 325 TABLET ORAL at 09:02

## 2020-02-03 RX ADMIN — OXYCODONE HYDROCHLORIDE AND ACETAMINOPHEN 1 TABLET: 10; 325 TABLET ORAL at 12:02

## 2020-02-03 RX ADMIN — ARIPIPRAZOLE 5 MG: 5 TABLET ORAL at 08:02

## 2020-02-03 RX ADMIN — PANTOPRAZOLE SODIUM 40 MG: 40 TABLET, DELAYED RELEASE ORAL at 05:02

## 2020-02-03 RX ADMIN — SODIUM CHLORIDE: 0.45 INJECTION, SOLUTION INTRAVENOUS at 08:02

## 2020-02-03 RX ADMIN — IPRATROPIUM BROMIDE AND ALBUTEROL SULFATE 3 ML: .5; 3 SOLUTION RESPIRATORY (INHALATION) at 12:02

## 2020-02-03 RX ADMIN — AMOXICILLIN AND CLAVULANATE POTASSIUM 1 TABLET: 875; 125 TABLET, FILM COATED ORAL at 08:02

## 2020-02-03 RX ADMIN — GABAPENTIN 300 MG: 300 CAPSULE ORAL at 09:02

## 2020-02-03 RX ADMIN — IPRATROPIUM BROMIDE AND ALBUTEROL SULFATE 3 ML: .5; 3 SOLUTION RESPIRATORY (INHALATION) at 07:02

## 2020-02-03 RX ADMIN — SENNOSIDES,DOCUSATE SODIUM 1 TABLET: 8.6; 5 TABLET, FILM COATED ORAL at 08:02

## 2020-02-03 RX ADMIN — ACETAMINOPHEN 650 MG: 325 TABLET ORAL at 05:02

## 2020-02-03 RX ADMIN — TAMSULOSIN HYDROCHLORIDE 0.4 MG: 0.4 CAPSULE ORAL at 09:02

## 2020-02-03 RX ADMIN — ESCITALOPRAM OXALATE 20 MG: 10 TABLET ORAL at 08:02

## 2020-02-03 RX ADMIN — GABAPENTIN 300 MG: 300 CAPSULE ORAL at 02:02

## 2020-02-03 RX ADMIN — AMOXICILLIN AND CLAVULANATE POTASSIUM 1 TABLET: 875; 125 TABLET, FILM COATED ORAL at 09:02

## 2020-02-03 RX ADMIN — SENNOSIDES,DOCUSATE SODIUM 1 TABLET: 8.6; 5 TABLET, FILM COATED ORAL at 09:02

## 2020-02-03 RX ADMIN — HYDROCODONE BITARTRATE AND ACETAMINOPHEN 1 TABLET: 10; 325 TABLET ORAL at 04:02

## 2020-02-03 RX ADMIN — HYDROCODONE BITARTRATE AND ACETAMINOPHEN 1 TABLET: 10; 325 TABLET ORAL at 08:02

## 2020-02-03 RX ADMIN — ENOXAPARIN SODIUM 40 MG: 100 INJECTION SUBCUTANEOUS at 04:02

## 2020-02-03 RX ADMIN — CHLORHEXIDINE GLUCONATE 0.12% ORAL RINSE 10 ML: 1.2 LIQUID ORAL at 08:02

## 2020-02-03 RX ADMIN — HYDROMORPHONE HYDROCHLORIDE 0.2 MG: 1 INJECTION, SOLUTION INTRAMUSCULAR; INTRAVENOUS; SUBCUTANEOUS at 09:02

## 2020-02-03 RX ADMIN — GABAPENTIN 300 MG: 300 CAPSULE ORAL at 08:02

## 2020-02-03 RX ADMIN — HYDROMORPHONE HYDROCHLORIDE 0.2 MG: 1 INJECTION, SOLUTION INTRAMUSCULAR; INTRAVENOUS; SUBCUTANEOUS at 02:02

## 2020-02-03 RX ADMIN — CHLORHEXIDINE GLUCONATE 0.12% ORAL RINSE 10 ML: 1.2 LIQUID ORAL at 09:02

## 2020-02-03 RX ADMIN — Medication: at 06:02

## 2020-02-03 NOTE — PROGRESS NOTES
[facility] -   General Surgery  Progress Note    Subjective:     History of Present Illness:  He presented for colostomy reversal.       Post-Op Info:  Procedure(s) (LRB):  CLOSURE, COLOSTOMY (open, lithotomy) (N/A)  BLOCK, TRANSVERSUS ABDOMINIS PLANE (N/A)  COLECTOMY, PARTIAL  PROCTECTOMY (N/A)  LYSIS, ADHESIONS (N/A)   4 Days Post-Op     Interval History: no new complaints. Pain controlled. Afebrile. Leukocytosis resolved.     Medications:  Continuous Infusions:   sodium chloride 0.45% 50 mL/hr at 02/03/20 0822    hydromorphone in 0.9 % NaCl 6 mg/30 ml       Scheduled Meds:   albuterol-ipratropium  3 mL Nebulization Q6H    amoxicillin-clavulanate 875-125mg  1 tablet Oral Q12H    ARIPiprazole  5 mg Oral Daily    chlorhexidine  10 mL Mouth/Throat BID    enoxaparin  40 mg Subcutaneous Daily    escitalopram oxalate  20 mg Oral Daily    gabapentin  300 mg Oral TID    nozaseptin   Each Nostril BID    pantoprazole  40 mg Oral Before breakfast    senna-docusate 8.6-50 mg  1 tablet Oral BID    tamsulosin  0.4 mg Oral Daily     PRN Meds:artificial tears, diphenhydrAMINE, HYDROcodone-acetaminophen, naloxone, ondansetron, promethazine (PHENERGAN) IVPB     Review of patient's allergies indicates:   Allergen Reactions    Codeine Other (See Comments)     violent    Iodinated contrast media Swelling     Tongue, right eye, lips swelling. Rash on abdomen and axilla    Nuts [tree nut] Anaphylaxis    Dairy aid [lactase] Diarrhea and Nausea And Vomiting    Morphine      Tolerated hydromorphone in October 2019.      Objective:     Vital Signs (Most Recent):  Temp: 97.9 °F (36.6 °C) (02/03/20 0702)  Pulse: 109 (02/03/20 0704)  Resp: 18 (02/03/20 0704)  BP: 123/79 (02/03/20 0702)  SpO2: 96 % (02/03/20 0704) Vital Signs (24h Range):  Temp:  [97.9 °F (36.6 °C)-98.7 °F (37.1 °C)] 97.9 °F (36.6 °C)  Pulse:  [103-121] 109  Resp:  [12-18] 18  SpO2:  [90 %-99 %] 96 %  BP: (123-141)/(69-81) 123/79     Weight: 87.8  kg (193 lb 9 oz)  Body mass index is 27.77 kg/m².    Intake/Output - Last 3 Shifts       02/01 0700 - 02/02 0659 02/02 0700 - 02/03 0659 02/03 0700 - 02/04 0659    P.O. 720 1200     I.V. (mL/kg) 2882 (32.8) 2220.8 (25.3)     IV Piggyback       Total Intake(mL/kg) 3602 (41) 3420.8 (39)     Urine (mL/kg/hr) 3350 (1.6) 3950 (1.9)     Total Output 3350 3950     Net +252 -529.2                  Physical Exam   Constitutional: He is oriented to person, place, and time. He appears well-developed and well-nourished.   HENT:   Head: Normocephalic and atraumatic.   Eyes: EOM are normal.   Cardiovascular: Regular rhythm.   Tachy   Pulmonary/Chest: Effort normal. No respiratory distress.   Abdominal: There is tenderness (incisional).   Incision c/d/i   Musculoskeletal: Normal range of motion.   Neurological: He is alert and oriented to person, place, and time.   Skin: Skin is warm and dry.   Psychiatric: He has a normal mood and affect. Thought content normal.   Vitals reviewed.      Significant Labs:  CBC:   Recent Labs   Lab 02/03/20  0524   WBC 5.59   RBC 2.90*   HGB 8.2*   HCT 25.4*      MCV 88   MCH 28.3   MCHC 32.3     CMP:   Recent Labs   Lab 02/03/20  0524   *   CALCIUM 8.5*      K 3.5   CO2 32*      BUN 3*   CREATININE 0.6       Significant Diagnostics:  I have reviewed all pertinent imaging results/findings within the past 24 hours.    Assessment/Plan:     Diverticulitis of large intestine with perforation and abscess without bleeding  S/p colostomy reversal  POD4  Tachycardic - initial studies negative, continue to monitor may need further evaluation with CT scan if no improvement  leukocytosis - improved continue  Post op pain - d/c PCA today and transition to oral medications with IV for breakthrough as pain improves  Clear liquids advance once bowel function returning and appetite returned  OOB, mobilize.   IS  DVT prophylaxis      Acute midline thoracic back pain  Chronic        Promise N  ISRRAEL Fajardo  General Surgery  [facility] [place]

## 2020-02-03 NOTE — SUBJECTIVE & OBJECTIVE
Interval History: no new complaints. Pain controlled. Afebrile. Leukocytosis resolved.     Medications:  Continuous Infusions:   sodium chloride 0.45% 50 mL/hr at 02/03/20 0822    hydromorphone in 0.9 % NaCl 6 mg/30 ml       Scheduled Meds:   albuterol-ipratropium  3 mL Nebulization Q6H    amoxicillin-clavulanate 875-125mg  1 tablet Oral Q12H    ARIPiprazole  5 mg Oral Daily    chlorhexidine  10 mL Mouth/Throat BID    enoxaparin  40 mg Subcutaneous Daily    escitalopram oxalate  20 mg Oral Daily    gabapentin  300 mg Oral TID    nozaseptin   Each Nostril BID    pantoprazole  40 mg Oral Before breakfast    senna-docusate 8.6-50 mg  1 tablet Oral BID    tamsulosin  0.4 mg Oral Daily     PRN Meds:artificial tears, diphenhydrAMINE, HYDROcodone-acetaminophen, naloxone, ondansetron, promethazine (PHENERGAN) IVPB     Review of patient's allergies indicates:   Allergen Reactions    Codeine Other (See Comments)     violent    Iodinated contrast media Swelling     Tongue, right eye, lips swelling. Rash on abdomen and axilla    Nuts [tree nut] Anaphylaxis    Dairy aid [lactase] Diarrhea and Nausea And Vomiting    Morphine      Tolerated hydromorphone in October 2019.      Objective:     Vital Signs (Most Recent):  Temp: 97.9 °F (36.6 °C) (02/03/20 0702)  Pulse: 109 (02/03/20 0704)  Resp: 18 (02/03/20 0704)  BP: 123/79 (02/03/20 0702)  SpO2: 96 % (02/03/20 0704) Vital Signs (24h Range):  Temp:  [97.9 °F (36.6 °C)-98.7 °F (37.1 °C)] 97.9 °F (36.6 °C)  Pulse:  [103-121] 109  Resp:  [12-18] 18  SpO2:  [90 %-99 %] 96 %  BP: (123-141)/(69-81) 123/79     Weight: 87.8 kg (193 lb 9 oz)  Body mass index is 27.77 kg/m².    Intake/Output - Last 3 Shifts       02/01 0700 - 02/02 0659 02/02 0700 - 02/03 0659 02/03 0700 - 02/04 0659    P.O. 720 1200     I.V. (mL/kg) 2882 (32.8) 2220.8 (25.3)     IV Piggyback       Total Intake(mL/kg) 3602 (41) 3420.8 (39)     Urine (mL/kg/hr) 3350 (1.6) 3950 (1.9)     Total Output 3350 3950      Net +313 -799.2                  Physical Exam   Constitutional: He is oriented to person, place, and time. He appears well-developed and well-nourished.   HENT:   Head: Normocephalic and atraumatic.   Eyes: EOM are normal.   Cardiovascular: Regular rhythm.   Tachy   Pulmonary/Chest: Effort normal. No respiratory distress.   Abdominal: There is tenderness (incisional).   Incision c/d/i   Musculoskeletal: Normal range of motion.   Neurological: He is alert and oriented to person, place, and time.   Skin: Skin is warm and dry.   Psychiatric: He has a normal mood and affect. Thought content normal.   Vitals reviewed.      Significant Labs:  CBC:   Recent Labs   Lab 02/03/20  0524   WBC 5.59   RBC 2.90*   HGB 8.2*   HCT 25.4*      MCV 88   MCH 28.3   MCHC 32.3     CMP:   Recent Labs   Lab 02/03/20  0524   *   CALCIUM 8.5*      K 3.5   CO2 32*      BUN 3*   CREATININE 0.6       Significant Diagnostics:  I have reviewed all pertinent imaging results/findings within the past 24 hours.

## 2020-02-03 NOTE — NURSING
Pt agreed to sit up in chair until ready to sleep. Seen ambulating in hallway w/ wife. Made it to end of grant and back to room.

## 2020-02-03 NOTE — PLAN OF CARE
Pt AAO x 3. Ambulatory. IV fluids. Free from injury. Hernandez secured. Percocet and dilaudid for pain. Up to chair. Regular diet. Educated to IS while awake. Telemetry active. Dressing changed to abdomen. VSS. NADN. Call light in reach. chart check completed.

## 2020-02-03 NOTE — PLAN OF CARE
Problem: Adult Inpatient Plan of Care  Goal: Plan of Care Review  Outcome: Ongoing, Progressing  Flowsheets (Taken 2/3/2020 0034)  Plan of Care Reviewed With: patient   Pt had no adverse events during shift. Pt free of falls. Call light in reach. Side rails x 2. Pain managed w/ Dilaudid PCA pump. Pt repositions independently. IVF administered as ordered. VTE prophylaxis-Lovenox. Dressing CDI, abdominal binder in place. Encouraged ambulation, I/S use, frequent turning, coughing, and deep breathing. Pt ambulated 2x during shift. Sinus tach up to 130s on tele monitor (when ambulating). VSS. Chart reviewed, will continue to monitor.

## 2020-02-03 NOTE — NURSING
PCA pump cleared, number of delivered doses does not match mL and mg of drug given. New syringe started w/ JAYESH Johnson who verified amount left is 4.7mL-wasted in Pyxis. Notified surgery (Dr. Hightower on call) and Charlette TAYLOR.     Dr. Hightower recommended contacting primary surgeon to notify and advised to d/c PCA if tampering was suspected. Will defer this to Dr. Choi.

## 2020-02-03 NOTE — NURSING
Pt complains of wheezing, RT was contacted to provide breathing treatment. Encouraged I/S, turning, coughing, deep breaths, and ambulation. Pt states he tries but is in a lot of pain. Refused to perform I/S at this time. Pt spouse concerned about his BP of 140/80 states this is elevated for him, explained to pt and spouse that this is acceptable in the presence of pain. Currently on 1LNC for pt comfort, sats 98% currently but does not want it removed. Pt and spouse verbalize understanding of everything discussed, will continue to monitor and encourage interventions.     Wife also concerned that pt is still on PCA pump. Explained to wife that it is up to pt to use as he feels and he can use as little as he wants. He states his pain is still severe and not ready to come off PCA. Educated pt on gas pain that could be contributing to pain and ambulation was best for that. Will continue to monitor and encourage.

## 2020-02-03 NOTE — ASSESSMENT & PLAN NOTE
S/p colostomy reversal  POD4  Tachycardic - initial studies negative, continue to monitor may need further evaluation with CT scan if no improvement  leukocytosis - improved continue  Post op pain - d/c PCA today and transition to oral medications with IV for breakthrough as pain improves  Clear liquids advance once bowel function returning and appetite returned  OOB, mobilize.   IS  DVT prophylaxis

## 2020-02-04 LAB
ANION GAP SERPL CALC-SCNC: 8 MMOL/L (ref 8–16)
BASOPHILS # BLD AUTO: 0.02 K/UL (ref 0–0.2)
BASOPHILS NFR BLD: 0.3 % (ref 0–1.9)
BUN SERPL-MCNC: 5 MG/DL (ref 6–20)
CALCIUM SERPL-MCNC: 9 MG/DL (ref 8.7–10.5)
CHLORIDE SERPL-SCNC: 105 MMOL/L (ref 95–110)
CO2 SERPL-SCNC: 28 MMOL/L (ref 23–29)
CREAT SERPL-MCNC: 0.6 MG/DL (ref 0.5–1.4)
DIFFERENTIAL METHOD: ABNORMAL
EOSINOPHIL # BLD AUTO: 0.4 K/UL (ref 0–0.5)
EOSINOPHIL NFR BLD: 5.8 % (ref 0–8)
ERYTHROCYTE [DISTWIDTH] IN BLOOD BY AUTOMATED COUNT: 13.8 % (ref 11.5–14.5)
EST. GFR  (AFRICAN AMERICAN): >60 ML/MIN/1.73 M^2
EST. GFR  (NON AFRICAN AMERICAN): >60 ML/MIN/1.73 M^2
GLUCOSE SERPL-MCNC: 99 MG/DL (ref 70–110)
HCT VFR BLD AUTO: 26.6 % (ref 40–54)
HGB BLD-MCNC: 8.6 G/DL (ref 14–18)
IMM GRANULOCYTES # BLD AUTO: 0.03 K/UL (ref 0–0.04)
IMM GRANULOCYTES NFR BLD AUTO: 0.5 % (ref 0–0.5)
LYMPHOCYTES # BLD AUTO: 1.2 K/UL (ref 1–4.8)
LYMPHOCYTES NFR BLD: 20.1 % (ref 18–48)
MAGNESIUM SERPL-MCNC: 2 MG/DL (ref 1.6–2.6)
MCH RBC QN AUTO: 28.5 PG (ref 27–31)
MCHC RBC AUTO-ENTMCNC: 32.3 G/DL (ref 32–36)
MCV RBC AUTO: 88 FL (ref 82–98)
MONOCYTES # BLD AUTO: 0.7 K/UL (ref 0.3–1)
MONOCYTES NFR BLD: 11.3 % (ref 4–15)
NEUTROPHILS # BLD AUTO: 3.7 K/UL (ref 1.8–7.7)
NEUTROPHILS NFR BLD: 62 % (ref 38–73)
NRBC BLD-RTO: 0 /100 WBC
PHOSPHATE SERPL-MCNC: 2.4 MG/DL (ref 2.7–4.5)
PLATELET # BLD AUTO: 213 K/UL (ref 150–350)
PMV BLD AUTO: 9.9 FL (ref 9.2–12.9)
POTASSIUM SERPL-SCNC: 3.8 MMOL/L (ref 3.5–5.1)
RBC # BLD AUTO: 3.02 M/UL (ref 4.6–6.2)
SODIUM SERPL-SCNC: 141 MMOL/L (ref 136–145)
WBC # BLD AUTO: 6.02 K/UL (ref 3.9–12.7)

## 2020-02-04 PROCEDURE — 94761 N-INVAS EAR/PLS OXIMETRY MLT: CPT

## 2020-02-04 PROCEDURE — 80048 BASIC METABOLIC PNL TOTAL CA: CPT

## 2020-02-04 PROCEDURE — 25000003 PHARM REV CODE 250: Performed by: COLON & RECTAL SURGERY

## 2020-02-04 PROCEDURE — 83735 ASSAY OF MAGNESIUM: CPT

## 2020-02-04 PROCEDURE — 85025 COMPLETE CBC W/AUTO DIFF WBC: CPT

## 2020-02-04 PROCEDURE — 25000242 PHARM REV CODE 250 ALT 637 W/ HCPCS: Performed by: SURGERY

## 2020-02-04 PROCEDURE — 94640 AIRWAY INHALATION TREATMENT: CPT

## 2020-02-04 PROCEDURE — 25000003 PHARM REV CODE 250: Performed by: SURGERY

## 2020-02-04 PROCEDURE — 36415 COLL VENOUS BLD VENIPUNCTURE: CPT

## 2020-02-04 PROCEDURE — 63600175 PHARM REV CODE 636 W HCPCS: Performed by: PHYSICIAN ASSISTANT

## 2020-02-04 PROCEDURE — 84100 ASSAY OF PHOSPHORUS: CPT

## 2020-02-04 PROCEDURE — 63600175 PHARM REV CODE 636 W HCPCS: Performed by: COLON & RECTAL SURGERY

## 2020-02-04 PROCEDURE — 21400001 HC TELEMETRY ROOM

## 2020-02-04 RX ADMIN — SENNOSIDES,DOCUSATE SODIUM 1 TABLET: 8.6; 5 TABLET, FILM COATED ORAL at 09:02

## 2020-02-04 RX ADMIN — HYDROCODONE BITARTRATE AND ACETAMINOPHEN 1 TABLET: 10; 325 TABLET ORAL at 09:02

## 2020-02-04 RX ADMIN — DIPHENHYDRAMINE HYDROCHLORIDE 12.5 MG: 50 INJECTION, SOLUTION INTRAMUSCULAR; INTRAVENOUS at 09:02

## 2020-02-04 RX ADMIN — ARIPIPRAZOLE 5 MG: 5 TABLET ORAL at 09:02

## 2020-02-04 RX ADMIN — ENOXAPARIN SODIUM 40 MG: 100 INJECTION SUBCUTANEOUS at 04:02

## 2020-02-04 RX ADMIN — GABAPENTIN 300 MG: 300 CAPSULE ORAL at 08:02

## 2020-02-04 RX ADMIN — IPRATROPIUM BROMIDE AND ALBUTEROL SULFATE 3 ML: .5; 3 SOLUTION RESPIRATORY (INHALATION) at 08:02

## 2020-02-04 RX ADMIN — IPRATROPIUM BROMIDE AND ALBUTEROL SULFATE 3 ML: .5; 3 SOLUTION RESPIRATORY (INHALATION) at 01:02

## 2020-02-04 RX ADMIN — ONDANSETRON 4 MG: 2 INJECTION INTRAMUSCULAR; INTRAVENOUS at 08:02

## 2020-02-04 RX ADMIN — GABAPENTIN 300 MG: 300 CAPSULE ORAL at 03:02

## 2020-02-04 RX ADMIN — HYDROCODONE BITARTRATE AND ACETAMINOPHEN 1 TABLET: 10; 325 TABLET ORAL at 08:02

## 2020-02-04 RX ADMIN — HYDROMORPHONE HYDROCHLORIDE 0.2 MG: 1 INJECTION, SOLUTION INTRAMUSCULAR; INTRAVENOUS; SUBCUTANEOUS at 10:02

## 2020-02-04 RX ADMIN — IPRATROPIUM BROMIDE AND ALBUTEROL SULFATE 3 ML: .5; 3 SOLUTION RESPIRATORY (INHALATION) at 12:02

## 2020-02-04 RX ADMIN — PANTOPRAZOLE SODIUM 40 MG: 40 TABLET, DELAYED RELEASE ORAL at 05:02

## 2020-02-04 RX ADMIN — HYDROMORPHONE HYDROCHLORIDE 0.2 MG: 1 INJECTION, SOLUTION INTRAMUSCULAR; INTRAVENOUS; SUBCUTANEOUS at 03:02

## 2020-02-04 RX ADMIN — HYDROCODONE BITARTRATE AND ACETAMINOPHEN 1 TABLET: 10; 325 TABLET ORAL at 04:02

## 2020-02-04 RX ADMIN — HYDROCODONE BITARTRATE AND ACETAMINOPHEN 1 TABLET: 10; 325 TABLET ORAL at 12:02

## 2020-02-04 RX ADMIN — GABAPENTIN 300 MG: 300 CAPSULE ORAL at 09:02

## 2020-02-04 RX ADMIN — HYDROCODONE BITARTRATE AND ACETAMINOPHEN 1 TABLET: 10; 325 TABLET ORAL at 05:02

## 2020-02-04 RX ADMIN — HYDROMORPHONE HYDROCHLORIDE 0.2 MG: 1 INJECTION, SOLUTION INTRAMUSCULAR; INTRAVENOUS; SUBCUTANEOUS at 04:02

## 2020-02-04 RX ADMIN — SODIUM CHLORIDE: 0.45 INJECTION, SOLUTION INTRAVENOUS at 12:02

## 2020-02-04 RX ADMIN — ESCITALOPRAM OXALATE 20 MG: 10 TABLET ORAL at 09:02

## 2020-02-04 RX ADMIN — CHLORHEXIDINE GLUCONATE 0.12% ORAL RINSE 10 ML: 1.2 LIQUID ORAL at 08:02

## 2020-02-04 RX ADMIN — AMOXICILLIN AND CLAVULANATE POTASSIUM 1 TABLET: 875; 125 TABLET, FILM COATED ORAL at 08:02

## 2020-02-04 RX ADMIN — TAMSULOSIN HYDROCHLORIDE 0.4 MG: 0.4 CAPSULE ORAL at 08:02

## 2020-02-04 NOTE — PROGRESS NOTES
Notified KADEEM Stover that the pt stated he was unable to void and c./o pressure. Nurse stated pt had 475mls on bladder scan. Per Promise place abdi and start bladder training.

## 2020-02-04 NOTE — PHYSICIAN QUERY
"PT Name: Dax Carlisle  MR #: [id number]    Physician Query Form - Hematology Clarification      CDS: Pati COTA RN  Contact information: [email or web address]  Phone number: [phone]    This form is a permanent document in the medical record.      Query Date: February 4, 2020    By submitting this query, we are merely seeking further clarificaxtion of documentation. Please utilize your independent clinical judgment when addressing the question(s) below.    The Medical record contains the following:   Indicators  Supporting Clinical Findings Location in Medical Record    "Anemia" documented     X H & H = H/H= 13.7/40.9 -- > 11.9/36.4 -- > 9.1/27.7 -- > 8.2/25.4 -- > 8.6/26.6   Labs 1/31/20-2/4/20     X BP =                     HR= Vital Signs (24h Range):  Pulse: [] 138    Tachycardic/leukocytosis - continue hydration and close monitoring if no improvement or patient spikes fevers will further evaluate with CT scan. Initial imaging and workup negative Progress Note Gen Surg 1/31/20    Progress Note Gen Surg 2/1/20    "GI bleeding" documented     X Acute bleeding (Non GI site) Estimated Blood Loss (EBL): 200 mL Op Note 1/30/20    Transfusion(s)     X Treatment: lactated ringers bolus 1,000 mL   Dose: 1,000 mL MAR 2/1/20 1437   X Other:  Procedure:  1. Colostomy takedown  2. Partial colectomy  3. Partial proctectomy with colorectal anastomosis  4. Omental pedicle flap  5. Extensive lysis of adhesions  6. Flexible sigmoidoscopy  7. Transversus abdominal plan block Op Note 1/30/20     Provider, please specify diagnosis or diagnoses associated with above clinical findings.    [  ] Acute blood loss anemia expected post-operatively   [  ] Acute blood loss anemia     [  ] Anemia of chronic disease ( Specify chronic disease)       [  ] Other (Specify):   [ x ] Clinically Undetermined       [  ] Other Hematological Diagnosis (please specify):     [ x ] Clinically Undetermined       Please document in your " progress notes daily for the duration of treatment, until resolved, and include in your discharge summary.

## 2020-02-04 NOTE — PROGRESS NOTES
[facility] -   General Surgery  Progress Note    Subjective:     History of Present Illness:  He presented for colostomy reversal.       Post-Op Info:  Procedure(s) (LRB):  CLOSURE, COLOSTOMY (open, lithotomy) (N/A)  BLOCK, TRANSVERSUS ABDOMINIS PLANE (N/A)  COLECTOMY, PARTIAL  PROCTECTOMY (N/A)  LYSIS, ADHESIONS (N/A)   5 Days Post-Op     Interval History: no complaints. +bm/flatus. No nausea. Tolerating diet.     Medications:  Continuous Infusions:  Scheduled Meds:   albuterol-ipratropium  3 mL Nebulization Q6H    ARIPiprazole  5 mg Oral Daily    enoxaparin  40 mg Subcutaneous Daily    escitalopram oxalate  20 mg Oral Daily    gabapentin  300 mg Oral TID    nozaseptin   Each Nostril BID    pantoprazole  40 mg Oral Before breakfast    senna-docusate 8.6-50 mg  1 tablet Oral BID    tamsulosin  0.4 mg Oral Daily     PRN Meds:artificial tears, diphenhydrAMINE, HYDROcodone-acetaminophen, HYDROmorphone, naloxone, ondansetron, promethazine (PHENERGAN) IVPB     Review of patient's allergies indicates:   Allergen Reactions    Codeine Other (See Comments)     violent    Iodinated contrast media Swelling     Tongue, right eye, lips swelling. Rash on abdomen and axilla    Nuts [tree nut] Anaphylaxis    Dairy aid [lactase] Diarrhea and Nausea And Vomiting    Morphine      Tolerated hydromorphone in October 2019.      Objective:     Vital Signs (Most Recent):  Temp: 97.8 °F (36.6 °C) (02/04/20 0733)  Pulse: (!) 121 (02/04/20 0901)  Resp: 20 (02/04/20 0828)  BP: 134/86 (02/04/20 0733)  SpO2: 96 % (02/04/20 0828) Vital Signs (24h Range):  Temp:  [97.8 °F (36.6 °C)-99.3 °F (37.4 °C)] 97.8 °F (36.6 °C)  Pulse:  [] 121  Resp:  [16-20] 20  SpO2:  [92 %-97 %] 96 %  BP: (132-141)/(76-86) 134/86     Weight: 87.8 kg (193 lb 9 oz)  Body mass index is 27.77 kg/m².    Intake/Output - Last 3 Shifts       02/02 0700 - 02/03 0659 02/03 0700 - 02/04 0659 02/04 0700 - 02/05 0659    P.O. 1200 940     I.V. (mL/kg)  2220.8 (25.3) 931.5 (10.6) 209.2 (2.4)    Total Intake(mL/kg) 3420.8 (39) 1871.5 (21.3) 209.2 (2.4)    Urine (mL/kg/hr) 3950 (1.9) 6200 (2.9)     Stool  0     Total Output 3950 6200     Net -529.2 -4328.5 +209.2           Stool Occurrence  1 x 1 x          Physical Exam   Constitutional: He is oriented to person, place, and time. He appears well-developed and well-nourished.   HENT:   Head: Normocephalic and atraumatic.   Eyes: EOM are normal.   Cardiovascular: Regular rhythm.   Tachycardic   Pulmonary/Chest: Effort normal. No respiratory distress.   Abdominal: Soft. He exhibits no distension. There is tenderness.   Incisions clean, dry, intact with staples. Ostomy site is clean.    Musculoskeletal: Normal range of motion.   Neurological: He is alert and oriented to person, place, and time.   Skin: Skin is warm and dry.   Psychiatric: He has a normal mood and affect. Thought content normal.   Vitals reviewed.      Significant Labs:  CBC:   Recent Labs   Lab 02/04/20  0521   WBC 6.02   RBC 3.02*   HGB 8.6*   HCT 26.6*      MCV 88   MCH 28.5   MCHC 32.3     CMP:   Recent Labs   Lab 02/04/20  0521   GLU 99   CALCIUM 9.0      K 3.8   CO2 28      BUN 5*   CREATININE 0.6       Significant Diagnostics:  I have reviewed all pertinent imaging results/findings within the past 24 hours.    Assessment/Plan:     Diverticulitis of large intestine with perforation and abscess without bleeding  S/p colostomy reversal  POD4  Tachycardic - initial studies negative, continue to monitor may need further evaluation with CT scan if no improvement. Continue to monitor  leukocytosis -resolved  Post op pain-oral medications with IV for breakthrough as pain improves  Regular diet  OOB, mobilize.   IS  DVT prophylaxis      Acute midline thoracic back pain  Chronic    MDD (major depressive disorder)  Resume home meds        Promise Fajardo PA-C  General Surgery  [facility]

## 2020-02-04 NOTE — PLAN OF CARE
PRN Norco and Dilaudid for breakthrough during shift. Pt ambulated in grant during shift. Hernandez to stay in place for urinary retention. Sinus tach during shift. Pt reports passing gas during shift. Abdominal binder in place. Dressing clean, dry, and intact.   Fall precautions in place. Call light and personal items within reach. Educated patient on side effects of medication administered. Pt verbalized understanding. 24 hour order check done.  Will continue to monitor.

## 2020-02-04 NOTE — ASSESSMENT & PLAN NOTE
S/p colostomy reversal  POD4  Tachycardic - initial studies negative, continue to monitor may need further evaluation with CT scan if no improvement. Continue to monitor  leukocytosis -resolved  Post op pain-oral medications with IV for breakthrough as pain improves  Regular diet  OOB, mobilize.   IS  DVT prophylaxis

## 2020-02-04 NOTE — SUBJECTIVE & OBJECTIVE
Interval History: no complaints. +bm/flatus. No nausea. Tolerating diet.     Medications:  Continuous Infusions:  Scheduled Meds:   albuterol-ipratropium  3 mL Nebulization Q6H    ARIPiprazole  5 mg Oral Daily    enoxaparin  40 mg Subcutaneous Daily    escitalopram oxalate  20 mg Oral Daily    gabapentin  300 mg Oral TID    nozaseptin   Each Nostril BID    pantoprazole  40 mg Oral Before breakfast    senna-docusate 8.6-50 mg  1 tablet Oral BID    tamsulosin  0.4 mg Oral Daily     PRN Meds:artificial tears, diphenhydrAMINE, HYDROcodone-acetaminophen, HYDROmorphone, naloxone, ondansetron, promethazine (PHENERGAN) IVPB     Review of patient's allergies indicates:   Allergen Reactions    Codeine Other (See Comments)     violent    Iodinated contrast media Swelling     Tongue, right eye, lips swelling. Rash on abdomen and axilla    Nuts [tree nut] Anaphylaxis    Dairy aid [lactase] Diarrhea and Nausea And Vomiting    Morphine      Tolerated hydromorphone in October 2019.      Objective:     Vital Signs (Most Recent):  Temp: 97.8 °F (36.6 °C) (02/04/20 0733)  Pulse: (!) 121 (02/04/20 0901)  Resp: 20 (02/04/20 0828)  BP: 134/86 (02/04/20 0733)  SpO2: 96 % (02/04/20 0828) Vital Signs (24h Range):  Temp:  [97.8 °F (36.6 °C)-99.3 °F (37.4 °C)] 97.8 °F (36.6 °C)  Pulse:  [] 121  Resp:  [16-20] 20  SpO2:  [92 %-97 %] 96 %  BP: (132-141)/(76-86) 134/86     Weight: 87.8 kg (193 lb 9 oz)  Body mass index is 27.77 kg/m².    Intake/Output - Last 3 Shifts       02/02 0700 - 02/03 0659 02/03 0700 - 02/04 0659 02/04 0700 - 02/05 0659    P.O. 1200 940     I.V. (mL/kg) 2220.8 (25.3) 931.5 (10.6) 209.2 (2.4)    Total Intake(mL/kg) 3420.8 (39) 1871.5 (21.3) 209.2 (2.4)    Urine (mL/kg/hr) 3950 (1.9) 6200 (2.9)     Stool  0     Total Output 3950 6200     Net -529.2 -4328.5 +209.2           Stool Occurrence  1 x 1 x          Physical Exam   Constitutional: He is oriented to person, place, and time. He appears  well-developed and well-nourished.   HENT:   Head: Normocephalic and atraumatic.   Eyes: EOM are normal.   Cardiovascular: Regular rhythm.   Tachycardic   Pulmonary/Chest: Effort normal. No respiratory distress.   Abdominal: Soft. He exhibits no distension. There is tenderness.   Incisions clean, dry, intact with staples. Ostomy site is clean.    Musculoskeletal: Normal range of motion.   Neurological: He is alert and oriented to person, place, and time.   Skin: Skin is warm and dry.   Psychiatric: He has a normal mood and affect. Thought content normal.   Vitals reviewed.      Significant Labs:  CBC:   Recent Labs   Lab 02/04/20  0521   WBC 6.02   RBC 3.02*   HGB 8.6*   HCT 26.6*      MCV 88   MCH 28.5   MCHC 32.3     CMP:   Recent Labs   Lab 02/04/20  0521   GLU 99   CALCIUM 9.0      K 3.8   CO2 28      BUN 5*   CREATININE 0.6       Significant Diagnostics:  I have reviewed all pertinent imaging results/findings within the past 24 hours.

## 2020-02-05 VITALS
BODY MASS INDEX: 27.71 KG/M2 | OXYGEN SATURATION: 97 % | TEMPERATURE: 99 F | RESPIRATION RATE: 18 BRPM | SYSTOLIC BLOOD PRESSURE: 142 MMHG | DIASTOLIC BLOOD PRESSURE: 89 MMHG | WEIGHT: 193.56 LBS | HEIGHT: 70 IN | HEART RATE: 102 BPM

## 2020-02-05 LAB
ANION GAP SERPL CALC-SCNC: 9 MMOL/L (ref 8–16)
BASOPHILS # BLD AUTO: 0.02 K/UL (ref 0–0.2)
BASOPHILS NFR BLD: 0.3 % (ref 0–1.9)
BUN SERPL-MCNC: 5 MG/DL (ref 6–20)
CALCIUM SERPL-MCNC: 9 MG/DL (ref 8.7–10.5)
CHLORIDE SERPL-SCNC: 105 MMOL/L (ref 95–110)
CO2 SERPL-SCNC: 28 MMOL/L (ref 23–29)
CREAT SERPL-MCNC: 0.7 MG/DL (ref 0.5–1.4)
DIFFERENTIAL METHOD: ABNORMAL
EOSINOPHIL # BLD AUTO: 0.4 K/UL (ref 0–0.5)
EOSINOPHIL NFR BLD: 6.5 % (ref 0–8)
ERYTHROCYTE [DISTWIDTH] IN BLOOD BY AUTOMATED COUNT: 14.2 % (ref 11.5–14.5)
EST. GFR  (AFRICAN AMERICAN): >60 ML/MIN/1.73 M^2
EST. GFR  (NON AFRICAN AMERICAN): >60 ML/MIN/1.73 M^2
FINAL PATHOLOGIC DIAGNOSIS: NORMAL
GLUCOSE SERPL-MCNC: 98 MG/DL (ref 70–110)
GROSS: NORMAL
HCT VFR BLD AUTO: 27.3 % (ref 40–54)
HGB BLD-MCNC: 8.8 G/DL (ref 14–18)
IMM GRANULOCYTES # BLD AUTO: 0.07 K/UL (ref 0–0.04)
IMM GRANULOCYTES NFR BLD AUTO: 1.1 % (ref 0–0.5)
LYMPHOCYTES # BLD AUTO: 1.4 K/UL (ref 1–4.8)
LYMPHOCYTES NFR BLD: 21.8 % (ref 18–48)
MAGNESIUM SERPL-MCNC: 2.2 MG/DL (ref 1.6–2.6)
MCH RBC QN AUTO: 28.6 PG (ref 27–31)
MCHC RBC AUTO-ENTMCNC: 32.2 G/DL (ref 32–36)
MCV RBC AUTO: 89 FL (ref 82–98)
MONOCYTES # BLD AUTO: 0.8 K/UL (ref 0.3–1)
MONOCYTES NFR BLD: 12.7 % (ref 4–15)
NEUTROPHILS # BLD AUTO: 3.8 K/UL (ref 1.8–7.7)
NEUTROPHILS NFR BLD: 57.6 % (ref 38–73)
NRBC BLD-RTO: 0 /100 WBC
PHOSPHATE SERPL-MCNC: 3.4 MG/DL (ref 2.7–4.5)
PLATELET # BLD AUTO: 228 K/UL (ref 150–350)
PMV BLD AUTO: 9.3 FL (ref 9.2–12.9)
POTASSIUM SERPL-SCNC: 3.9 MMOL/L (ref 3.5–5.1)
RBC # BLD AUTO: 3.08 M/UL (ref 4.6–6.2)
SODIUM SERPL-SCNC: 142 MMOL/L (ref 136–145)
WBC # BLD AUTO: 6.6 K/UL (ref 3.9–12.7)

## 2020-02-05 PROCEDURE — 83735 ASSAY OF MAGNESIUM: CPT

## 2020-02-05 PROCEDURE — 25000003 PHARM REV CODE 250: Performed by: COLON & RECTAL SURGERY

## 2020-02-05 PROCEDURE — 94640 AIRWAY INHALATION TREATMENT: CPT

## 2020-02-05 PROCEDURE — 63600175 PHARM REV CODE 636 W HCPCS: Performed by: PHYSICIAN ASSISTANT

## 2020-02-05 PROCEDURE — 36415 COLL VENOUS BLD VENIPUNCTURE: CPT

## 2020-02-05 PROCEDURE — 25000242 PHARM REV CODE 250 ALT 637 W/ HCPCS: Performed by: SURGERY

## 2020-02-05 PROCEDURE — 85025 COMPLETE CBC W/AUTO DIFF WBC: CPT

## 2020-02-05 PROCEDURE — 25000003 PHARM REV CODE 250: Performed by: SURGERY

## 2020-02-05 PROCEDURE — 63600175 PHARM REV CODE 636 W HCPCS: Performed by: COLON & RECTAL SURGERY

## 2020-02-05 PROCEDURE — 94760 N-INVAS EAR/PLS OXIMETRY 1: CPT

## 2020-02-05 PROCEDURE — 80048 BASIC METABOLIC PNL TOTAL CA: CPT

## 2020-02-05 PROCEDURE — 84100 ASSAY OF PHOSPHORUS: CPT

## 2020-02-05 RX ORDER — IPRATROPIUM BROMIDE AND ALBUTEROL SULFATE 2.5; .5 MG/3ML; MG/3ML
3 SOLUTION RESPIRATORY (INHALATION) EVERY 6 HOURS PRN
Status: DISCONTINUED | OUTPATIENT
Start: 2020-02-05 | End: 2020-02-05 | Stop reason: HOSPADM

## 2020-02-05 RX ORDER — HYDROCODONE BITARTRATE AND ACETAMINOPHEN 10; 325 MG/1; MG/1
1 TABLET ORAL EVERY 6 HOURS PRN
Qty: 28 TABLET | Refills: 0 | Status: SHIPPED | OUTPATIENT
Start: 2020-02-05 | End: 2020-11-16

## 2020-02-05 RX ORDER — TAMSULOSIN HYDROCHLORIDE 0.4 MG/1
0.4 CAPSULE ORAL DAILY
Qty: 30 CAPSULE | Refills: 0 | Status: SHIPPED | OUTPATIENT
Start: 2020-02-06 | End: 2020-11-16

## 2020-02-05 RX ADMIN — PANTOPRAZOLE SODIUM 40 MG: 40 TABLET, DELAYED RELEASE ORAL at 05:02

## 2020-02-05 RX ADMIN — TAMSULOSIN HYDROCHLORIDE 0.4 MG: 0.4 CAPSULE ORAL at 09:02

## 2020-02-05 RX ADMIN — ONDANSETRON 4 MG: 2 INJECTION INTRAMUSCULAR; INTRAVENOUS at 12:02

## 2020-02-05 RX ADMIN — GABAPENTIN 300 MG: 300 CAPSULE ORAL at 09:02

## 2020-02-05 RX ADMIN — HYDROMORPHONE HYDROCHLORIDE 0.2 MG: 1 INJECTION, SOLUTION INTRAMUSCULAR; INTRAVENOUS; SUBCUTANEOUS at 07:02

## 2020-02-05 RX ADMIN — SENNOSIDES,DOCUSATE SODIUM 1 TABLET: 8.6; 5 TABLET, FILM COATED ORAL at 09:02

## 2020-02-05 RX ADMIN — HYDROMORPHONE HYDROCHLORIDE 0.2 MG: 1 INJECTION, SOLUTION INTRAMUSCULAR; INTRAVENOUS; SUBCUTANEOUS at 04:02

## 2020-02-05 RX ADMIN — IPRATROPIUM BROMIDE AND ALBUTEROL SULFATE 3 ML: .5; 3 SOLUTION RESPIRATORY (INHALATION) at 07:02

## 2020-02-05 RX ADMIN — PROMETHAZINE HYDROCHLORIDE 6.25 MG: 25 INJECTION INTRAMUSCULAR; INTRAVENOUS at 12:02

## 2020-02-05 RX ADMIN — HYDROCODONE BITARTRATE AND ACETAMINOPHEN 1 TABLET: 10; 325 TABLET ORAL at 01:02

## 2020-02-05 RX ADMIN — HYDROCODONE BITARTRATE AND ACETAMINOPHEN 1 TABLET: 10; 325 TABLET ORAL at 02:02

## 2020-02-05 RX ADMIN — GABAPENTIN 300 MG: 300 CAPSULE ORAL at 02:02

## 2020-02-05 RX ADMIN — IPRATROPIUM BROMIDE AND ALBUTEROL SULFATE 3 ML: .5; 3 SOLUTION RESPIRATORY (INHALATION) at 12:02

## 2020-02-05 RX ADMIN — HYDROCODONE BITARTRATE AND ACETAMINOPHEN 1 TABLET: 10; 325 TABLET ORAL at 10:02

## 2020-02-05 RX ADMIN — HYDROCODONE BITARTRATE AND ACETAMINOPHEN 1 TABLET: 10; 325 TABLET ORAL at 05:02

## 2020-02-05 NOTE — PROGRESS NOTES
[facility] -   General Surgery  Progress Note    Subjective:     History of Present Illness:  He presented for colostomy reversal.       Post-Op Info:  Procedure(s) (LRB):  CLOSURE, COLOSTOMY (open, lithotomy) (N/A)  BLOCK, TRANSVERSUS ABDOMINIS PLANE (N/A)  COLECTOMY, PARTIAL  PROCTECTOMY (N/A)  LYSIS, ADHESIONS (N/A)   6 Days Post-Op     Interval History: doing well. Tolerating diet, pain controlled, having bowel function.     Medications:  Continuous Infusions:  Scheduled Meds:   ARIPiprazole  5 mg Oral Daily    enoxaparin  40 mg Subcutaneous Daily    escitalopram oxalate  20 mg Oral Daily    gabapentin  300 mg Oral TID    nozaseptin   Each Nostril BID    pantoprazole  40 mg Oral Before breakfast    senna-docusate 8.6-50 mg  1 tablet Oral BID    tamsulosin  0.4 mg Oral Daily     PRN Meds:albuterol-ipratropium, artificial tears, diphenhydrAMINE, HYDROcodone-acetaminophen, HYDROmorphone, naloxone, ondansetron, promethazine (PHENERGAN) IVPB     Review of patient's allergies indicates:   Allergen Reactions    Codeine Other (See Comments)     violent    Iodinated contrast media Swelling     Tongue, right eye, lips swelling. Rash on abdomen and axilla    Nuts [tree nut] Anaphylaxis    Dairy aid [lactase] Diarrhea and Nausea And Vomiting    Morphine      Tolerated hydromorphone in October 2019.      Objective:     Vital Signs (Most Recent):  Temp: 98.2 °F (36.8 °C) (02/05/20 0727)  Pulse: 103 (02/05/20 0742)  Resp: 18 (02/05/20 0742)  BP: 134/88 (02/05/20 0727)  SpO2: 96 % (02/05/20 0742) Vital Signs (24h Range):  Temp:  [98.2 °F (36.8 °C)-98.6 °F (37 °C)] 98.2 °F (36.8 °C)  Pulse:  [] 103  Resp:  [16-18] 18  SpO2:  [92 %-99 %] 96 %  BP: (123-145)/(73-88) 134/88     Weight: 87.8 kg (193 lb 9 oz)  Body mass index is 27.77 kg/m².    Intake/Output - Last 3 Shifts       02/03 0700 - 02/04 0659 02/04 0700 - 02/05 0659 02/05 0700 - 02/06 0659    P.O. 940 1500     I.V. (mL/kg) 931.5 (10.6)  209.2 (2.4)     IV Piggyback  50     Total Intake(mL/kg) 1871.5 (21.3) 1759.2 (20)     Urine (mL/kg/hr) 6200 (2.9) 3900 (1.9)     Stool 0 0     Total Output 6200 3900     Net -4328.5 -2140.8            Stool Occurrence 1 x 1 x           Physical Exam   Constitutional: He is oriented to person, place, and time. He appears well-developed and well-nourished.   HENT:   Head: Normocephalic and atraumatic.   Eyes: EOM are normal.   Cardiovascular: Regular rhythm.   tachy   Pulmonary/Chest: Effort normal. No respiratory distress.   Abdominal: Soft. He exhibits no distension. There is tenderness (mild, appropriate ttp).   Incision c/d/i with gaps between staples. Minimal clear drainage. Ostomy site healing well.    Musculoskeletal: Normal range of motion.   Neurological: He is alert and oriented to person, place, and time.   Skin: Skin is warm and dry.   Psychiatric: He has a normal mood and affect. Thought content normal.   Vitals reviewed.      Significant Labs:  CBC:   Recent Labs   Lab 02/05/20  0504   WBC 6.60   RBC 3.08*   HGB 8.8*   HCT 27.3*      MCV 89   MCH 28.6   MCHC 32.2     CMP:   Recent Labs   Lab 02/05/20  0504   GLU 98   CALCIUM 9.0      K 3.9   CO2 28      BUN 5*   CREATININE 0.7       Significant Diagnostics:  I have reviewed all pertinent imaging results/findings within the past 24 hours.    Assessment/Plan:     * Diverticulitis of large intestine with perforation and abscess without bleeding  S/p colostomy reversal  POD6  Tachycardic- improving somewhat. Continue to monitor  leukocytosis -resolved  Post op pain controleld on hydrocodone  Regular diet  OOB, mobilize.   IS  DVT prophylaxis  Abdi replaced multiple times due to urinary retention. On flomax. Currently doing bladder training. May need to be d/c with abdi and follow up outpatient with Urology.   DC possible this afternoon vs tomorrow AM      Acute midline thoracic back pain  Chronic    MDD (major depressive  disorder)  Resume home meds        Promise Fajardo PA-C  General Surgery  [facility]

## 2020-02-05 NOTE — PLAN OF CARE
Bladder training during shift. NSR to sinus tach in the 110's during shift.   Fall precautions in place. Call light and personal items within reach. Educated patient on side effects of medication administered. Pt verbalized understanding. 24 hour order check done.  Will continue to monitor.

## 2020-02-05 NOTE — SUBJECTIVE & OBJECTIVE
Interval History: doing well. Tolerating diet, pain controlled, having bowel function.     Medications:  Continuous Infusions:  Scheduled Meds:   ARIPiprazole  5 mg Oral Daily    enoxaparin  40 mg Subcutaneous Daily    escitalopram oxalate  20 mg Oral Daily    gabapentin  300 mg Oral TID    nozaseptin   Each Nostril BID    pantoprazole  40 mg Oral Before breakfast    senna-docusate 8.6-50 mg  1 tablet Oral BID    tamsulosin  0.4 mg Oral Daily     PRN Meds:albuterol-ipratropium, artificial tears, diphenhydrAMINE, HYDROcodone-acetaminophen, HYDROmorphone, naloxone, ondansetron, promethazine (PHENERGAN) IVPB     Review of patient's allergies indicates:   Allergen Reactions    Codeine Other (See Comments)     violent    Iodinated contrast media Swelling     Tongue, right eye, lips swelling. Rash on abdomen and axilla    Nuts [tree nut] Anaphylaxis    Dairy aid [lactase] Diarrhea and Nausea And Vomiting    Morphine      Tolerated hydromorphone in October 2019.      Objective:     Vital Signs (Most Recent):  Temp: 98.2 °F (36.8 °C) (02/05/20 0727)  Pulse: 103 (02/05/20 0742)  Resp: 18 (02/05/20 0742)  BP: 134/88 (02/05/20 0727)  SpO2: 96 % (02/05/20 0742) Vital Signs (24h Range):  Temp:  [98.2 °F (36.8 °C)-98.6 °F (37 °C)] 98.2 °F (36.8 °C)  Pulse:  [] 103  Resp:  [16-18] 18  SpO2:  [92 %-99 %] 96 %  BP: (123-145)/(73-88) 134/88     Weight: 87.8 kg (193 lb 9 oz)  Body mass index is 27.77 kg/m².    Intake/Output - Last 3 Shifts       02/03 0700 - 02/04 0659 02/04 0700 - 02/05 0659 02/05 0700 - 02/06 0659    P.O. 940 1500     I.V. (mL/kg) 931.5 (10.6) 209.2 (2.4)     IV Piggyback  50     Total Intake(mL/kg) 1871.5 (21.3) 1759.2 (20)     Urine (mL/kg/hr) 6200 (2.9) 3900 (1.9)     Stool 0 0     Total Output 6200 3900     Net -4328.5 -2140.8            Stool Occurrence 1 x 1 x           Physical Exam   Constitutional: He is oriented to person, place, and time. He appears well-developed and well-nourished.    HENT:   Head: Normocephalic and atraumatic.   Eyes: EOM are normal.   Cardiovascular: Regular rhythm.   tachy   Pulmonary/Chest: Effort normal. No respiratory distress.   Abdominal: Soft. He exhibits no distension. There is tenderness (mild, appropriate ttp).   Incision c/d/i with gaps between staples. Minimal clear drainage. Ostomy site healing well.    Musculoskeletal: Normal range of motion.   Neurological: He is alert and oriented to person, place, and time.   Skin: Skin is warm and dry.   Psychiatric: He has a normal mood and affect. Thought content normal.   Vitals reviewed.      Significant Labs:  CBC:   Recent Labs   Lab 02/05/20  0504   WBC 6.60   RBC 3.08*   HGB 8.8*   HCT 27.3*      MCV 89   MCH 28.6   MCHC 32.2     CMP:   Recent Labs   Lab 02/05/20  0504   GLU 98   CALCIUM 9.0      K 3.9   CO2 28      BUN 5*   CREATININE 0.7       Significant Diagnostics:  I have reviewed all pertinent imaging results/findings within the past 24 hours.

## 2020-02-05 NOTE — ASSESSMENT & PLAN NOTE
S/p colostomy reversal  POD6  Tachycardic- improving somewhat. Continue to monitor  leukocytosis -resolved  Post op pain controleld on hydrocodone  Regular diet  OOB, mobilize.   IS  DVT prophylaxis  Abdi replaced multiple times due to urinary retention. On flomax. Currently doing bladder training. May need to be d/c with abdi and follow up outpatient with Urology.   DC possible this afternoon vs tomorrow AM

## 2020-02-05 NOTE — PLAN OF CARE
Patient remains injury free.  No signs or symptoms of distress noted.  Patient denies any pain or distress noted.  Patient pain being managed with oral pain medications and will be discharged to home with abdi and self care.

## 2020-02-05 NOTE — NURSING
Message sent to Dr. Dyer office for patients post-op  Appointment.  I was unable to make the appointment myself.

## 2020-02-05 NOTE — DISCHARGE SUMMARY
[facility]  General Surgery  Discharge Summary      Patient Name: Dax Carlisle  MRN: [id number]  Admission Date: 1/30/2020  Hospital Length of Stay: 6 days  Discharge Date and Time: No discharge date for patient encounter.  Attending Physician: Kadeem Choi MD   Discharging Provider: Promise Fajardo PA-C  Primary Care Provider: Pramod Nuñez MD    HPI:   He presented for colostomy reversal.       Procedure(s) (LRB):  CLOSURE, COLOSTOMY (open, lithotomy) (N/A)  BLOCK, TRANSVERSUS ABDOMINIS PLANE (N/A)  COLECTOMY, PARTIAL  PROCTECTOMY (N/A)  LYSIS, ADHESIONS (N/A)      Indwelling Lines/Drains at time of discharge:   Lines/Drains/Airways     Drain                 Urethral Catheter 02/04/20 1643 Straight-tip 18 Fr. less than 1 day              Hospital Course: 01/31/2020: pod1 s/p open colostomy reversal. Tachycardic. C/o Abdominal pain which is poorly controlled. No nausea. No chest pain or SOB. CXR normal EKG done. Fluid bolused. PCA dose increased.   02/01/2020 postop day 2 minimal appetite, no flatus or bowel movement, remains tachycardic  02/02/2020 postop day 3 pain control improving, small appetite, no flatus or bowel movement, leukocytosis improved, tachycardic  02/03/2020: pod4. Pain controleld, appetite decreased, no bm/flatus. Leukocytosis improved and tachycardia improving.   02/04/2020: pod5 +bm, remains tachycardic. Tolerating diet  02/05/2020: pod6: pain controlled, tolerating diet, + bowel function. Remains tachycardic but asymptomatic. Hernandez replaced 2/2 urinary retention. He was examined and found to be fit for discharge. F/u in 1-2 weeks with Urology.     Consults:     Significant Diagnostic Studies: Labs:   BMP:   Recent Labs   Lab 02/04/20  0521 02/05/20  0504   GLU 99 98    142   K 3.8 3.9    105   CO2 28 28   BUN 5* 5*   CREATININE 0.6 0.7   CALCIUM 9.0 9.0   MG 2.0 2.2    and CBC   Recent Labs   Lab 02/04/20  0521 02/05/20  0504   WBC 6.02 6.60   HGB 8.6*  8.8*   HCT 26.6* 27.3*    228       Pending Diagnostic Studies:     Procedure Component Value Units Date/Time    Specimen to Pathology, Surgery General Surgery [[id number]] Collected:  01/30/20 1311    Order Status:  Sent Lab Status:  In process Updated:  01/31/20 0822        Final Active Diagnoses:    Diagnosis Date Noted POA    PRINCIPAL PROBLEM:  Diverticulitis of large intestine with perforation and abscess without bleeding [K57.20] 01/30/2020 Yes    Acute midline thoracic back pain [M54.6] 09/03/2019 Yes    MDD (major depressive disorder) [F32.9] 03/26/2015 Yes      Problems Resolved During this Admission:      Discharged Condition: good    Disposition: Home or Self Care    Follow Up:  Follow-up Information     Rohan Dyer IV, MD In 1 week.    Specialty:  Urology  Why:  post op urinary retention, indwelling abdi  Contact information:  [address] [place] [address] DR Prabhu GALLARDO [id number]  [phone]                 Patient Instructions:      Diet Adult Regular     Lifting restrictions   Order Comments: 20lb limit     No driving until:   Order Comments: No longer taking narcotic pain medication     Notify your health care provider if you experience any of the following:  temperature >100.4     Notify your health care provider if you experience any of the following:  persistent nausea and vomiting or diarrhea     Notify your health care provider if you experience any of the following:  severe uncontrolled pain     Notify your health care provider if you experience any of the following:  redness, tenderness, or signs of infection (pain, swelling, redness, odor or green/yellow discharge around incision site)     Notify your health care provider if you experience any of the following:  difficulty breathing or increased cough     Lifting restrictions   Order Comments: 20lb limit     No driving until:   Order Comments: No longer taking narcotic pain medication     Notify your health care provider if you  experience any of the following:  temperature >100.4     Notify your health care provider if you experience any of the following:  persistent nausea and vomiting or diarrhea     Notify your health care provider if you experience any of the following:  severe uncontrolled pain     Notify your health care provider if you experience any of the following:  redness, tenderness, or signs of infection (pain, swelling, redness, odor or green/yellow discharge around incision site)     Notify your health care provider if you experience any of the following:  difficulty breathing or increased cough     Activity as tolerated     Activity as tolerated     Medications:  Reconciled Home Medications:      Medication List      START taking these medications    nozaseptin  nasal   Commonly known as:  NOZIN  1 each by Each Nostril route 2 (two) times daily.     tamsulosin 0.4 mg Cap  Commonly known as:  FLOMAX  Take 1 capsule (0.4 mg total) by mouth once daily.  Start taking on:  February 6, 2020        CHANGE how you take these medications    * ARIPiprazole 5 MG Tab  Commonly known as:  ABILIFY  Take 1 tablet (5 mg total) by mouth once daily.  What changed:  when to take this     * ARIPiprazole 5 MG Tab  Commonly known as:  ABILIFY  TAKE 1 TABLET BY MOUTH DAILY  What changed:  Another medication with the same name was changed. Make sure you understand how and when to take each.     * HYDROcodone-acetaminophen 7.5-325 mg per tablet  Commonly known as:  NORCO  Take 1 tablet by mouth every 6 (six) hours as needed for Pain.  What changed:  Another medication with the same name was added. Make sure you understand how and when to take each.     * HYDROcodone-acetaminophen  mg per tablet  Commonly known as:  NORCO  Take 1 tablet by mouth every 6 (six) hours as needed for Pain.  What changed:  You were already taking a medication with the same name, and this prescription was added. Make sure you understand how and when to take  each.     linaCLOtide 290 mcg Cap capsule  Commonly known as:  LINZESS  Take 1 capsule (290 mcg total) by mouth once daily.  What changed:    · when to take this  · reasons to take this         * This list has 4 medication(s) that are the same as other medications prescribed for you. Read the directions carefully, and ask your doctor or other care provider to review them with you.            CONTINUE taking these medications    acetaminophen 325 MG tablet  Commonly known as:  TYLENOL  Take 325 mg by mouth every 6 (six) hours as needed for Pain.     azelastine 137 mcg (0.1 %) nasal spray  Commonly known as:  ASTELIN  1 spray (137 mcg total) by Nasal route 2 (two) times daily.     diphenhydrAMINE 50 MG capsule  Commonly known as:  BENADRYL  Take 1 capsule (50 mg total) by mouth On call Procedure for Allergies (1 hour prior to CT).     escitalopram oxalate 20 MG tablet  Commonly known as:  LEXAPRO  TAKE 1 TABLET (20 MG TOTAL) BY MOUTH ONCE DAILY.     loratadine 10 mg tablet  Commonly known as:  CLARITIN  Take 10 mg by mouth once daily.     meloxicam 15 MG tablet  Commonly known as:  MOBIC  Take 1 tablet (15 mg total) by mouth once daily.     PHENERGAN ORAL  Take by mouth.     predniSONE 50 MG Tab  Commonly known as:  DELTASONE  Take 1 tablet (50 mg total) by mouth On call Procedure (at 13 hours, 7 hours, and 1 hour before CT).        STOP taking these medications    metroNIDAZOLE 500 MG tablet  Commonly known as:  FLAGYL     neomycin 500 mg Tab  Commonly known as:  MYCIFRADIN     polyethylene glycol 17 gram/dose powder  Commonly known as:  GLYCOLAX          Time spent on the discharge of patient: 30 minutes    Promise Fajardo PA-C  General Surgery  [facility]

## 2020-02-06 ENCOUNTER — TELEPHONE (OUTPATIENT)
Dept: UROLOGY | Facility: CLINIC | Age: 45
End: 2020-02-06

## 2020-02-06 ENCOUNTER — PATIENT MESSAGE (OUTPATIENT)
Dept: SURGERY | Facility: CLINIC | Age: 45
End: 2020-02-06

## 2020-02-06 NOTE — PLAN OF CARE
02/06/20 0829   Final Note   Assessment Type Final Discharge Note   Anticipated Discharge Disposition Home   Right Care Referral Info   Post Acute Recommendation No Care

## 2020-02-07 RX ORDER — HYDROCODONE BITARTRATE AND ACETAMINOPHEN 7.5; 325 MG/1; MG/1
1 TABLET ORAL EVERY 6 HOURS PRN
Qty: 120 TABLET | Refills: 0 | Status: SHIPPED | OUTPATIENT
Start: 2020-02-07 | End: 2020-03-09 | Stop reason: SDUPTHER

## 2020-02-08 ENCOUNTER — PATIENT MESSAGE (OUTPATIENT)
Dept: SURGERY | Facility: CLINIC | Age: 45
End: 2020-02-08

## 2020-02-09 ENCOUNTER — PATIENT MESSAGE (OUTPATIENT)
Dept: SURGERY | Facility: CLINIC | Age: 45
End: 2020-02-09

## 2020-02-10 ENCOUNTER — PATIENT MESSAGE (OUTPATIENT)
Dept: SURGERY | Facility: CLINIC | Age: 45
End: 2020-02-10

## 2020-02-17 ENCOUNTER — PATIENT MESSAGE (OUTPATIENT)
Dept: SURGERY | Facility: CLINIC | Age: 45
End: 2020-02-17

## 2020-02-17 ENCOUNTER — OFFICE VISIT (OUTPATIENT)
Dept: SURGERY | Facility: CLINIC | Age: 45
End: 2020-02-17
Payer: COMMERCIAL

## 2020-02-17 VITALS
HEIGHT: 70 IN | DIASTOLIC BLOOD PRESSURE: 79 MMHG | HEART RATE: 105 BPM | BODY MASS INDEX: 29.32 KG/M2 | SYSTOLIC BLOOD PRESSURE: 130 MMHG | WEIGHT: 204.81 LBS | RESPIRATION RATE: 18 BRPM

## 2020-02-17 DIAGNOSIS — K57.20 DIVERTICULITIS OF LARGE INTESTINE WITH PERFORATION AND ABSCESS WITHOUT BLEEDING: Primary | ICD-10-CM

## 2020-02-17 DIAGNOSIS — Z93.3 STATUS POST HARTMANN PROCEDURE: ICD-10-CM

## 2020-02-17 PROCEDURE — 99999 PR PBB SHADOW E&M-EST. PATIENT-LVL III: CPT | Mod: PBBFAC,,, | Performed by: COLON & RECTAL SURGERY

## 2020-02-17 PROCEDURE — 99024 POSTOP FOLLOW-UP VISIT: CPT | Mod: S$GLB,,, | Performed by: COLON & RECTAL SURGERY

## 2020-02-17 PROCEDURE — 99999 PR PBB SHADOW E&M-EST. PATIENT-LVL III: ICD-10-PCS | Mod: PBBFAC,,, | Performed by: COLON & RECTAL SURGERY

## 2020-02-17 PROCEDURE — 99024 PR POST-OP FOLLOW-UP VISIT: ICD-10-PCS | Mod: S$GLB,,, | Performed by: COLON & RECTAL SURGERY

## 2020-02-17 RX ORDER — MELOXICAM 15 MG/1
15 TABLET ORAL DAILY
Qty: 90 TABLET | Refills: 1 | Status: SHIPPED | OUTPATIENT
Start: 2020-02-17 | End: 2020-03-09 | Stop reason: SDUPTHER

## 2020-02-17 RX ORDER — CYCLOBENZAPRINE HCL 10 MG
TABLET ORAL
COMMUNITY
Start: 2020-01-03 | End: 2020-05-07 | Stop reason: SDUPTHER

## 2020-02-17 NOTE — PROGRESS NOTES
History & Physical    SUBJECTIVE:     CC: Discuss colostomy reversal  Ref: Mindy Goff MD    History of Present Illness:  Patient is a 44 y.o. male presents for evaluation colostomy reversal.  Patient had perforated sigmoid diverticulitis in October 2019 requiring Kirsten's procedure. He did have a postoperative abscess or required IR drainage but has recovered well since that time.  He is currently tolerating a regular diet and having good colostomy function.  He denies any leaks.  His last colonoscopy was in 2016 and he has no history of polyps.  He does have a positive family history of colorectal cancer in his grandmother as well as colon polyps in his mother.  He denies any current bleeding per rectum or from his ostomy.  He currently denies any fever, chills, nausea, vomiting.  He does endorse low transit constipation reports a history of IBS-C. Reports weak urine stream currently since last operation, as well as some lateral abdominal wall pain since surgery. I have discussed this case personally with his referring provider and reviewed all his previous medical records.    Oct 2019: Kirsten's procedure  1/30/2020: Kirsten's reversal with partial LAR due to rectal tear during surgery    Interval history:  Since last clinic visit, the patient underwent Kirsten's reversal on January 30, 2020.  His operative course was complicated by a rectal tear during surgery requiring partial low anterior resection with colorectal anastomosis. He had uneventful postop recovery in the hospital was discharged home.  Since discharge, he is tolerating regular diet had good bowel movements.  He has had intermittent loose stool and solid stool but more consistent recently.  He denies any fever, chills, nausea, vomiting.  He states his incision has mostly healed.  He still has some van-incisional pain but his abdominal pain has mostly resolved.    Review of patient's allergies indicates:   Allergen Reactions    Codeine  Other (See Comments)     violent    Iodinated contrast media Swelling     Tongue, right eye, lips swelling. Rash on abdomen and axilla    Nuts [tree nut] Anaphylaxis    Dairy aid [lactase] Diarrhea and Nausea And Vomiting    Morphine      Tolerated hydromorphone in October 2019.        Current Outpatient Medications   Medication Sig Dispense Refill    ARIPiprazole (ABILIFY) 5 MG Tab Take 1 tablet (5 mg total) by mouth once daily. (Patient taking differently: Take 5 mg by mouth every evening. ) 90 tablet 1    escitalopram oxalate (LEXAPRO) 20 MG tablet TAKE 1 TABLET (20 MG TOTAL) BY MOUTH ONCE DAILY. 90 tablet 1    HYDROcodone-acetaminophen (NORCO)  mg per tablet Take 1 tablet by mouth every 6 (six) hours as needed for Pain. 28 tablet 0    HYDROcodone-acetaminophen (NORCO) 7.5-325 mg per tablet Take 1 tablet by mouth every 6 (six) hours as needed for Pain. 120 tablet 0    meloxicam (MOBIC) 15 MG tablet Take 1 tablet (15 mg total) by mouth once daily. 90 tablet 1    tamsulosin (FLOMAX) 0.4 mg Cap Take 1 capsule (0.4 mg total) by mouth once daily. 30 capsule 0    acetaminophen (TYLENOL) 325 MG tablet Take 325 mg by mouth every 6 (six) hours as needed for Pain.      ARIPiprazole (ABILIFY) 5 MG Tab TAKE 1 TABLET BY MOUTH DAILY (Patient not taking: Reported on 2/17/2020) 90 tablet 1    azelastine (ASTELIN) 137 mcg (0.1 %) nasal spray 1 spray (137 mcg total) by Nasal route 2 (two) times daily. (Patient not taking: Reported on 2/17/2020) 30 mL 3    cyclobenzaprine (FLEXERIL) 10 MG tablet       diphenhydrAMINE (BENADRYL) 50 MG capsule Take 1 capsule (50 mg total) by mouth On call Procedure for Allergies (1 hour prior to CT). (Patient not taking: Reported on 2/17/2020) 1 capsule 0    linaclotide 290 mcg Cap Take 1 capsule (290 mcg total) by mouth once daily. (Patient not taking: Reported on 2/17/2020) 90 capsule 1    loratadine (CLARITIN) 10 mg tablet Take 10 mg by mouth once daily.      nozaseptin  (NOZIN) nasal  1 each by Each Nostril route 2 (two) times daily. (Patient not taking: Reported on 2/17/2020) 1 applicator 0    predniSONE (DELTASONE) 50 MG Tab Take 1 tablet (50 mg total) by mouth On call Procedure (at 13 hours, 7 hours, and 1 hour before CT). (Patient not taking: Reported on 2/17/2020) 3 tablet 0    promethazine HCl (PHENERGAN ORAL) Take by mouth.       No current facility-administered medications for this visit.        Past Medical History:   Diagnosis Date    Angioedema of lips 3/13/2015    IVP dye allergy - swelling lips, eye, tongue    Depression     Diverticulitis 10/07/2019    Kidney stones      Past Surgical History:   Procedure Laterality Date    ABDOMINAL WASHOUT N/A 10/7/2019    Procedure: LAVAGE, PERITONEAL, THERAPEUTIC;  Surgeon: Mindy Goff MD;  Location: [facility] OR;  Service: General;  Laterality: N/A;  abdomen washout    anal fistula repair      APPENDECTOMY      BACK SURGERY      CHOLECYSTECTOMY  2005    COLONOSCOPY N/A 3/10/2016    Procedure: COLONOSCOPY;  Surgeon: Juvenal Dinero MD;  Location: [facility] ENDO;  Service: Endoscopy;  Laterality: N/A;    COLONOSCOPY N/A 1/17/2020    Procedure: COLONOSCOPY;  Surgeon: Kadeem Choi MD;  Location: [facility] ENDO;  Service: General;  Laterality: N/A;    COLOSTOMY N/A 10/7/2019    Procedure: CREATION, COLOSTOMY;  Surgeon: Mindy Goff MD;  Location: [facility] OR;  Service: General;  Laterality: N/A;    SIVAKUMAR PROCEDURE N/A 10/7/2019    Procedure: SIVAKUMAR PROCEDURE;  Surgeon: Mindy Goff MD;  Location: [facility] OR;  Service: General;  Laterality: N/A;    INJECTION OF ANESTHETIC AGENT INTO TISSUE PLANE DEFINED BY TRANSVERSUS ABDOMINIS MUSCLE N/A 1/30/2020    Procedure: BLOCK, TRANSVERSUS ABDOMINIS PLANE;  Surgeon: Kadeem Choi MD;  Location: [facility] OR;  Service: General;  Laterality: N/A;    KNEE SURGERY Right     KNEE SURGERY Left     LYSIS OF ADHESIONS N/A 1/30/2020    Procedure: LYSIS, ADHESIONS;   Surgeon: Kadeem Choi MD;  Location: [facility];  Service: General;  Laterality: N/A;    PILONIDAL CYST DRAINAGE      pt has had 6 of these adn has had revisions    PROCTECTOMY N/A 1/30/2020    Procedure: PROCTECTOMY;  Surgeon: Kadeem Choi MD;  Location: [facility];  Service: General;  Laterality: N/A;  Partial    SUBTOTAL COLECTOMY  1/30/2020    Procedure: COLECTOMY, PARTIAL;  Surgeon: Kadeem Choi MD;  Location: [facility] OR;  Service: General;;     Family History   Problem Relation Age of Onset    Heart disease Father         CABG age 66    Cancer Father         melanoma    Diabetes Father     Diabetes Mother     Colon polyps Mother     Colon polyps Sister     Diabetes Maternal Grandmother     Colon cancer Maternal Grandmother     Diabetes Maternal Grandfather     Diabetes Paternal Grandmother     Stroke Paternal Grandmother     Diabetes Paternal Grandfather      Social History     Tobacco Use    Smoking status: Never Smoker    Smokeless tobacco: Never Used   Substance Use Topics    Alcohol use: No     Frequency: Never     Binge frequency: Never    Drug use: No        Review of Systems:  Review of Systems   Constitutional: Negative for activity change, appetite change, chills, fatigue, fever and unexpected weight change.   HENT: Negative for congestion, ear pain, sore throat and trouble swallowing.    Eyes: Negative for pain, redness and itching.   Respiratory: Negative for cough, shortness of breath and wheezing.    Cardiovascular: Negative for chest pain, palpitations and leg swelling.   Gastrointestinal: Positive for abdominal pain. Negative for abdominal distention, anal bleeding, blood in stool, constipation, diarrhea, nausea, rectal pain and vomiting.   Endocrine: Negative for cold intolerance, heat intolerance and polyuria.   Genitourinary: Negative for dysuria, flank pain, frequency and hematuria.   Musculoskeletal: Positive for back pain. Negative for gait problem, joint  "swelling and neck pain.   Skin: Negative for color change, rash and wound.   Allergic/Immunologic: Negative for environmental allergies and immunocompromised state.   Neurological: Negative for dizziness, speech difficulty, weakness and numbness.   Psychiatric/Behavioral: Negative for agitation, confusion and hallucinations.       OBJECTIVE:     Vital Signs (Most Recent)  Pulse: 105 (02/17/20 0948)  Resp: 18 (02/17/20 0948)  BP: 130/79 (02/17/20 0948)  5' 10" (1.778 m)  92.9 kg (204 lb 12.9 oz)     Physical Exam:  Physical Exam   Constitutional: He is oriented to person, place, and time. He appears well-developed.   HENT:   Head: Normocephalic and atraumatic.   Eyes: Conjunctivae and EOM are normal.   Neck: Normal range of motion. No thyromegaly present.   Cardiovascular: Normal rate and regular rhythm.   Pulmonary/Chest: Effort normal. No respiratory distress.   Abdominal: Soft. He exhibits no distension and no mass.   Midline incision well-healing with staples in place with some small areas with eschar (staples removed at bedside today); previous LLQ ostomy site clean and dry with eschar in center (wound not fully closed yet) without erythema   Musculoskeletal: Normal range of motion. He exhibits no edema or tenderness.   Neurological: He is alert and oriented to person, place, and time.   Skin: Skin is warm and dry. Capillary refill takes less than 2 seconds. No rash noted.   Psychiatric: He has a normal mood and affect.       Laboratory  Lab Results   Component Value Date    WBC 6.60 02/05/2020    HGB 8.8 (L) 02/05/2020    HCT 27.3 (L) 02/05/2020     02/05/2020    CHOL 278 (H) 12/05/2019    TRIG 145 12/05/2019    HDL 63 12/05/2019    ALT 35 01/13/2020    AST 25 01/13/2020     02/05/2020    K 3.9 02/05/2020     02/05/2020    CREATININE 0.7 02/05/2020    BUN 5 (L) 02/05/2020    CO2 28 02/05/2020    INR 1.0 01/10/2016    HGBA1C 5.0 03/26/2015         Pathology Results:  FINAL PATHOLOGIC " DIAGNOSIS  1. Sigmoid colon (stitch marks perforation), segmental resection (20 cm in length):  - Gross and histologic evidence of transmural perforation (4 cm from nearest surgical margin)  - Associated acute chronic transmural inflammation with extension into pericolic fat resulting in abscess  formation  - Serosal adhesion formation with acute chronic serositis  - Margins histologically viable  - Negative for dysplasia or malignancy    PATHOLOGY from Kirsten's reversal Jan 2020:  1. SIGMOID COLON AND UPPER RECTUM, PARTIAL PROCTECTOMY:  - Colorectal tissue with chronic inflammation and fibrous serosal adhesions  - Submucosal foreign-body giant cell reaction associated with acellular suture-like material, consistent with  prior surgical intervention  - No evidence of dysplasia or malignancy  2. COLOSTOMY, TAKEDOWN:  - Inflamed colo-cutaneous tissue with foreign-body giant cell reaction, consistent with ostomy  3. ANASTOMOTIC RING, EXCISION:  - Colonic tissue with chronic inflammation, vascular congestion, and hyperplastic mucosal changes  - No evidence of dysplasia or malignancy    ASSESSMENT/PLAN:     43yo F with previous perforated sigmoid diverticulitis requiring Kirsten's procedure in October 2019 who is now s/p Kirsten's reversal and LAR on 1/30/2020 who has recovered well postop    - staples removed at bedside today  - discussed with the patient that his midline wound should continue to heal as well as his previous ostomy site which did close over the next few weeks  - encouraged him to avoid lifting over 10 lb for 6 weeks following surgery  - needs repeat colonoscopy in 5 years  - RTC pfanny.    Kadeem Choi MD  Colon and Rectal Surgery  [facility]

## 2020-02-20 ENCOUNTER — TELEPHONE (OUTPATIENT)
Dept: SURGERY | Facility: CLINIC | Age: 45
End: 2020-02-20

## 2020-02-20 NOTE — TELEPHONE ENCOUNTER
Phoned patient and let him know that his paperwork will be sent to him tomorrow morning. Patient thanked me and the call ended well.

## 2020-02-21 ENCOUNTER — PATIENT MESSAGE (OUTPATIENT)
Dept: CARDIOTHORACIC SURGERY | Facility: CLINIC | Age: 45
End: 2020-02-21

## 2020-03-10 RX ORDER — MELOXICAM 15 MG/1
15 TABLET ORAL DAILY
Qty: 90 TABLET | Refills: 1 | Status: SHIPPED | OUTPATIENT
Start: 2020-03-10 | End: 2020-11-16

## 2020-03-10 RX ORDER — HYDROCODONE BITARTRATE AND ACETAMINOPHEN 7.5; 325 MG/1; MG/1
1 TABLET ORAL EVERY 6 HOURS PRN
Qty: 120 TABLET | Refills: 0 | Status: SHIPPED | OUTPATIENT
Start: 2020-03-10 | End: 2020-04-07 | Stop reason: SDUPTHER

## 2020-03-11 RX ORDER — ESCITALOPRAM OXALATE 20 MG/1
20 TABLET ORAL DAILY
Qty: 90 TABLET | Refills: 1 | Status: SHIPPED | OUTPATIENT
Start: 2020-03-11 | End: 2020-09-08

## 2020-03-20 ENCOUNTER — PATIENT MESSAGE (OUTPATIENT)
Dept: RESEARCH | Facility: HOSPITAL | Age: 45
End: 2020-03-20

## 2020-04-07 RX ORDER — HYDROCODONE BITARTRATE AND ACETAMINOPHEN 7.5; 325 MG/1; MG/1
1 TABLET ORAL EVERY 6 HOURS PRN
Qty: 120 TABLET | Refills: 0 | Status: SHIPPED | OUTPATIENT
Start: 2020-04-07 | End: 2020-05-07 | Stop reason: SDUPTHER

## 2020-04-28 RX ORDER — ARIPIPRAZOLE 5 MG/1
5 TABLET ORAL DAILY
Qty: 90 TABLET | Refills: 1 | Status: SHIPPED | OUTPATIENT
Start: 2020-04-28 | End: 2020-11-16 | Stop reason: SDUPTHER

## 2020-05-07 ENCOUNTER — TELEPHONE (OUTPATIENT)
Dept: INTERNAL MEDICINE | Facility: CLINIC | Age: 45
End: 2020-05-07

## 2020-05-07 ENCOUNTER — OFFICE VISIT (OUTPATIENT)
Dept: INTERNAL MEDICINE | Facility: CLINIC | Age: 45
End: 2020-05-07
Payer: COMMERCIAL

## 2020-05-07 DIAGNOSIS — F32.5 MAJOR DEPRESSIVE DISORDER WITH SINGLE EPISODE, IN REMISSION: ICD-10-CM

## 2020-05-07 DIAGNOSIS — K55.9 VASCULAR DISORDER OF INTESTINE, UNSPECIFIED: ICD-10-CM

## 2020-05-07 DIAGNOSIS — G89.4 CHRONIC PAIN SYNDROME: Primary | ICD-10-CM

## 2020-05-07 DIAGNOSIS — E78.00 HYPERCHOLESTEREMIA: ICD-10-CM

## 2020-05-07 PROCEDURE — 99214 PR OFFICE/OUTPT VISIT, EST, LEVL IV, 30-39 MIN: ICD-10-PCS | Mod: 95,,, | Performed by: FAMILY MEDICINE

## 2020-05-07 PROCEDURE — 99214 OFFICE O/P EST MOD 30 MIN: CPT | Mod: 95,,, | Performed by: FAMILY MEDICINE

## 2020-05-07 RX ORDER — CYCLOBENZAPRINE HCL 10 MG
10 TABLET ORAL 3 TIMES DAILY
Qty: 90 TABLET | Refills: 1 | Status: SHIPPED | OUTPATIENT
Start: 2020-05-07 | End: 2020-07-06

## 2020-05-07 RX ORDER — HYDROCODONE BITARTRATE AND ACETAMINOPHEN 7.5; 325 MG/1; MG/1
1 TABLET ORAL EVERY 6 HOURS PRN
Qty: 120 TABLET | Refills: 0 | Status: SHIPPED | OUTPATIENT
Start: 2020-05-07 | End: 2020-06-09 | Stop reason: SDUPTHER

## 2020-05-07 NOTE — PROGRESS NOTES
Subjective:       Patient ID: Dax Carlisle is a 44 y.o. male.    Chief Complaint: No chief complaint on file.    The patient location is: Home  The chief complaint leading to consultation is: Medication refills  Visit type: AudioVisual  Total time spent with patient:  7 min  Each patient to whom he or she provides medical services by telemedicine is:  (1) informed of the relationship between the physician and patient and the respective role of any other health care provider with respect to management of the patient; and (2) notified that he or she may decline to receive medical services by telemedicine and may withdraw from such care at any time.    Notes:       Pt is a 44 year old who is seen for medication refill. Pt mood is doing good on abilify. Has had GI surgery which was a reversal of colostomy. Pt is complaint with pain medication    Review of Systems   Constitutional: Negative.    Respiratory: Negative.    Cardiovascular: Negative.    Musculoskeletal: Positive for back pain.   Neurological: Negative.    Hematological: Negative.        Objective:      Physical Exam   Constitutional: He is oriented to person, place, and time. He appears well-developed and well-nourished.   Neurological: He is alert and oriented to person, place, and time.   Psychiatric: He has a normal mood and affect. His behavior is normal.       Assessment:       1. Chronic pain syndrome    2. Vascular disorder of intestine, unspecified    3. Major depressive disorder with single episode, in remission        Plan:       Chronic pain syndrome  Comments:  Continue with the norco    Vascular disorder of intestine, unspecified  Comments:  Start on Flexeril having some muscle tone issue due to surgery    Major depressive disorder with single episode, in remission  Comments:  Continue with the abilify    Other orders  -     cyclobenzaprine (FLEXERIL) 10 MG tablet; Take 1 tablet (10 mg total) by mouth 3 (three) times daily.  Dispense: 90  tablet; Refill: 1  -     HYDROcodone-acetaminophen (NORCO) 7.5-325 mg per tablet; Take 1 tablet by mouth every 6 (six) hours as needed for Pain.  Dispense: 120 tablet; Refill: 0

## 2020-05-08 ENCOUNTER — LAB VISIT (OUTPATIENT)
Dept: LAB | Facility: HOSPITAL | Age: 45
End: 2020-05-08
Attending: FAMILY MEDICINE
Payer: COMMERCIAL

## 2020-05-08 DIAGNOSIS — E78.00 HYPERCHOLESTEREMIA: ICD-10-CM

## 2020-05-08 LAB
CHOLEST SERPL-MCNC: 200 MG/DL (ref 120–199)
CHOLEST/HDLC SERPL: 4.7 {RATIO} (ref 2–5)
HDLC SERPL-MCNC: 43 MG/DL (ref 40–75)
HDLC SERPL: 21.5 % (ref 20–50)
LDLC SERPL CALC-MCNC: 88.6 MG/DL (ref 63–159)
NONHDLC SERPL-MCNC: 157 MG/DL
TRIGL SERPL-MCNC: 342 MG/DL (ref 30–150)

## 2020-05-08 PROCEDURE — 36415 COLL VENOUS BLD VENIPUNCTURE: CPT

## 2020-05-08 PROCEDURE — 80061 LIPID PANEL: CPT

## 2020-05-12 ENCOUNTER — PATIENT MESSAGE (OUTPATIENT)
Dept: INTERNAL MEDICINE | Facility: CLINIC | Age: 45
End: 2020-05-12

## 2020-06-09 RX ORDER — HYDROCODONE BITARTRATE AND ACETAMINOPHEN 7.5; 325 MG/1; MG/1
1 TABLET ORAL EVERY 6 HOURS PRN
Qty: 120 TABLET | Refills: 0 | Status: SHIPPED | OUTPATIENT
Start: 2020-06-09 | End: 2020-07-10 | Stop reason: SDUPTHER

## 2020-07-06 ENCOUNTER — OFFICE VISIT (OUTPATIENT)
Dept: INTERNAL MEDICINE | Facility: CLINIC | Age: 45
End: 2020-07-06
Payer: COMMERCIAL

## 2020-07-06 ENCOUNTER — LAB VISIT (OUTPATIENT)
Dept: LAB | Facility: HOSPITAL | Age: 45
End: 2020-07-06
Attending: FAMILY MEDICINE
Payer: COMMERCIAL

## 2020-07-06 VITALS
BODY MASS INDEX: 33.96 KG/M2 | HEART RATE: 109 BPM | HEIGHT: 70 IN | TEMPERATURE: 97 F | WEIGHT: 237.19 LBS | SYSTOLIC BLOOD PRESSURE: 130 MMHG | DIASTOLIC BLOOD PRESSURE: 90 MMHG | OXYGEN SATURATION: 98 %

## 2020-07-06 DIAGNOSIS — R55 SYNCOPE, NEAR: ICD-10-CM

## 2020-07-06 DIAGNOSIS — R00.0 TACHYCARDIA: Primary | ICD-10-CM

## 2020-07-06 PROCEDURE — 99213 OFFICE O/P EST LOW 20 MIN: CPT | Mod: S$GLB,,, | Performed by: FAMILY MEDICINE

## 2020-07-06 PROCEDURE — 99999 PR PBB SHADOW E&M-EST. PATIENT-LVL IV: CPT | Mod: PBBFAC,,, | Performed by: FAMILY MEDICINE

## 2020-07-06 PROCEDURE — 3008F BODY MASS INDEX DOCD: CPT | Mod: CPTII,S$GLB,, | Performed by: FAMILY MEDICINE

## 2020-07-06 PROCEDURE — 36415 COLL VENOUS BLD VENIPUNCTURE: CPT

## 2020-07-06 PROCEDURE — 99999 PR PBB SHADOW E&M-EST. PATIENT-LVL IV: ICD-10-PCS | Mod: PBBFAC,,, | Performed by: FAMILY MEDICINE

## 2020-07-06 PROCEDURE — 3008F PR BODY MASS INDEX (BMI) DOCUMENTED: ICD-10-PCS | Mod: CPTII,S$GLB,, | Performed by: FAMILY MEDICINE

## 2020-07-06 PROCEDURE — 84439 ASSAY OF FREE THYROXINE: CPT

## 2020-07-06 PROCEDURE — 84443 ASSAY THYROID STIM HORMONE: CPT

## 2020-07-06 PROCEDURE — 99213 PR OFFICE/OUTPT VISIT, EST, LEVL III, 20-29 MIN: ICD-10-PCS | Mod: S$GLB,,, | Performed by: FAMILY MEDICINE

## 2020-07-06 RX ORDER — CYCLOBENZAPRINE HCL 10 MG
TABLET ORAL
Qty: 90 TABLET | Refills: 1 | Status: SHIPPED | OUTPATIENT
Start: 2020-07-06 | End: 2020-09-08

## 2020-07-06 NOTE — PROGRESS NOTES
Subjective:       Patient ID: Dax Carlisle is a 44 y.o. male.    Chief Complaint: Chest Pain and Tachycardia    Pt is a 44 year old who is having tachycardia with some reported passing out. Pt reports that in October post surgery has had episodes. Pt reports when bearing down for bowel movement or standing up will feel like he is going to passout    Review of Systems   Constitutional: Negative.    Respiratory: Negative.    Cardiovascular: Negative.    Musculoskeletal: Negative.    Psychiatric/Behavioral: Negative.          Objective:      Physical Exam  Constitutional:       Appearance: Normal appearance.   Neck:      Musculoskeletal: Normal range of motion and neck supple.   Cardiovascular:      Rate and Rhythm: Normal rate and regular rhythm.      Pulses: Normal pulses.      Heart sounds: Normal heart sounds.   Neurological:      Mental Status: He is alert.         Assessment:       1. Tachycardia    2. Syncope, near        Plan:       Tachycardia    Syncope, near  -     T4, free; Future; Expected date: 07/06/2020  -     TSH; Future; Expected date: 07/06/2020  -     SCHEDULED EKG 12-LEAD (to Muse); Future  -     Ambulatory referral/consult to Cardiology; Future; Expected date: 07/13/2020

## 2020-07-07 ENCOUNTER — OFFICE VISIT (OUTPATIENT)
Dept: CARDIOLOGY | Facility: CLINIC | Age: 45
End: 2020-07-07
Payer: COMMERCIAL

## 2020-07-07 ENCOUNTER — LAB VISIT (OUTPATIENT)
Dept: LAB | Facility: HOSPITAL | Age: 45
End: 2020-07-07
Attending: INTERNAL MEDICINE
Payer: COMMERCIAL

## 2020-07-07 VITALS
SYSTOLIC BLOOD PRESSURE: 122 MMHG | DIASTOLIC BLOOD PRESSURE: 80 MMHG | HEIGHT: 70 IN | HEART RATE: 102 BPM | BODY MASS INDEX: 33.39 KG/M2 | WEIGHT: 233.25 LBS | OXYGEN SATURATION: 97 %

## 2020-07-07 DIAGNOSIS — R07.89 ATYPICAL CHEST PAIN: ICD-10-CM

## 2020-07-07 DIAGNOSIS — R94.31 ABNORMAL ECG: ICD-10-CM

## 2020-07-07 DIAGNOSIS — Z82.49 FAMILY HISTORY OF CARDIOVASCULAR DISEASE: ICD-10-CM

## 2020-07-07 DIAGNOSIS — E78.2 MIXED HYPERLIPIDEMIA: ICD-10-CM

## 2020-07-07 DIAGNOSIS — E66.9 OBESITY (BMI 30-39.9): ICD-10-CM

## 2020-07-07 DIAGNOSIS — R55 SYNCOPE, UNSPECIFIED SYNCOPE TYPE: Primary | ICD-10-CM

## 2020-07-07 DIAGNOSIS — R55 SYNCOPE, NEAR: ICD-10-CM

## 2020-07-07 DIAGNOSIS — R55 SYNCOPE, UNSPECIFIED SYNCOPE TYPE: ICD-10-CM

## 2020-07-07 LAB
ALBUMIN SERPL BCP-MCNC: 4.3 G/DL (ref 3.5–5.2)
ALP SERPL-CCNC: 66 U/L (ref 55–135)
ALT SERPL W/O P-5'-P-CCNC: 30 U/L (ref 10–44)
ANION GAP SERPL CALC-SCNC: 8 MMOL/L (ref 8–16)
AST SERPL-CCNC: 24 U/L (ref 10–40)
BASOPHILS # BLD AUTO: 0.05 K/UL (ref 0–0.2)
BASOPHILS NFR BLD: 0.6 % (ref 0–1.9)
BILIRUB SERPL-MCNC: 0.3 MG/DL (ref 0.1–1)
BUN SERPL-MCNC: 14 MG/DL (ref 6–20)
CALCIUM SERPL-MCNC: 9.5 MG/DL (ref 8.7–10.5)
CHLORIDE SERPL-SCNC: 104 MMOL/L (ref 95–110)
CO2 SERPL-SCNC: 27 MMOL/L (ref 23–29)
CREAT SERPL-MCNC: 1 MG/DL (ref 0.5–1.4)
DIFFERENTIAL METHOD: ABNORMAL
EOSINOPHIL # BLD AUTO: 1.4 K/UL (ref 0–0.5)
EOSINOPHIL NFR BLD: 16 % (ref 0–8)
ERYTHROCYTE [DISTWIDTH] IN BLOOD BY AUTOMATED COUNT: 13.9 % (ref 11.5–14.5)
EST. GFR  (AFRICAN AMERICAN): >60 ML/MIN/1.73 M^2
EST. GFR  (NON AFRICAN AMERICAN): >60 ML/MIN/1.73 M^2
GLUCOSE SERPL-MCNC: 99 MG/DL (ref 70–110)
HCT VFR BLD AUTO: 42.3 % (ref 40–54)
HGB BLD-MCNC: 13.4 G/DL (ref 14–18)
IMM GRANULOCYTES # BLD AUTO: 0.03 K/UL (ref 0–0.04)
IMM GRANULOCYTES NFR BLD AUTO: 0.3 % (ref 0–0.5)
LYMPHOCYTES # BLD AUTO: 2.4 K/UL (ref 1–4.8)
LYMPHOCYTES NFR BLD: 27 % (ref 18–48)
MAGNESIUM SERPL-MCNC: 2 MG/DL (ref 1.6–2.6)
MCH RBC QN AUTO: 27.5 PG (ref 27–31)
MCHC RBC AUTO-ENTMCNC: 31.7 G/DL (ref 32–36)
MCV RBC AUTO: 87 FL (ref 82–98)
MONOCYTES # BLD AUTO: 0.8 K/UL (ref 0.3–1)
MONOCYTES NFR BLD: 8.6 % (ref 4–15)
NEUTROPHILS # BLD AUTO: 4.1 K/UL (ref 1.8–7.7)
NEUTROPHILS NFR BLD: 47.5 % (ref 38–73)
NRBC BLD-RTO: 0 /100 WBC
PLATELET # BLD AUTO: 175 K/UL (ref 150–350)
PMV BLD AUTO: 11.7 FL (ref 9.2–12.9)
POTASSIUM SERPL-SCNC: 4.1 MMOL/L (ref 3.5–5.1)
PROT SERPL-MCNC: 7.4 G/DL (ref 6–8.4)
RBC # BLD AUTO: 4.87 M/UL (ref 4.6–6.2)
SODIUM SERPL-SCNC: 139 MMOL/L (ref 136–145)
T4 FREE SERPL-MCNC: 0.73 NG/DL (ref 0.71–1.51)
TSH SERPL DL<=0.005 MIU/L-ACNC: 1.98 UIU/ML (ref 0.4–4)
WBC # BLD AUTO: 8.7 K/UL (ref 3.9–12.7)

## 2020-07-07 PROCEDURE — 99999 PR PBB SHADOW E&M-EST. PATIENT-LVL V: CPT | Mod: PBBFAC,,, | Performed by: INTERNAL MEDICINE

## 2020-07-07 PROCEDURE — 99215 OFFICE O/P EST HI 40 MIN: CPT | Mod: S$GLB,,, | Performed by: INTERNAL MEDICINE

## 2020-07-07 PROCEDURE — 36415 COLL VENOUS BLD VENIPUNCTURE: CPT

## 2020-07-07 PROCEDURE — 83735 ASSAY OF MAGNESIUM: CPT

## 2020-07-07 PROCEDURE — 80053 COMPREHEN METABOLIC PANEL: CPT

## 2020-07-07 PROCEDURE — 99999 PR PBB SHADOW E&M-EST. PATIENT-LVL V: ICD-10-PCS | Mod: PBBFAC,,, | Performed by: INTERNAL MEDICINE

## 2020-07-07 PROCEDURE — 99215 PR OFFICE/OUTPT VISIT, EST, LEVL V, 40-54 MIN: ICD-10-PCS | Mod: S$GLB,,, | Performed by: INTERNAL MEDICINE

## 2020-07-07 PROCEDURE — 85025 COMPLETE CBC W/AUTO DIFF WBC: CPT

## 2020-07-07 NOTE — PROGRESS NOTES
"Subjective:    Patient ID:  Dax Carlisle is a 44 y.o. male who presents for evaluation of Loss of Consciousness and Chest Pain      Pt referred by Dr. Pramod Nuñez      HPI  Pt presents for syncope.  Has hyperlipidemia, obesity. Nonsmoker.   States has had 10 episodes of syncope, usually after standing or using bathroom.  sxs started this year after colostomy reversal (had ruptured bowel 10/19, abscess).  Syncope short lasting, did not seek medical attention because he felt fine afterwards but his "arms were flailing around" afterwards.  Father had CABG.  Had cp x 30 minutes, 3 w ago, after intercourse with wife.  Has put on weight after surgery.  No cp with walking.  ecg 7/3/20 NSR, QTc 489 ms.  ecg 1/20 and 10/19 sinus, nonspecific st-t abnl.  Has been on Lexapro for long time.       Past Medical History:   Diagnosis Date    Angioedema of lips 3/13/2015    IVP dye allergy - swelling lips, eye, tongue    Depression     Diverticulitis 10/07/2019    Kidney stones        Current Outpatient Medications   Medication Instructions    acetaminophen (TYLENOL) 325 mg, Oral, Every 6 hours PRN    ARIPiprazole (ABILIFY) 5 mg, Oral, Daily    ARIPiprazole (ABILIFY) 5 mg, Oral, Daily    azelastine (ASTELIN) 137 mcg, Nasal, 2 times daily    cyclobenzaprine (FLEXERIL) 10 MG tablet TAKE 1 TABLET(10 MG) BY MOUTH THREE TIMES DAILY    diphenhydrAMINE (BENADRYL) 50 mg, Oral, On Call Procedure    escitalopram oxalate (LEXAPRO) 20 mg, Oral, Daily    HYDROcodone-acetaminophen (NORCO)  mg per tablet 1 tablet, Oral, Every 6 hours PRN    HYDROcodone-acetaminophen (NORCO) 7.5-325 mg per tablet 1 tablet, Oral, Every 6 hours PRN    linaCLOtide (LINZESS) 290 mcg, Oral, Daily    loratadine (CLARITIN) 10 mg, Oral, Daily    meloxicam (MOBIC) 15 mg, Oral, Daily    nozaseptin (NOZIN) nasal  1 each, Each Nostril, 2 times daily    predniSONE (DELTASONE) 50 mg, Oral, On Call Procedure    promethazine HCl " "(PHENERGAN ORAL) Oral    tamsulosin (FLOMAX) 0.4 mg, Oral, Daily         Review of Systems   Constitution: Positive for weight gain.   HENT: Negative.    Eyes: Negative.    Cardiovascular: Positive for chest pain and syncope.   Respiratory: Negative.    Endocrine: Negative.    Hematologic/Lymphatic: Negative.    Skin: Negative.    Musculoskeletal: Negative.    Gastrointestinal: Negative.    Genitourinary: Negative.    Psychiatric/Behavioral: Negative.    Allergic/Immunologic: Negative.        /80 (BP Location: Left arm, Patient Position: Sitting, BP Method: Large (Manual))   Pulse 102   Ht 5' 10" (1.778 m)   Wt 105.8 kg (233 lb 4 oz)   SpO2 97%   BMI 33.47 kg/m²     Wt Readings from Last 3 Encounters:   07/07/20 105.8 kg (233 lb 4 oz)   07/06/20 107.6 kg (237 lb 3.4 oz)   02/17/20 92.9 kg (204 lb 12.9 oz)     Temp Readings from Last 3 Encounters:   07/06/20 96.8 °F (36 °C) (Tympanic)   02/05/20 98.7 °F (37.1 °C) (Oral)   01/17/20 98.5 °F (36.9 °C) (Temporal)     BP Readings from Last 3 Encounters:   07/07/20 122/80   07/06/20 (!) 130/90   02/17/20 130/79     Pulse Readings from Last 3 Encounters:   07/07/20 102   07/06/20 109   02/17/20 105          Objective:    Physical Exam   Constitutional: He is oriented to person, place, and time. He appears well-developed and well-nourished.   HENT:   Head: Normocephalic.   Neck: Normal range of motion. Neck supple. Normal carotid pulses, no hepatojugular reflux and no JVD present. Carotid bruit is not present. No thyromegaly present.   Cardiovascular: Normal rate, regular rhythm, S1 normal and S2 normal. PMI is not displaced. Exam reveals no S3, no S4, no distant heart sounds, no friction rub, no midsystolic click and no opening snap.   No murmur heard.  Pulses:       Radial pulses are 2+ on the right side and 2+ on the left side.   Pulmonary/Chest: Effort normal and breath sounds normal. He has no wheezes. He has no rales.   Abdominal: Soft. Bowel sounds are " normal. He exhibits no distension, no abdominal bruit, no ascites and no mass. There is no abdominal tenderness.   Musculoskeletal:         General: No edema.   Neurological: He is alert and oriented to person, place, and time.   Skin: Skin is warm.   Psychiatric: He has a normal mood and affect. His behavior is normal.   Nursing note and vitals reviewed.      I have reviewed all pertinent labs and cardiac studies.      Chemistry        Component Value Date/Time     02/05/2020 0504    K 3.9 02/05/2020 0504     02/05/2020 0504    CO2 28 02/05/2020 0504    BUN 5 (L) 02/05/2020 0504    CREATININE 0.7 02/05/2020 0504    GLU 98 02/05/2020 0504        Component Value Date/Time    CALCIUM 9.0 02/05/2020 0504    ALKPHOS 59 01/13/2020 0908    AST 25 01/13/2020 0908    ALT 35 01/13/2020 0908    BILITOT 0.3 01/13/2020 0908    ESTGFRAFRICA >60 02/05/2020 0504    EGFRNONAA >60 02/05/2020 0504        Lab Results   Component Value Date    WBC 6.60 02/05/2020    HGB 8.8 (L) 02/05/2020    HCT 27.3 (L) 02/05/2020    MCV 89 02/05/2020     02/05/2020       Lab Results   Component Value Date    CHOL 200 (H) 05/08/2020    CHOL 278 (H) 12/05/2019    CHOL 185 11/20/2018     Lab Results   Component Value Date    HDL 43 05/08/2020    HDL 63 12/05/2019    HDL 43 11/20/2018     Lab Results   Component Value Date    LDLCALC 88.6 05/08/2020    LDLCALC 186.0 (H) 12/05/2019    LDLCALC 125.0 11/20/2018     Lab Results   Component Value Date    TRIG 342 (H) 05/08/2020    TRIG 145 12/05/2019    TRIG 85 11/20/2018     Lab Results   Component Value Date    CHOLHDL 21.5 05/08/2020    CHOLHDL 22.7 12/05/2019    CHOLHDL 23.2 11/20/2018     No results found for: TSH        Assessment:       1. Syncope, unspecified syncope type    2. Syncope, near    3. Atypical chest pain    4. Obesity (BMI 30-39.9)    5. Abnormal ECG    6. Family history of cardiovascular disease    7. Mixed hyperlipidemia         Plan:             Syncope likely  vasovagal in etiology; however will also have pt see Neurology to ensure there is not a seizure component.  Pt counseled on vasovagal.  Atypical cp.  Slight QTc prolongation, possibly due to Lexapro and has been on for long time and must stay on it per pt.    Monitor for now.  Stress test.  Echocardiogram.  2 week Zio Holter.  Update labs (CBC, CMP, Magnesium).  Weight loss needed.  F/u after above tests.

## 2020-07-07 NOTE — LETTER
July 7, 2020      Pramod Nuñez MD  [address] [address] [facility] [address] [facility] [address]           [facility]  [address] [address] [facility] [address]  Phone: [phone]  Fax: [phone]          Patient: Dax Carlisle   MR Number: [id number]   YOB: 1975   Date of Visit: 7/7/2020       Dear Dr. Pramod Nuñez:    Thank you for referring Dax Carlisle to me for evaluation. Attached you will find relevant portions of my assessment and plan of care.    If you have questions, please do not hesitate to call me. I look forward to following Dax Carlisle along with you.    Sincerely,    Itz Braun MD    Enclosure  CC:  No Recipients    If you would like to receive this communication electronically, please contact [email or web address] or [phone] to request more information on [employer] access.    For providers and/or their staff who would like to refer a patient to [facility], please contact us through our one-stop-shop provider referral line, [facility] , at [phone].    If you feel you have received this communication in error or would no longer like to receive these types of communications, please e-mail [email or web address]

## 2020-07-08 ENCOUNTER — TELEPHONE (OUTPATIENT)
Dept: CARDIOLOGY | Facility: HOSPITAL | Age: 45
End: 2020-07-08

## 2020-07-08 NOTE — TELEPHONE ENCOUNTER
Called patient to advise of results Per Dr. Braun Please call pt.  Labs stable.  Anemia almost back to normal.  Await other test results.     Dr Braun    Patient verbally understood.

## 2020-07-08 NOTE — TELEPHONE ENCOUNTER
Please call pt.  Labs stable.  Anemia almost back to normal.  Await other test results.    Dr Braun

## 2020-07-12 RX ORDER — HYDROCODONE BITARTRATE AND ACETAMINOPHEN 7.5; 325 MG/1; MG/1
1 TABLET ORAL EVERY 6 HOURS PRN
Qty: 120 TABLET | Refills: 0 | Status: SHIPPED | OUTPATIENT
Start: 2020-07-12 | End: 2020-08-08 | Stop reason: SDUPTHER

## 2020-07-28 ENCOUNTER — HOSPITAL ENCOUNTER (OUTPATIENT)
Dept: CARDIOLOGY | Facility: HOSPITAL | Age: 45
Discharge: HOME | End: 2020-07-28
Attending: INTERNAL MEDICINE
Payer: COMMERCIAL

## 2020-07-28 ENCOUNTER — HOSPITAL ENCOUNTER (OUTPATIENT)
Dept: RADIOLOGY | Facility: HOSPITAL | Age: 45
Discharge: HOME | End: 2020-07-28
Attending: INTERNAL MEDICINE
Payer: COMMERCIAL

## 2020-07-28 VITALS
WEIGHT: 233 LBS | DIASTOLIC BLOOD PRESSURE: 80 MMHG | SYSTOLIC BLOOD PRESSURE: 122 MMHG | HEIGHT: 70 IN | BODY MASS INDEX: 33.36 KG/M2

## 2020-07-28 DIAGNOSIS — R07.89 ATYPICAL CHEST PAIN: ICD-10-CM

## 2020-07-28 DIAGNOSIS — R55 SYNCOPE, UNSPECIFIED SYNCOPE TYPE: ICD-10-CM

## 2020-07-28 DIAGNOSIS — Z82.49 FAMILY HISTORY OF CARDIOVASCULAR DISEASE: ICD-10-CM

## 2020-07-28 DIAGNOSIS — E66.9 OBESITY (BMI 30-39.9): ICD-10-CM

## 2020-07-28 DIAGNOSIS — R94.31 ABNORMAL ECG: ICD-10-CM

## 2020-07-28 DIAGNOSIS — R55 SYNCOPE, NEAR: ICD-10-CM

## 2020-07-28 LAB
AORTIC ROOT ANNULUS: 3.08 CM
AV INDEX (PROSTH): 0.82
AV MEAN GRADIENT: 5 MMHG
AV PEAK GRADIENT: 11 MMHG
AV VALVE AREA: 2.67 CM2
AV VELOCITY RATIO: 0.71
BSA FOR ECHO PROCEDURE: 2.28 M2
CV ECHO LV RWT: 0.5 CM
CV STRESS BASE HR: 89 BPM
DIASTOLIC BLOOD PRESSURE: 79 MMHG
DOP CALC AO PEAK VEL: 1.63 M/S
DOP CALC AO VTI: 31.86 CM
DOP CALC LVOT AREA: 3.3 CM2
DOP CALC LVOT DIAMETER: 2.04 CM
DOP CALC LVOT PEAK VEL: 1.16 M/S
DOP CALC LVOT STROKE VOLUME: 84.94 CM3
DOP CALC RVOT PEAK VEL: 0.73 M/S
DOP CALC RVOT VTI: 18.99 CM
DOP CALCLVOT PEAK VEL VTI: 26 CM
E WAVE DECELERATION TIME: 200.36 MSEC
E/A RATIO: 1.19
E/E' RATIO: 6.38 M/S
ECHO LV POSTERIOR WALL: 1.12 CM (ref 0.6–1.1)
FRACTIONAL SHORTENING: 30 % (ref 28–44)
INTERVENTRICULAR SEPTUM: 0.97 CM (ref 0.6–1.1)
IVRT: 77.07 MSEC
LA MAJOR: 3.94 CM
LA MINOR: 3.92 CM
LA WIDTH: 2.54 CM
LEFT ATRIUM SIZE: 3.51 CM
LEFT ATRIUM VOLUME INDEX: 13.4 ML/M2
LEFT ATRIUM VOLUME: 29.78 CM3
LEFT INTERNAL DIMENSION IN SYSTOLE: 3.14 CM (ref 2.1–4)
LEFT VENTRICLE DIASTOLIC VOLUME INDEX: 40.88 ML/M2
LEFT VENTRICLE DIASTOLIC VOLUME: 91.05 ML
LEFT VENTRICLE MASS INDEX: 72 G/M2
LEFT VENTRICLE SYSTOLIC VOLUME INDEX: 17.5 ML/M2
LEFT VENTRICLE SYSTOLIC VOLUME: 39.04 ML
LEFT VENTRICULAR INTERNAL DIMENSION IN DIASTOLE: 4.47 CM (ref 3.5–6)
LEFT VENTRICULAR MASS: 161.16 G
LV LATERAL E/E' RATIO: 5.19 M/S
LV SEPTAL E/E' RATIO: 8.3 M/S
MV PEAK A VEL: 0.7 M/S
MV PEAK E VEL: 0.83 M/S
NUC REST EJECTION FRACTION: 67
NUC STRESS EJECTION FRACTION: 64 %
OHS CV CPX 85 PERCENT MAX PREDICTED HEART RATE MALE: 151
OHS CV CPX MAX PREDICTED HEART RATE: 178
OHS CV CPX PATIENT IS FEMALE: 0
OHS CV CPX PATIENT IS MALE: 1
OHS CV CPX PEAK DIASTOLIC BLOOD PRESSURE: 80 MMHG
OHS CV CPX PEAK HEAR RATE: 113 BPM
OHS CV CPX PEAK RATE PRESSURE PRODUCT: NORMAL
OHS CV CPX PEAK SYSTOLIC BLOOD PRESSURE: 123 MMHG
OHS CV CPX PERCENT MAX PREDICTED HEART RATE ACHIEVED: 63
OHS CV CPX RATE PRESSURE PRODUCT PRESENTING: NORMAL
PISA TR MAX VEL: 2.5 M/S
PULM VEIN S/D RATIO: 1.37
PV MEAN GRADIENT: 1.29 MMHG
PV PEAK D VEL: 0.43 M/S
PV PEAK S VEL: 0.59 M/S
PV PEAK VELOCITY: 1 CM/S
RA MAJOR: 4.3 CM
RA PRESSURE: 3 MMHG
RA WIDTH: 2.37 CM
RIGHT VENTRICULAR END-DIASTOLIC DIMENSION: 2.34 CM
SINUS: 3.2 CM
STJ: 3.05 CM
STRESS ECHO POST EXERCISE DUR MIN: 1 MINUTES
STRESS ECHO POST EXERCISE DUR SEC: 6 SECONDS
SYSTOLIC BLOOD PRESSURE: 116 MMHG
TDI LATERAL: 0.16 M/S
TDI SEPTAL: 0.1 M/S
TDI: 0.13 M/S
TR MAX PG: 25 MMHG
TV REST PULMONARY ARTERY PRESSURE: 28 MMHG

## 2020-07-28 PROCEDURE — 0298T CV CARDIAC MONITOR - 3-14 DAY ADULT (CUPID ONLY): CPT | Mod: ,,, | Performed by: INTERNAL MEDICINE

## 2020-07-28 PROCEDURE — 78452 STRESS TEST WITH MYOCARDIAL PERFUSION (CUPID ONLY): ICD-10-PCS | Mod: 26,,, | Performed by: INTERNAL MEDICINE

## 2020-07-28 PROCEDURE — 0296T CV CARDIAC MONITOR - 3-14 DAY ADULT (CUPID ONLY): ICD-10-PCS | Mod: ,,, | Performed by: INTERNAL MEDICINE

## 2020-07-28 PROCEDURE — 93306 TTE W/DOPPLER COMPLETE: CPT | Mod: 26,,, | Performed by: INTERNAL MEDICINE

## 2020-07-28 PROCEDURE — 0298T CV CARDIAC MONITOR - 3-14 DAY ADULT (CUPID ONLY): ICD-10-PCS | Mod: ,,, | Performed by: INTERNAL MEDICINE

## 2020-07-28 PROCEDURE — 93018 STRESS TEST WITH MYOCARDIAL PERFUSION (CUPID ONLY): ICD-10-PCS | Mod: ,,, | Performed by: INTERNAL MEDICINE

## 2020-07-28 PROCEDURE — 93016 CV STRESS TEST SUPVJ ONLY: CPT | Mod: ,,, | Performed by: INTERNAL MEDICINE

## 2020-07-28 PROCEDURE — 93306 ECHO (CUPID ONLY): ICD-10-PCS | Mod: 26,,, | Performed by: INTERNAL MEDICINE

## 2020-07-28 PROCEDURE — 93017 CV STRESS TEST TRACING ONLY: CPT

## 2020-07-28 PROCEDURE — A9502 TC99M TETROFOSMIN: HCPCS

## 2020-07-28 PROCEDURE — 93018 CV STRESS TEST I&R ONLY: CPT | Mod: ,,, | Performed by: INTERNAL MEDICINE

## 2020-07-28 PROCEDURE — 93306 TTE W/DOPPLER COMPLETE: CPT

## 2020-07-28 PROCEDURE — 0296T CV CARDIAC MONITOR - 3-14 DAY ADULT (CUPID ONLY): CPT | Mod: ,,, | Performed by: INTERNAL MEDICINE

## 2020-07-28 PROCEDURE — 93016 STRESS TEST WITH MYOCARDIAL PERFUSION (CUPID ONLY): ICD-10-PCS | Mod: ,,, | Performed by: INTERNAL MEDICINE

## 2020-07-28 PROCEDURE — 78452 HT MUSCLE IMAGE SPECT MULT: CPT | Mod: 26,,, | Performed by: INTERNAL MEDICINE

## 2020-07-28 RX ORDER — REGADENOSON 0.08 MG/ML
0.4 INJECTION, SOLUTION INTRAVENOUS ONCE
Status: COMPLETED | OUTPATIENT
Start: 2020-07-28 | End: 2020-07-28

## 2020-07-28 RX ADMIN — REGADENOSON 0.4 MG: 0.08 INJECTION, SOLUTION INTRAVENOUS at 10:07

## 2020-07-29 ENCOUNTER — TELEPHONE (OUTPATIENT)
Dept: CARDIOLOGY | Facility: CLINIC | Age: 45
End: 2020-07-29

## 2020-07-30 NOTE — TELEPHONE ENCOUNTER
Please call pt.  He passes his stress test.  Echo shows normal heart strength/function.  F/u next appt.    Dr Braun

## 2020-07-30 NOTE — TELEPHONE ENCOUNTER
Spoke with patient to advise him that He passes his stress test.  Echo shows normal heart strength/function.  F/u next appt.  Denies questions/concerns.

## 2020-08-11 RX ORDER — HYDROCODONE BITARTRATE AND ACETAMINOPHEN 7.5; 325 MG/1; MG/1
1 TABLET ORAL EVERY 6 HOURS PRN
Qty: 120 TABLET | Refills: 0 | Status: SHIPPED | OUTPATIENT
Start: 2020-08-11 | End: 2020-09-06 | Stop reason: SDUPTHER

## 2020-08-21 ENCOUNTER — TELEPHONE (OUTPATIENT)
Dept: CARDIOLOGY | Facility: CLINIC | Age: 45
End: 2020-08-21

## 2020-08-21 DIAGNOSIS — I49.1 PAC (PREMATURE ATRIAL CONTRACTION): Primary | ICD-10-CM

## 2020-08-21 DIAGNOSIS — I47.10 SVT (SUPRAVENTRICULAR TACHYCARDIA): ICD-10-CM

## 2020-08-21 RX ORDER — METOPROLOL SUCCINATE 25 MG/1
25 TABLET, EXTENDED RELEASE ORAL DAILY
Qty: 30 TABLET | Refills: 11 | Status: SHIPPED | OUTPATIENT
Start: 2020-08-21 | End: 2021-01-08 | Stop reason: SDUPTHER

## 2020-08-21 NOTE — TELEPHONE ENCOUNTER
The patient has been notified of this information and all questions answered.  holter scheduled 9/22

## 2020-08-21 NOTE — TELEPHONE ENCOUNTER
Please call pt.  Skylar Holter reviewed.  Benign skipped beats noted with a couple of runs of SVT which only lasted up to 5 beats.  Average  bpm.  Recommend starting low dose Toprol xl 25 mg daily for better HR control.  Schedule 48 hour Holter 4 weeks after starting Toprol.    Dr Braun

## 2020-09-08 RX ORDER — CYCLOBENZAPRINE HCL 10 MG
TABLET ORAL
Qty: 90 TABLET | Refills: 1 | Status: SHIPPED | OUTPATIENT
Start: 2020-09-08 | End: 2020-11-03

## 2020-09-08 RX ORDER — ESCITALOPRAM OXALATE 20 MG/1
TABLET ORAL
Qty: 90 TABLET | Refills: 1 | Status: SHIPPED | OUTPATIENT
Start: 2020-09-08 | End: 2021-01-08 | Stop reason: SDUPTHER

## 2020-09-22 ENCOUNTER — HOSPITAL ENCOUNTER (OUTPATIENT)
Dept: CARDIOLOGY | Facility: HOSPITAL | Age: 45
Discharge: HOME | End: 2020-09-22
Attending: INTERNAL MEDICINE
Payer: COMMERCIAL

## 2020-09-22 ENCOUNTER — PATIENT OUTREACH (OUTPATIENT)
Dept: ADMINISTRATIVE | Facility: OTHER | Age: 45
End: 2020-09-22

## 2020-09-22 ENCOUNTER — OFFICE VISIT (OUTPATIENT)
Dept: CARDIOLOGY | Facility: CLINIC | Age: 45
End: 2020-09-22
Payer: COMMERCIAL

## 2020-09-22 VITALS
DIASTOLIC BLOOD PRESSURE: 80 MMHG | SYSTOLIC BLOOD PRESSURE: 104 MMHG | HEART RATE: 89 BPM | HEIGHT: 70 IN | OXYGEN SATURATION: 98 % | WEIGHT: 249.56 LBS | BODY MASS INDEX: 35.73 KG/M2

## 2020-09-22 DIAGNOSIS — I47.10 SVT (SUPRAVENTRICULAR TACHYCARDIA): ICD-10-CM

## 2020-09-22 DIAGNOSIS — E78.2 MIXED HYPERLIPIDEMIA: ICD-10-CM

## 2020-09-22 DIAGNOSIS — R94.31 ABNORMAL ECG: ICD-10-CM

## 2020-09-22 DIAGNOSIS — Z82.49 FAMILY HISTORY OF CARDIOVASCULAR DISEASE: ICD-10-CM

## 2020-09-22 DIAGNOSIS — R00.0 SINUS TACHYCARDIA: ICD-10-CM

## 2020-09-22 DIAGNOSIS — I47.10 SVT (SUPRAVENTRICULAR TACHYCARDIA): Primary | ICD-10-CM

## 2020-09-22 DIAGNOSIS — R55 SYNCOPE, UNSPECIFIED SYNCOPE TYPE: ICD-10-CM

## 2020-09-22 DIAGNOSIS — E66.9 OBESITY (BMI 30-39.9): ICD-10-CM

## 2020-09-22 DIAGNOSIS — R07.89 ATYPICAL CHEST PAIN: ICD-10-CM

## 2020-09-22 PROCEDURE — 99999 PR PBB SHADOW E&M-EST. PATIENT-LVL III: CPT | Mod: PBBFAC,,, | Performed by: INTERNAL MEDICINE

## 2020-09-22 PROCEDURE — 93227 HOLTER MONITOR - 48 HOUR (CUPID ONLY): ICD-10-PCS | Mod: ,,, | Performed by: INTERNAL MEDICINE

## 2020-09-22 PROCEDURE — 93227 XTRNL ECG REC<48 HR R&I: CPT | Mod: ,,, | Performed by: INTERNAL MEDICINE

## 2020-09-22 PROCEDURE — 3008F BODY MASS INDEX DOCD: CPT | Mod: CPTII,S$GLB,, | Performed by: INTERNAL MEDICINE

## 2020-09-22 PROCEDURE — 99214 PR OFFICE/OUTPT VISIT, EST, LEVL IV, 30-39 MIN: ICD-10-PCS | Mod: S$GLB,,, | Performed by: INTERNAL MEDICINE

## 2020-09-22 PROCEDURE — 93225 XTRNL ECG REC<48 HRS REC: CPT

## 2020-09-22 PROCEDURE — 99214 OFFICE O/P EST MOD 30 MIN: CPT | Mod: S$GLB,,, | Performed by: INTERNAL MEDICINE

## 2020-09-22 PROCEDURE — 3008F PR BODY MASS INDEX (BMI) DOCUMENTED: ICD-10-PCS | Mod: CPTII,S$GLB,, | Performed by: INTERNAL MEDICINE

## 2020-09-22 PROCEDURE — 99999 PR PBB SHADOW E&M-EST. PATIENT-LVL III: ICD-10-PCS | Mod: PBBFAC,,, | Performed by: INTERNAL MEDICINE

## 2020-09-22 NOTE — PROGRESS NOTES
"Subjective:    Patient ID:  Dax Carlisle is a 44 y.o. male who presents for evaluation of Test Results,  Risk Factor Management For Atherosclerosis, Palpitations, and Chest Pain      HPI Pt presents for f/u.  Has hyperlipidemia, obesity. Nonsmoker.   Pt seen as new pt July 2020.  Past hx pertinent for following:   States has had 10 episodes of syncope, usually after standing or using bathroom.  sxs started this year 2020 after colostomy reversal (had ruptured bowel 10/19, abscess).  Syncope short lasting, did not seek medical attention because he felt fine afterwards but his "arms were flailing around" afterwards.  Father had CABG.  Had cp x 30 minutes, 3 w prior to 7/20 consult appt, after intercourse with wife.  Has put on weight after surgery.  No cp with walking.  ecg 7/3/20 NSR, QTc 489 ms.  ecg 1/20 and 10/19 sinus, nonspecific st-t abnl.  Has been on Lexapro for long time.   Now here.  Weight up 10 pounds since last visit.  Has h/o atypical chest pain sxs, and currently this is not bothersome.  Some TENORIO, improved.  HR improved.  Toprol xl 25 mg qd started for sinus tachycardia, nonsustained SVT.  He feels better since Toprol started.  No recurrent syncope since I saw him 7/20.  Stress MPI 7/20 no ischemia.  Echo 7/20 normal LV function.  Zio Holter 7/20 NSR, 2 runs of minimal SVT, rare ectopy.        Current Outpatient Medications:     acetaminophen (TYLENOL) 325 MG tablet, Take 325 mg by mouth every 6 (six) hours as needed for Pain., Disp: , Rfl:     ARIPiprazole (ABILIFY) 5 MG Tab, Take 1 tablet (5 mg total) by mouth once daily. (Patient taking differently: Take 5 mg by mouth every evening. ), Disp: 90 tablet, Rfl: 1    ARIPiprazole (ABILIFY) 5 MG Tab, Take 1 tablet (5 mg total) by mouth once daily., Disp: 90 tablet, Rfl: 1    azelastine (ASTELIN) 137 mcg (0.1 %) nasal spray, 1 spray (137 mcg total) by Nasal route 2 (two) times daily., Disp: 30 mL, Rfl: 3    cyclobenzaprine (FLEXERIL) 10 MG " tablet, TAKE 1 TABLET(10 MG) BY MOUTH THREE TIMES DAILY, Disp: 90 tablet, Rfl: 1    diphenhydrAMINE (BENADRYL) 50 MG capsule, Take 1 capsule (50 mg total) by mouth On call Procedure for Allergies (1 hour prior to CT)., Disp: 1 capsule, Rfl: 0    escitalopram oxalate (LEXAPRO) 20 MG tablet, TAKE 1 TABLET(20 MG) BY MOUTH EVERY DAY, Disp: 90 tablet, Rfl: 1    HYDROcodone-acetaminophen (NORCO)  mg per tablet, Take 1 tablet by mouth every 6 (six) hours as needed for Pain., Disp: 28 tablet, Rfl: 0    HYDROcodone-acetaminophen (NORCO) 7.5-325 mg per tablet, Take 1 tablet by mouth every 6 (six) hours as needed for Pain., Disp: 120 tablet, Rfl: 0    linaclotide 290 mcg Cap, Take 1 capsule (290 mcg total) by mouth once daily., Disp: 90 capsule, Rfl: 1    loratadine (CLARITIN) 10 mg tablet, Take 10 mg by mouth once daily., Disp: , Rfl:     meloxicam (MOBIC) 15 MG tablet, Take 1 tablet (15 mg total) by mouth once daily., Disp: 90 tablet, Rfl: 1    metoprolol succinate (TOPROL-XL) 25 MG 24 hr tablet, Take 1 tablet (25 mg total) by mouth once daily., Disp: 30 tablet, Rfl: 11    nozaseptin (NOZIN) nasal , 1 each by Each Nostril route 2 (two) times daily., Disp: 1 applicator, Rfl: 0    predniSONE (DELTASONE) 50 MG Tab, Take 1 tablet (50 mg total) by mouth On call Procedure (at 13 hours, 7 hours, and 1 hour before CT)., Disp: 3 tablet, Rfl: 0    promethazine HCl (PHENERGAN ORAL), Take by mouth., Disp: , Rfl:     tamsulosin (FLOMAX) 0.4 mg Cap, Take 1 capsule (0.4 mg total) by mouth once daily., Disp: 30 capsule, Rfl: 0      Review of Systems   Constitution: Positive for weight gain.   HENT: Negative.    Eyes: Negative.    Cardiovascular: Positive for dyspnea on exertion and palpitations.   Respiratory: Positive for shortness of breath.    Endocrine: Negative.    Hematologic/Lymphatic: Negative.    Skin: Negative.    Musculoskeletal: Negative.    Gastrointestinal: Negative.    Genitourinary: Negative.   "  Neurological: Negative.    Psychiatric/Behavioral: Negative.    Allergic/Immunologic: Negative.        /80 (BP Location: Left arm, Patient Position: Sitting, BP Method: Large (Manual))   Pulse 89   Ht 5' 10" (1.778 m)   Wt 113.2 kg (249 lb 9 oz)   SpO2 98%   BMI 35.81 kg/m²     Wt Readings from Last 3 Encounters:   09/22/20 113.2 kg (249 lb 9 oz)   07/28/20 105.7 kg (233 lb)   07/07/20 105.8 kg (233 lb 4 oz)     Temp Readings from Last 3 Encounters:   07/06/20 96.8 °F (36 °C) (Tympanic)   02/05/20 98.7 °F (37.1 °C) (Oral)   01/17/20 98.5 °F (36.9 °C) (Temporal)     BP Readings from Last 3 Encounters:   09/22/20 104/80   07/28/20 122/80   07/07/20 122/80     Pulse Readings from Last 3 Encounters:   09/22/20 89   07/07/20 102   07/06/20 109          Objective:    Physical Exam   Constitutional: He is oriented to person, place, and time. He appears well-developed and well-nourished.   HENT:   Head: Normocephalic.   Neck: Normal range of motion. Neck supple. Normal carotid pulses, no hepatojugular reflux and no JVD present. Carotid bruit is not present. No thyromegaly present.   Cardiovascular: Normal rate, regular rhythm, S1 normal and S2 normal. PMI is not displaced. Exam reveals no S3, no S4, no distant heart sounds, no friction rub, no midsystolic click and no opening snap.   No murmur heard.  Pulses:       Radial pulses are 2+ on the right side and 2+ on the left side.   Pulmonary/Chest: Effort normal and breath sounds normal. He has no wheezes. He has no rales.   Abdominal: Soft. Bowel sounds are normal. He exhibits no distension, no abdominal bruit, no ascites and no mass. There is no abdominal tenderness.   Musculoskeletal:         General: No edema.   Neurological: He is alert and oriented to person, place, and time.   Skin: Skin is warm.   Psychiatric: He has a normal mood and affect. His behavior is normal.   Nursing note and vitals reviewed.      I have reviewed all pertinent labs and cardiac " studies.      Chemistry        Component Value Date/Time     07/07/2020 0938    K 4.1 07/07/2020 0938     07/07/2020 0938    CO2 27 07/07/2020 0938    BUN 14 07/07/2020 0938    CREATININE 1.0 07/07/2020 0938    GLU 99 07/07/2020 0938        Component Value Date/Time    CALCIUM 9.5 07/07/2020 0938    ALKPHOS 66 07/07/2020 0938    AST 24 07/07/2020 0938    ALT 30 07/07/2020 0938    BILITOT 0.3 07/07/2020 0938    ESTGFRAFRICA >60.0 07/07/2020 0938    EGFRNONAA >60.0 07/07/2020 0938        Lab Results   Component Value Date    WBC 8.70 07/07/2020    HGB 13.4 (L) 07/07/2020    HCT 42.3 07/07/2020    MCV 87 07/07/2020     07/07/2020       Lab Results   Component Value Date    HGBA1C 5.0 03/26/2015     Lab Results   Component Value Date    CHOL 200 (H) 05/08/2020    CHOL 278 (H) 12/05/2019    CHOL 185 11/20/2018     Lab Results   Component Value Date    HDL 43 05/08/2020    HDL 63 12/05/2019    HDL 43 11/20/2018     Lab Results   Component Value Date    LDLCALC 88.6 05/08/2020    LDLCALC 186.0 (H) 12/05/2019    LDLCALC 125.0 11/20/2018     Lab Results   Component Value Date    TRIG 342 (H) 05/08/2020    TRIG 145 12/05/2019    TRIG 85 11/20/2018     Lab Results   Component Value Date    CHOLHDL 21.5 05/08/2020    CHOLHDL 22.7 12/05/2019    CHOLHDL 23.2 11/20/2018     Conclusion    · Patient had a min HR of 60 bpm, max HR of 164 bpm, and avg HR of 100 bpm. Predominant underlying rhythm was Sinus Rhythm. 2 Supraventricular Tachycardia runs occurred, the run with the fastest interval lasting 5 beats with a max rate of 133 bpm (avg 125 bpm); the run with the fastest interval was also the longest. Isolated SVEs were rare (<1.0%), SVE Couplets were rare (<1.0%), and SVE Triplets were rare (<1.0%). Isolated VEs were rare (<1.0%), and no VE Couplets or VE Triplets were present.            Results for orders placed during the hospital encounter of 07/28/20   Echo Color Flow Doppler? Yes    Narrative · Normal left  ventricular systolic function. The estimated ejection   fraction is 55%.  · Normal LV diastolic function.  · Normal right ventricular systolic function.        Results for orders placed during the hospital encounter of 07/28/20   Nuclear Stress - Cardiology Interpreted    Narrative   The study shows normal myocardial perfusion.    The perfusion scan is free of evidence from myocardial ischemia or   injury.    Gated perfusion images showed an ejection fraction of 67% at rest and   64% post stress.    The EKG portion of this study is negative for ischemia.    The patient reported no chest pain during the stress test.    There were no arrhythmias during stress.             Assessment:       1. SVT (supraventricular tachycardia)    2. Mixed hyperlipidemia    3. Syncope, unspecified syncope type    4. Family history of cardiovascular disease    5. Abnormal ECG    6. Atypical chest pain    7. Sinus tachycardia    8. Obesity (BMI 30-39.9)         Plan:               Stable cardiovascular conditions at present time on current medical treatment.  Continue current dose of Toprol xl 25 mg qd.  Risk factor modification needed.  Needs to lose at least 10 - 15 pounds by next appt.  Needs to lower TG to goal.  Discussed dietary modification.  48 hour Holter pending -- phone review.  Atypical CP stable; no ischemia on stress test.  Syncope seems vasovagal mediated.  Monitor for now.  Reviewed all tests and above medical conditions with patient in detail and formulated treatment plan.  Continue optimal medical treatment for cardiovascular conditions.  Cardiac low salt diet discussed.  Daily exercise encouraged, goal 30 +  minutes aerobic exercise as tolerated.  Maintaining healthy weight and weight loss goals (if needed) were discussed in clinic.  F/u in 6 months with lipids.

## 2020-09-24 LAB
OHS CV EVENT MONITOR DAY: 0
OHS CV HOLTER LENGTH DECIMAL HOURS: 48
OHS CV HOLTER LENGTH HOURS: 48
OHS CV HOLTER LENGTH MINUTES: 0

## 2020-09-27 ENCOUNTER — TELEPHONE (OUTPATIENT)
Dept: CARDIOLOGY | Facility: HOSPITAL | Age: 45
End: 2020-09-27

## 2020-09-27 NOTE — TELEPHONE ENCOUNTER
Please call pt.  F/u Holter results look good.  Continue current dose of Toprol (metoprolol) and f/u next appt.    Dr Braun

## 2020-09-28 ENCOUNTER — PATIENT MESSAGE (OUTPATIENT)
Dept: CARDIOLOGY | Facility: CLINIC | Age: 45
End: 2020-09-28

## 2020-09-29 ENCOUNTER — TELEPHONE (OUTPATIENT)
Dept: CARDIOLOGY | Facility: HOSPITAL | Age: 45
End: 2020-09-29

## 2020-09-30 NOTE — TELEPHONE ENCOUNTER
Nursing staff:  Please see telephone note dated 9/27/20.  Don't see nursing disposition on phone message.  Has staff contacted pt about his test results?    Dr Braun

## 2020-10-07 RX ORDER — HYDROCODONE BITARTRATE AND ACETAMINOPHEN 7.5; 325 MG/1; MG/1
1 TABLET ORAL EVERY 6 HOURS PRN
Qty: 120 TABLET | Refills: 0 | Status: SHIPPED | OUTPATIENT
Start: 2020-10-07 | End: 2020-11-05 | Stop reason: SDUPTHER

## 2020-10-28 ENCOUNTER — TELEPHONE (OUTPATIENT)
Dept: INTERNAL MEDICINE | Facility: CLINIC | Age: 45
End: 2020-10-28

## 2020-10-28 NOTE — TELEPHONE ENCOUNTER
----- Message from Niko Patel sent at 10/28/2020  2:32 PM CDT -----  Hello,   pt was checked in early from his car but called to cancel appt because he had not been called in yet. He would like to be called back at his mobile number with a rescheduled time and date.     Thank you, sj

## 2020-11-02 ENCOUNTER — OFFICE VISIT (OUTPATIENT)
Dept: INTERNAL MEDICINE | Facility: CLINIC | Age: 45
End: 2020-11-02
Payer: COMMERCIAL

## 2020-11-02 VITALS
HEART RATE: 90 BPM | WEIGHT: 244.5 LBS | DIASTOLIC BLOOD PRESSURE: 84 MMHG | SYSTOLIC BLOOD PRESSURE: 122 MMHG | HEIGHT: 70 IN | TEMPERATURE: 98 F | OXYGEN SATURATION: 98 % | BODY MASS INDEX: 35 KG/M2

## 2020-11-02 DIAGNOSIS — F32.5 MAJOR DEPRESSIVE DISORDER WITH SINGLE EPISODE, IN REMISSION: Primary | ICD-10-CM

## 2020-11-02 DIAGNOSIS — F98.8 ATTENTION DEFICIT DISORDER (ADD) WITHOUT HYPERACTIVITY: ICD-10-CM

## 2020-11-02 PROCEDURE — 99214 OFFICE O/P EST MOD 30 MIN: CPT | Mod: S$GLB,,, | Performed by: FAMILY MEDICINE

## 2020-11-02 PROCEDURE — 3008F PR BODY MASS INDEX (BMI) DOCUMENTED: ICD-10-PCS | Mod: CPTII,S$GLB,, | Performed by: FAMILY MEDICINE

## 2020-11-02 PROCEDURE — 99999 PR PBB SHADOW E&M-EST. PATIENT-LVL V: ICD-10-PCS | Mod: PBBFAC,,, | Performed by: FAMILY MEDICINE

## 2020-11-02 PROCEDURE — 3008F BODY MASS INDEX DOCD: CPT | Mod: CPTII,S$GLB,, | Performed by: FAMILY MEDICINE

## 2020-11-02 PROCEDURE — 99999 PR PBB SHADOW E&M-EST. PATIENT-LVL V: CPT | Mod: PBBFAC,,, | Performed by: FAMILY MEDICINE

## 2020-11-02 PROCEDURE — 99214 PR OFFICE/OUTPT VISIT, EST, LEVL IV, 30-39 MIN: ICD-10-PCS | Mod: S$GLB,,, | Performed by: FAMILY MEDICINE

## 2020-11-02 RX ORDER — GUANFACINE 1 MG/1
3 TABLET, EXTENDED RELEASE ORAL DAILY
Qty: 30 TABLET | Refills: 0 | Status: SHIPPED | OUTPATIENT
Start: 2020-11-02 | End: 2020-11-16

## 2020-11-02 NOTE — PROGRESS NOTES
Subjective:       Patient ID: Dax Carlisle is a 44 y.o. male.    Chief Complaint: Mental Health Problem and ADD    Pt is a 44 year old who is here for ADD follow-up. Pt is unable to keep focus and sustain concentration. Pt reports that he has had increase anxiety for both being around people but also over his lack of concentration. Pt does reports that he feels like the mental focus is very good first thing in the morning after caffeine. Anxiety will than come in as the afternoon progresses    Review of Systems   Constitutional: Negative.    Respiratory: Negative.    Cardiovascular: Negative.    Musculoskeletal: Negative.    Hematological: Negative.          Objective:      Physical Exam  Neck:      Musculoskeletal: Normal range of motion and neck supple.   Cardiovascular:      Rate and Rhythm: Normal rate and regular rhythm.      Pulses: Normal pulses.      Heart sounds: Normal heart sounds.   Pulmonary:      Effort: Pulmonary effort is normal.      Breath sounds: Normal breath sounds.   Neurological:      General: No focal deficit present.      Mental Status: He is alert and oriented to person, place, and time.   Psychiatric:         Attention and Perception: He is inattentive.         Thought Content: Thought content does not include homicidal ideation. Thought content does not include homicidal plan.         Assessment:       1. Major depressive disorder with single episode, in remission    2. Attention deficit disorder (ADD) without hyperactivity        Plan:       Major depressive disorder with single episode, in remission  Comments:  Continue with the Lexapro  Orders:  -     Ambulatory referral/consult to Psychology; Future; Expected date: 11/09/2020    Attention deficit disorder (ADD) without hyperactivity  Comments:  Will try Intuniv 1 mg and increase by 1 mg every 3-7 days until 2-3 mg. RTC in 2 weeks  Orders:  -     Ambulatory referral/consult to Psychology; Future; Expected date:  11/09/2020    Other orders  -     guanFACINE 1 mg Tb24; Take 3 tablets by mouth once daily.  Dispense: 30 tablet; Refill: 0

## 2020-11-03 RX ORDER — CYCLOBENZAPRINE HCL 10 MG
TABLET ORAL
Qty: 90 TABLET | Refills: 1 | Status: SHIPPED | OUTPATIENT
Start: 2020-11-03 | End: 2021-02-01 | Stop reason: SDUPTHER

## 2020-11-04 ENCOUNTER — PATIENT MESSAGE (OUTPATIENT)
Dept: INTERNAL MEDICINE | Facility: CLINIC | Age: 45
End: 2020-11-04

## 2020-11-05 ENCOUNTER — TELEPHONE (OUTPATIENT)
Dept: INTERNAL MEDICINE | Facility: CLINIC | Age: 45
End: 2020-11-05

## 2020-11-05 NOTE — TELEPHONE ENCOUNTER
I returned call to pharmacy in regards to medication, I clarified directions and qty with Amanda. Pharmacist thanked me and ended call. //BJ

## 2020-11-05 NOTE — TELEPHONE ENCOUNTER
----- Message from Virgil Madrigal sent at 11/5/2020 11:46 AM CST -----  ..Type:  Pharmacy Calling to Clarify an RX    Name of Caller isaiah   Pharmacy Name: [email or web address]  Prescription Name:guanfacin  What do they need to clarify?:  Best Call Back Number: [phone]  Additional Information:  verify directions and tablets

## 2020-11-06 ENCOUNTER — PATIENT MESSAGE (OUTPATIENT)
Dept: INTERNAL MEDICINE | Facility: CLINIC | Age: 45
End: 2020-11-06

## 2020-11-06 NOTE — TELEPHONE ENCOUNTER
----- Message from Lauren Hou LPN sent at 11/6/2020  2:53 PM CST -----  Contact: wendy from [facility]    ----- Message -----  From: Sammi Stewart  Sent: 11/6/2020   2:50 PM CST  To: Fei NICHOLE Staff    Type:  Pharmacy Calling to Clarify an RX    Name of Caller: Wendy   [facility] Name: [facility]  Prescription Name:guafenasin XR   What do they need to clarify?: following up on the PA on the pt's meds   Best Call Back Number:[phone]  Additional Information:

## 2020-11-06 NOTE — TELEPHONE ENCOUNTER
I returned call to Wendy with [facility] informing her that PA was complete but I have not received a response yet. She thanked me and ended call. //BJ

## 2020-11-09 RX ORDER — HYDROCODONE BITARTRATE AND ACETAMINOPHEN 7.5; 325 MG/1; MG/1
1 TABLET ORAL EVERY 6 HOURS PRN
Qty: 120 TABLET | Refills: 0 | Status: SHIPPED | OUTPATIENT
Start: 2020-11-09 | End: 2020-12-07 | Stop reason: SDUPTHER

## 2020-11-16 ENCOUNTER — OFFICE VISIT (OUTPATIENT)
Dept: INTERNAL MEDICINE | Facility: CLINIC | Age: 45
End: 2020-11-16
Payer: COMMERCIAL

## 2020-11-16 VITALS
TEMPERATURE: 98 F | HEIGHT: 70 IN | WEIGHT: 249.13 LBS | BODY MASS INDEX: 35.66 KG/M2 | HEART RATE: 91 BPM | DIASTOLIC BLOOD PRESSURE: 70 MMHG | SYSTOLIC BLOOD PRESSURE: 116 MMHG | OXYGEN SATURATION: 98 %

## 2020-11-16 DIAGNOSIS — E66.9 OBESITY (BMI 35.0-39.9 WITHOUT COMORBIDITY): ICD-10-CM

## 2020-11-16 DIAGNOSIS — F32.5 MAJOR DEPRESSIVE DISORDER WITH SINGLE EPISODE, IN REMISSION: Primary | ICD-10-CM

## 2020-11-16 DIAGNOSIS — Z23 NEED FOR TDAP VACCINATION: ICD-10-CM

## 2020-11-16 DIAGNOSIS — Z23 NEED FOR INFLUENZA VACCINATION: ICD-10-CM

## 2020-11-16 DIAGNOSIS — F98.8 ATTENTION DEFICIT DISORDER (ADD) WITHOUT HYPERACTIVITY: ICD-10-CM

## 2020-11-16 PROBLEM — R00.0 SINUS TACHYCARDIA: Status: RESOLVED | Noted: 2019-10-08 | Resolved: 2020-11-16

## 2020-11-16 PROBLEM — H92.02 LEFT EAR PAIN: Status: RESOLVED | Noted: 2018-01-04 | Resolved: 2020-11-16

## 2020-11-16 PROBLEM — E78.2 MIXED HYPERLIPIDEMIA: Chronic | Status: ACTIVE | Noted: 2020-07-07

## 2020-11-16 PROBLEM — M54.6 ACUTE MIDLINE THORACIC BACK PAIN: Status: RESOLVED | Noted: 2019-09-03 | Resolved: 2020-11-16

## 2020-11-16 PROBLEM — R94.31 ABNORMAL ECG: Status: RESOLVED | Noted: 2020-07-07 | Resolved: 2020-11-16

## 2020-11-16 PROBLEM — R07.89 ATYPICAL CHEST PAIN: Status: RESOLVED | Noted: 2020-07-07 | Resolved: 2020-11-16

## 2020-11-16 PROBLEM — G47.00 INSOMNIA: Chronic | Status: ACTIVE | Noted: 2017-04-27

## 2020-11-16 PROBLEM — I47.10 SVT (SUPRAVENTRICULAR TACHYCARDIA): Chronic | Status: ACTIVE | Noted: 2020-09-22

## 2020-11-16 PROBLEM — G89.4 CHRONIC PAIN SYNDROME: Chronic | Status: ACTIVE | Noted: 2017-04-27

## 2020-11-16 PROCEDURE — 3008F PR BODY MASS INDEX (BMI) DOCUMENTED: ICD-10-PCS | Mod: CPTII,S$GLB,, | Performed by: FAMILY MEDICINE

## 2020-11-16 PROCEDURE — 1126F PR PAIN SEVERITY QUANTIFIED, NO PAIN PRESENT: ICD-10-PCS | Mod: S$GLB,,, | Performed by: FAMILY MEDICINE

## 2020-11-16 PROCEDURE — 99999 PR PBB SHADOW E&M-EST. PATIENT-LVL IV: CPT | Mod: PBBFAC,,, | Performed by: FAMILY MEDICINE

## 2020-11-16 PROCEDURE — 90471 IMMUNIZATION ADMIN: CPT | Mod: S$GLB,,, | Performed by: FAMILY MEDICINE

## 2020-11-16 PROCEDURE — 99999 PR PBB SHADOW E&M-EST. PATIENT-LVL IV: ICD-10-PCS | Mod: PBBFAC,,, | Performed by: FAMILY MEDICINE

## 2020-11-16 PROCEDURE — 1126F AMNT PAIN NOTED NONE PRSNT: CPT | Mod: S$GLB,,, | Performed by: FAMILY MEDICINE

## 2020-11-16 PROCEDURE — 90686 FLU VACCINE (QUAD) GREATER THAN OR EQUAL TO 3YO PRESERVATIVE FREE IM: ICD-10-PCS | Mod: S$GLB,,, | Performed by: FAMILY MEDICINE

## 2020-11-16 PROCEDURE — 90686 IIV4 VACC NO PRSV 0.5 ML IM: CPT | Mod: S$GLB,,, | Performed by: FAMILY MEDICINE

## 2020-11-16 PROCEDURE — 99214 OFFICE O/P EST MOD 30 MIN: CPT | Mod: 25,S$GLB,, | Performed by: FAMILY MEDICINE

## 2020-11-16 PROCEDURE — 90471 FLU VACCINE (QUAD) GREATER THAN OR EQUAL TO 3YO PRESERVATIVE FREE IM: ICD-10-PCS | Mod: S$GLB,,, | Performed by: FAMILY MEDICINE

## 2020-11-16 PROCEDURE — 99214 PR OFFICE/OUTPT VISIT, EST, LEVL IV, 30-39 MIN: ICD-10-PCS | Mod: 25,S$GLB,, | Performed by: FAMILY MEDICINE

## 2020-11-16 PROCEDURE — 3008F BODY MASS INDEX DOCD: CPT | Mod: CPTII,S$GLB,, | Performed by: FAMILY MEDICINE

## 2020-11-16 RX ORDER — ARIPIPRAZOLE 10 MG/1
10 TABLET ORAL DAILY
Qty: 90 TABLET | Refills: 0 | Status: SHIPPED | OUTPATIENT
Start: 2020-11-16 | End: 2021-01-08 | Stop reason: SDUPTHER

## 2020-11-16 RX ORDER — ARIPIPRAZOLE 5 MG/1
5 TABLET ORAL DAILY
Qty: 90 TABLET | Refills: 1 | OUTPATIENT
Start: 2020-11-16

## 2020-11-16 NOTE — PROGRESS NOTES
Subjective:   Patient ID: Dxa Carlisle is a 45 y.o. male.  Chief Complaint:  Follow-up and Medication Refill      Patient presents for follow-up on possible ADD in treatment with Intuniv.    Last visit 11/2/2020.    Previous PCP Dr. Nuñez.    At that visit, added Intuniv 1 mg daily with goal to increase by 1 mg weekly to 3 mg total daily dosing for potential ADD symptoms.    Patient reports unable to tolerate due to side effects.  Developed a rash which caused significant itching.  It was generalized.  It was worsened with increased dose of the medication.  He did not think the medication helped symptoms at all.    Of note, no previous ADD/ADHD diagnosis.    Does have previous diagnosis for depressive disorder. Denies bipolar diagnosis.    On Lexapro 20 mg daily and Abilify 5 mg daily for years now.  These focus, concentration, and other issues started in October 2019 after surgery and colostomy bag placement for diverticulitis with perforation.  Has not seen, but is agreeable to seeing a psychiatrist.    Needs flu vaccine.  No additional complaints today.      Current Outpatient Medications:     acetaminophen (TYLENOL) 325 MG tablet, Take 325 mg by mouth every 6 (six) hours as needed for Pain., Disp: , Rfl:     ARIPiprazole (ABILIFY) 10 MG Tab, Take 1 tablet (10 mg total) by mouth once daily., Disp: 90 tablet, Rfl: 0    cyclobenzaprine (FLEXERIL) 10 MG tablet, TAKE 1 TABLET(10 MG) BY MOUTH THREE TIMES DAILY, Disp: 90 tablet, Rfl: 1    escitalopram oxalate (LEXAPRO) 20 MG tablet, TAKE 1 TABLET(20 MG) BY MOUTH EVERY DAY, Disp: 90 tablet, Rfl: 1    HYDROcodone-acetaminophen (NORCO) 7.5-325 mg per tablet, Take 1 tablet by mouth every 6 (six) hours as needed for Pain., Disp: 120 tablet, Rfl: 0    linaclotide 290 mcg Cap, Take 1 capsule (290 mcg total) by mouth once daily., Disp: 90 capsule, Rfl: 1    loratadine (CLARITIN) 10 mg tablet, Take 10 mg by mouth once daily., Disp: , Rfl:     metoprolol  "succinate (TOPROL-XL) 25 MG 24 hr tablet, Take 1 tablet (25 mg total) by mouth once daily., Disp: 30 tablet, Rfl: 11    Review of Systems   Constitutional: Negative for appetite change, fatigue and unexpected weight change.   Eyes: Negative for visual disturbance.   Respiratory: Negative for chest tightness and shortness of breath.    Cardiovascular: Negative for chest pain, palpitations and leg swelling.   Gastrointestinal: Negative for abdominal pain, constipation, diarrhea, nausea and vomiting.   Musculoskeletal: Negative for myalgias.   Skin: Negative for rash.   Neurological: Negative for dizziness, tremors, light-headedness and headaches.   Psychiatric/Behavioral: Positive for agitation, confusion, decreased concentration and dysphoric mood. Negative for behavioral problems, hallucinations and sleep disturbance. The patient is not nervous/anxious and is not hyperactive.      Objective:   /70 (BP Location: Right arm, Patient Position: Sitting, BP Method: Large (Manual))   Pulse 91   Temp 98.3 °F (36.8 °C) (Tympanic)   Ht 5' 10" (1.778 m)   Wt 113 kg (249 lb 1.9 oz)   SpO2 98%   BMI 35.74 kg/m²     Physical Exam  Vitals signs and nursing note reviewed.   Constitutional:       General: He is not in acute distress.     Appearance: Normal appearance. He is well-developed and normal weight. He is not ill-appearing, toxic-appearing or diaphoretic.   Neck:      Thyroid: No thyroid mass, thyromegaly or thyroid tenderness.   Cardiovascular:      Rate and Rhythm: Normal rate and regular rhythm.   Pulmonary:      Effort: Pulmonary effort is normal. No tachypnea, accessory muscle usage or respiratory distress.   Musculoskeletal:      Right lower leg: No edema.      Left lower leg: No edema.   Skin:     General: Skin is warm and dry.      Findings: No abrasion, bruising, burn, ecchymosis, erythema, laceration, lesion or rash.   Neurological:      Mental Status: He is alert and oriented to person, place, and " time.      Coordination: Coordination is intact. Coordination normal.      Gait: Gait is intact. Gait normal.   Psychiatric:         Attention and Perception: Attention and perception normal. He is attentive.         Mood and Affect: Mood and affect normal.         Speech: Speech normal.         Behavior: Behavior normal. Behavior is not hyperactive. Behavior is cooperative.         Thought Content: Thought content normal.         Cognition and Memory: Cognition and memory normal.         Judgment: Judgment normal.       Assessment:       ICD-10-CM ICD-9-CM   1. Major depressive disorder with single episode, in remission  F32.5 296.25   2. Attention deficit disorder (ADD) without hyperactivity  F98.8 314.00   3. Need for influenza vaccination  Z23 V04.81   4. Need for Tdap vaccination  Z23 V06.1   5. Obesity (BMI 35.0-39.9 without comorbidity)  E66.9 278.00     Plan:   Major depressive disorder with single episode, in remission  Attention deficit disorder (ADD) without hyperactivity  -     ARIPiprazole (ABILIFY) 10 MG Tab; Take 1 tablet (10 mg total) by mouth once daily.  Dispense: 90 tablet; Refill: 0  -     Ambulatory referral/consult to Psychiatry; Future; Expected date: 11/23/2020  - Discussed overall questionable diagnosis of ADD/ ADHD based on symptomatology, when it has started in relation to recent surgery, and failure to Intuniv.  - Discussed treatment options to include stimulant medication and or non stimulant Strattera not recommended in light of other health issues and medications.    -For now recommend continue Lexapro 20 mg daily, increase Abilify 10 mg daily, referral to psychiatry.    -Patient expresses understanding and agrees to that plan.      RHM  -     Influenza - Quadrivalent (PF)    Return to clinic 1 month

## 2020-11-18 PROBLEM — E66.9 OBESITY (BMI 35.0-39.9 WITHOUT COMORBIDITY): Status: ACTIVE | Noted: 2020-11-18

## 2020-12-08 RX ORDER — HYDROCODONE BITARTRATE AND ACETAMINOPHEN 7.5; 325 MG/1; MG/1
1 TABLET ORAL EVERY 6 HOURS PRN
Qty: 120 TABLET | Refills: 0 | Status: SHIPPED | OUTPATIENT
Start: 2020-12-08 | End: 2020-12-17 | Stop reason: SDUPTHER

## 2020-12-17 ENCOUNTER — OFFICE VISIT (OUTPATIENT)
Dept: INTERNAL MEDICINE | Facility: CLINIC | Age: 45
End: 2020-12-17
Payer: COMMERCIAL

## 2020-12-17 VITALS
HEIGHT: 70 IN | DIASTOLIC BLOOD PRESSURE: 94 MMHG | TEMPERATURE: 98 F | HEART RATE: 117 BPM | BODY MASS INDEX: 35.79 KG/M2 | SYSTOLIC BLOOD PRESSURE: 138 MMHG | OXYGEN SATURATION: 98 % | WEIGHT: 250 LBS

## 2020-12-17 DIAGNOSIS — I47.10 SVT (SUPRAVENTRICULAR TACHYCARDIA): ICD-10-CM

## 2020-12-17 DIAGNOSIS — F98.8 ATTENTION DEFICIT DISORDER (ADD) WITHOUT HYPERACTIVITY: ICD-10-CM

## 2020-12-17 DIAGNOSIS — F32.5 MAJOR DEPRESSIVE DISORDER WITH SINGLE EPISODE, IN REMISSION: Primary | ICD-10-CM

## 2020-12-17 DIAGNOSIS — G89.4 CHRONIC PAIN SYNDROME: ICD-10-CM

## 2020-12-17 DIAGNOSIS — Z82.49 FAMILY HISTORY OF CARDIOVASCULAR DISEASE: ICD-10-CM

## 2020-12-17 PROCEDURE — 99214 PR OFFICE/OUTPT VISIT, EST, LEVL IV, 30-39 MIN: ICD-10-PCS | Mod: S$GLB,,, | Performed by: FAMILY MEDICINE

## 2020-12-17 PROCEDURE — 3008F BODY MASS INDEX DOCD: CPT | Mod: CPTII,S$GLB,, | Performed by: FAMILY MEDICINE

## 2020-12-17 PROCEDURE — 99999 PR PBB SHADOW E&M-EST. PATIENT-LVL IV: ICD-10-PCS | Mod: PBBFAC,,, | Performed by: FAMILY MEDICINE

## 2020-12-17 PROCEDURE — 3008F PR BODY MASS INDEX (BMI) DOCUMENTED: ICD-10-PCS | Mod: CPTII,S$GLB,, | Performed by: FAMILY MEDICINE

## 2020-12-17 PROCEDURE — 99999 PR PBB SHADOW E&M-EST. PATIENT-LVL IV: CPT | Mod: PBBFAC,,, | Performed by: FAMILY MEDICINE

## 2020-12-17 PROCEDURE — 1125F PR PAIN SEVERITY QUANTIFIED, PAIN PRESENT: ICD-10-PCS | Mod: S$GLB,,, | Performed by: FAMILY MEDICINE

## 2020-12-17 PROCEDURE — 99214 OFFICE O/P EST MOD 30 MIN: CPT | Mod: S$GLB,,, | Performed by: FAMILY MEDICINE

## 2020-12-17 PROCEDURE — 1125F AMNT PAIN NOTED PAIN PRSNT: CPT | Mod: S$GLB,,, | Performed by: FAMILY MEDICINE

## 2020-12-17 RX ORDER — AMOXICILLIN AND CLAVULANATE POTASSIUM 875; 125 MG/1; MG/1
1 TABLET, FILM COATED ORAL
COMMUNITY
Start: 2020-12-11 | End: 2020-12-21

## 2020-12-17 RX ORDER — HYDROCODONE BITARTRATE AND ACETAMINOPHEN 7.5; 325 MG/1; MG/1
1 TABLET ORAL EVERY 6 HOURS PRN
Qty: 120 TABLET | Refills: 0 | Status: SHIPPED | OUTPATIENT
Start: 2020-12-17 | End: 2021-02-01 | Stop reason: SDUPTHER

## 2020-12-17 NOTE — PROGRESS NOTES
Subjective:   Patient ID: Dax Carlisle is a 45 y.o. male.  Chief Complaint:  Follow-up    Follow-up on Depression and ADHD    Llast visit November 2020   Intuniv was not effective /did not help control any ADD type symptoms.  Also was unable to tolerate due to side effects.  Developed significant rash with itching.  It worsened with increased dose of the medication.  And resolved when he stopped the medication.  He was continued on Lexapro 20 mg daily.  These focus, concentration, and other issues started in October 2019 after surgery and colostomy bag placement for diverticulitis with perforation.  His Abilify dose was increased to 10 mg daily in case his symptoms or more bipolar related.  With history of SVT and other cardiac issues wanted to avoid stimulant medication if possible.    However, there has been no overall improvement or change in his symptoms with compliance to the above medication change.      Current Outpatient Medications:     acetaminophen (TYLENOL) 325 MG tablet, Take 325 mg by mouth every 6 (six) hours as needed for Pain., Disp: , Rfl:     ARIPiprazole (ABILIFY) 10 MG Tab, Take 1 tablet (10 mg total) by mouth once daily., Disp: 90 tablet, Rfl: 0    cyclobenzaprine (FLEXERIL) 10 MG tablet, TAKE 1 TABLET(10 MG) BY MOUTH THREE TIMES DAILY, Disp: 90 tablet, Rfl: 1    escitalopram oxalate (LEXAPRO) 20 MG tablet, TAKE 1 TABLET(20 MG) BY MOUTH EVERY DAY, Disp: 90 tablet, Rfl: 1    HYDROcodone-acetaminophen (NORCO) 7.5-325 mg per tablet, Take 1 tablet by mouth every 6 (six) hours as needed for Pain., Disp: 120 tablet, Rfl: 0    linaclotide 290 mcg Cap, Take 1 capsule (290 mcg total) by mouth once daily., Disp: 90 capsule, Rfl: 1    loratadine (CLARITIN) 10 mg tablet, Take 10 mg by mouth once daily., Disp: , Rfl:     metoprolol succinate (TOPROL-XL) 25 MG 24 hr tablet, Take 1 tablet (25 mg total) by mouth once daily., Disp: 30 tablet, Rfl: 11    dextroamphetamine-amphetamine 10 mg Tab,  "Take 1 tablet (10 mg total) by mouth 2 (two) times daily as needed., Disp: 60 tablet, Rfl: 0    Review of Systems   Constitutional: Negative for appetite change, fatigue and unexpected weight change.   Eyes: Negative for visual disturbance.   Respiratory: Negative for chest tightness and shortness of breath.    Cardiovascular: Negative for chest pain, palpitations and leg swelling.   Gastrointestinal: Negative for abdominal pain, constipation, diarrhea, nausea and vomiting.   Musculoskeletal: Positive for arthralgias and myalgias.   Skin: Negative for rash.   Neurological: Negative for dizziness, tremors, light-headedness and headaches.   Psychiatric/Behavioral: Positive for agitation, confusion, decreased concentration and dysphoric mood. Negative for behavioral problems, hallucinations and sleep disturbance. The patient is not nervous/anxious and is not hyperactive.      Objective:   BP (!) 138/94 (BP Location: Right arm, Patient Position: Sitting, BP Method: Medium (Manual))   Pulse (!) 117   Temp 98.3 °F (36.8 °C) (Tympanic)   Ht 5' 10" (1.778 m)   Wt 113.4 kg (250 lb)   SpO2 98%   BMI 35.87 kg/m²     Physical Exam  Vitals signs and nursing note reviewed.   Constitutional:       General: He is not in acute distress.     Appearance: Normal appearance. He is well-developed and normal weight. He is not ill-appearing, toxic-appearing or diaphoretic.   Neck:      Thyroid: No thyroid mass, thyromegaly or thyroid tenderness.   Cardiovascular:      Rate and Rhythm: Normal rate and regular rhythm.   Pulmonary:      Effort: Pulmonary effort is normal. No tachypnea, accessory muscle usage or respiratory distress.   Musculoskeletal:      Right lower leg: No edema.      Left lower leg: No edema.   Skin:     General: Skin is warm and dry.      Findings: No abrasion, bruising, burn, ecchymosis, erythema, laceration, lesion or rash.   Neurological:      Mental Status: He is alert and oriented to person, place, and time. "      Coordination: Coordination is intact. Coordination normal.      Gait: Gait is intact. Gait normal.   Psychiatric:         Attention and Perception: Attention and perception normal. He is attentive.         Mood and Affect: Mood and affect normal.         Speech: Speech normal.         Behavior: Behavior normal. Behavior is not hyperactive. Behavior is cooperative.         Thought Content: Thought content normal.         Cognition and Memory: Cognition and memory normal.         Judgment: Judgment normal.       Assessment:       ICD-10-CM ICD-9-CM   1. Major depressive disorder with single episode, in remission  F32.5 296.25   2. SVT (supraventricular tachycardia)  I47.1 427.89   3. Family history of cardiovascular disease  Z82.49 V17.49   4. Attention deficit disorder (ADD) without hyperactivity  F98.8 314.00   5. Chronic pain syndrome  G89.4 338.4     Plan:   Major depressive disorder with single episode, in remission  No real change in mood with medication change  Would recommend continue at lowest dose needed to control symptoms  Continue Lexapro 20 mg daily   Decrease Abilify dose back to 5 mg daily.      SVT (supraventricular tachycardia)  Family history of cardiovascular disease  Send message to his cardiologist Dr. Bruan regarding potential stimulant use.    Cardiologist okayed stimulant use for him.      Attention deficit disorder (ADD) without hyperactivity  -     dextroamphetamine-amphetamine 10 mg Tab; Take 1 tablet (10 mg total) by mouth 2 (two) times daily as needed.  Dispense: 60 tablet; Refill: 0  Since okay with Cardiology, start low-dose stimulant  Adderall 10 mg twice a day as needed  Reassess 1 month     Chronic pain syndrome  -     HYDROcodone-acetaminophen (NORCO) 7.5-325 mg per tablet; Take 1 tablet by mouth every 6 (six) hours as needed for Pain.  Dispense: 120 tablet; Refill: 0    Return to clinic 1 month

## 2020-12-22 ENCOUNTER — PATIENT MESSAGE (OUTPATIENT)
Dept: INTERNAL MEDICINE | Facility: CLINIC | Age: 45
End: 2020-12-22

## 2020-12-24 PROBLEM — E66.9 OBESITY (BMI 35.0-39.9 WITHOUT COMORBIDITY): Chronic | Status: ACTIVE | Noted: 2020-11-18

## 2020-12-24 RX ORDER — DEXTROAMPHETAMINE SACCHARATE, AMPHETAMINE ASPARTATE, DEXTROAMPHETAMINE SULFATE AND AMPHETAMINE SULFATE 2.5; 2.5; 2.5; 2.5 MG/1; MG/1; MG/1; MG/1
10 TABLET ORAL 2 TIMES DAILY PRN
Qty: 60 TABLET | Refills: 0 | Status: SHIPPED | OUTPATIENT
Start: 2020-12-24 | End: 2021-02-01 | Stop reason: SDUPTHER

## 2021-01-07 ENCOUNTER — OFFICE VISIT (OUTPATIENT)
Dept: OTOLARYNGOLOGY | Facility: CLINIC | Age: 46
End: 2021-01-07
Payer: COMMERCIAL

## 2021-01-07 ENCOUNTER — PATIENT MESSAGE (OUTPATIENT)
Dept: INTERNAL MEDICINE | Facility: CLINIC | Age: 46
End: 2021-01-07

## 2021-01-07 VITALS
TEMPERATURE: 98 F | BODY MASS INDEX: 35.83 KG/M2 | DIASTOLIC BLOOD PRESSURE: 88 MMHG | HEIGHT: 70 IN | WEIGHT: 250.25 LBS | SYSTOLIC BLOOD PRESSURE: 135 MMHG

## 2021-01-07 DIAGNOSIS — F98.8 ATTENTION DEFICIT DISORDER (ADD) WITHOUT HYPERACTIVITY: ICD-10-CM

## 2021-01-07 DIAGNOSIS — I47.10 SVT (SUPRAVENTRICULAR TACHYCARDIA): ICD-10-CM

## 2021-01-07 DIAGNOSIS — H65.192 ACUTE OTITIS MEDIA WITH EFFUSION OF LEFT EAR: ICD-10-CM

## 2021-01-07 DIAGNOSIS — F32.5 MAJOR DEPRESSIVE DISORDER WITH SINGLE EPISODE, IN REMISSION: ICD-10-CM

## 2021-01-07 DIAGNOSIS — H69.92 EUSTACHIAN TUBE DYSFUNCTION, LEFT: Primary | ICD-10-CM

## 2021-01-07 PROCEDURE — 99213 OFFICE O/P EST LOW 20 MIN: CPT | Mod: S$PBB,,, | Performed by: OTOLARYNGOLOGY

## 2021-01-07 PROCEDURE — 99213 PR OFFICE/OUTPT VISIT, EST, LEVL III, 20-29 MIN: ICD-10-PCS | Mod: S$PBB,,, | Performed by: OTOLARYNGOLOGY

## 2021-01-07 PROCEDURE — 99999 PR PBB SHADOW E&M-EST. PATIENT-LVL III: CPT | Mod: PBBFAC,,, | Performed by: OTOLARYNGOLOGY

## 2021-01-07 PROCEDURE — 99999 PR PBB SHADOW E&M-EST. PATIENT-LVL III: ICD-10-PCS | Mod: PBBFAC,,, | Performed by: OTOLARYNGOLOGY

## 2021-01-07 RX ORDER — AMOXICILLIN AND CLAVULANATE POTASSIUM 875; 125 MG/1; MG/1
TABLET, FILM COATED ORAL
COMMUNITY
Start: 2020-01-26 | End: 2021-02-01 | Stop reason: SDUPTHER

## 2021-01-07 RX ORDER — NEOMYCIN SULFATE, POLYMYXIN B SULFATE AND HYDROCORTISONE 10; 3.5; 1 MG/ML; MG/ML; [USP'U]/ML
5 SUSPENSION/ DROPS AURICULAR (OTIC) 2 TIMES DAILY
Qty: 10 ML | Refills: 0 | Status: SHIPPED | OUTPATIENT
Start: 2021-01-07 | End: 2021-01-23

## 2021-01-08 RX ORDER — METOPROLOL SUCCINATE 25 MG/1
25 TABLET, EXTENDED RELEASE ORAL DAILY
Qty: 90 TABLET | Refills: 3 | Status: SHIPPED | OUTPATIENT
Start: 2021-01-08 | End: 2021-11-18 | Stop reason: SDUPTHER

## 2021-01-08 RX ORDER — ARIPIPRAZOLE 10 MG/1
10 TABLET ORAL DAILY
Qty: 90 TABLET | Refills: 3 | Status: SHIPPED | OUTPATIENT
Start: 2021-01-08 | End: 2022-02-01 | Stop reason: SDUPTHER

## 2021-01-08 RX ORDER — ESCITALOPRAM OXALATE 20 MG/1
20 TABLET ORAL DAILY
Qty: 90 TABLET | Refills: 3 | Status: SHIPPED | OUTPATIENT
Start: 2021-01-08 | End: 2022-02-01 | Stop reason: SDUPTHER

## 2021-01-12 ENCOUNTER — PATIENT MESSAGE (OUTPATIENT)
Dept: INTERNAL MEDICINE | Facility: CLINIC | Age: 46
End: 2021-01-12

## 2021-01-19 ENCOUNTER — PATIENT MESSAGE (OUTPATIENT)
Dept: OTOLARYNGOLOGY | Facility: CLINIC | Age: 46
End: 2021-01-19

## 2021-01-20 RX ORDER — CIPROFLOXACIN AND DEXAMETHASONE 3; 1 MG/ML; MG/ML
4 SUSPENSION/ DROPS AURICULAR (OTIC) 2 TIMES DAILY
Qty: 7.5 ML | Refills: 0 | Status: SHIPPED | OUTPATIENT
Start: 2021-01-20 | End: 2021-01-27

## 2021-01-21 ENCOUNTER — PATIENT MESSAGE (OUTPATIENT)
Dept: OTOLARYNGOLOGY | Facility: CLINIC | Age: 46
End: 2021-01-21

## 2021-01-27 ENCOUNTER — PATIENT OUTREACH (OUTPATIENT)
Dept: ADMINISTRATIVE | Facility: OTHER | Age: 46
End: 2021-01-27

## 2021-01-29 ENCOUNTER — TELEPHONE (OUTPATIENT)
Dept: PAIN MEDICINE | Facility: CLINIC | Age: 46
End: 2021-01-29

## 2021-02-01 ENCOUNTER — OFFICE VISIT (OUTPATIENT)
Dept: PAIN MEDICINE | Facility: CLINIC | Age: 46
End: 2021-02-01
Payer: COMMERCIAL

## 2021-02-01 VITALS
WEIGHT: 250.88 LBS | SYSTOLIC BLOOD PRESSURE: 127 MMHG | DIASTOLIC BLOOD PRESSURE: 89 MMHG | BODY MASS INDEX: 35.92 KG/M2 | HEIGHT: 70 IN | HEART RATE: 107 BPM

## 2021-02-01 DIAGNOSIS — R10.2 PELVIC PAIN IN MALE: Primary | ICD-10-CM

## 2021-02-01 DIAGNOSIS — F98.8 ATTENTION DEFICIT DISORDER (ADD) WITHOUT HYPERACTIVITY: ICD-10-CM

## 2021-02-01 DIAGNOSIS — G89.4 CHRONIC PAIN SYNDROME: ICD-10-CM

## 2021-02-01 DIAGNOSIS — M53.3 COCCYDYNIA: ICD-10-CM

## 2021-02-01 PROCEDURE — 3008F PR BODY MASS INDEX (BMI) DOCUMENTED: ICD-10-PCS | Mod: CPTII,S$GLB,, | Performed by: PHYSICAL MEDICINE & REHABILITATION

## 2021-02-01 PROCEDURE — 3008F BODY MASS INDEX DOCD: CPT | Mod: CPTII,S$GLB,, | Performed by: PHYSICAL MEDICINE & REHABILITATION

## 2021-02-01 PROCEDURE — 99999 PR PBB SHADOW E&M-EST. PATIENT-LVL III: ICD-10-PCS | Mod: PBBFAC,,, | Performed by: PHYSICAL MEDICINE & REHABILITATION

## 2021-02-01 PROCEDURE — 99999 PR PBB SHADOW E&M-EST. PATIENT-LVL III: CPT | Mod: PBBFAC,,, | Performed by: PHYSICAL MEDICINE & REHABILITATION

## 2021-02-01 PROCEDURE — 99214 OFFICE O/P EST MOD 30 MIN: CPT | Mod: S$GLB,,, | Performed by: PHYSICAL MEDICINE & REHABILITATION

## 2021-02-01 PROCEDURE — 1125F AMNT PAIN NOTED PAIN PRSNT: CPT | Mod: S$GLB,,, | Performed by: PHYSICAL MEDICINE & REHABILITATION

## 2021-02-01 PROCEDURE — 1125F PR PAIN SEVERITY QUANTIFIED, PAIN PRESENT: ICD-10-PCS | Mod: S$GLB,,, | Performed by: PHYSICAL MEDICINE & REHABILITATION

## 2021-02-01 PROCEDURE — 99214 PR OFFICE/OUTPT VISIT, EST, LEVL IV, 30-39 MIN: ICD-10-PCS | Mod: S$GLB,,, | Performed by: PHYSICAL MEDICINE & REHABILITATION

## 2021-02-01 RX ORDER — DEXTROAMPHETAMINE SACCHARATE, AMPHETAMINE ASPARTATE, DEXTROAMPHETAMINE SULFATE AND AMPHETAMINE SULFATE 2.5; 2.5; 2.5; 2.5 MG/1; MG/1; MG/1; MG/1
10 TABLET ORAL 2 TIMES DAILY PRN
Qty: 60 TABLET | Refills: 0 | Status: SHIPPED | OUTPATIENT
Start: 2021-02-01 | End: 2021-02-28 | Stop reason: SDUPTHER

## 2021-02-01 RX ORDER — HYDROCODONE BITARTRATE AND ACETAMINOPHEN 7.5; 325 MG/1; MG/1
1 TABLET ORAL EVERY 6 HOURS PRN
Qty: 120 TABLET | Refills: 0 | Status: SHIPPED | OUTPATIENT
Start: 2021-02-01 | End: 2021-02-28 | Stop reason: SDUPTHER

## 2021-02-28 DIAGNOSIS — F98.8 ATTENTION DEFICIT DISORDER (ADD) WITHOUT HYPERACTIVITY: ICD-10-CM

## 2021-02-28 DIAGNOSIS — G89.4 CHRONIC PAIN SYNDROME: ICD-10-CM

## 2021-03-01 RX ORDER — HYDROCODONE BITARTRATE AND ACETAMINOPHEN 7.5; 325 MG/1; MG/1
1 TABLET ORAL EVERY 6 HOURS PRN
Qty: 120 TABLET | Refills: 0 | Status: SHIPPED | OUTPATIENT
Start: 2021-03-01 | End: 2021-04-03 | Stop reason: SDUPTHER

## 2021-03-01 RX ORDER — DEXTROAMPHETAMINE SACCHARATE, AMPHETAMINE ASPARTATE, DEXTROAMPHETAMINE SULFATE AND AMPHETAMINE SULFATE 2.5; 2.5; 2.5; 2.5 MG/1; MG/1; MG/1; MG/1
10 TABLET ORAL 2 TIMES DAILY PRN
Qty: 60 TABLET | Refills: 0 | Status: SHIPPED | OUTPATIENT
Start: 2021-03-01 | End: 2021-04-03 | Stop reason: SDUPTHER

## 2021-03-08 ENCOUNTER — TELEPHONE (OUTPATIENT)
Dept: PHARMACY | Facility: CLINIC | Age: 46
End: 2021-03-08

## 2021-03-09 ENCOUNTER — LAB VISIT (OUTPATIENT)
Dept: LAB | Facility: HOSPITAL | Age: 46
End: 2021-03-09
Attending: INTERNAL MEDICINE
Payer: COMMERCIAL

## 2021-03-09 DIAGNOSIS — E78.2 MIXED HYPERLIPIDEMIA: ICD-10-CM

## 2021-03-09 LAB
ALBUMIN SERPL BCP-MCNC: 3.9 G/DL (ref 3.5–5.2)
ALP SERPL-CCNC: 65 U/L (ref 55–135)
ALT SERPL W/O P-5'-P-CCNC: 55 U/L (ref 10–44)
ANION GAP SERPL CALC-SCNC: 9 MMOL/L (ref 8–16)
AST SERPL-CCNC: 28 U/L (ref 10–40)
BILIRUB SERPL-MCNC: 0.6 MG/DL (ref 0.1–1)
BUN SERPL-MCNC: 12 MG/DL (ref 6–20)
CALCIUM SERPL-MCNC: 8.9 MG/DL (ref 8.7–10.5)
CHLORIDE SERPL-SCNC: 105 MMOL/L (ref 95–110)
CHOLEST SERPL-MCNC: 197 MG/DL (ref 120–199)
CHOLEST/HDLC SERPL: 4.8 {RATIO} (ref 2–5)
CO2 SERPL-SCNC: 24 MMOL/L (ref 23–29)
CREAT SERPL-MCNC: 0.9 MG/DL (ref 0.5–1.4)
EST. GFR  (AFRICAN AMERICAN): >60 ML/MIN/1.73 M^2
EST. GFR  (NON AFRICAN AMERICAN): >60 ML/MIN/1.73 M^2
GLUCOSE SERPL-MCNC: 98 MG/DL (ref 70–110)
HDLC SERPL-MCNC: 41 MG/DL (ref 40–75)
HDLC SERPL: 20.8 % (ref 20–50)
LDLC SERPL CALC-MCNC: 121.4 MG/DL (ref 63–159)
NONHDLC SERPL-MCNC: 156 MG/DL
POTASSIUM SERPL-SCNC: 3.9 MMOL/L (ref 3.5–5.1)
PROT SERPL-MCNC: 6.8 G/DL (ref 6–8.4)
SODIUM SERPL-SCNC: 138 MMOL/L (ref 136–145)
TRIGL SERPL-MCNC: 173 MG/DL (ref 30–150)

## 2021-03-09 PROCEDURE — 80061 LIPID PANEL: CPT | Performed by: INTERNAL MEDICINE

## 2021-03-09 PROCEDURE — 36415 COLL VENOUS BLD VENIPUNCTURE: CPT | Performed by: INTERNAL MEDICINE

## 2021-03-09 PROCEDURE — 80053 COMPREHEN METABOLIC PANEL: CPT | Performed by: INTERNAL MEDICINE

## 2021-03-12 ENCOUNTER — PATIENT MESSAGE (OUTPATIENT)
Dept: INTERNAL MEDICINE | Facility: CLINIC | Age: 46
End: 2021-03-12

## 2021-03-15 ENCOUNTER — HOSPITAL ENCOUNTER (OUTPATIENT)
Facility: HOSPITAL | Age: 46
Discharge: HOME | End: 2021-03-15
Attending: PHYSICAL MEDICINE & REHABILITATION | Admitting: PHYSICAL MEDICINE & REHABILITATION
Payer: COMMERCIAL

## 2021-03-15 VITALS
DIASTOLIC BLOOD PRESSURE: 74 MMHG | HEART RATE: 74 BPM | HEIGHT: 69 IN | SYSTOLIC BLOOD PRESSURE: 114 MMHG | RESPIRATION RATE: 12 BRPM | OXYGEN SATURATION: 98 % | BODY MASS INDEX: 35.81 KG/M2 | TEMPERATURE: 98 F | WEIGHT: 241.75 LBS

## 2021-03-15 DIAGNOSIS — M53.3 COCCYXDYNIA: ICD-10-CM

## 2021-03-15 PROCEDURE — 63600175 PHARM REV CODE 636 W HCPCS: Performed by: PHYSICAL MEDICINE & REHABILITATION

## 2021-03-15 PROCEDURE — 64999 UNLISTED PX NERVOUS SYSTEM: CPT | Performed by: PHYSICAL MEDICINE & REHABILITATION

## 2021-03-15 PROCEDURE — 64999 PR GANGLION IMPAR INJECTION: ICD-10-PCS | Mod: ,,, | Performed by: PHYSICAL MEDICINE & REHABILITATION

## 2021-03-15 PROCEDURE — 25000003 PHARM REV CODE 250: Performed by: PHYSICAL MEDICINE & REHABILITATION

## 2021-03-15 PROCEDURE — 25500020 PHARM REV CODE 255: Performed by: PHYSICAL MEDICINE & REHABILITATION

## 2021-03-15 PROCEDURE — A9585 GADOBUTROL INJECTION: HCPCS | Performed by: PHYSICAL MEDICINE & REHABILITATION

## 2021-03-15 PROCEDURE — 64999 UNLISTED PX NERVOUS SYSTEM: CPT | Mod: ,,, | Performed by: PHYSICAL MEDICINE & REHABILITATION

## 2021-03-15 PROCEDURE — 64510 N BLOCK STELLATE GANGLION: CPT | Performed by: PHYSICAL MEDICINE & REHABILITATION

## 2021-03-15 RX ORDER — METHYLPREDNISOLONE ACETATE 40 MG/ML
INJECTION, SUSPENSION INTRA-ARTICULAR; INTRALESIONAL; INTRAMUSCULAR; SOFT TISSUE
Status: DISCONTINUED | OUTPATIENT
Start: 2021-03-15 | End: 2021-03-15 | Stop reason: HOSPADM

## 2021-03-15 RX ORDER — BUPIVACAINE HYDROCHLORIDE 2.5 MG/ML
INJECTION, SOLUTION EPIDURAL; INFILTRATION; INTRACAUDAL
Status: DISCONTINUED | OUTPATIENT
Start: 2021-03-15 | End: 2021-03-15 | Stop reason: HOSPADM

## 2021-03-15 RX ORDER — MIDAZOLAM HYDROCHLORIDE 1 MG/ML
INJECTION, SOLUTION INTRAMUSCULAR; INTRAVENOUS
Status: DISCONTINUED | OUTPATIENT
Start: 2021-03-15 | End: 2021-03-15 | Stop reason: HOSPADM

## 2021-03-15 RX ORDER — FENTANYL CITRATE 50 UG/ML
INJECTION, SOLUTION INTRAMUSCULAR; INTRAVENOUS
Status: DISCONTINUED | OUTPATIENT
Start: 2021-03-15 | End: 2021-03-15 | Stop reason: HOSPADM

## 2021-03-15 RX ORDER — GADOBUTROL 604.72 MG/ML
INJECTION INTRAVENOUS
Status: DISCONTINUED | OUTPATIENT
Start: 2021-03-15 | End: 2021-03-15 | Stop reason: HOSPADM

## 2021-03-15 RX ORDER — ONDANSETRON 2 MG/ML
4 INJECTION INTRAMUSCULAR; INTRAVENOUS ONCE AS NEEDED
Status: CANCELLED | OUTPATIENT
Start: 2021-03-15 | End: 2032-08-10

## 2021-03-16 ENCOUNTER — TELEPHONE (OUTPATIENT)
Dept: PAIN MEDICINE | Facility: CLINIC | Age: 46
End: 2021-03-16

## 2021-04-03 DIAGNOSIS — F98.8 ATTENTION DEFICIT DISORDER (ADD) WITHOUT HYPERACTIVITY: ICD-10-CM

## 2021-04-03 DIAGNOSIS — G89.4 CHRONIC PAIN SYNDROME: ICD-10-CM

## 2021-04-05 RX ORDER — HYDROCODONE BITARTRATE AND ACETAMINOPHEN 7.5; 325 MG/1; MG/1
1 TABLET ORAL EVERY 6 HOURS PRN
Qty: 120 TABLET | Refills: 0 | Status: SHIPPED | OUTPATIENT
Start: 2021-04-05 | End: 2021-05-03 | Stop reason: SDUPTHER

## 2021-04-05 RX ORDER — DEXTROAMPHETAMINE SACCHARATE, AMPHETAMINE ASPARTATE, DEXTROAMPHETAMINE SULFATE AND AMPHETAMINE SULFATE 2.5; 2.5; 2.5; 2.5 MG/1; MG/1; MG/1; MG/1
10 TABLET ORAL 2 TIMES DAILY PRN
Qty: 60 TABLET | Refills: 0 | Status: SHIPPED | OUTPATIENT
Start: 2021-04-05 | End: 2021-05-03 | Stop reason: SDUPTHER

## 2021-04-09 ENCOUNTER — TELEPHONE (OUTPATIENT)
Dept: PAIN MEDICINE | Facility: CLINIC | Age: 46
End: 2021-04-09

## 2021-04-09 ENCOUNTER — PATIENT MESSAGE (OUTPATIENT)
Dept: PAIN MEDICINE | Facility: CLINIC | Age: 46
End: 2021-04-09

## 2021-04-12 ENCOUNTER — TELEPHONE (OUTPATIENT)
Dept: PAIN MEDICINE | Facility: CLINIC | Age: 46
End: 2021-04-12

## 2021-04-20 ENCOUNTER — PATIENT MESSAGE (OUTPATIENT)
Dept: PAIN MEDICINE | Facility: CLINIC | Age: 46
End: 2021-04-20

## 2021-04-22 ENCOUNTER — PATIENT OUTREACH (OUTPATIENT)
Dept: ADMINISTRATIVE | Facility: OTHER | Age: 46
End: 2021-04-22

## 2021-04-23 ENCOUNTER — PATIENT MESSAGE (OUTPATIENT)
Dept: PAIN MEDICINE | Facility: CLINIC | Age: 46
End: 2021-04-23

## 2021-05-03 DIAGNOSIS — F98.8 ATTENTION DEFICIT DISORDER (ADD) WITHOUT HYPERACTIVITY: ICD-10-CM

## 2021-05-03 DIAGNOSIS — G89.4 CHRONIC PAIN SYNDROME: ICD-10-CM

## 2021-05-03 RX ORDER — HYDROCODONE BITARTRATE AND ACETAMINOPHEN 7.5; 325 MG/1; MG/1
1 TABLET ORAL EVERY 6 HOURS PRN
Qty: 120 TABLET | Refills: 0 | Status: SHIPPED | OUTPATIENT
Start: 2021-05-03 | End: 2021-06-01 | Stop reason: SDUPTHER

## 2021-05-03 RX ORDER — DEXTROAMPHETAMINE SACCHARATE, AMPHETAMINE ASPARTATE, DEXTROAMPHETAMINE SULFATE AND AMPHETAMINE SULFATE 2.5; 2.5; 2.5; 2.5 MG/1; MG/1; MG/1; MG/1
10 TABLET ORAL 2 TIMES DAILY PRN
Qty: 60 TABLET | Refills: 0 | Status: SHIPPED | OUTPATIENT
Start: 2021-05-03 | End: 2021-06-01 | Stop reason: SDUPTHER

## 2021-05-05 ENCOUNTER — OFFICE VISIT (OUTPATIENT)
Dept: INTERNAL MEDICINE | Facility: CLINIC | Age: 46
End: 2021-05-05
Payer: COMMERCIAL

## 2021-05-05 ENCOUNTER — IMMUNIZATION (OUTPATIENT)
Dept: PHARMACY | Facility: CLINIC | Age: 46
End: 2021-05-05
Payer: COMMERCIAL

## 2021-05-05 VITALS
SYSTOLIC BLOOD PRESSURE: 120 MMHG | TEMPERATURE: 97 F | DIASTOLIC BLOOD PRESSURE: 84 MMHG | WEIGHT: 238.56 LBS | HEART RATE: 87 BPM | BODY MASS INDEX: 35.33 KG/M2 | OXYGEN SATURATION: 97 % | HEIGHT: 69 IN

## 2021-05-05 DIAGNOSIS — F98.8 ATTENTION DEFICIT DISORDER (ADD) WITHOUT HYPERACTIVITY: Primary | ICD-10-CM

## 2021-05-05 DIAGNOSIS — Z23 NEED FOR VACCINATION: Primary | ICD-10-CM

## 2021-05-05 DIAGNOSIS — G89.4 CHRONIC PAIN SYNDROME: ICD-10-CM

## 2021-05-05 DIAGNOSIS — F32.5 MAJOR DEPRESSIVE DISORDER WITH SINGLE EPISODE, IN REMISSION: ICD-10-CM

## 2021-05-05 PROCEDURE — 99999 PR PBB SHADOW E&M-EST. PATIENT-LVL IV: ICD-10-PCS | Mod: PBBFAC,,, | Performed by: FAMILY MEDICINE

## 2021-05-05 PROCEDURE — 3008F BODY MASS INDEX DOCD: CPT | Mod: CPTII,S$GLB,, | Performed by: FAMILY MEDICINE

## 2021-05-05 PROCEDURE — 3008F PR BODY MASS INDEX (BMI) DOCUMENTED: ICD-10-PCS | Mod: CPTII,S$GLB,, | Performed by: FAMILY MEDICINE

## 2021-05-05 PROCEDURE — 1125F PR PAIN SEVERITY QUANTIFIED, PAIN PRESENT: ICD-10-PCS | Mod: S$GLB,,, | Performed by: FAMILY MEDICINE

## 2021-05-05 PROCEDURE — 99999 PR PBB SHADOW E&M-EST. PATIENT-LVL IV: CPT | Mod: PBBFAC,,, | Performed by: FAMILY MEDICINE

## 2021-05-05 PROCEDURE — 99214 PR OFFICE/OUTPT VISIT, EST, LEVL IV, 30-39 MIN: ICD-10-PCS | Mod: S$GLB,,, | Performed by: FAMILY MEDICINE

## 2021-05-05 PROCEDURE — 1125F AMNT PAIN NOTED PAIN PRSNT: CPT | Mod: S$GLB,,, | Performed by: FAMILY MEDICINE

## 2021-05-05 PROCEDURE — 99214 OFFICE O/P EST MOD 30 MIN: CPT | Mod: S$GLB,,, | Performed by: FAMILY MEDICINE

## 2021-06-01 DIAGNOSIS — F98.8 ATTENTION DEFICIT DISORDER (ADD) WITHOUT HYPERACTIVITY: ICD-10-CM

## 2021-06-01 DIAGNOSIS — G89.4 CHRONIC PAIN SYNDROME: ICD-10-CM

## 2021-06-02 RX ORDER — HYDROCODONE BITARTRATE AND ACETAMINOPHEN 7.5; 325 MG/1; MG/1
1 TABLET ORAL EVERY 6 HOURS PRN
Qty: 120 TABLET | Refills: 0 | Status: SHIPPED | OUTPATIENT
Start: 2021-06-02 | End: 2021-06-29 | Stop reason: SDUPTHER

## 2021-06-02 RX ORDER — DEXTROAMPHETAMINE SACCHARATE, AMPHETAMINE ASPARTATE, DEXTROAMPHETAMINE SULFATE AND AMPHETAMINE SULFATE 2.5; 2.5; 2.5; 2.5 MG/1; MG/1; MG/1; MG/1
10 TABLET ORAL 2 TIMES DAILY PRN
Qty: 60 TABLET | Refills: 0 | Status: SHIPPED | OUTPATIENT
Start: 2021-06-02 | End: 2021-06-29 | Stop reason: SDUPTHER

## 2021-06-15 ENCOUNTER — PATIENT OUTREACH (OUTPATIENT)
Dept: ADMINISTRATIVE | Facility: OTHER | Age: 46
End: 2021-06-15

## 2021-06-16 PROBLEM — R07.89 ATYPICAL CHEST PAIN: Status: ACTIVE | Noted: 2021-06-16

## 2021-06-24 ENCOUNTER — TELEPHONE (OUTPATIENT)
Dept: PAIN MEDICINE | Facility: CLINIC | Age: 46
End: 2021-06-24

## 2021-06-29 DIAGNOSIS — G89.4 CHRONIC PAIN SYNDROME: ICD-10-CM

## 2021-06-29 DIAGNOSIS — F98.8 ATTENTION DEFICIT DISORDER (ADD) WITHOUT HYPERACTIVITY: ICD-10-CM

## 2021-06-30 RX ORDER — DEXTROAMPHETAMINE SACCHARATE, AMPHETAMINE ASPARTATE, DEXTROAMPHETAMINE SULFATE AND AMPHETAMINE SULFATE 2.5; 2.5; 2.5; 2.5 MG/1; MG/1; MG/1; MG/1
10 TABLET ORAL 2 TIMES DAILY PRN
Qty: 60 TABLET | Refills: 0 | Status: SHIPPED | OUTPATIENT
Start: 2021-06-30 | End: 2021-07-29 | Stop reason: SDUPTHER

## 2021-06-30 RX ORDER — HYDROCODONE BITARTRATE AND ACETAMINOPHEN 7.5; 325 MG/1; MG/1
1 TABLET ORAL EVERY 6 HOURS PRN
Qty: 120 TABLET | Refills: 0 | Status: SHIPPED | OUTPATIENT
Start: 2021-06-30 | End: 2021-07-29 | Stop reason: SDUPTHER

## 2021-07-29 DIAGNOSIS — G89.4 CHRONIC PAIN SYNDROME: ICD-10-CM

## 2021-07-29 DIAGNOSIS — F98.8 ATTENTION DEFICIT DISORDER (ADD) WITHOUT HYPERACTIVITY: ICD-10-CM

## 2021-07-30 RX ORDER — HYDROCODONE BITARTRATE AND ACETAMINOPHEN 7.5; 325 MG/1; MG/1
1 TABLET ORAL EVERY 6 HOURS PRN
Qty: 120 TABLET | Refills: 0 | Status: SHIPPED | OUTPATIENT
Start: 2021-07-30 | End: 2021-08-27 | Stop reason: SDUPTHER

## 2021-07-30 RX ORDER — DEXTROAMPHETAMINE SACCHARATE, AMPHETAMINE ASPARTATE, DEXTROAMPHETAMINE SULFATE AND AMPHETAMINE SULFATE 2.5; 2.5; 2.5; 2.5 MG/1; MG/1; MG/1; MG/1
10 TABLET ORAL 2 TIMES DAILY PRN
Qty: 60 TABLET | Refills: 0 | Status: SHIPPED | OUTPATIENT
Start: 2021-07-30 | End: 2021-08-27 | Stop reason: SDUPTHER

## 2021-08-06 ENCOUNTER — PATIENT MESSAGE (OUTPATIENT)
Dept: INTERNAL MEDICINE | Facility: CLINIC | Age: 46
End: 2021-08-06

## 2021-08-10 ENCOUNTER — OFFICE VISIT (OUTPATIENT)
Dept: INTERNAL MEDICINE | Facility: CLINIC | Age: 46
End: 2021-08-10
Payer: COMMERCIAL

## 2021-08-10 VITALS
BODY MASS INDEX: 34.71 KG/M2 | HEART RATE: 115 BPM | WEIGHT: 234.38 LBS | TEMPERATURE: 97 F | HEIGHT: 69 IN | OXYGEN SATURATION: 98 % | DIASTOLIC BLOOD PRESSURE: 80 MMHG | SYSTOLIC BLOOD PRESSURE: 114 MMHG

## 2021-08-10 DIAGNOSIS — G89.29 ACUTE EXACERBATION OF CHRONIC LOW BACK PAIN: ICD-10-CM

## 2021-08-10 DIAGNOSIS — F32.5 MAJOR DEPRESSIVE DISORDER WITH SINGLE EPISODE, IN REMISSION: ICD-10-CM

## 2021-08-10 DIAGNOSIS — M54.50 ACUTE EXACERBATION OF CHRONIC LOW BACK PAIN: ICD-10-CM

## 2021-08-10 DIAGNOSIS — F98.8 ATTENTION DEFICIT DISORDER (ADD) WITHOUT HYPERACTIVITY: Primary | ICD-10-CM

## 2021-08-10 DIAGNOSIS — G89.4 CHRONIC PAIN SYNDROME: ICD-10-CM

## 2021-08-10 PROCEDURE — 3074F PR MOST RECENT SYSTOLIC BLOOD PRESSURE < 130 MM HG: ICD-10-PCS | Mod: CPTII,S$GLB,, | Performed by: FAMILY MEDICINE

## 2021-08-10 PROCEDURE — 3074F SYST BP LT 130 MM HG: CPT | Mod: CPTII,S$GLB,, | Performed by: FAMILY MEDICINE

## 2021-08-10 PROCEDURE — 3079F PR MOST RECENT DIASTOLIC BLOOD PRESSURE 80-89 MM HG: ICD-10-PCS | Mod: CPTII,S$GLB,, | Performed by: FAMILY MEDICINE

## 2021-08-10 PROCEDURE — 99214 PR OFFICE/OUTPT VISIT, EST, LEVL IV, 30-39 MIN: ICD-10-PCS | Mod: 25,S$GLB,, | Performed by: FAMILY MEDICINE

## 2021-08-10 PROCEDURE — 1159F PR MEDICATION LIST DOCUMENTED IN MEDICAL RECORD: ICD-10-PCS | Mod: CPTII,S$GLB,, | Performed by: FAMILY MEDICINE

## 2021-08-10 PROCEDURE — 99999 PR PBB SHADOW E&M-EST. PATIENT-LVL IV: CPT | Mod: PBBFAC,,, | Performed by: FAMILY MEDICINE

## 2021-08-10 PROCEDURE — 3008F PR BODY MASS INDEX (BMI) DOCUMENTED: ICD-10-PCS | Mod: CPTII,S$GLB,, | Performed by: FAMILY MEDICINE

## 2021-08-10 PROCEDURE — 1125F PR PAIN SEVERITY QUANTIFIED, PAIN PRESENT: ICD-10-PCS | Mod: CPTII,S$GLB,, | Performed by: FAMILY MEDICINE

## 2021-08-10 PROCEDURE — 96372 PR INJECTION,THERAP/PROPH/DIAG2ST, IM OR SUBCUT: ICD-10-PCS | Mod: S$GLB,,, | Performed by: FAMILY MEDICINE

## 2021-08-10 PROCEDURE — 99214 OFFICE O/P EST MOD 30 MIN: CPT | Mod: 25,S$GLB,, | Performed by: FAMILY MEDICINE

## 2021-08-10 PROCEDURE — 1160F PR REVIEW ALL MEDS BY PRESCRIBER/CLIN PHARMACIST DOCUMENTED: ICD-10-PCS | Mod: CPTII,S$GLB,, | Performed by: FAMILY MEDICINE

## 2021-08-10 PROCEDURE — 3079F DIAST BP 80-89 MM HG: CPT | Mod: CPTII,S$GLB,, | Performed by: FAMILY MEDICINE

## 2021-08-10 PROCEDURE — 1125F AMNT PAIN NOTED PAIN PRSNT: CPT | Mod: CPTII,S$GLB,, | Performed by: FAMILY MEDICINE

## 2021-08-10 PROCEDURE — 96372 THER/PROPH/DIAG INJ SC/IM: CPT | Mod: S$GLB,,, | Performed by: FAMILY MEDICINE

## 2021-08-10 PROCEDURE — 1160F RVW MEDS BY RX/DR IN RCRD: CPT | Mod: CPTII,S$GLB,, | Performed by: FAMILY MEDICINE

## 2021-08-10 PROCEDURE — 99999 PR PBB SHADOW E&M-EST. PATIENT-LVL IV: ICD-10-PCS | Mod: PBBFAC,,, | Performed by: FAMILY MEDICINE

## 2021-08-10 PROCEDURE — 1159F MED LIST DOCD IN RCRD: CPT | Mod: CPTII,S$GLB,, | Performed by: FAMILY MEDICINE

## 2021-08-10 PROCEDURE — 3008F BODY MASS INDEX DOCD: CPT | Mod: CPTII,S$GLB,, | Performed by: FAMILY MEDICINE

## 2021-08-10 RX ORDER — KETOROLAC TROMETHAMINE 30 MG/ML
60 INJECTION, SOLUTION INTRAMUSCULAR; INTRAVENOUS
Status: COMPLETED | OUTPATIENT
Start: 2021-08-10 | End: 2021-08-10

## 2021-08-10 RX ORDER — KETOROLAC TROMETHAMINE 10 MG/1
10 TABLET, FILM COATED ORAL 3 TIMES DAILY PRN
Qty: 10 TABLET | Refills: 0 | Status: SHIPPED | OUTPATIENT
Start: 2021-08-10 | End: 2021-09-08 | Stop reason: SDUPTHER

## 2021-08-10 RX ADMIN — KETOROLAC TROMETHAMINE 60 MG: 30 INJECTION, SOLUTION INTRAMUSCULAR; INTRAVENOUS at 09:08

## 2021-08-13 ENCOUNTER — PATIENT MESSAGE (OUTPATIENT)
Dept: INTERNAL MEDICINE | Facility: CLINIC | Age: 46
End: 2021-08-13

## 2021-08-27 DIAGNOSIS — G89.4 CHRONIC PAIN SYNDROME: ICD-10-CM

## 2021-08-27 DIAGNOSIS — F98.8 ATTENTION DEFICIT DISORDER (ADD) WITHOUT HYPERACTIVITY: ICD-10-CM

## 2021-08-31 RX ORDER — HYDROCODONE BITARTRATE AND ACETAMINOPHEN 7.5; 325 MG/1; MG/1
1 TABLET ORAL EVERY 6 HOURS PRN
Qty: 120 TABLET | Refills: 0 | Status: SHIPPED | OUTPATIENT
Start: 2021-08-31 | End: 2021-10-03 | Stop reason: SDUPTHER

## 2021-08-31 RX ORDER — DEXTROAMPHETAMINE SACCHARATE, AMPHETAMINE ASPARTATE, DEXTROAMPHETAMINE SULFATE AND AMPHETAMINE SULFATE 2.5; 2.5; 2.5; 2.5 MG/1; MG/1; MG/1; MG/1
10 TABLET ORAL 2 TIMES DAILY PRN
Qty: 60 TABLET | Refills: 0 | Status: SHIPPED | OUTPATIENT
Start: 2021-08-31 | End: 2021-10-03 | Stop reason: SDUPTHER

## 2021-09-08 ENCOUNTER — OFFICE VISIT (OUTPATIENT)
Dept: PAIN MEDICINE | Facility: CLINIC | Age: 46
End: 2021-09-08
Payer: COMMERCIAL

## 2021-09-08 VITALS
RESPIRATION RATE: 18 BRPM | HEART RATE: 98 BPM | DIASTOLIC BLOOD PRESSURE: 84 MMHG | WEIGHT: 230.38 LBS | HEIGHT: 69 IN | SYSTOLIC BLOOD PRESSURE: 128 MMHG | BODY MASS INDEX: 34.12 KG/M2

## 2021-09-08 DIAGNOSIS — M53.3 COCCYXDYNIA: Primary | ICD-10-CM

## 2021-09-08 DIAGNOSIS — L05.91 PILONIDAL CYST: ICD-10-CM

## 2021-09-08 DIAGNOSIS — R10.2 PELVIC PAIN IN MALE: ICD-10-CM

## 2021-09-08 PROCEDURE — 3074F PR MOST RECENT SYSTOLIC BLOOD PRESSURE < 130 MM HG: ICD-10-PCS | Mod: CPTII,S$GLB,, | Performed by: PHYSICAL MEDICINE & REHABILITATION

## 2021-09-08 PROCEDURE — 3008F BODY MASS INDEX DOCD: CPT | Mod: CPTII,S$GLB,, | Performed by: PHYSICAL MEDICINE & REHABILITATION

## 2021-09-08 PROCEDURE — 3079F PR MOST RECENT DIASTOLIC BLOOD PRESSURE 80-89 MM HG: ICD-10-PCS | Mod: CPTII,S$GLB,, | Performed by: PHYSICAL MEDICINE & REHABILITATION

## 2021-09-08 PROCEDURE — 99999 PR PBB SHADOW E&M-EST. PATIENT-LVL III: CPT | Mod: PBBFAC,,, | Performed by: PHYSICAL MEDICINE & REHABILITATION

## 2021-09-08 PROCEDURE — 99213 PR OFFICE/OUTPT VISIT, EST, LEVL III, 20-29 MIN: ICD-10-PCS | Mod: S$GLB,,, | Performed by: PHYSICAL MEDICINE & REHABILITATION

## 2021-09-08 PROCEDURE — 1160F PR REVIEW ALL MEDS BY PRESCRIBER/CLIN PHARMACIST DOCUMENTED: ICD-10-PCS | Mod: CPTII,S$GLB,, | Performed by: PHYSICAL MEDICINE & REHABILITATION

## 2021-09-08 PROCEDURE — 1159F PR MEDICATION LIST DOCUMENTED IN MEDICAL RECORD: ICD-10-PCS | Mod: CPTII,S$GLB,, | Performed by: PHYSICAL MEDICINE & REHABILITATION

## 2021-09-08 PROCEDURE — 3074F SYST BP LT 130 MM HG: CPT | Mod: CPTII,S$GLB,, | Performed by: PHYSICAL MEDICINE & REHABILITATION

## 2021-09-08 PROCEDURE — 99213 OFFICE O/P EST LOW 20 MIN: CPT | Mod: S$GLB,,, | Performed by: PHYSICAL MEDICINE & REHABILITATION

## 2021-09-08 PROCEDURE — 99999 PR PBB SHADOW E&M-EST. PATIENT-LVL III: ICD-10-PCS | Mod: PBBFAC,,, | Performed by: PHYSICAL MEDICINE & REHABILITATION

## 2021-09-08 PROCEDURE — 1160F RVW MEDS BY RX/DR IN RCRD: CPT | Mod: CPTII,S$GLB,, | Performed by: PHYSICAL MEDICINE & REHABILITATION

## 2021-09-08 PROCEDURE — 3079F DIAST BP 80-89 MM HG: CPT | Mod: CPTII,S$GLB,, | Performed by: PHYSICAL MEDICINE & REHABILITATION

## 2021-09-08 PROCEDURE — 1159F MED LIST DOCD IN RCRD: CPT | Mod: CPTII,S$GLB,, | Performed by: PHYSICAL MEDICINE & REHABILITATION

## 2021-09-08 PROCEDURE — 3008F PR BODY MASS INDEX (BMI) DOCUMENTED: ICD-10-PCS | Mod: CPTII,S$GLB,, | Performed by: PHYSICAL MEDICINE & REHABILITATION

## 2021-09-08 RX ORDER — TOPIRAMATE 25 MG/1
TABLET ORAL
Qty: 60 TABLET | Refills: 1 | Status: SHIPPED | OUTPATIENT
Start: 2021-09-08 | End: 2021-11-10

## 2021-09-09 ENCOUNTER — OFFICE VISIT (OUTPATIENT)
Dept: SURGERY | Facility: CLINIC | Age: 46
End: 2021-09-09
Payer: COMMERCIAL

## 2021-09-09 ENCOUNTER — ANESTHESIA EVENT (OUTPATIENT)
Dept: SURGERY | Facility: HOSPITAL | Age: 46
End: 2021-09-09
Payer: COMMERCIAL

## 2021-09-09 ENCOUNTER — PATIENT MESSAGE (OUTPATIENT)
Dept: SURGERY | Facility: CLINIC | Age: 46
End: 2021-09-09

## 2021-09-09 VITALS
TEMPERATURE: 99 F | DIASTOLIC BLOOD PRESSURE: 82 MMHG | BODY MASS INDEX: 33.96 KG/M2 | SYSTOLIC BLOOD PRESSURE: 135 MMHG | WEIGHT: 229.94 LBS | HEART RATE: 104 BPM

## 2021-09-09 DIAGNOSIS — K61.1 PERIRECTAL ABSCESS: Primary | ICD-10-CM

## 2021-09-09 PROCEDURE — 1159F PR MEDICATION LIST DOCUMENTED IN MEDICAL RECORD: ICD-10-PCS | Mod: CPTII,S$GLB,, | Performed by: COLON & RECTAL SURGERY

## 2021-09-09 PROCEDURE — 99214 PR OFFICE/OUTPT VISIT, EST, LEVL IV, 30-39 MIN: ICD-10-PCS | Mod: S$GLB,,, | Performed by: COLON & RECTAL SURGERY

## 2021-09-09 PROCEDURE — 3075F PR MOST RECENT SYSTOLIC BLOOD PRESS GE 130-139MM HG: ICD-10-PCS | Mod: CPTII,S$GLB,, | Performed by: COLON & RECTAL SURGERY

## 2021-09-09 PROCEDURE — 99214 OFFICE O/P EST MOD 30 MIN: CPT | Mod: S$GLB,,, | Performed by: COLON & RECTAL SURGERY

## 2021-09-09 PROCEDURE — 1160F RVW MEDS BY RX/DR IN RCRD: CPT | Mod: CPTII,S$GLB,, | Performed by: COLON & RECTAL SURGERY

## 2021-09-09 PROCEDURE — 1159F MED LIST DOCD IN RCRD: CPT | Mod: CPTII,S$GLB,, | Performed by: COLON & RECTAL SURGERY

## 2021-09-09 PROCEDURE — 99999 PR PBB SHADOW E&M-EST. PATIENT-LVL V: ICD-10-PCS | Mod: PBBFAC,,, | Performed by: COLON & RECTAL SURGERY

## 2021-09-09 PROCEDURE — 3075F SYST BP GE 130 - 139MM HG: CPT | Mod: CPTII,S$GLB,, | Performed by: COLON & RECTAL SURGERY

## 2021-09-09 PROCEDURE — 3079F PR MOST RECENT DIASTOLIC BLOOD PRESSURE 80-89 MM HG: ICD-10-PCS | Mod: CPTII,S$GLB,, | Performed by: COLON & RECTAL SURGERY

## 2021-09-09 PROCEDURE — 3008F BODY MASS INDEX DOCD: CPT | Mod: CPTII,S$GLB,, | Performed by: COLON & RECTAL SURGERY

## 2021-09-09 PROCEDURE — 99999 PR PBB SHADOW E&M-EST. PATIENT-LVL V: CPT | Mod: PBBFAC,,, | Performed by: COLON & RECTAL SURGERY

## 2021-09-09 PROCEDURE — 3008F PR BODY MASS INDEX (BMI) DOCUMENTED: ICD-10-PCS | Mod: CPTII,S$GLB,, | Performed by: COLON & RECTAL SURGERY

## 2021-09-09 PROCEDURE — 1160F PR REVIEW ALL MEDS BY PRESCRIBER/CLIN PHARMACIST DOCUMENTED: ICD-10-PCS | Mod: CPTII,S$GLB,, | Performed by: COLON & RECTAL SURGERY

## 2021-09-09 PROCEDURE — 3079F DIAST BP 80-89 MM HG: CPT | Mod: CPTII,S$GLB,, | Performed by: COLON & RECTAL SURGERY

## 2021-09-09 RX ORDER — ONDANSETRON 2 MG/ML
4 INJECTION INTRAMUSCULAR; INTRAVENOUS EVERY 12 HOURS PRN
Status: CANCELLED | OUTPATIENT
Start: 2021-09-09

## 2021-09-09 RX ORDER — SODIUM CHLORIDE, SODIUM LACTATE, POTASSIUM CHLORIDE, CALCIUM CHLORIDE 600; 310; 30; 20 MG/100ML; MG/100ML; MG/100ML; MG/100ML
INJECTION, SOLUTION INTRAVENOUS CONTINUOUS
Status: CANCELLED | OUTPATIENT
Start: 2021-09-09

## 2021-09-10 ENCOUNTER — ANESTHESIA (OUTPATIENT)
Dept: SURGERY | Facility: HOSPITAL | Age: 46
End: 2021-09-10
Payer: COMMERCIAL

## 2021-09-10 ENCOUNTER — HOSPITAL ENCOUNTER (OUTPATIENT)
Facility: HOSPITAL | Age: 46
Discharge: HOME | End: 2021-09-10
Attending: COLON & RECTAL SURGERY | Admitting: COLON & RECTAL SURGERY
Payer: COMMERCIAL

## 2021-09-10 DIAGNOSIS — K61.1 PERIRECTAL ABSCESS: ICD-10-CM

## 2021-09-10 LAB — SARS-COV-2 RDRP RESP QL NAA+PROBE: NEGATIVE

## 2021-09-10 PROCEDURE — 37000009 HC ANESTHESIA EA ADD 15 MINS: Performed by: COLON & RECTAL SURGERY

## 2021-09-10 PROCEDURE — 63600175 PHARM REV CODE 636 W HCPCS: Performed by: NURSE ANESTHETIST, CERTIFIED REGISTERED

## 2021-09-10 PROCEDURE — 46040 I&D ISCHIORCT&/PERIRCT ABSC: CPT | Mod: ,,, | Performed by: COLON & RECTAL SURGERY

## 2021-09-10 PROCEDURE — C9290 INJ, BUPIVACAINE LIPOSOME: HCPCS | Performed by: COLON & RECTAL SURGERY

## 2021-09-10 PROCEDURE — 71000015 HC POSTOP RECOV 1ST HR: Performed by: COLON & RECTAL SURGERY

## 2021-09-10 PROCEDURE — 25000003 PHARM REV CODE 250: Performed by: NURSE ANESTHETIST, CERTIFIED REGISTERED

## 2021-09-10 PROCEDURE — 36000704 HC OR TIME LEV I 1ST 15 MIN: Performed by: COLON & RECTAL SURGERY

## 2021-09-10 PROCEDURE — 46040 PR I&D PERIRECTAL ABSCESS: ICD-10-PCS | Mod: ,,, | Performed by: COLON & RECTAL SURGERY

## 2021-09-10 PROCEDURE — 71000033 HC RECOVERY, INTIAL HOUR: Performed by: COLON & RECTAL SURGERY

## 2021-09-10 PROCEDURE — 25000003 PHARM REV CODE 250: Performed by: COLON & RECTAL SURGERY

## 2021-09-10 PROCEDURE — 63600175 PHARM REV CODE 636 W HCPCS: Performed by: COLON & RECTAL SURGERY

## 2021-09-10 PROCEDURE — 36000705 HC OR TIME LEV I EA ADD 15 MIN: Performed by: COLON & RECTAL SURGERY

## 2021-09-10 PROCEDURE — U0002 COVID-19 LAB TEST NON-CDC: HCPCS | Performed by: COLON & RECTAL SURGERY

## 2021-09-10 PROCEDURE — 37000008 HC ANESTHESIA 1ST 15 MINUTES: Performed by: COLON & RECTAL SURGERY

## 2021-09-10 PROCEDURE — 25000003 PHARM REV CODE 250: Performed by: ANESTHESIOLOGY

## 2021-09-10 PROCEDURE — 63600175 PHARM REV CODE 636 W HCPCS: Performed by: ANESTHESIOLOGY

## 2021-09-10 RX ORDER — PROPOFOL 10 MG/ML
VIAL (ML) INTRAVENOUS
Status: DISCONTINUED | OUTPATIENT
Start: 2021-09-10 | End: 2021-09-10

## 2021-09-10 RX ORDER — AMOXICILLIN AND CLAVULANATE POTASSIUM 875; 125 MG/1; MG/1
1 TABLET, FILM COATED ORAL EVERY 12 HOURS
Qty: 14 TABLET | Refills: 0 | Status: SHIPPED | OUTPATIENT
Start: 2021-09-10 | End: 2021-09-17

## 2021-09-10 RX ORDER — SODIUM CHLORIDE, SODIUM LACTATE, POTASSIUM CHLORIDE, CALCIUM CHLORIDE 600; 310; 30; 20 MG/100ML; MG/100ML; MG/100ML; MG/100ML
INJECTION, SOLUTION INTRAVENOUS CONTINUOUS
Status: DISCONTINUED | OUTPATIENT
Start: 2021-09-10 | End: 2021-09-10 | Stop reason: HOSPADM

## 2021-09-10 RX ORDER — OXYCODONE HYDROCHLORIDE 5 MG/1
10 TABLET ORAL EVERY 4 HOURS PRN
Status: CANCELLED | OUTPATIENT
Start: 2021-09-10

## 2021-09-10 RX ORDER — FENTANYL CITRATE 50 UG/ML
INJECTION, SOLUTION INTRAMUSCULAR; INTRAVENOUS
Status: DISCONTINUED | OUTPATIENT
Start: 2021-09-10 | End: 2021-09-10

## 2021-09-10 RX ORDER — HYDROMORPHONE HYDROCHLORIDE 2 MG/ML
1 INJECTION, SOLUTION INTRAMUSCULAR; INTRAVENOUS; SUBCUTANEOUS
Status: COMPLETED | OUTPATIENT
Start: 2021-09-10 | End: 2021-09-10

## 2021-09-10 RX ORDER — AMOXICILLIN 250 MG
1 CAPSULE ORAL 2 TIMES DAILY
Qty: 30 TABLET | Refills: 0 | Status: SHIPPED | OUTPATIENT
Start: 2021-09-10 | End: 2021-09-25

## 2021-09-10 RX ORDER — OXYCODONE HYDROCHLORIDE 5 MG/1
5 TABLET ORAL EVERY 4 HOURS PRN
Status: CANCELLED | OUTPATIENT
Start: 2021-09-10

## 2021-09-10 RX ORDER — LIDOCAINE HYDROCHLORIDE 20 MG/ML
INJECTION INTRAVENOUS
Status: DISCONTINUED | OUTPATIENT
Start: 2021-09-10 | End: 2021-09-10

## 2021-09-10 RX ORDER — HYDROCODONE BITARTRATE AND ACETAMINOPHEN 10; 325 MG/1; MG/1
1 TABLET ORAL EVERY 6 HOURS PRN
Status: DISCONTINUED | OUTPATIENT
Start: 2021-09-10 | End: 2021-09-10 | Stop reason: HOSPADM

## 2021-09-10 RX ORDER — MIDAZOLAM HYDROCHLORIDE 1 MG/ML
INJECTION, SOLUTION INTRAMUSCULAR; INTRAVENOUS
Status: DISCONTINUED | OUTPATIENT
Start: 2021-09-10 | End: 2021-09-10

## 2021-09-10 RX ORDER — BUPIVACAINE HYDROCHLORIDE 2.5 MG/ML
INJECTION, SOLUTION EPIDURAL; INFILTRATION; INTRACAUDAL
Status: DISCONTINUED | OUTPATIENT
Start: 2021-09-10 | End: 2021-09-10 | Stop reason: HOSPADM

## 2021-09-10 RX ORDER — HYDROMORPHONE HYDROCHLORIDE 2 MG/ML
0.2 INJECTION, SOLUTION INTRAMUSCULAR; INTRAVENOUS; SUBCUTANEOUS EVERY 5 MIN PRN
Status: DISCONTINUED | OUTPATIENT
Start: 2021-09-10 | End: 2021-09-10 | Stop reason: HOSPADM

## 2021-09-10 RX ORDER — HYDROCODONE BITARTRATE AND ACETAMINOPHEN 5; 325 MG/1; MG/1
1 TABLET ORAL EVERY 6 HOURS PRN
Qty: 15 TABLET | Refills: 0 | Status: SHIPPED | OUTPATIENT
Start: 2021-09-10 | End: 2021-10-03

## 2021-09-10 RX ORDER — OXYCODONE HYDROCHLORIDE 5 MG/1
5 TABLET ORAL EVERY 6 HOURS PRN
Qty: 15 TABLET | Refills: 0 | Status: SHIPPED | OUTPATIENT
Start: 2021-09-10 | End: 2021-09-10 | Stop reason: HOSPADM

## 2021-09-10 RX ORDER — ONDANSETRON 2 MG/ML
4 INJECTION INTRAMUSCULAR; INTRAVENOUS DAILY PRN
Status: DISCONTINUED | OUTPATIENT
Start: 2021-09-10 | End: 2021-09-10 | Stop reason: HOSPADM

## 2021-09-10 RX ORDER — ONDANSETRON 2 MG/ML
4 INJECTION INTRAMUSCULAR; INTRAVENOUS EVERY 12 HOURS PRN
Status: DISCONTINUED | OUTPATIENT
Start: 2021-09-10 | End: 2021-09-10 | Stop reason: HOSPADM

## 2021-09-10 RX ORDER — ONDANSETRON 2 MG/ML
INJECTION INTRAMUSCULAR; INTRAVENOUS
Status: DISCONTINUED | OUTPATIENT
Start: 2021-09-10 | End: 2021-09-10

## 2021-09-10 RX ADMIN — PROPOFOL 30 MG: 10 INJECTION, EMULSION INTRAVENOUS at 11:09

## 2021-09-10 RX ADMIN — SODIUM CHLORIDE, SODIUM LACTATE, POTASSIUM CHLORIDE, AND CALCIUM CHLORIDE: .6; .31; .03; .02 INJECTION, SOLUTION INTRAVENOUS at 10:09

## 2021-09-10 RX ADMIN — HYDROCODONE BITARTRATE AND ACETAMINOPHEN 1 TABLET: 10; 325 TABLET ORAL at 12:09

## 2021-09-10 RX ADMIN — PROPOFOL 30 MG: 10 INJECTION, EMULSION INTRAVENOUS at 10:09

## 2021-09-10 RX ADMIN — HYDROMORPHONE HYDROCHLORIDE 1 MG: 2 INJECTION INTRAMUSCULAR; INTRAVENOUS; SUBCUTANEOUS at 10:09

## 2021-09-10 RX ADMIN — ONDANSETRON 4 MG: 2 INJECTION, SOLUTION INTRAMUSCULAR; INTRAVENOUS at 11:09

## 2021-09-10 RX ADMIN — LIDOCAINE HYDROCHLORIDE 50 MG: 20 INJECTION, SOLUTION INTRAVENOUS at 10:09

## 2021-09-10 RX ADMIN — MIDAZOLAM HYDROCHLORIDE 2 MG: 1 INJECTION, SOLUTION INTRAMUSCULAR; INTRAVENOUS at 10:09

## 2021-09-10 RX ADMIN — PROPOFOL 70 MG: 10 INJECTION, EMULSION INTRAVENOUS at 10:09

## 2021-09-10 RX ADMIN — FENTANYL CITRATE 100 MCG: 50 INJECTION, SOLUTION INTRAMUSCULAR; INTRAVENOUS at 10:09

## 2021-09-16 VITALS
WEIGHT: 228.31 LBS | BODY MASS INDEX: 32.69 KG/M2 | TEMPERATURE: 98 F | SYSTOLIC BLOOD PRESSURE: 114 MMHG | RESPIRATION RATE: 18 BRPM | HEIGHT: 70 IN | DIASTOLIC BLOOD PRESSURE: 73 MMHG | HEART RATE: 87 BPM | OXYGEN SATURATION: 98 %

## 2021-10-03 DIAGNOSIS — F98.8 ATTENTION DEFICIT DISORDER (ADD) WITHOUT HYPERACTIVITY: ICD-10-CM

## 2021-10-03 DIAGNOSIS — G89.4 CHRONIC PAIN SYNDROME: ICD-10-CM

## 2021-10-03 RX ORDER — HYDROCODONE BITARTRATE AND ACETAMINOPHEN 7.5; 325 MG/1; MG/1
1 TABLET ORAL EVERY 6 HOURS PRN
Qty: 120 TABLET | Refills: 0 | Status: SHIPPED | OUTPATIENT
Start: 2021-10-03 | End: 2021-11-01 | Stop reason: SDUPTHER

## 2021-10-03 RX ORDER — DEXTROAMPHETAMINE SACCHARATE, AMPHETAMINE ASPARTATE, DEXTROAMPHETAMINE SULFATE AND AMPHETAMINE SULFATE 2.5; 2.5; 2.5; 2.5 MG/1; MG/1; MG/1; MG/1
10 TABLET ORAL 2 TIMES DAILY PRN
Qty: 60 TABLET | Refills: 0 | Status: SHIPPED | OUTPATIENT
Start: 2021-10-03 | End: 2021-11-01 | Stop reason: SDUPTHER

## 2021-10-22 ENCOUNTER — PATIENT MESSAGE (OUTPATIENT)
Dept: PAIN MEDICINE | Facility: CLINIC | Age: 46
End: 2021-10-22
Payer: COMMERCIAL

## 2021-11-01 DIAGNOSIS — G89.4 CHRONIC PAIN SYNDROME: ICD-10-CM

## 2021-11-01 DIAGNOSIS — F98.8 ATTENTION DEFICIT DISORDER (ADD) WITHOUT HYPERACTIVITY: ICD-10-CM

## 2021-11-02 RX ORDER — HYDROCODONE BITARTRATE AND ACETAMINOPHEN 7.5; 325 MG/1; MG/1
1 TABLET ORAL EVERY 6 HOURS PRN
Qty: 120 TABLET | Refills: 0 | Status: SHIPPED | OUTPATIENT
Start: 2021-11-02 | End: 2021-11-30 | Stop reason: SDUPTHER

## 2021-11-02 RX ORDER — DEXTROAMPHETAMINE SACCHARATE, AMPHETAMINE ASPARTATE, DEXTROAMPHETAMINE SULFATE AND AMPHETAMINE SULFATE 2.5; 2.5; 2.5; 2.5 MG/1; MG/1; MG/1; MG/1
10 TABLET ORAL 2 TIMES DAILY PRN
Qty: 60 TABLET | Refills: 0 | Status: SHIPPED | OUTPATIENT
Start: 2021-11-02 | End: 2021-11-30 | Stop reason: SDUPTHER

## 2021-11-05 ENCOUNTER — PATIENT MESSAGE (OUTPATIENT)
Dept: ADMINISTRATIVE | Facility: OTHER | Age: 46
End: 2021-11-05
Payer: COMMERCIAL

## 2021-11-10 ENCOUNTER — LAB VISIT (OUTPATIENT)
Dept: LAB | Facility: HOSPITAL | Age: 46
End: 2021-11-10
Attending: FAMILY MEDICINE
Payer: COMMERCIAL

## 2021-11-10 ENCOUNTER — OFFICE VISIT (OUTPATIENT)
Dept: INTERNAL MEDICINE | Facility: CLINIC | Age: 46
End: 2021-11-10
Payer: COMMERCIAL

## 2021-11-10 VITALS
DIASTOLIC BLOOD PRESSURE: 80 MMHG | TEMPERATURE: 98 F | SYSTOLIC BLOOD PRESSURE: 118 MMHG | BODY MASS INDEX: 32.92 KG/M2 | HEIGHT: 70 IN | OXYGEN SATURATION: 98 % | HEART RATE: 73 BPM | WEIGHT: 229.94 LBS

## 2021-11-10 DIAGNOSIS — F98.8 ATTENTION DEFICIT DISORDER (ADD) WITHOUT HYPERACTIVITY: ICD-10-CM

## 2021-11-10 DIAGNOSIS — Z00.00 ANNUAL PHYSICAL EXAM: ICD-10-CM

## 2021-11-10 DIAGNOSIS — Z23 NEED FOR INFLUENZA VACCINATION: ICD-10-CM

## 2021-11-10 DIAGNOSIS — E66.9 OBESITY (BMI 30.0-34.9): ICD-10-CM

## 2021-11-10 DIAGNOSIS — Z00.00 ANNUAL PHYSICAL EXAM: Primary | ICD-10-CM

## 2021-11-10 DIAGNOSIS — K55.9 VASCULAR DISORDER OF INTESTINE, UNSPECIFIED: ICD-10-CM

## 2021-11-10 DIAGNOSIS — K59.09 CHRONIC CONSTIPATION: ICD-10-CM

## 2021-11-10 DIAGNOSIS — E78.2 MIXED HYPERLIPIDEMIA: ICD-10-CM

## 2021-11-10 DIAGNOSIS — Z93.3 STATUS POST HARTMANN PROCEDURE: ICD-10-CM

## 2021-11-10 DIAGNOSIS — G89.4 CHRONIC PAIN SYNDROME: ICD-10-CM

## 2021-11-10 DIAGNOSIS — F32.5 MAJOR DEPRESSIVE DISORDER WITH SINGLE EPISODE, IN REMISSION: ICD-10-CM

## 2021-11-10 DIAGNOSIS — I47.10 SVT (SUPRAVENTRICULAR TACHYCARDIA): ICD-10-CM

## 2021-11-10 PROBLEM — K61.1 PERIRECTAL ABSCESS: Status: RESOLVED | Noted: 2021-09-10 | Resolved: 2021-11-10

## 2021-11-10 PROBLEM — R07.89 ATYPICAL CHEST PAIN: Status: RESOLVED | Noted: 2021-06-16 | Resolved: 2021-11-10

## 2021-11-10 PROBLEM — Z82.49 FAMILY HISTORY OF CARDIOVASCULAR DISEASE: Status: RESOLVED | Noted: 2020-07-07 | Resolved: 2021-11-10

## 2021-11-10 PROBLEM — R55 SYNCOPE: Status: RESOLVED | Noted: 2020-07-07 | Resolved: 2021-11-10

## 2021-11-10 PROBLEM — E66.811 OBESITY (BMI 30.0-34.9): Status: ACTIVE | Noted: 2021-11-10

## 2021-11-10 PROBLEM — M53.3 COCCYDYNIA: Chronic | Status: ACTIVE | Noted: 2021-03-15

## 2021-11-10 LAB
ALBUMIN SERPL BCP-MCNC: 4.1 G/DL (ref 3.5–5.2)
ALP SERPL-CCNC: 66 U/L (ref 55–135)
ALT SERPL W/O P-5'-P-CCNC: 30 U/L (ref 10–44)
ANION GAP SERPL CALC-SCNC: 7 MMOL/L (ref 8–16)
AST SERPL-CCNC: 23 U/L (ref 10–40)
BILIRUB SERPL-MCNC: 0.4 MG/DL (ref 0.1–1)
BUN SERPL-MCNC: 12 MG/DL (ref 6–20)
CALCIUM SERPL-MCNC: 9.1 MG/DL (ref 8.7–10.5)
CHLORIDE SERPL-SCNC: 108 MMOL/L (ref 95–110)
CHOLEST SERPL-MCNC: 195 MG/DL (ref 120–199)
CHOLEST/HDLC SERPL: 4.8 {RATIO} (ref 2–5)
CO2 SERPL-SCNC: 25 MMOL/L (ref 23–29)
CREAT SERPL-MCNC: 0.9 MG/DL (ref 0.5–1.4)
ERYTHROCYTE [DISTWIDTH] IN BLOOD BY AUTOMATED COUNT: 13.2 % (ref 11.5–14.5)
EST. GFR  (AFRICAN AMERICAN): >60 ML/MIN/1.73 M^2
EST. GFR  (NON AFRICAN AMERICAN): >60 ML/MIN/1.73 M^2
ESTIMATED AVG GLUCOSE: 100 MG/DL (ref 68–131)
GLUCOSE SERPL-MCNC: 88 MG/DL (ref 70–110)
HBA1C MFR BLD: 5.1 % (ref 4–5.6)
HCT VFR BLD AUTO: 39.6 % (ref 40–54)
HDLC SERPL-MCNC: 41 MG/DL (ref 40–75)
HDLC SERPL: 21 % (ref 20–50)
HGB BLD-MCNC: 12.8 G/DL (ref 14–18)
LDLC SERPL CALC-MCNC: 124.8 MG/DL (ref 63–159)
MCH RBC QN AUTO: 28.1 PG (ref 27–31)
MCHC RBC AUTO-ENTMCNC: 32.3 G/DL (ref 32–36)
MCV RBC AUTO: 87 FL (ref 82–98)
NONHDLC SERPL-MCNC: 154 MG/DL
PLATELET # BLD AUTO: 168 K/UL (ref 150–450)
PMV BLD AUTO: 12.5 FL (ref 9.2–12.9)
POTASSIUM SERPL-SCNC: 4 MMOL/L (ref 3.5–5.1)
PROT SERPL-MCNC: 6.9 G/DL (ref 6–8.4)
RBC # BLD AUTO: 4.55 M/UL (ref 4.6–6.2)
SODIUM SERPL-SCNC: 140 MMOL/L (ref 136–145)
TRIGL SERPL-MCNC: 146 MG/DL (ref 30–150)
TSH SERPL DL<=0.005 MIU/L-ACNC: 1.41 UIU/ML (ref 0.4–4)
WBC # BLD AUTO: 5.43 K/UL (ref 3.9–12.7)

## 2021-11-10 PROCEDURE — 99396 PREV VISIT EST AGE 40-64: CPT | Mod: 25,S$GLB,, | Performed by: FAMILY MEDICINE

## 2021-11-10 PROCEDURE — 36415 COLL VENOUS BLD VENIPUNCTURE: CPT | Performed by: FAMILY MEDICINE

## 2021-11-10 PROCEDURE — 3074F SYST BP LT 130 MM HG: CPT | Mod: CPTII,S$GLB,, | Performed by: FAMILY MEDICINE

## 2021-11-10 PROCEDURE — 90686 FLU VACCINE (QUAD) GREATER THAN OR EQUAL TO 3YO PRESERVATIVE FREE IM: ICD-10-PCS | Mod: S$GLB,,, | Performed by: FAMILY MEDICINE

## 2021-11-10 PROCEDURE — 1159F MED LIST DOCD IN RCRD: CPT | Mod: CPTII,S$GLB,, | Performed by: FAMILY MEDICINE

## 2021-11-10 PROCEDURE — 90471 IMMUNIZATION ADMIN: CPT | Mod: S$GLB,,, | Performed by: FAMILY MEDICINE

## 2021-11-10 PROCEDURE — 84443 ASSAY THYROID STIM HORMONE: CPT | Performed by: FAMILY MEDICINE

## 2021-11-10 PROCEDURE — 99999 PR PBB SHADOW E&M-EST. PATIENT-LVL IV: CPT | Mod: PBBFAC,,, | Performed by: FAMILY MEDICINE

## 2021-11-10 PROCEDURE — 99999 PR PBB SHADOW E&M-EST. PATIENT-LVL IV: ICD-10-PCS | Mod: PBBFAC,,, | Performed by: FAMILY MEDICINE

## 2021-11-10 PROCEDURE — 86803 HEPATITIS C AB TEST: CPT | Performed by: FAMILY MEDICINE

## 2021-11-10 PROCEDURE — 80061 LIPID PANEL: CPT | Performed by: FAMILY MEDICINE

## 2021-11-10 PROCEDURE — 3008F PR BODY MASS INDEX (BMI) DOCUMENTED: ICD-10-PCS | Mod: CPTII,S$GLB,, | Performed by: FAMILY MEDICINE

## 2021-11-10 PROCEDURE — 83036 HEMOGLOBIN GLYCOSYLATED A1C: CPT | Performed by: FAMILY MEDICINE

## 2021-11-10 PROCEDURE — 3008F BODY MASS INDEX DOCD: CPT | Mod: CPTII,S$GLB,, | Performed by: FAMILY MEDICINE

## 2021-11-10 PROCEDURE — 85027 COMPLETE CBC AUTOMATED: CPT | Performed by: FAMILY MEDICINE

## 2021-11-10 PROCEDURE — 3079F PR MOST RECENT DIASTOLIC BLOOD PRESSURE 80-89 MM HG: ICD-10-PCS | Mod: CPTII,S$GLB,, | Performed by: FAMILY MEDICINE

## 2021-11-10 PROCEDURE — 1159F PR MEDICATION LIST DOCUMENTED IN MEDICAL RECORD: ICD-10-PCS | Mod: CPTII,S$GLB,, | Performed by: FAMILY MEDICINE

## 2021-11-10 PROCEDURE — 90686 IIV4 VACC NO PRSV 0.5 ML IM: CPT | Mod: S$GLB,,, | Performed by: FAMILY MEDICINE

## 2021-11-10 PROCEDURE — 3074F PR MOST RECENT SYSTOLIC BLOOD PRESSURE < 130 MM HG: ICD-10-PCS | Mod: CPTII,S$GLB,, | Performed by: FAMILY MEDICINE

## 2021-11-10 PROCEDURE — 3079F DIAST BP 80-89 MM HG: CPT | Mod: CPTII,S$GLB,, | Performed by: FAMILY MEDICINE

## 2021-11-10 PROCEDURE — 90471 FLU VACCINE (QUAD) GREATER THAN OR EQUAL TO 3YO PRESERVATIVE FREE IM: ICD-10-PCS | Mod: S$GLB,,, | Performed by: FAMILY MEDICINE

## 2021-11-10 PROCEDURE — 99396 PR PREVENTIVE VISIT,EST,40-64: ICD-10-PCS | Mod: 25,S$GLB,, | Performed by: FAMILY MEDICINE

## 2021-11-10 PROCEDURE — 80053 COMPREHEN METABOLIC PANEL: CPT | Performed by: FAMILY MEDICINE

## 2021-11-10 RX ORDER — TOPIRAMATE 25 MG/1
25 TABLET ORAL 2 TIMES DAILY
Start: 2021-11-10 | End: 2021-11-17 | Stop reason: SDUPTHER

## 2021-11-11 LAB — HCV AB SERPL QL IA: NEGATIVE

## 2021-11-15 ENCOUNTER — PATIENT OUTREACH (OUTPATIENT)
Dept: ADMINISTRATIVE | Facility: OTHER | Age: 46
End: 2021-11-15
Payer: COMMERCIAL

## 2021-11-16 ENCOUNTER — TELEPHONE (OUTPATIENT)
Dept: PAIN MEDICINE | Facility: CLINIC | Age: 46
End: 2021-11-16
Payer: COMMERCIAL

## 2021-11-17 ENCOUNTER — OFFICE VISIT (OUTPATIENT)
Dept: PAIN MEDICINE | Facility: CLINIC | Age: 46
End: 2021-11-17
Payer: COMMERCIAL

## 2021-11-17 ENCOUNTER — PATIENT MESSAGE (OUTPATIENT)
Dept: PAIN MEDICINE | Facility: CLINIC | Age: 46
End: 2021-11-17

## 2021-11-17 DIAGNOSIS — S22.070D CLOSED WEDGE COMPRESSION FRACTURE OF T10 VERTEBRA WITH ROUTINE HEALING, SUBSEQUENT ENCOUNTER: Primary | ICD-10-CM

## 2021-11-17 DIAGNOSIS — M53.3 COCCYXDYNIA: ICD-10-CM

## 2021-11-17 PROCEDURE — 99214 OFFICE O/P EST MOD 30 MIN: CPT | Mod: 95,,, | Performed by: PHYSICAL MEDICINE & REHABILITATION

## 2021-11-17 PROCEDURE — 1159F MED LIST DOCD IN RCRD: CPT | Mod: CPTII,95,, | Performed by: PHYSICAL MEDICINE & REHABILITATION

## 2021-11-17 PROCEDURE — 1160F PR REVIEW ALL MEDS BY PRESCRIBER/CLIN PHARMACIST DOCUMENTED: ICD-10-PCS | Mod: CPTII,95,, | Performed by: PHYSICAL MEDICINE & REHABILITATION

## 2021-11-17 PROCEDURE — 99214 PR OFFICE/OUTPT VISIT, EST, LEVL IV, 30-39 MIN: ICD-10-PCS | Mod: 95,,, | Performed by: PHYSICAL MEDICINE & REHABILITATION

## 2021-11-17 PROCEDURE — 1160F RVW MEDS BY RX/DR IN RCRD: CPT | Mod: CPTII,95,, | Performed by: PHYSICAL MEDICINE & REHABILITATION

## 2021-11-17 PROCEDURE — 1159F PR MEDICATION LIST DOCUMENTED IN MEDICAL RECORD: ICD-10-PCS | Mod: CPTII,95,, | Performed by: PHYSICAL MEDICINE & REHABILITATION

## 2021-11-17 PROCEDURE — 3044F HG A1C LEVEL LT 7.0%: CPT | Mod: CPTII,95,, | Performed by: PHYSICAL MEDICINE & REHABILITATION

## 2021-11-17 PROCEDURE — 3044F PR MOST RECENT HEMOGLOBIN A1C LEVEL <7.0%: ICD-10-PCS | Mod: CPTII,95,, | Performed by: PHYSICAL MEDICINE & REHABILITATION

## 2021-11-17 RX ORDER — TOPIRAMATE 25 MG/1
25 TABLET ORAL 2 TIMES DAILY
Qty: 60 TABLET | Refills: 11 | Status: SHIPPED | OUTPATIENT
Start: 2021-11-17 | End: 2022-12-21 | Stop reason: SDUPTHER

## 2021-11-17 RX ORDER — TIZANIDINE 2 MG/1
2 TABLET ORAL EVERY 8 HOURS PRN
Qty: 90 TABLET | Refills: 1 | Status: SHIPPED | OUTPATIENT
Start: 2021-11-17 | End: 2021-12-29

## 2021-11-18 ENCOUNTER — PATIENT MESSAGE (OUTPATIENT)
Dept: INTERNAL MEDICINE | Facility: CLINIC | Age: 46
End: 2021-11-18
Payer: COMMERCIAL

## 2021-11-18 DIAGNOSIS — I47.10 SVT (SUPRAVENTRICULAR TACHYCARDIA): Primary | ICD-10-CM

## 2021-11-18 RX ORDER — METOPROLOL SUCCINATE 25 MG/1
25 TABLET, EXTENDED RELEASE ORAL NIGHTLY
Qty: 90 TABLET | Refills: 3 | Status: SHIPPED | OUTPATIENT
Start: 2021-11-18 | End: 2022-11-17 | Stop reason: SDUPTHER

## 2021-11-23 ENCOUNTER — HOSPITAL ENCOUNTER (OUTPATIENT)
Dept: RADIOLOGY | Facility: HOSPITAL | Age: 46
Discharge: HOME | End: 2021-11-23
Attending: PHYSICAL MEDICINE & REHABILITATION
Payer: COMMERCIAL

## 2021-11-23 DIAGNOSIS — S22.070D CLOSED WEDGE COMPRESSION FRACTURE OF T10 VERTEBRA WITH ROUTINE HEALING, SUBSEQUENT ENCOUNTER: ICD-10-CM

## 2021-11-23 PROCEDURE — 72080 XR THORACOLUMBAR SPINE AP LATERAL: ICD-10-PCS | Mod: 26,,, | Performed by: RADIOLOGY

## 2021-11-23 PROCEDURE — 72080 X-RAY EXAM THORACOLMB 2/> VW: CPT | Mod: TC

## 2021-11-23 PROCEDURE — 72080 X-RAY EXAM THORACOLMB 2/> VW: CPT | Mod: 26,,, | Performed by: RADIOLOGY

## 2021-11-30 DIAGNOSIS — G89.4 CHRONIC PAIN SYNDROME: ICD-10-CM

## 2021-11-30 DIAGNOSIS — F98.8 ATTENTION DEFICIT DISORDER (ADD) WITHOUT HYPERACTIVITY: ICD-10-CM

## 2021-12-01 RX ORDER — HYDROCODONE BITARTRATE AND ACETAMINOPHEN 7.5; 325 MG/1; MG/1
1 TABLET ORAL EVERY 6 HOURS PRN
Qty: 120 TABLET | Refills: 0 | Status: SHIPPED | OUTPATIENT
Start: 2021-12-01 | End: 2021-12-29 | Stop reason: SDUPTHER

## 2021-12-01 RX ORDER — DEXTROAMPHETAMINE SACCHARATE, AMPHETAMINE ASPARTATE, DEXTROAMPHETAMINE SULFATE AND AMPHETAMINE SULFATE 2.5; 2.5; 2.5; 2.5 MG/1; MG/1; MG/1; MG/1
10 TABLET ORAL 2 TIMES DAILY PRN
Qty: 60 TABLET | Refills: 0 | Status: SHIPPED | OUTPATIENT
Start: 2021-12-01 | End: 2021-12-29 | Stop reason: SDUPTHER

## 2021-12-04 ENCOUNTER — PATIENT MESSAGE (OUTPATIENT)
Dept: INTERNAL MEDICINE | Facility: CLINIC | Age: 46
End: 2021-12-04
Payer: COMMERCIAL

## 2021-12-29 ENCOUNTER — PATIENT MESSAGE (OUTPATIENT)
Dept: PAIN MEDICINE | Facility: CLINIC | Age: 46
End: 2021-12-29
Payer: COMMERCIAL

## 2021-12-29 DIAGNOSIS — G89.4 CHRONIC PAIN SYNDROME: ICD-10-CM

## 2021-12-29 DIAGNOSIS — F98.8 ATTENTION DEFICIT DISORDER (ADD) WITHOUT HYPERACTIVITY: ICD-10-CM

## 2021-12-29 RX ORDER — CYCLOBENZAPRINE HCL 10 MG
10 TABLET ORAL 3 TIMES DAILY PRN
Qty: 90 TABLET | Refills: 0 | Status: SHIPPED | OUTPATIENT
Start: 2021-12-29 | End: 2022-02-01 | Stop reason: SDUPTHER

## 2021-12-29 RX ORDER — DEXTROAMPHETAMINE SACCHARATE, AMPHETAMINE ASPARTATE, DEXTROAMPHETAMINE SULFATE AND AMPHETAMINE SULFATE 2.5; 2.5; 2.5; 2.5 MG/1; MG/1; MG/1; MG/1
10 TABLET ORAL 2 TIMES DAILY PRN
Qty: 60 TABLET | Refills: 0 | Status: SHIPPED | OUTPATIENT
Start: 2021-12-29 | End: 2022-02-01 | Stop reason: SDUPTHER

## 2021-12-29 RX ORDER — HYDROCODONE BITARTRATE AND ACETAMINOPHEN 7.5; 325 MG/1; MG/1
1 TABLET ORAL EVERY 6 HOURS PRN
Qty: 120 TABLET | Refills: 0 | Status: ON HOLD | OUTPATIENT
Start: 2021-12-29 | End: 2022-01-06 | Stop reason: SDUPTHER

## 2021-12-29 NOTE — TELEPHONE ENCOUNTER
pt was previously on Flexeril 10mg TID PRN, he switched to Zanaflex which is  Causing him too much drowsiness, Pt would like to be put back on Flexeril. Please advise.    LOV 11/17/2021  Pt advised he needs to schedule a follow up to discuss treatment options with Dr. Cabrera

## 2022-01-03 ENCOUNTER — TELEPHONE (OUTPATIENT)
Dept: SURGERY | Facility: CLINIC | Age: 47
End: 2022-01-03
Payer: COMMERCIAL

## 2022-01-03 NOTE — TELEPHONE ENCOUNTER
"Spoke to and scheduled pt's appt with Dr Choi for Wed 1/5 [facility] @ 1400 - Pt correctly repeated back all appt information - (Needs to be seen for "Another abscess")    ----- Message from Connie Graves sent at 1/3/2022  7:45 AM CST -----  Contact: PT  .Type:  Same Day Appointment Request    Caller is requesting a same day appointment.  Caller declined first available appointment listed below.    Name of Caller: Dax   When is the first available appointment?   Symptoms: abscess   Best Call Back Number: [phone] (home)     Additional Information:            "

## 2022-01-04 ENCOUNTER — PATIENT OUTREACH (OUTPATIENT)
Dept: ADMINISTRATIVE | Facility: OTHER | Age: 47
End: 2022-01-04
Payer: COMMERCIAL

## 2022-01-04 NOTE — PROGRESS NOTES
Health Maintenance Due   Topic Date Due    TETANUS VACCINE  Never done    COVID-19 Vaccine (3 - Booster for Moderna series) 12/03/2021     Updates were requested from care everywhere.  Chart was reviewed for overdue Proactive [facility] Encounters ([facility]) topics (CRS, Breast Cancer Screening, Eye exam)  Health Maintenance has been updated.  LINKS immunization registry triggered.  Immunizations were reconciled.

## 2022-01-05 ENCOUNTER — OFFICE VISIT (OUTPATIENT)
Dept: SURGERY | Facility: CLINIC | Age: 47
End: 2022-01-05
Payer: COMMERCIAL

## 2022-01-05 VITALS
TEMPERATURE: 98 F | SYSTOLIC BLOOD PRESSURE: 118 MMHG | WEIGHT: 223.56 LBS | DIASTOLIC BLOOD PRESSURE: 73 MMHG | HEART RATE: 83 BPM | BODY MASS INDEX: 32.08 KG/M2

## 2022-01-05 DIAGNOSIS — K61.1 PERIRECTAL ABSCESS: Primary | ICD-10-CM

## 2022-01-05 DIAGNOSIS — Z83.719 FAMILY HISTORY OF COLONIC POLYPS: ICD-10-CM

## 2022-01-05 DIAGNOSIS — K57.20 DIVERTICULITIS OF LARGE INTESTINE WITH PERFORATION AND ABSCESS WITHOUT BLEEDING: ICD-10-CM

## 2022-01-05 PROCEDURE — 3074F SYST BP LT 130 MM HG: CPT | Mod: CPTII,S$GLB,, | Performed by: COLON & RECTAL SURGERY

## 2022-01-05 PROCEDURE — 3078F DIAST BP <80 MM HG: CPT | Mod: CPTII,S$GLB,, | Performed by: COLON & RECTAL SURGERY

## 2022-01-05 PROCEDURE — 3078F PR MOST RECENT DIASTOLIC BLOOD PRESSURE < 80 MM HG: ICD-10-PCS | Mod: CPTII,S$GLB,, | Performed by: COLON & RECTAL SURGERY

## 2022-01-05 PROCEDURE — 99214 PR OFFICE/OUTPT VISIT, EST, LEVL IV, 30-39 MIN: ICD-10-PCS | Mod: S$GLB,,, | Performed by: COLON & RECTAL SURGERY

## 2022-01-05 PROCEDURE — 1159F PR MEDICATION LIST DOCUMENTED IN MEDICAL RECORD: ICD-10-PCS | Mod: CPTII,S$GLB,, | Performed by: COLON & RECTAL SURGERY

## 2022-01-05 PROCEDURE — 3008F BODY MASS INDEX DOCD: CPT | Mod: CPTII,S$GLB,, | Performed by: COLON & RECTAL SURGERY

## 2022-01-05 PROCEDURE — 99999 PR PBB SHADOW E&M-EST. PATIENT-LVL IV: CPT | Mod: PBBFAC,,, | Performed by: COLON & RECTAL SURGERY

## 2022-01-05 PROCEDURE — 3008F PR BODY MASS INDEX (BMI) DOCUMENTED: ICD-10-PCS | Mod: CPTII,S$GLB,, | Performed by: COLON & RECTAL SURGERY

## 2022-01-05 PROCEDURE — 1159F MED LIST DOCD IN RCRD: CPT | Mod: CPTII,S$GLB,, | Performed by: COLON & RECTAL SURGERY

## 2022-01-05 PROCEDURE — 99999 PR PBB SHADOW E&M-EST. PATIENT-LVL IV: ICD-10-PCS | Mod: PBBFAC,,, | Performed by: COLON & RECTAL SURGERY

## 2022-01-05 PROCEDURE — 3074F PR MOST RECENT SYSTOLIC BLOOD PRESSURE < 130 MM HG: ICD-10-PCS | Mod: CPTII,S$GLB,, | Performed by: COLON & RECTAL SURGERY

## 2022-01-05 PROCEDURE — 99214 OFFICE O/P EST MOD 30 MIN: CPT | Mod: S$GLB,,, | Performed by: COLON & RECTAL SURGERY

## 2022-01-05 RX ORDER — SODIUM CHLORIDE, SODIUM LACTATE, POTASSIUM CHLORIDE, CALCIUM CHLORIDE 600; 310; 30; 20 MG/100ML; MG/100ML; MG/100ML; MG/100ML
INJECTION, SOLUTION INTRAVENOUS CONTINUOUS
Status: CANCELLED | OUTPATIENT
Start: 2022-01-05

## 2022-01-05 RX ORDER — ONDANSETRON 2 MG/ML
4 INJECTION INTRAMUSCULAR; INTRAVENOUS EVERY 12 HOURS PRN
Status: CANCELLED | OUTPATIENT
Start: 2022-01-05

## 2022-01-05 NOTE — PRE ADMISSION SCREENING
Pre op instructions reviewed with patient per phone:    To confirm, Your surgeon has instructed you:  Surgery is scheduled 1/6/22at 3:30pm.      Please report to [facility] 1st floor main lobby by 2:00pm.   Pre admit office to call afternoon prior to surgery with final arrival time    Covid 19 testing:Rapid      INSTRUCTIONS IMPORTANT!!!  ¨ Do not eat, drink, or smoke after 12 midnight prior to surgery, including water. OK to brush teeth, no gum, candy or mints!    ¨ Take only these medicines with a small swallow of water-morning of surgery.  none      -------   Do not shave morning of surgery  ____   1 visitor is  allowed in the pre operative area, no one under the age of 16,and must adhere to social distancing guidelines.  1 visitor/family member is allowed to              visit non covid  in-patients in their room        ____   Family/caregivers will be updated re pt status via text/cell phone      ____  Do not wear makeup, including mascara.  ____  No powder, lotions or creams to surgical area.  ____  Please remove all jewelry, including piercings and leave at home.  ____  No money or valuables needed. Please leave at home.  ____  Please bring identification and insurance information to hospital.  ____  If going home the same day, arrange for a ride home. You will not be able to   drive if Anesthesia was used.  ____  Children, under 12 years old, must remain in the waiting room with an adult.  They are not allowed in patient areas.  ____  Wear loose fitting clothing. Allow for dressings, bandages.  ____  Stop Aspirin, Ibuprofen, Motrin and Aleve at least 5-7 days before surgery, unless otherwise instructed by your doctor, or the nurse.   You MAY use Tylenol/acetaminophen until day of surgery.  ____  If you take diabetic medication, do not take am of surgery unless instructed by   Doctor.  ____ Stop taking any Fish Oil supplement or any Vitamins that contain Vitamin E at least 5 days  prior to surgery.          Bathing Instructions-- The night before surgery and the morning prior to coming to the hospital:   -Do not shave the surgical area.   -Shower and wash your hair and body as usual with anti-bacterial  soap and shampoo.   -Rinse your hair and body completely.   -Rinse your body thoroughly.   -Dry with clean, soft towel.  Do not use lotion, cream, deodorant, or powders on   the surgical site.    Surgical Site Infection    Prevention of surgical site infections:     -Keep incisions clean and dry.   -Do not soak/submerge incisions in water until completely healed.   -Do not apply lotions, powders, creams, or deodorants to site.   -Always make sure hands are cleaned with antibacterial soap/ alcohol-based   prior to touching the surgical site.  (This includes doctors, nurses, staff, and yourself.)    Signs and symptoms:   -Redness and pain around the area where you had surgery   -Drainage of cloudy fluid from your surgical wound   -Fever over 100.4  I have read or had read and explained to me, and understand the above information.

## 2022-01-05 NOTE — PROGRESS NOTES
History & Physical    SUBJECTIVE:     CC: Discuss colostomy reversal  Ref: Mindy Goff MD    History of Present Illness:  Patient is a 46 y.o. male presents for evaluation colostomy reversal.  Patient had perforated sigmoid diverticulitis in October 2019 requiring Kirsten's procedure. He did have a postoperative abscess or required IR drainage but has recovered well since that time.  He is currently tolerating a regular diet and having good colostomy function.  He denies any leaks.  His last colonoscopy was in 2016 and he has no history of polyps.  He does have a positive family history of colorectal cancer in his grandmother as well as colon polyps in his mother.  He denies any current bleeding per rectum or from his ostomy.  He currently denies any fever, chills, nausea, vomiting.  He does endorse low transit constipation reports a history of IBS-C. Reports weak urine stream currently since last operation, as well as some lateral abdominal wall pain since surgery. I have discussed this case personally with his referring provider and reviewed all his previous medical records.    Oct 2019: Kirsten's procedure  1/30/2020: Kirsten's reversal with partial LAR due to rectal tear during surgery  9/10/2021: EUA, perirectal abscess internal I&D    Interval history:  Since last clinic visit, the patient underwent perirectal abscess internal drainage on 9/10/21. States that after surgery, he did great and pain and drainage completely resolved. However, last week redeveloped pain and swelling near the same spot in the perianal area. No fever, chills, nausea or vomiting.    Review of patient's allergies indicates:   Allergen Reactions    Codeine Other (See Comments)     violent    Iodinated contrast media Swelling     Tongue, right eye, lips swelling. Rash on abdomen and axilla    Nuts [tree nut] Anaphylaxis    Dairy aid [lactase] Diarrhea and Nausea And Vomiting    Morphine      Tolerated hydromorphone in October  2019.        Current Outpatient Medications   Medication Sig Dispense Refill    acetaminophen (TYLENOL) 325 MG tablet Take 325 mg by mouth every 6 (six) hours as needed for Pain.      ARIPiprazole (ABILIFY) 10 MG Tab Take 1 tablet (10 mg total) by mouth once daily. (Patient taking differently: Take 10 mg by mouth every evening.) 90 tablet 3    azelastine (ASTELIN) 137 mcg (0.1 %) nasal spray Use Two sprays intranasal twice a day 30 mL 0    cefdinir (OMNICEF) 300 MG capsule Take One capsule by mouth twice a day 14 each 0    cyclobenzaprine (FLEXERIL) 10 MG tablet Take 1 tablet (10 mg total) by mouth 3 (three) times daily as needed for Muscle spasms. 90 tablet 0    dextroamphetamine-amphetamine 10 mg Tab Take 1 tablet (10 mg total) by mouth 2 (two) times daily as needed (for ADHD). 60 tablet 0    EScitalopram oxalate (LEXAPRO) 20 MG tablet Take 1 tablet (20 mg total) by mouth once daily. (Patient taking differently: Take 20 mg by mouth every evening.) 90 tablet 3    HYDROcodone-acetaminophen (NORCO) 7.5-325 mg per tablet Take 1 tablet by mouth every 6 (six) hours as needed for Pain. 120 tablet 0    linaclotide 290 mcg Cap Take 1 capsule (290 mcg total) by mouth once daily. (Patient taking differently: Take 290 mcg by mouth daily as needed.) 90 capsule 1    loratadine (CLARITIN) 10 mg tablet Take 10 mg by mouth daily as needed.       metoprolol succinate (TOPROL-XL) 25 MG 24 hr tablet Take 1 tablet (25 mg total) by mouth every evening. 90 tablet 3    predniSONE (DELTASONE) 50 MG Tab Take 1 tablet by mouth every day x 5 days 5 tablet 0    topiramate (TOPAMAX) 25 MG tablet Take 1 tablet (25 mg total) by mouth 2 (two) times daily. 60 tablet 11     No current facility-administered medications for this visit.       Past Medical History:   Diagnosis Date    Angioedema of lips 3/13/2015    IVP dye allergy - swelling lips, eye, tongue    Depression     Diverticulitis 10/07/2019    Kidney stones     Sleep  apnea      Past Surgical History:   Procedure Laterality Date    ABDOMINAL WASHOUT N/A 10/7/2019    Procedure: LAVAGE, PERITONEAL, THERAPEUTIC;  Surgeon: Mindy Goff MD;  Location: [facility] OR;  Service: General;  Laterality: N/A;  abdomen washout    ABSCESS DRAINAGE N/A 9/10/2021    Procedure: DRAINAGE, ABSCESS;  Surgeon: Kadeem Choi MD;  Location: [facility] OR;  Service: General;  Laterality: N/A;  perirectal    anal fistula repair      APPENDECTOMY      BACK SURGERY      CHOLECYSTECTOMY  2005    COLONOSCOPY N/A 3/10/2016    Procedure: COLONOSCOPY;  Surgeon: Juvenal Dinero MD;  Location: [facility] ENDO;  Service: Endoscopy;  Laterality: N/A;    COLONOSCOPY N/A 1/17/2020    Procedure: COLONOSCOPY;  Surgeon: Kadeem Choi MD;  Location: [facility] ENDO;  Service: General;  Laterality: N/A;    COLOSTOMY N/A 10/7/2019    Procedure: CREATION, COLOSTOMY;  Surgeon: Mindy Goff MD;  Location: [facility] OR;  Service: General;  Laterality: N/A;    COLOSTOMY REVERSAL      EXAMINATION UNDER ANESTHESIA N/A 9/10/2021    Procedure: Exam under anesthesia;  Surgeon: Kadeem Choi MD;  Location: [facility] OR;  Service: General;  Laterality: N/A;    SIVAKUMAR PROCEDURE N/A 10/7/2019    Procedure: SIVAKUMAR PROCEDURE;  Surgeon: Mindy Goff MD;  Location: [facility] OR;  Service: General;  Laterality: N/A;    INJECTION OF ANESTHETIC AGENT AROUND GANGLION IMPAR N/A 3/15/2021    Procedure: BLOCK, GANGLION IMPAR;  Surgeon: Ruben Cabrera MD;  Location: [facility];  Service: Pain Management;  Laterality: N/A;    INJECTION OF ANESTHETIC AGENT AROUND PUDENDAL NERVE N/A 9/10/2021    Procedure: BLOCK, NERVE, PUDENDAL;  Surgeon: Kadeem Choi MD;  Location: [facility] OR;  Service: General;  Laterality: N/A;    INJECTION OF ANESTHETIC AGENT INTO TISSUE PLANE DEFINED BY TRANSVERSUS ABDOMINIS MUSCLE N/A 1/30/2020    Procedure: BLOCK, TRANSVERSUS ABDOMINIS PLANE;  Surgeon: Kadeem Choi MD;  [facility];   Service: General;  Laterality: N/A;    KNEE SURGERY Right     KNEE SURGERY Left     LYSIS OF ADHESIONS N/A 1/30/2020    Procedure: LYSIS, ADHESIONS;  Surgeon: Kadeem Choi MD;  Location: [facility] OR;  Service: General;  Laterality: N/A;    PILONIDAL CYST DRAINAGE      pt has had 6 of these adn has had revisions    PROCTECTOMY N/A 1/30/2020    Procedure: PROCTECTOMY;  Surgeon: Kadeem Choi MD;  Location: [facility] OR;  Service: General;  Laterality: N/A;  Partial    SUBTOTAL COLECTOMY  1/30/2020    Procedure: COLECTOMY, PARTIAL;  Surgeon: Kadeem Choi MD;  Location: [facility] OR;  Service: General;;     Family History   Problem Relation Age of Onset    Heart disease Father         CABG age 66    Cancer Father         melanoma    Diabetes Father     Anesthesia problems Father         d/t polio    Diabetes Mother     Colon polyps Mother     Colon polyps Sister     Diabetes Maternal Grandmother     Colon cancer Maternal Grandmother     Diabetes Maternal Grandfather     Diabetes Paternal Grandmother     Stroke Paternal Grandmother     Diabetes Paternal Grandfather      Social History     Tobacco Use    Smoking status: Never Smoker    Smokeless tobacco: Never Used   Substance Use Topics    Alcohol use: No    Drug use: No        Review of Systems:  Review of Systems   Constitutional: Negative for activity change, appetite change, chills, fatigue, fever and unexpected weight change.   HENT: Negative for congestion, ear pain, sore throat and trouble swallowing.    Eyes: Negative for pain, redness and itching.   Respiratory: Negative for cough, shortness of breath and wheezing.    Cardiovascular: Negative for chest pain, palpitations and leg swelling.   Gastrointestinal: Positive for rectal pain. Negative for abdominal distention, abdominal pain, anal bleeding, blood in stool, constipation, diarrhea, nausea and vomiting.   Endocrine: Negative for cold intolerance, heat intolerance and polyuria.    Genitourinary: Negative for dysuria, flank pain, frequency and hematuria.   Musculoskeletal: Negative for back pain, gait problem, joint swelling and neck pain.   Skin: Negative for color change, rash and wound.   Allergic/Immunologic: Negative for environmental allergies and immunocompromised state.   Neurological: Negative for dizziness, speech difficulty, weakness and numbness.   Psychiatric/Behavioral: Negative for agitation, confusion and hallucinations.       OBJECTIVE:     Vital Signs (Most Recent)  Temp: 97.9 °F (36.6 °C) (01/05/22 1413)  Pulse: 83 (01/05/22 1413)  BP: 118/73 (01/05/22 1413)     101.4 kg (223 lb 8.7 oz)     Physical Exam:  Physical Exam  Exam conducted with a chaperone present.   Constitutional:       Appearance: He is well-developed.   HENT:      Head: Normocephalic and atraumatic.   Eyes:      Conjunctiva/sclera: Conjunctivae normal.   Neck:      Thyroid: No thyromegaly.   Cardiovascular:      Rate and Rhythm: Normal rate and regular rhythm.   Pulmonary:      Effort: Pulmonary effort is normal. No respiratory distress.   Abdominal:      General: There is no distension.      Palpations: Abdomen is soft. There is no mass.      Comments: Midline incision and LLQ ostomy site well-healed   Genitourinary:     Comments: Anorectal: +swelling and fluctuance at the left lateral perianal area just outside anal verge without expressible drainage and no external opening; KELLIE deferred due to pain  Musculoskeletal:         General: No tenderness. Normal range of motion.      Cervical back: Normal range of motion.   Skin:     General: Skin is warm and dry.      Capillary Refill: Capillary refill takes less than 2 seconds.      Findings: No rash.   Neurological:      Mental Status: He is alert and oriented to person, place, and time.         Laboratory  Lab Results   Component Value Date    WBC 5.43 11/10/2021    HGB 12.8 (L) 11/10/2021    HCT 39.6 (L) 11/10/2021     11/10/2021    CHOL 195  11/10/2021    TRIG 146 11/10/2021    HDL 41 11/10/2021    ALT 30 11/10/2021    AST 23 11/10/2021     11/10/2021    K 4.0 11/10/2021     11/10/2021    CREATININE 0.9 11/10/2021    BUN 12 11/10/2021    CO2 25 11/10/2021    TSH 1.405 11/10/2021    INR 1.0 01/10/2016    HGBA1C 5.1 11/10/2021         Pathology Results:  FINAL PATHOLOGIC DIAGNOSIS  1. Sigmoid colon (stitch marks perforation), segmental resection (20 cm in length):  - Gross and histologic evidence of transmural perforation (4 cm from nearest surgical margin)  - Associated acute chronic transmural inflammation with extension into pericolic fat resulting in abscess  formation  - Serosal adhesion formation with acute chronic serositis  - Margins histologically viable  - Negative for dysplasia or malignancy    PATHOLOGY from Kirsten's reversal Jan 2020:  1. SIGMOID COLON AND UPPER RECTUM, PARTIAL PROCTECTOMY:  - Colorectal tissue with chronic inflammation and fibrous serosal adhesions  - Submucosal foreign-body giant cell reaction associated with acellular suture-like material, consistent with  prior surgical intervention  - No evidence of dysplasia or malignancy  2. COLOSTOMY, TAKEDOWN:  - Inflamed colo-cutaneous tissue with foreign-body giant cell reaction, consistent with ostomy  3. ANASTOMOTIC RING, EXCISION:  - Colonic tissue with chronic inflammation, vascular congestion, and hyperplastic mucosal changes  - No evidence of dysplasia or malignancy    ASSESSMENT/PLAN:     45yo F with previous perforated sigmoid diverticulitis requiring Kirsten's procedure in October 2019 who is now s/p Kirsten's reversal and LAR on 1/30/2020 who recovered well postop but had new perianal/perirectal abscess requiring internal I&D in Sept 2021 and has recurred now    - to OR tomorrow for EUA, I&D of perirectal abscess  - All risks, benefits and alternatives fully explained to patient.  Risks include, but are not limited to, bleeding, infection, fecal  incontinence, damage to the sphincter muscles, postoperative abscess, postoperative pain, urinary incontinence, urinary retention, perioperative MI, CVA and death.  All questions appropriately answered to patient's satisfaction.  Consent signed and placed on chart.  - RTC postop    Kadeem Choi MD  Colon and Rectal Surgery  [facility]

## 2022-01-05 NOTE — H&P (VIEW-ONLY)
History & Physical    SUBJECTIVE:     CC: Discuss colostomy reversal  Ref: Mindy Goff MD    History of Present Illness:  Patient is a 46 y.o. male presents for evaluation colostomy reversal.  Patient had perforated sigmoid diverticulitis in October 2019 requiring Kirsten's procedure. He did have a postoperative abscess or required IR drainage but has recovered well since that time.  He is currently tolerating a regular diet and having good colostomy function.  He denies any leaks.  His last colonoscopy was in 2016 and he has no history of polyps.  He does have a positive family history of colorectal cancer in his grandmother as well as colon polyps in his mother.  He denies any current bleeding per rectum or from his ostomy.  He currently denies any fever, chills, nausea, vomiting.  He does endorse low transit constipation reports a history of IBS-C. Reports weak urine stream currently since last operation, as well as some lateral abdominal wall pain since surgery. I have discussed this case personally with his referring provider and reviewed all his previous medical records.    Oct 2019: Kirsten's procedure  1/30/2020: Kirsten's reversal with partial LAR due to rectal tear during surgery  9/10/2021: EUA, perirectal abscess internal I&D    Interval history:  Since last clinic visit, the patient underwent perirectal abscess internal drainage on 9/10/21. States that after surgery, he did great and pain and drainage completely resolved. However, last week redeveloped pain and swelling near the same spot in the perianal area. No fever, chills, nausea or vomiting.    Review of patient's allergies indicates:   Allergen Reactions    Codeine Other (See Comments)     violent    Iodinated contrast media Swelling     Tongue, right eye, lips swelling. Rash on abdomen and axilla    Nuts [tree nut] Anaphylaxis    Dairy aid [lactase] Diarrhea and Nausea And Vomiting    Morphine      Tolerated hydromorphone in October  2019.        Current Outpatient Medications   Medication Sig Dispense Refill    acetaminophen (TYLENOL) 325 MG tablet Take 325 mg by mouth every 6 (six) hours as needed for Pain.      ARIPiprazole (ABILIFY) 10 MG Tab Take 1 tablet (10 mg total) by mouth once daily. (Patient taking differently: Take 10 mg by mouth every evening.) 90 tablet 3    azelastine (ASTELIN) 137 mcg (0.1 %) nasal spray Use Two sprays intranasal twice a day 30 mL 0    cefdinir (OMNICEF) 300 MG capsule Take One capsule by mouth twice a day 14 each 0    cyclobenzaprine (FLEXERIL) 10 MG tablet Take 1 tablet (10 mg total) by mouth 3 (three) times daily as needed for Muscle spasms. 90 tablet 0    dextroamphetamine-amphetamine 10 mg Tab Take 1 tablet (10 mg total) by mouth 2 (two) times daily as needed (for ADHD). 60 tablet 0    EScitalopram oxalate (LEXAPRO) 20 MG tablet Take 1 tablet (20 mg total) by mouth once daily. (Patient taking differently: Take 20 mg by mouth every evening.) 90 tablet 3    HYDROcodone-acetaminophen (NORCO) 7.5-325 mg per tablet Take 1 tablet by mouth every 6 (six) hours as needed for Pain. 120 tablet 0    linaclotide 290 mcg Cap Take 1 capsule (290 mcg total) by mouth once daily. (Patient taking differently: Take 290 mcg by mouth daily as needed.) 90 capsule 1    loratadine (CLARITIN) 10 mg tablet Take 10 mg by mouth daily as needed.       metoprolol succinate (TOPROL-XL) 25 MG 24 hr tablet Take 1 tablet (25 mg total) by mouth every evening. 90 tablet 3    predniSONE (DELTASONE) 50 MG Tab Take 1 tablet by mouth every day x 5 days 5 tablet 0    topiramate (TOPAMAX) 25 MG tablet Take 1 tablet (25 mg total) by mouth 2 (two) times daily. 60 tablet 11     No current facility-administered medications for this visit.       Past Medical History:   Diagnosis Date    Angioedema of lips 3/13/2015    IVP dye allergy - swelling lips, eye, tongue    Depression     Diverticulitis 10/07/2019    Kidney stones     Sleep  apnea      Past Surgical History:   Procedure Laterality Date    ABDOMINAL WASHOUT N/A 10/7/2019    Procedure: LAVAGE, PERITONEAL, THERAPEUTIC;  Surgeon: Mindy Goff MD;  Location: [facility] OR;  Service: General;  Laterality: N/A;  abdomen washout    ABSCESS DRAINAGE N/A 9/10/2021    Procedure: DRAINAGE, ABSCESS;  Surgeon: Kadeem Choi MD;  Location: [facility] OR;  Service: General;  Laterality: N/A;  perirectal    anal fistula repair      APPENDECTOMY      BACK SURGERY      CHOLECYSTECTOMY  2005    COLONOSCOPY N/A 3/10/2016    Procedure: COLONOSCOPY;  Surgeon: Juvenal Dinero MD;  Location: [facility] ENDO;  Service: Endoscopy;  Laterality: N/A;    COLONOSCOPY N/A 1/17/2020    Procedure: COLONOSCOPY;  Surgeon: Kadeem Choi MD;  Location: [facility] ENDO;  Service: General;  Laterality: N/A;    COLOSTOMY N/A 10/7/2019    Procedure: CREATION, COLOSTOMY;  Surgeon: Mindy Goff MD;  Location: [facility] OR;  Service: General;  Laterality: N/A;    COLOSTOMY REVERSAL      EXAMINATION UNDER ANESTHESIA N/A 9/10/2021    Procedure: Exam under anesthesia;  Surgeon: Kadeem Choi MD;  Location: [facility] OR;  Service: General;  Laterality: N/A;    SIVAKUMAR PROCEDURE N/A 10/7/2019    Procedure: SIVAKUMAR PROCEDURE;  Surgeon: Mindy Goff MD;  Location: [facility] OR;  Service: General;  Laterality: N/A;    INJECTION OF ANESTHETIC AGENT AROUND GANGLION IMPAR N/A 3/15/2021    Procedure: BLOCK, GANGLION IMPAR;  Surgeon: Ruben Cabrera MD;  Location: [facility] PAIN MGT;  Service: Pain Management;  Laterality: N/A;    INJECTION OF ANESTHETIC AGENT AROUND PUDENDAL NERVE N/A 9/10/2021    Procedure: BLOCK, NERVE, PUDENDAL;  Surgeon: Kadeem Choi MD;  Location: [facility] OR;  Service: General;  Laterality: N/A;    INJECTION OF ANESTHETIC AGENT INTO TISSUE PLANE DEFINED BY TRANSVERSUS ABDOMINIS MUSCLE N/A 1/30/2020    Procedure: BLOCK, TRANSVERSUS ABDOMINIS PLANE;  Surgeon: Kadeem Choi MD;  Location: [facility];   Service: General;  Laterality: N/A;    KNEE SURGERY Right     KNEE SURGERY Left     LYSIS OF ADHESIONS N/A 1/30/2020    Procedure: LYSIS, ADHESIONS;  Surgeon: Kadeem Choi MD;  Location: [facility] OR;  Service: General;  Laterality: N/A;    PILONIDAL CYST DRAINAGE      pt has had 6 of these adn has had revisions    PROCTECTOMY N/A 1/30/2020    Procedure: PROCTECTOMY;  Surgeon: Kadeem Choi MD;  Location: [facility] OR;  Service: General;  Laterality: N/A;  Partial    SUBTOTAL COLECTOMY  1/30/2020    Procedure: COLECTOMY, PARTIAL;  Surgeon: Kadeem Choi MD;  Location: [facility] OR;  Service: General;;     Family History   Problem Relation Age of Onset    Heart disease Father         CABG age 66    Cancer Father         melanoma    Diabetes Father     Anesthesia problems Father         d/t polio    Diabetes Mother     Colon polyps Mother     Colon polyps Sister     Diabetes Maternal Grandmother     Colon cancer Maternal Grandmother     Diabetes Maternal Grandfather     Diabetes Paternal Grandmother     Stroke Paternal Grandmother     Diabetes Paternal Grandfather      Social History     Tobacco Use    Smoking status: Never Smoker    Smokeless tobacco: Never Used   Substance Use Topics    Alcohol use: No    Drug use: No        Review of Systems:  Review of Systems   Constitutional: Negative for activity change, appetite change, chills, fatigue, fever and unexpected weight change.   HENT: Negative for congestion, ear pain, sore throat and trouble swallowing.    Eyes: Negative for pain, redness and itching.   Respiratory: Negative for cough, shortness of breath and wheezing.    Cardiovascular: Negative for chest pain, palpitations and leg swelling.   Gastrointestinal: Positive for rectal pain. Negative for abdominal distention, abdominal pain, anal bleeding, blood in stool, constipation, diarrhea, nausea and vomiting.   Endocrine: Negative for cold intolerance, heat intolerance and polyuria.    Genitourinary: Negative for dysuria, flank pain, frequency and hematuria.   Musculoskeletal: Negative for back pain, gait problem, joint swelling and neck pain.   Skin: Negative for color change, rash and wound.   Allergic/Immunologic: Negative for environmental allergies and immunocompromised state.   Neurological: Negative for dizziness, speech difficulty, weakness and numbness.   Psychiatric/Behavioral: Negative for agitation, confusion and hallucinations.       OBJECTIVE:     Vital Signs (Most Recent)  Temp: 97.9 °F (36.6 °C) (01/05/22 1413)  Pulse: 83 (01/05/22 1413)  BP: 118/73 (01/05/22 1413)     101.4 kg (223 lb 8.7 oz)     Physical Exam:  Physical Exam  Exam conducted with a chaperone present.   Constitutional:       Appearance: He is well-developed.   HENT:      Head: Normocephalic and atraumatic.   Eyes:      Conjunctiva/sclera: Conjunctivae normal.   Neck:      Thyroid: No thyromegaly.   Cardiovascular:      Rate and Rhythm: Normal rate and regular rhythm.   Pulmonary:      Effort: Pulmonary effort is normal. No respiratory distress.   Abdominal:      General: There is no distension.      Palpations: Abdomen is soft. There is no mass.      Comments: Midline incision and LLQ ostomy site well-healed   Genitourinary:     Comments: Anorectal: +swelling and fluctuance at the left lateral perianal area just outside anal verge without expressible drainage and no external opening; KELLIE deferred due to pain  Musculoskeletal:         General: No tenderness. Normal range of motion.      Cervical back: Normal range of motion.   Skin:     General: Skin is warm and dry.      Capillary Refill: Capillary refill takes less than 2 seconds.      Findings: No rash.   Neurological:      Mental Status: He is alert and oriented to person, place, and time.         Laboratory  Lab Results   Component Value Date    WBC 5.43 11/10/2021    HGB 12.8 (L) 11/10/2021    HCT 39.6 (L) 11/10/2021     11/10/2021    CHOL 195  11/10/2021    TRIG 146 11/10/2021    HDL 41 11/10/2021    ALT 30 11/10/2021    AST 23 11/10/2021     11/10/2021    K 4.0 11/10/2021     11/10/2021    CREATININE 0.9 11/10/2021    BUN 12 11/10/2021    CO2 25 11/10/2021    TSH 1.405 11/10/2021    INR 1.0 01/10/2016    HGBA1C 5.1 11/10/2021         Pathology Results:  FINAL PATHOLOGIC DIAGNOSIS  1. Sigmoid colon (stitch marks perforation), segmental resection (20 cm in length):  - Gross and histologic evidence of transmural perforation (4 cm from nearest surgical margin)  - Associated acute chronic transmural inflammation with extension into pericolic fat resulting in abscess  formation  - Serosal adhesion formation with acute chronic serositis  - Margins histologically viable  - Negative for dysplasia or malignancy    PATHOLOGY from Kirsten's reversal Jan 2020:  1. SIGMOID COLON AND UPPER RECTUM, PARTIAL PROCTECTOMY:  - Colorectal tissue with chronic inflammation and fibrous serosal adhesions  - Submucosal foreign-body giant cell reaction associated with acellular suture-like material, consistent with  prior surgical intervention  - No evidence of dysplasia or malignancy  2. COLOSTOMY, TAKEDOWN:  - Inflamed colo-cutaneous tissue with foreign-body giant cell reaction, consistent with ostomy  3. ANASTOMOTIC RING, EXCISION:  - Colonic tissue with chronic inflammation, vascular congestion, and hyperplastic mucosal changes  - No evidence of dysplasia or malignancy    ASSESSMENT/PLAN:     47yo F with previous perforated sigmoid diverticulitis requiring Kirsten's procedure in October 2019 who is now s/p Kirsten's reversal and LAR on 1/30/2020 who recovered well postop but had new perianal/perirectal abscess requiring internal I&D in Sept 2021 and has recurred now    - to OR tomorrow for EUA, I&D of perirectal abscess  - All risks, benefits and alternatives fully explained to patient.  Risks include, but are not limited to, bleeding, infection, fecal  incontinence, damage to the sphincter muscles, postoperative abscess, postoperative pain, urinary incontinence, urinary retention, perioperative MI, CVA and death.  All questions appropriately answered to patient's satisfaction.  Consent signed and placed on chart.  - RTC postop    Kadeem Choi MD  Colon and Rectal Surgery  [facility]

## 2022-01-06 ENCOUNTER — HOSPITAL ENCOUNTER (OUTPATIENT)
Facility: HOSPITAL | Age: 47
Discharge: HOME | End: 2022-01-06
Attending: COLON & RECTAL SURGERY | Admitting: COLON & RECTAL SURGERY
Payer: COMMERCIAL

## 2022-01-06 ENCOUNTER — ANESTHESIA EVENT (OUTPATIENT)
Dept: SURGERY | Facility: HOSPITAL | Age: 47
End: 2022-01-06
Payer: COMMERCIAL

## 2022-01-06 ENCOUNTER — ANESTHESIA (OUTPATIENT)
Dept: SURGERY | Facility: HOSPITAL | Age: 47
End: 2022-01-06
Payer: COMMERCIAL

## 2022-01-06 DIAGNOSIS — G89.4 CHRONIC PAIN SYNDROME: ICD-10-CM

## 2022-01-06 DIAGNOSIS — K61.1 PERIRECTAL ABSCESS: ICD-10-CM

## 2022-01-06 LAB
CTP QC/QA: YES
SARS-COV-2 AG RESP QL IA.RAPID: NEGATIVE

## 2022-01-06 PROCEDURE — 63600175 PHARM REV CODE 636 W HCPCS: Performed by: COLON & RECTAL SURGERY

## 2022-01-06 PROCEDURE — 36000705 HC OR TIME LEV I EA ADD 15 MIN: Performed by: COLON & RECTAL SURGERY

## 2022-01-06 PROCEDURE — 71000015 HC POSTOP RECOV 1ST HR: Performed by: COLON & RECTAL SURGERY

## 2022-01-06 PROCEDURE — 25000003 PHARM REV CODE 250: Performed by: COLON & RECTAL SURGERY

## 2022-01-06 PROCEDURE — 27201423 OPTIME MED/SURG SUP & DEVICES STERILE SUPPLY: Performed by: COLON & RECTAL SURGERY

## 2022-01-06 PROCEDURE — 71000033 HC RECOVERY, INTIAL HOUR: Performed by: COLON & RECTAL SURGERY

## 2022-01-06 PROCEDURE — 25000003 PHARM REV CODE 250: Performed by: STUDENT IN AN ORGANIZED HEALTH CARE EDUCATION/TRAINING PROGRAM

## 2022-01-06 PROCEDURE — 46040 PR I&D PERIRECTAL ABSCESS: ICD-10-PCS | Mod: ,,, | Performed by: COLON & RECTAL SURGERY

## 2022-01-06 PROCEDURE — 63600175 PHARM REV CODE 636 W HCPCS: Performed by: STUDENT IN AN ORGANIZED HEALTH CARE EDUCATION/TRAINING PROGRAM

## 2022-01-06 PROCEDURE — 37000009 HC ANESTHESIA EA ADD 15 MINS: Performed by: COLON & RECTAL SURGERY

## 2022-01-06 PROCEDURE — 46040 I&D ISCHIORCT&/PERIRCT ABSC: CPT | Mod: ,,, | Performed by: COLON & RECTAL SURGERY

## 2022-01-06 PROCEDURE — C9290 INJ, BUPIVACAINE LIPOSOME: HCPCS | Performed by: COLON & RECTAL SURGERY

## 2022-01-06 PROCEDURE — 37000008 HC ANESTHESIA 1ST 15 MINUTES: Performed by: COLON & RECTAL SURGERY

## 2022-01-06 PROCEDURE — 36000704 HC OR TIME LEV I 1ST 15 MIN: Performed by: COLON & RECTAL SURGERY

## 2022-01-06 RX ORDER — SODIUM CHLORIDE, SODIUM LACTATE, POTASSIUM CHLORIDE, CALCIUM CHLORIDE 600; 310; 30; 20 MG/100ML; MG/100ML; MG/100ML; MG/100ML
INJECTION, SOLUTION INTRAVENOUS CONTINUOUS PRN
Status: DISCONTINUED | OUTPATIENT
Start: 2022-01-06 | End: 2022-01-06

## 2022-01-06 RX ORDER — SODIUM CHLORIDE 9 MG/ML
INJECTION, SOLUTION INTRAVENOUS CONTINUOUS
Status: DISCONTINUED | OUTPATIENT
Start: 2022-01-06 | End: 2022-01-06 | Stop reason: HOSPADM

## 2022-01-06 RX ORDER — ONDANSETRON 2 MG/ML
INJECTION INTRAMUSCULAR; INTRAVENOUS
Status: DISCONTINUED | OUTPATIENT
Start: 2022-01-06 | End: 2022-01-06

## 2022-01-06 RX ORDER — FENTANYL CITRATE 50 UG/ML
INJECTION, SOLUTION INTRAMUSCULAR; INTRAVENOUS
Status: DISCONTINUED | OUTPATIENT
Start: 2022-01-06 | End: 2022-01-06

## 2022-01-06 RX ORDER — BUPIVACAINE HYDROCHLORIDE 2.5 MG/ML
INJECTION, SOLUTION EPIDURAL; INFILTRATION; INTRACAUDAL
Status: DISCONTINUED | OUTPATIENT
Start: 2022-01-06 | End: 2022-01-06 | Stop reason: HOSPADM

## 2022-01-06 RX ORDER — ONDANSETRON 2 MG/ML
4 INJECTION INTRAMUSCULAR; INTRAVENOUS EVERY 12 HOURS PRN
Status: DISCONTINUED | OUTPATIENT
Start: 2022-01-06 | End: 2022-01-06 | Stop reason: HOSPADM

## 2022-01-06 RX ORDER — LIDOCAINE HYDROCHLORIDE 10 MG/ML
INJECTION, SOLUTION EPIDURAL; INFILTRATION; INTRACAUDAL; PERINEURAL
Status: DISCONTINUED | OUTPATIENT
Start: 2022-01-06 | End: 2022-01-06

## 2022-01-06 RX ORDER — HYDROCODONE BITARTRATE AND ACETAMINOPHEN 10; 325 MG/1; MG/1
1 TABLET ORAL EVERY 4 HOURS PRN
Status: DISCONTINUED | OUTPATIENT
Start: 2022-01-06 | End: 2022-01-06 | Stop reason: HOSPADM

## 2022-01-06 RX ORDER — PROPOFOL 10 MG/ML
VIAL (ML) INTRAVENOUS CONTINUOUS PRN
Status: DISCONTINUED | OUTPATIENT
Start: 2022-01-06 | End: 2022-01-06

## 2022-01-06 RX ORDER — KETAMINE HYDROCHLORIDE 50 MG/ML
INJECTION, SOLUTION INTRAMUSCULAR; INTRAVENOUS
Status: DISCONTINUED | OUTPATIENT
Start: 2022-01-06 | End: 2022-01-06

## 2022-01-06 RX ORDER — ONDANSETRON 2 MG/ML
4 INJECTION INTRAMUSCULAR; INTRAVENOUS DAILY PRN
Status: DISCONTINUED | OUTPATIENT
Start: 2022-01-06 | End: 2022-01-06 | Stop reason: HOSPADM

## 2022-01-06 RX ORDER — DEXAMETHASONE SODIUM PHOSPHATE 4 MG/ML
INJECTION, SOLUTION INTRA-ARTICULAR; INTRALESIONAL; INTRAMUSCULAR; INTRAVENOUS; SOFT TISSUE
Status: DISCONTINUED | OUTPATIENT
Start: 2022-01-06 | End: 2022-01-06

## 2022-01-06 RX ORDER — PROPOFOL 10 MG/ML
VIAL (ML) INTRAVENOUS
Status: DISCONTINUED | OUTPATIENT
Start: 2022-01-06 | End: 2022-01-06

## 2022-01-06 RX ORDER — HYDROMORPHONE HYDROCHLORIDE 2 MG/ML
0.2 INJECTION, SOLUTION INTRAMUSCULAR; INTRAVENOUS; SUBCUTANEOUS EVERY 5 MIN PRN
Status: DISCONTINUED | OUTPATIENT
Start: 2022-01-06 | End: 2022-01-06 | Stop reason: HOSPADM

## 2022-01-06 RX ORDER — OXYCODONE AND ACETAMINOPHEN 5; 325 MG/1; MG/1
1 TABLET ORAL
Status: DISCONTINUED | OUTPATIENT
Start: 2022-01-06 | End: 2022-01-06 | Stop reason: HOSPADM

## 2022-01-06 RX ORDER — MIDAZOLAM HYDROCHLORIDE 1 MG/ML
INJECTION, SOLUTION INTRAMUSCULAR; INTRAVENOUS
Status: DISCONTINUED | OUTPATIENT
Start: 2022-01-06 | End: 2022-01-06

## 2022-01-06 RX ORDER — HYDROCODONE BITARTRATE AND ACETAMINOPHEN 5; 325 MG/1; MG/1
1 TABLET ORAL EVERY 4 HOURS PRN
Status: DISCONTINUED | OUTPATIENT
Start: 2022-01-06 | End: 2022-01-06 | Stop reason: HOSPADM

## 2022-01-06 RX ORDER — SODIUM CHLORIDE, SODIUM LACTATE, POTASSIUM CHLORIDE, CALCIUM CHLORIDE 600; 310; 30; 20 MG/100ML; MG/100ML; MG/100ML; MG/100ML
INJECTION, SOLUTION INTRAVENOUS CONTINUOUS
Status: DISCONTINUED | OUTPATIENT
Start: 2022-01-06 | End: 2022-01-06 | Stop reason: HOSPADM

## 2022-01-06 RX ORDER — HYDROCODONE BITARTRATE AND ACETAMINOPHEN 7.5; 325 MG/1; MG/1
1 TABLET ORAL EVERY 6 HOURS PRN
Qty: 15 TABLET | Refills: 0 | Status: SHIPPED | OUTPATIENT
Start: 2022-01-06 | End: 2022-01-06

## 2022-01-06 RX ORDER — KETOROLAC TROMETHAMINE 30 MG/ML
15 INJECTION, SOLUTION INTRAMUSCULAR; INTRAVENOUS EVERY 8 HOURS PRN
Status: DISCONTINUED | OUTPATIENT
Start: 2022-01-06 | End: 2022-01-06 | Stop reason: HOSPADM

## 2022-01-06 RX ADMIN — MIDAZOLAM 2 MG: 1 INJECTION INTRAMUSCULAR; INTRAVENOUS at 04:01

## 2022-01-06 RX ADMIN — KETAMINE HYDROCHLORIDE 25 MG: 50 INJECTION, SOLUTION, CONCENTRATE INTRAMUSCULAR; INTRAVENOUS at 04:01

## 2022-01-06 RX ADMIN — SODIUM CHLORIDE, SODIUM LACTATE, POTASSIUM CHLORIDE, AND CALCIUM CHLORIDE: 600; 310; 30; 20 INJECTION, SOLUTION INTRAVENOUS at 04:01

## 2022-01-06 RX ADMIN — PROPOFOL 100 MG: 10 INJECTION, EMULSION INTRAVENOUS at 04:01

## 2022-01-06 RX ADMIN — PROPOFOL 200 MCG/KG/MIN: 10 INJECTION, EMULSION INTRAVENOUS at 04:01

## 2022-01-06 RX ADMIN — HYDROCODONE BITARTRATE AND ACETAMINOPHEN 1 TABLET: 5; 325 TABLET ORAL at 05:01

## 2022-01-06 RX ADMIN — DEXAMETHASONE SODIUM PHOSPHATE 4 MG: 4 INJECTION, SOLUTION INTRAMUSCULAR; INTRAVENOUS at 04:01

## 2022-01-06 RX ADMIN — FENTANYL CITRATE 100 MCG: 50 INJECTION, SOLUTION INTRAMUSCULAR; INTRAVENOUS at 04:01

## 2022-01-06 RX ADMIN — ONDANSETRON 4 MG: 2 INJECTION, SOLUTION INTRAMUSCULAR; INTRAVENOUS at 04:01

## 2022-01-06 RX ADMIN — LIDOCAINE HYDROCHLORIDE 100 MG: 10 INJECTION, SOLUTION EPIDURAL; INFILTRATION; INTRACAUDAL; PERINEURAL at 04:01

## 2022-01-06 NOTE — OP NOTE
[facility] - Surgery ([facility])  Surgery Department  Operative Note    SUMMARY     Date of Procedure: 1/6/2022     Procedure: Procedure(s) (LRB):  Exam under anesthesia, perirectal abscess I&D (N/A)  BLOCK, NERVE, PUDENDAL (N/A)     Surgeon(s) and Role:     * Kadeem Choi MD - Primary    Assisting Surgeon: None    Pre-Operative Diagnosis: Perirectal abscess [K61.1]    Post-Operative Diagnosis: Post-Op Diagnosis Codes:     * Perirectal abscess [K61.1]    Anesthesia: Local MAC    Technical Procedures Used:   1. Perirectal abscess incision and drainage  2. Pudendal nerve block    Indications for Procedure:  46-year-old male with a left lateral perirectal abscess who presents for definitive surgical management    Findings of the Procedure:  Left lateral perirectal abscess as expected with a 2 cm abscess cavity underneath the skin without evidence of fistula or internal connection    Description of the Procedure:  Patient was brought to the operating placed supine on table.  Mac anesthesia was then induced.  The patient was moved to lithotomy position.  The anus and perineum were then prepped and draped usual sterile fashion.  A preoperative surgical time-out was performed for the correct patient, procedure and preop medications given.  A digital rectal exam was then performed confirming the palpable area of induration and fluctuance along the left lateral perianal area.  There was no expressible drainage externally but she could feel the induration internally.  A lighted Hill-Mckinney retractor was inserted to the anus in all 4 quadrants were examined.  There was no internal connection but there was some minor enlarged hemorrhoids.  A pudendal nerve block was then performed using a mixture of 20 cc of Exparel and 30 cc of 0.25% bupivacaine plain.  10 cc injected subdermally around the anal verge, 20 cc injected bilaterally into the intersphincteric space and 20 cc injected bilaterally into the ischial fossa with  pudendal nerve enters for total of 50 cc given for the block.  The area of maximum fluctuance induration was evaluated along the left lateral perianal area.  This coincided to a previous likely fistulotomy site.  A cruciate incision was made over this area and the abscess cavity was entered.  A small amount of purulence was expressed along with some purulent bloody discharge.  This area was copiously irrigated all loculations were broken up.  The final measurement of the abscess cavity was 2 x 2 cm.  A Hill-Mckinney was inserted back into the anus and there were attempts to see if there was a fistulous connection present but there was none.  The areas then copiously irrigated and attempt to be made hemostatic.  Once this had been fully drained the fluctuance and induration were drastically improved.  The wound was then slightly packed with Kerlix to assist with hemostasis.  Surgical dressings were then applied.  The patient was moved back in the supine position.  He was woken from mac anesthesia and taken to the postanesthesia care in stable condition.  He tolerated procedure well.  All sponge, needle and instrument counts were correct at the end of the case.    Significant Surgical Tasks Conducted by the Assistant(s), if Applicable: N/A    Complications: No    Estimated Blood Loss (EBL): 10mL           Implants: * No implants in log *    Specimens:   Specimen (24h ago, onward)            None                  Condition: Good    Disposition: PACU - hemodynamically stable.    Attestation: I performed the procedure.

## 2022-01-06 NOTE — TRANSFER OF CARE
"Anesthesia Transfer of Care Note    Patient: Dax Carlisle    Procedure(s) Performed: Procedure(s) (LRB):  Exam under anesthesia, perirectal abscess I&D (N/A)    Patient location: PACU    Anesthesia Type: MAC    Transport from OR: Transported from OR on room air with adequate spontaneous ventilation    Post pain: adequate analgesia    Post assessment: no apparent anesthetic complications and tolerated procedure well    Post vital signs: stable    Level of consciousness: awake, responds to stimulation and alert    Nausea/Vomiting: no nausea/vomiting    Complications: none    Transfer of care protocol was followed      Last vitals:   Visit Vitals  /80 (BP Location: Right arm, Patient Position: Sitting)   Pulse 72   Temp 36.7 °C (98.1 °F) (Tympanic)   Resp 16   Ht 5' 9.5" (1.765 m)   Wt 99.3 kg (218 lb 14.7 oz)   SpO2 99%   BMI 31.86 kg/m²     "

## 2022-01-06 NOTE — ANESTHESIA PREPROCEDURE EVALUATION
01/06/2022  Dax Carlisle is a 46 y.o., male.  Past Medical History:   Diagnosis Date    Angioedema of lips 3/13/2015    IVP dye allergy - swelling lips, eye, tongue    Asthma     childhood    Depression     Diverticulitis 10/07/2019    Hypertension     Kidney stones     Sleep apnea     cpap not required     Past Surgical History:   Procedure Laterality Date    ABDOMINAL WASHOUT N/A 10/7/2019    Procedure: LAVAGE, PERITONEAL, THERAPEUTIC;  Surgeon: Mindy Goff MD;  Location: [facility] OR;  Service: General;  Laterality: N/A;  abdomen washout    ABSCESS DRAINAGE N/A 9/10/2021    Procedure: DRAINAGE, ABSCESS;  Surgeon: Kadeem Choi MD;  Location: [facility] OR;  Service: General;  Laterality: N/A;  perirectal    anal fistula repair      APPENDECTOMY      BACK SURGERY      CHOLECYSTECTOMY  2005    COLONOSCOPY N/A 3/10/2016    Procedure: COLONOSCOPY;  Surgeon: Juvenal Dinero MD;  Location: [facility] ENDO;  Service: Endoscopy;  Laterality: N/A;    COLONOSCOPY N/A 1/17/2020    Procedure: COLONOSCOPY;  Surgeon: Kadeem Choi MD;  Location: [facility] ENDO;  Service: General;  Laterality: N/A;    COLOSTOMY N/A 10/7/2019    Procedure: CREATION, COLOSTOMY;  Surgeon: Mindy Goff MD;  Location: [facility] OR;  Service: General;  Laterality: N/A;    COLOSTOMY REVERSAL      EXAMINATION UNDER ANESTHESIA N/A 9/10/2021    Procedure: Exam under anesthesia;  Surgeon: Kadeem Choi MD;  Location: [facility] OR;  Service: General;  Laterality: N/A;    SIVAKUMAR PROCEDURE N/A 10/7/2019    Procedure: SIVAKUMAR PROCEDURE;  Surgeon: Mindy Goff MD;  Location: [facility] OR;  Service: General;  Laterality: N/A;    INJECTION OF ANESTHETIC AGENT AROUND GANGLION IMPAR N/A 3/15/2021    Procedure: BLOCK, GANGLION IMPAR;  Surgeon: Ruben Cabrera MD;  Location: [facility] PAIN MGT;  Service: Pain Management;   Laterality: N/A;    INJECTION OF ANESTHETIC AGENT AROUND PUDENDAL NERVE N/A 9/10/2021    Procedure: BLOCK, NERVE, PUDENDAL;  Surgeon: Kadeem Choi MD;  Location: [facility] OR;  Service: General;  Laterality: N/A;    INJECTION OF ANESTHETIC AGENT INTO TISSUE PLANE DEFINED BY TRANSVERSUS ABDOMINIS MUSCLE N/A 1/30/2020    Procedure: BLOCK, TRANSVERSUS ABDOMINIS PLANE;  Surgeon: Kadeem Choi MD;  Location: [facility] OR;  Service: General;  Laterality: N/A;    KNEE SURGERY Right     KNEE SURGERY Left     LYSIS OF ADHESIONS N/A 1/30/2020    Procedure: LYSIS, ADHESIONS;  Surgeon: Kadeem Choi MD;  Location: [facility] OR;  Service: General;  Laterality: N/A;    PILONIDAL CYST DRAINAGE      pt has had 6 of these adn has had revisions    PROCTECTOMY N/A 1/30/2020    Procedure: PROCTECTOMY;  Surgeon: Kadeem Choi MD;  Location: [facility] OR;  Service: General;  Laterality: N/A;  Partial    SUBTOTAL COLECTOMY  1/30/2020    Procedure: COLECTOMY, PARTIAL;  Surgeon: Kadeem Choi MD;  Location: [facility] OR;  Service: General;;         Pre-op Assessment    I have reviewed the Patient Summary Reports.     I have reviewed the Nursing Notes. I have reviewed the NPO Status.   I have reviewed the Medications.     Review of Systems  Anesthesia Hx:  No problems with previous Anesthesia Denies Hx of Anesthetic complications  Denies Family Hx of Anesthesia complications.   Denies Personal Hx of Anesthesia complications.   Cardiovascular:   ECG has been reviewed.    Pulmonary:   Asthma Sleep Apnea    Renal/:   Chronic Renal Disease renal calculi    Psych:   Psychiatric History depression          Physical Exam  General:  Obesity    Airway/Jaw/Neck:  Airway Findings: Mouth Opening: Normal Tongue: Normal  General Airway Assessment: Adult  Mallampati: II  TM Distance: 4 - 6 cm  Jaw/Neck Findings:  Neck ROM: Normal ROM      Dental:  Dental Findings: In tact   Chest/Lungs:  Chest/Lungs Findings: Clear to auscultation, Normal  Respiratory Rate     Heart/Vascular:  Heart Findings: Rate: Normal  Rhythm: Regular Rhythm  Sounds: Normal        Mental Status:  Mental Status Findings:  Cooperative, Alert and Oriented       Lab Results   Component Value Date    WBC 5.43 11/10/2021    HGB 12.8 (L) 11/10/2021    HCT 39.6 (L) 11/10/2021    MCV 87 11/10/2021     11/10/2021         Chemistry        Component Value Date/Time     11/10/2021 0930    K 4.0 11/10/2021 0930     11/10/2021 0930    CO2 25 11/10/2021 0930    BUN 12 11/10/2021 0930    CREATININE 0.9 11/10/2021 0930    GLU 88 11/10/2021 0930        Component Value Date/Time    CALCIUM 9.1 11/10/2021 0930    ALKPHOS 66 11/10/2021 0930    AST 23 11/10/2021 0930    ALT 30 11/10/2021 0930    BILITOT 0.4 11/10/2021 0930    ESTGFRAFRICA >60.0 11/10/2021 0930    EGFRNONAA >60.0 11/10/2021 0930            Anesthesia Plan  Type of Anesthesia, risks & benefits discussed:  Anesthesia Type:  general, MAC    Patient's Preference: MAC  Plan Factors:          Intra-op Monitoring Plan: standard ASA monitors  Intra-op Monitoring Plan Comments:   Post Op Pain Control Plan: per primary service following discharge from PACU  Post Op Pain Control Plan Comments:     Induction:   IV  Beta Blocker:  Patient is not currently on a Beta-Blocker (No further documentation required).       Informed Consent: Patient understands risks and agrees with Anesthesia plan.  Questions answered. Anesthesia consent signed with patient.  ASA Score: 2     Day of Surgery Review of History & Physical: I have interviewed and examined the patient. I have reviewed the patient's H&P dated:  There are no significant changes.  H&P update referred to the surgeon.         Ready For Surgery From Anesthesia Perspective.       Echo 7.28.20:  · Normal left ventricular systolic function. The estimated ejection fraction is 55%.  · Normal LV diastolic function.  · Normal right ventricular systolic function.     Nuc Stress 7.28.20:       The study shows normal myocardial perfusion.    The perfusion scan is free of evidence from myocardial ischemia or injury.    Gated perfusion images showed an ejection fraction of 67% at rest and 64% post stress.    The EKG portion of this study is negative for ischemia.    The patient reported no chest pain during the stress test.    There were no arrhythmias during stress.

## 2022-01-07 NOTE — ANESTHESIA POSTPROCEDURE EVALUATION
Anesthesia Post Evaluation    Patient: Dax Carlisle    Procedure(s) Performed: Procedure(s) (LRB):  Exam under anesthesia, perirectal abscess I&D (N/A)  BLOCK, NERVE, PUDENDAL (N/A)    Final Anesthesia Type: general      Patient location during evaluation: PACU  Patient participation: Yes- Able to Participate  Level of consciousness: awake and alert and oriented  Post-procedure vital signs: reviewed and stable  Pain management: adequate  Airway patency: patent    PONV status at discharge: No PONV  Anesthetic complications: no      Cardiovascular status: blood pressure returned to baseline, stable and hemodynamically stable  Respiratory status: unassisted  Hydration status: euvolemic  Follow-up not needed.          Vitals Value Taken Time   /86 01/06/22 1738   Temp 36.2 °C (97.1 °F) 01/06/22 1740   Pulse 77 01/06/22 1741   Resp 8 01/06/22 1740   SpO2 99 % 01/06/22 1741   Vitals shown include unvalidated device data.      Event Time   Out of Recovery 17:38:00         Pain/Corie Score: Pain Rating Prior to Med Admin: 5 (1/6/2022  5:37 PM)  Corie Score: 10 (1/6/2022  5:30 PM)

## 2022-01-07 NOTE — DISCHARGE SUMMARY
[facility]  Discharge Note  Short Stay    Admit Date: 1/6/2022    Discharge Date and Time: 1/6/2022  6:22 PM     Attending Physician: Kadeem Choi     Discharge Provider: Kadeem Choi    Diagnoses:  Perirectal abscess    Discharged Condition: good    Hospital Course: Patient was admitted for an outpatient procedure and tolerated the procedure well with no complications.    Final Diagnoses: Same as principal problem.    Disposition: Home or Self Care    Follow up/Patient Instructions:    Medications:  Reconciled Home Medications:      Medication List      CHANGE how you take these medications    ARIPiprazole 10 MG Tab  Commonly known as: ABILIFY  Take 1 tablet (10 mg total) by mouth once daily.  What changed: when to take this     EScitalopram oxalate 20 MG tablet  Commonly known as: LEXAPRO  Take 1 tablet (20 mg total) by mouth once daily.  What changed: when to take this     linaCLOtide 290 mcg Cap capsule  Commonly known as: LINZESS  Take 1 capsule (290 mcg total) by mouth once daily.  What changed:   · when to take this  · reasons to take this        CONTINUE taking these medications    acetaminophen 325 MG tablet  Commonly known as: TYLENOL  Take 325 mg by mouth every 6 (six) hours as needed for Pain.     azelastine 137 mcg (0.1 %) nasal spray  Commonly known as: ASTELIN  Use Two sprays intranasal twice a day     cyclobenzaprine 10 MG tablet  Commonly known as: FLEXERIL  Take 1 tablet (10 mg total) by mouth 3 (three) times daily as needed for Muscle spasms.     dextroamphetamine-amphetamine 10 mg Tab  Take 1 tablet (10 mg total) by mouth 2 (two) times daily as needed (for ADHD).     loratadine 10 mg tablet  Commonly known as: CLARITIN  Take 10 mg by mouth daily as needed.     metoprolol succinate 25 MG 24 hr tablet  Commonly known as: TOPROL-XL  Take 1 tablet (25 mg total) by mouth every evening.     topiramate 25 MG tablet  Commonly known as: TOPAMAX  Take 1 tablet (25 mg total) by mouth 2  (two) times daily.        STOP taking these medications    cefdinir 300 MG capsule  Commonly known as: OMNICEF     HYDROcodone-acetaminophen 7.5-325 mg per tablet  Commonly known as: NORCO     predniSONE 50 MG Tab  Commonly known as: DELTASONE          Discharge Procedure Orders   Diet general     Call MD for:  extreme fatigue     Call MD for:  persistent dizziness or light-headedness     Call MD for:  hives     Call MD for:  redness, tenderness, or signs of infection (pain, swelling, redness, odor or green/yellow discharge around incision site)     Call MD for:  difficulty breathing, headache or visual disturbances     Call MD for:  severe uncontrolled pain     Call MD for:  temperature >100.4     Call MD for:  persistent nausea and vomiting     Remove dressing in 24 hours   Order Comments: Remove packing in 24 hours in warm bath     Activity as tolerated      Follow-up Information     Kadeem Choi MD In 3 weeks.    Specialties: Colon and Rectal Surgery, General Surgery  Contact information:  [address] [place] [address] DR Prabhu GALLARDO [id number]  [phone]                         Discharge Procedure Orders (must include Diet, Follow-up, Activity):   Discharge Procedure Orders (must include Diet, Follow-up, Activity)   Diet general     Call MD for:  extreme fatigue     Call MD for:  persistent dizziness or light-headedness     Call MD for:  hives     Call MD for:  redness, tenderness, or signs of infection (pain, swelling, redness, odor or green/yellow discharge around incision site)     Call MD for:  difficulty breathing, headache or visual disturbances     Call MD for:  severe uncontrolled pain     Call MD for:  temperature >100.4     Call MD for:  persistent nausea and vomiting     Remove dressing in 24 hours   Order Comments: Remove packing in 24 hours in warm bath     Activity as tolerated

## 2022-01-07 NOTE — PLAN OF CARE
Discharge instructions given to patient and patient's family. Understanding verbalized.  No other significant changes.  Stable for discharge.

## 2022-01-11 ENCOUNTER — TELEPHONE (OUTPATIENT)
Dept: SURGERY | Facility: CLINIC | Age: 47
End: 2022-01-11
Payer: COMMERCIAL

## 2022-01-11 NOTE — TELEPHONE ENCOUNTER
Spoke to and scheduled pt's Post Op appt with Dr Choi for Monday 1/31 [facility] @ 0840 - Pt correctly repeated back all appt information    ----- Message from Sammi Stewart sent at 1/11/2022 11:30 AM CST -----  Contact: pt  Type:  Sooner Apoointment Request    Caller is requesting a sooner appointment.  Caller declined first available appointment listed below.  Caller will not accept being placed on the waitlist and is requesting a message be sent to doctor.  Name of Caller: pt   When is the first available appointment?02/28  Symptoms: post op / seen 3 wks from 01/6  Would the patient rather a call back or a response via [facility]? Pt portal   Best Call Back Number:   Additional Information:

## 2022-01-20 VITALS
BODY MASS INDEX: 31.34 KG/M2 | HEIGHT: 70 IN | SYSTOLIC BLOOD PRESSURE: 116 MMHG | RESPIRATION RATE: 18 BRPM | TEMPERATURE: 97 F | WEIGHT: 218.94 LBS | DIASTOLIC BLOOD PRESSURE: 70 MMHG | OXYGEN SATURATION: 100 % | HEART RATE: 60 BPM

## 2022-01-21 ENCOUNTER — PATIENT MESSAGE (OUTPATIENT)
Dept: SURGERY | Facility: HOSPITAL | Age: 47
End: 2022-01-21
Payer: COMMERCIAL

## 2022-01-31 ENCOUNTER — OFFICE VISIT (OUTPATIENT)
Dept: SURGERY | Facility: CLINIC | Age: 47
End: 2022-01-31
Payer: COMMERCIAL

## 2022-01-31 VITALS
WEIGHT: 222 LBS | TEMPERATURE: 97 F | SYSTOLIC BLOOD PRESSURE: 110 MMHG | DIASTOLIC BLOOD PRESSURE: 74 MMHG | BODY MASS INDEX: 32.31 KG/M2 | HEART RATE: 88 BPM

## 2022-01-31 DIAGNOSIS — K61.1 PERIRECTAL ABSCESS: Primary | ICD-10-CM

## 2022-01-31 PROCEDURE — 99024 PR POST-OP FOLLOW-UP VISIT: ICD-10-PCS | Mod: S$GLB,,, | Performed by: COLON & RECTAL SURGERY

## 2022-01-31 PROCEDURE — 3008F PR BODY MASS INDEX (BMI) DOCUMENTED: ICD-10-PCS | Mod: CPTII,S$GLB,, | Performed by: COLON & RECTAL SURGERY

## 2022-01-31 PROCEDURE — 1159F PR MEDICATION LIST DOCUMENTED IN MEDICAL RECORD: ICD-10-PCS | Mod: CPTII,S$GLB,, | Performed by: COLON & RECTAL SURGERY

## 2022-01-31 PROCEDURE — 3074F PR MOST RECENT SYSTOLIC BLOOD PRESSURE < 130 MM HG: ICD-10-PCS | Mod: CPTII,S$GLB,, | Performed by: COLON & RECTAL SURGERY

## 2022-01-31 PROCEDURE — 99024 POSTOP FOLLOW-UP VISIT: CPT | Mod: S$GLB,,, | Performed by: COLON & RECTAL SURGERY

## 2022-01-31 PROCEDURE — 99999 PR PBB SHADOW E&M-EST. PATIENT-LVL III: ICD-10-PCS | Mod: PBBFAC,,, | Performed by: COLON & RECTAL SURGERY

## 2022-01-31 PROCEDURE — 99999 PR PBB SHADOW E&M-EST. PATIENT-LVL III: CPT | Mod: PBBFAC,,, | Performed by: COLON & RECTAL SURGERY

## 2022-01-31 PROCEDURE — 3078F PR MOST RECENT DIASTOLIC BLOOD PRESSURE < 80 MM HG: ICD-10-PCS | Mod: CPTII,S$GLB,, | Performed by: COLON & RECTAL SURGERY

## 2022-01-31 PROCEDURE — 3008F BODY MASS INDEX DOCD: CPT | Mod: CPTII,S$GLB,, | Performed by: COLON & RECTAL SURGERY

## 2022-01-31 PROCEDURE — 3078F DIAST BP <80 MM HG: CPT | Mod: CPTII,S$GLB,, | Performed by: COLON & RECTAL SURGERY

## 2022-01-31 PROCEDURE — 3074F SYST BP LT 130 MM HG: CPT | Mod: CPTII,S$GLB,, | Performed by: COLON & RECTAL SURGERY

## 2022-01-31 PROCEDURE — 1159F MED LIST DOCD IN RCRD: CPT | Mod: CPTII,S$GLB,, | Performed by: COLON & RECTAL SURGERY

## 2022-01-31 NOTE — PROGRESS NOTES
History & Physical    SUBJECTIVE:     CC: Discuss colostomy reversal  Ref: Mindy Goff MD    History of Present Illness:  Patient is a 46 y.o. male presents for evaluation colostomy reversal.  Patient had perforated sigmoid diverticulitis in October 2019 requiring Kirsten's procedure. He did have a postoperative abscess or required IR drainage but has recovered well since that time.  He is currently tolerating a regular diet and having good colostomy function.  He denies any leaks.  His last colonoscopy was in 2016 and he has no history of polyps.  He does have a positive family history of colorectal cancer in his grandmother as well as colon polyps in his mother.  He denies any current bleeding per rectum or from his ostomy.  He currently denies any fever, chills, nausea, vomiting.  He does endorse low transit constipation reports a history of IBS-C. Reports weak urine stream currently since last operation, as well as some lateral abdominal wall pain since surgery. I have discussed this case personally with his referring provider and reviewed all his previous medical records.    Oct 2019: Kirsten's procedure  1/30/2020: Kirsten's reversal with partial LAR due to rectal tear during surgery  9/10/2021: EUA, perirectal abscess internal I&D  01/06/2022: EUA, perirectal abscess I&D      Interval history:  Since the last visit, the patient underwent EUA with perirectal I&D on 01/06/2022. He has been doing well since surgery with resolution of pain. He has no new complaints. Denies any drainage from the area. No fevers, chills, nausea, and vomiting.     Review of patient's allergies indicates:   Allergen Reactions    Codeine Other (See Comments)     violent    Iodinated contrast media Swelling     Tongue, right eye, lips swelling. Rash on abdomen and axilla    Nuts [tree nut] Anaphylaxis    Dairy aid [lactase] Diarrhea and Nausea And Vomiting    Morphine      Tolerated hydromorphone in October 2019.         Current Outpatient Medications   Medication Sig Dispense Refill    acetaminophen (TYLENOL) 325 MG tablet Take 325 mg by mouth every 6 (six) hours as needed for Pain.      azelastine (ASTELIN) 137 mcg (0.1 %) nasal spray Use Two sprays intranasal twice a day 30 mL 0    dextroamphetamine-amphetamine 10 mg Tab Take 1 tablet (10 mg total) by mouth 2 (two) times daily as needed (for ADHD). 60 tablet 0    linaclotide 290 mcg Cap Take 1 capsule (290 mcg total) by mouth once daily. (Patient taking differently: Take 290 mcg by mouth daily as needed.) 90 capsule 1    loratadine (CLARITIN) 10 mg tablet Take 10 mg by mouth daily as needed.       metoprolol succinate (TOPROL-XL) 25 MG 24 hr tablet Take 1 tablet (25 mg total) by mouth every evening. 90 tablet 3    topiramate (TOPAMAX) 25 MG tablet Take 1 tablet (25 mg total) by mouth 2 (two) times daily. 60 tablet 11    ARIPiprazole (ABILIFY) 10 MG Tab Take 1 tablet (10 mg total) by mouth once daily. (Patient taking differently: Take 10 mg by mouth every evening.) 90 tablet 3    EScitalopram oxalate (LEXAPRO) 20 MG tablet Take 1 tablet (20 mg total) by mouth once daily. (Patient taking differently: Take 20 mg by mouth every evening.) 90 tablet 3     No current facility-administered medications for this visit.       Past Medical History:   Diagnosis Date    Angioedema of lips 3/13/2015    IVP dye allergy - swelling lips, eye, tongue    Asthma     childhood    Depression     Diverticulitis 10/07/2019    Hypertension     Kidney stones     Sleep apnea     cpap not required     Past Surgical History:   Procedure Laterality Date    ABDOMINAL WASHOUT N/A 10/7/2019    Procedure: LAVAGE, PERITONEAL, THERAPEUTIC;  Surgeon: Mindy Goff MD;  Location: [facility] OR;  Service: General;  Laterality: N/A;  abdomen washout    ABSCESS DRAINAGE N/A 9/10/2021    Procedure: DRAINAGE, ABSCESS;  Surgeon: Kadeem Choi MD;  Location: [facility] OR;  Service: General;   Laterality: N/A;  perirectal    anal fistula repair      APPENDECTOMY      BACK SURGERY      CHOLECYSTECTOMY  2005    COLONOSCOPY N/A 3/10/2016    Procedure: COLONOSCOPY;  Surgeon: Juvenal Dinero MD;  Location: [facility] ENDO;  Service: Endoscopy;  Laterality: N/A;    COLONOSCOPY N/A 1/17/2020    Procedure: COLONOSCOPY;  Surgeon: Kadeem Choi MD;  Location: [facility] ENDO;  Service: General;  Laterality: N/A;    COLOSTOMY N/A 10/7/2019    Procedure: CREATION, COLOSTOMY;  Surgeon: Mindy Goff MD;  Location: [facility] OR;  Service: General;  Laterality: N/A;    COLOSTOMY REVERSAL      EXAMINATION UNDER ANESTHESIA N/A 9/10/2021    Procedure: Exam under anesthesia;  Surgeon: Kadeem Choi MD;  Location: [facility] OR;  Service: General;  Laterality: N/A;    EXAMINATION UNDER ANESTHESIA N/A 1/6/2022    Procedure: Exam under anesthesia, perirectal abscess I&D;  Surgeon: Kadeem Choi MD;  Location: [facility] OR;  Service: General;  Laterality: N/A;    SIVAKUMAR PROCEDURE N/A 10/7/2019    Procedure: SIVAKUMAR PROCEDURE;  Surgeon: Mindy Goff MD;  Location: [facility] OR;  Service: General;  Laterality: N/A;    INJECTION OF ANESTHETIC AGENT AROUND GANGLION IMPAR N/A 3/15/2021    Procedure: BLOCK, GANGLION IMPAR;  Surgeon: Ruben Cabrera MD;  Location: [facility];  Service: Pain Management;  Laterality: N/A;    INJECTION OF ANESTHETIC AGENT AROUND PUDENDAL NERVE N/A 9/10/2021    Procedure: BLOCK, NERVE, PUDENDAL;  Surgeon: Kadeem Choi MD;  Location: [facility] OR;  Service: General;  Laterality: N/A;    INJECTION OF ANESTHETIC AGENT AROUND PUDENDAL NERVE N/A 1/6/2022    Procedure: BLOCK, NERVE, PUDENDAL;  Surgeon: Kadeem Choi MD;  Location: [facility] OR;  Service: General;  Laterality: N/A;    INJECTION OF ANESTHETIC AGENT INTO TISSUE PLANE DEFINED BY TRANSVERSUS ABDOMINIS MUSCLE N/A 1/30/2020    Procedure: BLOCK, TRANSVERSUS ABDOMINIS PLANE;  Surgeon: Kadeem Choi MD;  Location: [facility] OR;   Service: General;  Laterality: N/A;    KNEE SURGERY Right     KNEE SURGERY Left     LYSIS OF ADHESIONS N/A 1/30/2020    Procedure: LYSIS, ADHESIONS;  Surgeon: Kadeem Choi MD;  Location: [facility] OR;  Service: General;  Laterality: N/A;    PILONIDAL CYST DRAINAGE      pt has had 6 of these adn has had revisions    PROCTECTOMY N/A 1/30/2020    Procedure: PROCTECTOMY;  Surgeon: Kadeem Choi MD;  Location: [facility] OR;  Service: General;  Laterality: N/A;  Partial    SUBTOTAL COLECTOMY  1/30/2020    Procedure: COLECTOMY, PARTIAL;  Surgeon: Kadeem Choi MD;  Location: [facility] OR;  Service: General;;     Family History   Problem Relation Age of Onset    Heart disease Father         CABG age 66    Cancer Father         melanoma    Diabetes Father     Anesthesia problems Father         d/t polio    Diabetes Mother     Colon polyps Mother     Colon polyps Sister     Diabetes Maternal Grandmother     Colon cancer Maternal Grandmother     Diabetes Maternal Grandfather     Diabetes Paternal Grandmother     Stroke Paternal Grandmother     Diabetes Paternal Grandfather      Social History     Tobacco Use    Smoking status: Never Smoker    Smokeless tobacco: Never Used   Substance Use Topics    Alcohol use: Never    Drug use: No        Review of Systems:  Review of Systems   Constitutional: Negative for activity change, appetite change, chills, fatigue, fever and unexpected weight change.   HENT: Negative for congestion, ear pain, sore throat and trouble swallowing.    Eyes: Negative for pain, redness and itching.   Respiratory: Negative for cough, shortness of breath and wheezing.    Cardiovascular: Negative for chest pain, palpitations and leg swelling.   Gastrointestinal: Negative for abdominal distention, abdominal pain, anal bleeding, blood in stool, constipation, diarrhea, nausea, rectal pain and vomiting.   Endocrine: Negative for cold intolerance, heat intolerance and polyuria.    Genitourinary: Negative for dysuria, flank pain, frequency and hematuria.   Musculoskeletal: Negative for back pain, gait problem, joint swelling and neck pain.   Skin: Negative for color change, rash and wound.   Allergic/Immunologic: Negative for environmental allergies and immunocompromised state.   Neurological: Negative for dizziness, speech difficulty, weakness and numbness.   Psychiatric/Behavioral: Negative for agitation, confusion and hallucinations.       OBJECTIVE:     Vital Signs (Most Recent)  Temp: 97 °F (36.1 °C) (01/31/22 0853)  Pulse: 88 (01/31/22 0853)  BP: 110/74 (01/31/22 0853)     100.7 kg (222 lb 0.1 oz)     Physical Exam:  Physical Exam  Exam conducted with a chaperone present.   Constitutional:       Appearance: He is well-developed.   HENT:      Head: Normocephalic and atraumatic.   Eyes:      Conjunctiva/sclera: Conjunctivae normal.   Neck:      Thyroid: No thyromegaly.   Cardiovascular:      Rate and Rhythm: Normal rate and regular rhythm.   Pulmonary:      Effort: Pulmonary effort is normal. No respiratory distress.   Abdominal:      General: There is no distension.      Palpations: Abdomen is soft. There is no mass.   Genitourinary:     Comments: Anorectal: Small superficial wound at area of incision that is healing well without signs of infection. No fluctuance, erythema, or induration in the area of previous perirectal abscess.   Musculoskeletal:         General: No tenderness. Normal range of motion.      Cervical back: Normal range of motion.   Skin:     General: Skin is warm and dry.      Capillary Refill: Capillary refill takes less than 2 seconds.      Findings: No rash.   Neurological:      Mental Status: He is alert and oriented to person, place, and time.         Laboratory:   No new laboratory results since last visit.     Pathology Results:  FINAL PATHOLOGIC DIAGNOSIS  1. Sigmoid colon (stitch marks perforation), segmental resection (20 cm in length):  - Gross and  histologic evidence of transmural perforation (4 cm from nearest surgical margin)  - Associated acute chronic transmural inflammation with extension into pericolic fat resulting in abscess  formation  - Serosal adhesion formation with acute chronic serositis  - Margins histologically viable  - Negative for dysplasia or malignancy    PATHOLOGY from Kirsten's reversal Jan 2020:  1. SIGMOID COLON AND UPPER RECTUM, PARTIAL PROCTECTOMY:  - Colorectal tissue with chronic inflammation and fibrous serosal adhesions  - Submucosal foreign-body giant cell reaction associated with acellular suture-like material, consistent with  prior surgical intervention  - No evidence of dysplasia or malignancy  2. COLOSTOMY, TAKEDOWN:  - Inflamed colo-cutaneous tissue with foreign-body giant cell reaction, consistent with ostomy  3. ANASTOMOTIC RING, EXCISION:  - Colonic tissue with chronic inflammation, vascular congestion, and hyperplastic mucosal changes  - No evidence of dysplasia or malignancy    ASSESSMENT/PLAN:     45yo F with previous perforated sigmoid diverticulitis requiring Kirsten's procedure in October 2019 who is now s/p Kirsten's reversal and LAR on 1/30/2020 who recovered well postop but had new perianal/perirectal abscess requiring internal I&D in Sept 2021 and recurred in January 2022 requiring another I&D. He is doing well now with no remaining abscess.    - Advised to follow-up if he develops similar symptoms as prior to surgery or develops any new drainage from the perirectal area.  - RTC PRN.

## 2022-02-01 ENCOUNTER — PATIENT MESSAGE (OUTPATIENT)
Dept: INTERNAL MEDICINE | Facility: CLINIC | Age: 47
End: 2022-02-01
Payer: COMMERCIAL

## 2022-02-01 DIAGNOSIS — F32.5 MAJOR DEPRESSIVE DISORDER WITH SINGLE EPISODE, IN REMISSION: ICD-10-CM

## 2022-02-01 DIAGNOSIS — F98.8 ATTENTION DEFICIT DISORDER (ADD) WITHOUT HYPERACTIVITY: ICD-10-CM

## 2022-02-01 DIAGNOSIS — G89.4 CHRONIC PAIN SYNDROME: Primary | ICD-10-CM

## 2022-02-01 RX ORDER — DEXTROAMPHETAMINE SACCHARATE, AMPHETAMINE ASPARTATE, DEXTROAMPHETAMINE SULFATE AND AMPHETAMINE SULFATE 2.5; 2.5; 2.5; 2.5 MG/1; MG/1; MG/1; MG/1
10 TABLET ORAL 2 TIMES DAILY PRN
Qty: 60 TABLET | Refills: 0 | Status: SHIPPED | OUTPATIENT
Start: 2022-02-01 | End: 2022-02-24 | Stop reason: SDUPTHER

## 2022-02-01 RX ORDER — HYDROCODONE BITARTRATE AND ACETAMINOPHEN 7.5; 325 MG/1; MG/1
1 TABLET ORAL EVERY 6 HOURS PRN
Qty: 15 TABLET | Refills: 0 | Status: CANCELLED | OUTPATIENT
Start: 2022-02-01

## 2022-02-01 RX ORDER — CYCLOBENZAPRINE HCL 10 MG
10 TABLET ORAL 3 TIMES DAILY PRN
Qty: 90 TABLET | Refills: 0 | OUTPATIENT
Start: 2022-02-01 | End: 2022-03-03

## 2022-02-01 RX ORDER — ARIPIPRAZOLE 10 MG/1
10 TABLET ORAL NIGHTLY
Qty: 90 TABLET | Refills: 3 | Status: SHIPPED | OUTPATIENT
Start: 2022-02-01 | End: 2023-01-17 | Stop reason: SDUPTHER

## 2022-02-01 RX ORDER — HYDROCODONE BITARTRATE AND ACETAMINOPHEN 7.5; 325 MG/1; MG/1
1 TABLET ORAL EVERY 6 HOURS PRN
Qty: 120 TABLET | Refills: 0 | Status: SHIPPED | OUTPATIENT
Start: 2022-02-01 | End: 2022-02-24 | Stop reason: SDUPTHER

## 2022-02-01 RX ORDER — ESCITALOPRAM OXALATE 20 MG/1
20 TABLET ORAL NIGHTLY
Qty: 90 TABLET | Refills: 3 | Status: SHIPPED | OUTPATIENT
Start: 2022-02-01 | End: 2023-01-17 | Stop reason: SDUPTHER

## 2022-02-01 NOTE — TELEPHONE ENCOUNTER
No new care gaps identified.  Powered by [employer]. Reference number: [id number].   2/01/2022 9:28:21 AM CST

## 2022-02-02 RX ORDER — CYCLOBENZAPRINE HCL 10 MG
10 TABLET ORAL 3 TIMES DAILY PRN
Qty: 90 TABLET | Refills: 0 | Status: SHIPPED | OUTPATIENT
Start: 2022-02-02 | End: 2022-03-04 | Stop reason: SDUPTHER

## 2022-02-16 ENCOUNTER — OFFICE VISIT (OUTPATIENT)
Dept: INTERNAL MEDICINE | Facility: CLINIC | Age: 47
End: 2022-02-16
Payer: COMMERCIAL

## 2022-02-16 VITALS
WEIGHT: 223.75 LBS | HEART RATE: 94 BPM | TEMPERATURE: 97 F | DIASTOLIC BLOOD PRESSURE: 72 MMHG | SYSTOLIC BLOOD PRESSURE: 104 MMHG | BODY MASS INDEX: 32.03 KG/M2 | OXYGEN SATURATION: 97 % | HEIGHT: 70 IN

## 2022-02-16 DIAGNOSIS — F98.8 ATTENTION DEFICIT DISORDER (ADD) WITHOUT HYPERACTIVITY: ICD-10-CM

## 2022-02-16 DIAGNOSIS — G89.4 CHRONIC PAIN SYNDROME: Primary | ICD-10-CM

## 2022-02-16 DIAGNOSIS — B07.0 PLANTAR WART OF LEFT FOOT: ICD-10-CM

## 2022-02-16 DIAGNOSIS — F32.5 MAJOR DEPRESSIVE DISORDER WITH SINGLE EPISODE, IN REMISSION: ICD-10-CM

## 2022-02-16 PROCEDURE — 1159F PR MEDICATION LIST DOCUMENTED IN MEDICAL RECORD: ICD-10-PCS | Mod: CPTII,S$GLB,, | Performed by: FAMILY MEDICINE

## 2022-02-16 PROCEDURE — 1160F PR REVIEW ALL MEDS BY PRESCRIBER/CLIN PHARMACIST DOCUMENTED: ICD-10-PCS | Mod: CPTII,S$GLB,, | Performed by: FAMILY MEDICINE

## 2022-02-16 PROCEDURE — 3078F PR MOST RECENT DIASTOLIC BLOOD PRESSURE < 80 MM HG: ICD-10-PCS | Mod: CPTII,S$GLB,, | Performed by: FAMILY MEDICINE

## 2022-02-16 PROCEDURE — 99214 PR OFFICE/OUTPT VISIT, EST, LEVL IV, 30-39 MIN: ICD-10-PCS | Mod: S$GLB,,, | Performed by: FAMILY MEDICINE

## 2022-02-16 PROCEDURE — 1160F RVW MEDS BY RX/DR IN RCRD: CPT | Mod: CPTII,S$GLB,, | Performed by: FAMILY MEDICINE

## 2022-02-16 PROCEDURE — 3074F PR MOST RECENT SYSTOLIC BLOOD PRESSURE < 130 MM HG: ICD-10-PCS | Mod: CPTII,S$GLB,, | Performed by: FAMILY MEDICINE

## 2022-02-16 PROCEDURE — 99214 OFFICE O/P EST MOD 30 MIN: CPT | Mod: S$GLB,,, | Performed by: FAMILY MEDICINE

## 2022-02-16 PROCEDURE — 3078F DIAST BP <80 MM HG: CPT | Mod: CPTII,S$GLB,, | Performed by: FAMILY MEDICINE

## 2022-02-16 PROCEDURE — 3008F BODY MASS INDEX DOCD: CPT | Mod: CPTII,S$GLB,, | Performed by: FAMILY MEDICINE

## 2022-02-16 PROCEDURE — 3008F PR BODY MASS INDEX (BMI) DOCUMENTED: ICD-10-PCS | Mod: CPTII,S$GLB,, | Performed by: FAMILY MEDICINE

## 2022-02-16 PROCEDURE — 99999 PR PBB SHADOW E&M-EST. PATIENT-LVL IV: ICD-10-PCS | Mod: PBBFAC,,, | Performed by: FAMILY MEDICINE

## 2022-02-16 PROCEDURE — 99999 PR PBB SHADOW E&M-EST. PATIENT-LVL IV: CPT | Mod: PBBFAC,,, | Performed by: FAMILY MEDICINE

## 2022-02-16 PROCEDURE — 1159F MED LIST DOCD IN RCRD: CPT | Mod: CPTII,S$GLB,, | Performed by: FAMILY MEDICINE

## 2022-02-16 PROCEDURE — 3074F SYST BP LT 130 MM HG: CPT | Mod: CPTII,S$GLB,, | Performed by: FAMILY MEDICINE

## 2022-02-16 NOTE — PROGRESS NOTES
Subjective:   Patient ID: Dax Carlisle is a 46 y.o. male.  Chief Complaint:  Follow-up    Patient presents follow-up on ADD and chronic pain    Last visit November 2021 for annual physical exam  CBC test shows a stable anemia. Continue of daily multivitamin with iron.  Sugar, Kidney, Liver, and Electrolyte tests are all normal.  Cholesterol tests are normal.   10-year risk of a heart disease or stroke is low.   Aspirin or Statin cholesterol medications are not recommended.  Thyroid testing is normal.  A1c is in a normal range. No Prediabtes or Diabetes  Hepatitis C test is negative.    ADHD  On Adderall 10 mg twice a day to help with focus/ADD/ADHD related issues despite compliance with his other mental health medications  Effective with symptoms significantly improved and controlled on present medication and dose helping compensate for deficits at work/school.  Duration is appropriate.    No mood instability, tics, or disordered sleep, or other apparent adverse effects.  No increased blood pressure, heart, or other cardiovascular side effects.    Tolerating current treatment well.   No behaviors to suggest inappropriate use of prescribed medications.  [place] Board of Pharmacy Controlled Prescription Drug Monitoring database was queried and showed no activity to suggest abuse, diversion, or other inappropriate use of prescription medications.      Chronic Pain  Norco 7.5/325 mg every 6 hours as needed.  Averages 120 pills per month.  No behaviors to suggest inappropriate use of prescribed medications.  [place] Board of Pharmacy Controlled Prescription Drug Monitoring database was queried and showed no activity to suggest abuse, diversion, or other inappropriate use of prescription medications.      Major depressive disorder  Abilify 10 mg daily and Lexapro 20 mg daily   Stable, no side effects, mood and symptoms well controlled on present medication    Complains of new lesion/sore with pain on bottom  of his left foot        Current Outpatient Medications:     acetaminophen (TYLENOL) 325 MG tablet, Take 325 mg by mouth every 6 (six) hours as needed for Pain., Disp: , Rfl:     ARIPiprazole (ABILIFY) 10 MG Tab, Take 1 tablet (10 mg total) by mouth every evening., Disp: 90 tablet, Rfl: 3    azelastine (ASTELIN) 137 mcg (0.1 %) nasal spray, Use Two sprays intranasal twice a day, Disp: 30 mL, Rfl: 0    cyclobenzaprine (FLEXERIL) 10 MG tablet, Take 1 tablet (10 mg total) by mouth 3 (three) times daily as needed for Muscle spasms., Disp: 90 tablet, Rfl: 0    dextroamphetamine-amphetamine 10 mg Tab, Take 1 tablet (10 mg total) by mouth 2 (two) times daily as needed (for ADHD)., Disp: 60 tablet, Rfl: 0    EScitalopram oxalate (LEXAPRO) 20 MG tablet, Take 1 tablet (20 mg total) by mouth every evening., Disp: 90 tablet, Rfl: 3    HYDROcodone-acetaminophen (NORCO) 7.5-325 mg per tablet, Take 1 tablet by mouth every 6 (six) hours as needed for Pain., Disp: 120 tablet, Rfl: 0    linaclotide 290 mcg Cap, Take 1 capsule (290 mcg total) by mouth once daily. (Patient taking differently: Take 290 mcg by mouth daily as needed.), Disp: 90 capsule, Rfl: 1    loratadine (CLARITIN) 10 mg tablet, Take 10 mg by mouth daily as needed. , Disp: , Rfl:     metoprolol succinate (TOPROL-XL) 25 MG 24 hr tablet, Take 1 tablet (25 mg total) by mouth every evening., Disp: 90 tablet, Rfl: 3    topiramate (TOPAMAX) 25 MG tablet, Take 1 tablet (25 mg total) by mouth 2 (two) times daily., Disp: 60 tablet, Rfl: 11    Review of Systems   Constitutional: Negative for activity change, appetite change, chills, fatigue, fever and unexpected weight change.   Eyes: Negative for visual disturbance.   Respiratory: Negative for chest tightness and shortness of breath.    Cardiovascular: Negative for chest pain, palpitations and leg swelling.   Gastrointestinal: Negative for abdominal pain, constipation, diarrhea, nausea and vomiting.   Genitourinary:  "Negative for difficulty urinating.   Musculoskeletal: Positive for arthralgias, back pain and myalgias. Negative for joint swelling, neck pain and neck stiffness.   Skin: Positive for wound. Negative for color change, pallor and rash.   Neurological: Negative for dizziness, tremors, weakness, light-headedness, numbness and headaches.   Psychiatric/Behavioral: Negative for agitation, behavioral problems, confusion, decreased concentration, dysphoric mood, hallucinations, self-injury, sleep disturbance and suicidal ideas. The patient is not nervous/anxious and is not hyperactive.      Objective:   /72 (BP Location: Right arm, Patient Position: Sitting, BP Method: Large (Manual))   Pulse 94   Temp 97.2 °F (36.2 °C) (Tympanic)   Ht 5' 9.5" (1.765 m)   Wt 101.5 kg (223 lb 12.3 oz)   SpO2 97%   BMI 32.57 kg/m²     Physical Exam  Vitals and nursing note reviewed.   Constitutional:       General: He is not in acute distress.     Appearance: Normal appearance. He is well-developed. He is obese. He is not ill-appearing, toxic-appearing or diaphoretic.   Eyes:      General: No scleral icterus.     Conjunctiva/sclera: Conjunctivae normal.      Right eye: Right conjunctiva is not injected.      Left eye: Left conjunctiva is not injected.   Neck:      Thyroid: No thyroid mass, thyromegaly or thyroid tenderness.   Cardiovascular:      Rate and Rhythm: Normal rate and regular rhythm.      Pulses:           Radial pulses are 2+ on the right side.      Heart sounds: Normal heart sounds. No murmur heard.  No friction rub. No gallop.    Pulmonary:      Effort: Pulmonary effort is normal. No tachypnea, accessory muscle usage or respiratory distress.      Breath sounds: Normal breath sounds and air entry. No wheezing, rhonchi or rales.   Abdominal:      General: There is no distension.      Palpations: Abdomen is soft.      Tenderness: There is no abdominal tenderness. There is no guarding or rebound.   Musculoskeletal:      " Right lower leg: No edema.      Left lower leg: No edema.      Left foot: Normal range of motion. No deformity, bunion, Charcot foot, foot drop or prominent metatarsal heads.   Feet:      Left foot:      Skin integrity: Callus present. No ulcer, blister, skin breakdown, erythema, warmth, dry skin or fissure.      Toenail Condition: Left toenails are normal.      Comments:   Area tender palpation with only mild corn/callus changes foot probable underlying seed wart  Skin:     General: Skin is warm and dry.      Findings: No abrasion, burn, ecchymosis, laceration or rash.   Neurological:      Mental Status: He is alert and oriented to person, place, and time.      Coordination: Coordination is intact. Coordination normal.      Gait: Gait is intact. Gait normal.   Psychiatric:         Attention and Perception: Attention and perception normal. He is attentive.         Mood and Affect: Mood and affect normal. Mood is not anxious or depressed.         Speech: Speech normal. Speech is not rapid and pressured or tangential.         Behavior: Behavior normal. Behavior is cooperative.         Thought Content: Thought content normal.         Cognition and Memory: Cognition normal.         Judgment: Judgment normal.       Assessment:       ICD-10-CM ICD-9-CM   1. Chronic pain syndrome  G89.4 338.4   2. Attention deficit disorder (ADD) without hyperactivity  F98.8 314.00   3. Major depressive disorder with single episode, in remission  F32.5 296.25   4. Plantar wart of left foot  B07.0 078.12     Plan:   Chronic pain syndrome  Pain and symptoms well controlled on present medication .  Continue Norco 7.5/325 every 6 hours as needed for pain  Averaging 4 per day and stable pattern  Risk of discontinuation of long-term narcotics higher than risk of continuing long-term narcotics at current dose  Okay to refill monthly x2    Attention deficit disorder (ADD) without hyperactivity  ADHD, stable, no side effects, controlled on  present medication  Continue Adderall 10 mg twice a day  OK to refill monthly x 2    Major depressive disorder with single episode, in remission  Stable, no side effects, mood and symptoms well controlled on present medication .  Continue Abilify 10 mg daily  Continue Lexapro 20 mg daily    Plantar wart of left foot  Patient location/handout on plantar or  Over-the-counter corn/callus pad for symptomatic relief  Discussed if pain increased and/or desires more definitive treatment, would need to see Podiatry for excision    Return to clinic 3 months or sooner as needed

## 2022-02-24 DIAGNOSIS — F98.8 ATTENTION DEFICIT DISORDER (ADD) WITHOUT HYPERACTIVITY: ICD-10-CM

## 2022-02-24 DIAGNOSIS — G89.4 CHRONIC PAIN SYNDROME: ICD-10-CM

## 2022-02-24 RX ORDER — DEXTROAMPHETAMINE SACCHARATE, AMPHETAMINE ASPARTATE, DEXTROAMPHETAMINE SULFATE AND AMPHETAMINE SULFATE 2.5; 2.5; 2.5; 2.5 MG/1; MG/1; MG/1; MG/1
10 TABLET ORAL 2 TIMES DAILY PRN
Qty: 60 TABLET | Refills: 0 | Status: SHIPPED | OUTPATIENT
Start: 2022-02-24 | End: 2022-03-04 | Stop reason: SDUPTHER

## 2022-02-24 RX ORDER — CYCLOBENZAPRINE HCL 10 MG
10 TABLET ORAL 3 TIMES DAILY PRN
Qty: 90 TABLET | Refills: 0 | OUTPATIENT
Start: 2022-02-24 | End: 2022-03-26

## 2022-02-24 RX ORDER — HYDROCODONE BITARTRATE AND ACETAMINOPHEN 7.5; 325 MG/1; MG/1
1 TABLET ORAL EVERY 6 HOURS PRN
Qty: 120 TABLET | Refills: 0 | Status: SHIPPED | OUTPATIENT
Start: 2022-02-24 | End: 2022-03-04 | Stop reason: SDUPTHER

## 2022-02-24 NOTE — TELEPHONE ENCOUNTER
No new care gaps identified.  Powered by [employer]. Reference number: [id number].   2/24/2022 10:43:07 AM CST

## 2022-02-28 ENCOUNTER — PATIENT MESSAGE (OUTPATIENT)
Dept: ADMINISTRATIVE | Facility: OTHER | Age: 47
End: 2022-02-28
Payer: COMMERCIAL

## 2022-03-01 ENCOUNTER — PATIENT MESSAGE (OUTPATIENT)
Dept: INTERNAL MEDICINE | Facility: CLINIC | Age: 47
End: 2022-03-01
Payer: COMMERCIAL

## 2022-03-01 DIAGNOSIS — F98.8 ATTENTION DEFICIT DISORDER (ADD) WITHOUT HYPERACTIVITY: ICD-10-CM

## 2022-03-01 DIAGNOSIS — G89.4 CHRONIC PAIN SYNDROME: ICD-10-CM

## 2022-03-04 RX ORDER — DEXTROAMPHETAMINE SACCHARATE, AMPHETAMINE ASPARTATE, DEXTROAMPHETAMINE SULFATE AND AMPHETAMINE SULFATE 2.5; 2.5; 2.5; 2.5 MG/1; MG/1; MG/1; MG/1
10 TABLET ORAL 2 TIMES DAILY PRN
Qty: 60 TABLET | Refills: 0 | Status: SHIPPED | OUTPATIENT
Start: 2022-03-04 | End: 2022-03-30 | Stop reason: SDUPTHER

## 2022-03-04 RX ORDER — HYDROCODONE BITARTRATE AND ACETAMINOPHEN 7.5; 325 MG/1; MG/1
1 TABLET ORAL EVERY 6 HOURS PRN
Qty: 120 TABLET | Refills: 0 | Status: SHIPPED | OUTPATIENT
Start: 2022-03-04 | End: 2022-03-30 | Stop reason: SDUPTHER

## 2022-03-04 RX ORDER — CYCLOBENZAPRINE HCL 10 MG
10 TABLET ORAL 3 TIMES DAILY PRN
Qty: 90 TABLET | Refills: 0 | Status: SHIPPED | OUTPATIENT
Start: 2022-03-04 | End: 2022-03-30 | Stop reason: SDUPTHER

## 2022-03-20 DIAGNOSIS — G89.4 CHRONIC PAIN SYNDROME: ICD-10-CM

## 2022-03-21 RX ORDER — HYDROCODONE BITARTRATE AND ACETAMINOPHEN 7.5; 325 MG/1; MG/1
1 TABLET ORAL EVERY 6 HOURS PRN
Qty: 120 TABLET | Refills: 0 | OUTPATIENT
Start: 2022-03-21

## 2022-03-21 NOTE — TELEPHONE ENCOUNTER
No new care gaps identified.  Powered by [employer]. Reference number: [id number].   3/20/2022 8:06:37 PM CDT

## 2022-03-30 DIAGNOSIS — G89.4 CHRONIC PAIN SYNDROME: ICD-10-CM

## 2022-03-30 DIAGNOSIS — F98.8 ATTENTION DEFICIT DISORDER (ADD) WITHOUT HYPERACTIVITY: ICD-10-CM

## 2022-03-30 RX ORDER — DEXTROAMPHETAMINE SACCHARATE, AMPHETAMINE ASPARTATE, DEXTROAMPHETAMINE SULFATE AND AMPHETAMINE SULFATE 2.5; 2.5; 2.5; 2.5 MG/1; MG/1; MG/1; MG/1
10 TABLET ORAL 2 TIMES DAILY PRN
Qty: 60 TABLET | Refills: 0 | Status: SHIPPED | OUTPATIENT
Start: 2022-03-30 | End: 2022-05-02 | Stop reason: SDUPTHER

## 2022-03-30 RX ORDER — HYDROCODONE BITARTRATE AND ACETAMINOPHEN 7.5; 325 MG/1; MG/1
1 TABLET ORAL EVERY 6 HOURS PRN
Qty: 120 TABLET | Refills: 0 | Status: SHIPPED | OUTPATIENT
Start: 2022-03-30 | End: 2022-05-02 | Stop reason: SDUPTHER

## 2022-03-30 RX ORDER — CYCLOBENZAPRINE HCL 10 MG
10 TABLET ORAL 3 TIMES DAILY PRN
Qty: 90 TABLET | Refills: 0 | Status: SHIPPED | OUTPATIENT
Start: 2022-03-30 | End: 2022-05-02 | Stop reason: SDUPTHER

## 2022-03-30 NOTE — TELEPHONE ENCOUNTER
No new care gaps identified.  Powered by [employer] by [employer]. Reference number: [id number].   3/30/2022 12:46:29 PM CDT

## 2022-03-30 NOTE — TELEPHONE ENCOUNTER
This Rx Request does not qualify for assessment with the OR   Please review protocol details and the Care Due Message for extra clinical information    Reasons Rx Request may be deferred:  Medication is non-delegated    Note composed:2:50 PM 03/30/2022

## 2022-05-02 DIAGNOSIS — G89.4 CHRONIC PAIN SYNDROME: ICD-10-CM

## 2022-05-02 DIAGNOSIS — F98.8 ATTENTION DEFICIT DISORDER (ADD) WITHOUT HYPERACTIVITY: ICD-10-CM

## 2022-05-02 RX ORDER — HYDROCODONE BITARTRATE AND ACETAMINOPHEN 7.5; 325 MG/1; MG/1
1 TABLET ORAL EVERY 6 HOURS PRN
Qty: 120 TABLET | Refills: 0 | Status: SHIPPED | OUTPATIENT
Start: 2022-05-02 | End: 2022-05-25 | Stop reason: SDUPTHER

## 2022-05-02 RX ORDER — DEXTROAMPHETAMINE SACCHARATE, AMPHETAMINE ASPARTATE, DEXTROAMPHETAMINE SULFATE AND AMPHETAMINE SULFATE 2.5; 2.5; 2.5; 2.5 MG/1; MG/1; MG/1; MG/1
10 TABLET ORAL 2 TIMES DAILY PRN
Qty: 60 TABLET | Refills: 0 | Status: SHIPPED | OUTPATIENT
Start: 2022-05-02 | End: 2022-05-25 | Stop reason: SDUPTHER

## 2022-05-02 RX ORDER — CYCLOBENZAPRINE HCL 10 MG
10 TABLET ORAL 3 TIMES DAILY PRN
Qty: 90 TABLET | Refills: 0 | Status: SHIPPED | OUTPATIENT
Start: 2022-05-02 | End: 2022-05-25 | Stop reason: SDUPTHER

## 2022-05-02 NOTE — TELEPHONE ENCOUNTER
Refill Routing Note   Medication(s) are not appropriate for processing by [facility] for the following reason(s):      - Outside of protocol    [facility] action(s):  Route          Medication reconciliation completed: No     Appointments  past 12m or future 3m with PCP    Date Provider   Last Visit   2/16/2022 Cody Enamorado MD   Next Visit   5/17/2022 Cody Enamorado MD   ED visits in past 90 days: 0        Note composed:12:49 PM 05/02/2022

## 2022-05-02 NOTE — TELEPHONE ENCOUNTER
No new care gaps identified.  Powered by [employer]. Reference number: [id number].   5/02/2022 9:26:58 AM CDT

## 2022-05-14 ENCOUNTER — OFFICE VISIT (OUTPATIENT)
Dept: URGENT CARE | Facility: CLINIC | Age: 47
End: 2022-05-14
Payer: COMMERCIAL

## 2022-05-14 VITALS
WEIGHT: 223.31 LBS | RESPIRATION RATE: 16 BRPM | BODY MASS INDEX: 31.97 KG/M2 | DIASTOLIC BLOOD PRESSURE: 60 MMHG | TEMPERATURE: 98 F | SYSTOLIC BLOOD PRESSURE: 125 MMHG | HEART RATE: 97 BPM | HEIGHT: 70 IN | OXYGEN SATURATION: 100 %

## 2022-05-14 DIAGNOSIS — R11.2 NAUSEA AND VOMITING IN ADULT: Primary | ICD-10-CM

## 2022-05-14 DIAGNOSIS — Z20.822 SUSPECTED COVID-19 VIRUS INFECTION: ICD-10-CM

## 2022-05-14 DIAGNOSIS — R68.89 FLU-LIKE SYMPTOMS: ICD-10-CM

## 2022-05-14 LAB
CTP QC/QA: YES
CTP QC/QA: YES
POC MOLECULAR INFLUENZA A AGN: NEGATIVE
POC MOLECULAR INFLUENZA B AGN: NEGATIVE
SARS-COV-2 RDRP RESP QL NAA+PROBE: NEGATIVE

## 2022-05-14 PROCEDURE — 3078F DIAST BP <80 MM HG: CPT | Mod: CPTII,S$GLB,, | Performed by: NURSE PRACTITIONER

## 2022-05-14 PROCEDURE — 1160F PR REVIEW ALL MEDS BY PRESCRIBER/CLIN PHARMACIST DOCUMENTED: ICD-10-PCS | Mod: CPTII,S$GLB,, | Performed by: NURSE PRACTITIONER

## 2022-05-14 PROCEDURE — 3008F PR BODY MASS INDEX (BMI) DOCUMENTED: ICD-10-PCS | Mod: CPTII,S$GLB,, | Performed by: NURSE PRACTITIONER

## 2022-05-14 PROCEDURE — 87502 POCT INFLUENZA A/B MOLECULAR: ICD-10-PCS | Mod: QW,S$GLB,, | Performed by: NURSE PRACTITIONER

## 2022-05-14 PROCEDURE — 1159F MED LIST DOCD IN RCRD: CPT | Mod: CPTII,S$GLB,, | Performed by: NURSE PRACTITIONER

## 2022-05-14 PROCEDURE — 96372 THER/PROPH/DIAG INJ SC/IM: CPT | Mod: S$GLB,,, | Performed by: NURSE PRACTITIONER

## 2022-05-14 PROCEDURE — U0002: ICD-10-PCS | Mod: QW,S$GLB,, | Performed by: NURSE PRACTITIONER

## 2022-05-14 PROCEDURE — 99214 PR OFFICE/OUTPT VISIT, EST, LEVL IV, 30-39 MIN: ICD-10-PCS | Mod: 25,S$GLB,, | Performed by: NURSE PRACTITIONER

## 2022-05-14 PROCEDURE — 3074F PR MOST RECENT SYSTOLIC BLOOD PRESSURE < 130 MM HG: ICD-10-PCS | Mod: CPTII,S$GLB,, | Performed by: NURSE PRACTITIONER

## 2022-05-14 PROCEDURE — 87502 INFLUENZA DNA AMP PROBE: CPT | Mod: QW,S$GLB,, | Performed by: NURSE PRACTITIONER

## 2022-05-14 PROCEDURE — 3078F PR MOST RECENT DIASTOLIC BLOOD PRESSURE < 80 MM HG: ICD-10-PCS | Mod: CPTII,S$GLB,, | Performed by: NURSE PRACTITIONER

## 2022-05-14 PROCEDURE — 3074F SYST BP LT 130 MM HG: CPT | Mod: CPTII,S$GLB,, | Performed by: NURSE PRACTITIONER

## 2022-05-14 PROCEDURE — 3008F BODY MASS INDEX DOCD: CPT | Mod: CPTII,S$GLB,, | Performed by: NURSE PRACTITIONER

## 2022-05-14 PROCEDURE — 99214 OFFICE O/P EST MOD 30 MIN: CPT | Mod: 25,S$GLB,, | Performed by: NURSE PRACTITIONER

## 2022-05-14 PROCEDURE — 1159F PR MEDICATION LIST DOCUMENTED IN MEDICAL RECORD: ICD-10-PCS | Mod: CPTII,S$GLB,, | Performed by: NURSE PRACTITIONER

## 2022-05-14 PROCEDURE — 96372 PR INJECTION,THERAP/PROPH/DIAG2ST, IM OR SUBCUT: ICD-10-PCS | Mod: S$GLB,,, | Performed by: NURSE PRACTITIONER

## 2022-05-14 PROCEDURE — 1160F RVW MEDS BY RX/DR IN RCRD: CPT | Mod: CPTII,S$GLB,, | Performed by: NURSE PRACTITIONER

## 2022-05-14 PROCEDURE — U0002 COVID-19 LAB TEST NON-CDC: HCPCS | Mod: QW,S$GLB,, | Performed by: NURSE PRACTITIONER

## 2022-05-14 RX ORDER — ONDANSETRON 4 MG/1
4 TABLET, ORALLY DISINTEGRATING ORAL EVERY 6 HOURS PRN
Qty: 10 TABLET | Refills: 0 | Status: SHIPPED | OUTPATIENT
Start: 2022-05-14 | End: 2022-08-17

## 2022-05-14 RX ORDER — ONDANSETRON 2 MG/ML
4 INJECTION INTRAMUSCULAR; INTRAVENOUS
Status: COMPLETED | OUTPATIENT
Start: 2022-05-14 | End: 2022-05-14

## 2022-05-14 RX ADMIN — ONDANSETRON 4 MG: 2 INJECTION INTRAMUSCULAR; INTRAVENOUS at 11:05

## 2022-05-14 NOTE — PROGRESS NOTES
"Subjective:       Patient ID: Dax Carlisle is a 46 y.o. male.    Vitals:  height is 5' 9.5" (1.765 m) and weight is 101.3 kg (223 lb 5.2 oz). His tympanic temperature is 98.2 °F (36.8 °C). His blood pressure is 125/60 and his pulse is 97. His respiration is 16 and oxygen saturation is 100%.     Chief Complaint: Emesis    Patient presents with nausea and vomiting that started 11:30am yesterday.     Emesis   This is a new problem. The current episode started yesterday. The problem occurs more than 10 times per day. The problem has been gradually worsening. The emesis has an appearance of projectile. There has been no fever. Associated symptoms include chest pain, chills and sweats. Pertinent negatives include no abdominal pain, arthralgias, coughing, decreased urine volume, diarrhea, dizziness, fever, headaches, myalgias, URI or weight loss. He has tried nothing for the symptoms.       Constitution: Positive for chills. Negative for fever.   Cardiovascular: Positive for chest pain.   Respiratory: Negative for cough.    Gastrointestinal: Positive for vomiting. Negative for abdominal pain and diarrhea.   Genitourinary: Negative for urine decreased.   Musculoskeletal: Negative for joint pain and muscle ache.   Neurological: Negative for dizziness and headaches.          Past Medical History:   Diagnosis Date    Angioedema of lips 3/13/2015    IVP dye allergy - swelling lips, eye, tongue    Asthma     childhood    Depression     Diverticulitis 10/07/2019    Hypertension     Kidney stones     Sleep apnea     cpap not required         .  Past Surgical History:   Procedure Laterality Date    ABDOMINAL WASHOUT N/A 10/7/2019    Procedure: LAVAGE, PERITONEAL, THERAPEUTIC;  Surgeon: Mindy Goff MD;  Location: [facility] OR;  Service: General;  Laterality: N/A;  abdomen washout    ABSCESS DRAINAGE N/A 9/10/2021    Procedure: DRAINAGE, ABSCESS;  Surgeon: Kadeem Choi MD;  Location: [facility] OR;  Service: " General;  Laterality: N/A;  perirectal    anal fistula repair      APPENDECTOMY      BACK SURGERY      CHOLECYSTECTOMY  2005    COLONOSCOPY N/A 3/10/2016    Procedure: COLONOSCOPY;  Surgeon: Juvenal Dinero MD;  Location: [facility] ENDO;  Service: Endoscopy;  Laterality: N/A;    COLONOSCOPY N/A 1/17/2020    Procedure: COLONOSCOPY;  Surgeon: Kadeem Choi MD;  Location: [facility] ENDO;  Service: General;  Laterality: N/A;    COLOSTOMY N/A 10/7/2019    Procedure: CREATION, COLOSTOMY;  Surgeon: Mindy Goff MD;  Location: [facility] OR;  Service: General;  Laterality: N/A;    COLOSTOMY REVERSAL      EXAMINATION UNDER ANESTHESIA N/A 9/10/2021    Procedure: Exam under anesthesia;  Surgeon: Kadeem Choi MD;  Location: [facility] OR;  Service: General;  Laterality: N/A;    EXAMINATION UNDER ANESTHESIA N/A 1/6/2022    Procedure: Exam under anesthesia, perirectal abscess I&D;  Surgeon: Kadeem Choi MD;  Location: [facility] OR;  Service: General;  Laterality: N/A;    SIVAKUMAR PROCEDURE N/A 10/7/2019    Procedure: SIVAKUMAR PROCEDURE;  Surgeon: Mindy Goff MD;  Location: [facility] OR;  Service: General;  Laterality: N/A;    INJECTION OF ANESTHETIC AGENT AROUND GANGLION IMPAR N/A 3/15/2021    Procedure: BLOCK, GANGLION IMPAR;  Surgeon: Ruben Cabrera MD;  Location: [facility];  Service: Pain Management;  Laterality: N/A;    INJECTION OF ANESTHETIC AGENT AROUND PUDENDAL NERVE N/A 9/10/2021    Procedure: BLOCK, NERVE, PUDENDAL;  Surgeon: Kadeem Choi MD;  Location: [facility] OR;  Service: General;  Laterality: N/A;    INJECTION OF ANESTHETIC AGENT AROUND PUDENDAL NERVE N/A 1/6/2022    Procedure: BLOCK, NERVE, PUDENDAL;  Surgeon: Kadeem Choi MD;  Location: [facility] OR;  Service: General;  Laterality: N/A;    INJECTION OF ANESTHETIC AGENT INTO TISSUE PLANE DEFINED BY TRANSVERSUS ABDOMINIS MUSCLE N/A 1/30/2020    Procedure: BLOCK, TRANSVERSUS ABDOMINIS PLANE;  Surgeon: Kadeem Choi MD;  Location: [facility]  OR;  Service: General;  Laterality: N/A;    KNEE SURGERY Right     KNEE SURGERY Left     LYSIS OF ADHESIONS N/A 1/30/2020    Procedure: LYSIS, ADHESIONS;  Surgeon: Kadeem Choi MD;  Location: [facility] OR;  Service: General;  Laterality: N/A;    PILONIDAL CYST DRAINAGE      pt has had 6 of these adn has had revisions    PROCTECTOMY N/A 1/30/2020    Procedure: PROCTECTOMY;  Surgeon: Kadeem Choi MD;  Location: [facility] OR;  Service: General;  Laterality: N/A;  Partial    SUBTOTAL COLECTOMY  1/30/2020    Procedure: COLECTOMY, PARTIAL;  Surgeon: Kadeem Choi MD;  Location: [facility] OR;  Service: General;;         Social History     Socioeconomic History    Marital status:    Tobacco Use    Smoking status: Never Smoker    Smokeless tobacco: Never Used   Substance and Sexual Activity    Alcohol use: Never    Drug use: No    Sexual activity: Yes     Partners: Female   Social History Narrative    Live w/ spouse and  1 dog        Family History   Problem Relation Age of Onset    Heart disease Father         CABG age 66    Cancer Father         melanoma    Diabetes Father     Anesthesia problems Father         d/t polio    Diabetes Mother     Colon polyps Mother     Colon polyps Sister     Diabetes Maternal Grandmother     Colon cancer Maternal Grandmother     Diabetes Maternal Grandfather     Diabetes Paternal Grandmother     Stroke Paternal Grandmother     Diabetes Paternal Grandfather          ROS:  GENERAL: fever, chills with body aches  SKIN: No rashes, itching or changes in color or texture of skin.   HEENT:  Reports no runny nose, nasal congestion, headache  NODES: No masses or lesions. Denies swollen glands.   CHEST: reports no cough   CARDIOVASCULAR: Denies chest pain, shortness of breath.  ABDOMEN:  Nausea and vomiting, abdominal cramping. Denies diarrhea, constipation  MUSCULOSKELETAL: No joint stiffness or swelling. Denies back pain.  NEUROLOGIC: No history of seizures,  paralysis, alteration of gait or coordination.  PSYCHIATRIC: Denies mood swings, depression or suicidal thoughts.    PE:   APPEARANCE: Well nourished, well developed, in mild distress.  Temp 98.2°, pulse ox 100%  V/S: Reviewed.  SKIN: Normal skin turgor, no lesions.  HEENT: Turbinates injected, mucus membranes okay, minimal red pharynx. TM's poor light reflex bilateral, no facial tenderness.  CHEST: Lungs clear to auscultation. No wheezing  CARDIOVASCULAR: Regular rate and rhythm.  ABDOMEN: Soft. tenderness on patient all 4 quadrants.  Active bowel sounds  NEUROLOGIC: No sensory deficits. Gait & Posture: Normal, No cerebellar signs.  MENTAL STATUS: Patient alert, oriented x 3 & conversant.    PLAN: Lab work POCT rapid Covid 19 screen/negative    POCT influenza A and B/negative  And administer Zofran 4 mg IM now  Advise increase p.o. fluids--water/juice & rest  Meds:  Zofran / no refills  Advise avoid dairy products  Simply saline nasal wash to irrigate sinuses and for congestion/runny nose.  Cool mist humidifier/vaporizer.  Practice good handwashing.  Advise use of green tea with honey  Tylenol or Ibuprofen for fever, headache and body aches.  Warm salt water gargles for throat comfort.  Chloraseptic spray or lozenges for throat comfort.  Advise follow up with PCP in 2-3 days for recheck  Advise go to ER if symptoms worsen or fail to improve with treatment.  Instructions, follow up, and supportive care as per AVS.  AVS provided and reviewed with patient including supportive care, follow up, and red flag symptoms.   Patient verbalizes understanding and agrees with treatment plan. Discharged from Urgent Care in stable condition.  You have tested negative for COVID-19 today. If you did not have any close exposure as defined below, then effective today, you can return to your normal daily activities including social distancing, wearing masks, and frequent handwashing.    DIAGNOSIS:  Flu like symptoms  Suspect COVID-19    Nausea and vomiting

## 2022-05-14 NOTE — LETTER
May 14, 2022      [facility]  [address], [address]  [place] [address]  Phone: [phone]  Fax: [phone]       Patient: Dax Carlisle   YOB: 1975  Date of Visit: 05/14/2022    To Whom It May Concern:    Olga Lidia Carlisle  was at [facility] on 05/14/2022. He may return to work/school on 5/16/2022 with no restrictions. If you have any questions or concerns, or if I can be of further assistance, please do not hesitate to contact me.    Sincerely,    Ashia Tsai MA

## 2022-05-14 NOTE — PATIENT INSTRUCTIONS
PLAN: Lab work POCT rapid Covid 19 screen/negative    POCT influenza A and B/negative  And administer Zofran 4 mg IM now  Advise increase p.o. fluids--water/juice & rest  Meds:  Zofran / no refills  Advise avoid dairy products  Simply saline nasal wash to irrigate sinuses and for congestion/runny nose.  Cool mist humidifier/vaporizer.  Practice good handwashing.  Advise use of green tea with honey  Tylenol or Ibuprofen for fever, headache and body aches.  Warm salt water gargles for throat comfort.  Chloraseptic spray or lozenges for throat comfort.  Advise follow up with PCP in 2-3 days for recheck  Advise go to ER if symptoms worsen or fail to improve with treatment.  Instructions, follow up, and supportive care as per AVS.  AVS provided and reviewed with patient including supportive care, follow up, and red flag symptoms.   Patient verbalizes understanding and agrees with treatment plan. Discharged from Urgent Care in stable condition.  You have tested negative for COVID-19 today. If you did not have any close exposure as defined below, then effective today, you can return to your normal daily activities including social distancing, wearing masks, and frequent handwashing.

## 2022-05-17 ENCOUNTER — OFFICE VISIT (OUTPATIENT)
Dept: INTERNAL MEDICINE | Facility: CLINIC | Age: 47
End: 2022-05-17
Payer: COMMERCIAL

## 2022-05-17 VITALS
DIASTOLIC BLOOD PRESSURE: 80 MMHG | HEART RATE: 86 BPM | OXYGEN SATURATION: 97 % | WEIGHT: 222.44 LBS | TEMPERATURE: 97 F | SYSTOLIC BLOOD PRESSURE: 122 MMHG | BODY MASS INDEX: 32.95 KG/M2 | HEIGHT: 69 IN

## 2022-05-17 DIAGNOSIS — G89.4 CHRONIC PAIN SYNDROME: Primary | ICD-10-CM

## 2022-05-17 DIAGNOSIS — F98.8 ATTENTION DEFICIT DISORDER (ADD) WITHOUT HYPERACTIVITY: ICD-10-CM

## 2022-05-17 DIAGNOSIS — F32.5 MAJOR DEPRESSIVE DISORDER WITH SINGLE EPISODE, IN REMISSION: ICD-10-CM

## 2022-05-17 PROCEDURE — 1160F PR REVIEW ALL MEDS BY PRESCRIBER/CLIN PHARMACIST DOCUMENTED: ICD-10-PCS | Mod: CPTII,S$GLB,, | Performed by: FAMILY MEDICINE

## 2022-05-17 PROCEDURE — 3074F SYST BP LT 130 MM HG: CPT | Mod: CPTII,S$GLB,, | Performed by: FAMILY MEDICINE

## 2022-05-17 PROCEDURE — 1160F RVW MEDS BY RX/DR IN RCRD: CPT | Mod: CPTII,S$GLB,, | Performed by: FAMILY MEDICINE

## 2022-05-17 PROCEDURE — 1159F MED LIST DOCD IN RCRD: CPT | Mod: CPTII,S$GLB,, | Performed by: FAMILY MEDICINE

## 2022-05-17 PROCEDURE — 99214 OFFICE O/P EST MOD 30 MIN: CPT | Mod: S$GLB,,, | Performed by: FAMILY MEDICINE

## 2022-05-17 PROCEDURE — 3008F BODY MASS INDEX DOCD: CPT | Mod: CPTII,S$GLB,, | Performed by: FAMILY MEDICINE

## 2022-05-17 PROCEDURE — 3079F PR MOST RECENT DIASTOLIC BLOOD PRESSURE 80-89 MM HG: ICD-10-PCS | Mod: CPTII,S$GLB,, | Performed by: FAMILY MEDICINE

## 2022-05-17 PROCEDURE — 99214 PR OFFICE/OUTPT VISIT, EST, LEVL IV, 30-39 MIN: ICD-10-PCS | Mod: S$GLB,,, | Performed by: FAMILY MEDICINE

## 2022-05-17 PROCEDURE — 99999 PR PBB SHADOW E&M-EST. PATIENT-LVL IV: CPT | Mod: PBBFAC,,, | Performed by: FAMILY MEDICINE

## 2022-05-17 PROCEDURE — 99999 PR PBB SHADOW E&M-EST. PATIENT-LVL IV: ICD-10-PCS | Mod: PBBFAC,,, | Performed by: FAMILY MEDICINE

## 2022-05-17 PROCEDURE — 3079F DIAST BP 80-89 MM HG: CPT | Mod: CPTII,S$GLB,, | Performed by: FAMILY MEDICINE

## 2022-05-17 PROCEDURE — 3074F PR MOST RECENT SYSTOLIC BLOOD PRESSURE < 130 MM HG: ICD-10-PCS | Mod: CPTII,S$GLB,, | Performed by: FAMILY MEDICINE

## 2022-05-17 PROCEDURE — 1159F PR MEDICATION LIST DOCUMENTED IN MEDICAL RECORD: ICD-10-PCS | Mod: CPTII,S$GLB,, | Performed by: FAMILY MEDICINE

## 2022-05-17 PROCEDURE — 3008F PR BODY MASS INDEX (BMI) DOCUMENTED: ICD-10-PCS | Mod: CPTII,S$GLB,, | Performed by: FAMILY MEDICINE

## 2022-05-17 NOTE — PROGRESS NOTES
Subjective:   Patient ID: Dax Carlisle is a 46 y.o. male.  Chief Complaint:  3 month follow up     Patient presents follow-up on ADD and chronic pain    Last visit February 2022  ADHD, chronic pain, major depressive disorder stable  Plantar/Seed wart on foot but not symptomatic enough for any definitive treatment    Last annual physical exam November 2021  CBC test shows a stable anemia. Continue of daily multivitamin with iron.  Sugar, Kidney, Liver, and Electrolyte tests are all normal.  Cholesterol tests are normal.   10-year risk of a heart disease or stroke is low.   Aspirin or Statin cholesterol medications are not recommended.  Thyroid testing is normal.  A1c is in a normal range. No Prediabtes or Diabetes  Hepatitis C test is negative.     ADHD  On Adderall 10 mg twice a day to help with focus/ADD/ADHD related issues despite compliance with his other mental health medications  Effective with symptoms significantly improved and controlled on present medication and dose helping compensate for deficits at work/school.  Duration is appropriate.    No mood instability, tics, or disordered sleep, or other apparent adverse effects.  No increased blood pressure, heart, or other cardiovascular side effects.    Tolerating current treatment well.   No behaviors to suggest inappropriate use of prescribed medications.  [place] [employer] Controlled Prescription Drug Monitoring database was queried and showed no activity to suggest abuse, diversion, or other inappropriate use of prescription medications.      Chronic Pain  Norco 7.5/325 mg every 6 hours as needed.  Averages 120 pills per month.  No behaviors to suggest inappropriate use of prescribed medications.  [place] [employer] Controlled Prescription Drug Monitoring database was queried and showed no activity to suggest abuse, diversion, or other inappropriate use of prescription medications.      Major depressive disorder  Abilify 10 mg  daily and Lexapro 20 mg daily   Stable, no side effects, mood and symptoms well controlled on present medication    No new complaints or concerns today         Current Outpatient Medications:     acetaminophen (TYLENOL) 325 MG tablet, Take 325 mg by mouth every 6 (six) hours as needed for Pain., Disp: , Rfl:     ARIPiprazole (ABILIFY) 10 MG Tab, Take 1 tablet (10 mg total) by mouth every evening., Disp: 90 tablet, Rfl: 3    azelastine (ASTELIN) 137 mcg (0.1 %) nasal spray, Use Two sprays intranasal twice a day, Disp: 30 mL, Rfl: 0    cyclobenzaprine (FLEXERIL) 10 MG tablet, Take 1 tablet (10 mg total) by mouth 3 (three) times daily as needed for Muscle spasms., Disp: 90 tablet, Rfl: 0    dextroamphetamine-amphetamine 10 mg Tab, Take 1 tablet (10 mg total) by mouth 2 (two) times daily as needed (for ADHD)., Disp: 60 tablet, Rfl: 0    EScitalopram oxalate (LEXAPRO) 20 MG tablet, Take 1 tablet (20 mg total) by mouth every evening., Disp: 90 tablet, Rfl: 3    HYDROcodone-acetaminophen (NORCO) 7.5-325 mg per tablet, Take 1 tablet by mouth every 6 (six) hours as needed for Pain., Disp: 120 tablet, Rfl: 0    linaclotide 290 mcg Cap, Take 1 capsule (290 mcg total) by mouth once daily. (Patient taking differently: Take 290 mcg by mouth daily as needed.), Disp: 90 capsule, Rfl: 1    loratadine (CLARITIN) 10 mg tablet, Take 10 mg by mouth daily as needed. , Disp: , Rfl:     metoprolol succinate (TOPROL-XL) 25 MG 24 hr tablet, Take 1 tablet (25 mg total) by mouth every evening., Disp: 90 tablet, Rfl: 3    ondansetron (ZOFRAN-ODT) 4 MG TbDL, Take 1 tablet (4 mg total) by mouth every 6 (six) hours as needed (P.r.n.)., Disp: 10 tablet, Rfl: 0    topiramate (TOPAMAX) 25 MG tablet, Take 1 tablet (25 mg total) by mouth 2 (two) times daily., Disp: 60 tablet, Rfl: 11    Review of Systems   Constitutional: Negative for activity change, appetite change, chills, fatigue, fever and unexpected weight change.   Eyes: Negative for  "visual disturbance.   Respiratory: Negative for chest tightness and shortness of breath.    Cardiovascular: Negative for chest pain, palpitations and leg swelling.   Gastrointestinal: Negative for abdominal pain, constipation, diarrhea, nausea and vomiting.   Genitourinary: Negative for decreased urine volume, difficulty urinating, dysuria, flank pain, frequency, hematuria and urgency.   Musculoskeletal: Positive for arthralgias, back pain and myalgias. Negative for gait problem, joint swelling, neck pain and neck stiffness.   Skin: Negative for rash.   Neurological: Negative for dizziness, tremors, syncope, weakness, light-headedness, numbness and headaches.   Psychiatric/Behavioral: Negative for agitation, behavioral problems, confusion, decreased concentration, dysphoric mood, hallucinations, self-injury, sleep disturbance and suicidal ideas. The patient is not nervous/anxious and is not hyperactive.      Objective:   /80   Pulse 86   Temp 96.9 °F (36.1 °C)   Ht 5' 9" (1.753 m)   Wt 100.9 kg (222 lb 7.1 oz)   SpO2 97%   BMI 32.85 kg/m²     Physical Exam  Vitals and nursing note reviewed.   Constitutional:       General: He is not in acute distress.     Appearance: Normal appearance. He is well-developed and normal weight. He is not ill-appearing, toxic-appearing or diaphoretic.   Eyes:      General: No scleral icterus.     Conjunctiva/sclera: Conjunctivae normal.      Right eye: Right conjunctiva is not injected.      Left eye: Left conjunctiva is not injected.   Neck:      Thyroid: No thyroid mass, thyromegaly or thyroid tenderness.   Cardiovascular:      Rate and Rhythm: Normal rate and regular rhythm.      Pulses:           Radial pulses are 2+ on the right side.      Heart sounds: Normal heart sounds. No murmur heard.    No friction rub. No gallop.   Pulmonary:      Effort: Pulmonary effort is normal. No tachypnea or accessory muscle usage.      Breath sounds: Normal breath sounds and air entry. " No wheezing, rhonchi or rales.   Abdominal:      General: There is no distension.      Palpations: Abdomen is soft.      Tenderness: There is no abdominal tenderness. There is no guarding or rebound.   Musculoskeletal:      Right lower leg: No edema.      Left lower leg: No edema.   Skin:     General: Skin is warm and dry.      Findings: No abrasion, burn, ecchymosis, laceration or rash.   Neurological:      Mental Status: He is alert and oriented to person, place, and time.      Coordination: Coordination is intact. Coordination normal.      Gait: Gait is intact. Gait normal.   Psychiatric:         Attention and Perception: Attention and perception normal. He is attentive.         Mood and Affect: Mood and affect normal. Mood is not anxious or depressed.         Speech: Speech normal. Speech is not rapid and pressured or tangential.         Behavior: Behavior normal. Behavior is cooperative.         Thought Content: Thought content normal.         Cognition and Memory: Cognition normal.         Judgment: Judgment normal.       Assessment:       ICD-10-CM ICD-9-CM   1. Chronic pain syndrome  G89.4 338.4   2. Attention deficit disorder (ADD) without hyperactivity  F98.8 314.00   3. Major depressive disorder with single episode, in remission  F32.5 296.25     Plan:   Chronic pain syndrome  Pain and symptoms well controlled on present medication   Continue Topamax 25 mg twice a day.  Continue Norco 7.5/325 every 6 hours as needed for pain  Averaging 4 per day and stable pattern  Risk of discontinuation of long-term narcotics higher than risk of continuing long-term narcotics at current dose  Okay to refill monthly x 2    Attention deficit disorder (ADD) without hyperactivity  ADHD, stable, no side effects, controlled on present medication  Continue Adderall 10 mg twice a day  OK to refill monthly x 2  RTC in 3 months    Major depressive disorder with single episode, in remission  Stable, no side effects, mood and  symptoms well controlled on present medication .  Continue Lexapro 20 mg daily  Continue Abilify 10 mg daily    Return to clinic 3 months or sooner if needed

## 2022-05-17 NOTE — PROGRESS NOTES
Subjective:   Patient ID: Dax Carlisle is a 46 y.o. male.  Chief Complaint:  3 month follow up     Presents for 3 month follow up    Last visit in February 2022 for follow up on ADD and chronic pain    ADHD  On Adderall 10 mg twice a day to help with focus/ADD/ADHD related issues despite compliance with his other mental health medications  Effective with symptoms significantly improved and controlled on present medication and dose helping compensate for deficits at work/school.  Duration is appropriate.    No mood instability, tics, or disordered sleep, or other apparent adverse effects.  No increased blood pressure, heart, or other cardiovascular side effects.    Tolerating current treatment well.   No behaviors to suggest inappropriate use of prescribed medications.  [place] [employer] Controlled Prescription Drug Monitoring database was queried and showed no activity to suggest abuse, diversion, or other inappropriate use of prescription medications.      Chronic Pain  Norco 7.5/325 mg every 6 hours as needed.  Averages 120 pills per month.  No behaviors to suggest inappropriate use of prescribed medications.  [place] [employer] Controlled Prescription Drug Monitoring database was queried and showed no activity to suggest abuse, diversion, or other inappropriate use of prescription medications.      Major depressive disorder  Abilify 10 mg daily and Lexapro 20 mg daily   Stable, no side effects, mood and symptoms well controlled on present medication     Ongoing left foot plantar wart  Wart remains stable since last visit    No other concerns noted today      Current Outpatient Medications:     acetaminophen (TYLENOL) 325 MG tablet, Take 325 mg by mouth every 6 (six) hours as needed for Pain., Disp: , Rfl:     ARIPiprazole (ABILIFY) 10 MG Tab, Take 1 tablet (10 mg total) by mouth every evening., Disp: 90 tablet, Rfl: 3    azelastine (ASTELIN) 137 mcg (0.1 %) nasal spray, Use Two  sprays intranasal twice a day, Disp: 30 mL, Rfl: 0    cyclobenzaprine (FLEXERIL) 10 MG tablet, Take 1 tablet (10 mg total) by mouth 3 (three) times daily as needed for Muscle spasms., Disp: 90 tablet, Rfl: 0    dextroamphetamine-amphetamine 10 mg Tab, Take 1 tablet (10 mg total) by mouth 2 (two) times daily as needed (for ADHD)., Disp: 60 tablet, Rfl: 0    EScitalopram oxalate (LEXAPRO) 20 MG tablet, Take 1 tablet (20 mg total) by mouth every evening., Disp: 90 tablet, Rfl: 3    HYDROcodone-acetaminophen (NORCO) 7.5-325 mg per tablet, Take 1 tablet by mouth every 6 (six) hours as needed for Pain., Disp: 120 tablet, Rfl: 0    linaclotide 290 mcg Cap, Take 1 capsule (290 mcg total) by mouth once daily. (Patient taking differently: Take 290 mcg by mouth daily as needed.), Disp: 90 capsule, Rfl: 1    loratadine (CLARITIN) 10 mg tablet, Take 10 mg by mouth daily as needed. , Disp: , Rfl:     metoprolol succinate (TOPROL-XL) 25 MG 24 hr tablet, Take 1 tablet (25 mg total) by mouth every evening., Disp: 90 tablet, Rfl: 3    ondansetron (ZOFRAN-ODT) 4 MG TbDL, Take 1 tablet (4 mg total) by mouth every 6 (six) hours as needed (P.r.n.)., Disp: 10 tablet, Rfl: 0    topiramate (TOPAMAX) 25 MG tablet, Take 1 tablet (25 mg total) by mouth 2 (two) times daily., Disp: 60 tablet, Rfl: 11    Review of Systems   Constitutional: Negative for activity change, appetite change, chills, fatigue, fever and unexpected weight change.   HENT: Negative for congestion, ear discharge, ear pain, nosebleeds, postnasal drip, rhinorrhea, sinus pressure, sinus pain, sore throat and trouble swallowing.    Eyes: Negative for photophobia, pain, discharge and visual disturbance.   Respiratory: Negative for cough, chest tightness and shortness of breath.    Cardiovascular: Negative for chest pain.   Gastrointestinal: Negative for abdominal pain, blood in stool, constipation, diarrhea, nausea and vomiting.   Endocrine: Negative for polydipsia,  "polyphagia and polyuria.   Genitourinary: Negative for dysuria, frequency, hematuria and urgency.   Musculoskeletal: Negative for arthralgias, back pain, gait problem, myalgias and neck pain.   Skin: Negative for color change and rash.   Neurological: Negative for dizziness, tremors, seizures, facial asymmetry, weakness, light-headedness, numbness and headaches.   Psychiatric/Behavioral: Negative for dysphoric mood. The patient is not nervous/anxious.      Objective:   /80   Pulse 86   Temp 96.9 °F (36.1 °C)   Ht 5' 9" (1.753 m)   Wt 100.9 kg (222 lb 7.1 oz)   SpO2 97%   BMI 32.85 kg/m²     Physical Exam  Vitals reviewed.   Constitutional:       General: He is not in acute distress.     Appearance: Normal appearance. He is obese. He is not ill-appearing.   Cardiovascular:      Rate and Rhythm: Normal rate and regular rhythm.      Pulses: Normal pulses.           Radial pulses are 2+ on the right side and 2+ on the left side.      Heart sounds: Normal heart sounds. No murmur heard.  Pulmonary:      Effort: Pulmonary effort is normal. No respiratory distress.      Breath sounds: Normal breath sounds.   Abdominal:      General: Abdomen is flat. Bowel sounds are normal. There is no distension.      Palpations: Abdomen is soft.      Tenderness: There is no abdominal tenderness. There is no guarding or rebound.   Musculoskeletal:         General: Normal range of motion.      Cervical back: Normal range of motion and neck supple. No rigidity.   Lymphadenopathy:      Cervical: No cervical adenopathy.   Skin:     General: Skin is warm and dry.   Neurological:      Mental Status: He is alert.       Assessment:       ICD-10-CM ICD-9-CM   1. Chronic pain syndrome  G89.4 338.4   2. Attention deficit disorder (ADD) without hyperactivity  F98.8 314.00   3. Major depressive disorder with single episode, in remission  F32.5 296.25     Plan:   Chronic pain syndrome    Attention deficit disorder (ADD) without " hyperactivity    Major depressive disorder with single episode, in remission

## 2022-05-25 DIAGNOSIS — F98.8 ATTENTION DEFICIT DISORDER (ADD) WITHOUT HYPERACTIVITY: ICD-10-CM

## 2022-05-25 DIAGNOSIS — G89.4 CHRONIC PAIN SYNDROME: ICD-10-CM

## 2022-05-25 RX ORDER — HYDROCODONE BITARTRATE AND ACETAMINOPHEN 7.5; 325 MG/1; MG/1
1 TABLET ORAL EVERY 6 HOURS PRN
Qty: 120 TABLET | Refills: 0 | Status: SHIPPED | OUTPATIENT
Start: 2022-05-25 | End: 2022-06-29 | Stop reason: SDUPTHER

## 2022-05-25 RX ORDER — DEXTROAMPHETAMINE SACCHARATE, AMPHETAMINE ASPARTATE, DEXTROAMPHETAMINE SULFATE AND AMPHETAMINE SULFATE 2.5; 2.5; 2.5; 2.5 MG/1; MG/1; MG/1; MG/1
10 TABLET ORAL 2 TIMES DAILY PRN
Qty: 60 TABLET | Refills: 0 | Status: SHIPPED | OUTPATIENT
Start: 2022-05-25 | End: 2022-06-29 | Stop reason: SDUPTHER

## 2022-05-25 RX ORDER — CYCLOBENZAPRINE HCL 10 MG
10 TABLET ORAL 3 TIMES DAILY PRN
Qty: 90 TABLET | Refills: 0 | Status: SHIPPED | OUTPATIENT
Start: 2022-05-25 | End: 2022-06-29 | Stop reason: SDUPTHER

## 2022-05-25 NOTE — TELEPHONE ENCOUNTER
Refill Routing Note   Medication(s) are not appropriate for processing by [facility] for the following reason(s):      - Outside of protocol    [facility] action(s):  Route       Medication Therapy Plan: Non-delegated  Medication reconciliation completed: No     Appointments  past 12m or future 3m with PCP    Date Provider   Last Visit   5/17/2022 Cody Enamorado MD   Next Visit   8/17/2022 Cody Enamorado MD   ED visits in past 90 days: 0        Note composed:2:20 PM 05/25/2022

## 2022-05-25 NOTE — TELEPHONE ENCOUNTER
No new care gaps identified.  [facility] Embedded Care Gaps. Reference number: [id number]. 5/25/2022   2:07:31 PM CDT

## 2022-06-29 DIAGNOSIS — G89.4 CHRONIC PAIN SYNDROME: ICD-10-CM

## 2022-06-29 DIAGNOSIS — F98.8 ATTENTION DEFICIT DISORDER (ADD) WITHOUT HYPERACTIVITY: ICD-10-CM

## 2022-06-29 RX ORDER — HYDROCODONE BITARTRATE AND ACETAMINOPHEN 7.5; 325 MG/1; MG/1
1 TABLET ORAL EVERY 6 HOURS PRN
Qty: 120 TABLET | Refills: 0 | Status: SHIPPED | OUTPATIENT
Start: 2022-06-29 | End: 2022-07-27 | Stop reason: SDUPTHER

## 2022-06-29 RX ORDER — CYCLOBENZAPRINE HCL 10 MG
10 TABLET ORAL 3 TIMES DAILY PRN
Qty: 90 TABLET | Refills: 0 | Status: SHIPPED | OUTPATIENT
Start: 2022-06-29 | End: 2022-07-27 | Stop reason: SDUPTHER

## 2022-06-29 RX ORDER — DEXTROAMPHETAMINE SACCHARATE, AMPHETAMINE ASPARTATE, DEXTROAMPHETAMINE SULFATE AND AMPHETAMINE SULFATE 2.5; 2.5; 2.5; 2.5 MG/1; MG/1; MG/1; MG/1
10 TABLET ORAL 2 TIMES DAILY PRN
Qty: 60 TABLET | Refills: 0 | Status: SHIPPED | OUTPATIENT
Start: 2022-06-29 | End: 2022-07-27 | Stop reason: SDUPTHER

## 2022-06-29 NOTE — TELEPHONE ENCOUNTER
No new care gaps identified.  [facility] Embedded Care Gaps. Reference number: [id number]. 6/29/2022   1:29:34 PM CDT

## 2022-07-27 DIAGNOSIS — F98.8 ATTENTION DEFICIT DISORDER (ADD) WITHOUT HYPERACTIVITY: ICD-10-CM

## 2022-07-27 DIAGNOSIS — G89.4 CHRONIC PAIN SYNDROME: ICD-10-CM

## 2022-07-27 NOTE — TELEPHONE ENCOUNTER
No new care gaps identified.  [facility] Embedded Care Gaps. Reference number: [id number]. 7/27/2022   12:22:41 PM CDT

## 2022-07-28 RX ORDER — DEXTROAMPHETAMINE SACCHARATE, AMPHETAMINE ASPARTATE, DEXTROAMPHETAMINE SULFATE AND AMPHETAMINE SULFATE 2.5; 2.5; 2.5; 2.5 MG/1; MG/1; MG/1; MG/1
10 TABLET ORAL 2 TIMES DAILY PRN
Qty: 60 TABLET | Refills: 0 | Status: SHIPPED | OUTPATIENT
Start: 2022-07-28 | End: 2022-08-25 | Stop reason: SDUPTHER

## 2022-07-28 RX ORDER — CYCLOBENZAPRINE HCL 10 MG
10 TABLET ORAL 3 TIMES DAILY PRN
Qty: 90 TABLET | Refills: 0 | Status: SHIPPED | OUTPATIENT
Start: 2022-07-28 | End: 2022-08-25 | Stop reason: SDUPTHER

## 2022-07-28 RX ORDER — HYDROCODONE BITARTRATE AND ACETAMINOPHEN 7.5; 325 MG/1; MG/1
1 TABLET ORAL EVERY 6 HOURS PRN
Qty: 120 TABLET | Refills: 0 | Status: SHIPPED | OUTPATIENT
Start: 2022-07-28 | End: 2022-08-25 | Stop reason: SDUPTHER

## 2022-08-15 RX ORDER — SULFAMETHOXAZOLE AND TRIMETHOPRIM 800; 160 MG/1; MG/1
1 TABLET ORAL 2 TIMES DAILY
Qty: 20 TABLET | Refills: 0 | Status: SHIPPED | OUTPATIENT
Start: 2022-08-15 | End: 2022-08-25

## 2022-08-17 ENCOUNTER — OFFICE VISIT (OUTPATIENT)
Dept: INTERNAL MEDICINE | Facility: CLINIC | Age: 47
End: 2022-08-17
Payer: COMMERCIAL

## 2022-08-17 VITALS
HEIGHT: 69 IN | BODY MASS INDEX: 32.16 KG/M2 | OXYGEN SATURATION: 97 % | SYSTOLIC BLOOD PRESSURE: 130 MMHG | DIASTOLIC BLOOD PRESSURE: 80 MMHG | HEART RATE: 81 BPM | TEMPERATURE: 98 F | WEIGHT: 217.13 LBS

## 2022-08-17 DIAGNOSIS — F32.5 MAJOR DEPRESSIVE DISORDER WITH SINGLE EPISODE, IN REMISSION: ICD-10-CM

## 2022-08-17 DIAGNOSIS — F98.8 ATTENTION DEFICIT DISORDER (ADD) WITHOUT HYPERACTIVITY: ICD-10-CM

## 2022-08-17 DIAGNOSIS — G89.4 CHRONIC PAIN SYNDROME: Primary | ICD-10-CM

## 2022-08-17 PROCEDURE — 1159F MED LIST DOCD IN RCRD: CPT | Mod: CPTII,S$GLB,, | Performed by: FAMILY MEDICINE

## 2022-08-17 PROCEDURE — 3008F PR BODY MASS INDEX (BMI) DOCUMENTED: ICD-10-PCS | Mod: CPTII,S$GLB,, | Performed by: FAMILY MEDICINE

## 2022-08-17 PROCEDURE — 3075F SYST BP GE 130 - 139MM HG: CPT | Mod: CPTII,S$GLB,, | Performed by: FAMILY MEDICINE

## 2022-08-17 PROCEDURE — 99999 PR PBB SHADOW E&M-EST. PATIENT-LVL IV: CPT | Mod: PBBFAC,,, | Performed by: FAMILY MEDICINE

## 2022-08-17 PROCEDURE — 3008F BODY MASS INDEX DOCD: CPT | Mod: CPTII,S$GLB,, | Performed by: FAMILY MEDICINE

## 2022-08-17 PROCEDURE — 3079F DIAST BP 80-89 MM HG: CPT | Mod: CPTII,S$GLB,, | Performed by: FAMILY MEDICINE

## 2022-08-17 PROCEDURE — 1159F PR MEDICATION LIST DOCUMENTED IN MEDICAL RECORD: ICD-10-PCS | Mod: CPTII,S$GLB,, | Performed by: FAMILY MEDICINE

## 2022-08-17 PROCEDURE — 99214 PR OFFICE/OUTPT VISIT, EST, LEVL IV, 30-39 MIN: ICD-10-PCS | Mod: S$GLB,,, | Performed by: FAMILY MEDICINE

## 2022-08-17 PROCEDURE — 3079F PR MOST RECENT DIASTOLIC BLOOD PRESSURE 80-89 MM HG: ICD-10-PCS | Mod: CPTII,S$GLB,, | Performed by: FAMILY MEDICINE

## 2022-08-17 PROCEDURE — 99214 OFFICE O/P EST MOD 30 MIN: CPT | Mod: S$GLB,,, | Performed by: FAMILY MEDICINE

## 2022-08-17 PROCEDURE — 3075F PR MOST RECENT SYSTOLIC BLOOD PRESS GE 130-139MM HG: ICD-10-PCS | Mod: CPTII,S$GLB,, | Performed by: FAMILY MEDICINE

## 2022-08-17 PROCEDURE — 99999 PR PBB SHADOW E&M-EST. PATIENT-LVL IV: ICD-10-PCS | Mod: PBBFAC,,, | Performed by: FAMILY MEDICINE

## 2022-08-17 NOTE — PROGRESS NOTES
Subjective:   Patient ID: Dax Carlisle is a 46 y.o. male.  Chief Complaint:  Follow-up    Patient presents follow-up on ADD, chronic pain, and MDD     Last visit May 2022  ADHD, chronic pain, major depressive disorder stable     Last annual physical exam November 2021  CBC stable anemia  CMP normal  Lipids normal  Thyroid normal  A1c normal  Hepatitis-C negative    - ADHD  On Adderall 10 mg twice a day to help with focus/ADD/ADHD related issues despite compliance with his other mental health medications  Effective with symptoms controlled on present medication and dose helping compensate for deficits at work  Duration is appropriate.    No mood instability, tics, or disordered sleep, or other apparent adverse effects.  No increased blood pressure, heart, or other cardiovascular side effects.    Tolerating current treatment well.   No behaviors to suggest inappropriate use of prescribed medications.  [place] Board [employer] Controlled Prescription Drug Monitoring database was queried and showed no activity to suggest abuse, diversion, or other inappropriate use of prescription medications.      - Chronic Pain  On Norco 7.5/325 mg every 6 hours as needed.  Averages 120 pills per month.  No behaviors to suggest inappropriate use of prescribed medications.  [place] Board [employer] Controlled Prescription Drug Monitoring database was queried and showed no activity to suggest abuse, diversion, or other inappropriate use of prescription medications.      - Major depressive disorder  On Abilify 10 mg daily and Lexapro 20 mg daily   Remains stable, no side effects, mood and symptoms well controlled on present medication    Reports had recent recurrent right goal infection/abscess  Pain was minimal and no significant fever  With warm compresses drained and resolved on its own     No new complaints or concerns today       Review of Systems   Constitutional: Negative for activity change, appetite change,  "chills, fatigue, fever and unexpected weight change.   Eyes: Negative for visual disturbance.   Respiratory: Negative for chest tightness and shortness of breath.    Cardiovascular: Negative for chest pain, palpitations and leg swelling.   Gastrointestinal: Negative for abdominal pain, constipation, diarrhea, nausea and vomiting.   Genitourinary: Negative for decreased urine volume, difficulty urinating, dysuria, flank pain, frequency, hematuria and urgency.   Musculoskeletal: Positive for arthralgias, back pain and myalgias. Negative for gait problem, joint swelling, neck pain and neck stiffness.   Skin: Negative for rash.   Neurological: Negative for dizziness, tremors, syncope, weakness, light-headedness, numbness and headaches.   Psychiatric/Behavioral: Negative for agitation, behavioral problems, confusion, decreased concentration, dysphoric mood, hallucinations, self-injury, sleep disturbance and suicidal ideas. The patient is not nervous/anxious and is not hyperactive.      Objective:   /80 (BP Location: Right arm, Patient Position: Sitting, BP Method: Large (Manual))   Pulse 81   Temp 98.3 °F (36.8 °C) (Tympanic)   Ht 5' 9" (1.753 m)   Wt 98.5 kg (217 lb 2.5 oz)   SpO2 97%   BMI 32.07 kg/m²     Physical Exam  Vitals and nursing note reviewed.   Constitutional:       General: He is not in acute distress.     Appearance: Normal appearance. He is well-developed and normal weight.   Cardiovascular:      Rate and Rhythm: Normal rate and regular rhythm.   Pulmonary:      Effort: Pulmonary effort is normal. No tachypnea, accessory muscle usage or respiratory distress.   Musculoskeletal:      Right lower leg: No edema.      Left lower leg: No edema.   Skin:     Findings: No rash.   Neurological:      Mental Status: He is alert.      Coordination: Coordination is intact.      Gait: Gait is intact.   Psychiatric:         Attention and Perception: Attention normal.         Mood and Affect: Mood normal.    "      Behavior: Behavior normal.         Cognition and Memory: Cognition normal.       Assessment:       ICD-10-CM ICD-9-CM   1. Chronic pain syndrome  G89.4 338.4   2. Attention deficit disorder (ADD) without hyperactivity  F98.8 314.00   3. Major depressive disorder with single episode, in remission  F32.5 296.25     Plan:   Chronic pain syndrome  Pain and symptoms remain well controlled on current medication   Continue Topamax 25 mg twice a day.  Continue Norco 7.5/325 every 6 hours as needed for pain  Averaging 4 per day and stable pattern  Risk of discontinuation of long-term narcotics higher than risk of continuing long-term narcotics at current dose  Okay to refill monthly x 2     Attention deficit disorder (ADD) without hyperactivity  ADHD, stable, no side effects, controlled on present medication  Continue Adderall 10 mg twice a day  OK to refill monthly x 2     Major depressive disorder with single episode, in remission  Stable, no side effects, mood and symptoms well controlled on present medication .  Continue Lexapro 20 mg daily  Continue Abilify 10 mg daily     Return to clinic 3 months for annual physical exam or sooner if needed the

## 2022-08-25 DIAGNOSIS — G89.4 CHRONIC PAIN SYNDROME: ICD-10-CM

## 2022-08-25 DIAGNOSIS — F98.8 ATTENTION DEFICIT DISORDER (ADD) WITHOUT HYPERACTIVITY: ICD-10-CM

## 2022-08-25 RX ORDER — CYCLOBENZAPRINE HCL 10 MG
10 TABLET ORAL 3 TIMES DAILY PRN
Qty: 90 TABLET | Refills: 0 | Status: SHIPPED | OUTPATIENT
Start: 2022-08-25 | End: 2022-09-24 | Stop reason: SDUPTHER

## 2022-08-25 RX ORDER — DEXTROAMPHETAMINE SACCHARATE, AMPHETAMINE ASPARTATE, DEXTROAMPHETAMINE SULFATE AND AMPHETAMINE SULFATE 2.5; 2.5; 2.5; 2.5 MG/1; MG/1; MG/1; MG/1
10 TABLET ORAL 2 TIMES DAILY PRN
Qty: 60 TABLET | Refills: 0 | Status: SHIPPED | OUTPATIENT
Start: 2022-08-25 | End: 2022-09-24 | Stop reason: SDUPTHER

## 2022-08-25 RX ORDER — HYDROCODONE BITARTRATE AND ACETAMINOPHEN 7.5; 325 MG/1; MG/1
1 TABLET ORAL EVERY 6 HOURS PRN
Qty: 120 TABLET | Refills: 0 | Status: SHIPPED | OUTPATIENT
Start: 2022-08-25 | End: 2022-09-24 | Stop reason: SDUPTHER

## 2022-08-25 NOTE — TELEPHONE ENCOUNTER
No new care gaps identified.  [facility] Embedded Care Gaps. Reference number: [id number]. 8/25/2022   10:57:46 AM YOLANDAT

## 2022-09-22 ENCOUNTER — OFFICE VISIT (OUTPATIENT)
Dept: URGENT CARE | Facility: CLINIC | Age: 47
End: 2022-09-22
Payer: COMMERCIAL

## 2022-09-22 VITALS
HEART RATE: 104 BPM | BODY MASS INDEX: 32.14 KG/M2 | DIASTOLIC BLOOD PRESSURE: 71 MMHG | WEIGHT: 217 LBS | SYSTOLIC BLOOD PRESSURE: 113 MMHG | RESPIRATION RATE: 16 BRPM | OXYGEN SATURATION: 98 % | HEIGHT: 69 IN | TEMPERATURE: 99 F

## 2022-09-22 DIAGNOSIS — J20.9 ACUTE BRONCHITIS, UNSPECIFIED ORGANISM: Primary | ICD-10-CM

## 2022-09-22 DIAGNOSIS — R06.2 WHEEZING: ICD-10-CM

## 2022-09-22 DIAGNOSIS — R09.81 SINUS CONGESTION: ICD-10-CM

## 2022-09-22 DIAGNOSIS — R00.0 TACHYCARDIA, UNSPECIFIED: ICD-10-CM

## 2022-09-22 DIAGNOSIS — R09.81 NASAL CONGESTION: ICD-10-CM

## 2022-09-22 DIAGNOSIS — M79.18 LEFT BUTTOCK PAIN: ICD-10-CM

## 2022-09-22 PROCEDURE — 99204 OFFICE O/P NEW MOD 45 MIN: CPT | Mod: S$GLB,,, | Performed by: NURSE PRACTITIONER

## 2022-09-22 PROCEDURE — 3008F PR BODY MASS INDEX (BMI) DOCUMENTED: ICD-10-PCS | Mod: CPTII,S$GLB,, | Performed by: NURSE PRACTITIONER

## 2022-09-22 PROCEDURE — 3074F SYST BP LT 130 MM HG: CPT | Mod: CPTII,S$GLB,, | Performed by: NURSE PRACTITIONER

## 2022-09-22 PROCEDURE — 99204 PR OFFICE/OUTPT VISIT, NEW, LEVL IV, 45-59 MIN: ICD-10-PCS | Mod: S$GLB,,, | Performed by: NURSE PRACTITIONER

## 2022-09-22 PROCEDURE — 3078F PR MOST RECENT DIASTOLIC BLOOD PRESSURE < 80 MM HG: ICD-10-PCS | Mod: CPTII,S$GLB,, | Performed by: NURSE PRACTITIONER

## 2022-09-22 PROCEDURE — 3078F DIAST BP <80 MM HG: CPT | Mod: CPTII,S$GLB,, | Performed by: NURSE PRACTITIONER

## 2022-09-22 PROCEDURE — U0002 COVID-19 LAB TEST NON-CDC: HCPCS | Mod: QW,S$GLB,, | Performed by: NURSE PRACTITIONER

## 2022-09-22 PROCEDURE — 3008F BODY MASS INDEX DOCD: CPT | Mod: CPTII,S$GLB,, | Performed by: NURSE PRACTITIONER

## 2022-09-22 PROCEDURE — 3074F PR MOST RECENT SYSTOLIC BLOOD PRESSURE < 130 MM HG: ICD-10-PCS | Mod: CPTII,S$GLB,, | Performed by: NURSE PRACTITIONER

## 2022-09-22 PROCEDURE — 87502 INFLUENZA DNA AMP PROBE: CPT | Mod: QW,S$GLB,, | Performed by: NURSE PRACTITIONER

## 2022-09-22 PROCEDURE — 87502 POCT INFLUENZA A/B MOLECULAR: ICD-10-PCS | Mod: QW,S$GLB,, | Performed by: NURSE PRACTITIONER

## 2022-09-22 PROCEDURE — U0002: ICD-10-PCS | Mod: QW,S$GLB,, | Performed by: NURSE PRACTITIONER

## 2022-09-22 PROCEDURE — 1159F MED LIST DOCD IN RCRD: CPT | Mod: CPTII,S$GLB,, | Performed by: NURSE PRACTITIONER

## 2022-09-22 PROCEDURE — 1159F PR MEDICATION LIST DOCUMENTED IN MEDICAL RECORD: ICD-10-PCS | Mod: CPTII,S$GLB,, | Performed by: NURSE PRACTITIONER

## 2022-09-22 RX ORDER — METHYLPREDNISOLONE 4 MG/1
TABLET ORAL
Qty: 21 TABLET | Refills: 0 | Status: SHIPPED | OUTPATIENT
Start: 2022-09-22 | End: 2022-10-13

## 2022-09-22 RX ORDER — ALBUTEROL SULFATE 90 UG/1
2 AEROSOL, METERED RESPIRATORY (INHALATION) EVERY 6 HOURS PRN
Qty: 8.5 G | Refills: 0 | Status: SHIPPED | OUTPATIENT
Start: 2022-09-22 | End: 2023-10-19

## 2022-09-22 NOTE — PROGRESS NOTES
"Subjective:       Patient ID: Dax Carlisle is a 46 y.o. male.    Vitals:  height is 5' 9" (1.753 m) and weight is 98.4 kg (217 lb). His tympanic temperature is 98.8 °F (37.1 °C). His blood pressure is 113/71 and his pulse is 104. His respiration is 16 and oxygen saturation is 98%.     Chief Complaint: Sinus Problem    Patient presents with sinus congestion, brain fog, leg muscle complaint (3 weeks).  Symptoms started yesterday.  Patient states he just got back from trip to [place] and was concerned he picked up a bug.    Reports his nasal congestion is bothering him the most (when he sits or lay down).      Patient also complains of having a rectal abscess that his wife squeezed out of him 3 weeks ago and since then his left buttock has been hurting    Sinus Problem  This is a new problem. The current episode started yesterday. The problem is unchanged. There has been no fever. Associated symptoms include congestion, ear pain (drainage), sinus pressure and a sore throat. Pertinent negatives include no chills, coughing, diaphoresis, headaches, hoarse voice, neck pain, shortness of breath, sneezing or swollen glands. Past treatments include nothing.     Constitution: Negative for chills and sweating.   HENT:  Positive for ear pain (drainage), congestion, sinus pressure and sore throat.    Neck: Negative for neck pain.   Respiratory:  Positive for chest tightness and wheezing. Negative for cough and shortness of breath.    Allergic/Immunologic: Negative for sneezing.   Neurological:  Negative for headaches.     Objective:      Physical Exam   Constitutional: He appears well-developed.  Non-toxic appearance. He does not appear ill. No distress.   HENT:   Head: Normocephalic and atraumatic.   Ears:   Right Ear: External ear normal.   Left Ear: External ear normal.   Nose: Nose normal.   Eyes: Conjunctivae and EOM are normal.   Neck: Neck supple.   Cardiovascular: Regular rhythm. Tachycardia present. "   Pulmonary/Chest: Effort normal and breath sounds normal.   Abdominal: Normal appearance.   Musculoskeletal: Normal range of motion.         General: Normal range of motion.   Neurological: no focal deficit. He is alert. He displays no weakness. Gait normal.   Skin: Skin is warm, dry, not diaphoretic, not pale and no rash.   Psychiatric: His behavior is normal.     Results for orders placed or performed in visit on 09/22/22   POCT COVID-19 Rapid Screening   Result Value Ref Range    POC Rapid COVID Negative Negative     Acceptable Yes    POCT Influenza A/B MOLECULAR   Result Value Ref Range    POC Molecular Influenza A Ag Negative Negative, Not Reported    POC Molecular Influenza B Ag Negative Negative, Not Reported     Acceptable Yes            Assessment:       1. Acute bronchitis, unspecified organism    2. Tachycardia, unspecified    3. Wheezing    4. Sinus congestion    5. Nasal congestion    6. Left buttock pain          Plan:         Acute bronchitis, unspecified organism  -     methylPREDNISolone (MEDROL DOSEPACK) 4 mg tablet; use as directed  Dispense: 21 tablet; Refill: 0  -     albuterol (VENTOLIN HFA) 90 mcg/actuation inhaler; Inhale 2 puffs into the lungs every 6 (six) hours as needed for Wheezing. Rescue  Dispense: 8.5 g; Refill: 0    Tachycardia, unspecified    Wheezing  -     methylPREDNISolone (MEDROL DOSEPACK) 4 mg tablet; use as directed  Dispense: 21 tablet; Refill: 0  -     albuterol (VENTOLIN HFA) 90 mcg/actuation inhaler; Inhale 2 puffs into the lungs every 6 (six) hours as needed for Wheezing. Rescue  Dispense: 8.5 g; Refill: 0    Sinus congestion  -     POCT COVID-19 Rapid Screening  -     POCT Influenza A/B MOLECULAR  -     methylPREDNISolone (MEDROL DOSEPACK) 4 mg tablet; use as directed  Dispense: 21 tablet; Refill: 0    Nasal congestion  -     methylPREDNISolone (MEDROL DOSEPACK) 4 mg tablet; use as directed  Dispense: 21 tablet; Refill: 0    Left buttock  pain       Discussed increasing oral hydration  with water  Discussed eating a heart healthy diet  Discussed following with PCP regarding left buttock pain  Patient agreed with plan of care and verbalized understanding     Patient Instructions   Follow up with your PCP for your buttock pain    Increase your water intake as we discussed     You have tested NEGATIVE for COVID-19 today.     CDC Testing and Quarantine Guidelines for patients with exposure to a known-positive COVID-19 person:    ·     A 'close exposure' is defined as anyone who has had an exposure (masked or unmasked) to a known COVID -19 positive person within 6 feet of someone for a cumulative total of 15 minutes or more over a 24-hour period.    ·     Vaccinated: patients who have been boosted or completed the primary series of Pfizer or Moderna vaccine within the last 6 months or completed the primary series of J&J vaccine within the last 2 months and/or had a positive test within 90 days   - do NOT need to quarantine after contact with someone who had COVID-19 unless they have symptoms.   - should get tested 3-5 days after their exposure (if they have not had a positive test within the last 90 days), even if they don't have symptoms and wear a mask indoors in public for 10 days following exposure or until their test result is negative on day 5.  - If you develop symptoms test and quarantine.    ·     Unvaccinated, or are more than six months out from their second mRNA dose (or more than 2 months after the J&J vaccine) and not yet boosted,  and/or NOT had a positive test within 90 days and meet 'close exposure'  - you are required by CDC guidelines to quarantine for at least 5 days from time of exposure followed by 5 days of strict masking. It is recommended, but not required to test after 5 days, unless you develop symptoms, in which case you should test at that time.  - If you do decide to test at 5 days and are asymptomatic, the risk is that if  you test without symptoms (on Day 5 for example) and you are positive, your 5 day isolation begins on that day, and you turned your 5 day quarantine into 10 days.  - If your exposure does not meet the above definition, you can return to your normal daily activities to include social distancing, wearing a mask and frequent handwashing.    Alternatively, if a 5-day quarantine is not feasible, it is imperative that an exposed person wear a well-fitting mask at all times when around others for 10 days after exposure.    During quarantine:   Separate yourself from other people and animals in your home.  Call ahead before visiting your doctor.  Wear a facemask.  Cover your coughs and sneezes.  Wash your hands often with soap and water; hand  can be used, too.  Avoid sharing personal household items.  Wipe down surfaces used daily.  Monitor your symptoms. Seek prompt medical attention if your illness is worsening (e.g., difficulty breathing).   Before seeking care, call your healthcare provider.  If you have a medical emergency and need to call 911, notify the dispatch personnel that you have, or are being evaluated for COVID-19. If possible, put on a facemask before emergency medical services arrive.                               Close contacts should also follow these recommendations:  Make sure that you understand and can help the patient follow their provider's instructions for medication(s) and care. You should help the patient with basic needs in the home and provide support for getting groceries, prescriptions, and other personal needs.  Monitor the patient's symptoms. If the patient is getting sicker, call his or her healthcare provider and tell them that the patient has laboratory-confirmed COVID-19. If the patient has a medical emergency and you need to call 911, notify the dispatch personnel that the patient has, or is being evaluated for COVID-19.  Household members should stay in another room or be   from the patient. Household members should use a separate bedroom and bathroom, if available.  Prohibit visitors.  Household members should care for any pets in the home.  Make sure that shared spaces in the home have good air flow, such as by an air conditioner or an opened window, weather permitting.  Perform hand hygiene frequently. Wash your hands often with soap and water for at least 20 seconds or use an alcohol-based hand  (that contains > 60% alcohol) covering all surfaces of your hands and rubbing them together until they feel dry. Soap and water should be used preferentially.  Avoid touching your eyes, nose, and mouth.  The patient should wear a facemask. If the patient is not able to wear a facemask (for example, because it causes trouble breathing), caregivers should wear a mask when they are in the same room as the patient.  Wear a disposable facemask and gloves when you touch or have contact with the patient's blood, stool, or body fluids, such as saliva, sputum, nasal mucus, vomit, urine.  Throw out disposable facemasks and gloves after using them. Do not reuse.  When removing personal protective equipment, first remove and dispose of gloves. Then, immediately clean your hands with soap and water or alcohol-based hand . Next, remove and dispose of facemask, and immediately clean your hands again with soap and water or alcohol-based hand .  You should not share dishes, drinking glasses, cups, eating utensils, towels, bedding, or other items with the patient. After the patient uses these items, you should wash them thoroughly (see below Wash laundry thoroughly).  Clean all high-touch surfaces, such as counters, tabletops, doorknobs, bathroom fixtures, toilets, phones, keyboards, tablets, and bedside tables, every day. Also, clean any surfaces that may have blood, stool, or body fluids on them.  Use a household cleaning spray or wipe, according to the label  instructions. Labels contain instructions for safe and effective use of the cleaning product including precautions you should take when applying the product, such as wearing gloves and making sure you have good ventilation during use of the product.  Wash laundry thoroughly.  Immediately remove and wash clothes or bedding that have blood, stool, or body fluids on them.  Wear disposable gloves while handling soiled items and keep soiled items away from your body. Clean your hands (with soap and water or an alcohol-based hand ) immediately after removing your gloves.  Read and follow directions on labels of laundry or clothing items and detergent. In general, using a normal laundry detergent according to washing machine instructions and dry thoroughly using the warmest temperatures recommended on the clothing label.  Place all used disposable gloves, facemasks, and other contaminated items in a lined container before disposing of them with other household waste. Clean your hands (with soap and water or an alcohol-based hand ) immediately after handling these items. Soap and water should be used preferentially if hands are visibly dirty.  Discuss any additional questions with your [facility] or local health department or healthcare provider. Check available hours when contacting your local health department.     Follow up with your PCP or specialty clinic as directed within 2-5 days if not improved or as needed.  You can call [phone] to schedule an appointment with the appropriate provider.  If your condition worsens we recommend that you receive another evaluation at the emergency room immediately or contact your primary medical clinics after hours call service to discuss your concerns.  Please return here or go to the Emergency Department for any concerns or worsening of condition.

## 2022-09-22 NOTE — PATIENT INSTRUCTIONS
Follow up with your PCP for your buttock pain    Increase your water intake as we discussed     You have tested NEGATIVE for COVID-19 today.     CDC Testing and Quarantine Guidelines for patients with exposure to a known-positive COVID-19 person:    ·     A 'close exposure' is defined as anyone who has had an exposure (masked or unmasked) to a known COVID -19 positive person within 6 feet of someone for a cumulative total of 15 minutes or more over a 24-hour period.    ·     Vaccinated: patients who have been boosted or completed the primary series of Pfizer or Moderna vaccine within the last 6 months or completed the primary series of J&J vaccine within the last 2 months and/or had a positive test within 90 days   - do NOT need to quarantine after contact with someone who had COVID-19 unless they have symptoms.   - should get tested 3-5 days after their exposure (if they have not had a positive test within the last 90 days), even if they don't have symptoms and wear a mask indoors in public for 10 days following exposure or until their test result is negative on day 5.  - If you develop symptoms test and quarantine.    ·     Unvaccinated, or are more than six months out from their second mRNA dose (or more than 2 months after the J&J vaccine) and not yet boosted,  and/or NOT had a positive test within 90 days and meet 'close exposure'  - you are required by CDC guidelines to quarantine for at least 5 days from time of exposure followed by 5 days of strict masking. It is recommended, but not required to test after 5 days, unless you develop symptoms, in which case you should test at that time.  - If you do decide to test at 5 days and are asymptomatic, the risk is that if you test without symptoms (on Day 5 for example) and you are positive, your 5 day isolation begins on that day, and you turned your 5 day quarantine into 10 days.  - If your exposure does not meet the above definition, you can return to your normal  daily activities to include social distancing, wearing a mask and frequent handwashing.    Alternatively, if a 5-day quarantine is not feasible, it is imperative that an exposed person wear a well-fitting mask at all times when around others for 10 days after exposure.    During quarantine:   Separate yourself from other people and animals in your home.  Call ahead before visiting your doctor.  Wear a facemask.  Cover your coughs and sneezes.  Wash your hands often with soap and water; hand  can be used, too.  Avoid sharing personal household items.  Wipe down surfaces used daily.  Monitor your symptoms. Seek prompt medical attention if your illness is worsening (e.g., difficulty breathing).   Before seeking care, call your healthcare provider.  If you have a medical emergency and need to call 911, notify the dispatch personnel that you have, or are being evaluated for COVID-19. If possible, put on a facemask before emergency medical services arrive.                               Close contacts should also follow these recommendations:  Make sure that you understand and can help the patient follow their provider's instructions for medication(s) and care. You should help the patient with basic needs in the home and provide support for getting groceries, prescriptions, and other personal needs.  Monitor the patient's symptoms. If the patient is getting sicker, call his or her healthcare provider and tell them that the patient has laboratory-confirmed COVID-19. If the patient has a medical emergency and you need to call 911, notify the dispatch personnel that the patient has, or is being evaluated for COVID-19.  Household members should stay in another room or be  from the patient. Household members should use a separate bedroom and bathroom, if available.  Prohibit visitors.  Household members should care for any pets in the home.  Make sure that shared spaces in the home have good air flow, such as  by an air conditioner or an opened window, weather permitting.  Perform hand hygiene frequently. Wash your hands often with soap and water for at least 20 seconds or use an alcohol-based hand  (that contains > 60% alcohol) covering all surfaces of your hands and rubbing them together until they feel dry. Soap and water should be used preferentially.  Avoid touching your eyes, nose, and mouth.  The patient should wear a facemask. If the patient is not able to wear a facemask (for example, because it causes trouble breathing), caregivers should wear a mask when they are in the same room as the patient.  Wear a disposable facemask and gloves when you touch or have contact with the patient's blood, stool, or body fluids, such as saliva, sputum, nasal mucus, vomit, urine.  Throw out disposable facemasks and gloves after using them. Do not reuse.  When removing personal protective equipment, first remove and dispose of gloves. Then, immediately clean your hands with soap and water or alcohol-based hand . Next, remove and dispose of facemask, and immediately clean your hands again with soap and water or alcohol-based hand .  You should not share dishes, drinking glasses, cups, eating utensils, towels, bedding, or other items with the patient. After the patient uses these items, you should wash them thoroughly (see below Wash laundry thoroughly).  Clean all high-touch surfaces, such as counters, tabletops, doorknobs, bathroom fixtures, toilets, phones, keyboards, tablets, and bedside tables, every day. Also, clean any surfaces that may have blood, stool, or body fluids on them.  Use a household cleaning spray or wipe, according to the label instructions. Labels contain instructions for safe and effective use of the cleaning product including precautions you should take when applying the product, such as wearing gloves and making sure you have good ventilation during use of the product.  Wash  laundry thoroughly.  Immediately remove and wash clothes or bedding that have blood, stool, or body fluids on them.  Wear disposable gloves while handling soiled items and keep soiled items away from your body. Clean your hands (with soap and water or an alcohol-based hand ) immediately after removing your gloves.  Read and follow directions on labels of laundry or clothing items and detergent. In general, using a normal laundry detergent according to washing machine instructions and dry thoroughly using the warmest temperatures recommended on the clothing label.  Place all used disposable gloves, facemasks, and other contaminated items in a lined container before disposing of them with other household waste. Clean your hands (with soap and water or an alcohol-based hand ) immediately after handling these items. Soap and water should be used preferentially if hands are visibly dirty.  Discuss any additional questions with your [facility] or local health department or healthcare provider. Check available hours when contacting your local health department.     Follow up with your PCP or specialty clinic as directed within 2-5 days if not improved or as needed.  You can call [phone] to schedule an appointment with the appropriate provider.  If your condition worsens we recommend that you receive another evaluation at the emergency room immediately or contact your primary medical clinics after hours call service to discuss your concerns.  Please return here or go to the Emergency Department for any concerns or worsening of condition.

## 2022-09-24 DIAGNOSIS — F98.8 ATTENTION DEFICIT DISORDER (ADD) WITHOUT HYPERACTIVITY: ICD-10-CM

## 2022-09-24 DIAGNOSIS — G89.4 CHRONIC PAIN SYNDROME: ICD-10-CM

## 2022-09-24 NOTE — TELEPHONE ENCOUNTER
No new care gaps identified.  [facility] Embedded Care Gaps. Reference number: [id number]. 9/24/2022   4:10:33 PM CDT

## 2022-09-27 ENCOUNTER — OFFICE VISIT (OUTPATIENT)
Dept: URGENT CARE | Facility: CLINIC | Age: 47
End: 2022-09-27
Payer: COMMERCIAL

## 2022-09-27 VITALS
RESPIRATION RATE: 16 BRPM | TEMPERATURE: 98 F | DIASTOLIC BLOOD PRESSURE: 69 MMHG | SYSTOLIC BLOOD PRESSURE: 112 MMHG | HEIGHT: 69 IN | BODY MASS INDEX: 32.14 KG/M2 | OXYGEN SATURATION: 95 % | HEART RATE: 98 BPM | WEIGHT: 217 LBS

## 2022-09-27 DIAGNOSIS — H92.03 EAR PAIN, BILATERAL: ICD-10-CM

## 2022-09-27 DIAGNOSIS — R09.81 NASAL CONGESTION: ICD-10-CM

## 2022-09-27 DIAGNOSIS — B96.89 ACUTE BACTERIAL RHINOSINUSITIS: Primary | ICD-10-CM

## 2022-09-27 DIAGNOSIS — J01.90 ACUTE BACTERIAL RHINOSINUSITIS: Primary | ICD-10-CM

## 2022-09-27 DIAGNOSIS — Z98.890 HX OF TYMPANOSTOMY TUBES: ICD-10-CM

## 2022-09-27 DIAGNOSIS — H10.33 ACUTE BACTERIAL CONJUNCTIVITIS OF BOTH EYES: ICD-10-CM

## 2022-09-27 DIAGNOSIS — H66.91 ACUTE OTITIS MEDIA, RIGHT: ICD-10-CM

## 2022-09-27 DIAGNOSIS — H57.89 EYE DISCHARGE: ICD-10-CM

## 2022-09-27 PROCEDURE — 3078F PR MOST RECENT DIASTOLIC BLOOD PRESSURE < 80 MM HG: ICD-10-PCS | Mod: CPTII,S$GLB,,

## 2022-09-27 PROCEDURE — 1159F PR MEDICATION LIST DOCUMENTED IN MEDICAL RECORD: ICD-10-PCS | Mod: CPTII,S$GLB,,

## 2022-09-27 PROCEDURE — 3078F DIAST BP <80 MM HG: CPT | Mod: CPTII,S$GLB,,

## 2022-09-27 PROCEDURE — 3074F SYST BP LT 130 MM HG: CPT | Mod: CPTII,S$GLB,,

## 2022-09-27 PROCEDURE — 99214 PR OFFICE/OUTPT VISIT, EST, LEVL IV, 30-39 MIN: ICD-10-PCS | Mod: S$GLB,,,

## 2022-09-27 PROCEDURE — 99214 OFFICE O/P EST MOD 30 MIN: CPT | Mod: S$GLB,,,

## 2022-09-27 PROCEDURE — 3008F PR BODY MASS INDEX (BMI) DOCUMENTED: ICD-10-PCS | Mod: CPTII,S$GLB,,

## 2022-09-27 PROCEDURE — 3074F PR MOST RECENT SYSTOLIC BLOOD PRESSURE < 130 MM HG: ICD-10-PCS | Mod: CPTII,S$GLB,,

## 2022-09-27 PROCEDURE — 1160F PR REVIEW ALL MEDS BY PRESCRIBER/CLIN PHARMACIST DOCUMENTED: ICD-10-PCS | Mod: CPTII,S$GLB,,

## 2022-09-27 PROCEDURE — 1160F RVW MEDS BY RX/DR IN RCRD: CPT | Mod: CPTII,S$GLB,,

## 2022-09-27 PROCEDURE — 3008F BODY MASS INDEX DOCD: CPT | Mod: CPTII,S$GLB,,

## 2022-09-27 PROCEDURE — 1159F MED LIST DOCD IN RCRD: CPT | Mod: CPTII,S$GLB,,

## 2022-09-27 RX ORDER — AMOXICILLIN AND CLAVULANATE POTASSIUM 875; 125 MG/1; MG/1
1 TABLET, FILM COATED ORAL EVERY 12 HOURS
Qty: 14 TABLET | Refills: 0 | Status: SHIPPED | OUTPATIENT
Start: 2022-09-27 | End: 2022-10-04

## 2022-09-27 RX ORDER — NEOMYCIN/POLYMYXIN B/HYDROCORT 3.5-10K-1
1 SUSPENSION, DROPS(FINAL DOSAGE FORM)(ML) OPHTHALMIC (EYE) EVERY 4 HOURS
Qty: 7.5 ML | Refills: 0 | Status: SHIPPED | OUTPATIENT
Start: 2022-09-27 | End: 2022-10-09

## 2022-09-27 NOTE — PROGRESS NOTES
"Subjective:       Patient ID: Dax Carlisle is a 46 y.o. male.    Vitals:  height is 5' 9" (1.753 m) and weight is 98.4 kg (217 lb). His tympanic temperature is 97.8 °F (36.6 °C). His blood pressure is 112/69 and his pulse is 98. His respiration is 16 and oxygen saturation is 95%.     Chief Complaint: Sinus Problem    Patient here for re-evaluation after being seen 5 days ago. Was prescribed medrol dose pack but symptoms have significantly worsened. He is c/o b/l ear pain, R > L. Expresses concern that his he may have ruptured his right ear drum as it was extremely painful last night. States pain woke him up from his sleep and he had to take a lortab. Also states he is having redness and discharge from both of his eyes. Lastly he is having sinus pressure and congestion. Has not taken any OTC sinus medications  Denies fever, CP, SOB, weakness, abdominal sx, and other associated complaints.      Sinus Problem  This is a new problem. The current episode started in the past 7 days (5). The problem has been gradually worsening since onset. There has been no fever. His pain is at a severity of 9/10. Associated symptoms include congestion, coughing, ear pain, a hoarse voice, sinus pressure, sneezing, a sore throat and swollen glands. Pertinent negatives include no chills, diaphoresis, headaches, neck pain or shortness of breath.     Constitution: Negative for chills and sweating.   HENT:  Positive for ear pain, congestion, sinus pressure and sore throat.    Neck: Negative for neck pain.   Respiratory:  Positive for cough. Negative for shortness of breath.    Allergic/Immunologic: Positive for sneezing.   Neurological:  Negative for headaches.     Objective:      Physical Exam   Constitutional: He is oriented to person, place, and time. He appears well-developed. He is cooperative.  Non-toxic appearance. He appears ill. No distress.   HENT:   Head: Normocephalic and atraumatic.   Ears:   Right Ear: Hearing, tympanic " membrane, external ear and ear canal normal.   Left Ear: Hearing, tympanic membrane, external ear and ear canal normal.      Comments: Right TM erythematous and bulging with purulent discharge in EAC  Left ear with clear discharge noted in EAC. PE tube in place  Nose: Nose normal. No mucosal edema, rhinorrhea or nasal deformity. No epistaxis. Right sinus exhibits no maxillary sinus tenderness and no frontal sinus tenderness. Left sinus exhibits no maxillary sinus tenderness and no frontal sinus tenderness.   Mouth/Throat: Uvula is midline, oropharynx is clear and moist and mucous membranes are normal. No trismus in the jaw. Normal dentition. No uvula swelling. No oropharyngeal exudate, posterior oropharyngeal edema or posterior oropharyngeal erythema.   Eyes: Conjunctivae and lids are normal. No scleral icterus.      Comments: Both eyes with mucopurulent drainage, conjunctiva injected   Neck: Trachea normal and phonation normal. Neck supple. No edema present. No erythema present. No neck rigidity present.   Cardiovascular: Normal rate, regular rhythm, normal heart sounds and normal pulses.   Pulmonary/Chest: Effort normal and breath sounds normal. No respiratory distress. He has no decreased breath sounds. He has no rhonchi.   Abdominal: Normal appearance.   Musculoskeletal: Normal range of motion.         General: No deformity. Normal range of motion.   Neurological: He is alert and oriented to person, place, and time. He exhibits normal muscle tone. Coordination normal.   Skin: Skin is warm, dry, intact, not diaphoretic and not pale.   Psychiatric: His speech is normal and behavior is normal. Judgment and thought content normal.   Nursing note and vitals reviewed.      Assessment:       1. Acute bacterial rhinosinusitis    2. Acute bacterial conjunctivitis of both eyes    3. Acute otitis media, right          Plan:         Discussed proper use and side effects of prescribed and OTC medications recommended for  symptomatic relief.   Return to clinic if symptoms worsen, persisit, or change.   Red flag signs/sx that warrants ED evaluation discussed with patient/parent who verbalized understanding      Acute bacterial rhinosinusitis  -     amoxicillin-clavulanate 875-125mg (AUGMENTIN) 875-125 mg per tablet; Take 1 tablet by mouth every 12 (twelve) hours. for 7 days  Dispense: 14 tablet; Refill: 0    Acute bacterial conjunctivitis of both eyes  -     neomycin-polymyxin-hydrocortisone (CORTISPORIN) 3.5-10,000-10 mg-unit-mg/mL ophthalmic suspension; Place 1 drop into both eyes every 4 (four) hours. for 7 days  Dispense: 7.5 mL; Refill: 0    Acute otitis media, right       Patient Instructions   If you have CIPRODEX: it is 4 drops into affected (left) ear twice a day x 7 days                                       Conjunctivitis  If your condition worsens or fails to improve we recommend that you receive another evaluation at the ER immediately or contact your PCP to discuss your concerns or return here. You must understand that you've received an urgent care treatment only and that you may be released before all your medical problems are known or treated. You the patient will arrange for followup care as instructed.     Infected individuals should not share handkerchiefs, tissues, towels, cosmetics, linens, or eating utensils.     RETURN TO WORK/SCHOOL: The safest approach to prevent spread to others is to stay home until there is no longer any discharge, but this is not feasible for most students and for those who work outside the home. Most  centers and schools require that students receive 24 hours of topical therapy before returning to school      Keep eyes cleaned.    Use the eye drops/ointment as prescribed.      Do not wear your contact lens ( if you use them) for at least 5 days after you stop having symptoms and are rechecked by your doctor.       Cool compresses to affected eye.     Throw away the contacts,  contact solution and carrying case you were using and start with new material.     If you develop increase eye symptoms or change in your vision seek medical care immediately either with your ophthalomologist or the ER or return here.      PLEASE READ YOUR DISCHARGE INSTRUCTIONS ENTIRELY AS IT CONTAINS IMPORTANT INFORMATION.      Please drink plenty of fluids.    Please get plenty of rest.    Please return here or go to the Emergency Department for any concerns or worsening of condition.      Tylenol or ibuprofen can also be used as directed for pain and fever unless you have an allergy to them or medical condition such as stomach ulcers, kidney or liver disease or blood thinners etc for which you should not be taking these type of medications.   YOU CAN ALTERNATE TYLENOL AND IBUPROFEN EVERY 3-4 HOURS. Take 1000mg (2 pills) of Extra Strength Acetaminophen (Tylenol) every 6 hours and 600mg (3 pills) of Ibuprofen (Motrin/Advil) every 6 hours, alternating the two so that every 3 hours you take one or the other. Start with the Tylenol, then 3 hours later take the Ibuprofen, then 3 hours later take the Tylenol again, and so on.         For your allergy symptoms and/or runny nose, sinus/ear pressure, congestion:        - You can take over-the-counter claritin, zyrtec, allegra, or xyzal as directed. These are antihistamines that can help with runny nose, nasal congestion, sneezing, and helps to dry up post-nasal drip, which usually causes sore throat and cough.               - If you do NOT have high blood pressure, you may use a decongestant form (D)  of this medication (ie. Claritin- D, zyrtec-D, allegra-D) or if you do not take the D form, you can take sudafed (pseudoephedrine) over the counter, which is a decongestant. Do NOT take two decongestant (D) medications at the same time (such as mucinex-D and claritin-D or plain sudafed and claritin D). Dextromethorphan (DM) is a cough suppressant over the counter (ie.  mucinex DM, robitussin, delsym; dayquil/nyquil has DM as well.)    -If you DO have high blood pressure, AVOID DECONGESTANTS (I.e., Phenylephrine and Pseudoephedrine). You may take Coricidin HBP for sinus congestion and Delsym for cough.        - You can take plain Mucinex (guaifenesin) 1200 mg twice a day to help loosen mucous.       Use over the counter flonase or nasocort: one spray each nostril twice daily OR two sprays each nostril once daily until nares dry out, unless you have Glaucoma.   If you find this dries your nose out or your nose bleeds, try using over the counter nasal saline a few minutes prior to using the flonase to moisten the lining of your nose and throughout the day as needed.   Flonase/nasal spray use directions:  1) Use once per day.  2) Blow nose first.  3) Close one nostril and spray flonase up the other nostril while inhaling gently.   4) If you inhale to aggressively, you will have a bitter taste in the back of your mouth.       You can try breathe right strips at night to help you breathe.  A cool mist humidifier in bedroom may help with cough and relieve stuffy nose.     Sinus rinses DO NOT USE TAP WATER, if you must, water must be a rolling boil for 1 minute, let it cool, then use.  May use distilled water, or over the counter nasal saline rinses.  Vics vapor rub in shower to help open nasal passages.  May use nasal gel to keep passages moisturized.  May use Nasal saline sprays during the day for added relief of congestion.   For those who go to the gym, please do not use the sauna or steam room now to clear sinuses.      Sore throat recommendations: Warm fluids, warm salt water gargles, throat lozenges, tea, honey, soup, rest, hydration.      Cough     Honey with ankit to soothe your throat    Robitussin or Delsyum for cough suppressant for dry cough.      Please follow up with your primary care doctor or specialist in the next 48-72hrs as needed and if no improvement    If you   smoke, please stop smoking.      Please return or see your primary care doctor if you develop new or worsening symptoms.     Please arrange follow up with your primary medical clinic as soon as possible. You must understand that you've received an Urgent Care treatment only and that you may be released before all of your medical problems are known or treated. You, the patient, will arrange for follow up as instructed. If your symptoms worsen or fail to improve you should go to the Emergency Room.

## 2022-09-27 NOTE — LETTER
September 27, 2022      [facility]  [address], [address]  [place] [address]  Phone: [phone]  Fax: [phone]       Patient: Dax Carlisle   YOB: 1975  Date of Visit: 09/27/2022    To Whom It May Concern:    Olga Lidia Carlisle  was at [facility] on 09/27/2022. The patient may return to work/school on 9/30/2022 with no restrictions. If you have any questions or concerns, or if I can be of further assistance, please do not hesitate to contact me.    Sincerely,          Jessi Shaw PA-C

## 2022-09-27 NOTE — PATIENT INSTRUCTIONS
If you have CIPRODEX: it is 4 drops into affected (left) ear twice a day x 7 days                                       Conjunctivitis  If your condition worsens or fails to improve we recommend that you receive another evaluation at the ER immediately or contact your PCP to discuss your concerns or return here. You must understand that you've received an urgent care treatment only and that you may be released before all your medical problems are known or treated. You the patient will arrange for followup care as instructed.     Infected individuals should not share handkerchiefs, tissues, towels, cosmetics, linens, or eating utensils.     RETURN TO WORK/SCHOOL: The safest approach to prevent spread to others is to stay home until there is no longer any discharge, but this is not feasible for most students and for those who work outside the home. Most  centers and schools require that students receive 24 hours of topical therapy before returning to school      Keep eyes cleaned.    Use the eye drops/ointment as prescribed.      Do not wear your contact lens ( if you use them) for at least 5 days after you stop having symptoms and are rechecked by your doctor.       Cool compresses to affected eye.     Throw away the contacts, contact solution and carrying case you were using and start with new material.     If you develop increase eye symptoms or change in your vision seek medical care immediately either with your ophthalomologist or the ER or return here.      PLEASE READ YOUR DISCHARGE INSTRUCTIONS ENTIRELY AS IT CONTAINS IMPORTANT INFORMATION.      Please drink plenty of fluids.    Please get plenty of rest.    Please return here or go to the Emergency Department for any concerns or worsening of condition.      Tylenol or ibuprofen can also be used as directed for pain and fever unless you have an allergy to them or medical condition such as stomach ulcers, kidney or liver disease or blood thinners etc for  which you should not be taking these type of medications.   YOU CAN ALTERNATE TYLENOL AND IBUPROFEN EVERY 3-4 HOURS. Take 1000mg (2 pills) of Extra Strength Acetaminophen (Tylenol) every 6 hours and 600mg (3 pills) of Ibuprofen (Motrin/Advil) every 6 hours, alternating the two so that every 3 hours you take one or the other. Start with the Tylenol, then 3 hours later take the Ibuprofen, then 3 hours later take the Tylenol again, and so on.         For your allergy symptoms and/or runny nose, sinus/ear pressure, congestion:        - You can take over-the-counter claritin, zyrtec, allegra, or xyzal as directed. These are antihistamines that can help with runny nose, nasal congestion, sneezing, and helps to dry up post-nasal drip, which usually causes sore throat and cough.               - If you do NOT have high blood pressure, you may use a decongestant form (D)  of this medication (ie. Claritin- D, zyrtec-D, allegra-D) or if you do not take the D form, you can take sudafed (pseudoephedrine) over the counter, which is a decongestant. Do NOT take two decongestant (D) medications at the same time (such as mucinex-D and claritin-D or plain sudafed and claritin D). Dextromethorphan (DM) is a cough suppressant over the counter (ie. mucinex DM, robitussin, delsym; dayquil/nyquil has DM as well.)    -If you DO have high blood pressure, AVOID DECONGESTANTS (I.e., Phenylephrine and Pseudoephedrine). You may take Coricidin HBP for sinus congestion and Delsym for cough.        - You can take plain Mucinex (guaifenesin) 1200 mg twice a day to help loosen mucous.       Use over the counter flonase or nasocort: one spray each nostril twice daily OR two sprays each nostril once daily until nares dry out, unless you have Glaucoma.   If you find this dries your nose out or your nose bleeds, try using over the counter nasal saline a few minutes prior to using the flonase to moisten the lining of your nose and throughout the day as  needed.   Flonase/nasal spray use directions:  1) Use once per day.  2) Blow nose first.  3) Close one nostril and spray flonase up the other nostril while inhaling gently.   4) If you inhale to aggressively, you will have a bitter taste in the back of your mouth.       You can try breathe right strips at night to help you breathe.  A cool mist humidifier in bedroom may help with cough and relieve stuffy nose.     Sinus rinses DO NOT USE TAP WATER, if you must, water must be a rolling boil for 1 minute, let it cool, then use.  May use distilled water, or over the counter nasal saline rinses.  Vics vapor rub in shower to help open nasal passages.  May use nasal gel to keep passages moisturized.  May use Nasal saline sprays during the day for added relief of congestion.   For those who go to the gym, please do not use the sauna or steam room now to clear sinuses.      Sore throat recommendations: Warm fluids, warm salt water gargles, throat lozenges, tea, honey, soup, rest, hydration.      Cough     Honey with ankit to soothe your throat    Robitussin or Delsyum for cough suppressant for dry cough.      Please follow up with your primary care doctor or specialist in the next 48-72hrs as needed and if no improvement    If you  smoke, please stop smoking.      Please return or see your primary care doctor if you develop new or worsening symptoms.     Please arrange follow up with your primary medical clinic as soon as possible. You must understand that you've received an Urgent Care treatment only and that you may be released before all of your medical problems are known or treated. You, the patient, will arrange for follow up as instructed. If your symptoms worsen or fail to improve you should go to the Emergency Room.

## 2022-09-29 RX ORDER — DEXTROAMPHETAMINE SACCHARATE, AMPHETAMINE ASPARTATE, DEXTROAMPHETAMINE SULFATE AND AMPHETAMINE SULFATE 2.5; 2.5; 2.5; 2.5 MG/1; MG/1; MG/1; MG/1
10 TABLET ORAL 2 TIMES DAILY PRN
Qty: 60 TABLET | Refills: 0 | Status: SHIPPED | OUTPATIENT
Start: 2022-09-29 | End: 2022-10-26 | Stop reason: SDUPTHER

## 2022-09-29 RX ORDER — CYCLOBENZAPRINE HCL 10 MG
10 TABLET ORAL 3 TIMES DAILY PRN
Qty: 90 TABLET | Refills: 0 | Status: SHIPPED | OUTPATIENT
Start: 2022-09-29 | End: 2022-10-26 | Stop reason: SDUPTHER

## 2022-09-29 RX ORDER — HYDROCODONE BITARTRATE AND ACETAMINOPHEN 7.5; 325 MG/1; MG/1
1 TABLET ORAL EVERY 6 HOURS PRN
Qty: 120 TABLET | Refills: 0 | Status: SHIPPED | OUTPATIENT
Start: 2022-09-29 | End: 2022-10-26 | Stop reason: SDUPTHER

## 2022-10-26 DIAGNOSIS — F98.8 ATTENTION DEFICIT DISORDER (ADD) WITHOUT HYPERACTIVITY: ICD-10-CM

## 2022-10-26 DIAGNOSIS — G89.4 CHRONIC PAIN SYNDROME: ICD-10-CM

## 2022-10-26 RX ORDER — DEXTROAMPHETAMINE SACCHARATE, AMPHETAMINE ASPARTATE, DEXTROAMPHETAMINE SULFATE AND AMPHETAMINE SULFATE 2.5; 2.5; 2.5; 2.5 MG/1; MG/1; MG/1; MG/1
10 TABLET ORAL 2 TIMES DAILY PRN
Qty: 60 TABLET | Refills: 0 | Status: SHIPPED | OUTPATIENT
Start: 2022-10-26 | End: 2022-11-21 | Stop reason: SDUPTHER

## 2022-10-26 RX ORDER — CYCLOBENZAPRINE HCL 10 MG
10 TABLET ORAL 3 TIMES DAILY PRN
Qty: 90 TABLET | Refills: 0 | Status: SHIPPED | OUTPATIENT
Start: 2022-10-26 | End: 2022-11-21 | Stop reason: SDUPTHER

## 2022-10-26 RX ORDER — HYDROCODONE BITARTRATE AND ACETAMINOPHEN 7.5; 325 MG/1; MG/1
1 TABLET ORAL EVERY 6 HOURS PRN
Qty: 120 TABLET | Refills: 0 | Status: SHIPPED | OUTPATIENT
Start: 2022-10-26 | End: 2022-11-21 | Stop reason: SDUPTHER

## 2022-10-26 NOTE — TELEPHONE ENCOUNTER
No new care gaps identified.  [facility] Embedded Care Gaps. Reference number: [id number]. 10/26/2022   10:34:59 AM YOLANDAT

## 2022-11-17 ENCOUNTER — OFFICE VISIT (OUTPATIENT)
Dept: INTERNAL MEDICINE | Facility: CLINIC | Age: 47
End: 2022-11-17
Payer: COMMERCIAL

## 2022-11-17 ENCOUNTER — LAB VISIT (OUTPATIENT)
Dept: LAB | Facility: HOSPITAL | Age: 47
End: 2022-11-17
Attending: FAMILY MEDICINE
Payer: COMMERCIAL

## 2022-11-17 VITALS
SYSTOLIC BLOOD PRESSURE: 120 MMHG | DIASTOLIC BLOOD PRESSURE: 66 MMHG | OXYGEN SATURATION: 100 % | RESPIRATION RATE: 18 BRPM | BODY MASS INDEX: 32.65 KG/M2 | WEIGHT: 220.44 LBS | HEART RATE: 75 BPM | TEMPERATURE: 97 F | HEIGHT: 69 IN

## 2022-11-17 DIAGNOSIS — Z00.00 ANNUAL PHYSICAL EXAM: Primary | ICD-10-CM

## 2022-11-17 DIAGNOSIS — G89.4 CHRONIC PAIN SYNDROME: Chronic | ICD-10-CM

## 2022-11-17 DIAGNOSIS — F98.8 ATTENTION DEFICIT DISORDER (ADD) WITHOUT HYPERACTIVITY: ICD-10-CM

## 2022-11-17 DIAGNOSIS — Z00.00 ANNUAL PHYSICAL EXAM: ICD-10-CM

## 2022-11-17 DIAGNOSIS — J32.9 RECURRENT SINUS INFECTIONS: ICD-10-CM

## 2022-11-17 DIAGNOSIS — F32.5 MAJOR DEPRESSIVE DISORDER WITH SINGLE EPISODE, IN REMISSION: Chronic | ICD-10-CM

## 2022-11-17 DIAGNOSIS — E78.2 MIXED HYPERLIPIDEMIA: Chronic | ICD-10-CM

## 2022-11-17 DIAGNOSIS — A63.0 GENITAL WARTS: ICD-10-CM

## 2022-11-17 DIAGNOSIS — Z23 NEED FOR INFLUENZA VACCINATION: ICD-10-CM

## 2022-11-17 DIAGNOSIS — I47.10 SVT (SUPRAVENTRICULAR TACHYCARDIA): Chronic | ICD-10-CM

## 2022-11-17 LAB
ALBUMIN SERPL BCP-MCNC: 4.1 G/DL (ref 3.5–5.2)
ALP SERPL-CCNC: 67 U/L (ref 55–135)
ALT SERPL W/O P-5'-P-CCNC: 29 U/L (ref 10–44)
ANION GAP SERPL CALC-SCNC: 7 MMOL/L (ref 8–16)
AST SERPL-CCNC: 18 U/L (ref 10–40)
BILIRUB SERPL-MCNC: 0.5 MG/DL (ref 0.1–1)
BUN SERPL-MCNC: 14 MG/DL (ref 6–20)
CALCIUM SERPL-MCNC: 9.2 MG/DL (ref 8.7–10.5)
CHLORIDE SERPL-SCNC: 108 MMOL/L (ref 95–110)
CHOLEST SERPL-MCNC: 237 MG/DL (ref 120–199)
CHOLEST/HDLC SERPL: 4.3 {RATIO} (ref 2–5)
CO2 SERPL-SCNC: 25 MMOL/L (ref 23–29)
CREAT SERPL-MCNC: 1.3 MG/DL (ref 0.5–1.4)
ERYTHROCYTE [DISTWIDTH] IN BLOOD BY AUTOMATED COUNT: 14 % (ref 11.5–14.5)
EST. GFR  (NO RACE VARIABLE): >60 ML/MIN/1.73 M^2
ESTIMATED AVG GLUCOSE: 100 MG/DL (ref 68–131)
GLUCOSE SERPL-MCNC: 86 MG/DL (ref 70–110)
HBA1C MFR BLD: 5.1 % (ref 4–5.6)
HCT VFR BLD AUTO: 40.6 % (ref 40–54)
HDLC SERPL-MCNC: 55 MG/DL (ref 40–75)
HDLC SERPL: 23.2 % (ref 20–50)
HGB BLD-MCNC: 13.1 G/DL (ref 14–18)
LDLC SERPL CALC-MCNC: 154.4 MG/DL (ref 63–159)
MCH RBC QN AUTO: 29.2 PG (ref 27–31)
MCHC RBC AUTO-ENTMCNC: 32.3 G/DL (ref 32–36)
MCV RBC AUTO: 91 FL (ref 82–98)
NONHDLC SERPL-MCNC: 182 MG/DL
PLATELET # BLD AUTO: 163 K/UL (ref 150–450)
PMV BLD AUTO: 12.2 FL (ref 9.2–12.9)
POTASSIUM SERPL-SCNC: 4.3 MMOL/L (ref 3.5–5.1)
PROT SERPL-MCNC: 7 G/DL (ref 6–8.4)
RBC # BLD AUTO: 4.48 M/UL (ref 4.6–6.2)
SODIUM SERPL-SCNC: 140 MMOL/L (ref 136–145)
TRIGL SERPL-MCNC: 138 MG/DL (ref 30–150)
TSH SERPL DL<=0.005 MIU/L-ACNC: 1.49 UIU/ML (ref 0.4–4)
WBC # BLD AUTO: 7.2 K/UL (ref 3.9–12.7)

## 2022-11-17 PROCEDURE — 3074F SYST BP LT 130 MM HG: CPT | Mod: CPTII,S$GLB,, | Performed by: FAMILY MEDICINE

## 2022-11-17 PROCEDURE — 3044F PR MOST RECENT HEMOGLOBIN A1C LEVEL <7.0%: ICD-10-PCS | Mod: CPTII,S$GLB,, | Performed by: FAMILY MEDICINE

## 2022-11-17 PROCEDURE — 3078F PR MOST RECENT DIASTOLIC BLOOD PRESSURE < 80 MM HG: ICD-10-PCS | Mod: CPTII,S$GLB,, | Performed by: FAMILY MEDICINE

## 2022-11-17 PROCEDURE — 90686 FLU VACCINE (QUAD) GREATER THAN OR EQUAL TO 3YO PRESERVATIVE FREE IM: ICD-10-PCS | Mod: S$GLB,,, | Performed by: FAMILY MEDICINE

## 2022-11-17 PROCEDURE — 3078F DIAST BP <80 MM HG: CPT | Mod: CPTII,S$GLB,, | Performed by: FAMILY MEDICINE

## 2022-11-17 PROCEDURE — 90471 FLU VACCINE (QUAD) GREATER THAN OR EQUAL TO 3YO PRESERVATIVE FREE IM: ICD-10-PCS | Mod: S$GLB,,, | Performed by: FAMILY MEDICINE

## 2022-11-17 PROCEDURE — 99999 PR PBB SHADOW E&M-EST. PATIENT-LVL V: ICD-10-PCS | Mod: PBBFAC,,, | Performed by: FAMILY MEDICINE

## 2022-11-17 PROCEDURE — 80053 COMPREHEN METABOLIC PANEL: CPT | Performed by: FAMILY MEDICINE

## 2022-11-17 PROCEDURE — 3008F BODY MASS INDEX DOCD: CPT | Mod: CPTII,S$GLB,, | Performed by: FAMILY MEDICINE

## 2022-11-17 PROCEDURE — 99396 PR PREVENTIVE VISIT,EST,40-64: ICD-10-PCS | Mod: 25,S$GLB,, | Performed by: FAMILY MEDICINE

## 2022-11-17 PROCEDURE — 84443 ASSAY THYROID STIM HORMONE: CPT | Performed by: FAMILY MEDICINE

## 2022-11-17 PROCEDURE — 85027 COMPLETE CBC AUTOMATED: CPT | Performed by: FAMILY MEDICINE

## 2022-11-17 PROCEDURE — 99999 PR PBB SHADOW E&M-EST. PATIENT-LVL V: CPT | Mod: PBBFAC,,, | Performed by: FAMILY MEDICINE

## 2022-11-17 PROCEDURE — 3008F PR BODY MASS INDEX (BMI) DOCUMENTED: ICD-10-PCS | Mod: CPTII,S$GLB,, | Performed by: FAMILY MEDICINE

## 2022-11-17 PROCEDURE — 83036 HEMOGLOBIN GLYCOSYLATED A1C: CPT | Performed by: FAMILY MEDICINE

## 2022-11-17 PROCEDURE — 99396 PREV VISIT EST AGE 40-64: CPT | Mod: 25,S$GLB,, | Performed by: FAMILY MEDICINE

## 2022-11-17 PROCEDURE — 80061 LIPID PANEL: CPT | Performed by: FAMILY MEDICINE

## 2022-11-17 PROCEDURE — 36415 COLL VENOUS BLD VENIPUNCTURE: CPT | Performed by: FAMILY MEDICINE

## 2022-11-17 PROCEDURE — 3044F HG A1C LEVEL LT 7.0%: CPT | Mod: CPTII,S$GLB,, | Performed by: FAMILY MEDICINE

## 2022-11-17 PROCEDURE — 3074F PR MOST RECENT SYSTOLIC BLOOD PRESSURE < 130 MM HG: ICD-10-PCS | Mod: CPTII,S$GLB,, | Performed by: FAMILY MEDICINE

## 2022-11-17 PROCEDURE — 90686 IIV4 VACC NO PRSV 0.5 ML IM: CPT | Mod: S$GLB,,, | Performed by: FAMILY MEDICINE

## 2022-11-17 PROCEDURE — 90471 IMMUNIZATION ADMIN: CPT | Mod: S$GLB,,, | Performed by: FAMILY MEDICINE

## 2022-11-17 RX ORDER — METOPROLOL SUCCINATE 25 MG/1
25 TABLET, EXTENDED RELEASE ORAL NIGHTLY
Qty: 90 TABLET | Refills: 3 | Status: SHIPPED | OUTPATIENT
Start: 2022-11-17 | End: 2023-08-23 | Stop reason: SDUPTHER

## 2022-11-17 NOTE — PROGRESS NOTES
Subjective:   Patient ID: Dax Carlisle is a 47 y.o. male.  Chief Complaint:  Follow-up (3m )    Presents for annual physical exam and follow-up on ADD, depression, and chronic pain    Last annual physical exam November 2021   CBC test shows a stable anemia. Continue of daily multivitamin with iron.  Sugar, Kidney, Liver, and Electrolyte tests are all normal.  Cholesterol tests are normal. 10-year risk of a heart disease or stroke is low. Aspirin or Statin cholesterol medications are not recommended.  Thyroid testing is normal.  A1c is in a normal range. No Prediabtes or Diabetes  Hepatitis C test is negative     Last visit August 2022 for follow-up on chronic pain syndrome and ADHD   - ADHD  On Adderall 10 mg twice a day to help with focus/ADD/ADHD related issues despite compliance with his other mental health medications  Effective with symptoms controlled on present medication and dose helping compensate for deficits at work  Duration is appropriate.    No mood instability, tics, or disordered sleep, or other apparent adverse effects.  No increased blood pressure, heart, or other cardiovascular side effects.    Tolerating current treatment well.   No behaviors to suggest inappropriate use of prescribed medications.  [place] Board [employer]acy Controlled Prescription Drug Monitoring database was queried and showed no activity to suggest abuse, diversion, or other inappropriate use of prescription medications.      - Chronic Pain  On Norco 7.5/325 mg every 6 hours as needed.  Averages 120 pills per month.  No behaviors to suggest inappropriate use of prescribed medications.  [place] Board of Pharmacy Controlled Prescription Drug Monitoring database was queried and showed no activity to suggest abuse, diversion, or other inappropriate use of prescription medications.      - Major depressive disorder  On Abilify 10 mg daily and Lexapro 20 mg daily   Remains stable, no side effects, mood and symptoms well  "controlled on present medication     Health maintenance:  - Colon cancer screening up-to-date   - Needs tetanus booster, COVID booster, and flu vaccine     Complaints today:   - recurrent genital warts.  Previous cryotherapy treatment.  Would like referral to Dermatology for repeat treatment.    - pressure right sinus and ear.  Feels like there was a foreign body or something "enlarged /growing" in there.  Would like referral to ENT for evaluation      Review of Systems   Constitutional:  Negative for activity change, appetite change, chills, fatigue, fever and unexpected weight change.   HENT:  Positive for sinus pressure (Foreign body sensation). Negative for congestion, dental problem, drooling, ear discharge, ear pain, facial swelling, hearing loss, mouth sores, nosebleeds, postnasal drip, rhinorrhea, sinus pain, sneezing, sore throat, tinnitus, trouble swallowing and voice change.    Eyes:  Negative for pain, redness and visual disturbance.   Respiratory:  Negative for cough, chest tightness, shortness of breath and wheezing.    Cardiovascular:  Negative for chest pain, palpitations and leg swelling.   Gastrointestinal:  Negative for abdominal pain, constipation, diarrhea, nausea and vomiting.   Endocrine: Negative for polydipsia, polyphagia and polyuria.   Genitourinary:  Negative for decreased urine volume, difficulty urinating, dysuria, flank pain, frequency, hematuria and urgency.   Musculoskeletal:  Positive for arthralgias, back pain and myalgias. Negative for gait problem, joint swelling, neck pain and neck stiffness.   Skin:  Negative for rash.        Recurrent genitall warts   Neurological:  Negative for dizziness, tremors, syncope, weakness, light-headedness, numbness and headaches.   Hematological:  Negative for adenopathy.   Psychiatric/Behavioral:  Negative for agitation, behavioral problems, confusion, decreased concentration, dysphoric mood, hallucinations, self-injury, sleep disturbance and " "suicidal ideas. The patient is not nervous/anxious and is not hyperactive.      Objective:   /66 (BP Location: Left arm, Patient Position: Sitting, BP Method: Large (Manual))   Pulse 75   Temp 96.6 °F (35.9 °C) (Tympanic)   Resp 18   Ht 5' 9" (1.753 m)   Wt 100 kg (220 lb 7.4 oz)   SpO2 100%   BMI 32.56 kg/m²     Physical Exam  Vitals and nursing note reviewed.   Constitutional:       General: He is not in acute distress.     Appearance: Normal appearance. He is well-developed. He is obese.   HENT:      Right Ear: Hearing, tympanic membrane, ear canal and external ear normal.      Left Ear: Hearing, ear canal and external ear normal. A PE tube is present.      Nose: No mucosal edema, congestion or rhinorrhea.      Right Turbinates: Not enlarged or swollen.      Left Turbinates: Not enlarged or swollen.      Right Sinus: No maxillary sinus tenderness or frontal sinus tenderness.      Left Sinus: No maxillary sinus tenderness or frontal sinus tenderness.      Mouth/Throat:      Mouth: No oral lesions.      Pharynx: Oropharynx is clear. Uvula midline.      Tonsils: No tonsillar exudate.   Eyes:      General: No scleral icterus.        Right eye: No discharge.         Left eye: No discharge.      Conjunctiva/sclera: Conjunctivae normal.      Right eye: Right conjunctiva is not injected.      Left eye: Left conjunctiva is not injected.   Neck:      Thyroid: No thyroid mass, thyromegaly or thyroid tenderness.      Vascular: No JVD.   Cardiovascular:      Rate and Rhythm: Normal rate and regular rhythm.      Pulses:           Radial pulses are 2+ on the right side and 2+ on the left side.      Heart sounds: Normal heart sounds. No murmur heard.    No friction rub. No gallop.   Pulmonary:      Effort: Pulmonary effort is normal. No accessory muscle usage or respiratory distress.      Breath sounds: Normal breath sounds and air entry. No wheezing, rhonchi or rales.   Abdominal:      General: There is no " distension.      Palpations: Abdomen is soft.      Tenderness: There is no abdominal tenderness. There is no guarding or rebound.   Musculoskeletal:      Right lower leg: No edema.      Left lower leg: No edema.   Lymphadenopathy:      Cervical: No cervical adenopathy.   Skin:     General: Skin is warm and dry.      Findings: No rash.   Neurological:      Mental Status: He is alert and oriented to person, place, and time.      Coordination: Coordination is intact. Coordination normal.      Gait: Gait is intact. Gait normal.   Psychiatric:         Attention and Perception: Attention and perception normal. He is attentive.         Mood and Affect: Mood normal. Mood is not anxious.         Speech: Speech normal. Speech is not rapid and pressured or tangential.         Behavior: Behavior normal. Behavior is cooperative.         Thought Content: Thought content normal.         Cognition and Memory: Cognition normal.         Judgment: Judgment normal.       Assessment:       ICD-10-CM ICD-9-CM   1. Annual physical exam  Z00.00 V70.0   2. Need for influenza vaccination  Z23 V04.81   3. Chronic pain syndrome  G89.4 338.4   4. Attention deficit disorder (ADD) without hyperactivity  F98.8 314.00   5. Major depressive disorder with single episode, in remission  F32.5 296.25   6. Mixed hyperlipidemia  E78.2 272.2   7. SVT (supraventricular tachycardia)  I47.1 427.89   8. Obesity (BMI 30.0-34.9)  E66.9 278.00   9. Chronic constipation  K59.09 564.00   10. Genital warts  A63.0 078.11   11. Recurrent sinus infections  J32.9 473.9     Plan:   Annual physical exam  Need for influenza vaccination  -     CBC Without Differential; Future; Expected date: 11/17/2022  -     Comprehensive Metabolic Panel; Future; Expected date: 11/17/2022  -     Lipid Panel; Future; Expected date: 11/17/2022  -     TSH; Future; Expected date: 11/17/2022  -     Hemoglobin A1C; Future; Expected date: 11/17/2022  -     Influenza - Quadrivalent (PF.  Blood  pressure normal.  BMI 33.    Check labs.  Treat as indicated.    Colon cancer screening up-to-date   Reported tetanus booster up-to-date   Recommend COVID booster through pharmacy  Flu vaccine today     Chronic pain syndrome  Pain and symptoms well controlled on present medication .  Continue Norco 7.5/325 every 6 hours as needed for pain  Averaging 4 per day and stable pattern  Risk of discontinuation of long-term narcotics higher than risk of continuing long-term narcotics at current dose  Okay to refill monthly x2     Attention deficit disorder (ADD) without hyperactivity  ADHD, stable, no side effects, controlled on present medication  Continue Adderall 10 mg twice a day  OK to refill monthly x 2     Major depressive disorder with single episode, in remission  Stable, no side effects, mood and symptoms well controlled on present medication .  Continue Abilify 10 mg daily  Continue Lexapro 20 mg daily    Mixed hyperlipidemia  Mild elevations on previous lipid panels   Ten year cardiovascular risk less than 7.5%   Diet lifestyle changes only  The medications indicated     SVT (supraventricular tachycardia)  -     metoprolol succinate (TOPROL-XL) 25 MG 24 hr tablet; Take 1 tablet (25 mg total) by mouth every evening.  Dispense: 90 tablet; Refill: 3  Stable.  Asymptomatic.    Blood pressure heart rate controlled.    Continue curent medication     Genital warts  -     Ambulatory referral/consult to Dermatology; Future; Expected date: 11/24/2022    Recurrent sinus infections  -     Ambulatory referral/consult to ENT; Future; Expected date: 11/24/2022  Overall exam normal   Reported foreign body sensation in posterior nasal/sinus cavity   Referral to ENT for NPL to rule out polyp or other significant abnormality     Return to clinic 3 months or sooner as needed

## 2022-11-21 DIAGNOSIS — F98.8 ATTENTION DEFICIT DISORDER (ADD) WITHOUT HYPERACTIVITY: ICD-10-CM

## 2022-11-21 DIAGNOSIS — G89.4 CHRONIC PAIN SYNDROME: ICD-10-CM

## 2022-11-21 RX ORDER — HYDROCODONE BITARTRATE AND ACETAMINOPHEN 7.5; 325 MG/1; MG/1
1 TABLET ORAL EVERY 6 HOURS PRN
Qty: 120 TABLET | Refills: 0 | Status: SHIPPED | OUTPATIENT
Start: 2022-11-21 | End: 2022-12-21 | Stop reason: SDUPTHER

## 2022-11-21 RX ORDER — TOPIRAMATE 25 MG/1
25 TABLET ORAL 2 TIMES DAILY
Qty: 60 TABLET | Refills: 11 | OUTPATIENT
Start: 2022-11-21

## 2022-11-21 RX ORDER — CYCLOBENZAPRINE HCL 10 MG
10 TABLET ORAL 3 TIMES DAILY PRN
Qty: 90 TABLET | Refills: 0 | Status: SHIPPED | OUTPATIENT
Start: 2022-11-21 | End: 2022-12-21 | Stop reason: SDUPTHER

## 2022-11-21 RX ORDER — DEXTROAMPHETAMINE SACCHARATE, AMPHETAMINE ASPARTATE, DEXTROAMPHETAMINE SULFATE AND AMPHETAMINE SULFATE 2.5; 2.5; 2.5; 2.5 MG/1; MG/1; MG/1; MG/1
10 TABLET ORAL 2 TIMES DAILY PRN
Qty: 60 TABLET | Refills: 0 | Status: SHIPPED | OUTPATIENT
Start: 2022-11-21 | End: 2022-12-21 | Stop reason: SDUPTHER

## 2022-11-21 NOTE — TELEPHONE ENCOUNTER
No new care gaps identified.  [facility] Embedded Care Gaps. Reference number: [id number]. 11/21/2022   10:32:12 AM CST

## 2022-12-01 ENCOUNTER — OFFICE VISIT (OUTPATIENT)
Dept: OTOLARYNGOLOGY | Facility: CLINIC | Age: 47
End: 2022-12-01
Payer: COMMERCIAL

## 2022-12-01 VITALS — TEMPERATURE: 97 F | WEIGHT: 220.88 LBS | BODY MASS INDEX: 32.62 KG/M2

## 2022-12-01 DIAGNOSIS — J32.9 CHRONIC SINUSITIS, UNSPECIFIED LOCATION: ICD-10-CM

## 2022-12-01 DIAGNOSIS — J34.89 MASS OF PARANASAL SINUS: Primary | ICD-10-CM

## 2022-12-01 DIAGNOSIS — J32.9 RECURRENT SINUS INFECTIONS: ICD-10-CM

## 2022-12-01 PROCEDURE — 99999 PR PBB SHADOW E&M-EST. PATIENT-LVL IV: ICD-10-PCS | Mod: PBBFAC,,, | Performed by: STUDENT IN AN ORGANIZED HEALTH CARE EDUCATION/TRAINING PROGRAM

## 2022-12-01 PROCEDURE — 31231 NASAL ENDOSCOPY DX: CPT | Mod: S$GLB,,, | Performed by: STUDENT IN AN ORGANIZED HEALTH CARE EDUCATION/TRAINING PROGRAM

## 2022-12-01 PROCEDURE — 3008F BODY MASS INDEX DOCD: CPT | Mod: CPTII,S$GLB,, | Performed by: STUDENT IN AN ORGANIZED HEALTH CARE EDUCATION/TRAINING PROGRAM

## 2022-12-01 PROCEDURE — 3044F PR MOST RECENT HEMOGLOBIN A1C LEVEL <7.0%: ICD-10-PCS | Mod: CPTII,S$GLB,, | Performed by: STUDENT IN AN ORGANIZED HEALTH CARE EDUCATION/TRAINING PROGRAM

## 2022-12-01 PROCEDURE — 31231 PR NASAL ENDOSCOPY, DX: ICD-10-PCS | Mod: S$GLB,,, | Performed by: STUDENT IN AN ORGANIZED HEALTH CARE EDUCATION/TRAINING PROGRAM

## 2022-12-01 PROCEDURE — 1159F PR MEDICATION LIST DOCUMENTED IN MEDICAL RECORD: ICD-10-PCS | Mod: CPTII,S$GLB,, | Performed by: STUDENT IN AN ORGANIZED HEALTH CARE EDUCATION/TRAINING PROGRAM

## 2022-12-01 PROCEDURE — 1159F MED LIST DOCD IN RCRD: CPT | Mod: CPTII,S$GLB,, | Performed by: STUDENT IN AN ORGANIZED HEALTH CARE EDUCATION/TRAINING PROGRAM

## 2022-12-01 PROCEDURE — 99999 PR PBB SHADOW E&M-EST. PATIENT-LVL IV: CPT | Mod: PBBFAC,,, | Performed by: STUDENT IN AN ORGANIZED HEALTH CARE EDUCATION/TRAINING PROGRAM

## 2022-12-01 PROCEDURE — 99214 OFFICE O/P EST MOD 30 MIN: CPT | Mod: 25,S$GLB,, | Performed by: STUDENT IN AN ORGANIZED HEALTH CARE EDUCATION/TRAINING PROGRAM

## 2022-12-01 PROCEDURE — 99214 PR OFFICE/OUTPT VISIT, EST, LEVL IV, 30-39 MIN: ICD-10-PCS | Mod: 25,S$GLB,, | Performed by: STUDENT IN AN ORGANIZED HEALTH CARE EDUCATION/TRAINING PROGRAM

## 2022-12-01 PROCEDURE — 3044F HG A1C LEVEL LT 7.0%: CPT | Mod: CPTII,S$GLB,, | Performed by: STUDENT IN AN ORGANIZED HEALTH CARE EDUCATION/TRAINING PROGRAM

## 2022-12-01 PROCEDURE — 3008F PR BODY MASS INDEX (BMI) DOCUMENTED: ICD-10-PCS | Mod: CPTII,S$GLB,, | Performed by: STUDENT IN AN ORGANIZED HEALTH CARE EDUCATION/TRAINING PROGRAM

## 2022-12-01 RX ORDER — PREDNISONE 10 MG/1
TABLET ORAL
Qty: 24 TABLET | Refills: 0 | Status: SHIPPED | OUTPATIENT
Start: 2022-12-01 | End: 2023-03-23

## 2022-12-01 RX ORDER — AMOXICILLIN AND CLAVULANATE POTASSIUM 875; 125 MG/1; MG/1
1 TABLET, FILM COATED ORAL EVERY 12 HOURS
Qty: 20 TABLET | Refills: 0 | Status: SHIPPED | OUTPATIENT
Start: 2022-12-01 | End: 2022-12-11

## 2022-12-01 NOTE — PROGRESS NOTES
Referring Provider:    Cody Enamorado Md  [id number] [facility]  [place] [id number]  Subjective:   Patient: Dax Carlisle [id number], :1975   Visit date:2022 11:37 AM    Chief Complaint:  Sinus Problem (Growth in right - few months)    HPI:    Prior notes by myself reviewed  Clinical documentation obtained by nursing staff reviewed.       Chronic eustachian tube dysfunction AS for many years.  PET placed 2018.  Was stable until about 1 month ago.  No change with Augmentin.       Update 22  Most recently for the last 2 months or so. Feels nasal obstruction and feels there is a mass in the right nasal airway. Feels something flapping. Also with hyposmia.     Denies facial pressure, purulent rhinorrhea.    No treatment.     Endorses recurrent sinus infections. 2 per year.     Endorses year round allergies. Claritin and astelin regularly. Had allergy testing as a child - pan allergic.      Objective:     Physical Exam:  Vitals:  Temp 97.2 °F (36.2 °C) (Temporal)   Wt 100.2 kg (220 lb 14.4 oz)   BMI 32.62 kg/m²   General appearance:  Well developed, well nourished    Nose:  No masses/lesions of external nose, nasal mucosa, septum, and turbinates were within normal limits.    Mouth:  No mass/lesion of lips, teeth, gums, hard/soft palate, tongue, tonsils, or oropharynx.    Neck & Lymphatics:  No cervical lymphadenopathy, no neck mass/crepitus/ asymmetry, trachea is midline, no thyroid enlargement/tenderness/mass.    Left T tube in place and patent.  Purulence in tube.  Suctioned clear.  Middle ear irrigated with Otovel using spinal needle.  Significant purulence flushed.     []  Data Reviewed:      []  Independent review of test:    CT cervical spine   Visualized paranasal sinuses clear    []  Management discussed with another provider    Procedure Note - Rigid Nasal Endoscopy     Surgeon: Uriel Vasquez MD  Anesthesia: topical oxymetazoline and 4% lidocaine.    Technique: The nose  was sprayed with oxymetazoline and 4% lidocaine. With the patient in the upright position, a 2.7mm 30-degree endscope was inserted into the patient's right and left nare.  Where visible, nasal secretions and mucosal crusting were removed with a suction. The overall appearance of the nasal cavity and paranasal sinuses were noted and the findings are described below.     Findings: The nasal septum was intact and was deviated somewhat to the left.  The inferior turbinates were not enlarged. There was evidence of a mass from the right sphenoethmiodal recess, posterior to the vertical portion of the MT. Associated thick mucoid drainage. Otherwise there was not any evidence of mucopurulence, nasal polyps, masses, or lesions within the nasal cavity.            Assessment & Plan:   Mass of paranasal sinus  -     CT [employer] Sinuses without; Future; Expected date: 12/01/2022    Recurrent sinus infections  -     Ambulatory referral/consult to ENT    Chronic sinusitis, unspecified location  -     CT [employer] Sinuses without; Future; Expected date: 12/01/2022    Other orders  -     amoxicillin-clavulanate 875-125mg (AUGMENTIN) 875-125 mg per tablet; Take 1 tablet by mouth every 12 (twelve) hours. for 10 days  Dispense: 20 tablet; Refill: 0  -     predniSONE (DELTASONE) 10 MG tablet; Take 3 tablets a day for 4 days (1 before breakfast, 1 after lunch, 1 before bed) THEN take 2 tablets a day for 4 days (1 before breakfast, 1 before bed) THEN take 1 tablet a day for 4 days (1 before breakfast).  Dispense: 24 tablet; Refill: 0      Augmentin/prednisone taper  CT scan prior to follow up   Biopsy at return visit

## 2022-12-19 ENCOUNTER — OFFICE VISIT (OUTPATIENT)
Dept: OTOLARYNGOLOGY | Facility: CLINIC | Age: 47
End: 2022-12-19
Payer: COMMERCIAL

## 2022-12-19 ENCOUNTER — HOSPITAL ENCOUNTER (OUTPATIENT)
Dept: RADIOLOGY | Facility: HOSPITAL | Age: 47
Discharge: HOME | End: 2022-12-19
Attending: STUDENT IN AN ORGANIZED HEALTH CARE EDUCATION/TRAINING PROGRAM
Payer: COMMERCIAL

## 2022-12-19 VITALS — BODY MASS INDEX: 33.37 KG/M2 | WEIGHT: 226 LBS | TEMPERATURE: 97 F

## 2022-12-19 DIAGNOSIS — J34.89 MASS OF PARANASAL SINUS: Primary | ICD-10-CM

## 2022-12-19 DIAGNOSIS — J32.9 CHRONIC SINUSITIS, UNSPECIFIED LOCATION: ICD-10-CM

## 2022-12-19 DIAGNOSIS — J34.89 MASS OF PARANASAL SINUS: ICD-10-CM

## 2022-12-19 PROCEDURE — 99999 PR PBB SHADOW E&M-EST. PATIENT-LVL III: CPT | Mod: PBBFAC,,, | Performed by: STUDENT IN AN ORGANIZED HEALTH CARE EDUCATION/TRAINING PROGRAM

## 2022-12-19 PROCEDURE — 31237 PR NASAL/SINUS ENDOSCOPY,BX/RMV POLYP/DEBRID: ICD-10-PCS | Mod: RT,S$GLB,, | Performed by: STUDENT IN AN ORGANIZED HEALTH CARE EDUCATION/TRAINING PROGRAM

## 2022-12-19 PROCEDURE — 99214 OFFICE O/P EST MOD 30 MIN: CPT | Mod: 25,S$GLB,, | Performed by: STUDENT IN AN ORGANIZED HEALTH CARE EDUCATION/TRAINING PROGRAM

## 2022-12-19 PROCEDURE — 3008F BODY MASS INDEX DOCD: CPT | Mod: CPTII,S$GLB,, | Performed by: STUDENT IN AN ORGANIZED HEALTH CARE EDUCATION/TRAINING PROGRAM

## 2022-12-19 PROCEDURE — 70486 CT MAXILLOFACIAL W/O DYE: CPT | Mod: TC

## 2022-12-19 PROCEDURE — 3044F HG A1C LEVEL LT 7.0%: CPT | Mod: CPTII,S$GLB,, | Performed by: STUDENT IN AN ORGANIZED HEALTH CARE EDUCATION/TRAINING PROGRAM

## 2022-12-19 PROCEDURE — 88305 TISSUE EXAM BY PATHOLOGIST: CPT | Mod: 26,,, | Performed by: STUDENT IN AN ORGANIZED HEALTH CARE EDUCATION/TRAINING PROGRAM

## 2022-12-19 PROCEDURE — 99999 PR PBB SHADOW E&M-EST. PATIENT-LVL III: ICD-10-PCS | Mod: PBBFAC,,, | Performed by: STUDENT IN AN ORGANIZED HEALTH CARE EDUCATION/TRAINING PROGRAM

## 2022-12-19 PROCEDURE — 31237 NSL/SINS NDSC SURG BX POLYPC: CPT | Mod: RT,S$GLB,, | Performed by: STUDENT IN AN ORGANIZED HEALTH CARE EDUCATION/TRAINING PROGRAM

## 2022-12-19 PROCEDURE — 3008F PR BODY MASS INDEX (BMI) DOCUMENTED: ICD-10-PCS | Mod: CPTII,S$GLB,, | Performed by: STUDENT IN AN ORGANIZED HEALTH CARE EDUCATION/TRAINING PROGRAM

## 2022-12-19 PROCEDURE — 88304 TISSUE EXAM BY PATHOLOGIST: CPT | Performed by: STUDENT IN AN ORGANIZED HEALTH CARE EDUCATION/TRAINING PROGRAM

## 2022-12-19 PROCEDURE — 99214 PR OFFICE/OUTPT VISIT, EST, LEVL IV, 30-39 MIN: ICD-10-PCS | Mod: 25,S$GLB,, | Performed by: STUDENT IN AN ORGANIZED HEALTH CARE EDUCATION/TRAINING PROGRAM

## 2022-12-19 PROCEDURE — 1159F MED LIST DOCD IN RCRD: CPT | Mod: CPTII,S$GLB,, | Performed by: STUDENT IN AN ORGANIZED HEALTH CARE EDUCATION/TRAINING PROGRAM

## 2022-12-19 PROCEDURE — 3044F PR MOST RECENT HEMOGLOBIN A1C LEVEL <7.0%: ICD-10-PCS | Mod: CPTII,S$GLB,, | Performed by: STUDENT IN AN ORGANIZED HEALTH CARE EDUCATION/TRAINING PROGRAM

## 2022-12-19 PROCEDURE — 88307 TISSUE EXAM BY PATHOLOGIST: CPT | Performed by: STUDENT IN AN ORGANIZED HEALTH CARE EDUCATION/TRAINING PROGRAM

## 2022-12-19 PROCEDURE — 88305 TISSUE EXAM BY PATHOLOGIST: ICD-10-PCS | Mod: 26,,, | Performed by: STUDENT IN AN ORGANIZED HEALTH CARE EDUCATION/TRAINING PROGRAM

## 2022-12-19 PROCEDURE — 1159F PR MEDICATION LIST DOCUMENTED IN MEDICAL RECORD: ICD-10-PCS | Mod: CPTII,S$GLB,, | Performed by: STUDENT IN AN ORGANIZED HEALTH CARE EDUCATION/TRAINING PROGRAM

## 2022-12-19 NOTE — PROGRESS NOTES
Referring Provider:    No referring provider defined for this encounter.  Subjective:   Patient: Dax Carlisle [id number], :1975   Visit date:2022 11:37 AM    Chief Complaint:  mass right nostril (Pt noticed about 2 months ago. No pain noted)      HPI:    Prior notes by myself reviewed  Clinical documentation obtained by nursing staff reviewed.       Chronic eustachian tube dysfunction AS for many years.  PET placed 2018.  Was stable until about 1 month ago.  No change with Augmentin.       Update 22  Most recently for the last 2 months or so. Feels nasal obstruction and feels there is a mass in the right nasal airway. Feels something flapping. Also with hyposmia.     Denies facial pressure, purulent rhinorrhea.    No treatment.     Endorses recurrent sinus infections. 2 per year.     Endorses year round allergies. Claritin and astelin regularly. Had allergy testing as a child - pan allergic.    Update 22    Augmentin/prednisone taper completed.   No significant change in right nasal obstruction and sensation of a mass.         Objective:     Physical Exam:  Vitals:  Temp 97.4 °F (36.3 °C) (Temporal)   Wt 102.5 kg (225 lb 15.5 oz)   BMI 33.37 kg/m²   General appearance:  Well developed, well nourished    Nose:  No masses/lesions of external nose, nasal mucosa, septum, and turbinates were within normal limits.    Mouth:  No mass/lesion of lips, teeth, gums, hard/soft palate, tongue, tonsils, or oropharynx.    Neck & Lymphatics:  No cervical lymphadenopathy, no neck mass/crepitus/ asymmetry, trachea is midline, no thyroid enlargement/tenderness/mass.        [x]  Data Reviewed:    [x]  Independent review of test:    CT cervical spine   Visualized paranasal sinuses clear    CT sinus 22  Mass from right sphenoethmoidal recess extending into nasal cavity, abutting right middle turbinates  Skull base intact  Sinuses clear  Mastoids and middle ears clear      Procedure Note -  Rigid Nasal Endoscopy with biopsy    Surgeon: Uriel Vasquez MD  Anesthesia: topical oxymetazoline and 4% lidocaine.    Indication: Dax Carlisle is a 47 y.o. male with a right sinonasal mass which was unresponsive to medical therapy, now presenting for biopsy. Risks and benefits were discussed and consent was obtained.    Technique: The nose was sprayed with oxymetazoline and 4% lidocaine.  Pledgets soaked in  oxymetazoline and 4% lidocaine were placed in the nasal cavity. Once removed, then mass was injected with 1% lidocaine with 100,000 epinephrine using a 25-gauge spinal needle under endoscopic visualization with a 2.7mm 0-degree endoscope. Next, the mass was biopsied using through cutting instruments. Hemostasis was obtained with pledgets and the nose was suctioned clear. The patient tolerated the procedure well.     Findings: right sinonasal mass from right sphenoethmoidal recess.          Assessment & Plan:   Mass of paranasal sinus    Biopsy taken today  Discussed possible etiologies and treatments  Will call with results and plan for excision based on results

## 2022-12-21 DIAGNOSIS — G89.4 CHRONIC PAIN SYNDROME: ICD-10-CM

## 2022-12-21 DIAGNOSIS — F98.8 ATTENTION DEFICIT DISORDER (ADD) WITHOUT HYPERACTIVITY: ICD-10-CM

## 2022-12-21 RX ORDER — TOPIRAMATE 25 MG/1
25 TABLET ORAL 2 TIMES DAILY
Qty: 60 TABLET | Refills: 11 | Status: SHIPPED | OUTPATIENT
Start: 2022-12-21 | End: 2023-06-12

## 2022-12-21 RX ORDER — HYDROCODONE BITARTRATE AND ACETAMINOPHEN 7.5; 325 MG/1; MG/1
1 TABLET ORAL EVERY 6 HOURS PRN
Qty: 120 TABLET | Refills: 0 | Status: SHIPPED | OUTPATIENT
Start: 2022-12-21 | End: 2023-01-17 | Stop reason: SDUPTHER

## 2022-12-21 RX ORDER — CYCLOBENZAPRINE HCL 10 MG
10 TABLET ORAL 3 TIMES DAILY PRN
Qty: 90 TABLET | Refills: 0 | Status: SHIPPED | OUTPATIENT
Start: 2022-12-21 | End: 2023-01-17 | Stop reason: SDUPTHER

## 2022-12-21 RX ORDER — DEXTROAMPHETAMINE SACCHARATE, AMPHETAMINE ASPARTATE, DEXTROAMPHETAMINE SULFATE AND AMPHETAMINE SULFATE 2.5; 2.5; 2.5; 2.5 MG/1; MG/1; MG/1; MG/1
10 TABLET ORAL 2 TIMES DAILY PRN
Qty: 60 TABLET | Refills: 0 | Status: SHIPPED | OUTPATIENT
Start: 2022-12-21 | End: 2023-01-17 | Stop reason: SDUPTHER

## 2022-12-21 NOTE — TELEPHONE ENCOUNTER
Good Afternoon,     Pt is requesting for a refill of:   Last filed:  Last encounter:   Up coming apt:   Pharmacy:[facility]  Is this something you can do?      Carlton Diaz   Medical Assistant

## 2022-12-21 NOTE — TELEPHONE ENCOUNTER
No new care gaps identified.  [facility] Embedded Care Gaps. Reference number: [id number]. 12/21/2022   10:34:24 AM CST

## 2022-12-30 LAB
COMMENT: NORMAL
FINAL PATHOLOGIC DIAGNOSIS: NORMAL
GROSS: NORMAL
Lab: NORMAL
MICROSCOPIC EXAM: NORMAL

## 2023-01-09 ENCOUNTER — OFFICE VISIT (OUTPATIENT)
Dept: OTOLARYNGOLOGY | Facility: CLINIC | Age: 48
End: 2023-01-09
Payer: COMMERCIAL

## 2023-01-09 VITALS — WEIGHT: 223.75 LBS | BODY MASS INDEX: 33.04 KG/M2 | TEMPERATURE: 97 F

## 2023-01-09 DIAGNOSIS — D36.9 INVERTED PAPILLOMA: Primary | ICD-10-CM

## 2023-01-09 PROCEDURE — 3008F BODY MASS INDEX DOCD: CPT | Mod: CPTII,S$GLB,, | Performed by: STUDENT IN AN ORGANIZED HEALTH CARE EDUCATION/TRAINING PROGRAM

## 2023-01-09 PROCEDURE — 1159F MED LIST DOCD IN RCRD: CPT | Mod: CPTII,S$GLB,, | Performed by: STUDENT IN AN ORGANIZED HEALTH CARE EDUCATION/TRAINING PROGRAM

## 2023-01-09 PROCEDURE — 99999 PR PBB SHADOW E&M-EST. PATIENT-LVL IV: ICD-10-PCS | Mod: PBBFAC,,, | Performed by: STUDENT IN AN ORGANIZED HEALTH CARE EDUCATION/TRAINING PROGRAM

## 2023-01-09 PROCEDURE — 99214 OFFICE O/P EST MOD 30 MIN: CPT | Mod: S$GLB,,, | Performed by: STUDENT IN AN ORGANIZED HEALTH CARE EDUCATION/TRAINING PROGRAM

## 2023-01-09 PROCEDURE — 99214 PR OFFICE/OUTPT VISIT, EST, LEVL IV, 30-39 MIN: ICD-10-PCS | Mod: S$GLB,,, | Performed by: STUDENT IN AN ORGANIZED HEALTH CARE EDUCATION/TRAINING PROGRAM

## 2023-01-09 PROCEDURE — 3008F PR BODY MASS INDEX (BMI) DOCUMENTED: ICD-10-PCS | Mod: CPTII,S$GLB,, | Performed by: STUDENT IN AN ORGANIZED HEALTH CARE EDUCATION/TRAINING PROGRAM

## 2023-01-09 PROCEDURE — 1159F PR MEDICATION LIST DOCUMENTED IN MEDICAL RECORD: ICD-10-PCS | Mod: CPTII,S$GLB,, | Performed by: STUDENT IN AN ORGANIZED HEALTH CARE EDUCATION/TRAINING PROGRAM

## 2023-01-09 PROCEDURE — 99999 PR PBB SHADOW E&M-EST. PATIENT-LVL IV: CPT | Mod: PBBFAC,,, | Performed by: STUDENT IN AN ORGANIZED HEALTH CARE EDUCATION/TRAINING PROGRAM

## 2023-01-09 NOTE — PROGRESS NOTES
Referring Provider:    Aaareferral Self  No address on file  Subjective:   Patient: Dax Carlisle [id number], :1975   Visit date:2023 11:37 AM    Chief Complaint:  No chief complaint on file.      HPI:    Prior notes by myself reviewed  Clinical documentation obtained by nursing staff reviewed.       Chronic eustachian tube dysfunction AS for many years.  PET placed 2018.  Was stable until about 1 month ago.  No change with Augmentin.       Update 22  Most recently for the last 2 months or so. Feels nasal obstruction and feels there is a mass in the right nasal airway. Feels something flapping. Also with hyposmia.     Denies facial pressure, purulent rhinorrhea.    No treatment.     Endorses recurrent sinus infections. 2 per year.     Endorses year round allergies. Claritin and astelin regularly. Had allergy testing as a child - pan allergic.    Update 22    Augmentin/prednisone taper completed.   No significant change in right nasal obstruction and sensation of a mass.         Objective:     Physical Exam:  Vitals:  Temp 97.2 °F (36.2 °C)   Wt 101.5 kg (223 lb 12.3 oz)   BMI 33.04 kg/m²   General appearance:  Well developed, well nourished    Nose:  No masses/lesions of external nose, nasal mucosa, septum, and turbinates were within normal limits.    Mouth:  No mass/lesion of lips, teeth, gums, hard/soft palate, tongue, tonsils, or oropharynx.    Neck & Lymphatics:  No cervical lymphadenopathy, no neck mass/crepitus/ asymmetry, trachea is midline, no thyroid enlargement/tenderness/mass.        [x]  Data Reviewed:    [x]  Independent review of test:    CT cervical spine   Visualized paranasal sinuses clear    CT sinus 22  Mass from right sphenoethmoidal recess extending into nasal cavity, abutting right middle turbinates  Skull base intact  Sinuses clear  Mastoids and middle ears clear      Path  Sinonasal mass, right, biopsy:   - Inverted papilloma   - Negative for  malignancy     Procedure Note - Rigid Nasal Endoscopy with biopsy    Surgeon: Uriel Vasquez MD  Anesthesia: topical oxymetazoline and 4% lidocaine.    Indication: Dax Carlisle is a 47 y.o. male with a right sinonasal mass which was unresponsive to medical therapy, now presenting for biopsy. Risks and benefits were discussed and consent was obtained.    Technique: The nose was sprayed with oxymetazoline and 4% lidocaine.  Pledgets soaked in  oxymetazoline and 4% lidocaine were placed in the nasal cavity. Once removed, then mass was injected with 1% lidocaine with 100,000 epinephrine using a 25-gauge spinal needle under endoscopic visualization with a 2.7mm 0-degree endoscope. Next, the mass was biopsied using through cutting instruments. Hemostasis was obtained with pledgets and the nose was suctioned clear. The patient tolerated the procedure well.     Findings: right sinonasal mass from right sphenoethmoidal recess.              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  My recommendation is for endoscopic sinus surgery including excision with image guidance.  Discussed risks of IP - recurrence, 10% risk of conversion to cancer. We discussed the need for postoperative debridements and continued surveillance.    Risks of sinus surgery were discussed including, but not limited to bleeding, infection, scar, lack of improvement or recurrence of symptoms, cerebrospinal fluid leak, injury to eyes. Additional risks of septoplasty include septal perforation, upper teeth and gum numbness, and nasal collapse were discussed.

## 2023-01-10 ENCOUNTER — OFFICE VISIT (OUTPATIENT)
Dept: DERMATOLOGY | Facility: CLINIC | Age: 48
End: 2023-01-10
Payer: COMMERCIAL

## 2023-01-10 DIAGNOSIS — B35.6 TINEA CRURIS: ICD-10-CM

## 2023-01-10 DIAGNOSIS — B35.3 TINEA PEDIS, UNSPECIFIED LATERALITY: Primary | ICD-10-CM

## 2023-01-10 PROCEDURE — 99999 PR PBB SHADOW E&M-EST. PATIENT-LVL III: CPT | Mod: PBBFAC,,, | Performed by: STUDENT IN AN ORGANIZED HEALTH CARE EDUCATION/TRAINING PROGRAM

## 2023-01-10 PROCEDURE — 1159F PR MEDICATION LIST DOCUMENTED IN MEDICAL RECORD: ICD-10-PCS | Mod: CPTII,S$GLB,, | Performed by: STUDENT IN AN ORGANIZED HEALTH CARE EDUCATION/TRAINING PROGRAM

## 2023-01-10 PROCEDURE — 1159F MED LIST DOCD IN RCRD: CPT | Mod: CPTII,S$GLB,, | Performed by: STUDENT IN AN ORGANIZED HEALTH CARE EDUCATION/TRAINING PROGRAM

## 2023-01-10 PROCEDURE — 1160F PR REVIEW ALL MEDS BY PRESCRIBER/CLIN PHARMACIST DOCUMENTED: ICD-10-PCS | Mod: CPTII,S$GLB,, | Performed by: STUDENT IN AN ORGANIZED HEALTH CARE EDUCATION/TRAINING PROGRAM

## 2023-01-10 PROCEDURE — 99204 PR OFFICE/OUTPT VISIT, NEW, LEVL IV, 45-59 MIN: ICD-10-PCS | Mod: S$GLB,,, | Performed by: STUDENT IN AN ORGANIZED HEALTH CARE EDUCATION/TRAINING PROGRAM

## 2023-01-10 PROCEDURE — 1160F RVW MEDS BY RX/DR IN RCRD: CPT | Mod: CPTII,S$GLB,, | Performed by: STUDENT IN AN ORGANIZED HEALTH CARE EDUCATION/TRAINING PROGRAM

## 2023-01-10 PROCEDURE — 99204 OFFICE O/P NEW MOD 45 MIN: CPT | Mod: S$GLB,,, | Performed by: STUDENT IN AN ORGANIZED HEALTH CARE EDUCATION/TRAINING PROGRAM

## 2023-01-10 PROCEDURE — 99999 PR PBB SHADOW E&M-EST. PATIENT-LVL III: ICD-10-PCS | Mod: PBBFAC,,, | Performed by: STUDENT IN AN ORGANIZED HEALTH CARE EDUCATION/TRAINING PROGRAM

## 2023-01-10 RX ORDER — FLUCONAZOLE 150 MG/1
150 TABLET ORAL WEEKLY
Qty: 4 TABLET | Refills: 1 | Status: SHIPPED | OUTPATIENT
Start: 2023-01-10 | End: 2023-03-10

## 2023-01-10 NOTE — PROGRESS NOTES
Patient Information  Name: Dax Carlisle  : 1975  MRN: [id number]     Referring Physician:  Dr. Ramirez   Primary Care Physician:  Dr. Cody Enamorado MD   Date of Visit: 01/10/2023      Subjective:       Dax Carlisle is a 47 y.o. male who presents for   Chief Complaint   Patient presents with    Itching     And burning of gentials and feet. X months. Tx lotrimin af      HPI  Patient with new complaint of lesion(s)  Location: genitals, feet  Duration: 3-6 monyhs  Symptoms: itching  Relieving factors/Previous treatments: lotrimin AF topical    Patient was last seen:Visit date not found     Prior notes by myself reviewed.   Clinical documentation obtained by nursing staff reviewed.    Review of Systems   Skin:  Positive for itching and rash.      Objective:    Physical Exam   Constitutional: He appears well-developed and well-nourished. No distress.   Neurological: He is alert and oriented to person, place, and time. He is not disoriented.   Psychiatric: He has a normal mood and affect.   Skin:   Areas Examined (abnormalities noted in diagram):   Genitals / Buttocks / Groin Inspection Performed  RLE Inspected  LLE Inspection Performed            Diagram Legend     Erythematous scaling macule/papule c/w actinic keratosis       Vascular papule c/w angioma      Pigmented verrucoid papule/plaque c/w seborrheic keratosis      Yellow umbilicated papule c/w sebaceous hyperplasia      Irregularly shaped tan macule c/w lentigo     1-2 mm smooth white papules consistent with Milia      Movable subcutaneous cyst with punctum c/w epidermal inclusion cyst      Subcutaneous movable cyst c/w pilar cyst      Firm pink to brown papule c/w dermatofibroma      Pedunculated fleshy papule(s) c/w skin tag(s)      Evenly pigmented macule c/w junctional nevus     Mildly variegated pigmented, slightly irregular-bordered macule c/w mildly atypical nevus      Flesh colored to evenly pigmented papule c/w intradermal nevus        Pink pearly papule/plaque c/w basal cell carcinoma      Erythematous hyperkeratotic cursted plaque c/w SCC      Surgical scar with no sign of skin cancer recurrence      Open and closed comedones      Inflammatory papules and pustules      Verrucoid papule consistent consistent with wart     Erythematous eczematous patches and plaques     Dystrophic onycholytic nail with subungual debris c/w onychomycosis     Umbilicated papule    Erythematous-base heme-crusted tan verrucoid plaque consistent with inflamed seborrheic keratosis     Erythematous Silvery Scaling Plaque c/w Psoriasis     See annotation      No images are attached to the encounter or orders placed in the encounter.    [] Data reviewed  [] Independent review of test  [] Management discussed with another provider    Assessment / Plan:        Tinea pedis, unspecified laterality  -     fluconazole (DIFLUCAN) 150 MG Tab; Take 1 tablet (150 mg total) by mouth once a week. for 8 doses  Dispense: 4 tablet; Refill: 1    Tinea cruris  -     fluconazole (DIFLUCAN) 150 MG Tab; Take 1 tablet (150 mg total) by mouth once a week. for 8 doses  Dispense: 4 tablet; Refill: 1  Counseled Diflucan can have side affects such as change in taste, diarrhea, headache, nausea, these are not urgent, please report if bothersome. If a rash or feeling faint or having palpations please seek urgent care. Handout given in AVS      LOS NUMBER AND COMPLEXITY OF PROBLEMS    COMPLEXITY OF DATA RISK TOTAL TIME (m)   85905  87882 [] 1 self-limited or minor problem [x] Minimal to none [] No treatment recommended or patient to monitor 15-29  10-19   14315  37687 Low  [] 2 or > self limited or minor problems  [] 1 stable chronic illness  [] 1 acute, uncomplicated illness or injury Limited (2)  [] Prior external notes from each unique source  [] Review result of each unique test  [] Order each unique test []  Low  OTC medications, minor skin biopsy 30-44  20-29   35753  57990 Moderate  [x]   1 or > chronic illness with progression, exacerbation or SE of treatment  []  2 or more stable chronic illnesses  []  1 acute illness with systemic symptoms  []  1 acute complicated injury  []  1 undiagnosed new problem with uncertain prognosis Moderate (1/3 below)  []  3 or more data items        *Now includes assessment requiring independent historian  []  Independent interpretation of a test  []  Discuss management/test with another provider Moderate  [x]  Prescription drug mgmt  []  Minor surgery with risk discussed  []  Mgmt limited by social determinates 45-59  30-39   26380  52134 High  []  1 or more chronic illness with severe exacerbation, progression or SE of treatment  []  1 acute or chronic illness/injury that poses a threat to life or bodily function Extensive (2/3 below)  []  3 or more data items        *Now includes assessment requiring independent historian.  []  Independent interpretation of a test  []  Discuss management/test with another provider High  []  Major surgery with risk discussed  []  Drug therapy requiring intensive monitoring for toxicity  []  Hospitalization  []  Decision for DNR 60-74  40-54      No follow-ups on file.    Chika Abreu MD, FAAD  [facility]

## 2023-01-17 DIAGNOSIS — F32.5 MAJOR DEPRESSIVE DISORDER WITH SINGLE EPISODE, IN REMISSION: ICD-10-CM

## 2023-01-17 DIAGNOSIS — G89.4 CHRONIC PAIN SYNDROME: ICD-10-CM

## 2023-01-17 DIAGNOSIS — F98.8 ATTENTION DEFICIT DISORDER (ADD) WITHOUT HYPERACTIVITY: ICD-10-CM

## 2023-01-17 RX ORDER — CYCLOBENZAPRINE HCL 10 MG
10 TABLET ORAL 3 TIMES DAILY PRN
Qty: 90 TABLET | Refills: 0 | Status: SHIPPED | OUTPATIENT
Start: 2023-01-17 | End: 2023-02-16 | Stop reason: SDUPTHER

## 2023-01-17 RX ORDER — DEXTROAMPHETAMINE SACCHARATE, AMPHETAMINE ASPARTATE, DEXTROAMPHETAMINE SULFATE AND AMPHETAMINE SULFATE 2.5; 2.5; 2.5; 2.5 MG/1; MG/1; MG/1; MG/1
10 TABLET ORAL 2 TIMES DAILY PRN
Qty: 60 TABLET | Refills: 0 | Status: SHIPPED | OUTPATIENT
Start: 2023-01-17 | End: 2023-02-16 | Stop reason: SDUPTHER

## 2023-01-17 RX ORDER — HYDROCODONE BITARTRATE AND ACETAMINOPHEN 7.5; 325 MG/1; MG/1
1 TABLET ORAL EVERY 6 HOURS PRN
Qty: 120 TABLET | Refills: 0 | Status: SHIPPED | OUTPATIENT
Start: 2023-01-17 | End: 2023-02-16 | Stop reason: SDUPTHER

## 2023-01-17 NOTE — TELEPHONE ENCOUNTER
No new care gaps identified.  [facility] Embedded Care Gaps. Reference number: [id number]. 1/17/2023   12:30:07 PM CST

## 2023-01-17 NOTE — TELEPHONE ENCOUNTER
No new care gaps identified.  [facility] Embedded Care Gaps. Reference number: [id number]. 1/17/2023   12:30:39 PM CST

## 2023-01-18 RX ORDER — ARIPIPRAZOLE 10 MG/1
10 TABLET ORAL NIGHTLY
Qty: 90 TABLET | Refills: 3 | Status: SHIPPED | OUTPATIENT
Start: 2023-01-18 | End: 2024-01-24 | Stop reason: SDUPTHER

## 2023-01-18 RX ORDER — ESCITALOPRAM OXALATE 20 MG/1
20 TABLET ORAL NIGHTLY
Qty: 90 TABLET | Refills: 3 | Status: SHIPPED | OUTPATIENT
Start: 2023-01-18 | End: 2024-01-24 | Stop reason: SDUPTHER

## 2023-01-18 NOTE — TELEPHONE ENCOUNTER
Refill Routing Note   Medication(s) are not appropriate for processing by [facility] for the following reason(s):      - Outside of protocol  - Drug-Drug Interaction (FLUOXETINE AND FLUCONAZOLE)    [facility] action(s):  Route  Defer Medication-related problems identified: Drug-drug interaction     Medication Therapy Plan: DEFERRED FLUOXETINE AND FLUCONAZOLE DDI; ABILIFY OUT OF PROTOCOL  Medication reconciliation completed: No     Appointments  past 12m or future 3m with PCP    Date Provider   Last Visit   11/17/2022 Cody Enamorado MD   Next Visit   Visit date not found Cody Enamorado MD   ED visits in past 90 days: 0        Note composed:7:59 AM 01/18/2023

## 2023-01-21 ENCOUNTER — OFFICE VISIT (OUTPATIENT)
Dept: URGENT CARE | Facility: CLINIC | Age: 48
End: 2023-01-21
Payer: COMMERCIAL

## 2023-01-21 VITALS
HEART RATE: 106 BPM | OXYGEN SATURATION: 99 % | BODY MASS INDEX: 33.03 KG/M2 | HEIGHT: 69 IN | WEIGHT: 223 LBS | RESPIRATION RATE: 17 BRPM | TEMPERATURE: 99 F | DIASTOLIC BLOOD PRESSURE: 79 MMHG | SYSTOLIC BLOOD PRESSURE: 119 MMHG

## 2023-01-21 DIAGNOSIS — K61.1 PERIRECTAL ABSCESS: Primary | ICD-10-CM

## 2023-01-21 PROCEDURE — 3008F PR BODY MASS INDEX (BMI) DOCUMENTED: ICD-10-PCS | Mod: CPTII,S$GLB,, | Performed by: NURSE PRACTITIONER

## 2023-01-21 PROCEDURE — 1160F PR REVIEW ALL MEDS BY PRESCRIBER/CLIN PHARMACIST DOCUMENTED: ICD-10-PCS | Mod: CPTII,S$GLB,, | Performed by: NURSE PRACTITIONER

## 2023-01-21 PROCEDURE — 3078F PR MOST RECENT DIASTOLIC BLOOD PRESSURE < 80 MM HG: ICD-10-PCS | Mod: CPTII,S$GLB,, | Performed by: NURSE PRACTITIONER

## 2023-01-21 PROCEDURE — 1159F PR MEDICATION LIST DOCUMENTED IN MEDICAL RECORD: ICD-10-PCS | Mod: CPTII,S$GLB,, | Performed by: NURSE PRACTITIONER

## 2023-01-21 PROCEDURE — 3074F SYST BP LT 130 MM HG: CPT | Mod: CPTII,S$GLB,, | Performed by: NURSE PRACTITIONER

## 2023-01-21 PROCEDURE — 1160F RVW MEDS BY RX/DR IN RCRD: CPT | Mod: CPTII,S$GLB,, | Performed by: NURSE PRACTITIONER

## 2023-01-21 PROCEDURE — 99214 OFFICE O/P EST MOD 30 MIN: CPT | Mod: S$GLB,,, | Performed by: NURSE PRACTITIONER

## 2023-01-21 PROCEDURE — 99214 PR OFFICE/OUTPT VISIT, EST, LEVL IV, 30-39 MIN: ICD-10-PCS | Mod: S$GLB,,, | Performed by: NURSE PRACTITIONER

## 2023-01-21 PROCEDURE — 1159F MED LIST DOCD IN RCRD: CPT | Mod: CPTII,S$GLB,, | Performed by: NURSE PRACTITIONER

## 2023-01-21 PROCEDURE — 3078F DIAST BP <80 MM HG: CPT | Mod: CPTII,S$GLB,, | Performed by: NURSE PRACTITIONER

## 2023-01-21 PROCEDURE — 3008F BODY MASS INDEX DOCD: CPT | Mod: CPTII,S$GLB,, | Performed by: NURSE PRACTITIONER

## 2023-01-21 PROCEDURE — 3074F PR MOST RECENT SYSTOLIC BLOOD PRESSURE < 130 MM HG: ICD-10-PCS | Mod: CPTII,S$GLB,, | Performed by: NURSE PRACTITIONER

## 2023-01-21 RX ORDER — SULFAMETHOXAZOLE AND TRIMETHOPRIM 800; 160 MG/1; MG/1
1 TABLET ORAL 2 TIMES DAILY
Qty: 20 TABLET | Refills: 0 | Status: SHIPPED | OUTPATIENT
Start: 2023-01-21 | End: 2023-01-31

## 2023-01-21 NOTE — LETTER
January 21, 2023      [facility] [place]  [address], [address]  [place] [address]  Phone: [phone]  Fax: [phone]       Patient: Dax Carlisle   YOB: 1975  Date of Visit: 01/21/2023    To Whom It May Concern:    Olga Lidia Carlisle  was at [facility] on 01/21/2023. The patient may return to work/school on 01/23/2023. If you have any questions or concerns, or if I can be of further assistance, please do not hesitate to contact me.    Sincerely,    MONET Solitario

## 2023-01-21 NOTE — PROGRESS NOTES
"Subjective:       Patient ID: Dax Carlisle is a 47 y.o. male.    Vitals:  height is 5' 9" (1.753 m) and weight is 101.2 kg (223 lb). His temperature is 98.6 °F (37 °C). His blood pressure is 119/79 and his pulse is 106. His respiration is 17 and oxygen saturation is 99%.     Chief Complaint: Rectal Bleeding    PT states the last time he went to the bathroom was on Monday and he noticed an abscess in the rectum. He states on today while at work his rectal started hurting and bleeding. He has a history of diverticulitis and rectal abscess and is status post Kirsten procedure. . He is currently under the care of a surgeon. He states he doesn't know if his flu like symptoms are because of the rectal problems or if actually has the flu.    Rectal Bleeding  This is a new problem. The current episode started in the past 7 days. The problem occurs 2 to 4 times per day. The problem has been unchanged. Pertinent negatives include no abdominal pain, anorexia, arthralgias, change in bowel habit, chest pain, chills, congestion, coughing, diaphoresis, fatigue, fever, headaches, joint swelling, myalgias, nausea, neck pain, numbness, rash, sore throat, swollen glands, urinary symptoms, vertigo, visual change, vomiting or weakness. Nothing aggravates the symptoms. He has tried nothing for the symptoms. The treatment provided no relief.     Constitution: Positive for generalized weakness. Negative for chills, sweating, fatigue and fever.   HENT:  Negative for congestion and sore throat.    Neck: Negative for neck pain.   Cardiovascular:  Negative for chest pain.   Respiratory:  Negative for cough.    Gastrointestinal:  Positive for history of abdominal surgery, rectal bleeding and rectal pain. Negative for abdominal pain, nausea and vomiting.   Musculoskeletal:  Negative for joint pain, joint swelling and muscle ache.   Skin:  Negative for rash.   Neurological:  Negative for history of vertigo, headaches, disorientation and " numbness.   Psychiatric/Behavioral:  Negative for disorientation and confusion.      Objective:      Physical Exam   Constitutional: He is oriented to person, place, and time. He appears well-developed. No distress.   HENT:   Head: Normocephalic and atraumatic.   Ears:   Right Ear: External ear normal.   Left Ear: External ear normal.   Nose: Nose normal. No nasal deformity. No epistaxis.   Mouth/Throat: Oropharynx is clear and moist and mucous membranes are normal.   Eyes: Conjunctivae and lids are normal.   Neck: Trachea normal and phonation normal. Neck supple.   Cardiovascular: Normal rate.   Pulmonary/Chest: Effort normal and breath sounds normal.   Abdominal: Normal appearance and bowel sounds are normal. He exhibits no distension. Soft. There is no abdominal tenderness.   Genitourinary:         Comments: Significant purulent blood tinged drainage coming from the left side of the rectal sphincter region.      Musculoskeletal: Normal range of motion.         General: Normal range of motion.   Neurological: He is alert and oriented to person, place, and time. He has normal reflexes.   Skin: Skin is warm, dry, intact and not diaphoretic.   Psychiatric: His speech is normal and behavior is normal. Judgment and thought content normal.   Nursing note and vitals reviewed.chaperone present       Assessment:       1. Perirectal abscess          Plan:         Perirectal abscess  -     sulfamethoxazole-trimethoprim 800-160mg (BACTRIM DS) 800-160 mg Tab; Take 1 tablet by mouth 2 (two) times daily. for 10 days  Dispense: 20 tablet; Refill: 0           Medical Decision Making:   History:   Old Records Summarized: records from clinic visits and records from previous admission(s).       <> Summary of Records: HX diverticulitis with rectal abscess and Kirsten procedure.  Differential Diagnosis:   Perirectal abscess  Urgent Care Management:  Discussion that he needs to follow up with surgery ASAP. It is draining well but if  worsening or develops fever should go to the ED.

## 2023-02-08 ENCOUNTER — HOSPITAL ENCOUNTER (OUTPATIENT)
Dept: PREADMISSION TESTING | Facility: HOSPITAL | Age: 48
Discharge: HOME | End: 2023-02-08
Attending: FAMILY MEDICINE
Payer: COMMERCIAL

## 2023-02-08 VITALS
HEART RATE: 80 BPM | RESPIRATION RATE: 14 BRPM | SYSTOLIC BLOOD PRESSURE: 117 MMHG | OXYGEN SATURATION: 99 % | DIASTOLIC BLOOD PRESSURE: 77 MMHG | TEMPERATURE: 98 F

## 2023-02-08 DIAGNOSIS — I47.10 SVT (SUPRAVENTRICULAR TACHYCARDIA): Chronic | ICD-10-CM

## 2023-02-08 DIAGNOSIS — I10 HYPERTENSION, UNSPECIFIED TYPE: ICD-10-CM

## 2023-02-08 DIAGNOSIS — F32.5 MAJOR DEPRESSIVE DISORDER WITH SINGLE EPISODE, IN REMISSION: Chronic | ICD-10-CM

## 2023-02-08 DIAGNOSIS — D36.9 INVERTED PAPILLOMA: ICD-10-CM

## 2023-02-08 DIAGNOSIS — Z01.818 PREOP EXAMINATION: Primary | ICD-10-CM

## 2023-02-08 LAB
ALBUMIN SERPL BCP-MCNC: 4.5 G/DL (ref 3.5–5.2)
ALP SERPL-CCNC: 61 U/L (ref 55–135)
ALT SERPL W/O P-5'-P-CCNC: 24 U/L (ref 10–44)
ANION GAP SERPL CALC-SCNC: 12 MMOL/L (ref 8–16)
AST SERPL-CCNC: 18 U/L (ref 10–40)
BASOPHILS # BLD AUTO: 0.04 K/UL (ref 0–0.2)
BASOPHILS NFR BLD: 0.7 % (ref 0–1.9)
BILIRUB SERPL-MCNC: 0.4 MG/DL (ref 0.1–1)
BUN SERPL-MCNC: 17 MG/DL (ref 6–20)
CALCIUM SERPL-MCNC: 9.3 MG/DL (ref 8.7–10.5)
CHLORIDE SERPL-SCNC: 106 MMOL/L (ref 95–110)
CO2 SERPL-SCNC: 23 MMOL/L (ref 23–29)
CREAT SERPL-MCNC: 0.8 MG/DL (ref 0.5–1.4)
DIFFERENTIAL METHOD: ABNORMAL
EOSINOPHIL # BLD AUTO: 0.2 K/UL (ref 0–0.5)
EOSINOPHIL NFR BLD: 3.9 % (ref 0–8)
ERYTHROCYTE [DISTWIDTH] IN BLOOD BY AUTOMATED COUNT: 12.5 % (ref 11.5–14.5)
EST. GFR  (NO RACE VARIABLE): >60 ML/MIN/1.73 M^2
GLUCOSE SERPL-MCNC: 85 MG/DL (ref 70–110)
HCT VFR BLD AUTO: 39.8 % (ref 40–54)
HGB BLD-MCNC: 13.6 G/DL (ref 14–18)
IMM GRANULOCYTES # BLD AUTO: 0.01 K/UL (ref 0–0.04)
IMM GRANULOCYTES NFR BLD AUTO: 0.2 % (ref 0–0.5)
LYMPHOCYTES # BLD AUTO: 2 K/UL (ref 1–4.8)
LYMPHOCYTES NFR BLD: 33.5 % (ref 18–48)
MCH RBC QN AUTO: 29.6 PG (ref 27–31)
MCHC RBC AUTO-ENTMCNC: 34.2 G/DL (ref 32–36)
MCV RBC AUTO: 87 FL (ref 82–98)
MONOCYTES # BLD AUTO: 0.5 K/UL (ref 0.3–1)
MONOCYTES NFR BLD: 8.9 % (ref 4–15)
NEUTROPHILS # BLD AUTO: 3.1 K/UL (ref 1.8–7.7)
NEUTROPHILS NFR BLD: 52.8 % (ref 38–73)
NRBC BLD-RTO: 0 /100 WBC
PLATELET # BLD AUTO: 187 K/UL (ref 150–450)
PMV BLD AUTO: 11.4 FL (ref 9.2–12.9)
POTASSIUM SERPL-SCNC: 4.2 MMOL/L (ref 3.5–5.1)
PROT SERPL-MCNC: 7.4 G/DL (ref 6–8.4)
RBC # BLD AUTO: 4.6 M/UL (ref 4.6–6.2)
SODIUM SERPL-SCNC: 141 MMOL/L (ref 136–145)
WBC # BLD AUTO: 5.94 K/UL (ref 3.9–12.7)

## 2023-02-08 PROCEDURE — 93010 EKG 12-LEAD: ICD-10-PCS | Mod: ,,, | Performed by: INTERNAL MEDICINE

## 2023-02-08 PROCEDURE — 80053 COMPREHEN METABOLIC PANEL: CPT | Performed by: NURSE PRACTITIONER

## 2023-02-08 PROCEDURE — 93005 ELECTROCARDIOGRAM TRACING: CPT

## 2023-02-08 PROCEDURE — 93010 ELECTROCARDIOGRAM REPORT: CPT | Mod: ,,, | Performed by: INTERNAL MEDICINE

## 2023-02-08 PROCEDURE — 85025 COMPLETE CBC W/AUTO DIFF WBC: CPT | Performed by: NURSE PRACTITIONER

## 2023-02-08 NOTE — H&P
Preoperative History and Physical  [facility]                                                                   Chief Complaint: Preoperative evaluation     History of Present Illness:      Dax Carlisle is a 47 y.o. male with a PMHx of Childhood asthma, Depression, Diverticulitis, Kidney stones, LUNA (but no longer requires CPAP, SVT, and Inverted papilloma who presents to the office today for a preoperative consultation at the request of Dr. Vasquez who plans on performing FESS on February 15.     Functional Status:      The patient is able to climb a flight of stairs. The patient is able to ambulate without difficulty. The patient's functional status is not affected by the surgical problem. The patient's functional status is not affected by shortness of breath, chest pain, dyspnea on exertion and fatigue.      MET score greater than 4    Past Medical History:      Past Medical History:   Diagnosis Date    Angioedema of lips 03/13/2015    IVP dye allergy - swelling lips, eye, tongue    Asthma     childhood    Depression     Diverticulitis 10/07/2019    Kidney stones     Sleep apnea     cpap not required    SVT (supraventricular tachycardia)         Past Surgical History:      Past Surgical History:   Procedure Laterality Date    ABDOMINAL WASHOUT N/A 10/7/2019    Procedure: LAVAGE, PERITONEAL, THERAPEUTIC;  Surgeon: Mindy Goff MD;  Location: [facility] OR;  Service: General;  Laterality: N/A;  abdomen washout    ABSCESS DRAINAGE N/A 9/10/2021    Procedure: DRAINAGE, ABSCESS;  Surgeon: Kadeem Choi MD;  Location: [facility] OR;  Service: General;  Laterality: N/A;  perirectal    anal fistula repair      APPENDECTOMY      BACK SURGERY      CHOLECYSTECTOMY  2005    COLONOSCOPY N/A 3/10/2016    Procedure: COLONOSCOPY;  Surgeon: Juvenal Dinero MD;  Location: [facility] ENDO;  Service: Endoscopy;  Laterality: N/A;    COLONOSCOPY N/A 1/17/2020    Procedure:  COLONOSCOPY;  Surgeon: Kadeem Choi MD;  Location: [facility] ENDO;  Service: General;  Laterality: N/A;    COLOSTOMY N/A 10/7/2019    Procedure: CREATION, COLOSTOMY;  Surgeon: Mindy Goff MD;  Location: [facility] OR;  Service: General;  Laterality: N/A;    COLOSTOMY REVERSAL      EXAMINATION UNDER ANESTHESIA N/A 9/10/2021    Procedure: Exam under anesthesia;  Surgeon: Kadeem Choi MD;  Location: [facility] OR;  Service: General;  Laterality: N/A;    EXAMINATION UNDER ANESTHESIA N/A 1/6/2022    Procedure: Exam under anesthesia, perirectal abscess I&D;  Surgeon: Kadeem Choi MD;  Location: [facility] OR;  Service: General;  Laterality: N/A;    SIVAKUMAR PROCEDURE N/A 10/7/2019    Procedure: SIVAKUMAR PROCEDURE;  Surgeon: Mindy Goff MD;  Location: [facility] OR;  Service: General;  Laterality: N/A;    INJECTION OF ANESTHETIC AGENT AROUND GANGLION IMPAR N/A 3/15/2021    Procedure: BLOCK, GANGLION IMPAR;  Surgeon: Ruben Cabrera MD;  [facility];  Service: Pain Management;  Laterality: N/A;    INJECTION OF ANESTHETIC AGENT AROUND PUDENDAL NERVE N/A 9/10/2021    Procedure: BLOCK, NERVE, PUDENDAL;  Surgeon: Kadeem Choi MD;  Location: [facility] OR;  Service: General;  Laterality: N/A;    INJECTION OF ANESTHETIC AGENT AROUND PUDENDAL NERVE N/A 1/6/2022    Procedure: BLOCK, NERVE, PUDENDAL;  Surgeon: Kadeem Choi MD;  Location: [facility] OR;  Service: General;  Laterality: N/A;    INJECTION OF ANESTHETIC AGENT INTO TISSUE PLANE DEFINED BY TRANSVERSUS ABDOMINIS MUSCLE N/A 1/30/2020    Procedure: BLOCK, TRANSVERSUS ABDOMINIS PLANE;  Surgeon: Kadeem Choi MD;  Location: [facility] OR;  Service: General;  Laterality: N/A;    KNEE SURGERY Right     KNEE SURGERY Left     LYSIS OF ADHESIONS N/A 1/30/2020    Procedure: LYSIS, ADHESIONS;  Surgeon: Kadeem Choi MD;  Location: [facility] OR;  Service: General;  Laterality: N/A;    PILONIDAL CYST DRAINAGE      pt has had 6 of these adn has had revisions     PROCTECTOMY N/A 1/30/2020    Procedure: PROCTECTOMY;  Surgeon: Kadeem Choi MD;  Location: [facility] OR;  Service: General;  Laterality: N/A;  Partial    SUBTOTAL COLECTOMY  1/30/2020    Procedure: COLECTOMY, PARTIAL;  Surgeon: Kadeem Choi MD;  Location: [facility] OR;  Service: General;;        Social History:      Social History     Socioeconomic History    Marital status:    Tobacco Use    Smoking status: Never    Smokeless tobacco: Never   Substance and Sexual Activity    Alcohol use: Never    Drug use: No    Sexual activity: Yes     Partners: Female   Social History Narrative    Live w/ spouse and  1 dog         Family History:      Family History   Problem Relation Age of Onset    Diabetes Mother     Colon polyps Mother     Alzheimer's disease Mother     Heart disease Father         CABG age 66    Cancer Father         melanoma    Diabetes Father     Anesthesia problems Father         d/t polio    Colon polyps Sister     Diabetes Maternal Grandmother     Colon cancer Maternal Grandmother     Diabetes Maternal Grandfather     Diabetes Paternal Grandmother     Stroke Paternal Grandmother     Diabetes Paternal Grandfather        Allergies:      Review of patient's allergies indicates:   Allergen Reactions    Codeine Other (See Comments)     violent    Iodinated contrast media Swelling     Tongue, right eye, lips swelling. Rash on abdomen and axilla    Nuts [tree nut] Anaphylaxis    Dairy aid [lactase] Diarrhea and Nausea And Vomiting    Morphine      Tolerated hydromorphone in October 2019.        Medications:      Current Outpatient Medications   Medication Sig    acetaminophen (TYLENOL) 325 MG tablet Take 325 mg by mouth every 6 (six) hours as needed for Pain.    ARIPiprazole (ABILIFY) 10 MG Tab Take 1 tablet (10 mg total) by mouth every evening.    azelastine (ASTELIN) 137 mcg (0.1 %) nasal spray Use Two sprays intranasal twice a day    cyclobenzaprine (FLEXERIL) 10 MG tablet Take 1 tablet (10 mg  "total) by mouth 3 (three) times daily as needed for Muscle spasms.    dextroamphetamine-amphetamine 10 mg Tab Take 1 tablet (10 mg total) by mouth 2 (two) times daily as needed (for ADHD).    EScitalopram oxalate (LEXAPRO) 20 MG tablet Take 1 tablet (20 mg total) by mouth every evening.    fluconazole (DIFLUCAN) 150 MG Tab Take 1 tablet (150 mg total) by mouth once a week. for 8 doses    HYDROcodone-acetaminophen (NORCO) 7.5-325 mg per tablet Take 1 tablet by mouth every 6 (six) hours as needed for Pain.    linaclotide 290 mcg Cap Take 1 capsule (290 mcg total) by mouth once daily. (Patient taking differently: Take 290 mcg by mouth daily as needed.)    loratadine (CLARITIN) 10 mg tablet Take 10 mg by mouth daily as needed.     metoprolol succinate (TOPROL-XL) 25 MG 24 hr tablet Take 1 tablet (25 mg total) by mouth every evening.    topiramate (TOPAMAX) 25 MG tablet Take 1 tablet (25 mg total) by mouth 2 (two) times daily.    albuterol (VENTOLIN HFA) 90 mcg/actuation inhaler Inhale 2 puffs into the lungs every 6 (six) hours as needed for Wheezing. Rescue (Patient not taking: Reported on 2/8/2023)    predniSONE (DELTASONE) 10 MG tablet Take 3 tablets a day for 4 days (1 before breakfast, 1 after lunch, 1 before bed) THEN take 2 tablets a day for 4 days (1 before breakfast, 1 before bed) THEN take 1 tablet a day for 4 days (1 before breakfast).     No current facility-administered medications for this encounter.       Vitals:      Vitals:    02/08/23 1318   BP: 117/77   Pulse: 80   Resp: 14   Temp: 97.9 °F (36.6 °C)       Review of Systems:        Constitutional: Negative for fever, chills, weight loss, malaise/fatigue and diaphoresis.   HENT: Negative for hearing loss, ear pain, nosebleeds, congestion, sore throat, neck pain, tinnitus and ear discharge.  Positive for "a fullness in my nose, like something in my nose that I couldn't get rid of".   Eyes: Negative for blurred vision, double vision, photophobia, pain, " discharge and redness.   Respiratory: Negative for cough, hemoptysis, sputum production, shortness of breath, wheezing and stridor.    Cardiovascular: Negative for chest pain, palpitations, orthopnea, claudication, leg swelling and PND.   Gastrointestinal: Negative for heartburn, nausea, vomiting, abdominal pain, diarrhea, constipation, blood in stool and melena.   Genitourinary: Negative for dysuria, urgency, frequency, hematuria and flank pain.   Musculoskeletal: Negative for myalgias, back pain, joint pain and falls.   Skin: Negative for itching and rash.   Neurological: Negative for dizziness, tingling, tremors, sensory change, speech change, focal weakness, seizures, loss of consciousness, weakness and headaches.   Endo/Heme/Allergies: Negative for environmental allergies and polydipsia. Does not bruise/bleed easily.   Psychiatric/Behavioral: Negative for depression, suicidal ideas, hallucinations, memory loss and substance abuse. The patient is not nervous/anxious and does not have insomnia.    All 14 systems reviewed and negative except as noted above.    Physical Exam:      Constitutional: Appears well-developed, well-nourished and in no acute distress.  Patient is oriented to person, place, and time.   Head: Normocephalic and atraumatic. Mucous membranes moist.  Neck: Neck supple no mass.   Cardiovascular: Normal rate and regular rhythm.  S1 S2 appreciated by ascultation.  Pulmonary/Chest: Effort normal and clear to auscultation bilaterally. No respiratory distress.   Abdomen: Soft. Non-tender and non-distended. Bowel sounds are normal.   Neurological: Patient is alert and oriented to person, place and time. Moves all extremities.  Skin: Warm and dry. No lesions.  Extremities: No clubbing, cyanosis or edema.    Laboratory data:      Reviewed and noted in plan where applicable. Please see chart for full laboratory data.    No results for input(s): CPK, CPKMB, TROPONINI, MB in the last 24 hours. No results  for input(s): POCTGLUCOSE in the last 24 hours.     Lab Results   Component Value Date    INR 1.0 01/10/2016    INR 1.0 03/12/2015       Lab Results   Component Value Date    WBC 7.20 11/17/2022    HGB 13.1 (L) 11/17/2022    HCT 40.6 11/17/2022    MCV 91 11/17/2022     11/17/2022       No results for input(s): GLU, NA, K, CL, CO2, BUN, CREATININE, CALCIUM, MG in the last 24 hours.    Predictors of intubation difficulty:       Morbid obesity? yes    Anatomically abnormal facies? no   Prominent incisors? no   Receding mandible? no   Short, thick neck? yes    Neck range of motion: normal   Dentition: No chipped, loose, or missing teeth.  Based on the Modified Mallampati, patient is a mallampati score: I (soft palate, uvula, fauces, and tonsillar pillars visible)    Cardiographics:      ECG:  Normal sinus rhythm, Inferior infarct, age undetermined.  Awaiting official cardiology read.    Echocardiogram: Normal left ventricular systolic function. The estimated ejection fraction is 55%.  Normal LV diastolic function.  Normal right ventricular systolic function.    Imaging:      Chest x-ray: not indicated.    Assessment and Plan:      Inverted papilloma  - Patient presents today at the request of Dr. Sicard who plans on performing an FESS on February 15th.    Known risk factors for perioperative complications: None    Difficulty with intubation is not anticipated.    Cardiac Risk Estimation: Based on the Revised Cardiac Risk index, patient is a Class 1 risk with a 3.9% risk of a major cardiac event in a low risk procedure.    1.) Preoperative workup as follows: ECG, hemoglobin, hematocrit, electrolytes, creatinine, glucose, liver function studies.  2.) Change in medication regimen before surgery: discontinue ASA 6 days before surgery, discontinue NSAIDs 5 days before surgery.  3.) Prophylaxis for cardiac events with perioperative beta-blockers: Continue Toprol XL.  4.) Invasive hemodynamic monitoring perioperatively:  not indicated.  5.) Deep vein thrombosis prophylaxis postoperatively: intermittent pneumatic compression boots and regimen to be chosen by surgical team.  6.) Surveillance for postoperative MI with ECG immediately postoperatively and on postoperati ve days 1 and 2 AND troponin levels 24 hours postoperatively and on day 4 or hospital discharge (whichever comes first): not indicated.  7.) Current medications which may produce withdrawal symptoms if withheld perioperatively: None.  8.) Other measures: None.      SVT (supraventricular tachycardia)  - Currently rate controlled with no recent events.   - Continue Toprol XL.    Major depressive disorder with single episode, in remission  - Continue home medications.        Electronically signed by Laurie Chandler DNP, ACNP on 2/8/2023 at 1:12 PM.

## 2023-02-08 NOTE — PROGRESS NOTES
To confirm, your doctor has instructed you that surgery is scheduled for 2/15/2023.       Pre admit office will call the afternoon prior to surgery between 1PM and 3PM with arrival time.    Surgery will be at [facility],  The address is [address]. [place]  [address].      IMPORTANT INSTRUCTIONS!    Do not eat or drink after 12 midnight, including water.   Do not smoke or use chewing tobacco after 12 midnight  OK to brush teeth, but no gum, candy, or mints!      Take only these medicines with a small swallow of water-morning of surgery.     topamax         ____ Stop Aspirin, Ibuprofen, Motrin and Aleve at least 5-7 days before surgery, unless otherwise instructed by your doctor, or the nurse.   You MAY use Tylenol/acetaminophen until day of surgery.      ____  If you take diabetic medication, do NOT take morning of surgery unless instructed by Doctor. Metformin must be stopped 24 hrs prior to surgery time.       ____ Stop taking any Fish Oil supplements or Vitamins at least 5 days prior to surgery, unless instructed otherwise by your Doctor.       Please notify MD office if you have an active infection, currently taking antibiotics or received a vaccination within the past 7 days.      Bathing Instructions: The night before surgery and the morning prior to coming to the hospital:    - Shower & rinse your body as usual with anti-bacterial Soap (Dial or Danitza 2000)   -Hibiclens (if indicated) use AFTER anti-bacterial soap; 1 packet PM/1 packet in AM on surgical site only   -Do not use hibiclens on your head, face, or genitals.    -Do not wash with anti-bacterial soap after you use the hibiclens.    -Do not shave surgical site 5-7 days prior to surgery.    -Pubic hair 7 days prior to surgery (gyn pt's).      Pediatric patients do not need to use anti-bacterial soap or Hibiclens.             After Bathing:   __ No powder, lotions, creams, or body spray to skin     __No deodorant for any breast  procedure, PORT, or upper arm surgery     __ No makeup, mascara, nail polish or artificial nails        **SURGERY WILL BE CANCELLED IF ARTIFICIAL/NAIL POLISH IS PRESENT!!!**    __ Please remove all piercings and leave all jewelry at home.    **SURGERY WILL BE CANCELLED IF PIERCINGS ARE PRESENT!!!**      __ Dentures, Hearing Aids and Contact Lens need to be removed prior to the start of surgery.      __ Wear clean, loose-fitting clothing. Allow for dressings/bandages/surgical equipment     __ You must have transportation, and they MUST stay the entire time.       [facility] Visitor/Ride Policy:   Only 1 adult allowed (over the age of 18) to accompany you into Pre-op/Recovery Surgery Dept and must stay through the entire length of admission.     Must have a ride home from a responsible adult that you know and trust.      Pediatric Patients are allowed 2 adult visitors.     Medical Transport, Uber or Lyft can only be used if patient has a responsible adult to accompany them during ride home.         Post-Op Instructions: You will receive surgery post-op instructions by your Discharge Nurse prior to going home.     Surgical Site Infection:   Prevention of surgical site infections:   -Keep incisions clean and dry.   -Do not soak/submerge incisions in water until completely healed.   -Do not apply lotions, powders, creams, or deodorants to site.   -Always make sure hands are cleaned with antibacterial soap/ alcohol-based   prior to touching the surgical site.       Signs and symptoms:               -Redness and pain around the area where you had surgery               -Drainage of cloudy fluid from your surgical wound               -Fever over 100.4 or chills     >>>Call Surgeon office/on-call Surgeon if you experience any of these signs & symptoms post-surgery.        *Please Call [facility] Pre-Admissions Department with surgery instruction questions at [phone] or [phone].     *Insurance Questions, please  call [phone] or [phone]

## 2023-02-08 NOTE — DISCHARGE INSTRUCTIONS
To confirm, your doctor has instructed you that surgery is scheduled for 2/15/2023.       Pre admit office will call the afternoon prior to surgery between 1PM and 3PM with arrival time.    Surgery will be at [facility],  The address is [address]. [place]  [address].      IMPORTANT INSTRUCTIONS!    Do not eat or drink after 12 midnight, including water.   Do not smoke or use chewing tobacco after 12 midnight  OK to brush teeth, but no gum, candy, or mints!      Take only these medicines with a small swallow of water-morning of surgery.     topamax         ____ Stop Aspirin, Ibuprofen, Motrin and Aleve at least 5-7 days before surgery, unless otherwise instructed by your doctor, or the nurse.   You MAY use Tylenol/acetaminophen until day of surgery.      ____  If you take diabetic medication, do NOT take morning of surgery unless instructed by Doctor. Metformin must be stopped 24 hrs prior to surgery time.       ____ Stop taking any Fish Oil supplements or Vitamins at least 5 days prior to surgery, unless instructed otherwise by your Doctor.       Please notify MD office if you have an active infection, currently taking antibiotics or received a vaccination within the past 7 days.      Bathing Instructions: The night before surgery and the morning prior to coming to the hospital:    - Shower & rinse your body as usual with anti-bacterial Soap (Dial or Danitza 2000)   -Hibiclens (if indicated) use AFTER anti-bacterial soap; 1 packet PM/1 packet in AM on surgical site only   -Do not use hibiclens on your head, face, or genitals.    -Do not wash with anti-bacterial soap after you use the hibiclens.    -Do not shave surgical site 5-7 days prior to surgery.    -Pubic hair 7 days prior to surgery (gyn pt's).      Pediatric patients do not need to use anti-bacterial soap or Hibiclens.             After Bathing:   __ No powder, lotions, creams, or body spray to skin     __No deodorant for any breast  procedure, PORT, or upper arm surgery     __ No makeup, mascara, nail polish or artificial nails        **SURGERY WILL BE CANCELLED IF ARTIFICIAL/NAIL POLISH IS PRESENT!!!**    __ Please remove all piercings and leave all jewelry at home.    **SURGERY WILL BE CANCELLED IF PIERCINGS ARE PRESENT!!!**      __ Dentures, Hearing Aids and Contact Lens need to be removed prior to the start of surgery.      __ Wear clean, loose-fitting clothing. Allow for dressings/bandages/surgical equipment     __ You must have transportation, and they MUST stay the entire time.       [facility] Visitor/Ride Policy:   Only 1 adult allowed (over the age of 18) to accompany you into Pre-op/Recovery Surgery Dept and must stay through the entire length of admission.     Must have a ride home from a responsible adult that you know and trust.      Pediatric Patients are allowed 2 adult visitors.     Medical Transport, Uber or Lyft can only be used if patient has a responsible adult to accompany them during ride home.         Post-Op Instructions: You will receive surgery post-op instructions by your Discharge Nurse prior to going home.     Surgical Site Infection:   Prevention of surgical site infections:   -Keep incisions clean and dry.   -Do not soak/submerge incisions in water until completely healed.   -Do not apply lotions, powders, creams, or deodorants to site.   -Always make sure hands are cleaned with antibacterial soap/ alcohol-based   prior to touching the surgical site.       Signs and symptoms:               -Redness and pain around the area where you had surgery               -Drainage of cloudy fluid from your surgical wound               -Fever over 100.4 or chills     >>>Call Surgeon office/on-call Surgeon if you experience any of these signs & symptoms post-surgery.        *Please Call [facility] Pre-Admissions Department with surgery instruction questions at [phone] or [phone].     *Insurance Questions, please  call [phone] or [phone]

## 2023-02-08 NOTE — ASSESSMENT & PLAN NOTE
- Patient presents today at the request of Dr. Sicard who plans on performing an FESS on February 15th.    Known risk factors for perioperative complications: None    Difficulty with intubation is not anticipated.    Cardiac Risk Estimation: Based on the Revised Cardiac Risk index, patient is a Class 1 risk with a 3.9% risk of a major cardiac event in a low risk procedure.    1.) Preoperative workup as follows: ECG, hemoglobin, hematocrit, electrolytes, creatinine, glucose, liver function studies.  2.) Change in medication regimen before surgery: discontinue ASA 6 days before surgery, discontinue NSAIDs 5 days before surgery.  3.) Prophylaxis for cardiac events with perioperative beta-blockers: Continue Toprol XL.  4.) Invasive hemodynamic monitoring perioperatively: not indicated.  5.) Deep vein thrombosis prophylaxis postoperatively: intermittent pneumatic compression boots and regimen to be chosen by surgical team.  6.) Surveillance for postoperative MI with ECG immediately postoperatively and on postoperati ve days 1 and 2 AND troponin levels 24 hours postoperatively and on day 4 or hospital discharge (whichever comes first): not indicated.  7.) Current medications which may produce withdrawal symptoms if withheld perioperatively: None.  8.) Other measures: None.

## 2023-02-09 ENCOUNTER — TELEPHONE (OUTPATIENT)
Dept: CARDIOLOGY | Facility: CLINIC | Age: 48
End: 2023-02-09
Payer: COMMERCIAL

## 2023-02-09 NOTE — TELEPHONE ENCOUNTER
[facility] Staff  Caller: Dustin (Today, 12:05 PM)  Dustin with [facility] is calling to speak with nurse regarding appt. Reports patient is having surgery on 3/15/23 and is needing a clearance appt with provider before that date. Please give dustin a call back at ext: [phone]      Called to get details on what procedure she is referring to

## 2023-02-10 ENCOUNTER — OFFICE VISIT (OUTPATIENT)
Dept: CARDIOLOGY | Facility: CLINIC | Age: 48
End: 2023-02-10
Payer: COMMERCIAL

## 2023-02-10 VITALS
OXYGEN SATURATION: 100 % | WEIGHT: 227.31 LBS | HEIGHT: 69 IN | HEART RATE: 84 BPM | BODY MASS INDEX: 33.67 KG/M2 | SYSTOLIC BLOOD PRESSURE: 107 MMHG | DIASTOLIC BLOOD PRESSURE: 77 MMHG

## 2023-02-10 DIAGNOSIS — E78.2 MIXED HYPERLIPIDEMIA: Primary | Chronic | ICD-10-CM

## 2023-02-10 DIAGNOSIS — I47.10 SVT (SUPRAVENTRICULAR TACHYCARDIA): Chronic | ICD-10-CM

## 2023-02-10 PROCEDURE — 3074F PR MOST RECENT SYSTOLIC BLOOD PRESSURE < 130 MM HG: ICD-10-PCS | Mod: CPTII,S$GLB,, | Performed by: INTERNAL MEDICINE

## 2023-02-10 PROCEDURE — 99999 PR PBB SHADOW E&M-EST. PATIENT-LVL IV: CPT | Mod: PBBFAC,,, | Performed by: INTERNAL MEDICINE

## 2023-02-10 PROCEDURE — 1159F MED LIST DOCD IN RCRD: CPT | Mod: CPTII,S$GLB,, | Performed by: INTERNAL MEDICINE

## 2023-02-10 PROCEDURE — 99214 OFFICE O/P EST MOD 30 MIN: CPT | Mod: S$GLB,,, | Performed by: INTERNAL MEDICINE

## 2023-02-10 PROCEDURE — 1159F PR MEDICATION LIST DOCUMENTED IN MEDICAL RECORD: ICD-10-PCS | Mod: CPTII,S$GLB,, | Performed by: INTERNAL MEDICINE

## 2023-02-10 PROCEDURE — 99999 PR PBB SHADOW E&M-EST. PATIENT-LVL IV: ICD-10-PCS | Mod: PBBFAC,,, | Performed by: INTERNAL MEDICINE

## 2023-02-10 PROCEDURE — 3078F PR MOST RECENT DIASTOLIC BLOOD PRESSURE < 80 MM HG: ICD-10-PCS | Mod: CPTII,S$GLB,, | Performed by: INTERNAL MEDICINE

## 2023-02-10 PROCEDURE — 3078F DIAST BP <80 MM HG: CPT | Mod: CPTII,S$GLB,, | Performed by: INTERNAL MEDICINE

## 2023-02-10 PROCEDURE — 99214 PR OFFICE/OUTPT VISIT, EST, LEVL IV, 30-39 MIN: ICD-10-PCS | Mod: S$GLB,,, | Performed by: INTERNAL MEDICINE

## 2023-02-10 PROCEDURE — 3008F BODY MASS INDEX DOCD: CPT | Mod: CPTII,S$GLB,, | Performed by: INTERNAL MEDICINE

## 2023-02-10 PROCEDURE — 3008F PR BODY MASS INDEX (BMI) DOCUMENTED: ICD-10-PCS | Mod: CPTII,S$GLB,, | Performed by: INTERNAL MEDICINE

## 2023-02-10 PROCEDURE — 3074F SYST BP LT 130 MM HG: CPT | Mod: CPTII,S$GLB,, | Performed by: INTERNAL MEDICINE

## 2023-02-10 NOTE — PROGRESS NOTES
"EF Subjective:   Patient ID:  Dax Carlisle is a 47 y.o. male who presents for follow-up of No chief complaint on file.     Dax Carlisle is a 44 y.o. male who presents for evaluation of Test Results,  Risk Factor Management For Atherosclerosis, Palpitations, and Chest Pain        HPI Pt presents for f/u.  Has hyperlipidemia, obesity. Nonsmoker.   Pt seen as new pt July 2020.  Past hx pertinent for following:   States has had 10 episodes of syncope, usually after standing or using bathroom.  sxs started this year 2020 after colostomy reversal (had ruptured bowel 10/19, abscess).  Syncope short lasting, did not seek medical attention because he felt fine afterwards but his "arms were flailing around" afterwards.  Father had CABG.  Had cp x 30 minutes, 3 w prior to 7/20 consult appt, after intercourse with wife.  Has put on weight after surgery.  No cp with walking.  ecg 7/3/20 NSR, QTc 489 ms.  ecg 1/20 and 10/19 sinus, nonspecific st-t abnl.  Has been on Lexapro for long time.   Now here.  Weight up 10 pounds since last visit.  Has h/o atypical chest pain sxs, and currently this is not bothersome.  Some TENORIO, improved.  HR improved.  Toprol xl 25 mg qd started for sinus tachycardia, nonsustained SVT.  He feels better since Toprol started.  No recurrent syncope since I saw him 7/20.  Stress MPI 7/20 no ischemia.  Echo 7/20 normal LV function.  Zio Holter 7/20 NSR, 2 runs of minimal SVT, rare ectopy.  Patient is here for preop evaluation.  Patient doing well in terms of SVT on metoprolol 25 mg daily continues his medication.  Patient has had no significant exertional symptoms he is doing well.  EKG showed minor abnormalities in the inferior leads is present on prior electrocardiograms and therefore no change.  Patient will be cleared for nasal procedure next week.  Otherwise stable.      Review of Systems   Constitutional: Negative for chills, diaphoresis, night sweats, weight gain and weight loss. "   HENT:  Negative for congestion, hoarse voice, sore throat and stridor.    Eyes:  Negative for double vision and pain.   Cardiovascular:  Negative for chest pain, claudication, cyanosis, dyspnea on exertion, irregular heartbeat, leg swelling, near-syncope, orthopnea, palpitations, paroxysmal nocturnal dyspnea and syncope.   Respiratory:  Negative for cough, hemoptysis, shortness of breath, sleep disturbances due to breathing, snoring, sputum production and wheezing.    Endocrine: Negative for cold intolerance, heat intolerance and polydipsia.   Hematologic/Lymphatic: Negative for bleeding problem. Does not bruise/bleed easily.   Skin:  Negative for color change, dry skin and rash.   Musculoskeletal:  Negative for joint swelling and muscle cramps.   Gastrointestinal:  Negative for bloating, abdominal pain, constipation, diarrhea, dysphagia, melena, nausea and vomiting.   Genitourinary:  Negative for flank pain and urgency.   Neurological:  Negative for dizziness, focal weakness, headaches, light-headedness, loss of balance, seizures and weakness.   Psychiatric/Behavioral:  Negative for altered mental status and memory loss. The patient is not nervous/anxious.    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    IVP dye allergy - swelling lips, eye, tongue    Asthma     childhood    Depression     Diverticulitis 10/07/2019    Kidney stones     Sleep apnea     cpap not required    SVT  (supraventricular tachycardia)      Social History     Socioeconomic History    Marital status:    Tobacco Use    Smoking status: Never    Smokeless tobacco: Never   Substance and Sexual Activity    Alcohol use: Never    Drug use: No    Sexual activity: Yes     Partners: Female   Social History Narrative    Live w/ spouse and  1 dog      Current Outpatient Medications on File Prior to Visit   Medication Sig Dispense Refill    acetaminophen (TYLENOL) 325 MG tablet Take 325 mg by mouth every 6 (six) hours as needed for Pain.      albuterol (VENTOLIN HFA) 90 mcg/actuation inhaler Inhale 2 puffs into the lungs every 6 (six) hours as needed for Wheezing. Rescue (Patient not taking: Reported on 2/8/2023) 8.5 g 0    ARIPiprazole (ABILIFY) 10 MG Tab Take 1 tablet (10 mg total) by mouth every evening. 90 tablet 3    azelastine (ASTELIN) 137 mcg (0.1 %) nasal spray Use Two sprays intranasal twice a day 30 mL 0    cyclobenzaprine (FLEXERIL) 10 MG tablet Take 1 tablet (10 mg total) by mouth 3 (three) times daily as needed for Muscle spasms. 90 tablet 0    dextroamphetamine-amphetamine 10 mg Tab Take 1 tablet (10 mg total) by mouth 2 (two) times daily as needed (for ADHD). 60 tablet 0    EScitalopram oxalate (LEXAPRO) 20 MG tablet Take 1 tablet (20 mg total) by mouth every evening. 90 tablet 3    fluconazole (DIFLUCAN) 150 MG Tab Take 1 tablet (150 mg total) by mouth once a week. for 8 doses 4 tablet 1    HYDROcodone-acetaminophen (NORCO) 7.5-325 mg per tablet Take 1 tablet by mouth every 6 (six) hours as needed for Pain. 120 tablet 0    linaclotide 290 mcg Cap Take 1 capsule (290 mcg total) by mouth once daily. (Patient taking differently: Take 290 mcg by mouth daily as needed.) 90 capsule 1    loratadine (CLARITIN) 10 mg tablet Take 10 mg by mouth daily as needed.       metoprolol succinate (TOPROL-XL) 25 MG 24 hr tablet Take 1 tablet (25 mg total) by mouth every evening. 90 tablet 3    predniSONE (DELTASONE) 10 MG  tablet Take 3 tablets a day for 4 days (1 before breakfast, 1 after lunch, 1 before bed) THEN take 2 tablets a day for 4 days (1 before breakfast, 1 before bed) THEN take 1 tablet a day for 4 days (1 before breakfast). 24 tablet 0    topiramate (TOPAMAX) 25 MG tablet Take 1 tablet (25 mg total) by mouth 2 (two) times daily. 60 tablet 11     No current facility-administered medications on file prior to visit.     Review of patient's allergies indicates:   Allergen Reactions    Codeine Other (See Comments)     violent    Iodinated contrast media Swelling     Tongue, right eye, lips swelling. Rash on abdomen and axilla    Nuts [tree nut] Anaphylaxis    Dairy aid [lactase] Diarrhea and Nausea And Vomiting    Morphine      Tolerated hydromorphone in October 2019.        Objective:     Physical Exam  Eyes:      Pupils: Pupils are equal, round, and reactive to light.   Neck:      Trachea: No tracheal deviation.   Cardiovascular:      Rate and Rhythm: Normal rate and regular rhythm.      Pulses: Intact distal pulses.           Carotid pulses are 2+ on the right side and 2+ on the left side.       Radial pulses are 2+ on the right side and 2+ on the left side.        Femoral pulses are 2+ on the right side and 2+ on the left side.       Popliteal pulses are 2+ on the right side and 2+ on the left side.        Dorsalis pedis pulses are 2+ on the right side and 2+ on the left side.        Posterior tibial pulses are 2+ on the right side and 2+ on the left side.      Heart sounds: Normal heart sounds. No murmur heard.    No friction rub. No gallop.   Pulmonary:      Effort: Pulmonary effort is normal. No respiratory distress.      Breath sounds: Normal breath sounds. No stridor. No wheezing or rales.   Chest:      Chest wall: No tenderness.   Abdominal:      General: There is no distension.      Tenderness: There is no abdominal tenderness. There is no rebound.   Musculoskeletal:         General: No tenderness.      Cervical  back: Normal range of motion.   Skin:     General: Skin is warm and dry.   Neurological:      Mental Status: He is alert and oriented to person, place, and time.     EKG on 02/08/2023 shows minor inferior ST changes which were present present in 2020.  No significant changes noted.  Assessment:     1. Mixed hyperlipidemia    2. SVT (supraventricular tachycardia)    3.     Pre op    Plan:     Mixed hyperlipidemia    SVT (supraventricular tachycardia)    Impression 1. Mixed hyperlipidemia stable   2. SVT stable on metoprolol and continues medications   3. Preop evaluation patient is stable no acute EKG changes.    Given the patient's prior evaluation 2020 will go ahead with another nuclear stress test at his convenience in the future but this will not hold up nasal procedure next week.    The patient is cleared for upcoming nasal procedure at low cardiac risk.  No acute EKG changes.

## 2023-02-14 ENCOUNTER — ANESTHESIA EVENT (OUTPATIENT)
Dept: SURGERY | Facility: HOSPITAL | Age: 48
End: 2023-02-14
Payer: COMMERCIAL

## 2023-02-14 ENCOUNTER — TELEPHONE (OUTPATIENT)
Dept: PREADMISSION TESTING | Facility: HOSPITAL | Age: 48
End: 2023-02-14
Payer: COMMERCIAL

## 2023-02-14 NOTE — ANESTHESIA PREPROCEDURE EVALUATION
02/14/2023  Dax Carlisle is a 47 y.o., male.  Past Medical History:   Diagnosis Date    Angioedema of lips 03/13/2015    IVP dye allergy - swelling lips, eye, tongue    Asthma     childhood    Depression     Diverticulitis 10/07/2019    Kidney stones     Sleep apnea     cpap not required    SVT (supraventricular tachycardia)      Past Surgical History:   Procedure Laterality Date    ABDOMINAL WASHOUT N/A 10/7/2019    Procedure: LAVAGE, PERITONEAL, THERAPEUTIC;  Surgeon: Mindy Goff MD;  Location: [facility] OR;  Service: General;  Laterality: N/A;  abdomen washout    ABSCESS DRAINAGE N/A 9/10/2021    Procedure: DRAINAGE, ABSCESS;  Surgeon: Kadeem Choi MD;  Location: [facility] OR;  Service: General;  Laterality: N/A;  perirectal    anal fistula repair      APPENDECTOMY      BACK SURGERY      CHOLECYSTECTOMY  2005    COLONOSCOPY N/A 3/10/2016    Procedure: COLONOSCOPY;  Surgeon: Juvenal Dinero MD;  Location: [facility] ENDO;  Service: Endoscopy;  Laterality: N/A;    COLONOSCOPY N/A 1/17/2020    Procedure: COLONOSCOPY;  Surgeon: Kadeem Choi MD;  Location: [facility] ENDO;  Service: General;  Laterality: N/A;    COLOSTOMY N/A 10/7/2019    Procedure: CREATION, COLOSTOMY;  Surgeon: Mindy Goff MD;  Location: [facility] OR;  Service: General;  Laterality: N/A;    COLOSTOMY REVERSAL      EXAMINATION UNDER ANESTHESIA N/A 9/10/2021    Procedure: Exam under anesthesia;  Surgeon: Kadeem Choi MD;  Location: [facility] OR;  Service: General;  Laterality: N/A;    EXAMINATION UNDER ANESTHESIA N/A 1/6/2022    Procedure: Exam under anesthesia, perirectal abscess I&D;  Surgeon: Kadeem Choi MD;  Location: [facility] OR;  Service: General;  Laterality: N/A;    SIVAKUMAR PROCEDURE N/A 10/7/2019    Procedure: SIVAKUMAR PROCEDURE;  Surgeon: Mindy Goff MD;  Location: [facility] OR;  Service:  General;  Laterality: N/A;    INJECTION OF ANESTHETIC AGENT AROUND GANGLION IMPAR N/A 3/15/2021    Procedure: BLOCK, GANGLION IMPAR;  Surgeon: Ruben Cabrera MD;  Location: [facility] PAIN MGT;  Service: Pain Management;  Laterality: N/A;    INJECTION OF ANESTHETIC AGENT AROUND PUDENDAL NERVE N/A 9/10/2021    Procedure: BLOCK, NERVE, PUDENDAL;  Surgeon: Kadeem Choi MD;  Location: [facility] OR;  Service: General;  Laterality: N/A;    INJECTION OF ANESTHETIC AGENT AROUND PUDENDAL NERVE N/A 1/6/2022    Procedure: BLOCK, NERVE, PUDENDAL;  Surgeon: Kadeem Choi MD;  Location: [facility] OR;  Service: General;  Laterality: N/A;    INJECTION OF ANESTHETIC AGENT INTO TISSUE PLANE DEFINED BY TRANSVERSUS ABDOMINIS MUSCLE N/A 1/30/2020    Procedure: BLOCK, TRANSVERSUS ABDOMINIS PLANE;  Surgeon: Kadeem Choi MD;  Location: [facility];  Service: General;  Laterality: N/A;    KNEE SURGERY Right     KNEE SURGERY Left     LYSIS OF ADHESIONS N/A 1/30/2020    Procedure: LYSIS, ADHESIONS;  Surgeon: Kadeem Choi MD;  Location: [facility];  Service: General;  Laterality: N/A;    PILONIDAL CYST DRAINAGE      pt has had 6 of these adn has had revisions    PROCTECTOMY N/A 1/30/2020    Procedure: PROCTECTOMY;  Surgeon: Kadeem Choi MD;  Location: [facility];  Service: General;  Laterality: N/A;  Partial    SUBTOTAL COLECTOMY  1/30/2020    Procedure: COLECTOMY, PARTIAL;  Surgeon: Kadeem Choi MD;  Location: [facility];  Service: General;;         Pre-op Assessment    I have reviewed the Patient Summary Reports.    I have reviewed the Nursing Notes. I have reviewed the NPO Status.   I have reviewed the Medications.     Review of Systems  Anesthesia Hx:  No problems with previous Anesthesia  Denies Family Hx of Anesthesia complications.   Denies Personal Hx of Anesthesia complications.   Social:  Non-Smoker    Cardiovascular:   Dysrhythmias hyperlipidemia ECG has been reviewed. Clr'd per cards.  Pt had w/u for palpitations  and syncope, few short runs SVT on Holter.  Asymptomatic.   Pulmonary:   Asthma asymptomatic Sleep Apnea    Education provided regarding risk of obstructive sleep apnea     Renal/:   Chronic Renal Disease renal calculi    Psych:   Psychiatric History depression          Physical Exam  General:  Obesity, Alert and Oriented      Airway/Jaw/Neck:  Airway Findings: Mouth Opening: Normal   Tongue: Normal   General Airway Assessment: Adult Mallampati: II  TM Distance: 4 - 6 cm   Jaw/Neck Findings:  Neck ROM: Normal ROM       Dental:  Dental Findings: In tact     Chest/Lungs:  Chest/Lungs Findings: Clear to auscultation, Normal Respiratory Rate      Heart/Vascular:  Heart Findings: Rate: Normal  Rhythm: Regular Rhythm  Sounds: Normal        Mental Status:  Mental Status Findings:  Cooperative, Alert and Oriented       Lab Results   Component Value Date    WBC 5.94 02/08/2023    HGB 13.6 (L) 02/08/2023    HCT 39.8 (L) 02/08/2023    MCV 87 02/08/2023     02/08/2023         Chemistry        Component Value Date/Time     02/08/2023 0133    K 4.2 02/08/2023 0133     02/08/2023 0133    CO2 23 02/08/2023 0133    BUN 17 02/08/2023 0133    CREATININE 0.8 02/08/2023 0133    GLU 85 02/08/2023 0133        Component Value Date/Time    CALCIUM 9.3 02/08/2023 0133    ALKPHOS 61 02/08/2023 0133    AST 18 02/08/2023 0133    ALT 24 02/08/2023 0133    BILITOT 0.4 02/08/2023 0133    ESTGFRAFRICA >60.0 11/10/2021 0930    EGFRNONAA >60.0 11/10/2021 0930            Anesthesia Plan  Type of Anesthesia, risks & benefits discussed:  Anesthesia Type:  general    Patient's Preference: MAC  Plan Factors:          Intra-op Monitoring Plan: standard ASA monitors  Intra-op Monitoring Plan Comments:   Post Op Pain Control Plan: per primary service following discharge from PACU  Post Op Pain Control Plan Comments:     Induction:   IV  Beta Blocker:  Patient is not currently on a Beta-Blocker (No further documentation required).        Informed Consent: Informed consent signed with the Patient and all parties understand the risks and agree with anesthesia plan.  All questions answered.  Anesthesia consent signed with patient.  ASA Score: 2     Day of Surgery Review of History & Physical:  There are no significant changes.  H&P Update referred to the surgeon/provider.          Ready For Surgery From Anesthesia Perspective.       Echo 7.28.20:  · Normal left ventricular systolic function. The estimated ejection fraction is 55%.  · Normal LV diastolic function.  · Normal right ventricular systolic function.     Nuc Stress 7.28.20:      The study shows normal myocardial perfusion.    The perfusion scan is free of evidence from myocardial ischemia or injury.    Gated perfusion images showed an ejection fraction of 67% at rest and 64% post stress.    The EKG portion of this study is negative for ischemia.    The patient reported no chest pain during the stress test.    There were no arrhythmias during stress.      Physical Exam  General: Obesity, Alert and Oriented    Airway:  Mallampati: II   Mouth Opening: Normal  TM Distance: 4 - 6 cm  Tongue: Normal  Neck ROM: Normal ROM    Dental:  In tact    Chest/Lungs:  Clear to auscultation, Normal Respiratory Rate    Heart:  Rate: Normal  Rhythm: Regular Rhythm  Sounds: Normal          Anesthesia Plan  Type of Anesthesia, risks & benefits discussed:    Anesthesia Type: general  Intra-op Monitoring Plan: standard ASA monitors  Post Op Pain Control Plan: per primary service following discharge from PACU  Induction:  IV  Informed Consent: Informed consent signed with the Patient and all parties understand the risks and agree with anesthesia plan.  All questions answered.   ASA Score: 2  Day of Surgery Review of History & Physical: H&P Update referred to the surgeon/provider.    Ready For Surgery From Anesthesia Perspective.       .

## 2023-02-14 NOTE — TELEPHONE ENCOUNTER
Called and spoke with the patient about the following:     Your Surgery arrival time is at 0530 on 2/15/2023 at [facility] location.   The address is [address] [place]  [address].      Only one adult (over 18) is to accompany you to surgery, unless it is a Pediatric patient, then 2 adults are encouraged to accompany them to the surgery center.     Your ride MUST STAY the entire time until you are discharged.      Please come to the main lobby and be prepared to show your photo ID and insurance card.      Nothing to eat or drink after midnight, unless you were instructed to take specific medications discussed with the Pre-admit Nurse.      Please call [phone] or [phone] with any questions or concerns.      Thanks.

## 2023-02-15 ENCOUNTER — TELEPHONE (OUTPATIENT)
Dept: OTOLARYNGOLOGY | Facility: CLINIC | Age: 48
End: 2023-02-15
Payer: COMMERCIAL

## 2023-02-15 ENCOUNTER — HOSPITAL ENCOUNTER (OUTPATIENT)
Facility: HOSPITAL | Age: 48
Discharge: HOME | End: 2023-02-15
Attending: STUDENT IN AN ORGANIZED HEALTH CARE EDUCATION/TRAINING PROGRAM | Admitting: STUDENT IN AN ORGANIZED HEALTH CARE EDUCATION/TRAINING PROGRAM
Payer: COMMERCIAL

## 2023-02-15 ENCOUNTER — ANESTHESIA (OUTPATIENT)
Dept: SURGERY | Facility: HOSPITAL | Age: 48
End: 2023-02-15
Payer: COMMERCIAL

## 2023-02-15 VITALS
TEMPERATURE: 99 F | RESPIRATION RATE: 15 BRPM | SYSTOLIC BLOOD PRESSURE: 124 MMHG | DIASTOLIC BLOOD PRESSURE: 81 MMHG | BODY MASS INDEX: 32.84 KG/M2 | HEART RATE: 79 BPM | OXYGEN SATURATION: 99 % | HEIGHT: 70 IN | WEIGHT: 229.38 LBS

## 2023-02-15 DIAGNOSIS — D36.9 INVERTED PAPILLOMA: Primary | ICD-10-CM

## 2023-02-15 PROCEDURE — 36000710: Performed by: STUDENT IN AN ORGANIZED HEALTH CARE EDUCATION/TRAINING PROGRAM

## 2023-02-15 PROCEDURE — 27201423 OPTIME MED/SURG SUP & DEVICES STERILE SUPPLY: Performed by: STUDENT IN AN ORGANIZED HEALTH CARE EDUCATION/TRAINING PROGRAM

## 2023-02-15 PROCEDURE — 63600175 PHARM REV CODE 636 W HCPCS: Performed by: NURSE ANESTHETIST, CERTIFIED REGISTERED

## 2023-02-15 PROCEDURE — 37000009 HC ANESTHESIA EA ADD 15 MINS: Performed by: STUDENT IN AN ORGANIZED HEALTH CARE EDUCATION/TRAINING PROGRAM

## 2023-02-15 PROCEDURE — 31288 PR NASAL/SINUS ENDOSCOPY,REMV TISS SPHENOID: ICD-10-PCS | Mod: RT,,, | Performed by: STUDENT IN AN ORGANIZED HEALTH CARE EDUCATION/TRAINING PROGRAM

## 2023-02-15 PROCEDURE — D9220A PRA ANESTHESIA: ICD-10-PCS | Mod: ,,, | Performed by: NURSE ANESTHETIST, CERTIFIED REGISTERED

## 2023-02-15 PROCEDURE — 88307 TISSUE EXAM BY PATHOLOGIST: CPT | Mod: 26,,, | Performed by: PATHOLOGY

## 2023-02-15 PROCEDURE — 88307 TISSUE EXAM BY PATHOLOGIST: CPT | Performed by: PATHOLOGY

## 2023-02-15 PROCEDURE — 25000003 PHARM REV CODE 250: Performed by: NURSE ANESTHETIST, CERTIFIED REGISTERED

## 2023-02-15 PROCEDURE — 88312 SPECIAL STAINS GROUP 1: CPT | Mod: 26,,, | Performed by: PATHOLOGY

## 2023-02-15 PROCEDURE — 63600175 PHARM REV CODE 636 W HCPCS: Performed by: ANESTHESIOLOGY

## 2023-02-15 PROCEDURE — 88312 PR  SPECIAL STAINS,GROUP I: ICD-10-PCS | Mod: 26,,, | Performed by: PATHOLOGY

## 2023-02-15 PROCEDURE — D9220A PRA ANESTHESIA: Mod: ,,, | Performed by: NURSE ANESTHETIST, CERTIFIED REGISTERED

## 2023-02-15 PROCEDURE — 71000015 HC POSTOP RECOV 1ST HR: Performed by: STUDENT IN AN ORGANIZED HEALTH CARE EDUCATION/TRAINING PROGRAM

## 2023-02-15 PROCEDURE — 25000003 PHARM REV CODE 250: Performed by: ANESTHESIOLOGY

## 2023-02-15 PROCEDURE — 71000033 HC RECOVERY, INTIAL HOUR: Performed by: STUDENT IN AN ORGANIZED HEALTH CARE EDUCATION/TRAINING PROGRAM

## 2023-02-15 PROCEDURE — 37000008 HC ANESTHESIA 1ST 15 MINUTES: Performed by: STUDENT IN AN ORGANIZED HEALTH CARE EDUCATION/TRAINING PROGRAM

## 2023-02-15 PROCEDURE — 25000003 PHARM REV CODE 250: Performed by: STUDENT IN AN ORGANIZED HEALTH CARE EDUCATION/TRAINING PROGRAM

## 2023-02-15 PROCEDURE — 88307 PR  SURG PATH,LEVEL V: ICD-10-PCS | Mod: 26,,, | Performed by: PATHOLOGY

## 2023-02-15 PROCEDURE — 61782 SCAN PROC CRANIAL EXTRA: CPT | Mod: ,,, | Performed by: STUDENT IN AN ORGANIZED HEALTH CARE EDUCATION/TRAINING PROGRAM

## 2023-02-15 PROCEDURE — 31288 NASAL/SINUS ENDOSCOPY SURG: CPT | Mod: RT,,, | Performed by: STUDENT IN AN ORGANIZED HEALTH CARE EDUCATION/TRAINING PROGRAM

## 2023-02-15 PROCEDURE — 61782 PR STEREOTACTIC COMP ASSIST PROC,CRANIAL,EXTRADURAL: ICD-10-PCS | Mod: ,,, | Performed by: STUDENT IN AN ORGANIZED HEALTH CARE EDUCATION/TRAINING PROGRAM

## 2023-02-15 PROCEDURE — 36000711: Performed by: STUDENT IN AN ORGANIZED HEALTH CARE EDUCATION/TRAINING PROGRAM

## 2023-02-15 RX ORDER — ONDANSETRON 2 MG/ML
4 INJECTION INTRAMUSCULAR; INTRAVENOUS ONCE AS NEEDED
Status: DISCONTINUED | OUTPATIENT
Start: 2023-02-15 | End: 2023-02-15 | Stop reason: HOSPADM

## 2023-02-15 RX ORDER — FENTANYL CITRATE 50 UG/ML
25 INJECTION, SOLUTION INTRAMUSCULAR; INTRAVENOUS EVERY 5 MIN PRN
Status: DISCONTINUED | OUTPATIENT
Start: 2023-02-15 | End: 2023-02-15 | Stop reason: HOSPADM

## 2023-02-15 RX ORDER — NEOSTIGMINE METHYLSULFATE 1 MG/ML
INJECTION, SOLUTION INTRAVENOUS
Status: DISCONTINUED | OUTPATIENT
Start: 2023-02-15 | End: 2023-02-15

## 2023-02-15 RX ORDER — ONDANSETRON 2 MG/ML
INJECTION INTRAMUSCULAR; INTRAVENOUS
Status: DISCONTINUED | OUTPATIENT
Start: 2023-02-15 | End: 2023-02-15

## 2023-02-15 RX ORDER — PROPOFOL 10 MG/ML
VIAL (ML) INTRAVENOUS CONTINUOUS PRN
Status: DISCONTINUED | OUTPATIENT
Start: 2023-02-15 | End: 2023-02-15

## 2023-02-15 RX ORDER — PROPOFOL 10 MG/ML
VIAL (ML) INTRAVENOUS
Status: DISCONTINUED | OUTPATIENT
Start: 2023-02-15 | End: 2023-02-15

## 2023-02-15 RX ORDER — EPINEPHRINE 1 MG/ML
INJECTION INTRAMUSCULAR; INTRAVENOUS; SUBCUTANEOUS
Status: DISCONTINUED
Start: 2023-02-15 | End: 2023-02-15 | Stop reason: HOSPADM

## 2023-02-15 RX ORDER — OXYMETAZOLINE HCL 0.05 %
SPRAY, NON-AEROSOL (ML) NASAL
Status: DISCONTINUED
Start: 2023-02-15 | End: 2023-02-15 | Stop reason: HOSPADM

## 2023-02-15 RX ORDER — HYDROCODONE BITARTRATE AND ACETAMINOPHEN 5; 325 MG/1; MG/1
1 TABLET ORAL ONCE
Status: COMPLETED | OUTPATIENT
Start: 2023-02-15 | End: 2023-02-15

## 2023-02-15 RX ORDER — ROCURONIUM BROMIDE 10 MG/ML
INJECTION, SOLUTION INTRAVENOUS
Status: DISCONTINUED | OUTPATIENT
Start: 2023-02-15 | End: 2023-02-15

## 2023-02-15 RX ORDER — DEXMEDETOMIDINE HYDROCHLORIDE 100 UG/ML
INJECTION, SOLUTION INTRAVENOUS
Status: DISCONTINUED | OUTPATIENT
Start: 2023-02-15 | End: 2023-02-15

## 2023-02-15 RX ORDER — SODIUM CHLORIDE, SODIUM LACTATE, POTASSIUM CHLORIDE, CALCIUM CHLORIDE 600; 310; 30; 20 MG/100ML; MG/100ML; MG/100ML; MG/100ML
INJECTION, SOLUTION INTRAVENOUS CONTINUOUS
Status: DISCONTINUED | OUTPATIENT
Start: 2023-02-15 | End: 2023-02-15 | Stop reason: HOSPADM

## 2023-02-15 RX ORDER — MIDAZOLAM HYDROCHLORIDE 1 MG/ML
INJECTION, SOLUTION INTRAMUSCULAR; INTRAVENOUS
Status: DISCONTINUED | OUTPATIENT
Start: 2023-02-15 | End: 2023-02-15

## 2023-02-15 RX ORDER — DIPHENHYDRAMINE HYDROCHLORIDE 50 MG/ML
25 INJECTION INTRAMUSCULAR; INTRAVENOUS EVERY 6 HOURS PRN
Status: DISCONTINUED | OUTPATIENT
Start: 2023-02-15 | End: 2023-02-15 | Stop reason: HOSPADM

## 2023-02-15 RX ORDER — DEXAMETHASONE SODIUM PHOSPHATE 4 MG/ML
INJECTION, SOLUTION INTRA-ARTICULAR; INTRALESIONAL; INTRAMUSCULAR; INTRAVENOUS; SOFT TISSUE
Status: DISCONTINUED | OUTPATIENT
Start: 2023-02-15 | End: 2023-02-15

## 2023-02-15 RX ORDER — ACETAMINOPHEN 10 MG/ML
INJECTION, SOLUTION INTRAVENOUS
Status: DISCONTINUED | OUTPATIENT
Start: 2023-02-15 | End: 2023-02-15

## 2023-02-15 RX ORDER — FENTANYL CITRATE 50 UG/ML
INJECTION, SOLUTION INTRAMUSCULAR; INTRAVENOUS
Status: DISCONTINUED | OUTPATIENT
Start: 2023-02-15 | End: 2023-02-15

## 2023-02-15 RX ORDER — MEPERIDINE HYDROCHLORIDE 25 MG/ML
12.5 INJECTION INTRAMUSCULAR; INTRAVENOUS; SUBCUTANEOUS ONCE
Status: DISCONTINUED | OUTPATIENT
Start: 2023-02-15 | End: 2023-02-15 | Stop reason: HOSPADM

## 2023-02-15 RX ORDER — SUCCINYLCHOLINE CHLORIDE 20 MG/ML
INJECTION INTRAMUSCULAR; INTRAVENOUS
Status: DISCONTINUED | OUTPATIENT
Start: 2023-02-15 | End: 2023-02-15

## 2023-02-15 RX ORDER — LIDOCAINE HYDROCHLORIDE 20 MG/ML
INJECTION INTRAVENOUS
Status: DISCONTINUED | OUTPATIENT
Start: 2023-02-15 | End: 2023-02-15

## 2023-02-15 RX ORDER — OXYMETAZOLINE HCL 0.05 %
SPRAY, NON-AEROSOL (ML) NASAL
Status: DISCONTINUED | OUTPATIENT
Start: 2023-02-15 | End: 2023-02-15 | Stop reason: HOSPADM

## 2023-02-15 RX ADMIN — SODIUM CHLORIDE, POTASSIUM CHLORIDE, SODIUM LACTATE AND CALCIUM CHLORIDE: 600; 310; 30; 20 INJECTION, SOLUTION INTRAVENOUS at 06:02

## 2023-02-15 RX ADMIN — SUCCINYLCHOLINE CHLORIDE 120 MG: 20 INJECTION, SOLUTION INTRAMUSCULAR; INTRAVENOUS; PARENTERAL at 07:02

## 2023-02-15 RX ADMIN — ROCURONIUM BROMIDE 30 MG: 10 SOLUTION INTRAVENOUS at 07:02

## 2023-02-15 RX ADMIN — FENTANYL CITRATE 50 MCG: 50 INJECTION, SOLUTION INTRAMUSCULAR; INTRAVENOUS at 08:02

## 2023-02-15 RX ADMIN — PROPOFOL 100 MCG/KG/MIN: 10 INJECTION, EMULSION INTRAVENOUS at 07:02

## 2023-02-15 RX ADMIN — LIDOCAINE HYDROCHLORIDE 75 MG: 20 INJECTION INTRAVENOUS at 07:02

## 2023-02-15 RX ADMIN — DEXAMETHASONE SODIUM PHOSPHATE 12 MG: 4 INJECTION, SOLUTION INTRA-ARTICULAR; INTRALESIONAL; INTRAMUSCULAR; INTRAVENOUS; SOFT TISSUE at 07:02

## 2023-02-15 RX ADMIN — FENTANYL CITRATE 100 MCG: 50 INJECTION, SOLUTION INTRAMUSCULAR; INTRAVENOUS at 07:02

## 2023-02-15 RX ADMIN — ONDANSETRON 4 MG: 2 INJECTION INTRAMUSCULAR; INTRAVENOUS at 07:02

## 2023-02-15 RX ADMIN — ACETAMINOPHEN 1000 MG: 10 INJECTION, SOLUTION INTRAVENOUS at 07:02

## 2023-02-15 RX ADMIN — PROPOFOL 200 MG: 10 INJECTION, EMULSION INTRAVENOUS at 07:02

## 2023-02-15 RX ADMIN — NEOSTIGMINE METHYLSULFATE 5 MG: 1 INJECTION INTRAVENOUS at 08:02

## 2023-02-15 RX ADMIN — HYDROCODONE BITARTRATE AND ACETAMINOPHEN 1 TABLET: 5; 325 TABLET ORAL at 08:02

## 2023-02-15 RX ADMIN — FENTANYL CITRATE 50 MCG: 50 INJECTION, SOLUTION INTRAMUSCULAR; INTRAVENOUS at 07:02

## 2023-02-15 RX ADMIN — DEXMEDETOMIDINE HYDROCHLORIDE 8 MCG: 100 INJECTION, SOLUTION INTRAVENOUS at 07:02

## 2023-02-15 RX ADMIN — GLYCOPYRROLATE 0.6 MG: 0.2 INJECTION, SOLUTION INTRAMUSCULAR; INTRAVITREAL at 08:02

## 2023-02-15 RX ADMIN — DEXTROSE 2 G: 50 INJECTION, SOLUTION INTRAVENOUS at 07:02

## 2023-02-15 RX ADMIN — DEXMEDETOMIDINE HYDROCHLORIDE 8 MCG: 100 INJECTION, SOLUTION INTRAVENOUS at 08:02

## 2023-02-15 RX ADMIN — ROCURONIUM BROMIDE 10 MG: 10 SOLUTION INTRAVENOUS at 07:02

## 2023-02-15 RX ADMIN — MIDAZOLAM 2 MG: 1 INJECTION INTRAMUSCULAR; INTRAVENOUS at 06:02

## 2023-02-15 NOTE — H&P
Day of Surgery History & Physical Exam    Patient Name: Dax Carlisle     Date: 2/15/2023          HPI: Dax is a 47 y.o. male who presents today for operative treatment of   1. Inverted papilloma    . No recent illnesses.          ROS:    A complete review of systems was obtained and is otherwise negative.       PMH:      Past Medical History:   Diagnosis Date    Angioedema of lips 03/13/2015    IVP dye allergy - swelling lips, eye, tongue    Asthma     childhood    Depression     Diverticulitis 10/07/2019    Kidney stones     Sleep apnea     cpap not required/ patient states this has resolved with weight loss    SVT (supraventricular tachycardia)           PSH:      Past Surgical History:   Procedure Laterality Date    ABDOMINAL WASHOUT N/A 10/7/2019    Procedure: LAVAGE, PERITONEAL, THERAPEUTIC;  Surgeon: Mindy Goff MD;  Location: [facility];  Service: General;  Laterality: N/A;  abdomen washout    ABSCESS DRAINAGE N/A 9/10/2021    Procedure: DRAINAGE, ABSCESS;  Surgeon: Kadeem Choi MD;  Location: [facility] OR;  Service: General;  Laterality: N/A;  perirectal    anal fistula repair      APPENDECTOMY      BACK SURGERY      CHOLECYSTECTOMY  2005    COLONOSCOPY N/A 3/10/2016    Procedure: COLONOSCOPY;  Surgeon: Juvenal Dinero MD;  Location: [facility] ENDO;  Service: Endoscopy;  Laterality: N/A;    COLONOSCOPY N/A 1/17/2020    Procedure: COLONOSCOPY;  Surgeon: Kadeem Choi MD;  Location: [facility] ENDO;  Service: General;  Laterality: N/A;    COLOSTOMY N/A 10/7/2019    Procedure: CREATION, COLOSTOMY;  Surgeon: Mindy Goff MD;  Location: [facility] OR;  Service: General;  Laterality: N/A;    COLOSTOMY REVERSAL      EXAMINATION UNDER ANESTHESIA N/A 9/10/2021    Procedure: Exam under anesthesia;  Surgeon: Kadeem Choi MD;  Location: [facility] OR;  Service: General;  Laterality: N/A;    EXAMINATION UNDER ANESTHESIA N/A 1/6/2022    Procedure: Exam under anesthesia, perirectal abscess I&D;  Surgeon:  Kadeem Choi MD;  Location: [facility] OR;  Service: General;  Laterality: N/A;    SIVAKUMAR PROCEDURE N/A 10/7/2019    Procedure: SIVAKUMAR PROCEDURE;  Surgeon: Mindy Goff MD;  Location: [facility] OR;  Service: General;  Laterality: N/A;    INJECTION OF ANESTHETIC AGENT AROUND GANGLION IMPAR N/A 3/15/2021    Procedure: BLOCK, GANGLION IMPAR;  Surgeon: Ruben Cabrera MD;  Location: [facility] PAIN MGT;  Service: Pain Management;  Laterality: N/A;    INJECTION OF ANESTHETIC AGENT AROUND PUDENDAL NERVE N/A 9/10/2021    Procedure: BLOCK, NERVE, PUDENDAL;  Surgeon: Kadeem Choi MD;  Location: [facility] OR;  Service: General;  Laterality: N/A;    INJECTION OF ANESTHETIC AGENT AROUND PUDENDAL NERVE N/A 1/6/2022    Procedure: BLOCK, NERVE, PUDENDAL;  Surgeon: Kadeem Choi MD;  Location: [facility] OR;  Service: General;  Laterality: N/A;    INJECTION OF ANESTHETIC AGENT INTO TISSUE PLANE DEFINED BY TRANSVERSUS ABDOMINIS MUSCLE N/A 1/30/2020    Procedure: BLOCK, TRANSVERSUS ABDOMINIS PLANE;  Surgeon: Kadeem Choi MD;  Location: [facility] OR;  Service: General;  Laterality: N/A;    KNEE SURGERY Right     KNEE SURGERY Left     LYSIS OF ADHESIONS N/A 1/30/2020    Procedure: LYSIS, ADHESIONS;  Surgeon: Kadeem Choi MD;  Location: [facility] OR;  Service: General;  Laterality: N/A;    PILONIDAL CYST DRAINAGE      pt has had 6 of these adn has had revisions    PROCTECTOMY N/A 1/30/2020    Procedure: PROCTECTOMY;  Surgeon: Kadeem Choi MD;  Location: [facility] OR;  Service: General;  Laterality: N/A;  Partial    SUBTOTAL COLECTOMY  1/30/2020    Procedure: COLECTOMY, PARTIAL;  Surgeon: Kadeem Choi MD;  Location: [facility];  Service: General;;          MEDS:        Current Facility-Administered Medications:     EPINEPHrine (ADRENALIN) 1 mg/mL injection, , , ,     fluorescein (FLUOR-I-STRIPS A.T.) 1 mg ophthalmic strip, , , ,     lactated ringers infusion, , Intravenous, Continuous, Mary Jane Verduzco MD, Last Rate: 10 mL/hr at  "02/15/23 0619, New Bag at 02/15/23 0619    LIDOcaine-EPINEPHrine (PF) 1%-1:200,000 1 %-1:200,000 injection, , , ,     oxymetazoline (AFRIN) 0.05 % nasal spray, , , ,        ALLERGIES:     Codeine, Iodinated contrast media, Nuts [tree nut], Dairy aid [lactase], and Morphine       EXAM:     Vitals: /70 (BP Location: Right arm, Patient Position: Sitting)   Pulse 82   Temp 97 °F (36.1 °C) (Temporal)   Resp 18   Ht 5' 10" (1.778 m)   Wt 104.1 kg (229 lb 6.2 oz)   SpO2 96%   BMI 32.91 kg/m²      General: Awake, at baseline alertness.      HEENT: No scleral icterus or conjunctival hemorrhage. Globe position appears normal. External ears  normal. Nose patent without rhinorrhea. No lymphadenopathy. No thyromegaly     Cardiovascular: No cyanosis.     Pulmonary: No audible stridor. Breathing easily with no labor.     Neuro: Symmetric facial movement.      Psychiatry: Appropriate affect and mood.     Skin: No scars or lesions on face or neck.     Extremities: Moves all extremities with normal range of motion.      Other Findings: None.       Assessment & Plan: Dax has diagnoses of   1. Inverted papilloma     and will go to the OR today for resection of IP. Informed consent was obtained in clinic and available in the EMR today. All questions have been answered.      "

## 2023-02-15 NOTE — OP NOTE
DATE OF PROCEDURE: 2/15/2023     PRE-OPERATIVE DIAGNOSIS:   Sinonasal inverted papilloma     POST-OPERATIVE DIAGNOSIS:   Same      PROCEDURE:   Nasal endoscopy with excision of sinonasal mass - 40672  Nasal endoscopy with right sphenoidotomy - 58662  Functional endoscopic sinus surgery with CT image guided electromagnetic system - 80664    SURGEON:   Uriel Vasquez MD     ANESTHESIA:   General endotracheal anesthesia        ESTIMATED BLOOD LOSS:   10 mL      SPECIMENS:   Right sinonasal mass      COMPLICATIONS:   None        OPERATIVE INDICATIONS:   Dax Carlisle is a 47 y.o. male with a history of right sinonasal inverted papilloma. He presents today for endoscopic sinus surgery with resection of the mass.       OPERATIVE FINDINGS:   - large right sinonasal inverted papilloma stalked along the face of the right sphenoid removed in its entirety and the base removed with a combination of sphenoidotomy to widen the ostium and remove the face of the sphenoid and cauterization of mucosal edges to prevent recurrence      OPERATIVE DETAILS:   Anesthesia and Prep -- After being properly identified in the preoperative holding area, the patient was brought into the operating suite. The patient was placed supine on the operating table. A pre-procedural time-out was performed. Pre-operative antibiotics and steroids were administered. After induction of general anesthesia, the patient was successfully intubated with confirmation of tube placement via CO2 return. The bed was then turned 180 degrees. Eyes were taped closed with steristrips. Pledgets soaked in 1:1000 fluorescein dyed epinephrine were placed in each nostril. The face was draped sterilely.       Image Guidance Registration and Prep -- The registration sticker for the [employer] image guidance system was placed on the forehead. The face was registered with good correlation. Landmarks were checked with the probe which showed satisfactory accuracy.      Right  nasal endoscopy with resection of sinonasal mass - The pledgets were removed and a 0 degree endoscope was utilized to examine both nasal cavities. 1% lidocaine with 1:100,000 epinephrine was injected into the axilla of the middle turbinate, the SPA region, and the mass. The middle turbinate was lateralized with a freer. The mass was removed piece meal until only the base of the mass along face of the sphenoid remained. The specimen was sent for permanent pathology.       Right nasal endoscopy with sphenoidotomy -- Using the microdebrider, the inferior one-third of the superior turbinate was removed with the microdebrider, further allowing exposure of the stalk of the mass along the face of the sphenoid. Proceeding between the superior turbinate and the septum, the sphenoid ostium was identified. A mushroom punch was inserted and used to widen the ostium. The lateral face of the sphenoid was removed where the mass was stalked. The mucosal edges were the cauterized with the suction bovie. A 30 degree telescope was used to ensure there was no further evidence of abnormal mucosa superiorly and laterally.    Conclusion -- Both sides were inspected and no orbital fat or evidence of cerebral spinal fluid leak were observed. Both sides were irrigated and clot removed.      A flexible suction catheter was used to remove fluid and blood from the oropharynx and hypopharynx.     Steri strips were removed from the eyelids and the eyes were checked for tension or proptosis, none was observed. The image guidance sticker was removed. The patient's skin was cleaned. The patient was returned to the care of the anesthesia team. They were then weaned from the anesthetic and transported to the PACU in stable condition.

## 2023-02-15 NOTE — ANESTHESIA PROCEDURE NOTES
Intubation    Date/Time: 2/15/2023 7:05 AM  Performed by: Peyton Palacios CRNA  Authorized by: Mary Jane Verduzco MD     Intubation:     Induction:  Intravenous    Intubated:  Postinduction    Mask Ventilation:  Easy mask    Attempts:  1    Attempted By:  CRNA    Method of Intubation:  Direct    Blade:  Boswell 2    Laryngeal View Grade: Grade I - full view of cords      Difficult Airway Encountered?: No      Complications:  None    Airway Device:  Oral endotracheal tube    Airway Device Size:  7.5    Style/Cuff Inflation:  Cuffed (inflated to minimal occlusive pressure)    Tube secured:  22    Secured at:  The lips    Placement Verified By:  Capnometry    Complicating Factors:  None    Findings Post-Intubation:  BS equal bilateral

## 2023-02-15 NOTE — TELEPHONE ENCOUNTER
Detailed message left advising of recommended 1 week follow up post FESS. Advised that he will see the reflection of his appointments in my chart. Scheduled for 02/27/23 at the [facility] location at 1:15 pm.

## 2023-02-15 NOTE — TRANSFER OF CARE
"Anesthesia Transfer of Care Note    Patient: Dax Carlisle    Procedure(s) Performed: Procedure(s) (LRB):  FESS, USING COMPUTER-ASSISTED NAVIGATION (Right)    Patient location: PACU    Anesthesia Type: general    Transport from OR: Transported from OR on room air with adequate spontaneous ventilation    Post pain: adequate analgesia    Post assessment: no apparent anesthetic complications and tolerated procedure well    Post vital signs: stable    Level of consciousness: awake    Nausea/Vomiting: no nausea/vomiting    Complications: none    Transfer of care protocol was followed      Last vitals:   Visit Vitals  /70 (BP Location: Right arm, Patient Position: Sitting)   Pulse 82   Temp 36.1 °C (97 °F) (Temporal)   Resp 18   Ht 5' 10" (1.778 m)   Wt 104.1 kg (229 lb 6.2 oz)   SpO2 96%   BMI 32.91 kg/m²     "

## 2023-02-15 NOTE — PLAN OF CARE
Discussed current POC and discharge instructions with patient and spouse  
Dr. Vasquez notified that patient reports having jock itch and is currently being treated with diflucan. No new orders.   
Patient prepped for surgery. Family at bedside.   
Pt lying in bed in NAD,VSS,RR equal and unlabored. Bed is locked and low. Side rails up x 2.   
Yes

## 2023-02-15 NOTE — TELEPHONE ENCOUNTER
----- Message from Uriel Vasquez MD sent at 2/15/2023  8:27 AM CST -----  Regarding: post op FESS  Please schedule post op FESS for 1 week    Thanks!

## 2023-02-15 NOTE — ANESTHESIA POSTPROCEDURE EVALUATION
Anesthesia Post Evaluation    Patient: Dax Carlisle    Procedure(s) Performed: Procedure(s) (LRB):  FESS, USING COMPUTER-ASSISTED NAVIGATION (Right)    Final Anesthesia Type: general      Patient location during evaluation: PACU  Patient participation: Yes- Able to Participate  Level of consciousness: awake and alert and oriented  Post-procedure vital signs: reviewed and stable  Pain management: adequate  Airway patency: patent    PONV status at discharge: No PONV  Anesthetic complications: no      Cardiovascular status: blood pressure returned to baseline, stable and hemodynamically stable  Respiratory status: unassisted  Hydration status: euvolemic  Follow-up not needed.          Vitals Value Taken Time   /81 02/15/23 0900   Temp 37 °C (98.6 °F) 02/15/23 0900   Pulse 79 02/15/23 0900   Resp 15 02/15/23 0900   SpO2 89 % 02/15/23 0900   Vitals shown include unvalidated device data.      Event Time   Out of Recovery 08:55:39         Pain/Corie Score: Pain Rating Prior to Med Admin: 6 (2/15/2023  8:38 AM)  Corie Score: 10 (2/15/2023  9:00 AM)

## 2023-02-15 NOTE — BRIEF OP NOTE
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]    Procedure(s) (LRB):  FESS, USING COMPUTER-ASSISTED NAVIGATION (Right)    Anesthesia: General    Findings/Key Components:  see op note    Estimated Blood Loss: 10 mL         Specimens:   Specimen (24h ago, onward)       Start     Ordered    02/15/23 0754  Specimen to Pathology, Surgery ENT  Once        Comments: Pre-op Diagnosis: 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 AYO ALCOCER    Release to patient Immediate        02/15/23 0754                    Discharge Note    SUMMARY     Admit Date: 2/15/2023    Discharge Date and Time: No discharge date for patient encounter.    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  Qty: 90 tablet, Refills: 3    Associated Diagnoses: Major depressive disorder with single episode, in remission; Attention deficit disorder (ADD) without hyperactivity      cyclobenzaprine (FLEXERIL) 10 MG tablet Take 1 tablet (10 mg total) by mouth 3 (three) times daily as  needed for Muscle spasms.  Qty: 90 tablet, Refills: 0    Associated Diagnoses: Chronic pain syndrome      dextroamphetamine-amphetamine 10 mg Tab Take 1 tablet (10 mg total) by mouth 2 (two) times daily as needed (for ADHD).  Qty: 60 tablet, Refills: 0    Associated Diagnoses: Attention deficit disorder (ADD) without hyperactivity      EScitalopram oxalate (LEXAPRO) 20 MG tablet Take 1 tablet (20 mg total) by mouth every evening.  Qty: 90 tablet, Refills: 3    Associated Diagnoses: Major depressive disorder with single episode, in remission; Attention deficit disorder (ADD) without hyperactivity      HYDROcodone-acetaminophen (NORCO) 7.5-325 mg per tablet Take 1 tablet by mouth every 6 (six) hours as needed for Pain.  Qty: 120 tablet, Refills: 0    Comments: Quantity prescribed more than 7 day supply? Yes, quantity medically necessary  Associated Diagnoses: Chronic pain syndrome      linaclotide 290 mcg Cap Take 1 capsule (290 mcg total) by mouth once daily.  Qty: 90 capsule, Refills: 1      metoprolol succinate (TOPROL-XL) 25 MG 24 hr tablet Take 1 tablet (25 mg total) by mouth every evening.  Qty: 90 tablet, Refills: 3    Associated Diagnoses: SVT (supraventricular tachycardia)      topiramate (TOPAMAX) 25 MG tablet Take 1 tablet (25 mg total) by mouth 2 (two) times daily.  Qty: 60 tablet, Refills: 11      acetaminophen (TYLENOL) 325 MG tablet Take 325 mg by mouth every 6 (six) hours as needed for Pain.      albuterol (VENTOLIN HFA) 90 mcg/actuation inhaler Inhale 2 puffs into the lungs every 6 (six) hours as needed for Wheezing. Rescue  Qty: 8.5 g, Refills: 0    Associated Diagnoses: Acute bronchitis, unspecified organism; Wheezing      azelastine (ASTELIN) 137 mcg (0.1 %) nasal spray Use Two sprays intranasal twice a day  Qty: 30 mL, Refills: 0      fluconazole (DIFLUCAN) 150 MG Tab Take 1 tablet (150 mg total) by mouth once a week. for 8 doses  Qty: 4 tablet, Refills: 1    Associated Diagnoses: Tinea cruris;  Tinea pedis, unspecified laterality      loratadine (CLARITIN) 10 mg tablet Take 10 mg by mouth daily as needed.       predniSONE (DELTASONE) 10 MG tablet Take 3 tablets a day for 4 days (1 before breakfast, 1 after lunch, 1 before bed) THEN take 2 tablets a day for 4 days (1 before breakfast, 1 before bed) THEN take 1 tablet a day for 4 days (1 before breakfast).  Qty: 24 tablet, Refills: 0           No discharge procedures on file.

## 2023-02-15 NOTE — PATIENT INSTRUCTIONS
Sinus Surgery  Discharge Instructions:     - Start saline irrigations as soon as you are not having any further bloody oozing. This may take a day or two. Please wait to resume other nasal sprays.    - You may have bloody mucous from your nose for a few days. This is expected. Please call if there is significant bleeding.   - No nose blowing, picking, strenuous activity, straining, or use of a straw. Sleep with the head of the bed elevated.    - if you need to sneeze, do so with your mouth open.    Activity/Restrictions:    - Avoid heavy lifting, straining or strenuous activities until instructed otherwise     Diet:   - Resume your regular diet, as tolerated     Pain Instructions:   - Try using acetaminophen/Tylenol and ibuprofen/Motrin to control your pain. If this does not bring you relief or the pain remains severe, a stronger pain medication has been prescribed to you.   - DO NOT MIX NARCOTIC PAIN MEDICATIONS OR TAKE NARCOTIC PRESCRIPTIONS AT THE SAME TIME (PERCODET, LORTAB, ROXICODONE, ETC.)   - DO NOT DRIVE OR OPERATE HEAVY MACHINERY WHILE ON NARCOTICS    - DO NOT TAKE MORE THAN 4 GRAMS (4000mg) OF TYLENOL (ACETAMINOPHEN) IN 24 HOURS     Follow Up:    - A follow up appointment will be scheduled for you in ~1 week. If you have not heard from our schedulers in 3 business days please call our clinic using the numbers provided below.     Call your doctor or go to the emergency room if you have:    - Fever of 101.5 degrees or higher   - Severe pain that has increased greatly since your surgery or is uncontrolled by your current pain medications   - Heavy nose bleeding with bright red blood or large blood clots  - Nausea and vomiting that does not go away   - Chest pain/shortness of breath   - Any other acute events, problems, or concerns     For any questions, please call our clinic our leave us a My Chart message. [facility]: [phone], then ask for ENT Clinic.   For after hours questions and/or  urgent concerns, call the same number above ([phone]) and ask for the on-call ENT physician.

## 2023-02-15 NOTE — DISCHARGE INSTRUCTIONS
DEPARTMENT OF OTOLARYNGOLOGY, HEAD AND NECK SURGERY     MD Kassie Baker MD Duncan Hanby, MD Amy Rabalais, MD John Wood, MD     CONTACT   PHONE:   [phone] [address] [place] [address]           Patient Instructions After Nasal and/or Sinus Surgery      What to expect after surgery:     Pain/Sore throat:  This is normal after sinus surgery, take your prescribed pain medication as needed.     Fever:  This may happen during the first 1-2 days after surgery.  If you have a temperature greater than 101 that does not respond to treatment with your oral pain medication/Tylenol, notify your MD     Facial swelling/nasal congestion:  It is common to have some swelling of your nose and face after nasal/sinus surgery.  You also will likely have splints and/or packing in your nose that will make your nasal breathing more difficult. Your physician will remove these splints at your postoperative visit.    Bloody drainage:  Patients having nasal and/or sinus surgery typically will have some bloody drainage from their nose for 2-3 days after surgery.  If you notice significant or bright red bleeding, contact your physician.     Diet:     In general, patients can resume a normal diet after nasal and/or sinus surgery          Activity:     Avoid any strenuous activity, sports, heavy lifting     Anything that raises your blood pressure significantly can increase your chance of bleeding after surgery.     No nose blowing, picking your nose and avoid any nasal trauma     Cough/sneeze only with mouth open     Elevate the head of your bed about 20-30 degrees for the first week after surgery     Start saline irrigation on the afternoon after your surgery, you can irrigate every 2-3 hours and also as needed.  This will help keep your splints and/or nasal passages more clear.     Wait until advised by your surgeon to start or restart any other nasal sprays.     Medication:     Pain  medication is often necessary after this surgery.  Depending on your age, your physician may or may not have prescribed a pain medication.     You can alternate your prescription pain medicine and ibuprofen every 3 hours as needed for pain.     It is VERY important that you do not take your prescription pain medicine AND additional Tylenol since your prescription pain medicine already has Tylenol in it.     Do not drive or operate heavy machinery while on narcotic (prescription) pain medication     Do not take more than 4000mg of Tylenol in 24 hours as this can have SEVERE health consequences including liver failure and death.        Reasons to Call your surgeon:     Persistent fever of 101.5 or higher   Severe pain that has increased greatly since the surgery or is uncontrolled by your prescription pain medication.   If you have significant amounts of bleeding, you should contact your physician and make arrangements to be evaluated in the emergency room.   Persistent nausea and/or vomiting   Chest pain or persistent shortness of breath   Any other significant concerns

## 2023-02-16 DIAGNOSIS — F98.8 ATTENTION DEFICIT DISORDER (ADD) WITHOUT HYPERACTIVITY: ICD-10-CM

## 2023-02-16 DIAGNOSIS — G89.4 CHRONIC PAIN SYNDROME: ICD-10-CM

## 2023-02-16 RX ORDER — HYDROCODONE BITARTRATE AND ACETAMINOPHEN 7.5; 325 MG/1; MG/1
1 TABLET ORAL EVERY 6 HOURS PRN
Qty: 120 TABLET | Refills: 0 | Status: SHIPPED | OUTPATIENT
Start: 2023-02-16 | End: 2023-03-23 | Stop reason: SDUPTHER

## 2023-02-16 RX ORDER — DEXTROAMPHETAMINE SACCHARATE, AMPHETAMINE ASPARTATE, DEXTROAMPHETAMINE SULFATE AND AMPHETAMINE SULFATE 2.5; 2.5; 2.5; 2.5 MG/1; MG/1; MG/1; MG/1
10 TABLET ORAL 2 TIMES DAILY PRN
Qty: 60 TABLET | Refills: 0 | Status: SHIPPED | OUTPATIENT
Start: 2023-02-16 | End: 2023-03-23 | Stop reason: SDUPTHER

## 2023-02-16 RX ORDER — CYCLOBENZAPRINE HCL 10 MG
10 TABLET ORAL 3 TIMES DAILY PRN
Qty: 90 TABLET | Refills: 0 | Status: SHIPPED | OUTPATIENT
Start: 2023-02-16 | End: 2023-05-22 | Stop reason: SDUPTHER

## 2023-02-16 NOTE — TELEPHONE ENCOUNTER
No new care gaps identified.  [facility] Embedded Care Gaps. Reference number: [id number]. 2/16/2023   10:59:40 AM CST

## 2023-02-23 LAB
FINAL PATHOLOGIC DIAGNOSIS: NORMAL
Lab: NORMAL

## 2023-02-27 ENCOUNTER — OFFICE VISIT (OUTPATIENT)
Dept: OTOLARYNGOLOGY | Facility: CLINIC | Age: 48
End: 2023-02-27
Payer: COMMERCIAL

## 2023-02-27 VITALS — WEIGHT: 234.13 LBS | BODY MASS INDEX: 33.59 KG/M2 | TEMPERATURE: 98 F

## 2023-02-27 DIAGNOSIS — D36.9 INVERTED PAPILLOMA: Primary | ICD-10-CM

## 2023-02-27 PROCEDURE — 99999 PR PBB SHADOW E&M-EST. PATIENT-LVL II: CPT | Mod: PBBFAC,,, | Performed by: STUDENT IN AN ORGANIZED HEALTH CARE EDUCATION/TRAINING PROGRAM

## 2023-02-27 PROCEDURE — 31237 NSL/SINS NDSC SURG BX POLYPC: CPT | Mod: RT,S$GLB,, | Performed by: STUDENT IN AN ORGANIZED HEALTH CARE EDUCATION/TRAINING PROGRAM

## 2023-02-27 PROCEDURE — 3008F BODY MASS INDEX DOCD: CPT | Mod: CPTII,S$GLB,, | Performed by: STUDENT IN AN ORGANIZED HEALTH CARE EDUCATION/TRAINING PROGRAM

## 2023-02-27 PROCEDURE — 31237 PR NASAL/SINUS ENDOSCOPY,BX/RMV POLYP/DEBRID: ICD-10-PCS | Mod: RT,S$GLB,, | Performed by: STUDENT IN AN ORGANIZED HEALTH CARE EDUCATION/TRAINING PROGRAM

## 2023-02-27 PROCEDURE — 3008F PR BODY MASS INDEX (BMI) DOCUMENTED: ICD-10-PCS | Mod: CPTII,S$GLB,, | Performed by: STUDENT IN AN ORGANIZED HEALTH CARE EDUCATION/TRAINING PROGRAM

## 2023-02-27 PROCEDURE — 99024 PR POST-OP FOLLOW-UP VISIT: ICD-10-PCS | Mod: S$GLB,,, | Performed by: STUDENT IN AN ORGANIZED HEALTH CARE EDUCATION/TRAINING PROGRAM

## 2023-02-27 PROCEDURE — 99024 POSTOP FOLLOW-UP VISIT: CPT | Mod: S$GLB,,, | Performed by: STUDENT IN AN ORGANIZED HEALTH CARE EDUCATION/TRAINING PROGRAM

## 2023-02-27 PROCEDURE — 99999 PR PBB SHADOW E&M-EST. PATIENT-LVL II: ICD-10-PCS | Mod: PBBFAC,,, | Performed by: STUDENT IN AN ORGANIZED HEALTH CARE EDUCATION/TRAINING PROGRAM

## 2023-02-27 NOTE — PROGRESS NOTES
Referring Provider:    No referring provider defined for this encounter.  Subjective:   Patient: Dax Carlisle [id number], :1975   Visit date:2023 11:37 AM    Chief Complaint:  Follow-up (P/o FESS)      HPI:    Prior notes by myself reviewed  Clinical documentation obtained by nursing staff reviewed.       Chronic eustachian tube dysfunction AS for many years.  PET placed 2018.  Was stable until about 1 month ago.  No change with Augmentin.       Update 22  Most recently for the last 2 months or so. Feels nasal obstruction and feels there is a mass in the right nasal airway. Feels something flapping. Also with hyposmia.     Denies facial pressure, purulent rhinorrhea.    No treatment.     Endorses recurrent sinus infections. 2 per year.     Endorses year round allergies. Claritin and astelin regularly. Had allergy testing as a child - pan allergic.    Update 22    Augmentin/prednisone taper completed.   No significant change in right nasal obstruction and sensation of a mass.     Update 23  Doing well.   Minimal bleeding POD0. No longer feels mas in the right nare. No nasal obstruction.       Objective:     Physical Exam:  Vitals:  Temp 97.8 °F (36.6 °C) (Temporal)   Wt 106.2 kg (234 lb 2.1 oz)   BMI 33.59 kg/m²   General appearance:  Well developed, well nourished    Nose:  No masses/lesions of external nose, nasal mucosa, septum, and turbinates were within normal limits.    Mouth:  No mass/lesion of lips, teeth, gums, hard/soft palate, tongue, tonsils, or oropharynx.    Neck & Lymphatics:  No cervical lymphadenopathy, no neck mass/crepitus/ asymmetry, trachea is midline, no thyroid enlargement/tenderness/mass.        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   - Additional respiratory mucosa with submucosal edema and increased   eosinophils (up to 50 eosinophils per high power field, max count).   - No fungal organisms identified by GMS stain.   - Negative for dysplasia/carcinoma.   COMMENT:   This case was also reviewed by Cathleen Hanson M.D., who concurs with the   diagnosis.     Procedure Note - Rigid Nasal Endoscopy with debridement    Surgeon: Uriel Vasquez MD  Anesthesia: topical oxymetazoline and 4% lidocaine.    Indication: Dax Carlisle is a 47 y.o. male underwent recent sinus surgery. Debridement was indicated to remove crusting and clot and visualize at the surgical site.    Technique: The nose was sprayed with oxymetazoline and 4% lidocaine. With the patient in the upright position, a 2.7mm 30-degree endscope was inserted into the patient's right and left nare.  Where visible, nasal secretions and mucosal crusting were removed with a suction. The overall appearance of the nasal cavity and paranasal sinuses were noted and the findings are described below.     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 Healing well.  No evidence of recurrence  Continue saline   Return to clinic 3-4 weeks for recheck

## 2023-03-16 DIAGNOSIS — E78.2 MIXED HYPERLIPIDEMIA: ICD-10-CM

## 2023-03-16 DIAGNOSIS — I47.10 SVT (SUPRAVENTRICULAR TACHYCARDIA): Primary | ICD-10-CM

## 2023-03-17 ENCOUNTER — TELEPHONE (OUTPATIENT)
Dept: CARDIOLOGY | Facility: HOSPITAL | Age: 48
End: 2023-03-17
Payer: COMMERCIAL

## 2023-03-19 DIAGNOSIS — F98.8 ATTENTION DEFICIT DISORDER (ADD) WITHOUT HYPERACTIVITY: ICD-10-CM

## 2023-03-19 DIAGNOSIS — G89.4 CHRONIC PAIN SYNDROME: ICD-10-CM

## 2023-03-19 NOTE — TELEPHONE ENCOUNTER
No new care gaps identified.  [facility] Embedded Care Gaps. Reference number: [id number]. 3/19/2023   8:32:26 AM CDT

## 2023-03-20 RX ORDER — DEXTROAMPHETAMINE SACCHARATE, AMPHETAMINE ASPARTATE, DEXTROAMPHETAMINE SULFATE AND AMPHETAMINE SULFATE 2.5; 2.5; 2.5; 2.5 MG/1; MG/1; MG/1; MG/1
10 TABLET ORAL 2 TIMES DAILY PRN
Qty: 60 TABLET | Refills: 0 | OUTPATIENT
Start: 2023-03-20

## 2023-03-20 RX ORDER — HYDROCODONE BITARTRATE AND ACETAMINOPHEN 7.5; 325 MG/1; MG/1
1 TABLET ORAL EVERY 6 HOURS PRN
Qty: 120 TABLET | Refills: 0 | OUTPATIENT
Start: 2023-03-20

## 2023-03-20 RX ORDER — CYCLOBENZAPRINE HCL 10 MG
10 TABLET ORAL 3 TIMES DAILY PRN
Qty: 90 TABLET | Refills: 0 | OUTPATIENT
Start: 2023-03-20 | End: 2023-04-19

## 2023-03-20 NOTE — TELEPHONE ENCOUNTER
Refill denied.   Please call the patient and schedule an appointment for any refills  Can be telemedicine if needed   Can double book if needed

## 2023-03-23 ENCOUNTER — OFFICE VISIT (OUTPATIENT)
Dept: INTERNAL MEDICINE | Facility: CLINIC | Age: 48
End: 2023-03-23
Payer: COMMERCIAL

## 2023-03-23 VITALS
SYSTOLIC BLOOD PRESSURE: 126 MMHG | BODY MASS INDEX: 33.7 KG/M2 | WEIGHT: 235.44 LBS | HEART RATE: 87 BPM | TEMPERATURE: 97 F | DIASTOLIC BLOOD PRESSURE: 72 MMHG | HEIGHT: 70 IN | OXYGEN SATURATION: 99 %

## 2023-03-23 DIAGNOSIS — F98.8 ATTENTION DEFICIT DISORDER (ADD) WITHOUT HYPERACTIVITY: ICD-10-CM

## 2023-03-23 DIAGNOSIS — G89.4 CHRONIC PAIN SYNDROME: Primary | ICD-10-CM

## 2023-03-23 DIAGNOSIS — F32.5 MAJOR DEPRESSIVE DISORDER WITH SINGLE EPISODE, IN REMISSION: Chronic | ICD-10-CM

## 2023-03-23 PROCEDURE — 3078F DIAST BP <80 MM HG: CPT | Mod: CPTII,S$GLB,, | Performed by: FAMILY MEDICINE

## 2023-03-23 PROCEDURE — 3074F SYST BP LT 130 MM HG: CPT | Mod: CPTII,S$GLB,, | Performed by: FAMILY MEDICINE

## 2023-03-23 PROCEDURE — 1160F RVW MEDS BY RX/DR IN RCRD: CPT | Mod: CPTII,S$GLB,, | Performed by: FAMILY MEDICINE

## 2023-03-23 PROCEDURE — 3074F PR MOST RECENT SYSTOLIC BLOOD PRESSURE < 130 MM HG: ICD-10-PCS | Mod: CPTII,S$GLB,, | Performed by: FAMILY MEDICINE

## 2023-03-23 PROCEDURE — 99214 OFFICE O/P EST MOD 30 MIN: CPT | Mod: S$GLB,,, | Performed by: FAMILY MEDICINE

## 2023-03-23 PROCEDURE — 3008F BODY MASS INDEX DOCD: CPT | Mod: CPTII,S$GLB,, | Performed by: FAMILY MEDICINE

## 2023-03-23 PROCEDURE — 99999 PR PBB SHADOW E&M-EST. PATIENT-LVL III: ICD-10-PCS | Mod: PBBFAC,,, | Performed by: FAMILY MEDICINE

## 2023-03-23 PROCEDURE — 1159F MED LIST DOCD IN RCRD: CPT | Mod: CPTII,S$GLB,, | Performed by: FAMILY MEDICINE

## 2023-03-23 PROCEDURE — 99999 PR PBB SHADOW E&M-EST. PATIENT-LVL III: CPT | Mod: PBBFAC,,, | Performed by: FAMILY MEDICINE

## 2023-03-23 PROCEDURE — 3008F PR BODY MASS INDEX (BMI) DOCUMENTED: ICD-10-PCS | Mod: CPTII,S$GLB,, | Performed by: FAMILY MEDICINE

## 2023-03-23 PROCEDURE — 3078F PR MOST RECENT DIASTOLIC BLOOD PRESSURE < 80 MM HG: ICD-10-PCS | Mod: CPTII,S$GLB,, | Performed by: FAMILY MEDICINE

## 2023-03-23 PROCEDURE — 1160F PR REVIEW ALL MEDS BY PRESCRIBER/CLIN PHARMACIST DOCUMENTED: ICD-10-PCS | Mod: CPTII,S$GLB,, | Performed by: FAMILY MEDICINE

## 2023-03-23 PROCEDURE — 1159F PR MEDICATION LIST DOCUMENTED IN MEDICAL RECORD: ICD-10-PCS | Mod: CPTII,S$GLB,, | Performed by: FAMILY MEDICINE

## 2023-03-23 PROCEDURE — 99214 PR OFFICE/OUTPT VISIT, EST, LEVL IV, 30-39 MIN: ICD-10-PCS | Mod: S$GLB,,, | Performed by: FAMILY MEDICINE

## 2023-03-23 RX ORDER — HYDROCODONE BITARTRATE AND ACETAMINOPHEN 7.5; 325 MG/1; MG/1
1 TABLET ORAL EVERY 6 HOURS PRN
Qty: 120 TABLET | Refills: 0 | Status: SHIPPED | OUTPATIENT
Start: 2023-03-23 | End: 2023-04-24 | Stop reason: SDUPTHER

## 2023-03-23 RX ORDER — DEXTROAMPHETAMINE SACCHARATE, AMPHETAMINE ASPARTATE, DEXTROAMPHETAMINE SULFATE AND AMPHETAMINE SULFATE 2.5; 2.5; 2.5; 2.5 MG/1; MG/1; MG/1; MG/1
10 TABLET ORAL 2 TIMES DAILY PRN
Qty: 60 TABLET | Refills: 0 | Status: SHIPPED | OUTPATIENT
Start: 2023-03-23 | End: 2023-03-23

## 2023-03-23 RX ORDER — DEXTROAMPHETAMINE SACCHARATE, AMPHETAMINE ASPARTATE, DEXTROAMPHETAMINE SULFATE AND AMPHETAMINE SULFATE 5; 5; 5; 5 MG/1; MG/1; MG/1; MG/1
1 TABLET ORAL 2 TIMES DAILY
Qty: 30 TABLET | Refills: 0 | Status: SHIPPED | OUTPATIENT
Start: 2023-03-23 | End: 2023-03-23 | Stop reason: SDUPTHER

## 2023-03-23 RX ORDER — DEXTROAMPHETAMINE SACCHARATE, AMPHETAMINE ASPARTATE, DEXTROAMPHETAMINE SULFATE AND AMPHETAMINE SULFATE 5; 5; 5; 5 MG/1; MG/1; MG/1; MG/1
TABLET ORAL
Qty: 30 TABLET | Refills: 0 | Status: SHIPPED | OUTPATIENT
Start: 2023-03-23 | End: 2023-04-24 | Stop reason: SDUPTHER

## 2023-03-23 NOTE — PROGRESS NOTES
Subjective:   Patient ID: Dax Carlisle is a 47 y.o. male.  Chief Complaint:  Medication Refill    Patient presents follow-up on ADD, chronic pain, and MDD     Last visit November 2022 for annual physical exam  CBC test shows a stable mild anemia. Take a daily multivitamin with iron.   Sugar, Kidney, Liver, and Electrolyte tests are all normal.  Cholesterol tests are normal. 10-year risk of a heart disease or stroke is 2%. Aspirin or Statin cholesterol medications are not recommended.  A1c is in a normal range. No Prediabtes or Diabetes  Thyroid testing is normal.  Okay to continue all current medications   Recheck labs 1 year    Since last visit underwent successful removal of nasal/sinus lesion by ENT    - ADHD  On Adderall 10 mg twice a day to help with focus/ADD/ADHD related issues despite compliance with his other mental health medications  Effective with symptoms controlled on present medication and dose helping compensate for deficits at work  Duration is appropriate.    No mood instability, tics, or disordered sleep, or other apparent adverse effects.  No increased blood pressure, heart, or other cardiovascular side effects.    Tolerating current treatment well.   No behaviors to suggest inappropriate use of prescribed medications.  [place] Board [employer] Controlled Prescription Drug Monitoring database was queried and showed no activity to suggest abuse, diversion, or other inappropriate use of prescription medications.      - Chronic Pain  On Norco 7.5/325 mg every 6 hours as needed.  Averages 120 pills per month.  No behaviors to suggest inappropriate use of prescribed medications.  [place] Board [employer] Controlled Prescription Drug Monitoring database was queried and showed no activity to suggest abuse, diversion, or other inappropriate use of prescription medications.      - Major depressive disorder  On Abilify 10 mg daily and Lexapro 20 mg daily   Remains stable, no side effects,  "mood and symptoms well controlled on present medication     No new complaints or concerns today     Review of Systems   Constitutional:  Negative for activity change, appetite change, chills, fatigue, fever and unexpected weight change.   Eyes:  Negative for visual disturbance.   Respiratory:  Negative for chest tightness and shortness of breath.    Cardiovascular:  Negative for chest pain, palpitations and leg swelling.   Gastrointestinal:  Negative for abdominal pain, constipation, diarrhea, nausea and vomiting.   Genitourinary:  Negative for decreased urine volume, difficulty urinating, dysuria, flank pain, frequency, hematuria and urgency.   Musculoskeletal:  Positive for arthralgias, back pain and myalgias. Negative for gait problem, joint swelling, neck pain and neck stiffness.   Skin:  Negative for rash.   Neurological:  Negative for dizziness, tremors, syncope, weakness, light-headedness, numbness and headaches.   Psychiatric/Behavioral:  Negative for agitation, behavioral problems, confusion, decreased concentration, dysphoric mood, hallucinations, self-injury, sleep disturbance and suicidal ideas. The patient is not nervous/anxious and is not hyperactive.      Objective:   /72 (BP Location: Left arm, Patient Position: Sitting, BP Method: Large (Manual))   Pulse 87   Temp 97.4 °F (36.3 °C) (Tympanic)   Ht 5' 10" (1.778 m)   Wt 106.8 kg (235 lb 7.2 oz)   SpO2 99%   BMI 33.78 kg/m²     Physical Exam  Vitals and nursing note reviewed.   Constitutional:       General: He is not in acute distress.     Appearance: Normal appearance. He is well-developed. He is obese.   Cardiovascular:      Rate and Rhythm: Normal rate and regular rhythm.   Pulmonary:      Effort: Pulmonary effort is normal. No tachypnea, accessory muscle usage or respiratory distress.   Musculoskeletal:      Right lower leg: No edema.      Left lower leg: No edema.   Skin:     Findings: No rash.   Neurological:      Mental Status: He " is alert.      Coordination: Coordination is intact.      Gait: Gait is intact.   Psychiatric:         Attention and Perception: Attention normal.         Mood and Affect: Mood normal.         Speech: Speech normal.         Behavior: Behavior normal.         Thought Content: Thought content normal.         Cognition and Memory: Cognition normal.       Assessment:       ICD-10-CM ICD-9-CM   1. Chronic pain syndrome  G89.4 338.4   2. Attention deficit disorder (ADD) without hyperactivity  F98.8 314.00   3. Major depressive disorder with single episode, in remission  F32.5 296.25     Plan:   Chronic pain syndrome  -     HYDROcodone-acetaminophen (NORCO) 7.5-325 mg per tablet; Take 1 tablet by mouth every 6 (six) hours as needed for Pain.  Dispense: 120 tablet; Refill: 0  Pain and symptoms remain well controlled on current medication   Continue Topamax 25 mg twice a day.  Continue Norco 7.5/325 every 6 hours as needed for pain  Averaging 4 per day and stable pattern  Risk of discontinuation of long-term narcotics higher than risk of continuing long-term narcotics at current dose  Okay to refill monthly x 2    Attention deficit disorder (ADD) without hyperactivity  -     dextroamphetamine-amphetamine (ADDERALL) 20 mg tablet; Take 0.5 tablet (10 mg) by mouth 2 (two) times a day as needed  Dispense: 30 tablet; Refill: 0  ADHD, stable, no side effects, controlled on present medication  Continue stimulant at present dose  OK to refill monthly x 2    Major depressive disorder with single episode, in remission  Stable, no side effects, mood and symptoms well controlled on present medication .  Continue current medications     RTC in 3 months

## 2023-03-27 ENCOUNTER — OFFICE VISIT (OUTPATIENT)
Dept: OTOLARYNGOLOGY | Facility: CLINIC | Age: 48
End: 2023-03-27
Payer: COMMERCIAL

## 2023-03-27 VITALS — BODY MASS INDEX: 34.54 KG/M2 | TEMPERATURE: 97 F | WEIGHT: 240.75 LBS

## 2023-03-27 DIAGNOSIS — D36.9 INVERTED PAPILLOMA: Primary | ICD-10-CM

## 2023-03-27 PROCEDURE — 99213 PR OFFICE/OUTPT VISIT, EST, LEVL III, 20-29 MIN: ICD-10-PCS | Mod: 25,S$GLB,, | Performed by: STUDENT IN AN ORGANIZED HEALTH CARE EDUCATION/TRAINING PROGRAM

## 2023-03-27 PROCEDURE — 31237 NSL/SINS NDSC SURG BX POLYPC: CPT | Mod: RT,S$GLB,, | Performed by: STUDENT IN AN ORGANIZED HEALTH CARE EDUCATION/TRAINING PROGRAM

## 2023-03-27 PROCEDURE — 99999 PR PBB SHADOW E&M-EST. PATIENT-LVL III: CPT | Mod: PBBFAC,,, | Performed by: STUDENT IN AN ORGANIZED HEALTH CARE EDUCATION/TRAINING PROGRAM

## 2023-03-27 PROCEDURE — 31237 PR NASAL/SINUS ENDOSCOPY,BX/RMV POLYP/DEBRID: ICD-10-PCS | Mod: RT,S$GLB,, | Performed by: STUDENT IN AN ORGANIZED HEALTH CARE EDUCATION/TRAINING PROGRAM

## 2023-03-27 PROCEDURE — 3008F PR BODY MASS INDEX (BMI) DOCUMENTED: ICD-10-PCS | Mod: CPTII,S$GLB,, | Performed by: STUDENT IN AN ORGANIZED HEALTH CARE EDUCATION/TRAINING PROGRAM

## 2023-03-27 PROCEDURE — 99213 OFFICE O/P EST LOW 20 MIN: CPT | Mod: 25,S$GLB,, | Performed by: STUDENT IN AN ORGANIZED HEALTH CARE EDUCATION/TRAINING PROGRAM

## 2023-03-27 PROCEDURE — 3008F BODY MASS INDEX DOCD: CPT | Mod: CPTII,S$GLB,, | Performed by: STUDENT IN AN ORGANIZED HEALTH CARE EDUCATION/TRAINING PROGRAM

## 2023-03-27 PROCEDURE — 99999 PR PBB SHADOW E&M-EST. PATIENT-LVL III: ICD-10-PCS | Mod: PBBFAC,,, | Performed by: STUDENT IN AN ORGANIZED HEALTH CARE EDUCATION/TRAINING PROGRAM

## 2023-03-27 NOTE — PROGRESS NOTES
Referring Provider:    No referring provider defined for this encounter.  Subjective:   Patient: Dax Carlisle [id number], :1975   Visit date:3/27/2023 11:37 AM    Chief Complaint:  Follow-up      HPI:    Prior notes by myself reviewed  Clinical documentation obtained by nursing staff reviewed.       Chronic eustachian tube dysfunction AS for many years.  PET placed 2018.  Was stable until about 1 month ago.  No change with Augmentin.       Update 22  Most recently for the last 2 months or so. Feels nasal obstruction and feels there is a mass in the right nasal airway. Feels something flapping. Also with hyposmia.     Denies facial pressure, purulent rhinorrhea.    No treatment.     Endorses recurrent sinus infections. 2 per year.     Endorses year round allergies. Claritin and astelin regularly. Had allergy testing as a child - pan allergic.    Update 22    Augmentin/prednisone taper completed.   No significant change in right nasal obstruction and sensation of a mass.     Update 23  Doing well.   Minimal bleeding POD0. No longer feels mas in the right nare. No nasal obstruction.     Update 3/27/23  Doing well. No obstruction, bleeding, or sensation, of mass      Objective:     Physical Exam:  Vitals:  Temp 97.3 °F (36.3 °C) (Temporal)   Wt 109.2 kg (240 lb 11.9 oz)   BMI 34.54 kg/m²   General appearance:  Well developed, well nourished    Nose:  No masses/lesions of external nose, nasal mucosa, septum, and turbinates were within normal limits.    Mouth:  No mass/lesion of lips, teeth, gums, hard/soft palate, tongue, tonsils, or oropharynx.    Neck & Lymphatics:  No cervical lymphadenopathy, no neck mass/crepitus/ asymmetry, trachea is midline, no thyroid enlargement/tenderness/mass.        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   - Additional respiratory mucosa with submucosal edema and increased   eosinophils (up to 50 eosinophils per high power field, max count).   - No fungal organisms identified by GMS stain.   - Negative for dysplasia/carcinoma.   COMMENT:   This case was also reviewed by Cathleen Hanson M.D., who concurs with the   diagnosis.         Procedure Note - Rigid Nasal Endoscopy with debridement    Surgeon: Uriel Vasquez MD  Anesthesia: topical oxymetazoline and 4% lidocaine.    Indication: Dax Carlisle is a 47 y.o. male underwent recent sinus surgery. Debridement was indicated to remove crusting and clot and visualize at the surgical site.    Technique: The nose was sprayed with oxymetazoline and 4% lidocaine. With the patient in the upright position, a 2.7mm 30-degree endscope was inserted into the patient's right and left nare.  Where visible, nasal secretions and mucosal crusting were removed with a suction. The overall appearance of the nasal cavity and paranasal sinuses were noted and the findings are described " below.     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  No evidence of recurrence  Return to clinic 3-4 months, sooner with concerns

## 2023-03-30 ENCOUNTER — HOSPITAL ENCOUNTER (OUTPATIENT)
Dept: CARDIOLOGY | Facility: HOSPITAL | Age: 48
Discharge: HOME | End: 2023-03-30
Attending: INTERNAL MEDICINE
Payer: COMMERCIAL

## 2023-03-30 DIAGNOSIS — E78.2 MIXED HYPERLIPIDEMIA: ICD-10-CM

## 2023-03-30 DIAGNOSIS — I47.10 SVT (SUPRAVENTRICULAR TACHYCARDIA): ICD-10-CM

## 2023-03-30 LAB
CV STRESS BASE HR: 100 BPM
DIASTOLIC BLOOD PRESSURE: 73 MMHG
OHS CV CPX 1 MINUTE RECOVERY HEART RATE: 125 BPM
OHS CV CPX 85 PERCENT MAX PREDICTED HEART RATE MALE: 149
OHS CV CPX ESTIMATED METS: 7
OHS CV CPX MAX PREDICTED HEART RATE: 175
OHS CV CPX PATIENT IS FEMALE: 0
OHS CV CPX PATIENT IS MALE: 1
OHS CV CPX PEAK DIASTOLIC BLOOD PRESSURE: 57 MMHG
OHS CV CPX PEAK HEAR RATE: 134 BPM
OHS CV CPX PEAK RATE PRESSURE PRODUCT: NORMAL
OHS CV CPX PEAK SYSTOLIC BLOOD PRESSURE: 133 MMHG
OHS CV CPX PERCENT MAX PREDICTED HEART RATE ACHIEVED: 77
OHS CV CPX RATE PRESSURE PRODUCT PRESENTING: NORMAL
STRESS ECHO POST EXERCISE DUR MIN: 4 MINUTES
STRESS ECHO POST EXERCISE DUR SEC: 41 SECONDS
SYSTOLIC BLOOD PRESSURE: 128 MMHG

## 2023-03-30 PROCEDURE — 93017 CV STRESS TEST TRACING ONLY: CPT

## 2023-03-30 PROCEDURE — 93018 CV STRESS TEST I&R ONLY: CPT | Mod: ,,, | Performed by: INTERNAL MEDICINE

## 2023-03-30 PROCEDURE — 93016 EXERCISE STRESS - EKG (CUPID ONLY): ICD-10-PCS | Mod: ,,, | Performed by: INTERNAL MEDICINE

## 2023-03-30 PROCEDURE — 93018 EXERCISE STRESS - EKG (CUPID ONLY): ICD-10-PCS | Mod: ,,, | Performed by: INTERNAL MEDICINE

## 2023-03-30 PROCEDURE — 93016 CV STRESS TEST SUPVJ ONLY: CPT | Mod: ,,, | Performed by: INTERNAL MEDICINE

## 2023-04-17 NOTE — HPI
Chief Complaint   Patient presents with    Follow-up     Hypertension, Hyperlipidemia, and Hypothyroidism    Dry skin     Pt states she would like the same Topical Rx her  gets, but unsure what its called.      Patient is here for follow-up on hypertension and hyperlipidemia. She is exercising and is adherent to a low-salt diet. Patient denies chest pain, dyspnea, exertional chest pressure/discomfort, near-syncope, orthopnea, palpitations, paroxysmal nocturnal dyspnea, and syncope. Taking his medication regularly with no side effects.    Patient also presents today complaining of dry skin and requesting a prescription for Lac-Hydrin lotion.    Patient Active Problem List   Diagnosis    Acquired hypothyroidism    Allergic rhinitis    Chronic cough    Chronic cystitis    Chronic pain of right wrist    Essential hypertension    Mixed hyperlipidemia    Osteopenia    Osteoarthritis    Polyarthritis    Reactive airway disease    Rheumatoid arthritis involving multiple sites with positive rheumatoid factor (CMS/[facility])    Tenosynovitis, de Quervain    Vitamin D deficiency    Xerosis cutis           Past Medical History:   Diagnosis Date    Xerosis of skin 01/10/2022        Current Outpatient Medications   Medication Sig Dispense Refill    albuterol 90 mcg/actuation inhaler Inhale 2 puffs every 6 hours if needed for shortness of breath.      biotin 10 mg tablet Take 1 tablet (10 mg) by mouth once daily.      cholestyramine light (Prevalite) 4 gram packet Take 1 packet (4 g) by mouth once daily.      cranberry extract 200 mg capsule Take 1 capsule by mouth in the morning and 1 capsule before bedtime.      krill-omega-3-dha-epa-lipids 407-15-53-50 mg capsule Take 1 capsule by mouth once daily.      ammonium lactate (Lac-Hydrin) 12 % lotion Apply topically if needed for dry skin. 225 g 3    levothyroxine (Synthroid, Levoxyl) 50 mcg tablet Take 1 tablet (50 mcg) by mouth once daily. 90 tablet 1     Mr. Carlisle is a 43 year old male patient whose current medical conditions include MDD and chronic pain syndrome who presented to [facility] ED yesterday with a chief complaint of worsening lower abdominal pain over the past 3-4 days. Associated symptoms included fever, chills, nausea, and emesis. Patient denied any associated chest pain, SOB, PND, orthopnea, palpitations, near syncope, or syncope. Initial workup in ED revealed pneumoperitoneum with evidence of perforated sigmoid colon and patient was subsequently admitted and underwent exploratory laparotomy with with raf procedure and colostomy placement. This afternoon, patient was noted to be persistently tachycardic and cardiology was consulted to assist with management. Patient seen and examined today, resting in bed. Complains of post-op pain and episodes of profuse sweating. No cardiac complaints. Denies any chest pain or SOB. No prior cardiac history. HR in low 100's after receiving IV lopressor earlier. Chart reviewed. Labs stable. EKG shows sinus tachycardia without any acute ischemic changes. 2D echo pending.     "losartan-hydrochlorothiazide (Hyzaar) 100-25 mg tablet Take 1 tablet by mouth once daily. 90 tablet 1     No current facility-administered medications for this visit.       No Known Allergies    Social History     Tobacco Use    Smoking status: Never     Passive exposure: Never    Smokeless tobacco: Never   Vaping Use    Vaping status: Not on file   Substance Use Topics    Alcohol use: Yes        Review of Systems - General ROS: negative for - fatigue, fever, malaise, night sweats, sleep disturbance or weight loss  Psychological ROS: negative for - anxiety, concentration difficulties, depression, memory difficulties or sleep disturbances  ENT ROS: negative for - hearing change, nasal discharge, oral lesions, sinus pain, sore throat, tinnitus or vertigo  Allergy and Immunology ROS: negative for - hives, nasal congestion or seasonal allergies  Hematological and Lymphatic ROS: negative for - bruising, fatigue, night sweats or pallor  Endocrine ROS: negative for - hot flashes, malaise/lethargy, palpitations, polydipsia/polyuria, skin changes, temperature intolerance or unexpected weight changes  Respiratory ROS: negative for - cough, hemoptysis, pleuritic pain, shortness of breath or wheezing  Cardiovascular ROS: no chest pain or dyspnea on exertion  Gastrointestinal ROS: no abdominal pain, change in bowel habits, or black or bloody stools  Genito-Urinary ROS: no dysuria, trouble voiding, or hematuria  Musculoskeletal ROS: negative for - joint pain, joint stiffness, joint swelling, muscle pain or muscular weakness  Neurological ROS: negative for - dizziness, gait disturbance, headaches, impaired coordination/balance, numbness/tingling, tremors or visual changes    Objective:  Blood pressure 124/80, pulse 72, temperature 36.2 °C (97.2 °F), resp. rate 16, height 1.626 m (5' 4\"), weight 101 kg (222 lb).    Physical Examination: General appearance - alert, well appearing, and in no distress  Mental status - alert, oriented " to person, place, and time  Eyes - pupils equal and reactive, extraocular eye movements intact  Ears - bilateral TM's and external ear canals normal  Mouth - mucous membranes moist, pharynx normal without lesions  Neck The neck is supple and free of adenopathy or masses, the thyroid is normal without enlargement or nodules.  Lymphatics - no palpable lymphadenopathy, no hepatosplenomegaly  Chest - clear to auscultation, no wheezes, rales or rhonchi, symmetric air entry  Heart - normal rate, regular rhythm, normal S1, S2, no murmurs, rubs, clicks or gallops  Abdomen - soft, nontender, nondistended, no masses or organomegaly  Neurological - alert, oriented, normal speech, no focal findings or movement disorder noted  Musculoskeletal - no joint tenderness, deformity or swelling  Extremities: peripheral pulses normal, no pedal edema, no clubbing or cyanosis, intact peripheral pulses.  Neurological exam reveals alert, oriented, normal speech, no focal findings or movement disorder noted, cranial nerves II through XII intact, motor and sensory grossly normal bilaterally, no focal deficit..    [facility]acy Encounter on 10/12/2022   Component Date Value Ref Range Status    WBC 10/12/2022 10.9  4.4 - 11.3 x10E9/L Final    RBC 10/12/2022 4.78  4.00 - 5.20 x10E12/L Final    Hemoglobin 10/12/2022 13.9  12.0 - 16.0 g/dL Final    Hematocrit 10/12/2022 44.5  36.0 - 46.0 % Final    MCV 10/12/2022 93  80 - 100 fL Final    MCHC 10/12/2022 31.2 (L)  32.0 - 36.0 g/dL Final    Platelets 10/12/2022 256  150 - 450 x10E9/L Final    RDW 10/12/2022 14.4  11.5 - 14.5 % Final    Neutrophils % 10/12/2022 55.6  40.0 - 80.0 % Final    Immature Granulocytes %, Automated 10/12/2022 0.5  0.0 - 0.9 % Final    Comment:  Immature Granulocyte Count (IG) includes promyelocytes,    myelocytes and metamyelocytes but does not include bands.   Percent differential counts (%) should be interpreted in the   context of the absolute cell counts (cells/L).       Lymphocytes % 10/12/2022 33.9  13.0 - 44.0 % Final    Monocytes % 10/12/2022 7.4  2.0 - 10.0 % Final    Eosinophils % 10/12/2022 2.1  0.0 - 6.0 % Final    Basophils % 10/12/2022 0.5  0.0 - 2.0 % Final    Neutrophils Absolute 10/12/2022 6.06 (H)  1.60 - 5.50 x10E9/L Final    Lymphocytes Absolute 10/12/2022 3.69 (H)  0.80 - 3.00 x10E9/L Final    Monocytes Absolute 10/12/2022 0.81 (H)  0.05 - 0.80 x10E9/L Final    Eosinophils Absolute 10/12/2022 0.23  0.00 - 0.40 x10E9/L Final    Basophils Absolute 10/12/2022 0.05  0.00 - 0.10 x10E9/L Final    Cholesterol 10/12/2022 147  0 - 199 mg/dL Final    Comment: .      AGE      DESIRABLE   BORDERLINE HIGH   HIGH     0-19 Y     0 - 169       170 - 199     >/= 200    20-24 Y     0 - 189       190 - 224     >/= 225         >24 Y     0 - 199       200 - 239     >/= 240   **All ranges are based on fasting samples. Specific   therapeutic targets will vary based on patient-specific   cardiac risk.  .   Pediatric guidelines reference:Pediatrics 2011, 128(S5).   Adult guidelines reference: NCEP ATPIII Guidelines,     ANURAG 2001, 258:2486-97  .   Venipuncture immediately after or during the    administration of Metamizole may lead to falsely   low results. Testing should be performed immediately   prior to Metamizole dosing.      HDL 10/12/2022 66.0  mg/dL Final    Comment: .      AGE      VERY LOW   LOW     NORMAL    HIGH       0-19 Y       < 35   < 40     40-45     ----    20-24 Y       ----   < 40       >45     ----      >24 Y       ----   < 40     40-60      >60  .      Cholesterol/HDL Ratio 10/12/2022 2.2   Final    Comment: REF VALUES  DESIRABLE  < 3.4  HIGH RISK  > 5.0      LDL 10/12/2022 55  0 - 99 mg/dL Final    Comment: .                           NEAR      BORD      AGE      DESIRABLE  OPTIMAL    HIGH     HIGH     VERY HIGH     0-19 Y     0 - 109     ---    110-129   >/= 130     ----    20-24 Y     0 - 119     ---    120-159   >/= 160     ----      >24 Y     0 -  99   100-129   130-159   160-189     >/=190  .      VLDL 10/12/2022 26  0 - 40 mg/dL Final    Triglycerides 10/12/2022 132  0 - 149 mg/dL Final    Comment: .      AGE      DESIRABLE   BORDERLINE HIGH   HIGH     VERY HIGH   0 D-90 D    19 - 174         ----         ----        ----  91 D- 9 Y     0 -  74        75 -  99     >/= 100      ----    10-19 Y     0 -  89        90 - 129     >/= 130      ----    20-24 Y     0 - 114       115 - 149     >/= 150      ----         >24 Y     0 - 149       150 - 199    200- 499    >/= 500  .   Venipuncture immediately after or during the    administration of Metamizole may lead to falsely   low results. Testing should be performed immediately   prior to Metamizole dosing.      TSH 10/12/2022 3.19  0.44 - 3.98 mIU/L Final    Comment:  TSH testing is performed using different testing    methodology at [facility] than at other    [facility]. Direct result comparisons should    only be made within the same method.      Glucose 10/12/2022 79  74 - 99 mg/dL Final    Sodium 10/12/2022 140  136 - 145 mmol/L Final    Potassium 10/12/2022 4.1  3.5 - 5.3 mmol/L Final    Chloride 10/12/2022 104  98 - 107 mmol/L Final    Bicarbonate 10/12/2022 27  21 - 32 mmol/L Final    Anion Gap 10/12/2022 13  10 - 20 mmol/L Final    Urea Nitrogen 10/12/2022 26 (H)  6 - 23 mg/dL Final    Creatinine 10/12/2022 0.91  0.50 - 1.05 mg/dL Final    GFR Female 10/12/2022 64  >90 mL/min/1.73m2 Final    Comment:  CALCULATIONS OF ESTIMATED GFR ARE PERFORMED   USING THE 2021 CKD-EPI STUDY REFIT EQUATION   WITHOUT THE RACE VARIABLE FOR THE IDMS-TRACEABLE   CREATININE METHODS.    https://jasn.asnjournals.org/content/early/2021/09/22/ASN.9439628170      Calcium 10/12/2022 9.2  8.6 - 10.3 mg/dL Final    Albumin 10/12/2022 3.7  3.4 - 5.0 g/dL Final    Alkaline Phosphatase 10/12/2022 49  33 - 136 U/L Final    Total Protein 10/12/2022 6.4  6.4 - 8.2 g/dL Final    AST 10/12/2022 17  9 - 39 U/L Final    Total Bilirubin  "10/12/2022 0.7  0.0 - 1.2 mg/dL Final    ALT (SGPT) 10/12/2022 18  7 - 45 U/L Final    Comment:  Patients treated with Sulfasalazine may generate    falsely decreased results for ALT.      Free T4 10/12/2022 1.16 (H)  0.61 - 1.12 ng/dL Final    Comment:  Thyroxine Free testing is performed using different testing    methodology at [facility] than at other    [facility] hospitals. Direct result comparisons should    only be made within the same method.  .   Biotin can cause falsely elevated free T4 results. Patients   taking a Biotin dose of up to 10 mg/day should refrain from   taking Biotin for 24 hours before sample collection. Patient   taking a Biotin dose of >10 mg/day should consult with their   physician or the laboratory before the blood draw.           Assessment / Plan:  Problem List Items Addressed This Visit       Acquired hypothyroidism    Relevant Medications    levothyroxine (Synthroid, Levoxyl) 50 mcg tablet    Other Relevant Orders    Thyroid Stimulating Hormone    Thyroxine, Free    Follow Up In [facility] - PCP    Essential hypertension - Primary    Current Assessment & Plan     Dietary sodium restriction.  Regular aerobic exercise program is recommended to help achieve and maintain normal body weight, fitness and improve lipid balance. .  Dietary changes: Increase soluble fiber  Plant sterols 2grams per day (e.g. Benecol)  Reduce saturated fat, \"trans\" monounsaturated fatty acids, and cholesterol           Relevant Medications    losartan-hydrochlorothiazide (Hyzaar) 100-25 mg tablet    Other Relevant Orders    Comprehensive Metabolic Panel    Lipid Panel    Follow Up In [facility] - PCP    Mixed hyperlipidemia    Current Assessment & Plan     The nature of cardiac risk has been fully discussed with this patient. Discussed cardiovascular risk analysis and appropriate diet with the need for lifelong measures to reduce the risk. A regular exercise program is " recommended to help achieve and maintain normal body weight, fitness and improve lipid balance. Patient education provided. They understand and agree with this course of treatment. They will return with new or worsening symptoms. Patient instructed to remain current with appropriate annual health maintenance.            Relevant Orders    Comprehensive Metabolic Panel    Lipid Panel    Follow Up In [facility] - PCP    Rheumatoid arthritis involving multiple sites with positive rheumatoid factor (CMS/[facility])    Current Assessment & Plan     Chronic Condition Documentation : Stable based on symptoms and exam.  Continue established treatment plan and follow-up at least yearly.           Xerosis cutis    Relevant Medications    ammonium lactate (Lac-Hydrin) 12 % lotion        Outpatient Encounter Medications as of 4/17/2023   Medication Sig Dispense Refill    albuterol 90 mcg/actuation inhaler Inhale 2 puffs every 6 hours if needed for shortness of breath.      biotin 10 mg tablet Take 1 tablet (10 mg) by mouth once daily.      cholestyramine light (Prevalite) 4 gram packet Take 1 packet (4 g) by mouth once daily.      cranberry extract 200 mg capsule Take 1 capsule by mouth in the morning and 1 capsule before bedtime.      krill-omega-3-dha-epa-lipids 373-93-37-50 mg capsule Take 1 capsule by mouth once daily.      [DISCONTINUED] levothyroxine (Synthroid, Levoxyl) 50 mcg tablet Take 1 tablet (50 mcg) by mouth once daily.      [DISCONTINUED] losartan-hydrochlorothiazide (Hyzaar) 100-25 mg tablet Take 1 tablet by mouth once daily.      ammonium lactate (Lac-Hydrin) 12 % lotion Apply topically if needed for dry skin. 225 g 3    levothyroxine (Synthroid, Levoxyl) 50 mcg tablet Take 1 tablet (50 mcg) by mouth once daily. 90 tablet 1    losartan-hydrochlorothiazide (Hyzaar) 100-25 mg tablet Take 1 tablet by mouth once daily. 90 tablet 1     No facility-administered encounter medications on file as of 4/17/2023.       Scribe Attestation  By signing my name below, I, Carolina Moffett   attest that this documentation has been prepared under the direction and in the presence of Ismael Gan MD.

## 2023-04-24 DIAGNOSIS — G89.4 CHRONIC PAIN SYNDROME: ICD-10-CM

## 2023-04-24 DIAGNOSIS — F98.8 ATTENTION DEFICIT DISORDER (ADD) WITHOUT HYPERACTIVITY: ICD-10-CM

## 2023-04-24 NOTE — TELEPHONE ENCOUNTER
No new care gaps identified.  [facility] Embedded Care Gaps. Reference number: [id number]. 4/24/2023   1:23:14 PM CDT

## 2023-04-26 RX ORDER — HYDROCODONE BITARTRATE AND ACETAMINOPHEN 7.5; 325 MG/1; MG/1
1 TABLET ORAL EVERY 6 HOURS PRN
Qty: 120 TABLET | Refills: 0 | Status: SHIPPED | OUTPATIENT
Start: 2023-04-26 | End: 2023-05-22 | Stop reason: SDUPTHER

## 2023-04-26 RX ORDER — DEXTROAMPHETAMINE SACCHARATE, AMPHETAMINE ASPARTATE, DEXTROAMPHETAMINE SULFATE AND AMPHETAMINE SULFATE 5; 5; 5; 5 MG/1; MG/1; MG/1; MG/1
TABLET ORAL
Qty: 30 TABLET | Refills: 0 | Status: SHIPPED | OUTPATIENT
Start: 2023-04-26 | End: 2023-05-22 | Stop reason: SDUPTHER

## 2023-05-22 ENCOUNTER — PATIENT MESSAGE (OUTPATIENT)
Dept: INTERNAL MEDICINE | Facility: CLINIC | Age: 48
End: 2023-05-22
Payer: COMMERCIAL

## 2023-05-22 DIAGNOSIS — F98.8 ATTENTION DEFICIT DISORDER (ADD) WITHOUT HYPERACTIVITY: ICD-10-CM

## 2023-05-22 DIAGNOSIS — G89.4 CHRONIC PAIN SYNDROME: ICD-10-CM

## 2023-05-22 RX ORDER — CYCLOBENZAPRINE HCL 10 MG
10 TABLET ORAL 3 TIMES DAILY PRN
Qty: 90 TABLET | Refills: 11 | Status: SHIPPED | OUTPATIENT
Start: 2023-05-22 | End: 2023-07-19 | Stop reason: SDUPTHER

## 2023-05-23 RX ORDER — DEXTROAMPHETAMINE SACCHARATE, AMPHETAMINE ASPARTATE, DEXTROAMPHETAMINE SULFATE AND AMPHETAMINE SULFATE 5; 5; 5; 5 MG/1; MG/1; MG/1; MG/1
TABLET ORAL
Qty: 30 TABLET | Refills: 0 | Status: SHIPPED | OUTPATIENT
Start: 2023-05-23 | End: 2023-06-21 | Stop reason: SDUPTHER

## 2023-05-23 RX ORDER — HYDROCODONE BITARTRATE AND ACETAMINOPHEN 7.5; 325 MG/1; MG/1
1 TABLET ORAL EVERY 6 HOURS PRN
Qty: 120 TABLET | Refills: 0 | Status: SHIPPED | OUTPATIENT
Start: 2023-05-23 | End: 2023-06-21 | Stop reason: SDUPTHER

## 2023-06-05 ENCOUNTER — PATIENT MESSAGE (OUTPATIENT)
Dept: OTOLARYNGOLOGY | Facility: CLINIC | Age: 48
End: 2023-06-05

## 2023-06-05 ENCOUNTER — OFFICE VISIT (OUTPATIENT)
Dept: OTOLARYNGOLOGY | Facility: CLINIC | Age: 48
End: 2023-06-05
Payer: COMMERCIAL

## 2023-06-05 VITALS — BODY MASS INDEX: 34.86 KG/M2 | TEMPERATURE: 98 F | WEIGHT: 242.94 LBS

## 2023-06-05 DIAGNOSIS — D36.9 INVERTED PAPILLOMA: Primary | ICD-10-CM

## 2023-06-05 PROCEDURE — 99213 PR OFFICE/OUTPT VISIT, EST, LEVL III, 20-29 MIN: ICD-10-PCS | Mod: 25,S$GLB,, | Performed by: STUDENT IN AN ORGANIZED HEALTH CARE EDUCATION/TRAINING PROGRAM

## 2023-06-05 PROCEDURE — 1159F MED LIST DOCD IN RCRD: CPT | Mod: CPTII,S$GLB,, | Performed by: STUDENT IN AN ORGANIZED HEALTH CARE EDUCATION/TRAINING PROGRAM

## 2023-06-05 PROCEDURE — 1159F PR MEDICATION LIST DOCUMENTED IN MEDICAL RECORD: ICD-10-PCS | Mod: CPTII,S$GLB,, | Performed by: STUDENT IN AN ORGANIZED HEALTH CARE EDUCATION/TRAINING PROGRAM

## 2023-06-05 PROCEDURE — 99999 PR PBB SHADOW E&M-EST. PATIENT-LVL III: CPT | Mod: PBBFAC,,, | Performed by: STUDENT IN AN ORGANIZED HEALTH CARE EDUCATION/TRAINING PROGRAM

## 2023-06-05 PROCEDURE — 99999 PR PBB SHADOW E&M-EST. PATIENT-LVL III: ICD-10-PCS | Mod: PBBFAC,,, | Performed by: STUDENT IN AN ORGANIZED HEALTH CARE EDUCATION/TRAINING PROGRAM

## 2023-06-05 PROCEDURE — 3008F BODY MASS INDEX DOCD: CPT | Mod: CPTII,S$GLB,, | Performed by: STUDENT IN AN ORGANIZED HEALTH CARE EDUCATION/TRAINING PROGRAM

## 2023-06-05 PROCEDURE — 3008F PR BODY MASS INDEX (BMI) DOCUMENTED: ICD-10-PCS | Mod: CPTII,S$GLB,, | Performed by: STUDENT IN AN ORGANIZED HEALTH CARE EDUCATION/TRAINING PROGRAM

## 2023-06-05 PROCEDURE — 99213 OFFICE O/P EST LOW 20 MIN: CPT | Mod: 25,S$GLB,, | Performed by: STUDENT IN AN ORGANIZED HEALTH CARE EDUCATION/TRAINING PROGRAM

## 2023-06-05 PROCEDURE — 31231 PR NASAL ENDOSCOPY, DX: ICD-10-PCS | Mod: S$GLB,,, | Performed by: STUDENT IN AN ORGANIZED HEALTH CARE EDUCATION/TRAINING PROGRAM

## 2023-06-05 PROCEDURE — 31231 NASAL ENDOSCOPY DX: CPT | Mod: S$GLB,,, | Performed by: STUDENT IN AN ORGANIZED HEALTH CARE EDUCATION/TRAINING PROGRAM

## 2023-06-05 NOTE — PROGRESS NOTES
Referring Provider:    No referring provider defined for this encounter.  Subjective:   Patient: Dax Carlisle [id number], :1975   Visit date:2023 11:37 AM    Chief Complaint:  Follow-up      HPI:  Dax Carlisle is a 47 y.o. male with right sinonasal IP s/p resection 2023    Doing well. No obstruction, bleeding, change to facial sensation, purulent rhinorrhea      Objective:     Physical Exam:  Vitals:  Temp 97.8 °F (36.6 °C) (Temporal)   Wt 110.2 kg (242 lb 15.2 oz)   BMI 34.86 kg/m²   General appearance:  Well developed, well nourished    Nose:  No masses/lesions of external nose, nasal mucosa, septum, and turbinates were within normal limits.    Mouth:  No mass/lesion of lips, teeth, gums, hard/soft palate, tongue, tonsils, or oropharynx.    Neck & Lymphatics:  No cervical lymphadenopathy, no neck mass/crepitus/ asymmetry, trachea is midline, no thyroid enlargement/tenderness/mass.        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   - Additional respiratory mucosa with submucosal edema and increased   eosinophils (up to 50 eosinophils per high power field, max count).   - No fungal organisms identified by GMS stain.   - Negative for dysplasia/carcinoma.   COMMENT:   This case was also reviewed by Cathleen Hanson M.D., who concurs with the   diagnosis.         Procedure Note - Rigid Nasal Endoscopy     Surgeon: Uriel Vasquez MD  Anesthesia: topical oxymetazoline and 4% lidocaine.    Technique: The nose was sprayed with oxymetazoline and 4% lidocaine. With the patient in the upright position, a 2.7mm 30-degree endscope was inserted into the patient's right and left nare.  Where visible, nasal secretions and mucosal crusting were removed with a suction. The overall appearance of the nasal cavity and paranasal sinuses were noted and the findings are described below.     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  No evidence of recurrence  Return to clinic 6 months, sooner with concerns              "

## 2023-06-11 ENCOUNTER — PATIENT MESSAGE (OUTPATIENT)
Dept: INTERNAL MEDICINE | Facility: CLINIC | Age: 48
End: 2023-06-11
Payer: COMMERCIAL

## 2023-06-12 ENCOUNTER — OFFICE VISIT (OUTPATIENT)
Dept: INTERNAL MEDICINE | Facility: CLINIC | Age: 48
End: 2023-06-12
Payer: COMMERCIAL

## 2023-06-12 VITALS
HEART RATE: 112 BPM | HEIGHT: 70 IN | TEMPERATURE: 100 F | BODY MASS INDEX: 35.23 KG/M2 | WEIGHT: 246.06 LBS | DIASTOLIC BLOOD PRESSURE: 88 MMHG | SYSTOLIC BLOOD PRESSURE: 138 MMHG | OXYGEN SATURATION: 96 %

## 2023-06-12 DIAGNOSIS — K57.90 DIVERTICULOSIS: ICD-10-CM

## 2023-06-12 DIAGNOSIS — K61.0 PERIANAL ABSCESS: Primary | ICD-10-CM

## 2023-06-12 PROCEDURE — 99999 PR PBB SHADOW E&M-EST. PATIENT-LVL IV: ICD-10-PCS | Mod: PBBFAC,,, | Performed by: EMERGENCY MEDICINE

## 2023-06-12 PROCEDURE — 3075F SYST BP GE 130 - 139MM HG: CPT | Mod: CPTII,S$GLB,, | Performed by: EMERGENCY MEDICINE

## 2023-06-12 PROCEDURE — 3079F PR MOST RECENT DIASTOLIC BLOOD PRESSURE 80-89 MM HG: ICD-10-PCS | Mod: CPTII,S$GLB,, | Performed by: EMERGENCY MEDICINE

## 2023-06-12 PROCEDURE — 1159F PR MEDICATION LIST DOCUMENTED IN MEDICAL RECORD: ICD-10-PCS | Mod: CPTII,S$GLB,, | Performed by: EMERGENCY MEDICINE

## 2023-06-12 PROCEDURE — 1159F MED LIST DOCD IN RCRD: CPT | Mod: CPTII,S$GLB,, | Performed by: EMERGENCY MEDICINE

## 2023-06-12 PROCEDURE — 3079F DIAST BP 80-89 MM HG: CPT | Mod: CPTII,S$GLB,, | Performed by: EMERGENCY MEDICINE

## 2023-06-12 PROCEDURE — 99999 PR PBB SHADOW E&M-EST. PATIENT-LVL IV: CPT | Mod: PBBFAC,,, | Performed by: EMERGENCY MEDICINE

## 2023-06-12 PROCEDURE — 99214 PR OFFICE/OUTPT VISIT, EST, LEVL IV, 30-39 MIN: ICD-10-PCS | Mod: S$GLB,,, | Performed by: EMERGENCY MEDICINE

## 2023-06-12 PROCEDURE — 3008F PR BODY MASS INDEX (BMI) DOCUMENTED: ICD-10-PCS | Mod: CPTII,S$GLB,, | Performed by: EMERGENCY MEDICINE

## 2023-06-12 PROCEDURE — 1160F RVW MEDS BY RX/DR IN RCRD: CPT | Mod: CPTII,S$GLB,, | Performed by: EMERGENCY MEDICINE

## 2023-06-12 PROCEDURE — 1160F PR REVIEW ALL MEDS BY PRESCRIBER/CLIN PHARMACIST DOCUMENTED: ICD-10-PCS | Mod: CPTII,S$GLB,, | Performed by: EMERGENCY MEDICINE

## 2023-06-12 PROCEDURE — 3075F PR MOST RECENT SYSTOLIC BLOOD PRESS GE 130-139MM HG: ICD-10-PCS | Mod: CPTII,S$GLB,, | Performed by: EMERGENCY MEDICINE

## 2023-06-12 PROCEDURE — 3008F BODY MASS INDEX DOCD: CPT | Mod: CPTII,S$GLB,, | Performed by: EMERGENCY MEDICINE

## 2023-06-12 PROCEDURE — 99214 OFFICE O/P EST MOD 30 MIN: CPT | Mod: S$GLB,,, | Performed by: EMERGENCY MEDICINE

## 2023-06-12 RX ORDER — CIPROFLOXACIN 500 MG/1
500 TABLET ORAL EVERY 12 HOURS
Qty: 20 TABLET | Refills: 1 | Status: SHIPPED | OUTPATIENT
Start: 2023-06-12 | End: 2023-08-23

## 2023-06-12 NOTE — PROGRESS NOTES
Subjective:       Patient ID: Dax Carlisle is a 47 y.o. male.    Chief Complaint: Abscess    Abscess  Associated Symptoms: a fever    47 yr WM, hx of Depression, Ch Pain, SVT, diverticulosis s/p Perforation 2020 with Monsalve's pouch, s/p reversal 2021, hx of perianal abscess s/p I&D in the past by Dr. Choi, here c/o recurrence of the perianal abscess since Sat- 3 days ago. Has low grade fever 99.9- no chills. No Abx. He has ch constipation, no fissure. Had a liquid prior to the abscess development.     Past Medical History:   Diagnosis Date    Angioedema of lips 03/13/2015    IVP dye allergy - swelling lips, eye, tongue    Asthma     childhood    Depression     Diverticulitis 10/07/2019    Kidney stones     Sleep apnea     cpap not required/ patient states this has resolved with weight loss    SVT (supraventricular tachycardia)      Past Surgical History:   Procedure Laterality Date    ABDOMINAL WASHOUT N/A 10/7/2019    Procedure: LAVAGE, PERITONEAL, THERAPEUTIC;  Surgeon: Mindy Goff MD;  Location: [facility] OR;  Service: General;  Laterality: N/A;  abdomen washout    ABSCESS DRAINAGE N/A 9/10/2021    Procedure: DRAINAGE, ABSCESS;  Surgeon: Kadeem Choi MD;  Location: [facility] OR;  Service: General;  Laterality: N/A;  perirectal    anal fistula repair      APPENDECTOMY      BACK SURGERY      BIOPSY, INTRANASAL, ENDOSCOPIC N/A 2/15/2023    Procedure: BIOPSY, INTRANASAL, ENDOSCOPIC;  Surgeon: Uriel Vasquez MD;  Location: [facility] OR;  Service: ENT;  Laterality: N/A;    CHOLECYSTECTOMY  2005    COLONOSCOPY N/A 3/10/2016    Procedure: COLONOSCOPY;  Surgeon: Juvenal Dinero MD;  Location: [facility] ENDO;  Service: Endoscopy;  Laterality: N/A;    COLONOSCOPY N/A 1/17/2020    Procedure: COLONOSCOPY;  Surgeon: Kadeem Choi MD;  Location: [facility];  Service: General;  Laterality: N/A;    COLOSTOMY N/A 10/7/2019    Procedure: CREATION, COLOSTOMY;  Surgeon: Mindy Goff MD;  Location: [facility];   Service: General;  Laterality: N/A;    COLOSTOMY REVERSAL      EXAMINATION UNDER ANESTHESIA N/A 9/10/2021    Procedure: Exam under anesthesia;  Surgeon: Kadeem Choi MD;  Location: [facility] OR;  Service: General;  Laterality: N/A;    EXAMINATION UNDER ANESTHESIA N/A 1/6/2022    Procedure: Exam under anesthesia, perirectal abscess I&D;  Surgeon: Kadeem Choi MD;  Location: [facility] OR;  Service: General;  Laterality: N/A;    FUNCTIONAL ENDOSCOPIC SINUS SURGERY (FESS) USING COMPUTER-ASSISTED NAVIGATION N/A 2/15/2023    Procedure: FESS, USING COMPUTER-ASSISTED NAVIGATION;  Surgeon: Uriel Vasquez MD;  Location: [facility] OR;  Service: ENT;  Laterality: N/A;    SIVAKUMAR PROCEDURE N/A 10/7/2019    Procedure: SIVAKUMAR PROCEDURE;  Surgeon: Mindy Goff MD;  Location: [facility];  Service: General;  Laterality: N/A;    INJECTION OF ANESTHETIC AGENT AROUND GANGLION IMPAR N/A 3/15/2021    Procedure: BLOCK, GANGLION IMPAR;  Surgeon: Ruben Cabrera MD;  Location: [facility] PAIN MGT;  Service: Pain Management;  Laterality: N/A;    INJECTION OF ANESTHETIC AGENT AROUND PUDENDAL NERVE N/A 9/10/2021    Procedure: BLOCK, NERVE, PUDENDAL;  Surgeon: Kadeem Choi MD;  Location: [facility] OR;  Service: General;  Laterality: N/A;    INJECTION OF ANESTHETIC AGENT AROUND PUDENDAL NERVE N/A 1/6/2022    Procedure: BLOCK, NERVE, PUDENDAL;  Surgeon: Kadeem Choi MD;  Location: [facility] OR;  Service: General;  Laterality: N/A;    INJECTION OF ANESTHETIC AGENT INTO TISSUE PLANE DEFINED BY TRANSVERSUS ABDOMINIS MUSCLE N/A 1/30/2020    Procedure: BLOCK, TRANSVERSUS ABDOMINIS PLANE;  Surgeon: Kadeem Choi MD;  Location: [facility] OR;  Service: General;  Laterality: N/A;    KNEE SURGERY Right     KNEE SURGERY Left     LYSIS OF ADHESIONS N/A 1/30/2020    Procedure: LYSIS, ADHESIONS;  Surgeon: Kadeem Choi MD;  Location: [facility] OR;  Service: General;  Laterality: N/A;    PILONIDAL CYST DRAINAGE      pt has had 6 of these adn has had  revisions    PROCTECTOMY N/A 1/30/2020    Procedure: PROCTECTOMY;  Surgeon: Kadeem Choi MD;  Location: [facility] OR;  Service: General;  Laterality: N/A;  Partial    SINUSOTOMY, SPHENOID SINUS, ENDOSCOPIC Right 2/15/2023    Procedure: SINUSOTOMY, SPHENOID SINUS, ENDOSCOPIC;  Surgeon: Uriel Vasquez MD;  Location: [facility] OR;  Service: ENT;  Laterality: Right;    SUBTOTAL COLECTOMY  1/30/2020    Procedure: COLECTOMY, PARTIAL;  Surgeon: Kadeem Choi MD;  Location: [facility] OR;  Service: General;;     Past Surgical History:   Procedure Laterality Date    ABDOMINAL WASHOUT N/A 10/7/2019    Procedure: LAVAGE, PERITONEAL, THERAPEUTIC;  Surgeon: Mindy Goff MD;  Location: [facility] OR;  Service: General;  Laterality: N/A;  abdomen washout    ABSCESS DRAINAGE N/A 9/10/2021    Procedure: DRAINAGE, ABSCESS;  Surgeon: Kadeem Choi MD;  Location: [facility] OR;  Service: General;  Laterality: N/A;  perirectal    anal fistula repair      APPENDECTOMY      BACK SURGERY      BIOPSY, INTRANASAL, ENDOSCOPIC N/A 2/15/2023    Procedure: BIOPSY, INTRANASAL, ENDOSCOPIC;  Surgeon: Uriel Vasquez MD;  Location: [facility] OR;  Service: ENT;  Laterality: N/A;    CHOLECYSTECTOMY  2005    COLONOSCOPY N/A 3/10/2016    Procedure: COLONOSCOPY;  Surgeon: Juvenal Dinero MD;  Location: [facility] ENDO;  Service: Endoscopy;  Laterality: N/A;    COLONOSCOPY N/A 1/17/2020    Procedure: COLONOSCOPY;  Surgeon: Kadeem Choi MD;  Location: [facility] ENDO;  Service: General;  Laterality: N/A;    COLOSTOMY N/A 10/7/2019    Procedure: CREATION, COLOSTOMY;  Surgeon: Mindy Goff MD;  Location: [facility] OR;  Service: General;  Laterality: N/A;    COLOSTOMY REVERSAL      EXAMINATION UNDER ANESTHESIA N/A 9/10/2021    Procedure: Exam under anesthesia;  Surgeon: Kadeem Choi MD;  Location: [facility] OR;  Service: General;  Laterality: N/A;    EXAMINATION UNDER ANESTHESIA N/A 1/6/2022    Procedure: Exam under anesthesia, perirectal abscess I&D;  Surgeon:  Kadeem Choi MD;  Location: [facility] OR;  Service: General;  Laterality: N/A;    FUNCTIONAL ENDOSCOPIC SINUS SURGERY (FESS) USING COMPUTER-ASSISTED NAVIGATION N/A 2/15/2023    Procedure: FESS, USING COMPUTER-ASSISTED NAVIGATION;  Surgeon: Uriel Vasquez MD;  Location: [facility] OR;  Service: ENT;  Laterality: N/A;    SIVAKUMAR PROCEDURE N/A 10/7/2019    Procedure: SIVAKUMAR PROCEDURE;  Surgeon: Mindy Goff MD;  Location: [facility] OR;  Service: General;  Laterality: N/A;    INJECTION OF ANESTHETIC AGENT AROUND GANGLION IMPAR N/A 3/15/2021    Procedure: BLOCK, GANGLION IMPAR;  Surgeon: Ruben Cabrera MD;  Location: [facility] PAIN MGT;  Service: Pain Management;  Laterality: N/A;    INJECTION OF ANESTHETIC AGENT AROUND PUDENDAL NERVE N/A 9/10/2021    Procedure: BLOCK, NERVE, PUDENDAL;  Surgeon: Kadeem Choi MD;  Location: [facility] OR;  Service: General;  Laterality: N/A;    INJECTION OF ANESTHETIC AGENT AROUND PUDENDAL NERVE N/A 1/6/2022    Procedure: BLOCK, NERVE, PUDENDAL;  Surgeon: Kadeem Choi MD;  Location: [facility] OR;  Service: General;  Laterality: N/A;    INJECTION OF ANESTHETIC AGENT INTO TISSUE PLANE DEFINED BY TRANSVERSUS ABDOMINIS MUSCLE N/A 1/30/2020    Procedure: BLOCK, TRANSVERSUS ABDOMINIS PLANE;  Surgeon: Kadeem Choi MD;  Location: [facility] OR;  Service: General;  Laterality: N/A;    KNEE SURGERY Right     KNEE SURGERY Left     LYSIS OF ADHESIONS N/A 1/30/2020    Procedure: LYSIS, ADHESIONS;  Surgeon: Kadeem Choi MD;  Location: [facility] OR;  Service: General;  Laterality: N/A;    PILONIDAL CYST DRAINAGE      pt has had 6 of these adn has had revisions    PROCTECTOMY N/A 1/30/2020    Procedure: PROCTECTOMY;  Surgeon: Kadeem Choi MD;  Location: [facility] OR;  Service: General;  Laterality: N/A;  Partial    SINUSOTOMY, SPHENOID SINUS, ENDOSCOPIC Right 2/15/2023    Procedure: SINUSOTOMY, SPHENOID SINUS, ENDOSCOPIC;  Surgeon: Uriel Vasquez MD;  Location: [facility] OR;  Service: ENT;  Laterality:  Right;    SUBTOTAL COLECTOMY  1/30/2020    Procedure: COLECTOMY, PARTIAL;  Surgeon: Kadeem Choi MD;  Location: [facility];  Service: General;;     Social History     Socioeconomic History    Marital status:    Tobacco Use    Smoking status: Never    Smokeless tobacco: Never   Substance and Sexual Activity    Alcohol use: Never    Drug use: No    Sexual activity: Yes     Partners: Female   Social History Narrative    Live w/ spouse and  1 dog      Review of patient's allergies indicates:   Allergen Reactions    Codeine Other (See Comments)     violent    Iodinated contrast media Swelling     Tongue, right eye, lips swelling. Rash on abdomen and axilla    Nuts [tree nut] Anaphylaxis    Dairy aid [lactase] Diarrhea and Nausea And Vomiting    Morphine      Tolerated hydromorphone in October 2019.      Current Outpatient Medications   Medication Sig    acetaminophen (TYLENOL) 325 MG tablet Take 325 mg by mouth every 6 (six) hours as needed for Pain.    albuterol (VENTOLIN HFA) 90 mcg/actuation inhaler Inhale 2 puffs into the lungs every 6 (six) hours as needed for Wheezing. Rescue    ARIPiprazole (ABILIFY) 10 MG Tab Take 1 tablet (10 mg total) by mouth every evening.    azelastine (ASTELIN) 137 mcg (0.1 %) nasal spray Use Two sprays intranasal twice a day    cyclobenzaprine (FLEXERIL) 10 MG tablet Take 1 tablet (10 mg total) by mouth 3 (three) times daily as needed for Muscle spasms.    dextroamphetamine-amphetamine (ADDERALL) 20 mg tablet Take 0.5 tablet (10 mg) by mouth 2 (two) times a day as needed    EScitalopram oxalate (LEXAPRO) 20 MG tablet Take 1 tablet (20 mg total) by mouth every evening.    HYDROcodone-acetaminophen (NORCO) 7.5-325 mg per tablet Take 1 tablet by mouth every 6 (six) hours as needed for Pain.    linaclotide 290 mcg Cap Take 1 capsule (290 mcg total) by mouth once daily. (Patient taking differently: Take 290 mcg by mouth daily as needed.)    loratadine (CLARITIN) 10 mg tablet Take 10  mg by mouth daily as needed.     metoprolol succinate (TOPROL-XL) 25 MG 24 hr tablet Take 1 tablet (25 mg total) by mouth every evening.    ciprofloxacin HCl (CIPRO) 500 MG tablet Take 1 tablet (500 mg total) by mouth every 12 (twelve) hours.     No current facility-administered medications for this visit.           Review of Systems   Constitutional:  Positive for fever. Negative for activity change.   Gastrointestinal:  Positive for constipation, diarrhea and rectal pain.     Objective:      Physical Exam  Vitals and nursing note reviewed.   Constitutional:       General: He is not in acute distress.     Appearance: Normal appearance. He is obese. He is not ill-appearing.   HENT:      Head: Normocephalic and atraumatic.      Nose: Nose normal.      Mouth/Throat:      Mouth: Mucous membranes are moist.      Pharynx: Oropharynx is clear.   Eyes:      Extraocular Movements: Extraocular movements intact.      Conjunctiva/sclera: Conjunctivae normal.      Pupils: Pupils are equal, round, and reactive to light.   Cardiovascular:      Rate and Rhythm: Normal rate and regular rhythm.      Pulses: Normal pulses.      Heart sounds: Normal heart sounds.   Pulmonary:      Effort: Pulmonary effort is normal.      Breath sounds: Normal breath sounds.   Abdominal:      General: Bowel sounds are normal.      Palpations: Abdomen is soft.      Comments: Perianal swelling with tenderness at 9 oclock- about a cm in size with fluctuation- no abscess.    Musculoskeletal:      Cervical back: Normal range of motion and neck supple.   Neurological:      General: No focal deficit present.      Mental Status: He is alert and oriented to person, place, and time.   Psychiatric:         Mood and Affect: Mood normal.         Behavior: Behavior normal.         Thought Content: Thought content normal.         Judgment: Judgment normal.       Assessment:     Vitals:    06/12/23 1313   BP: 138/88   Pulse: (!) 112   Temp: 99.9 °F (37.7 °C)     1.  Perianal abscess: recurrent, warm sitz bath with salt every 2-3 hrly for next few days- start Cipro orally 500 bid, refer to Dr. Choi for I&D if needed.  -     Ambulatory referral/consult to Colorectal Surgery; Future; Expected date: 06/19/2023    2. Diverticulosis  -     Ambulatory referral/consult to Colorectal Surgery; Future; Expected date: 06/19/2023    Other orders  -     ciprofloxacin HCl (CIPRO) 500 MG tablet; Take 1 tablet (500 mg total) by mouth every 12 (twelve) hours.  Dispense: 20 tablet; Refill: 1

## 2023-06-21 DIAGNOSIS — G89.4 CHRONIC PAIN SYNDROME: ICD-10-CM

## 2023-06-21 DIAGNOSIS — F98.8 ATTENTION DEFICIT DISORDER (ADD) WITHOUT HYPERACTIVITY: ICD-10-CM

## 2023-06-21 RX ORDER — HYDROCODONE BITARTRATE AND ACETAMINOPHEN 7.5; 325 MG/1; MG/1
1 TABLET ORAL EVERY 6 HOURS PRN
Qty: 120 TABLET | Refills: 0 | Status: SHIPPED | OUTPATIENT
Start: 2023-06-21 | End: 2023-07-19 | Stop reason: SDUPTHER

## 2023-06-21 RX ORDER — DEXTROAMPHETAMINE SACCHARATE, AMPHETAMINE ASPARTATE, DEXTROAMPHETAMINE SULFATE AND AMPHETAMINE SULFATE 5; 5; 5; 5 MG/1; MG/1; MG/1; MG/1
TABLET ORAL
Qty: 30 TABLET | Refills: 0 | Status: SHIPPED | OUTPATIENT
Start: 2023-06-21 | End: 2023-07-19 | Stop reason: SDUPTHER

## 2023-06-21 NOTE — TELEPHONE ENCOUNTER
No care due was identified.  [facility] Embedded Care Due Messages. Reference number: [id number].   6/21/2023 11:16:13 AM CDT

## 2023-07-07 ENCOUNTER — PATIENT MESSAGE (OUTPATIENT)
Dept: INFECTIOUS DISEASES | Facility: CLINIC | Age: 48
End: 2023-07-07
Payer: COMMERCIAL

## 2023-07-19 ENCOUNTER — OFFICE VISIT (OUTPATIENT)
Dept: INTERNAL MEDICINE | Facility: CLINIC | Age: 48
End: 2023-07-19
Payer: COMMERCIAL

## 2023-07-19 VITALS
HEART RATE: 80 BPM | DIASTOLIC BLOOD PRESSURE: 84 MMHG | HEIGHT: 70 IN | OXYGEN SATURATION: 96 % | TEMPERATURE: 97 F | BODY MASS INDEX: 35.48 KG/M2 | WEIGHT: 247.81 LBS | SYSTOLIC BLOOD PRESSURE: 124 MMHG

## 2023-07-19 DIAGNOSIS — G89.4 CHRONIC PAIN SYNDROME: ICD-10-CM

## 2023-07-19 DIAGNOSIS — F98.8 ATTENTION DEFICIT DISORDER (ADD) WITHOUT HYPERACTIVITY: ICD-10-CM

## 2023-07-19 PROCEDURE — 1159F MED LIST DOCD IN RCRD: CPT | Mod: CPTII,S$GLB,, | Performed by: PHYSICIAN ASSISTANT

## 2023-07-19 PROCEDURE — 99999 PR PBB SHADOW E&M-EST. PATIENT-LVL IV: ICD-10-PCS | Mod: PBBFAC,,, | Performed by: PHYSICIAN ASSISTANT

## 2023-07-19 PROCEDURE — 1159F PR MEDICATION LIST DOCUMENTED IN MEDICAL RECORD: ICD-10-PCS | Mod: CPTII,S$GLB,, | Performed by: PHYSICIAN ASSISTANT

## 2023-07-19 PROCEDURE — 3074F SYST BP LT 130 MM HG: CPT | Mod: CPTII,S$GLB,, | Performed by: PHYSICIAN ASSISTANT

## 2023-07-19 PROCEDURE — 99213 PR OFFICE/OUTPT VISIT, EST, LEVL III, 20-29 MIN: ICD-10-PCS | Mod: S$GLB,,, | Performed by: PHYSICIAN ASSISTANT

## 2023-07-19 PROCEDURE — 1160F RVW MEDS BY RX/DR IN RCRD: CPT | Mod: CPTII,S$GLB,, | Performed by: PHYSICIAN ASSISTANT

## 2023-07-19 PROCEDURE — 99999 PR PBB SHADOW E&M-EST. PATIENT-LVL IV: CPT | Mod: PBBFAC,,, | Performed by: PHYSICIAN ASSISTANT

## 2023-07-19 PROCEDURE — 99213 OFFICE O/P EST LOW 20 MIN: CPT | Mod: S$GLB,,, | Performed by: PHYSICIAN ASSISTANT

## 2023-07-19 PROCEDURE — 3008F BODY MASS INDEX DOCD: CPT | Mod: CPTII,S$GLB,, | Performed by: PHYSICIAN ASSISTANT

## 2023-07-19 PROCEDURE — 3074F PR MOST RECENT SYSTOLIC BLOOD PRESSURE < 130 MM HG: ICD-10-PCS | Mod: CPTII,S$GLB,, | Performed by: PHYSICIAN ASSISTANT

## 2023-07-19 PROCEDURE — 3079F PR MOST RECENT DIASTOLIC BLOOD PRESSURE 80-89 MM HG: ICD-10-PCS | Mod: CPTII,S$GLB,, | Performed by: PHYSICIAN ASSISTANT

## 2023-07-19 PROCEDURE — 3079F DIAST BP 80-89 MM HG: CPT | Mod: CPTII,S$GLB,, | Performed by: PHYSICIAN ASSISTANT

## 2023-07-19 PROCEDURE — 1160F PR REVIEW ALL MEDS BY PRESCRIBER/CLIN PHARMACIST DOCUMENTED: ICD-10-PCS | Mod: CPTII,S$GLB,, | Performed by: PHYSICIAN ASSISTANT

## 2023-07-19 PROCEDURE — 3008F PR BODY MASS INDEX (BMI) DOCUMENTED: ICD-10-PCS | Mod: CPTII,S$GLB,, | Performed by: PHYSICIAN ASSISTANT

## 2023-07-19 RX ORDER — CYCLOBENZAPRINE HCL 10 MG
10 TABLET ORAL 3 TIMES DAILY PRN
Qty: 90 TABLET | Refills: 3 | Status: SHIPPED | OUTPATIENT
Start: 2023-07-19 | End: 2023-12-20 | Stop reason: SDUPTHER

## 2023-07-19 RX ORDER — DEXTROAMPHETAMINE SACCHARATE, AMPHETAMINE ASPARTATE, DEXTROAMPHETAMINE SULFATE AND AMPHETAMINE SULFATE 5; 5; 5; 5 MG/1; MG/1; MG/1; MG/1
TABLET ORAL
Qty: 30 TABLET | Refills: 0 | Status: SHIPPED | OUTPATIENT
Start: 2023-08-20 | End: 2023-10-19

## 2023-07-19 RX ORDER — DEXTROAMPHETAMINE SACCHARATE, AMPHETAMINE ASPARTATE, DEXTROAMPHETAMINE SULFATE AND AMPHETAMINE SULFATE 5; 5; 5; 5 MG/1; MG/1; MG/1; MG/1
TABLET ORAL
Qty: 30 TABLET | Refills: 0 | Status: SHIPPED | OUTPATIENT
Start: 2023-07-19 | End: 2023-09-19 | Stop reason: SDUPTHER

## 2023-07-19 RX ORDER — HYDROCODONE BITARTRATE AND ACETAMINOPHEN 7.5; 325 MG/1; MG/1
1 TABLET ORAL EVERY 6 HOURS PRN
Qty: 120 TABLET | Refills: 0 | Status: SHIPPED | OUTPATIENT
Start: 2023-07-19 | End: 2023-08-22 | Stop reason: SDUPTHER

## 2023-07-19 RX ORDER — DEXTROAMPHETAMINE SACCHARATE, AMPHETAMINE ASPARTATE, DEXTROAMPHETAMINE SULFATE AND AMPHETAMINE SULFATE 5; 5; 5; 5 MG/1; MG/1; MG/1; MG/1
TABLET ORAL
Qty: 30 TABLET | Refills: 0 | Status: SHIPPED | OUTPATIENT
Start: 2023-09-20 | End: 2023-10-19

## 2023-07-19 NOTE — PROGRESS NOTES
Subjective:      Patient ID: Dax Carlisle is a 47 y.o. male.    Chief Complaint: 3month f/u    HPI  Here today for a 3 month follow up to get a refill on his adderall and norco 7.5.   ADHD: Currently on Adderall 20mg BID. Takes 10mg BID as needed. DOing well without any SE. No insomnia, mood swings, or palpitations.   Also on norco for chronic pain syndrome. About 120 pills per month. Pain is well controlled. NO evidence of abuse.  will be checked today.     Labs completed this year already. Pt reports that his medical problems are stable. He is up to date with healthcare maintenance.   No issues/concerns to discuss today.     Patient Active Problem List   Diagnosis    Major depressive disorder with single episode, in remission    Family history of colonic polyps    Insomnia    Chronic pain syndrome    Sleep apnea    Status post Kirsten procedure    Diverticulitis of large intestine with perforation and abscess without bleeding    Vascular disorder of intestine, unspecified    Mixed hyperlipidemia    SVT (supraventricular tachycardia)    Coccydynia    Attention deficit disorder (ADD) without hyperactivity    Obesity (BMI 30.0-34.9)    Chronic constipation    Inverted papilloma         Current Outpatient Medications:     acetaminophen (TYLENOL) 325 MG tablet, Take 325 mg by mouth every 6 (six) hours as needed for Pain., Disp: , Rfl:     albuterol (VENTOLIN HFA) 90 mcg/actuation inhaler, Inhale 2 puffs into the lungs every 6 (six) hours as needed for Wheezing. Rescue, Disp: 8.5 g, Rfl: 0    ARIPiprazole (ABILIFY) 10 MG Tab, Take 1 tablet (10 mg total) by mouth every evening., Disp: 90 tablet, Rfl: 3    azelastine (ASTELIN) 137 mcg (0.1 %) nasal spray, Use Two sprays intranasal twice a day, Disp: 30 mL, Rfl: 0    ciprofloxacin HCl (CIPRO) 500 MG tablet, Take 1 tablet (500 mg total) by mouth every 12 (twelve) hours., Disp: 20 tablet, Rfl: 1    EScitalopram oxalate (LEXAPRO) 20 MG tablet, Take 1 tablet (20  mg total) by mouth every evening., Disp: 90 tablet, Rfl: 3    linaclotide 290 mcg Cap, Take 1 capsule (290 mcg total) by mouth once daily. (Patient taking differently: Take 290 mcg by mouth daily as needed.), Disp: 90 capsule, Rfl: 1    loratadine (CLARITIN) 10 mg tablet, Take 10 mg by mouth daily as needed. , Disp: , Rfl:     metoprolol succinate (TOPROL-XL) 25 MG 24 hr tablet, Take 1 tablet (25 mg total) by mouth every evening., Disp: 90 tablet, Rfl: 3    cyclobenzaprine (FLEXERIL) 10 MG tablet, Take 1 tablet (10 mg total) by mouth 3 (three) times daily as needed for Muscle spasms., Disp: 90 tablet, Rfl: 3    dextroamphetamine-amphetamine (ADDERALL) 20 mg tablet, Take 0.5 tablet (10 mg) by mouth 2 (two) times a day as needed, Disp: 30 tablet, Rfl: 0    [START ON 8/20/2023] dextroamphetamine-amphetamine (ADDERALL) 20 mg tablet, Take 0.5tablet (10mg) by mouth 2 (two) times a day as needed, Disp: 30 tablet, Rfl: 0    [START ON 9/20/2023] dextroamphetamine-amphetamine (ADDERALL) 20 mg tablet, Take 0.5 tablet (10mg) by mouth 2 (two) times a day as needed, Disp: 30 tablet, Rfl: 0    HYDROcodone-acetaminophen (NORCO) 7.5-325 mg per tablet, Take 1 tablet by mouth every 6 (six) hours as needed for Pain., Disp: 120 tablet, Rfl: 0    Review of Systems   Constitutional:  Negative for activity change, appetite change, chills, diaphoresis, fatigue, fever and unexpected weight change.   HENT: Negative.  Negative for congestion, hearing loss, postnasal drip, rhinorrhea, sore throat, trouble swallowing and voice change.    Eyes: Negative.  Negative for visual disturbance.   Respiratory: Negative.  Negative for cough, choking, chest tightness and shortness of breath.    Cardiovascular:  Negative for chest pain, palpitations and leg swelling.   Gastrointestinal:  Negative for abdominal distention, abdominal pain, blood in stool, constipation, diarrhea, nausea and vomiting.   Endocrine: Negative for cold intolerance, heat  "intolerance, polydipsia and polyuria.   Genitourinary: Negative.  Negative for difficulty urinating and frequency.   Musculoskeletal:  Positive for arthralgias, back pain and myalgias. Negative for gait problem, joint swelling, neck pain and neck stiffness.   Skin:  Negative for color change, pallor, rash and wound.   Neurological:  Negative for dizziness, tremors, weakness, light-headedness, numbness and headaches.   Hematological:  Negative for adenopathy.   Psychiatric/Behavioral:  Positive for decreased concentration. Negative for behavioral problems, confusion, dysphoric mood, hallucinations, self-injury, sleep disturbance and suicidal ideas. The patient is not nervous/anxious and is not hyperactive.    Objective:   /84 (BP Location: Left arm, Patient Position: Sitting, BP Method: Large (Manual))   Pulse 80   Temp 96.8 °F (36 °C) (Tympanic)   Ht 5' 10" (1.778 m)   Wt 112.4 kg (247 lb 12.8 oz)   SpO2 96%   BMI 35.56 kg/m²     Physical Exam  Vitals and nursing note reviewed.   Constitutional:       General: He is not in acute distress.     Appearance: Normal appearance. He is well-developed. He is not ill-appearing, toxic-appearing or diaphoretic.   HENT:      Head: Normocephalic and atraumatic.   Cardiovascular:      Rate and Rhythm: Normal rate and regular rhythm.      Heart sounds: Normal heart sounds. No murmur heard.    No friction rub. No gallop.   Pulmonary:      Effort: Pulmonary effort is normal. No respiratory distress.      Breath sounds: Normal breath sounds. No wheezing or rales.   Skin:     General: Skin is warm.      Findings: No rash.   Neurological:      Mental Status: He is alert and oriented to person, place, and time.   Psychiatric:         Mood and Affect: Mood normal.         Behavior: Behavior normal.         Thought Content: Thought content normal.         Judgment: Judgment normal.       Assessment:     1. Attention deficit disorder (ADD) without hyperactivity    2. Chronic " pain syndrome      Plan:   Attention deficit disorder (ADD) without hyperactivity  -     dextroamphetamine-amphetamine (ADDERALL) 20 mg tablet; Take 0.5 tablet (10 mg) by mouth 2 (two) times a day as needed  Dispense: 30 tablet; Refill: 0  -     dextroamphetamine-amphetamine (ADDERALL) 20 mg tablet; Take 0.5tablet (10mg) by mouth 2 (two) times a day as needed  Dispense: 30 tablet; Refill: 0  -     dextroamphetamine-amphetamine (ADDERALL) 20 mg tablet; Take 0.5 tablet (10mg) by mouth 2 (two) times a day as needed  Dispense: 30 tablet; Refill: 0    Chronic pain syndrome  -     cyclobenzaprine (FLEXERIL) 10 MG tablet; Take 1 tablet (10 mg total) by mouth 3 (three) times daily as needed for Muscle spasms.  Dispense: 90 tablet; Refill: 3    - checked and appropriate.   -will have MD fill his norco. Last filled 6/23/23.     Follow up in about 3 months (around 10/19/2023), or if symptoms worsen or fail to improve.

## 2023-07-24 ENCOUNTER — TELEPHONE (OUTPATIENT)
Dept: SURGERY | Facility: CLINIC | Age: 48
End: 2023-07-24
Payer: COMMERCIAL

## 2023-07-24 ENCOUNTER — PATIENT MESSAGE (OUTPATIENT)
Dept: SURGERY | Facility: CLINIC | Age: 48
End: 2023-07-24
Payer: COMMERCIAL

## 2023-07-24 NOTE — TELEPHONE ENCOUNTER
Spoke with patient. Patient scheduled for appointment with Christelle QUAN for Wednesday 7/26. Patient verbalized understanding.     ----- Message from Montse Alvarado sent at 7/24/2023  8:09 AM CDT -----  Contact: Gerardo Verduzco @[phone]  PT States that he was informed by his PCP to call the office to let the nurse know that he needs to have surgery this week. Please call to advise.

## 2023-07-26 ENCOUNTER — OFFICE VISIT (OUTPATIENT)
Dept: SURGERY | Facility: CLINIC | Age: 48
End: 2023-07-26
Payer: COMMERCIAL

## 2023-07-26 VITALS
HEART RATE: 85 BPM | WEIGHT: 243.19 LBS | BODY MASS INDEX: 34.89 KG/M2 | TEMPERATURE: 98 F | OXYGEN SATURATION: 95 % | DIASTOLIC BLOOD PRESSURE: 81 MMHG | SYSTOLIC BLOOD PRESSURE: 118 MMHG

## 2023-07-26 DIAGNOSIS — K61.1 PERIRECTAL ABSCESS: Primary | ICD-10-CM

## 2023-07-26 PROCEDURE — 1159F MED LIST DOCD IN RCRD: CPT | Mod: CPTII,S$GLB,,

## 2023-07-26 PROCEDURE — 99213 PR OFFICE/OUTPT VISIT, EST, LEVL III, 20-29 MIN: ICD-10-PCS | Mod: S$GLB,,,

## 2023-07-26 PROCEDURE — 3079F PR MOST RECENT DIASTOLIC BLOOD PRESSURE 80-89 MM HG: ICD-10-PCS | Mod: CPTII,S$GLB,,

## 2023-07-26 PROCEDURE — 3008F BODY MASS INDEX DOCD: CPT | Mod: CPTII,S$GLB,,

## 2023-07-26 PROCEDURE — 99999 PR PBB SHADOW E&M-EST. PATIENT-LVL IV: CPT | Mod: PBBFAC,,,

## 2023-07-26 PROCEDURE — 99213 OFFICE O/P EST LOW 20 MIN: CPT | Mod: S$GLB,,,

## 2023-07-26 PROCEDURE — 3074F SYST BP LT 130 MM HG: CPT | Mod: CPTII,S$GLB,,

## 2023-07-26 PROCEDURE — 99999 PR PBB SHADOW E&M-EST. PATIENT-LVL IV: ICD-10-PCS | Mod: PBBFAC,,,

## 2023-07-26 PROCEDURE — 3074F PR MOST RECENT SYSTOLIC BLOOD PRESSURE < 130 MM HG: ICD-10-PCS | Mod: CPTII,S$GLB,,

## 2023-07-26 PROCEDURE — 3008F PR BODY MASS INDEX (BMI) DOCUMENTED: ICD-10-PCS | Mod: CPTII,S$GLB,,

## 2023-07-26 PROCEDURE — 1160F PR REVIEW ALL MEDS BY PRESCRIBER/CLIN PHARMACIST DOCUMENTED: ICD-10-PCS | Mod: CPTII,S$GLB,,

## 2023-07-26 PROCEDURE — 1159F PR MEDICATION LIST DOCUMENTED IN MEDICAL RECORD: ICD-10-PCS | Mod: CPTII,S$GLB,,

## 2023-07-26 PROCEDURE — 3079F DIAST BP 80-89 MM HG: CPT | Mod: CPTII,S$GLB,,

## 2023-07-26 PROCEDURE — 1160F RVW MEDS BY RX/DR IN RCRD: CPT | Mod: CPTII,S$GLB,,

## 2023-07-26 RX ORDER — AMOXICILLIN AND CLAVULANATE POTASSIUM 875; 125 MG/1; MG/1
1 TABLET, FILM COATED ORAL 2 TIMES DAILY
Qty: 14 TABLET | Refills: 0 | Status: SHIPPED | OUTPATIENT
Start: 2023-07-26 | End: 2023-08-02

## 2023-07-26 NOTE — PROGRESS NOTES
History & Physical    SUBJECTIVE:     History of Present Illness:  Patient is a 47 y.o. male presents for evaluation colostomy reversal.  Patient had perforated sigmoid diverticulitis in October 2019 requiring Kirsten's procedure. He did have a postoperative abscess or required IR drainage but has recovered well since that time.  He is currently tolerating a regular diet and having good colostomy function.  He denies any leaks.  His last colonoscopy was in 2016 and he has no history of polyps.  He does have a positive family history of colorectal cancer in his grandmother as well as colon polyps in his mother.  He denies any current bleeding per rectum or from his ostomy.  He currently denies any fever, chills, nausea, vomiting.  He does endorse low transit constipation reports a history of IBS-C. Reports weak urine stream currently since last operation, as well as some lateral abdominal wall pain since surgery. I have discussed this case personally with his referring provider and reviewed all his previous medical records.    Oct 2019: Kirsten's procedure  1/30/2020: Kirsten's reversal with partial LAR due to rectal tear during surgery  9/10/2021: EUA, perirectal abscess internal I&D  01/06/2022: EUA, perirectal abscess I&D    Interval history:  Since the last visit, the patient underwent has developed another perianal pain and swelling concerning for perirectal abscess. This episode developed on Friday. 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 Underwent EUA with I&D with Dr. Choi in 2021 and again 2022 with no fistula noted. States that he thinks that he has developed another fistula. Pain and swelling located on left lateral area. States that he made this appointment to get some antibiotics. However, yesterday, the abscess began to drain SS and some purulent discharge. Has felt better since spontaneously draining. States that he has had repeat swelling, pain,  drainage twice this past month in the last month in the same spot. States that he has noticed that the symptoms happen only when he has diarrhea, happens every time he has diarrhea. On linzess for IBS-C. Denies nausea, vomiting, fevers, chills, abdominal pain.     Review of patient's allergies indicates:   Allergen Reactions    Codeine Other (See Comments)     violent    Iodinated contrast media Swelling     Tongue, right eye, lips swelling. Rash on abdomen and axilla    Nuts [tree nut] Anaphylaxis    Dairy aid [lactase] Diarrhea and Nausea And Vomiting    Morphine      Tolerated hydromorphone in October 2019.        Current Outpatient Medications   Medication Sig Dispense Refill    acetaminophen (TYLENOL) 325 MG tablet Take 325 mg by mouth every 6 (six) hours as needed for Pain.      albuterol (VENTOLIN HFA) 90 mcg/actuation inhaler Inhale 2 puffs into the lungs every 6 (six) hours as needed for Wheezing. Rescue 8.5 g 0    amoxicillin-clavulanate 875-125mg (AUGMENTIN) 875-125 mg per tablet Take 1 tablet by mouth 2 (two) times daily. for 7 days 14 tablet 0    ARIPiprazole (ABILIFY) 10 MG Tab Take 1 tablet (10 mg total) by mouth every evening. 90 tablet 3    azelastine (ASTELIN) 137 mcg (0.1 %) nasal spray Use Two sprays intranasal twice a day 30 mL 0    ciprofloxacin HCl (CIPRO) 500 MG tablet Take 1 tablet (500 mg total) by mouth every 12 (twelve) hours. 20 tablet 1    cyclobenzaprine (FLEXERIL) 10 MG tablet Take 1 tablet (10 mg total) by mouth 3 (three) times daily as needed for Muscle spasms. 90 tablet 3    dextroamphetamine-amphetamine (ADDERALL) 20 mg tablet Take 0.5 tablet (10 mg) by mouth 2 (two) times a day as needed 30 tablet 0    [START ON 8/20/2023] dextroamphetamine-amphetamine (ADDERALL) 20 mg tablet Take 0.5tablet (10mg) by mouth 2 (two) times a day as needed 30 tablet 0    [START ON 9/20/2023] dextroamphetamine-amphetamine (ADDERALL) 20 mg tablet Take 0.5 tablet (10mg) by mouth 2 (two) times a day as  needed 30 tablet 0    EScitalopram oxalate (LEXAPRO) 20 MG tablet Take 1 tablet (20 mg total) by mouth every evening. 90 tablet 3    HYDROcodone-acetaminophen (NORCO) 7.5-325 mg per tablet Take 1 tablet by mouth every 6 (six) hours as needed for Pain. 120 tablet 0    linaclotide 290 mcg Cap Take 1 capsule (290 mcg total) by mouth once daily. (Patient taking differently: Take 290 mcg by mouth daily as needed.) 90 capsule 1    loratadine (CLARITIN) 10 mg tablet Take 10 mg by mouth daily as needed.       metoprolol succinate (TOPROL-XL) 25 MG 24 hr tablet Take 1 tablet (25 mg total) by mouth every evening. 90 tablet 3     No current facility-administered medications for this visit.       Past Medical History:   Diagnosis Date    Angioedema of lips 03/13/2015    IVP dye allergy - swelling lips, eye, tongue    Asthma     childhood    Depression     Diverticulitis 10/07/2019    Kidney stones     Sleep apnea     cpap not required/ patient states this has resolved with weight loss    SVT (supraventricular tachycardia)      Past Surgical History:   Procedure Laterality Date    ABDOMINAL WASHOUT N/A 10/7/2019    Procedure: LAVAGE, PERITONEAL, THERAPEUTIC;  Surgeon: Mindy Goff MD;  Location: [facility] OR;  Service: General;  Laterality: N/A;  abdomen washout    ABSCESS DRAINAGE N/A 9/10/2021    Procedure: DRAINAGE, ABSCESS;  Surgeon: Kadeem Choi MD;  Location: [facility] OR;  Service: General;  Laterality: N/A;  perirectal    anal fistula repair      APPENDECTOMY      BACK SURGERY      BIOPSY, INTRANASAL, ENDOSCOPIC N/A 2/15/2023    Procedure: BIOPSY, INTRANASAL, ENDOSCOPIC;  Surgeon: Uriel Vasquez MD;  Location: [facility] OR;  Service: ENT;  Laterality: N/A;    CHOLECYSTECTOMY  2005    COLONOSCOPY N/A 3/10/2016    Procedure: COLONOSCOPY;  Surgeon: Juvenal Dinero MD;  Location: [facility];  Service: Endoscopy;  Laterality: N/A;    COLONOSCOPY N/A 1/17/2020    Procedure: COLONOSCOPY;  Surgeon: Kadeem Choi MD;   Location: [facility] ENDO;  Service: General;  Laterality: N/A;    COLOSTOMY N/A 10/7/2019    Procedure: CREATION, COLOSTOMY;  Surgeon: Mindy Goff MD;  Location: [facility] OR;  Service: General;  Laterality: N/A;    COLOSTOMY REVERSAL      EXAMINATION UNDER ANESTHESIA N/A 9/10/2021    Procedure: Exam under anesthesia;  Surgeon: Kadeem Choi MD;  Location: [facility] OR;  Service: General;  Laterality: N/A;    EXAMINATION UNDER ANESTHESIA N/A 1/6/2022    Procedure: Exam under anesthesia, perirectal abscess I&D;  Surgeon: Kadeem Choi MD;  Location: [facility] OR;  Service: General;  Laterality: N/A;    FUNCTIONAL ENDOSCOPIC SINUS SURGERY (FESS) USING COMPUTER-ASSISTED NAVIGATION N/A 2/15/2023    Procedure: FESS, USING COMPUTER-ASSISTED NAVIGATION;  Surgeon: Uriel Vasquez MD;  Location: [facility] OR;  Service: ENT;  Laterality: N/A;    SIVAKUMAR PROCEDURE N/A 10/7/2019    Procedure: SIVAKUMAR PROCEDURE;  Surgeon: Mindy Goff MD;  Location: [facility] OR;  Service: General;  Laterality: N/A;    INJECTION OF ANESTHETIC AGENT AROUND GANGLION IMPAR N/A 3/15/2021    Procedure: BLOCK, GANGLION IMPAR;  Surgeon: Ruben Cabrera MD;  Location: [facility] MGT;  Service: Pain Management;  Laterality: N/A;    INJECTION OF ANESTHETIC AGENT AROUND PUDENDAL NERVE N/A 9/10/2021    Procedure: BLOCK, NERVE, PUDENDAL;  Surgeon: Kadeem Choi MD;  Location: [facility] OR;  Service: General;  Laterality: N/A;    INJECTION OF ANESTHETIC AGENT AROUND PUDENDAL NERVE N/A 1/6/2022    Procedure: BLOCK, NERVE, PUDENDAL;  Surgeon: Kadeem Choi MD;  Location: [facility] OR;  Service: General;  Laterality: N/A;    INJECTION OF ANESTHETIC AGENT INTO TISSUE PLANE DEFINED BY TRANSVERSUS ABDOMINIS MUSCLE N/A 1/30/2020    Procedure: BLOCK, TRANSVERSUS ABDOMINIS PLANE;  Surgeon: Kadeem Choi MD;  Location: [facility] OR;  Service: General;  Laterality: N/A;    KNEE SURGERY Right     KNEE SURGERY Left     LYSIS OF ADHESIONS N/A 1/30/2020    Procedure: LYSIS,  ADHESIONS;  Surgeon: Kadeem Choi MD;  Location: [facility] OR;  Service: General;  Laterality: N/A;    PILONIDAL CYST DRAINAGE      pt has had 6 of these adn has had revisions    PROCTECTOMY N/A 1/30/2020    Procedure: PROCTECTOMY;  Surgeon: Kadeem Choi MD;  Location: [facility] OR;  Service: General;  Laterality: N/A;  Partial    SINUSOTOMY, SPHENOID SINUS, ENDOSCOPIC Right 2/15/2023    Procedure: SINUSOTOMY, SPHENOID SINUS, ENDOSCOPIC;  Surgeon: Uriel Vasquez MD;  Location: [facility] OR;  Service: ENT;  Laterality: Right;    SUBTOTAL COLECTOMY  1/30/2020    Procedure: COLECTOMY, PARTIAL;  Surgeon: Kadeem Choi MD;  Location: [facility] OR;  Service: General;;     Family History   Problem Relation Age of Onset    Diabetes Mother     Colon polyps Mother     Alzheimer's disease Mother     Heart disease Father         CABG age 66    Cancer Father         melanoma    Diabetes Father     Anesthesia problems Father         d/t polio    Colon polyps Sister     Diabetes Maternal Grandmother     Colon cancer Maternal Grandmother     Diabetes Maternal Grandfather     Diabetes Paternal Grandmother     Stroke Paternal Grandmother     Diabetes Paternal Grandfather      Social History     Tobacco Use    Smoking status: Never    Smokeless tobacco: Never   Substance Use Topics    Alcohol use: Never    Drug use: No        Review of Systems:  Review of Systems   Constitutional:  Negative for activity change, appetite change, chills, fatigue, fever and unexpected weight change.   HENT:  Negative for congestion, ear pain, sore throat and trouble swallowing.    Eyes:  Negative for pain, redness and itching.   Respiratory:  Negative for cough, shortness of breath and wheezing.    Cardiovascular:  Negative for chest pain, palpitations and leg swelling.   Gastrointestinal:  Positive for rectal pain. Negative for abdominal distention, abdominal pain, anal bleeding, blood in stool, constipation, diarrhea, nausea and vomiting.   Endocrine:  Negative for cold intolerance, heat intolerance and polyuria.   Genitourinary:  Negative for dysuria, flank pain, frequency and hematuria.   Musculoskeletal:  Negative for back pain, gait problem, joint swelling and neck pain.   Skin:  Negative for color change, rash and wound.   Allergic/Immunologic: Negative for environmental allergies and immunocompromised state.   Neurological:  Negative for dizziness, speech difficulty, weakness and numbness.   Psychiatric/Behavioral:  Negative for agitation, confusion and hallucinations.      OBJECTIVE:     Vital Signs (Most Recent)  Temp: 98.1 °F (36.7 °C) (07/26/23 0845)  Pulse: 85 (07/26/23 0845)  BP: 118/81 (07/26/23 0845)  SpO2: 95 % (07/26/23 0845)     110.3 kg (243 lb 2.7 oz)     Physical Exam:  Physical Exam  Vitals reviewed. Exam conducted with a chaperone present.   Constitutional:       Appearance: He is well-developed.   HENT:      Head: Normocephalic and atraumatic.   Eyes:      Conjunctiva/sclera: Conjunctivae normal.   Neck:      Thyroid: No thyromegaly.   Cardiovascular:      Rate and Rhythm: Normal rate and regular rhythm.   Pulmonary:      Effort: Pulmonary effort is normal. No respiratory distress.   Abdominal:      General: There is no distension.      Palpations: Abdomen is soft. There is no mass.   Genitourinary:     Comments: Anorectal: left lateral external abscess with drainage opening at perianal area just outside anal verge, some induration present, no expressible drainage. KELLIE deferred due to pain  Musculoskeletal:         General: No tenderness. Normal range of motion.      Cervical back: Normal range of motion.   Skin:     General: Skin is warm and dry.      Capillary Refill: Capillary refill takes less than 2 seconds.      Findings: No rash.   Neurological:      Mental Status: He is alert and oriented to person, place, and time.     Diagnostics:   Colonoscopy 01/2020- polyp, repeat recommended 5 years    ASSESSMENT/PLAN:     46yo M with previous  perforated sigmoid diverticulitis requiring Kirsten's procedure in October 2019 who is now s/p Kirsten's reversal and LAR on 1/30/2020 who recovered well postop but had new perianal/perirectal abscess requiring internal I&D in Sept 2021 and recurred in January 2022 requiring another I&D.     - spontaneous drainage of perianal abscess, no area of fluctuance needing I&D at this time. Patient feeling better since spontaneous drainage.  - recommended addition of fiber supplement to diet to avoid diarrhea  - RTC with Dr. Choi in near future after current flare for eval for possible fistulotomy     Christelle Turner PA-C  Colon and Rectal Surgery  [facility]

## 2023-07-30 NOTE — TELEPHONE ENCOUNTER
I returned call to pt in regards to having to leave. Pt stated that he would like to reschedule for Monday anytime. I rescheduled pt appointment for Monday at 11am. //BJ   monitor LFTs  GI follow up cath lab/Telemetry

## 2023-08-14 ENCOUNTER — OFFICE VISIT (OUTPATIENT)
Dept: SURGERY | Facility: CLINIC | Age: 48
End: 2023-08-14
Payer: COMMERCIAL

## 2023-08-14 ENCOUNTER — LAB VISIT (OUTPATIENT)
Dept: LAB | Facility: HOSPITAL | Age: 48
End: 2023-08-14
Attending: COLON & RECTAL SURGERY
Payer: COMMERCIAL

## 2023-08-14 VITALS
HEART RATE: 100 BPM | OXYGEN SATURATION: 95 % | TEMPERATURE: 98 F | BODY MASS INDEX: 34.72 KG/M2 | SYSTOLIC BLOOD PRESSURE: 114 MMHG | DIASTOLIC BLOOD PRESSURE: 81 MMHG | WEIGHT: 242 LBS

## 2023-08-14 DIAGNOSIS — K60.4 PERIRECTAL FISTULA: ICD-10-CM

## 2023-08-14 DIAGNOSIS — K61.1 PERIRECTAL ABSCESS: ICD-10-CM

## 2023-08-14 DIAGNOSIS — K60.4 PERIRECTAL FISTULA: Primary | ICD-10-CM

## 2023-08-14 LAB
BASOPHILS # BLD AUTO: 0.05 K/UL (ref 0–0.2)
BASOPHILS NFR BLD: 0.8 % (ref 0–1.9)
DIFFERENTIAL METHOD: ABNORMAL
EOSINOPHIL # BLD AUTO: 0.1 K/UL (ref 0–0.5)
EOSINOPHIL NFR BLD: 2 % (ref 0–8)
ERYTHROCYTE [DISTWIDTH] IN BLOOD BY AUTOMATED COUNT: 12.8 % (ref 11.5–14.5)
HCT VFR BLD AUTO: 39.7 % (ref 40–54)
HGB BLD-MCNC: 13.4 G/DL (ref 14–18)
IMM GRANULOCYTES # BLD AUTO: 0.02 K/UL (ref 0–0.04)
IMM GRANULOCYTES NFR BLD AUTO: 0.3 % (ref 0–0.5)
LYMPHOCYTES # BLD AUTO: 2.2 K/UL (ref 1–4.8)
LYMPHOCYTES NFR BLD: 37 % (ref 18–48)
MCH RBC QN AUTO: 28.6 PG (ref 27–31)
MCHC RBC AUTO-ENTMCNC: 33.8 G/DL (ref 32–36)
MCV RBC AUTO: 85 FL (ref 82–98)
MONOCYTES # BLD AUTO: 0.7 K/UL (ref 0.3–1)
MONOCYTES NFR BLD: 11.8 % (ref 4–15)
NEUTROPHILS # BLD AUTO: 2.9 K/UL (ref 1.8–7.7)
NEUTROPHILS NFR BLD: 48.1 % (ref 38–73)
NRBC BLD-RTO: 0 /100 WBC
PLATELET # BLD AUTO: 175 K/UL (ref 150–450)
PMV BLD AUTO: 12.6 FL (ref 9.2–12.9)
RBC # BLD AUTO: 4.69 M/UL (ref 4.6–6.2)
WBC # BLD AUTO: 5.95 K/UL (ref 3.9–12.7)

## 2023-08-14 PROCEDURE — 80053 COMPREHEN METABOLIC PANEL: CPT | Performed by: COLON & RECTAL SURGERY

## 2023-08-14 PROCEDURE — 85025 COMPLETE CBC W/AUTO DIFF WBC: CPT | Performed by: COLON & RECTAL SURGERY

## 2023-08-14 PROCEDURE — 3008F PR BODY MASS INDEX (BMI) DOCUMENTED: ICD-10-PCS | Mod: CPTII,S$GLB,, | Performed by: COLON & RECTAL SURGERY

## 2023-08-14 PROCEDURE — 3079F PR MOST RECENT DIASTOLIC BLOOD PRESSURE 80-89 MM HG: ICD-10-PCS | Mod: CPTII,S$GLB,, | Performed by: COLON & RECTAL SURGERY

## 2023-08-14 PROCEDURE — 36415 COLL VENOUS BLD VENIPUNCTURE: CPT | Performed by: COLON & RECTAL SURGERY

## 2023-08-14 PROCEDURE — 99214 OFFICE O/P EST MOD 30 MIN: CPT | Mod: 25,S$GLB,, | Performed by: COLON & RECTAL SURGERY

## 2023-08-14 PROCEDURE — 1159F PR MEDICATION LIST DOCUMENTED IN MEDICAL RECORD: ICD-10-PCS | Mod: CPTII,S$GLB,, | Performed by: COLON & RECTAL SURGERY

## 2023-08-14 PROCEDURE — 1159F MED LIST DOCD IN RCRD: CPT | Mod: CPTII,S$GLB,, | Performed by: COLON & RECTAL SURGERY

## 2023-08-14 PROCEDURE — 99999 PR PBB SHADOW E&M-EST. PATIENT-LVL V: ICD-10-PCS | Mod: PBBFAC,,, | Performed by: COLON & RECTAL SURGERY

## 2023-08-14 PROCEDURE — 99214 PR OFFICE/OUTPT VISIT, EST, LEVL IV, 30-39 MIN: ICD-10-PCS | Mod: 25,S$GLB,, | Performed by: COLON & RECTAL SURGERY

## 2023-08-14 PROCEDURE — 3074F SYST BP LT 130 MM HG: CPT | Mod: CPTII,S$GLB,, | Performed by: COLON & RECTAL SURGERY

## 2023-08-14 PROCEDURE — 46600 DIAGNOSTIC ANOSCOPY SPX: CPT | Mod: S$GLB,,, | Performed by: COLON & RECTAL SURGERY

## 2023-08-14 PROCEDURE — 99999 PR PBB SHADOW E&M-EST. PATIENT-LVL V: CPT | Mod: PBBFAC,,, | Performed by: COLON & RECTAL SURGERY

## 2023-08-14 PROCEDURE — 3008F BODY MASS INDEX DOCD: CPT | Mod: CPTII,S$GLB,, | Performed by: COLON & RECTAL SURGERY

## 2023-08-14 PROCEDURE — 3079F DIAST BP 80-89 MM HG: CPT | Mod: CPTII,S$GLB,, | Performed by: COLON & RECTAL SURGERY

## 2023-08-14 PROCEDURE — 3074F PR MOST RECENT SYSTOLIC BLOOD PRESSURE < 130 MM HG: ICD-10-PCS | Mod: CPTII,S$GLB,, | Performed by: COLON & RECTAL SURGERY

## 2023-08-14 PROCEDURE — 46600 PR DIAG2STIC A2SCOPY: ICD-10-PCS | Mod: S$GLB,,, | Performed by: COLON & RECTAL SURGERY

## 2023-08-14 RX ORDER — ONDANSETRON 2 MG/ML
4 INJECTION INTRAMUSCULAR; INTRAVENOUS EVERY 12 HOURS PRN
Status: CANCELLED | OUTPATIENT
Start: 2023-08-14

## 2023-08-14 RX ORDER — SODIUM CHLORIDE, SODIUM LACTATE, POTASSIUM CHLORIDE, CALCIUM CHLORIDE 600; 310; 30; 20 MG/100ML; MG/100ML; MG/100ML; MG/100ML
INJECTION, SOLUTION INTRAVENOUS CONTINUOUS
Status: CANCELLED | OUTPATIENT
Start: 2023-08-14

## 2023-08-14 NOTE — PROGRESS NOTES
History & Physical    SUBJECTIVE:     CC: Discuss colostomy reversal  Ref: Mindy Goff MD    History of Present Illness:  Patient is a 47 y.o. male presents for evaluation colostomy reversal.  Patient had perforated sigmoid diverticulitis in October 2019 requiring Kirsten's procedure. He did have a postoperative abscess or required IR drainage but has recovered well since that time.  He is currently tolerating a regular diet and having good colostomy function.  He denies any leaks.  His last colonoscopy was in 2016 and he has no history of polyps.  He does have a positive family history of colorectal cancer in his grandmother as well as colon polyps in his mother.  He denies any current bleeding per rectum or from his ostomy.  He currently denies any fever, chills, nausea, vomiting.  He does endorse low transit constipation reports a history of IBS-C. Reports weak urine stream currently since last operation, as well as some lateral abdominal wall pain since surgery. I have discussed this case personally with his referring provider and reviewed all his previous medical records.    Oct 2019: Kirsten's procedure  1/30/2020: Kirsten's reversal with partial LAR due to rectal tear during surgery  9/10/2021: EUA, perirectal abscess internal I&D  01/06/2022: EUA, perirectal abscess I&D      Interval history:  Since the last visit, the patient has had a recurrent cycle of swelling, pain and drainage near his anus near the previous abscess site consistent with possible fistula.  He endorses swelling near his anus and pain with relief once the area ruptures.  Denies any fever, chills, nausea, vomiting.  Reports his last cycle happened around a few weeks ago.    Review of patient's allergies indicates:   Allergen Reactions    Codeine Other (See Comments)     violent    Iodinated contrast media Swelling     Tongue, right eye, lips swelling. Rash on abdomen and axilla    Nuts [tree nut] Anaphylaxis    Dairy aid [lactase]  Diarrhea and Nausea And Vomiting    Morphine      Tolerated hydromorphone in October 2019.        Current Outpatient Medications   Medication Sig Dispense Refill    acetaminophen (TYLENOL) 325 MG tablet Take 325 mg by mouth every 6 (six) hours as needed for Pain.      albuterol (VENTOLIN HFA) 90 mcg/actuation inhaler Inhale 2 puffs into the lungs every 6 (six) hours as needed for Wheezing. Rescue 8.5 g 0    ARIPiprazole (ABILIFY) 10 MG Tab Take 1 tablet (10 mg total) by mouth every evening. 90 tablet 3    azelastine (ASTELIN) 137 mcg (0.1 %) nasal spray Use Two sprays intranasal twice a day 30 mL 0    ciprofloxacin HCl (CIPRO) 500 MG tablet Take 1 tablet (500 mg total) by mouth every 12 (twelve) hours. 20 tablet 1    cyclobenzaprine (FLEXERIL) 10 MG tablet Take 1 tablet (10 mg total) by mouth 3 (three) times daily as needed for Muscle spasms. 90 tablet 3    dextroamphetamine-amphetamine (ADDERALL) 20 mg tablet Take 0.5 tablet (10 mg) by mouth 2 (two) times a day as needed 30 tablet 0    [START ON 8/20/2023] dextroamphetamine-amphetamine (ADDERALL) 20 mg tablet Take 0.5tablet (10mg) by mouth 2 (two) times a day as needed 30 tablet 0    [START ON 9/20/2023] dextroamphetamine-amphetamine (ADDERALL) 20 mg tablet Take 0.5 tablet (10mg) by mouth 2 (two) times a day as needed 30 tablet 0    EScitalopram oxalate (LEXAPRO) 20 MG tablet Take 1 tablet (20 mg total) by mouth every evening. 90 tablet 3    HYDROcodone-acetaminophen (NORCO) 7.5-325 mg per tablet Take 1 tablet by mouth every 6 (six) hours as needed for Pain. 120 tablet 0    linaclotide 290 mcg Cap Take 1 capsule (290 mcg total) by mouth once daily. (Patient taking differently: Take 290 mcg by mouth daily as needed.) 90 capsule 1    loratadine (CLARITIN) 10 mg tablet Take 10 mg by mouth daily as needed.       metoprolol succinate (TOPROL-XL) 25 MG 24 hr tablet Take 1 tablet (25 mg total) by mouth every evening. 90 tablet 3     No current facility-administered  medications for this visit.       Past Medical History:   Diagnosis Date    Angioedema of lips 03/13/2015    IVP dye allergy - swelling lips, eye, tongue    Asthma     childhood    Depression     Diverticulitis 10/07/2019    Kidney stones     Sleep apnea     cpap not required/ patient states this has resolved with weight loss    SVT (supraventricular tachycardia)      Past Surgical History:   Procedure Laterality Date    ABDOMINAL WASHOUT N/A 10/7/2019    Procedure: LAVAGE, PERITONEAL, THERAPEUTIC;  Surgeon: Mindy Goff MD;  Location: [facility] OR;  Service: General;  Laterality: N/A;  abdomen washout    ABSCESS DRAINAGE N/A 9/10/2021    Procedure: DRAINAGE, ABSCESS;  Surgeon: Kadeem Choi MD;  Location: [facility] OR;  Service: General;  Laterality: N/A;  perirectal    anal fistula repair      APPENDECTOMY      BACK SURGERY      BIOPSY, INTRANASAL, ENDOSCOPIC N/A 2/15/2023    Procedure: BIOPSY, INTRANASAL, ENDOSCOPIC;  Surgeon: Uriel Vasquez MD;  Location: [facility] OR;  Service: ENT;  Laterality: N/A;    CHOLECYSTECTOMY  2005    COLONOSCOPY N/A 3/10/2016    Procedure: COLONOSCOPY;  Surgeon: Juvenal Dinero MD;  Location: [facility] ENDO;  Service: Endoscopy;  Laterality: N/A;    COLONOSCOPY N/A 1/17/2020    Procedure: COLONOSCOPY;  Surgeon: Kadeem Choi MD;  Location: [facility] ENDO;  Service: General;  Laterality: N/A;    COLOSTOMY N/A 10/7/2019    Procedure: CREATION, COLOSTOMY;  Surgeon: Mindy Goff MD;  Location: [facility] OR;  Service: General;  Laterality: N/A;    COLOSTOMY REVERSAL      EXAMINATION UNDER ANESTHESIA N/A 9/10/2021    Procedure: Exam under anesthesia;  Surgeon: Kadeem Choi MD;  Location: [facility] OR;  Service: General;  Laterality: N/A;    EXAMINATION UNDER ANESTHESIA N/A 1/6/2022    Procedure: Exam under anesthesia, perirectal abscess I&D;  Surgeon: Kadeem Choi MD;  Location: [facility] OR;  Service: General;  Laterality: N/A;    FUNCTIONAL ENDOSCOPIC SINUS SURGERY (FESS) USING  COMPUTER-ASSISTED NAVIGATION N/A 2/15/2023    Procedure: FESS, USING COMPUTER-ASSISTED NAVIGATION;  Surgeon: Uriel Vasquez MD;  Location: [facility] OR;  Service: ENT;  Laterality: N/A;    SIVAKUMAR PROCEDURE N/A 10/7/2019    Procedure: SIVAKUMAR PROCEDURE;  Surgeon: Mindy Goff MD;  Location: [facility] OR;  Service: General;  Laterality: N/A;    INJECTION OF ANESTHETIC AGENT AROUND GANGLION IMPAR N/A 3/15/2021    Procedure: BLOCK, GANGLION IMPAR;  Surgeon: Ruben Cabrera MD;  Location: [facility] PAIN MGT;  Service: Pain Management;  Laterality: N/A;    INJECTION OF ANESTHETIC AGENT AROUND PUDENDAL NERVE N/A 9/10/2021    Procedure: BLOCK, NERVE, PUDENDAL;  Surgeon: Kadeem Choi MD;  Location: [facility];  Service: General;  Laterality: N/A;    INJECTION OF ANESTHETIC AGENT AROUND PUDENDAL NERVE N/A 1/6/2022    Procedure: BLOCK, NERVE, PUDENDAL;  Surgeon: Kadeem Choi MD;  Location: [facility] OR;  Service: General;  Laterality: N/A;    INJECTION OF ANESTHETIC AGENT INTO TISSUE PLANE DEFINED BY TRANSVERSUS ABDOMINIS MUSCLE N/A 1/30/2020    Procedure: BLOCK, TRANSVERSUS ABDOMINIS PLANE;  Surgeon: Kadeem Choi MD;  Location: [facility];  Service: General;  Laterality: N/A;    KNEE SURGERY Right     KNEE SURGERY Left     LYSIS OF ADHESIONS N/A 1/30/2020    Procedure: LYSIS, ADHESIONS;  Surgeon: Kadeem Choi MD;  Location: [facility] OR;  Service: General;  Laterality: N/A;    PILONIDAL CYST DRAINAGE      pt has had 6 of these adn has had revisions    PROCTECTOMY N/A 1/30/2020    Procedure: PROCTECTOMY;  Surgeon: Kadeem Choi MD;  Location: [facility] OR;  Service: General;  Laterality: N/A;  Partial    SINUSOTOMY, SPHENOID SINUS, ENDOSCOPIC Right 2/15/2023    Procedure: SINUSOTOMY, SPHENOID SINUS, ENDOSCOPIC;  Surgeon: Uriel Vasquez MD;  Location: [facility] OR;  Service: ENT;  Laterality: Right;    SUBTOTAL COLECTOMY  1/30/2020    Procedure: COLECTOMY, PARTIAL;  Surgeon: Kadeem Choi MD;  Location: [facility];   Service: General;;     Family History   Problem Relation Age of Onset    Diabetes Mother     Colon polyps Mother     Alzheimer's disease Mother     Heart disease Father         CABG age 66    Cancer Father         melanoma    Diabetes Father     Anesthesia problems Father         d/t polio    Colon polyps Sister     Diabetes Maternal Grandmother     Colon cancer Maternal Grandmother     Diabetes Maternal Grandfather     Diabetes Paternal Grandmother     Stroke Paternal Grandmother     Diabetes Paternal Grandfather      Social History     Tobacco Use    Smoking status: Never    Smokeless tobacco: Never   Substance Use Topics    Alcohol use: Never    Drug use: No        Review of Systems:  Review of Systems   Constitutional:  Negative for activity change, appetite change, chills, fatigue, fever and unexpected weight change.   HENT:  Negative for congestion, ear pain, sore throat and trouble swallowing.    Eyes:  Negative for pain, redness and itching.   Respiratory:  Negative for cough, shortness of breath and wheezing.    Cardiovascular:  Negative for chest pain, palpitations and leg swelling.   Gastrointestinal:  Negative for abdominal distention, abdominal pain, anal bleeding, blood in stool, constipation, diarrhea, nausea, rectal pain and vomiting.   Endocrine: Negative for cold intolerance, heat intolerance and polyuria.   Genitourinary:  Negative for dysuria, flank pain, frequency and hematuria.   Musculoskeletal:  Negative for back pain, gait problem, joint swelling and neck pain.   Skin:  Negative for color change, rash and wound.   Allergic/Immunologic: Negative for environmental allergies and immunocompromised state.   Neurological:  Negative for dizziness, speech difficulty, weakness and numbness.   Psychiatric/Behavioral:  Negative for agitation, confusion and hallucinations.        OBJECTIVE:     Vital Signs (Most Recent)  Temp: 98.4 °F (36.9 °C) (08/14/23 1151)  Pulse: 100 (08/14/23 1151)  BP: 114/81  (08/14/23 1151)  SpO2: 95 % (08/14/23 1151)     109.8 kg (242 lb)     Physical Exam:  Physical Exam  Exam conducted with a chaperone present.   Constitutional:       Appearance: He is well-developed.   HENT:      Head: Normocephalic and atraumatic.   Eyes:      Conjunctiva/sclera: Conjunctivae normal.   Neck:      Thyroid: No thyromegaly.   Cardiovascular:      Rate and Rhythm: Normal rate and regular rhythm.   Pulmonary:      Effort: Pulmonary effort is normal. No respiratory distress.   Abdominal:      General: There is no distension.      Palpations: Abdomen is soft. There is no mass.   Genitourinary:     Comments: Anorectal: left lateral/anterolateral area of previous abscess without definitive external opening but obvious irritation, no active drainage; KELLIE with good tone, no blood, +palpable thickening in the left anterolateral position with some tenderness  Musculoskeletal:         General: No tenderness. Normal range of motion.      Cervical back: Normal range of motion.   Skin:     General: Skin is warm and dry.      Capillary Refill: Capillary refill takes less than 2 seconds.      Findings: No rash.   Neurological:      Mental Status: He is alert and oriented to person, place, and time.       Anoscopy Procedure Note    Pre-procedure diagnosis:  Perirectal fistula    Post-procedure diagnosis:  Perirectal fistula    Procedure: Anoscopy    Surgeon: Kadeem Choi MD    Assistant: KADEEM De Leon    Specimen: none    Findings:  Anoscope inserted and all 4 quadrants examined.  Possible internal opening seen in the left anterior lateral position with irritation from scope insertion.  No active purulent drainage.  Some mild enlargement of the internal hemorrhoidal columns.    Patient tolerated procedure well.     Laboratory:   No new laboratory results since last visit.     Pathology Results:  FINAL PATHOLOGIC DIAGNOSIS  1. Sigmoid colon (stitch marks perforation), segmental resection (20 cm in length):  -  Gross and histologic evidence of transmural perforation (4 cm from nearest surgical margin)  - Associated acute chronic transmural inflammation with extension into pericolic fat resulting in abscess  formation  - Serosal adhesion formation with acute chronic serositis  - Margins histologically viable  - Negative for dysplasia or malignancy    PATHOLOGY from Kirsten's reversal Jan 2020:  1. SIGMOID COLON AND UPPER RECTUM, PARTIAL PROCTECTOMY:  - Colorectal tissue with chronic inflammation and fibrous serosal adhesions  - Submucosal foreign-body giant cell reaction associated with acellular suture-like material, consistent with  prior surgical intervention  - No evidence of dysplasia or malignancy  2. COLOSTOMY, TAKEDOWN:  - Inflamed colo-cutaneous tissue with foreign-body giant cell reaction, consistent with ostomy  3. ANASTOMOTIC RING, EXCISION:  - Colonic tissue with chronic inflammation, vascular congestion, and hyperplastic mucosal changes  - No evidence of dysplasia or malignancy    ASSESSMENT/PLAN:     46yo F with previous perforated sigmoid diverticulitis requiring Kirsten's procedure in October 2019 who is now s/p Kirsten's reversal and LAR on 1/30/2020 who recovered well postop but had new perianal/perirectal abscess requiring internal I&D in Sept 2021 and recurred in January 2022 requiring another I&D but continued cycles of swelling, pain and drainage consistent with perirectal fistula    - long discussion with the patient regarding his likely perirectal fistula.  Discussed that I would recommend proceeding to the OR with exam under anesthesia with possible fistulotomy versus seton placement in the near future given his recurrent episodes of the perirectal swelling, pain and drainage consistent with fistula formation.  He is amenable to this.  - plan for rectal exam under anesthesia with possible fistulotomy versus seton placement in the near future  - All risks, benefits and alternatives fully  explained to patient.  Risks include, but are not limited to, bleeding, infection, fecal incontinence, damage to the sphincter muscles, postoperative abscess, postoperative pain, urinary incontinence, urinary retention, perioperative MI, CVA and death.  All questions appropriately answered to patient's satisfaction.  Consent signed and placed on chart.  - 1 enema prep  - RTC postop    Kadeem Choi MD  Colon and Rectal Surgery  [facility]

## 2023-08-14 NOTE — H&P (VIEW-ONLY)
History & Physical    SUBJECTIVE:     CC: Discuss colostomy reversal  Ref: Mindy Goff MD    History of Present Illness:  Patient is a 47 y.o. male presents for evaluation colostomy reversal.  Patient had perforated sigmoid diverticulitis in October 2019 requiring Kirsten's procedure. He did have a postoperative abscess or required IR drainage but has recovered well since that time.  He is currently tolerating a regular diet and having good colostomy function.  He denies any leaks.  His last colonoscopy was in 2016 and he has no history of polyps.  He does have a positive family history of colorectal cancer in his grandmother as well as colon polyps in his mother.  He denies any current bleeding per rectum or from his ostomy.  He currently denies any fever, chills, nausea, vomiting.  He does endorse low transit constipation reports a history of IBS-C. Reports weak urine stream currently since last operation, as well as some lateral abdominal wall pain since surgery. I have discussed this case personally with his referring provider and reviewed all his previous medical records.    Oct 2019: Kirsten's procedure  1/30/2020: Kirsten's reversal with partial LAR due to rectal tear during surgery  9/10/2021: EUA, perirectal abscess internal I&D  01/06/2022: EUA, perirectal abscess I&D      Interval history:  Since the last visit, the patient has had a recurrent cycle of swelling, pain and drainage near his anus near the previous abscess site consistent with possible fistula.  He endorses swelling near his anus and pain with relief once the area ruptures.  Denies any fever, chills, nausea, vomiting.  Reports his last cycle happened around a few weeks ago.    Review of patient's allergies indicates:   Allergen Reactions    Codeine Other (See Comments)     violent    Iodinated contrast media Swelling     Tongue, right eye, lips swelling. Rash on abdomen and axilla    Nuts [tree nut] Anaphylaxis    Dairy aid [lactase]  Diarrhea and Nausea And Vomiting    Morphine      Tolerated hydromorphone in October 2019.        Current Outpatient Medications   Medication Sig Dispense Refill    acetaminophen (TYLENOL) 325 MG tablet Take 325 mg by mouth every 6 (six) hours as needed for Pain.      albuterol (VENTOLIN HFA) 90 mcg/actuation inhaler Inhale 2 puffs into the lungs every 6 (six) hours as needed for Wheezing. Rescue 8.5 g 0    ARIPiprazole (ABILIFY) 10 MG Tab Take 1 tablet (10 mg total) by mouth every evening. 90 tablet 3    azelastine (ASTELIN) 137 mcg (0.1 %) nasal spray Use Two sprays intranasal twice a day 30 mL 0    ciprofloxacin HCl (CIPRO) 500 MG tablet Take 1 tablet (500 mg total) by mouth every 12 (twelve) hours. 20 tablet 1    cyclobenzaprine (FLEXERIL) 10 MG tablet Take 1 tablet (10 mg total) by mouth 3 (three) times daily as needed for Muscle spasms. 90 tablet 3    dextroamphetamine-amphetamine (ADDERALL) 20 mg tablet Take 0.5 tablet (10 mg) by mouth 2 (two) times a day as needed 30 tablet 0    [START ON 8/20/2023] dextroamphetamine-amphetamine (ADDERALL) 20 mg tablet Take 0.5tablet (10mg) by mouth 2 (two) times a day as needed 30 tablet 0    [START ON 9/20/2023] dextroamphetamine-amphetamine (ADDERALL) 20 mg tablet Take 0.5 tablet (10mg) by mouth 2 (two) times a day as needed 30 tablet 0    EScitalopram oxalate (LEXAPRO) 20 MG tablet Take 1 tablet (20 mg total) by mouth every evening. 90 tablet 3    HYDROcodone-acetaminophen (NORCO) 7.5-325 mg per tablet Take 1 tablet by mouth every 6 (six) hours as needed for Pain. 120 tablet 0    linaclotide 290 mcg Cap Take 1 capsule (290 mcg total) by mouth once daily. (Patient taking differently: Take 290 mcg by mouth daily as needed.) 90 capsule 1    loratadine (CLARITIN) 10 mg tablet Take 10 mg by mouth daily as needed.       metoprolol succinate (TOPROL-XL) 25 MG 24 hr tablet Take 1 tablet (25 mg total) by mouth every evening. 90 tablet 3     No current facility-administered  medications for this visit.       Past Medical History:   Diagnosis Date    Angioedema of lips 03/13/2015    IVP dye allergy - swelling lips, eye, tongue    Asthma     childhood    Depression     Diverticulitis 10/07/2019    Kidney stones     Sleep apnea     cpap not required/ patient states this has resolved with weight loss    SVT (supraventricular tachycardia)      Past Surgical History:   Procedure Laterality Date    ABDOMINAL WASHOUT N/A 10/7/2019    Procedure: LAVAGE, PERITONEAL, THERAPEUTIC;  Surgeon: Mindy Goff MD;  Location: [facility] OR;  Service: General;  Laterality: N/A;  abdomen washout    ABSCESS DRAINAGE N/A 9/10/2021    Procedure: DRAINAGE, ABSCESS;  Surgeon: Kadeem Choi MD;  Location: [facility] OR;  Service: General;  Laterality: N/A;  perirectal    anal fistula repair      APPENDECTOMY      BACK SURGERY      BIOPSY, INTRANASAL, ENDOSCOPIC N/A 2/15/2023    Procedure: BIOPSY, INTRANASAL, ENDOSCOPIC;  Surgeon: Uriel Vasquez MD;  Location: [facility] OR;  Service: ENT;  Laterality: N/A;    CHOLECYSTECTOMY  2005    COLONOSCOPY N/A 3/10/2016    Procedure: COLONOSCOPY;  Surgeon: Juvenal Dinero MD;  Location: [facility] ENDO;  Service: Endoscopy;  Laterality: N/A;    COLONOSCOPY N/A 1/17/2020    Procedure: COLONOSCOPY;  Surgeon: Kadeem Choi MD;  Location: [facility] ENDO;  Service: General;  Laterality: N/A;    COLOSTOMY N/A 10/7/2019    Procedure: CREATION, COLOSTOMY;  Surgeon: Mindy Goff MD;  Location: [facility] OR;  Service: General;  Laterality: N/A;    COLOSTOMY REVERSAL      EXAMINATION UNDER ANESTHESIA N/A 9/10/2021    Procedure: Exam under anesthesia;  Surgeon: Kadeem Choi MD;  Location: [facility] OR;  Service: General;  Laterality: N/A;    EXAMINATION UNDER ANESTHESIA N/A 1/6/2022    Procedure: Exam under anesthesia, perirectal abscess I&D;  Surgeon: Kadeem Choi MD;  Location: [facility] OR;  Service: General;  Laterality: N/A;    FUNCTIONAL ENDOSCOPIC SINUS SURGERY (FESS) USING  COMPUTER-ASSISTED NAVIGATION N/A 2/15/2023    Procedure: FESS, USING COMPUTER-ASSISTED NAVIGATION;  Surgeon: Uriel Vasquez MD;  Location: [facility] OR;  Service: ENT;  Laterality: N/A;    SIVAKUMAR PROCEDURE N/A 10/7/2019    Procedure: SIVAKUMAR PROCEDURE;  Surgeon: Mindy Goff MD;  Location: [facility] OR;  Service: General;  Laterality: N/A;    INJECTION OF ANESTHETIC AGENT AROUND GANGLION IMPAR N/A 3/15/2021    Procedure: BLOCK, GANGLION IMPAR;  Surgeon: Ruben Cabrera MD;  Location: [facility] PAIN MGT;  Service: Pain Management;  Laterality: N/A;    INJECTION OF ANESTHETIC AGENT AROUND PUDENDAL NERVE N/A 9/10/2021    Procedure: BLOCK, NERVE, PUDENDAL;  Surgeon: Kadeem Choi MD;  Location: [facility];  Service: General;  Laterality: N/A;    INJECTION OF ANESTHETIC AGENT AROUND PUDENDAL NERVE N/A 1/6/2022    Procedure: BLOCK, NERVE, PUDENDAL;  Surgeon: Kadeem Choi MD;  Location: [facility] OR;  Service: General;  Laterality: N/A;    INJECTION OF ANESTHETIC AGENT INTO TISSUE PLANE DEFINED BY TRANSVERSUS ABDOMINIS MUSCLE N/A 1/30/2020    Procedure: BLOCK, TRANSVERSUS ABDOMINIS PLANE;  Surgeon: Kadeem Choi MD;  Location: [facility];  Service: General;  Laterality: N/A;    KNEE SURGERY Right     KNEE SURGERY Left     LYSIS OF ADHESIONS N/A 1/30/2020    Procedure: LYSIS, ADHESIONS;  Surgeon: Kadeem Choi MD;  Location: [facility] OR;  Service: General;  Laterality: N/A;    PILONIDAL CYST DRAINAGE      pt has had 6 of these adn has had revisions    PROCTECTOMY N/A 1/30/2020    Procedure: PROCTECTOMY;  Surgeon: Kadeem Choi MD;  Location: [facility] OR;  Service: General;  Laterality: N/A;  Partial    SINUSOTOMY, SPHENOID SINUS, ENDOSCOPIC Right 2/15/2023    Procedure: SINUSOTOMY, SPHENOID SINUS, ENDOSCOPIC;  Surgeon: Uriel Vasquez MD;  Location: [facility] OR;  Service: ENT;  Laterality: Right;    SUBTOTAL COLECTOMY  1/30/2020    Procedure: COLECTOMY, PARTIAL;  Surgeon: Kadeem Choi MD;  Location: [facility];   Service: General;;     Family History   Problem Relation Age of Onset    Diabetes Mother     Colon polyps Mother     Alzheimer's disease Mother     Heart disease Father         CABG age 66    Cancer Father         melanoma    Diabetes Father     Anesthesia problems Father         d/t polio    Colon polyps Sister     Diabetes Maternal Grandmother     Colon cancer Maternal Grandmother     Diabetes Maternal Grandfather     Diabetes Paternal Grandmother     Stroke Paternal Grandmother     Diabetes Paternal Grandfather      Social History     Tobacco Use    Smoking status: Never    Smokeless tobacco: Never   Substance Use Topics    Alcohol use: Never    Drug use: No        Review of Systems:  Review of Systems   Constitutional:  Negative for activity change, appetite change, chills, fatigue, fever and unexpected weight change.   HENT:  Negative for congestion, ear pain, sore throat and trouble swallowing.    Eyes:  Negative for pain, redness and itching.   Respiratory:  Negative for cough, shortness of breath and wheezing.    Cardiovascular:  Negative for chest pain, palpitations and leg swelling.   Gastrointestinal:  Negative for abdominal distention, abdominal pain, anal bleeding, blood in stool, constipation, diarrhea, nausea, rectal pain and vomiting.   Endocrine: Negative for cold intolerance, heat intolerance and polyuria.   Genitourinary:  Negative for dysuria, flank pain, frequency and hematuria.   Musculoskeletal:  Negative for back pain, gait problem, joint swelling and neck pain.   Skin:  Negative for color change, rash and wound.   Allergic/Immunologic: Negative for environmental allergies and immunocompromised state.   Neurological:  Negative for dizziness, speech difficulty, weakness and numbness.   Psychiatric/Behavioral:  Negative for agitation, confusion and hallucinations.        OBJECTIVE:     Vital Signs (Most Recent)  Temp: 98.4 °F (36.9 °C) (08/14/23 1151)  Pulse: 100 (08/14/23 1151)  BP: 114/81  (08/14/23 1151)  SpO2: 95 % (08/14/23 1151)     109.8 kg (242 lb)     Physical Exam:  Physical Exam  Exam conducted with a chaperone present.   Constitutional:       Appearance: He is well-developed.   HENT:      Head: Normocephalic and atraumatic.   Eyes:      Conjunctiva/sclera: Conjunctivae normal.   Neck:      Thyroid: No thyromegaly.   Cardiovascular:      Rate and Rhythm: Normal rate and regular rhythm.   Pulmonary:      Effort: Pulmonary effort is normal. No respiratory distress.   Abdominal:      General: There is no distension.      Palpations: Abdomen is soft. There is no mass.   Genitourinary:     Comments: Anorectal: left lateral/anterolateral area of previous abscess without definitive external opening but obvious irritation, no active drainage; KELLIE with good tone, no blood, +palpable thickening in the left anterolateral position with some tenderness  Musculoskeletal:         General: No tenderness. Normal range of motion.      Cervical back: Normal range of motion.   Skin:     General: Skin is warm and dry.      Capillary Refill: Capillary refill takes less than 2 seconds.      Findings: No rash.   Neurological:      Mental Status: He is alert and oriented to person, place, and time.       Anoscopy Procedure Note    Pre-procedure diagnosis:  Perirectal fistula    Post-procedure diagnosis:  Perirectal fistula    Procedure: Anoscopy    Surgeon: Kadeem Choi MD    Assistant: KADEEM De Leon    Specimen: none    Findings:  Anoscope inserted and all 4 quadrants examined.  Possible internal opening seen in the left anterior lateral position with irritation from scope insertion.  No active purulent drainage.  Some mild enlargement of the internal hemorrhoidal columns.    Patient tolerated procedure well.     Laboratory:   No new laboratory results since last visit.     Pathology Results:  FINAL PATHOLOGIC DIAGNOSIS  1. Sigmoid colon (stitch marks perforation), segmental resection (20 cm in length):  -  Gross and histologic evidence of transmural perforation (4 cm from nearest surgical margin)  - Associated acute chronic transmural inflammation with extension into pericolic fat resulting in abscess  formation  - Serosal adhesion formation with acute chronic serositis  - Margins histologically viable  - Negative for dysplasia or malignancy    PATHOLOGY from Kirsten's reversal Jan 2020:  1. SIGMOID COLON AND UPPER RECTUM, PARTIAL PROCTECTOMY:  - Colorectal tissue with chronic inflammation and fibrous serosal adhesions  - Submucosal foreign-body giant cell reaction associated with acellular suture-like material, consistent with  prior surgical intervention  - No evidence of dysplasia or malignancy  2. COLOSTOMY, TAKEDOWN:  - Inflamed colo-cutaneous tissue with foreign-body giant cell reaction, consistent with ostomy  3. ANASTOMOTIC RING, EXCISION:  - Colonic tissue with chronic inflammation, vascular congestion, and hyperplastic mucosal changes  - No evidence of dysplasia or malignancy    ASSESSMENT/PLAN:     48yo F with previous perforated sigmoid diverticulitis requiring Kirsten's procedure in October 2019 who is now s/p Kirsten's reversal and LAR on 1/30/2020 who recovered well postop but had new perianal/perirectal abscess requiring internal I&D in Sept 2021 and recurred in January 2022 requiring another I&D but continued cycles of swelling, pain and drainage consistent with perirectal fistula    - long discussion with the patient regarding his likely perirectal fistula.  Discussed that I would recommend proceeding to the OR with exam under anesthesia with possible fistulotomy versus seton placement in the near future given his recurrent episodes of the perirectal swelling, pain and drainage consistent with fistula formation.  He is amenable to this.  - plan for rectal exam under anesthesia with possible fistulotomy versus seton placement in the near future  - All risks, benefits and alternatives fully  explained to patient.  Risks include, but are not limited to, bleeding, infection, fecal incontinence, damage to the sphincter muscles, postoperative abscess, postoperative pain, urinary incontinence, urinary retention, perioperative MI, CVA and death.  All questions appropriately answered to patient's satisfaction.  Consent signed and placed on chart.  - 1 enema prep  - RTC postop    Kadeem Choi MD  Colon and Rectal Surgery  [facility]

## 2023-08-15 LAB
ALBUMIN SERPL BCP-MCNC: 4.3 G/DL (ref 3.5–5.2)
ALP SERPL-CCNC: 79 U/L (ref 55–135)
ALT SERPL W/O P-5'-P-CCNC: 35 U/L (ref 10–44)
ANION GAP SERPL CALC-SCNC: 6 MMOL/L (ref 8–16)
AST SERPL-CCNC: 24 U/L (ref 10–40)
BILIRUB SERPL-MCNC: 0.5 MG/DL (ref 0.1–1)
BUN SERPL-MCNC: 13 MG/DL (ref 6–20)
CALCIUM SERPL-MCNC: 9.3 MG/DL (ref 8.7–10.5)
CHLORIDE SERPL-SCNC: 106 MMOL/L (ref 95–110)
CO2 SERPL-SCNC: 27 MMOL/L (ref 23–29)
CREAT SERPL-MCNC: 0.9 MG/DL (ref 0.5–1.4)
EST. GFR  (NO RACE VARIABLE): >60 ML/MIN/1.73 M^2
GLUCOSE SERPL-MCNC: 80 MG/DL (ref 70–110)
POTASSIUM SERPL-SCNC: 4 MMOL/L (ref 3.5–5.1)
PROT SERPL-MCNC: 7.2 G/DL (ref 6–8.4)
SODIUM SERPL-SCNC: 139 MMOL/L (ref 136–145)

## 2023-08-22 ENCOUNTER — TELEPHONE (OUTPATIENT)
Dept: PREADMISSION TESTING | Facility: HOSPITAL | Age: 48
End: 2023-08-22
Payer: COMMERCIAL

## 2023-08-22 DIAGNOSIS — F98.8 ATTENTION DEFICIT DISORDER (ADD) WITHOUT HYPERACTIVITY: ICD-10-CM

## 2023-08-22 DIAGNOSIS — G89.4 CHRONIC PAIN SYNDROME: ICD-10-CM

## 2023-08-22 RX ORDER — DEXTROAMPHETAMINE SACCHARATE, AMPHETAMINE ASPARTATE, DEXTROAMPHETAMINE SULFATE AND AMPHETAMINE SULFATE 5; 5; 5; 5 MG/1; MG/1; MG/1; MG/1
TABLET ORAL
Qty: 30 TABLET | Refills: 0 | OUTPATIENT
Start: 2023-08-22

## 2023-08-22 RX ORDER — HYDROCODONE BITARTRATE AND ACETAMINOPHEN 7.5; 325 MG/1; MG/1
1 TABLET ORAL EVERY 6 HOURS PRN
Qty: 120 TABLET | Refills: 0 | Status: SHIPPED | OUTPATIENT
Start: 2023-08-22 | End: 2023-09-19 | Stop reason: SDUPTHER

## 2023-08-22 NOTE — TELEPHONE ENCOUNTER
Pre op instructions reviewed with pt ,verbalized understanding.    To confirm, Surgery is scheduled on 8/24/23. We will call you late afternoon the business day prior to surgery with your arrival time.    *Please report to the [facility] (1st Floor) located off of [facility] (2nd Entrance/Building on the left, in front of the flag pole).  Address: [address] [place] [address] [place] [address]      INSTRUCTIONS IMPORTANT!!!  Do Not Eat, Drink, or Smoke after 12 midnight unless instructed otherwise by your Surgeon. OK to brush teeth, no gum, candy or mints!    >>>MEDICATION INSTRUCTIONS<<<: Morning of Surgery, please ONLY take:  None     *Patients should HOLD all vitamins, herbal supplements, weight loss medication, aspirin products & NSAIDS 7 days prior to surgery, as these can thin the blood.    ____  Avoid Alcoholic beverages 3 days prior to surgery, as it can thin the blood.  ____  NO Acrylic/fake nails or nail polish worn day of surgery (specifically hand/arm & foot surgeries).  ____  NO powder, lotions, deodorants, oils or cream on body.  ____  Remove all jewelry & piercings & foreign objects before arrival & leave at home.  ____  Remove Dentures, Hearing Aids & Contact Lens prior to surgery.  ____  Bring photo ID and insurance information to hospital (Leave Valuables at Home).  ____  If going home the same day, arrange for a ride home. You will not be able to drive for 24 hrs if Anesthesia was used.   ____  Females (ages 11-60): may need to give a urine sample the morning of surgery; please see Pre op Nurse prior to using the restroom.  ____  Males: Stop ED medications (Viagra, Cialis) 24 hrs prior to surgery.  ____  Wear clean, loose fitting clothing to allow for dressings/ bandages.      Bathing Instructions:    -Shower with anti-bacterial Soap (Hibiclens or Dial) the night before surgery and the morning of.   -Do not use Hibiclens on your face or genitals.   -Apply clean clothes after  shower.  -Do not shave your face or body 2 days prior to surgery unless instructed otherwise by your Surgeon.  -Do not shave pubic hair 7 days prior to surgery (gyn pt's).    [facility] Vis[facility]/Ride Policy:  Only 2 adults allowed in pre op/recovery area during your procedure. You MUST HAVE A RIDE HOME from a responsible adult that you know and trust. Medical Transport, Uber or Lyft can ONLY be used if patient has a responsible adult to accompany them during ride home.    Discharge Instructions: You will receive Post-op/Discharge instructions by your Discharge Nurse prior to going home.   *Prevention of surgical site infections:   -Keep incisions clean and dry.   -Do not soak/submerge incisions in water until completely healed.   -Do not apply lotions, powders, creams, or deodorants to site.   -Always make sure hands are cleaned with antibacterial soap/ alcohol-based  prior to touching the surgical site.        *Signs and symptoms of Infection:               -Redness and pain around the area where you had surgery               -Drainage of cloudy fluid from your surgical wound               -Fever, chills or any flu-like symptoms     >>>Call Surgeon office/on-call Surgeon if you experience any of these signs & symptoms before or after surgery @ [phone]<<<       *If you are running late or have questions the morning of surgery, please call the [facility] Surgery Dept @ [phone].     *Billing questions:  [phone]       Thank you,  -[facility] Pre Admit Dept.  [phone] or [phone]  M-F 7:30 am-4:00 pm (Closed Major Holidays)

## 2023-08-22 NOTE — TELEPHONE ENCOUNTER
No care due was identified.  [facility] Embedded Care Due Messages. Reference number: [id number].   8/22/2023 10:23:17 AM CDT

## 2023-08-23 ENCOUNTER — OFFICE VISIT (OUTPATIENT)
Dept: INTERNAL MEDICINE | Facility: CLINIC | Age: 48
End: 2023-08-23
Payer: COMMERCIAL

## 2023-08-23 ENCOUNTER — TELEPHONE (OUTPATIENT)
Dept: PREADMISSION TESTING | Facility: HOSPITAL | Age: 48
End: 2023-08-23
Payer: COMMERCIAL

## 2023-08-23 VITALS
TEMPERATURE: 98 F | HEART RATE: 96 BPM | WEIGHT: 245.38 LBS | OXYGEN SATURATION: 97 % | SYSTOLIC BLOOD PRESSURE: 112 MMHG | BODY MASS INDEX: 35.13 KG/M2 | DIASTOLIC BLOOD PRESSURE: 72 MMHG | HEIGHT: 70 IN

## 2023-08-23 DIAGNOSIS — G89.4 CHRONIC PAIN SYNDROME: Primary | ICD-10-CM

## 2023-08-23 DIAGNOSIS — F98.8 ATTENTION DEFICIT DISORDER (ADD) WITHOUT HYPERACTIVITY: ICD-10-CM

## 2023-08-23 DIAGNOSIS — F32.5 MAJOR DEPRESSIVE DISORDER WITH SINGLE EPISODE, IN REMISSION: ICD-10-CM

## 2023-08-23 DIAGNOSIS — I47.10 SVT (SUPRAVENTRICULAR TACHYCARDIA): Chronic | ICD-10-CM

## 2023-08-23 PROCEDURE — 1160F RVW MEDS BY RX/DR IN RCRD: CPT | Mod: CPTII,S$GLB,, | Performed by: FAMILY MEDICINE

## 2023-08-23 PROCEDURE — 3008F BODY MASS INDEX DOCD: CPT | Mod: CPTII,S$GLB,, | Performed by: FAMILY MEDICINE

## 2023-08-23 PROCEDURE — 3074F PR MOST RECENT SYSTOLIC BLOOD PRESSURE < 130 MM HG: ICD-10-PCS | Mod: CPTII,S$GLB,, | Performed by: FAMILY MEDICINE

## 2023-08-23 PROCEDURE — 3074F SYST BP LT 130 MM HG: CPT | Mod: CPTII,S$GLB,, | Performed by: FAMILY MEDICINE

## 2023-08-23 PROCEDURE — 1159F MED LIST DOCD IN RCRD: CPT | Mod: CPTII,S$GLB,, | Performed by: FAMILY MEDICINE

## 2023-08-23 PROCEDURE — 99999 PR PBB SHADOW E&M-EST. PATIENT-LVL III: CPT | Mod: PBBFAC,,, | Performed by: FAMILY MEDICINE

## 2023-08-23 PROCEDURE — 3078F DIAST BP <80 MM HG: CPT | Mod: CPTII,S$GLB,, | Performed by: FAMILY MEDICINE

## 2023-08-23 PROCEDURE — 99999 PR PBB SHADOW E&M-EST. PATIENT-LVL III: ICD-10-PCS | Mod: PBBFAC,,, | Performed by: FAMILY MEDICINE

## 2023-08-23 PROCEDURE — 1160F PR REVIEW ALL MEDS BY PRESCRIBER/CLIN PHARMACIST DOCUMENTED: ICD-10-PCS | Mod: CPTII,S$GLB,, | Performed by: FAMILY MEDICINE

## 2023-08-23 PROCEDURE — 99214 OFFICE O/P EST MOD 30 MIN: CPT | Mod: S$GLB,,, | Performed by: FAMILY MEDICINE

## 2023-08-23 PROCEDURE — 3008F PR BODY MASS INDEX (BMI) DOCUMENTED: ICD-10-PCS | Mod: CPTII,S$GLB,, | Performed by: FAMILY MEDICINE

## 2023-08-23 PROCEDURE — 3078F PR MOST RECENT DIASTOLIC BLOOD PRESSURE < 80 MM HG: ICD-10-PCS | Mod: CPTII,S$GLB,, | Performed by: FAMILY MEDICINE

## 2023-08-23 PROCEDURE — 1159F PR MEDICATION LIST DOCUMENTED IN MEDICAL RECORD: ICD-10-PCS | Mod: CPTII,S$GLB,, | Performed by: FAMILY MEDICINE

## 2023-08-23 PROCEDURE — 99214 PR OFFICE/OUTPT VISIT, EST, LEVL IV, 30-39 MIN: ICD-10-PCS | Mod: S$GLB,,, | Performed by: FAMILY MEDICINE

## 2023-08-23 RX ORDER — METOPROLOL SUCCINATE 25 MG/1
25 TABLET, EXTENDED RELEASE ORAL NIGHTLY
Qty: 90 TABLET | Refills: 3 | Status: SHIPPED | OUTPATIENT
Start: 2023-08-23 | End: 2024-08-22

## 2023-08-23 NOTE — PROGRESS NOTES
Subjective:   Patient ID: Dax Carlisle is a 47 y.o. male.  Chief Complaint:  Follow-up    Patient presents follow-up on ADD, chronic pain, and MDD     Last annual physical exam November 2022  CBC test shows a stable mild anemia. Take a daily multivitamin with iron.   Sugar, Kidney, Liver, and Electrolyte tests are all normal.  Cholesterol tests are normal. 10-year risk of a heart disease or stroke is 2%. Aspirin or Statin cholesterol medications are not recommended.  A1c is in a normal range. No Prediabtes or Diabetes  Thyroid testing is normal.  Okay to continue all current medications   Recheck labs 1 year     Last visit in clinic for follow-up on chronic medical conditions July 2023     - ADHD  On Adderall 10 mg twice a day to help with focus/ADD/ADHD related issues despite compliance with his other mental health medications  Effective with symptoms controlled on present medication and dose helping compensate for deficits at work  Duration is appropriate.    No mood instability, tics, or disordered sleep, or other apparent adverse effects.  No increased blood pressure, heart, or other cardiovascular side effects.    Tolerating current treatment well.   No behaviors to suggest inappropriate use of prescribed medications.  [place] Board [employer] Controlled Prescription Drug Monitoring database was queried and showed no activity to suggest abuse, diversion, or other inappropriate use of prescription medications.      - Chronic Pain  On Norco 7.5/325 mg every 6 hours as needed.  Averages 120 pills per month.  No behaviors to suggest inappropriate use of prescribed medications.  [place] Board [employer] Controlled Prescription Drug Monitoring database was queried and showed no activity to suggest abuse, diversion, or other inappropriate use of prescription medications.      - Major depressive disorder  On Abilify 10 mg daily and Lexapro 20 mg daily   Remains stable, no side effects, mood and symptoms  well controlled on present medication     No new complaints or concerns today        Patient presents follow-up on ADD, chronic pain, and MDD     Last visit March 2023 for annual physical exam  CBC test shows a stable mild anemia. Take a daily multivitamin with iron.   Sugar, Kidney, Liver, and Electrolyte tests are all normal.  Cholesterol tests are normal. 10-year risk of a heart disease or stroke is 2%. Aspirin or Statin cholesterol medications are not recommended.  A1c is in a normal range. No Prediabtes or Diabetes  Thyroid testing is normal.  Okay to continue all current medications   Recheck labs 1 year     Since last visit underwent successful removal of nasal/sinus lesion by ENT     - ADHD  On Adderall 10 mg twice a day to help with focus/ADD/ADHD related issues despite compliance with his other mental health medications  Effective with symptoms controlled on present medication and dose helping compensate for deficits at work  Duration is appropriate.    No mood instability, tics, or disordered sleep, or other apparent adverse effects.  No increased blood pressure, heart, or other cardiovascular side effects.    Tolerating current treatment well.   No behaviors to suggest inappropriate use of prescribed medications.  [place] Board [employer] Controlled Prescription Drug Monitoring database was queried and showed no activity to suggest abuse, diversion, or other inappropriate use of prescription medications.      - Chronic Pain  On Norco 7.5/325 mg every 6 hours as needed.  Averages 120 pills per month.  No behaviors to suggest inappropriate use of prescribed medications.  [place] Board [employer] Controlled Prescription Drug Monitoring database was queried and showed no activity to suggest abuse, diversion, or other inappropriate use of prescription medications.      - Major depressive disorder  On Abilify 10 mg daily and Lexapro 20 mg daily   Remains stable, no side effects, mood and symptoms well  controlled on present medication     No new complaints or concerns today      Current Outpatient Medications:     acetaminophen (TYLENOL) 325 MG tablet, Take 325 mg by mouth every 6 (six) hours as needed for Pain., Disp: , Rfl:     albuterol (VENTOLIN HFA) 90 mcg/actuation inhaler, Inhale 2 puffs into the lungs every 6 (six) hours as needed for Wheezing. Rescue, Disp: 8.5 g, Rfl: 0    ARIPiprazole (ABILIFY) 10 MG Tab, Take 1 tablet (10 mg total) by mouth every evening., Disp: 90 tablet, Rfl: 3    azelastine (ASTELIN) 137 mcg (0.1 %) nasal spray, Use Two sprays intranasal twice a day, Disp: 30 mL, Rfl: 0    cyclobenzaprine (FLEXERIL) 10 MG tablet, Take 1 tablet (10 mg total) by mouth 3 (three) times daily as needed for Muscle spasms., Disp: 90 tablet, Rfl: 3    dextroamphetamine-amphetamine (ADDERALL) 20 mg tablet, Take 0.5 tablet (10 mg) by mouth 2 (two) times a day as needed, Disp: 30 tablet, Rfl: 0    dextroamphetamine-amphetamine (ADDERALL) 20 mg tablet, Take 0.5tablet (10mg) by mouth 2 (two) times a day as needed, Disp: 30 tablet, Rfl: 0    [START ON 9/20/2023] dextroamphetamine-amphetamine (ADDERALL) 20 mg tablet, Take 0.5 tablet (10mg) by mouth 2 (two) times a day as needed, Disp: 30 tablet, Rfl: 0    EScitalopram oxalate (LEXAPRO) 20 MG tablet, Take 1 tablet (20 mg total) by mouth every evening., Disp: 90 tablet, Rfl: 3    HYDROcodone-acetaminophen (NORCO) 7.5-325 mg per tablet, Take 1 tablet by mouth every 6 (six) hours as needed for Pain., Disp: 120 tablet, Rfl: 0    linaclotide 290 mcg Cap, Take 1 capsule (290 mcg total) by mouth once daily. (Patient taking differently: Take 290 mcg by mouth daily as needed.), Disp: 90 capsule, Rfl: 1    loratadine (CLARITIN) 10 mg tablet, Take 10 mg by mouth daily as needed. , Disp: , Rfl:     metoprolol succinate (TOPROL-XL) 25 MG 24 hr tablet, Take 1 tablet (25 mg total) by mouth every evening., Disp: 90 tablet, Rfl: 3    Review of Systems   Constitutional:  Negative  "for activity change, appetite change, chills, fatigue, fever and unexpected weight change.   Eyes:  Negative for visual disturbance.   Respiratory:  Negative for chest tightness and shortness of breath.    Cardiovascular:  Negative for chest pain, palpitations and leg swelling.   Gastrointestinal:  Negative for abdominal pain, constipation, diarrhea, nausea and vomiting.   Genitourinary:  Negative for decreased urine volume, difficulty urinating, dysuria, flank pain, frequency, hematuria and urgency.   Musculoskeletal:  Positive for arthralgias, back pain and myalgias. Negative for gait problem, joint swelling, neck pain and neck stiffness.   Skin:  Negative for rash.   Neurological:  Negative for dizziness, tremors, syncope, weakness, light-headedness, numbness and headaches.   Psychiatric/Behavioral:  Negative for agitation, behavioral problems, confusion, decreased concentration, dysphoric mood, hallucinations, self-injury, sleep disturbance and suicidal ideas. The patient is not nervous/anxious and is not hyperactive.        Objective:   /72 (BP Location: Left arm, Patient Position: Sitting, BP Method: Large (Manual))   Pulse 96   Temp 98.3 °F (36.8 °C) (Tympanic)   Ht 5' 10" (1.778 m)   Wt 111.3 kg (245 lb 6 oz)   SpO2 97%   BMI 35.21 kg/m²     Physical Exam  Vitals and nursing note reviewed.   Constitutional:       Appearance: Normal appearance. He is well-developed. He is obese.   Cardiovascular:      Rate and Rhythm: Normal rate and regular rhythm.   Pulmonary:      Effort: Pulmonary effort is normal.   Musculoskeletal:      Right lower leg: No edema.      Left lower leg: No edema.   Skin:     Findings: No rash.   Psychiatric:         Mood and Affect: Mood and affect normal.       Assessment:       ICD-10-CM ICD-9-CM   1. Chronic pain syndrome  G89.4 338.4   2. Attention deficit disorder (ADD) without hyperactivity  F98.8 314.00   3. Major depressive disorder with single episode, in remission  " F32.5 296.25   4. SVT (supraventricular tachycardia)  I47.1 427.89     Plan:   Chronic pain syndrome  -     HYDROcodone-acetaminophen (NORCO) 7.5-325 mg per tablet; Take 1 tablet by mouth every 6 (six) hours as needed for Pain.  Dispense: 120 tablet; Refill: 0  Pain and symptoms remain well controlled on current medication   Continue Topamax 25 mg twice a day.  Continue Norco 7.5/325 every 6 hours as needed for pain  Averaging 4 per day and stable pattern  Risk of discontinuation of long-term narcotics higher than risk of continuing long-term narcotics at current dose  Okay to refill monthly x 2     Attention deficit disorder (ADD) without hyperactivity  -     dextroamphetamine-amphetamine (ADDERALL) 20 mg tablet; Take 0.5 tablet (10 mg) by mouth 2 (two) times a day as needed  Dispense: 30 tablet; Refill: 0  ADHD, stable, no side effects, controlled on present medication  Continue stimulant at present dose  OK to refill monthly x 2     Major depressive disorder with single episode, in remission  Stable, no side effects, mood and symptoms well controlled on present medication .  Continue current medications     SVT (supraventricular tachycardia)  -     metoprolol succinate (TOPROL-XL) 25 MG 24 hr tablet; Take 1 tablet (25 mg total) by mouth every evening.  Dispense: 90 tablet; Refill: 3    RTC in 3 months for annual physical exam or sooner if needed    I hereby acknowledge that I am relying upon documentation authored by a medical student working under my supervision and further I hereby attest that I have verified the student documentation or findings by personally re-performing the physical exam and medical decision making activities of the Evaluation and Management service to be billed.  Cody Enamorado

## 2023-08-23 NOTE — TELEPHONE ENCOUNTER
Called and spoke with Pt about the following:     Please arrive to [facility] ([facility] at 7:00 am on 8/24/23 for your scheduled procedure.  Address: [address] [place] [address] [place] [id number] ([address] on left, 1st Floor Lobby)  >>>NO eating or drinking after midnight unless instructed otherwise by your Surgeon<<<

## 2023-08-24 ENCOUNTER — ANESTHESIA (OUTPATIENT)
Dept: SURGERY | Facility: HOSPITAL | Age: 48
End: 2023-08-24
Payer: COMMERCIAL

## 2023-08-24 ENCOUNTER — HOSPITAL ENCOUNTER (OUTPATIENT)
Facility: HOSPITAL | Age: 48
Discharge: HOME | End: 2023-08-24
Attending: COLON & RECTAL SURGERY | Admitting: COLON & RECTAL SURGERY
Payer: COMMERCIAL

## 2023-08-24 ENCOUNTER — ANESTHESIA EVENT (OUTPATIENT)
Dept: SURGERY | Facility: HOSPITAL | Age: 48
End: 2023-08-24
Payer: COMMERCIAL

## 2023-08-24 DIAGNOSIS — K60.4 PERIRECTAL FISTULA: ICD-10-CM

## 2023-08-24 PROCEDURE — 71000015 HC POSTOP RECOV 1ST HR: Performed by: COLON & RECTAL SURGERY

## 2023-08-24 PROCEDURE — 46270 REMOVE ANAL FIST SUBQ: CPT | Mod: ,,, | Performed by: COLON & RECTAL SURGERY

## 2023-08-24 PROCEDURE — 25000003 PHARM REV CODE 250: Performed by: FAMILY MEDICINE

## 2023-08-24 PROCEDURE — 71000033 HC RECOVERY, INTIAL HOUR: Performed by: COLON & RECTAL SURGERY

## 2023-08-24 PROCEDURE — 46270 PR REMOVAL ANAL FISTULA,SUBCUTANEOUS: ICD-10-PCS | Mod: ,,, | Performed by: COLON & RECTAL SURGERY

## 2023-08-24 PROCEDURE — 36000707: Performed by: COLON & RECTAL SURGERY

## 2023-08-24 PROCEDURE — 36000706: Performed by: COLON & RECTAL SURGERY

## 2023-08-24 PROCEDURE — 37000009 HC ANESTHESIA EA ADD 15 MINS: Performed by: COLON & RECTAL SURGERY

## 2023-08-24 PROCEDURE — 37000008 HC ANESTHESIA 1ST 15 MINUTES: Performed by: COLON & RECTAL SURGERY

## 2023-08-24 PROCEDURE — 25000003 PHARM REV CODE 250: Performed by: STUDENT IN AN ORGANIZED HEALTH CARE EDUCATION/TRAINING PROGRAM

## 2023-08-24 PROCEDURE — C9290 INJ, BUPIVACAINE LIPOSOME: HCPCS | Performed by: COLON & RECTAL SURGERY

## 2023-08-24 PROCEDURE — 63600175 PHARM REV CODE 636 W HCPCS: Performed by: COLON & RECTAL SURGERY

## 2023-08-24 PROCEDURE — 63600175 PHARM REV CODE 636 W HCPCS: Performed by: FAMILY MEDICINE

## 2023-08-24 RX ORDER — AMOXICILLIN 250 MG
1 CAPSULE ORAL 2 TIMES DAILY
Qty: 60 TABLET | Refills: 0 | Status: SHIPPED | OUTPATIENT
Start: 2023-08-24 | End: 2023-10-19

## 2023-08-24 RX ORDER — ONDANSETRON 2 MG/ML
4 INJECTION INTRAMUSCULAR; INTRAVENOUS DAILY PRN
Status: DISCONTINUED | OUTPATIENT
Start: 2023-08-24 | End: 2023-08-24 | Stop reason: HOSPADM

## 2023-08-24 RX ORDER — BUPIVACAINE HYDROCHLORIDE 2.5 MG/ML
INJECTION, SOLUTION EPIDURAL; INFILTRATION; INTRACAUDAL
Status: DISCONTINUED | OUTPATIENT
Start: 2023-08-24 | End: 2023-08-24 | Stop reason: HOSPADM

## 2023-08-24 RX ORDER — ROCURONIUM BROMIDE 10 MG/ML
INJECTION, SOLUTION INTRAVENOUS
Status: DISCONTINUED | OUTPATIENT
Start: 2023-08-24 | End: 2023-08-24

## 2023-08-24 RX ORDER — ONDANSETRON 2 MG/ML
4 INJECTION INTRAMUSCULAR; INTRAVENOUS EVERY 12 HOURS PRN
Status: DISCONTINUED | OUTPATIENT
Start: 2023-08-24 | End: 2023-08-24 | Stop reason: HOSPADM

## 2023-08-24 RX ORDER — DIPHENHYDRAMINE HYDROCHLORIDE 50 MG/ML
12.5 INJECTION INTRAMUSCULAR; INTRAVENOUS
Status: DISCONTINUED | OUTPATIENT
Start: 2023-08-24 | End: 2023-08-24 | Stop reason: HOSPADM

## 2023-08-24 RX ORDER — OXYCODONE HYDROCHLORIDE 5 MG/1
5 TABLET ORAL ONCE AS NEEDED
Status: COMPLETED | OUTPATIENT
Start: 2023-08-24 | End: 2023-08-24

## 2023-08-24 RX ORDER — KETOROLAC TROMETHAMINE 30 MG/ML
15 INJECTION, SOLUTION INTRAMUSCULAR; INTRAVENOUS EVERY 8 HOURS PRN
Status: DISCONTINUED | OUTPATIENT
Start: 2023-08-24 | End: 2023-08-24 | Stop reason: HOSPADM

## 2023-08-24 RX ORDER — HYDROMORPHONE HYDROCHLORIDE 2 MG/ML
0.2 INJECTION, SOLUTION INTRAMUSCULAR; INTRAVENOUS; SUBCUTANEOUS EVERY 5 MIN PRN
Status: DISCONTINUED | OUTPATIENT
Start: 2023-08-24 | End: 2023-08-24 | Stop reason: HOSPADM

## 2023-08-24 RX ORDER — SODIUM CHLORIDE 9 MG/ML
INJECTION, SOLUTION INTRAVENOUS CONTINUOUS
Status: DISCONTINUED | OUTPATIENT
Start: 2023-08-24 | End: 2023-08-24 | Stop reason: HOSPADM

## 2023-08-24 RX ORDER — LIDOCAINE HYDROCHLORIDE 20 MG/ML
INJECTION, SOLUTION EPIDURAL; INFILTRATION; INTRACAUDAL; PERINEURAL
Status: DISCONTINUED | OUTPATIENT
Start: 2023-08-24 | End: 2023-08-24

## 2023-08-24 RX ORDER — PROPOFOL 10 MG/ML
VIAL (ML) INTRAVENOUS
Status: DISCONTINUED | OUTPATIENT
Start: 2023-08-24 | End: 2023-08-24

## 2023-08-24 RX ORDER — OXYCODONE HYDROCHLORIDE 5 MG/1
5 TABLET ORAL EVERY 6 HOURS PRN
Qty: 15 TABLET | Refills: 0 | Status: SHIPPED | OUTPATIENT
Start: 2023-08-24 | End: 2023-08-24 | Stop reason: HOSPADM

## 2023-08-24 RX ORDER — SODIUM CHLORIDE, SODIUM LACTATE, POTASSIUM CHLORIDE, CALCIUM CHLORIDE 600; 310; 30; 20 MG/100ML; MG/100ML; MG/100ML; MG/100ML
INJECTION, SOLUTION INTRAVENOUS CONTINUOUS
Status: DISCONTINUED | OUTPATIENT
Start: 2023-08-24 | End: 2023-08-24 | Stop reason: HOSPADM

## 2023-08-24 RX ORDER — FENTANYL CITRATE 50 UG/ML
INJECTION, SOLUTION INTRAMUSCULAR; INTRAVENOUS
Status: DISCONTINUED | OUTPATIENT
Start: 2023-08-24 | End: 2023-08-24

## 2023-08-24 RX ORDER — ACETAMINOPHEN 10 MG/ML
1000 INJECTION, SOLUTION INTRAVENOUS ONCE
Status: DISCONTINUED | OUTPATIENT
Start: 2023-08-24 | End: 2023-08-24 | Stop reason: HOSPADM

## 2023-08-24 RX ADMIN — SODIUM CHLORIDE, SODIUM LACTATE, POTASSIUM CHLORIDE, AND CALCIUM CHLORIDE: .6; .31; .03; .02 INJECTION, SOLUTION INTRAVENOUS at 09:08

## 2023-08-24 RX ADMIN — PROPOFOL 200 MG: 10 INJECTION, EMULSION INTRAVENOUS at 09:08

## 2023-08-24 RX ADMIN — SUGAMMADEX 200 MG: 100 INJECTION, SOLUTION INTRAVENOUS at 10:08

## 2023-08-24 RX ADMIN — FENTANYL CITRATE 100 MCG: 50 INJECTION, SOLUTION INTRAMUSCULAR; INTRAVENOUS at 09:08

## 2023-08-24 RX ADMIN — ROCURONIUM BROMIDE 50 MG: 10 INJECTION, SOLUTION INTRAVENOUS at 09:08

## 2023-08-24 RX ADMIN — LIDOCAINE HYDROCHLORIDE 50 MG: 20 INJECTION, SOLUTION EPIDURAL; INFILTRATION; INTRACAUDAL; PERINEURAL at 09:08

## 2023-08-24 RX ADMIN — OXYCODONE HYDROCHLORIDE 5 MG: 5 TABLET ORAL at 10:08

## 2023-08-24 NOTE — ANESTHESIA PROCEDURE NOTES
Intubation    Date/Time: 8/24/2023 9:15 AM    Performed by: Garfield Hou CRNA  Authorized by: Moy Suarez MD    Intubation:     Induction:  Intravenous    Intubated:  Postinduction    Mask Ventilation:  Easy mask    Attempts:  1    Attempted By:  CRNA    Method of Intubation:  Direct    Blade:  Boswell 2    Laryngeal View Grade: Grade I - full view of cords      Difficult Airway Encountered?: No      Complications:  None    Airway Device:  Oral endotracheal tube    Airway Device Size:  7.5    Style/Cuff Inflation:  Cuffed (inflated to minimal occlusive pressure)    Tube secured:  22    Placement Verified By:  Capnometry    Complicating Factors:  None

## 2023-08-24 NOTE — ANESTHESIA PREPROCEDURE EVALUATION
08/24/2023  Dax Carlisle is a 47 y.o., male     Patient Active Problem List   Diagnosis    Major depressive disorder with single episode, in remission    Family history of colonic polyps    Insomnia    Chronic pain syndrome    Sleep apnea    Status post Kirsten procedure    Diverticulitis of large intestine with perforation and abscess without bleeding    Vascular disorder of intestine, unspecified    Mixed hyperlipidemia    SVT (supraventricular tachycardia)    Coccydynia    Attention deficit disorder (ADD) without hyperactivity    Obesity (BMI 30.0-34.9)    Chronic constipation    Inverted papilloma       Past Medical History:   Diagnosis Date    Angioedema of lips 03/13/2015    IVP dye allergy - swelling lips, eye, tongue    Asthma     childhood    Depression     Diverticulitis 10/07/2019    Kidney stones     Sleep apnea     cpap not required/ patient states this has resolved with weight loss    SVT (supraventricular tachycardia)      Past Surgical History:   Procedure Laterality Date    ABDOMINAL WASHOUT N/A 10/7/2019    Procedure: LAVAGE, PERITONEAL, THERAPEUTIC;  Surgeon: Mindy Goff MD;  Location: [facility] OR;  Service: General;  Laterality: N/A;  abdomen washout    ABSCESS DRAINAGE N/A 9/10/2021    Procedure: DRAINAGE, ABSCESS;  Surgeon: Kadeem Choi MD;  Location: [facility] OR;  Service: General;  Laterality: N/A;  perirectal    anal fistula repair      APPENDECTOMY      BACK SURGERY      BIOPSY, INTRANASAL, ENDOSCOPIC N/A 2/15/2023    Procedure: BIOPSY, INTRANASAL, ENDOSCOPIC;  Surgeon: Uriel Vasquez MD;  Location: [facility] OR;  Service: ENT;  Laterality: N/A;    CHOLECYSTECTOMY  2005    COLONOSCOPY N/A 3/10/2016    Procedure: COLONOSCOPY;  Surgeon: Juvenal Dinero MD;  Location: [facility] ENDO;  Service: Endoscopy;  Laterality: N/A;    COLONOSCOPY N/A  1/17/2020    Procedure: COLONOSCOPY;  Surgeon: Kadeem Choi MD;  Location: [facility] ENDO;  Service: General;  Laterality: N/A;    COLOSTOMY N/A 10/7/2019    Procedure: CREATION, COLOSTOMY;  Surgeon: Mindy Goff MD;  Location: [facility] OR;  Service: General;  Laterality: N/A;    COLOSTOMY REVERSAL      EXAMINATION UNDER ANESTHESIA N/A 9/10/2021    Procedure: Exam under anesthesia;  Surgeon: Kadeem Choi MD;  Location: [facility] OR;  Service: General;  Laterality: N/A;    EXAMINATION UNDER ANESTHESIA N/A 1/6/2022    Procedure: Exam under anesthesia, perirectal abscess I&D;  Surgeon: Kadeem Choi MD;  Location: [facility] OR;  Service: General;  Laterality: N/A;    FUNCTIONAL ENDOSCOPIC SINUS SURGERY (FESS) USING COMPUTER-ASSISTED NAVIGATION N/A 2/15/2023    Procedure: FESS, USING COMPUTER-ASSISTED NAVIGATION;  Surgeon: Uriel Vasquez MD;  Location: [facility] OR;  Service: ENT;  Laterality: N/A;    SIVAKUMAR PROCEDURE N/A 10/7/2019    Procedure: SIVAKUMAR PROCEDURE;  Surgeon: Mindy Goff MD;  Location: [facility] OR;  Service: General;  Laterality: N/A;    INJECTION OF ANESTHETIC AGENT AROUND GANGLION IMPAR N/A 3/15/2021    Procedure: BLOCK, GANGLION IMPAR;  Surgeon: Ruben Cabrera MD;  [facility];  Service: Pain Management;  Laterality: N/A;    INJECTION OF ANESTHETIC AGENT AROUND PUDENDAL NERVE N/A 9/10/2021    Procedure: BLOCK, NERVE, PUDENDAL;  Surgeon: Kadeem Choi MD;  Location: [facility] OR;  Service: General;  Laterality: N/A;    INJECTION OF ANESTHETIC AGENT AROUND PUDENDAL NERVE N/A 1/6/2022    Procedure: BLOCK, NERVE, PUDENDAL;  Surgeon: Kadeem Choi MD;  Location: [facility] OR;  Service: General;  Laterality: N/A;    INJECTION OF ANESTHETIC AGENT INTO TISSUE PLANE DEFINED BY TRANSVERSUS ABDOMINIS MUSCLE N/A 1/30/2020    Procedure: BLOCK, TRANSVERSUS ABDOMINIS PLANE;  Surgeon: Kadeem Choi MD;  Location: [facility];  Service: General;  Laterality: N/A;    KNEE SURGERY  Right     KNEE SURGERY Left     LYSIS OF ADHESIONS N/A 1/30/2020    Procedure: LYSIS, ADHESIONS;  Surgeon: Kademe Choi MD;  Location: [facility] OR;  Service: General;  Laterality: N/A;    PILONIDAL CYST DRAINAGE      pt has had 6 of these adn has had revisions    PROCTECTOMY N/A 1/30/2020    Procedure: PROCTECTOMY;  Surgeon: Kadeem Choi MD;  Location: [facility] OR;  Service: General;  Laterality: N/A;  Partial    SINUSOTOMY, SPHENOID SINUS, ENDOSCOPIC Right 2/15/2023    Procedure: SINUSOTOMY, SPHENOID SINUS, ENDOSCOPIC;  Surgeon: Uriel Vasquez MD;  Location: [facility] OR;  Service: ENT;  Laterality: Right;    SUBTOTAL COLECTOMY  1/30/2020    Procedure: COLECTOMY, PARTIAL;  Surgeon: Kadeem Choi MD;  Location: [facility] OR;  Service: General;;         Anesthesia Evaluation    I have reviewed the Patient Summary Reports.    I have reviewed the Nursing Notes. I have reviewed the NPO Status.   I have reviewed the Medications.     Review of Systems  Anesthesia Hx:  No problems with previous Anesthesia  History of prior surgery of interest to airway management or planning: Previous anesthesia: General, MAC Denies Family Hx of Anesthesia complications.   Denies Personal Hx of Anesthesia complications.   Social:  Non-Smoker    Cardiovascular:   ECG has been reviewed.    Pulmonary:   Asthma Sleep Apnea    Renal/:   Chronic Renal Disease renal calculi    Endocrine:   BMI 35   Psych:   Psychiatric History depression          Physical Exam  General:  Obesity, Well nourished, Cooperative, Alert and Oriented      Airway/Jaw/Neck:  Airway Findings: Mouth Opening: Normal   Tongue: Normal   General Airway Assessment: Adult Mallampati: II  TM Distance: 4 - 6 cm   Jaw/Neck Findings:  Neck ROM: Normal ROM       Dental:  Dental Findings: In tact     Chest/Lungs:  Chest/Lungs Clear    Heart/Vascular:  Heart Findings: Normal       Mental Status:  Mental Status Findings:  Cooperative, Alert and Oriented       Lab Results    Component Value Date    WBC 5.95 08/14/2023    HGB 13.4 (L) 08/14/2023    HCT 39.7 (L) 08/14/2023    MCV 85 08/14/2023     08/14/2023         Chemistry        Component Value Date/Time     08/14/2023 1232    K 4.0 08/14/2023 1232     08/14/2023 1232    CO2 27 08/14/2023 1232    BUN 13 08/14/2023 1232    CREATININE 0.9 08/14/2023 1232    GLU 80 08/14/2023 1232        Component Value Date/Time    CALCIUM 9.3 08/14/2023 1232    ALKPHOS 79 08/14/2023 1232    AST 24 08/14/2023 1232    ALT 35 08/14/2023 1232    BILITOT 0.5 08/14/2023 1232    ESTGFRAFRICA >60.0 11/10/2021 0930    EGFRNONAA >60.0 11/10/2021 0930             Anesthesia Plan  Type of Anesthesia, risks & benefits discussed:  Anesthesia Type:  general, Gen ETT    Patient's Preference: MAC  Plan Factors:          Intra-op Monitoring Plan: standard ASA monitors and Standard ASA Monitors  Intra-op Monitoring Plan Comments:   Post Op Pain Control Plan: per primary service following discharge from PACU, multimodal analgesia and IV/PO Opioids PRN  Post Op Pain Control Plan Comments:     Induction:   IV  Beta Blocker:  Patient is not currently on a Beta-Blocker (No further documentation required).       Informed Consent: Informed consent signed with the Patient and all parties understand the risks and agree with anesthesia plan.  All questions answered.  Anesthesia consent signed with patient.  ASA Score: 2     Day of Surgery Review of History & Physical:  There are no significant changes.  H&P Update referred to the surgeon/provider.    Anesthesia Plan Notes: Intubation:     Induction:  Intravenous    Intubated:  Postinduction    Mask Ventilation:  Easy mask    Attempts:  1    Attempted By:  CRNA    Method of Intubation:  Direct    Blade:  Boswell 2    Laryngeal View Grade: Grade I - full view of cords      Difficult Airway Encountered?: No      Complications:  None    Airway Device:  Oral endotracheal tube    Airway Device Size:  7.5     Style/Cuff Inflation:  Cuffed (inflated to minimal occlusive pressure)    Tube secured:  22    Secured at:  The lips    Placement Verified By:  Capnometry    Complicating Factors:  None    Findings Post-Intubation:  BS equal bilateral        Ready For Surgery From Anesthesia Perspective.       Echo 7.28.20:  · Normal left ventricular systolic function. The estimated ejection fraction is 55%.  · Normal LV diastolic function.  · Normal right ventricular systolic function.     Nuc Stress 7.28.20:      The study shows normal myocardial perfusion.    The perfusion scan is free of evidence from myocardial ischemia or injury.    Gated perfusion images showed an ejection fraction of 67% at rest and 64% post stress.    The EKG portion of this study is negative for ischemia.    The patient reported no chest pain during the stress test.    There were no arrhythmias during stress.        Chemistry        Component Value Date/Time     08/14/2023 1232    K 4.0 08/14/2023 1232     08/14/2023 1232    CO2 27 08/14/2023 1232    BUN 13 08/14/2023 1232    CREATININE 0.9 08/14/2023 1232    GLU 80 08/14/2023 1232        Component Value Date/Time    CALCIUM 9.3 08/14/2023 1232    ALKPHOS 79 08/14/2023 1232    AST 24 08/14/2023 1232    ALT 35 08/14/2023 1232    BILITOT 0.5 08/14/2023 1232    ESTGFRAFRICA >60.0 11/10/2021 0930    EGFRNONAA >60.0 11/10/2021 0930        Lab Results   Component Value Date    WBC 5.95 08/14/2023    HGB 13.4 (L) 08/14/2023    HCT 39.7 (L) 08/14/2023    MCV 85 08/14/2023     08/14/2023           Physical Exam  General: Obesity, Well nourished, Cooperative, Alert and Oriented    Airway:  Mallampati: II   Mouth Opening: Normal  TM Distance: 4 - 6 cm  Tongue: Normal  Neck ROM: Normal ROM    Dental:  In tact          Anesthesia Plan  Type of Anesthesia, risks & benefits discussed:    Anesthesia Type: general, Gen ETT  Intra-op Monitoring Plan: standard ASA monitors and Standard ASA  Monitors  Post Op Pain Control Plan: per primary service following discharge from PACU, multimodal analgesia and IV/PO Opioids PRN  Induction:  IV  Airway Plan: Direct, Post-Induction  Informed Consent: Informed consent signed with the Patient and all parties understand the risks and agree with anesthesia plan.  All questions answered.   ASA Score: 2  Day of Surgery Review of History & Physical: H&P Update referred to the surgeon/provider.  Anesthesia Plan Notes: Intubation:     Induction:  Intravenous    Intubated:  Postinduction    Mask Ventilation:  Easy mask    Attempts:  1    Attempted By:  CRNA    Method of Intubation:  Direct    Blade:  Boswell 2    Laryngeal View Grade: Grade I - full view of cords      Difficult Airway Encountered?: No      Complications:  None    Airway Device:  Oral endotracheal tube    Airway Device Size:  7.5    Style/Cuff Inflation:  Cuffed (inflated to minimal occlusive pressure)    Tube secured:  22    Secured at:  The lips    Placement Verified By:  Capnometry    Complicating Factors:  None    Findings Post-Intubation:  BS equal bilateral    Ready For Surgery From Anesthesia Perspective.       .

## 2023-08-24 NOTE — ANESTHESIA POSTPROCEDURE EVALUATION
Anesthesia Post Evaluation    Patient: Dax Carlisle    Procedure(s) Performed: Procedure(s) (LRB):  EXAM UNDER ANESTHESIA (N/A)  FISTULOTOMY  BLOCK, NERVE, PUDENDAL (N/A)    Final Anesthesia Type: general      Patient location during evaluation: PACU  Patient participation: Yes- Able to Participate  Level of consciousness: awake  Post-procedure vital signs: reviewed and stable  Pain management: adequate  Airway patency: patent    PONV status at discharge: No PONV  Anesthetic complications: no      Cardiovascular status: hemodynamically stable  Respiratory status: unassisted  Hydration status: euvolemic  Follow-up not needed.          Vitals Value Taken Time   /78 08/24/23 1045   Temp 36.3 °C (97.3 °F) 08/24/23 1011   Pulse 76 08/24/23 1049   Resp 21 08/24/23 1049   SpO2 96 % 08/24/23 1049   Vitals shown include unvalidated device data.      Event Time   Out of Recovery 10:49:35         Pain/Corie Score: Pain Rating Prior to Med Admin: 4 (8/24/2023 10:45 AM)  Corie Score: 10 (8/24/2023 10:45 AM)

## 2023-08-24 NOTE — OP NOTE
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  General endotracheal anesthesia was then induced.  The patient was moved to the prone gold-knife position.  The anus and perineum were then prepped and draped in usual sterile fashion.  A preoperative surgical time-out was performed confirming the correct patient, procedure and preop medications given.    A digital rectal exam was performed confirming a palpable internal thickening in the left anterior lateral position as expected.  The area on the left anterior lateral perianal area of the previous incision and drainage was identified.  There was a small opening that was enlarged but there was no active drainage.  The area was then probed with a fistula probe but did not connect to the internal opening initially.  Hydrogen peroxide was then instilled into the external opening and there was some opening seen at the internal opening after inserting a lighted Hill-Mckinney retractor.  Once this was seen, the fistula probe was then reinserted from the external opening connected to the internal  opening.  This did not appear to have any purulent drainage or any active infection.  It was superficial and did not require a seton placement or another procedure and a fistulotomy would be indicated to correct the fistula.  The fistula was then opened over the fistula probe using electrocautery to fully open the fistula.  The tract was curetted and made hemostatic with electrocautery.  The area was copiously irrigated made hemostatic.  There was no other openings or areas of concern.      A pudendal nerve block was then performed using a mixture of 20 cc of Exparel and 30 cc of 0.25% bupivacaine plain.  10 cc injected subdermally around the anal verge, 20 cc injected bilaterally into the intersphincteric space and 20 cc injected bilaterally into the ischial fossa for total of 50 cc given for the block.  Surgical dressings were applied.  The patient was moved back in the supine position.  He was woken from general endotracheal anesthesia and taken to the postanesthesia care in stable condition.  He tolerated procedure well.  All sponge, needle and instrument counts were correct at the end of the case.    Significant Surgical Tasks Conducted by the Assistant(s), if Applicable: Assisted with all portions of the operation as it was required to help with the positioning, exposure and closure to complete the operation    Complications: No    Estimated Blood Loss (EBL): 5mL           Implants: * No implants in log *    Specimens:   Specimen (24h ago, onward)      None                    Condition: Good    Disposition: PACU - hemodynamically stable.    Attestation: I performed the procedure.

## 2023-08-28 VITALS
TEMPERATURE: 97 F | DIASTOLIC BLOOD PRESSURE: 78 MMHG | BODY MASS INDEX: 34.24 KG/M2 | HEART RATE: 75 BPM | SYSTOLIC BLOOD PRESSURE: 123 MMHG | OXYGEN SATURATION: 95 % | RESPIRATION RATE: 16 BRPM | WEIGHT: 239.19 LBS | HEIGHT: 70 IN

## 2023-08-29 ENCOUNTER — NURSE TRIAGE (OUTPATIENT)
Dept: ADMINISTRATIVE | Facility: CLINIC | Age: 48
End: 2023-08-29
Payer: COMMERCIAL

## 2023-08-30 ENCOUNTER — TELEPHONE (OUTPATIENT)
Dept: SURGERY | Facility: CLINIC | Age: 48
End: 2023-08-30
Payer: COMMERCIAL

## 2023-08-30 RX ORDER — SULFAMETHOXAZOLE AND TRIMETHOPRIM 800; 160 MG/1; MG/1
1 TABLET ORAL 2 TIMES DAILY
Qty: 14 TABLET | Refills: 0 | Status: SHIPPED | OUTPATIENT
Start: 2023-08-30 | End: 2023-09-06

## 2023-08-30 NOTE — TELEPHONE ENCOUNTER
Spoke with patient. Patient reports fever 101 that started yesterday evening. Patient reports pain at incision site and lots of drainage. Denies pus or fowl odor. Minimal bloody drainage. Patient states he went through two pairs of pants at work yesterday with the amount of drainage. Reports gas but no BM since surgery. Denies N/V/D. Patient able to tolerate food. Patient reports that he is back at work. Denies any other symptoms. Informed patient I would message our providers and get back to him. If symptoms worsened to seek further evaluation at the ER.

## 2023-08-30 NOTE — TELEPHONE ENCOUNTER
Pt calls had surgery on 8/24 and now has a fever of 101 F temporally. Pt states that he does not feel well and is worried that he has an infection.     Care Advice recommends that pt be seen within the next 3-4 hours. UCC or ED discussed as best options at this time. Pt verbalizes understanding and is instructed to call back with any new/worsening sxs, questions, or concerns.   Reason for Disposition   Fever > 100.4 F (38.0 C)    Additional Information   Negative: [1] Widespread rash AND [2] bright red, sunburn-like   Negative: [1] SEVERE headache AND [2] after spinal (epidural) anesthesia   Negative: [1] Vomiting AND [2] persists > 4 hours   Negative: [1] Vomiting AND [2] abdomen looks much more swollen than usual   Negative: [1] Drinking very little AND [2] dehydration suspected (e.g., no urine > 12 hours, very dry mouth, very lightheaded)   Negative: Patient sounds very sick or weak to the triager   Negative: Sounds like a serious complication to the triager    Protocols used: Post-Op Symptoms and [id number]

## 2023-09-12 ENCOUNTER — OFFICE VISIT (OUTPATIENT)
Dept: INTERNAL MEDICINE | Facility: CLINIC | Age: 48
End: 2023-09-12
Payer: COMMERCIAL

## 2023-09-12 VITALS
DIASTOLIC BLOOD PRESSURE: 82 MMHG | TEMPERATURE: 98 F | HEART RATE: 97 BPM | WEIGHT: 245.13 LBS | OXYGEN SATURATION: 97 % | BODY MASS INDEX: 35.09 KG/M2 | SYSTOLIC BLOOD PRESSURE: 122 MMHG | HEIGHT: 70 IN

## 2023-09-12 DIAGNOSIS — N52.9 ERECTILE DYSFUNCTION, UNSPECIFIED ERECTILE DYSFUNCTION TYPE: Primary | ICD-10-CM

## 2023-09-12 PROCEDURE — 3008F BODY MASS INDEX DOCD: CPT | Mod: CPTII,S$GLB,, | Performed by: FAMILY MEDICINE

## 2023-09-12 PROCEDURE — 3074F SYST BP LT 130 MM HG: CPT | Mod: CPTII,S$GLB,, | Performed by: FAMILY MEDICINE

## 2023-09-12 PROCEDURE — 99999 PR PBB SHADOW E&M-EST. PATIENT-LVL III: ICD-10-PCS | Mod: PBBFAC,,, | Performed by: FAMILY MEDICINE

## 2023-09-12 PROCEDURE — 99214 OFFICE O/P EST MOD 30 MIN: CPT | Mod: S$GLB,,, | Performed by: FAMILY MEDICINE

## 2023-09-12 PROCEDURE — 1159F MED LIST DOCD IN RCRD: CPT | Mod: CPTII,S$GLB,, | Performed by: FAMILY MEDICINE

## 2023-09-12 PROCEDURE — 3079F PR MOST RECENT DIASTOLIC BLOOD PRESSURE 80-89 MM HG: ICD-10-PCS | Mod: CPTII,S$GLB,, | Performed by: FAMILY MEDICINE

## 2023-09-12 PROCEDURE — 3008F PR BODY MASS INDEX (BMI) DOCUMENTED: ICD-10-PCS | Mod: CPTII,S$GLB,, | Performed by: FAMILY MEDICINE

## 2023-09-12 PROCEDURE — 99214 PR OFFICE/OUTPT VISIT, EST, LEVL IV, 30-39 MIN: ICD-10-PCS | Mod: S$GLB,,, | Performed by: FAMILY MEDICINE

## 2023-09-12 PROCEDURE — 3079F DIAST BP 80-89 MM HG: CPT | Mod: CPTII,S$GLB,, | Performed by: FAMILY MEDICINE

## 2023-09-12 PROCEDURE — 1159F PR MEDICATION LIST DOCUMENTED IN MEDICAL RECORD: ICD-10-PCS | Mod: CPTII,S$GLB,, | Performed by: FAMILY MEDICINE

## 2023-09-12 PROCEDURE — 1160F RVW MEDS BY RX/DR IN RCRD: CPT | Mod: CPTII,S$GLB,, | Performed by: FAMILY MEDICINE

## 2023-09-12 PROCEDURE — 99999 PR PBB SHADOW E&M-EST. PATIENT-LVL III: CPT | Mod: PBBFAC,,, | Performed by: FAMILY MEDICINE

## 2023-09-12 PROCEDURE — 1160F PR REVIEW ALL MEDS BY PRESCRIBER/CLIN PHARMACIST DOCUMENTED: ICD-10-PCS | Mod: CPTII,S$GLB,, | Performed by: FAMILY MEDICINE

## 2023-09-12 PROCEDURE — 3074F PR MOST RECENT SYSTOLIC BLOOD PRESSURE < 130 MM HG: ICD-10-PCS | Mod: CPTII,S$GLB,, | Performed by: FAMILY MEDICINE

## 2023-09-12 RX ORDER — TADALAFIL 20 MG/1
20 TABLET ORAL DAILY PRN
Qty: 10 TABLET | Refills: 0 | Status: SHIPPED | OUTPATIENT
Start: 2023-09-12 | End: 2023-09-12 | Stop reason: SDUPTHER

## 2023-09-12 RX ORDER — TADALAFIL 20 MG/1
20 TABLET ORAL DAILY PRN
Qty: 10 TABLET | Refills: 0 | Status: SHIPPED | OUTPATIENT
Start: 2023-09-12

## 2023-09-12 NOTE — PROGRESS NOTES
"Subjective:   Patient ID: Dax Carlisle is a 47 y.o. male.  Chief Complaint:  Erectile Dysfunction    Presents for evaluation of erectile dysfunction   Predates his recent pudendal nerve block and Colorectal surgery   Feels could be primarily psychosocial related based on his mental health history and having nocturnal erections   Still interested in trial of Viagra or Cialis    Erectile Dysfunction  This is a chronic problem. The current episode started more than 1 month ago (1 month ago). The problem has been gradually worsening since onset. The nature of his difficulty is achieving erection. Non-physiologic factors contributing to erectile dysfunction are anxiety and a decreased libido. Irritative symptoms do not include frequency or urgency. Obstructive symptoms do not include dribbling or straining. Pertinent negatives include no dysuria. Past treatments include nothing.     Review of Systems   Respiratory:  Negative for shortness of breath.    Cardiovascular:  Negative for chest pain, palpitations and leg swelling.   Gastrointestinal:  Negative for abdominal pain.   Endocrine: Negative for polyuria.   Genitourinary:  Positive for decreased libido. Negative for difficulty urinating, dysuria, flank pain, frequency, penile pain, testicular pain and urgency.   Neurological:  Negative for syncope, light-headedness and headaches.   Psychiatric/Behavioral:  The patient is nervous/anxious.        Objective:   /82 (BP Location: Left arm, Patient Position: Sitting, BP Method: Large (Manual))   Pulse 97   Temp 97.6 °F (36.4 °C) (Tympanic)   Ht 5' 10" (1.778 m)   Wt 111.2 kg (245 lb 2.4 oz)   SpO2 97%   BMI 35.18 kg/m²     Physical Exam  Vitals and nursing note reviewed.   Constitutional:       Appearance: Normal appearance. He is well-developed. He is obese.   Cardiovascular:      Rate and Rhythm: Normal rate and regular rhythm.   Pulmonary:      Effort: Pulmonary effort is normal.   Musculoskeletal: "      Right lower leg: No edema.      Left lower leg: No edema.   Skin:     Findings: No rash.   Psychiatric:         Mood and Affect: Mood and affect normal.       Assessment:       ICD-10-CM ICD-9-CM   1. Erectile dysfunction, unspecified erectile dysfunction type  N52.9 607.84     Plan:   Erectile dysfunction, unspecified erectile dysfunction type  -     tadalafiL (CIALIS) 20 MG Tab; Take 1 tablet (20 mg total) by mouth daily as needed.  Dispense: 10 tablet; Refill: 0    Patient education on erectile dysfunction.    No contraindications to treatment with medication.    Discussed Viagra versus Cialis versus Levitra.    Reviewed potential side effects.  Recommended and agrees to Cialis 20 mg half to whole tablet daily as needed  If effective and no side effects, okay to refill medication  Return to clinic October 2023 as scheduled or sooner if needed    30+ minutes of total time spent on the encounter, which includes face to face time and non-face to face time preparing to see the patient (eg, review of tests), Obtaining and/or reviewing separately obtained history, documenting clinical information in the electronic or other health record, independently interpreting results (not separately reported) and communicating results to the patient/family/caregiver, or Care coordination (not separately reported).       I hereby acknowledge that I am relying upon documentation authored by a medical student working under my supervision and further I hereby attest that I have verified the student documentation or findings by personally performing the physical exam and medical decision making activities of the Evaluation and Management service to be billed.  Cody Enamorado

## 2023-09-19 DIAGNOSIS — F98.8 ATTENTION DEFICIT DISORDER (ADD) WITHOUT HYPERACTIVITY: ICD-10-CM

## 2023-09-19 DIAGNOSIS — G89.4 CHRONIC PAIN SYNDROME: ICD-10-CM

## 2023-09-19 RX ORDER — DEXTROAMPHETAMINE SACCHARATE, AMPHETAMINE ASPARTATE, DEXTROAMPHETAMINE SULFATE AND AMPHETAMINE SULFATE 5; 5; 5; 5 MG/1; MG/1; MG/1; MG/1
TABLET ORAL
Qty: 30 TABLET | Refills: 0 | Status: SHIPPED | OUTPATIENT
Start: 2023-09-25 | End: 2023-10-19

## 2023-09-19 RX ORDER — HYDROCODONE BITARTRATE AND ACETAMINOPHEN 7.5; 325 MG/1; MG/1
1 TABLET ORAL EVERY 6 HOURS PRN
Qty: 120 TABLET | Refills: 0 | Status: SHIPPED | OUTPATIENT
Start: 2023-09-19 | End: 2023-10-19 | Stop reason: SDUPTHER

## 2023-09-19 NOTE — TELEPHONE ENCOUNTER
No care due was identified.  [facility] Embedded Care Due Messages. Reference number: [id number].   9/19/2023 12:06:56 PM CDT

## 2023-10-09 ENCOUNTER — OFFICE VISIT (OUTPATIENT)
Dept: SURGERY | Facility: CLINIC | Age: 48
End: 2023-10-09
Payer: COMMERCIAL

## 2023-10-09 VITALS
SYSTOLIC BLOOD PRESSURE: 126 MMHG | DIASTOLIC BLOOD PRESSURE: 81 MMHG | WEIGHT: 244.69 LBS | BODY MASS INDEX: 35.11 KG/M2 | HEART RATE: 85 BPM

## 2023-10-09 DIAGNOSIS — K60.4 PERIRECTAL FISTULA: Primary | ICD-10-CM

## 2023-10-09 PROCEDURE — 3074F SYST BP LT 130 MM HG: CPT | Mod: CPTII,S$GLB,, | Performed by: COLON & RECTAL SURGERY

## 2023-10-09 PROCEDURE — 1159F MED LIST DOCD IN RCRD: CPT | Mod: CPTII,S$GLB,, | Performed by: COLON & RECTAL SURGERY

## 2023-10-09 PROCEDURE — 3079F DIAST BP 80-89 MM HG: CPT | Mod: CPTII,S$GLB,, | Performed by: COLON & RECTAL SURGERY

## 2023-10-09 PROCEDURE — 99999 PR PBB SHADOW E&M-EST. PATIENT-LVL III: ICD-10-PCS | Mod: PBBFAC,,, | Performed by: COLON & RECTAL SURGERY

## 2023-10-09 PROCEDURE — 3074F PR MOST RECENT SYSTOLIC BLOOD PRESSURE < 130 MM HG: ICD-10-PCS | Mod: CPTII,S$GLB,, | Performed by: COLON & RECTAL SURGERY

## 2023-10-09 PROCEDURE — 3079F PR MOST RECENT DIASTOLIC BLOOD PRESSURE 80-89 MM HG: ICD-10-PCS | Mod: CPTII,S$GLB,, | Performed by: COLON & RECTAL SURGERY

## 2023-10-09 PROCEDURE — 99024 PR POST-OP FOLLOW-UP VISIT: ICD-10-PCS | Mod: S$GLB,,, | Performed by: COLON & RECTAL SURGERY

## 2023-10-09 PROCEDURE — 1159F PR MEDICATION LIST DOCUMENTED IN MEDICAL RECORD: ICD-10-PCS | Mod: CPTII,S$GLB,, | Performed by: COLON & RECTAL SURGERY

## 2023-10-09 PROCEDURE — 99999 PR PBB SHADOW E&M-EST. PATIENT-LVL III: CPT | Mod: PBBFAC,,, | Performed by: COLON & RECTAL SURGERY

## 2023-10-09 PROCEDURE — 99024 POSTOP FOLLOW-UP VISIT: CPT | Mod: S$GLB,,, | Performed by: COLON & RECTAL SURGERY

## 2023-10-09 NOTE — PROGRESS NOTES
History & Physical    SUBJECTIVE:     CC: Discuss colostomy reversal  Ref: Mindy Goff MD    History of Present Illness:  Patient is a 47 y.o. male presents for evaluation colostomy reversal.  Patient had perforated sigmoid diverticulitis in October 2019 requiring Kirsten's procedure. He did have a postoperative abscess or required IR drainage but has recovered well since that time.  He is currently tolerating a regular diet and having good colostomy function.  He denies any leaks.  His last colonoscopy was in 2016 and he has no history of polyps.  He does have a positive family history of colorectal cancer in his grandmother as well as colon polyps in his mother.  He denies any current bleeding per rectum or from his ostomy.  He currently denies any fever, chills, nausea, vomiting.  He does endorse low transit constipation reports a history of IBS-C. Reports weak urine stream currently since last operation, as well as some lateral abdominal wall pain since surgery. I have discussed this case personally with his referring provider and reviewed all his previous medical records.    Oct 2019: Kirsten's procedure  1/30/2020: Kirsten's reversal with partial LAR due to rectal tear during surgery  9/10/2021: EUA, perirectal abscess internal I&D  01/06/2022: EUA, perirectal abscess I&D  08/24/2023: EUA with fistulotomy    Interval history:  Since the last visit, the patient underwent EUA with fistulotomy. He has had some pain which improved. Having serous drainage over the past 2 weeks. Denies any fever, chills, nausea, vomiting.      Review of patient's allergies indicates:   Allergen Reactions    Codeine Other (See Comments)     violent    Iodinated contrast media Swelling     Tongue, right eye, lips swelling. Rash on abdomen and axilla    Nuts [tree nut] Anaphylaxis    Dairy aid [lactase] Diarrhea and Nausea And Vomiting    Morphine      Tolerated hydromorphone in October 2019.        Current Outpatient  Medications   Medication Sig Dispense Refill    acetaminophen (TYLENOL) 325 MG tablet Take 325 mg by mouth every 6 (six) hours as needed for Pain.      ARIPiprazole (ABILIFY) 10 MG Tab Take 1 tablet (10 mg total) by mouth every evening. 90 tablet 3    azelastine (ASTELIN) 137 mcg (0.1 %) nasal spray Use Two sprays intranasal twice a day 30 mL 0    cyclobenzaprine (FLEXERIL) 10 MG tablet Take 1 tablet (10 mg total) by mouth 3 (three) times daily as needed for Muscle spasms. 90 tablet 3    dextroamphetamine-amphetamine (ADDERALL) 20 mg tablet Take 1/2 tablet (10mg) by mouth 2 (two) times a day as needed 30 tablet 0    dextroamphetamine-amphetamine (ADDERALL) 20 mg tablet Take 0.5 tablet (10mg) by mouth 2 (two) times a day as needed 30 tablet 0    dextroamphetamine-amphetamine (ADDERALL) 20 mg tablet Take 0.5 tablet (10 mg) by mouth 2 (two) times a day as needed 30 tablet 0    EScitalopram oxalate (LEXAPRO) 20 MG tablet Take 1 tablet (20 mg total) by mouth every evening. 90 tablet 3    HYDROcodone-acetaminophen (NORCO) 7.5-325 mg per tablet Take 1 tablet by mouth every 6 (six) hours as needed for Pain. 120 tablet 0    linaclotide 290 mcg Cap Take 1 capsule (290 mcg total) by mouth once daily. (Patient taking differently: Take 290 mcg by mouth daily as needed.) 90 capsule 1    loratadine (CLARITIN) 10 mg tablet Take 10 mg by mouth daily as needed.       metoprolol succinate (TOPROL-XL) 25 MG 24 hr tablet Take 1 tablet (25 mg total) by mouth every evening. 90 tablet 3    tadalafiL (CIALIS) 20 MG Tab Take 1 tablet (20 mg total) by mouth daily as needed. 10 tablet 0    albuterol (VENTOLIN HFA) 90 mcg/actuation inhaler Inhale 2 puffs into the lungs every 6 (six) hours as needed for Wheezing. Rescue 8.5 g 0    senna-docusate 8.6-50 mg (SENNA WITH DOCUSATE SODIUM) 8.6-50 mg per tablet Take 1 tablet by mouth 2 (two) times a day. Stop taking if having diarrhea 60 tablet 0     No current facility-administered medications for  this visit.       Past Medical History:   Diagnosis Date    Angioedema of lips 03/13/2015    IVP dye allergy - swelling lips, eye, tongue    Asthma     childhood    Depression     Diverticulitis 10/07/2019    Kidney stones     Sleep apnea     cpap not required/ patient states this has resolved with weight loss    SVT (supraventricular tachycardia)      Past Surgical History:   Procedure Laterality Date    ABDOMINAL WASHOUT N/A 10/7/2019    Procedure: LAVAGE, PERITONEAL, THERAPEUTIC;  Surgeon: Mindy Goff MD;  Location: [facility] OR;  Service: General;  Laterality: N/A;  abdomen washout    ABSCESS DRAINAGE N/A 9/10/2021    Procedure: DRAINAGE, ABSCESS;  Surgeon: Kadeem Choi MD;  Location: [facility] OR;  Service: General;  Laterality: N/A;  perirectal    anal fistula repair      APPENDECTOMY      BACK SURGERY      BIOPSY, INTRANASAL, ENDOSCOPIC N/A 2/15/2023    Procedure: BIOPSY, INTRANASAL, ENDOSCOPIC;  Surgeon: Uriel Vasquez MD;  Location: [facility] OR;  Service: ENT;  Laterality: N/A;    CHOLECYSTECTOMY  2005    COLONOSCOPY N/A 3/10/2016    Procedure: COLONOSCOPY;  Surgeon: Juvenal Dinero MD;  Location: [facility] ENDO;  Service: Endoscopy;  Laterality: N/A;    COLONOSCOPY N/A 1/17/2020    Procedure: COLONOSCOPY;  Surgeon: Kadeem Choi MD;  Location: [facility] ENDO;  Service: General;  Laterality: N/A;    COLOSTOMY N/A 10/7/2019    Procedure: CREATION, COLOSTOMY;  Surgeon: Mindy Goff MD;  Location: [facility] OR;  Service: General;  Laterality: N/A;    COLOSTOMY REVERSAL      EXAMINATION UNDER ANESTHESIA N/A 9/10/2021    Procedure: Exam under anesthesia;  Surgeon: Kadeem Choi MD;  Location: [facility] OR;  Service: General;  Laterality: N/A;    EXAMINATION UNDER ANESTHESIA N/A 1/6/2022    Procedure: Exam under anesthesia, perirectal abscess I&D;  Surgeon: Kadeem Choi MD;  Location: [facility] OR;  Service: General;  Laterality: N/A;    EXAMINATION UNDER ANESTHESIA N/A 8/24/2023    Procedure: EXAM UNDER  ANESTHESIA;  Surgeon: Kadeem Choi MD;  Location: [facility] OR;  Service: General;  Laterality: N/A;  Exam under anesthesia, possible fistulotomy, possible seton placement (prone)    FISTULOTOMY  8/24/2023    Procedure: FISTULOTOMY;  Surgeon: Kadeem Choi MD;  Location: [facility] OR;  Service: General;;    FUNCTIONAL ENDOSCOPIC SINUS SURGERY (FESS) USING COMPUTER-ASSISTED NAVIGATION N/A 2/15/2023    Procedure: FESS, USING COMPUTER-ASSISTED NAVIGATION;  Surgeon: Uriel Vasquez MD;  Location: [facility] OR;  Service: ENT;  Laterality: N/A;    SIVAKUMAR PROCEDURE N/A 10/7/2019    Procedure: SIVAKUMAR PROCEDURE;  Surgeon: Mindy Goff MD;  Location: [facility] OR;  Service: General;  Laterality: N/A;    INJECTION OF ANESTHETIC AGENT AROUND GANGLION IMPAR N/A 3/15/2021    Procedure: BLOCK, GANGLION IMPAR;  Surgeon: Ruben Cabrera MD;  Location: [facility] PAIN MGT;  Service: Pain Management;  Laterality: N/A;    INJECTION OF ANESTHETIC AGENT AROUND PUDENDAL NERVE N/A 9/10/2021    Procedure: BLOCK, NERVE, PUDENDAL;  Surgeon: Kadeem Choi MD;  Location: [facility] OR;  Service: General;  Laterality: N/A;    INJECTION OF ANESTHETIC AGENT AROUND PUDENDAL NERVE N/A 1/6/2022    Procedure: BLOCK, NERVE, PUDENDAL;  Surgeon: Kadeem Choi MD;  Location: [facility] OR;  Service: General;  Laterality: N/A;    INJECTION OF ANESTHETIC AGENT AROUND PUDENDAL NERVE N/A 8/24/2023    Procedure: BLOCK, NERVE, PUDENDAL;  Surgeon: Kadeem Choi MD;  Location: [facility] OR;  Service: General;  Laterality: N/A;    INJECTION OF ANESTHETIC AGENT INTO TISSUE PLANE DEFINED BY TRANSVERSUS ABDOMINIS MUSCLE N/A 1/30/2020    Procedure: BLOCK, TRANSVERSUS ABDOMINIS PLANE;  Surgeon: Kadeem Choi MD;  Location: [facility] OR;  Service: General;  Laterality: N/A;    KNEE SURGERY Right     KNEE SURGERY Left     LYSIS OF ADHESIONS N/A 1/30/2020    Procedure: LYSIS, ADHESIONS;  Surgeon: Kadeem Choi MD;  Location: [facility];  Service: General;  Laterality:  N/A;    PILONIDAL CYST DRAINAGE      pt has had 6 of these adn has had revisions    PROCTECTOMY N/A 1/30/2020    Procedure: PROCTECTOMY;  Surgeon: Kadeem Choi MD;  Location: [facility] OR;  Service: General;  Laterality: N/A;  Partial    SINUSOTOMY, SPHENOID SINUS, ENDOSCOPIC Right 2/15/2023    Procedure: SINUSOTOMY, SPHENOID SINUS, ENDOSCOPIC;  Surgeon: Uriel Vasquez MD;  Location: [facility] OR;  Service: ENT;  Laterality: Right;    SUBTOTAL COLECTOMY  1/30/2020    Procedure: COLECTOMY, PARTIAL;  Surgeon: Kadeem Choi MD;  Location: [facility] OR;  Service: General;;     Family History   Problem Relation Age of Onset    Diabetes Mother     Colon polyps Mother     Alzheimer's disease Mother     Heart disease Father         CABG age 66    Cancer Father         melanoma    Diabetes Father     Anesthesia problems Father         d/t polio    Colon polyps Sister     Diabetes Maternal Grandmother     Colon cancer Maternal Grandmother     Diabetes Maternal Grandfather     Diabetes Paternal Grandmother     Stroke Paternal Grandmother     Diabetes Paternal Grandfather      Social History     Tobacco Use    Smoking status: Never    Smokeless tobacco: Never   Substance Use Topics    Alcohol use: Never    Drug use: No        Review of Systems:  Review of Systems   Constitutional:  Negative for activity change, appetite change, chills, fatigue, fever and unexpected weight change.   HENT:  Negative for congestion, ear pain, sore throat and trouble swallowing.    Eyes:  Negative for pain, redness and itching.   Respiratory:  Negative for cough, shortness of breath and wheezing.    Cardiovascular:  Negative for chest pain, palpitations and leg swelling.   Gastrointestinal:  Negative for abdominal distention, abdominal pain, anal bleeding, blood in stool, constipation, diarrhea, nausea, rectal pain and vomiting.   Endocrine: Negative for cold intolerance, heat intolerance and polyuria.   Genitourinary:  Negative for dysuria, flank  pain, frequency and hematuria.   Musculoskeletal:  Negative for back pain, gait problem, joint swelling and neck pain.   Skin:  Negative for color change, rash and wound.   Allergic/Immunologic: Negative for environmental allergies and immunocompromised state.   Neurological:  Negative for dizziness, speech difficulty, weakness and numbness.   Psychiatric/Behavioral:  Negative for agitation, confusion and hallucinations.        OBJECTIVE:     Vital Signs (Most Recent)  Pulse: 85 (10/09/23 1339)  BP: 126/81 (10/09/23 1339)     111 kg (244 lb 11.4 oz)     Physical Exam:  Physical Exam  Exam conducted with a chaperone present.   Constitutional:       Appearance: He is well-developed.   HENT:      Head: Normocephalic and atraumatic.   Eyes:      Conjunctiva/sclera: Conjunctivae normal.   Neck:      Thyroid: No thyromegaly.   Cardiovascular:      Rate and Rhythm: Normal rate and regular rhythm.   Pulmonary:      Effort: Pulmonary effort is normal. No respiratory distress.   Abdominal:      General: There is no distension.      Palpations: Abdomen is soft. There is no mass.   Genitourinary:     Comments: Anorectal: left lateral/anterolateral mostly/partially healed fistulotomy site  Musculoskeletal:         General: No tenderness. Normal range of motion.      Cervical back: Normal range of motion.   Skin:     General: Skin is warm and dry.      Capillary Refill: Capillary refill takes less than 2 seconds.      Findings: No rash.   Neurological:      Mental Status: He is alert and oriented to person, place, and time.       Pathology Results:  FINAL PATHOLOGIC DIAGNOSIS  1. Sigmoid colon (stitch marks perforation), segmental resection (20 cm in length):  - Gross and histologic evidence of transmural perforation (4 cm from nearest surgical margin)  - Associated acute chronic transmural inflammation with extension into pericolic fat resulting in abscess  formation  - Serosal adhesion formation with acute chronic serositis  -  Margins histologically viable  - Negative for dysplasia or malignancy    PATHOLOGY from Kirsten's reversal Jan 2020:  1. SIGMOID COLON AND UPPER RECTUM, PARTIAL PROCTECTOMY:  - Colorectal tissue with chronic inflammation and fibrous serosal adhesions  - Submucosal foreign-body giant cell reaction associated with acellular suture-like material, consistent with  prior surgical intervention  - No evidence of dysplasia or malignancy  2. COLOSTOMY, TAKEDOWN:  - Inflamed colo-cutaneous tissue with foreign-body giant cell reaction, consistent with ostomy  3. ANASTOMOTIC RING, EXCISION:  - Colonic tissue with chronic inflammation, vascular congestion, and hyperplastic mucosal changes  - No evidence of dysplasia or malignancy    ASSESSMENT/PLAN:     46yo F with previous perforated sigmoid diverticulitis requiring Kirsten's procedure in October 2019 who is now s/p Kirsten's reversal and LAR on 1/30/2020 who recovered well postop but had new perianal/perirectal abscess requiring internal I&D in Sept 2021 and recurred in January 2022 requiring another I&D but continued cycles of swelling, pain and drainage consistent with perirectal fistula now s/p EUA with fistulotomy 08/24/23    - fistulotomy site well healing with one area of granulation tissue. Will give more time to heal and may possibly need silver nitrate if does not fully heal  - RTC 4 weeks    Kademe Choi MD  Colon and Rectal Surgery  [facility]

## 2023-10-19 ENCOUNTER — OFFICE VISIT (OUTPATIENT)
Dept: INTERNAL MEDICINE | Facility: CLINIC | Age: 48
End: 2023-10-19
Payer: COMMERCIAL

## 2023-10-19 ENCOUNTER — LAB VISIT (OUTPATIENT)
Dept: LAB | Facility: HOSPITAL | Age: 48
End: 2023-10-19
Attending: FAMILY MEDICINE
Payer: COMMERCIAL

## 2023-10-19 VITALS
OXYGEN SATURATION: 95 % | DIASTOLIC BLOOD PRESSURE: 82 MMHG | TEMPERATURE: 98 F | HEART RATE: 78 BPM | HEIGHT: 70 IN | SYSTOLIC BLOOD PRESSURE: 128 MMHG | BODY MASS INDEX: 34.94 KG/M2 | WEIGHT: 244.06 LBS

## 2023-10-19 DIAGNOSIS — Z83.719 FAMILY HISTORY OF COLONIC POLYPS: ICD-10-CM

## 2023-10-19 DIAGNOSIS — F98.8 ATTENTION DEFICIT DISORDER (ADD) WITHOUT HYPERACTIVITY: ICD-10-CM

## 2023-10-19 DIAGNOSIS — Z93.3 STATUS POST HARTMANN PROCEDURE: ICD-10-CM

## 2023-10-19 DIAGNOSIS — K59.09 CHRONIC CONSTIPATION: ICD-10-CM

## 2023-10-19 DIAGNOSIS — G89.4 CHRONIC PAIN SYNDROME: ICD-10-CM

## 2023-10-19 DIAGNOSIS — K57.20 DIVERTICULITIS OF LARGE INTESTINE WITH PERFORATION AND ABSCESS WITHOUT BLEEDING: ICD-10-CM

## 2023-10-19 DIAGNOSIS — E78.2 MIXED HYPERLIPIDEMIA: ICD-10-CM

## 2023-10-19 DIAGNOSIS — I47.10 SVT (SUPRAVENTRICULAR TACHYCARDIA): ICD-10-CM

## 2023-10-19 DIAGNOSIS — Z00.00 ANNUAL PHYSICAL EXAM: Primary | ICD-10-CM

## 2023-10-19 DIAGNOSIS — F32.5 MAJOR DEPRESSIVE DISORDER WITH SINGLE EPISODE, IN REMISSION: ICD-10-CM

## 2023-10-19 DIAGNOSIS — Z00.00 ANNUAL PHYSICAL EXAM: ICD-10-CM

## 2023-10-19 DIAGNOSIS — K55.9 VASCULAR DISORDER OF INTESTINE, UNSPECIFIED: ICD-10-CM

## 2023-10-19 DIAGNOSIS — Z23 NEED FOR TDAP VACCINATION: ICD-10-CM

## 2023-10-19 DIAGNOSIS — E66.01 SEVERE OBESITY (BMI 35.0-39.9) WITH COMORBIDITY: ICD-10-CM

## 2023-10-19 PROBLEM — E66.9 OBESITY (BMI 30.0-34.9): Status: RESOLVED | Noted: 2021-11-10 | Resolved: 2023-10-19

## 2023-10-19 PROBLEM — E66.811 OBESITY (BMI 30.0-34.9): Status: RESOLVED | Noted: 2021-11-10 | Resolved: 2023-10-19

## 2023-10-19 LAB
ALBUMIN SERPL BCP-MCNC: 4.1 G/DL (ref 3.5–5.2)
ALP SERPL-CCNC: 77 U/L (ref 55–135)
ALT SERPL W/O P-5'-P-CCNC: 46 U/L (ref 10–44)
ANION GAP SERPL CALC-SCNC: 11 MMOL/L (ref 8–16)
AST SERPL-CCNC: 25 U/L (ref 10–40)
BILIRUB SERPL-MCNC: 0.3 MG/DL (ref 0.1–1)
BUN SERPL-MCNC: 11 MG/DL (ref 6–20)
CALCIUM SERPL-MCNC: 9.5 MG/DL (ref 8.7–10.5)
CHLORIDE SERPL-SCNC: 106 MMOL/L (ref 95–110)
CHOLEST SERPL-MCNC: 220 MG/DL (ref 120–199)
CHOLEST/HDLC SERPL: 5.2 {RATIO} (ref 2–5)
CO2 SERPL-SCNC: 23 MMOL/L (ref 23–29)
CREAT SERPL-MCNC: 0.8 MG/DL (ref 0.5–1.4)
ERYTHROCYTE [DISTWIDTH] IN BLOOD BY AUTOMATED COUNT: 13.3 % (ref 11.5–14.5)
EST. GFR  (NO RACE VARIABLE): >60 ML/MIN/1.73 M^2
ESTIMATED AVG GLUCOSE: 97 MG/DL (ref 68–131)
GLUCOSE SERPL-MCNC: 101 MG/DL (ref 70–110)
HBA1C MFR BLD: 5 % (ref 4–5.6)
HCT VFR BLD AUTO: 40.3 % (ref 40–54)
HDLC SERPL-MCNC: 42 MG/DL (ref 40–75)
HDLC SERPL: 19.1 % (ref 20–50)
HGB BLD-MCNC: 13.4 G/DL (ref 14–18)
LDLC SERPL CALC-MCNC: 141.6 MG/DL (ref 63–159)
MCH RBC QN AUTO: 28.5 PG (ref 27–31)
MCHC RBC AUTO-ENTMCNC: 33.3 G/DL (ref 32–36)
MCV RBC AUTO: 86 FL (ref 82–98)
NONHDLC SERPL-MCNC: 178 MG/DL
PLATELET # BLD AUTO: 176 K/UL (ref 150–450)
PMV BLD AUTO: 12.9 FL (ref 9.2–12.9)
POTASSIUM SERPL-SCNC: 4.3 MMOL/L (ref 3.5–5.1)
PROT SERPL-MCNC: 7.1 G/DL (ref 6–8.4)
RBC # BLD AUTO: 4.71 M/UL (ref 4.6–6.2)
SODIUM SERPL-SCNC: 140 MMOL/L (ref 136–145)
TRIGL SERPL-MCNC: 182 MG/DL (ref 30–150)
TSH SERPL DL<=0.005 MIU/L-ACNC: 1.67 UIU/ML (ref 0.4–4)
WBC # BLD AUTO: 6.27 K/UL (ref 3.9–12.7)

## 2023-10-19 PROCEDURE — 84443 ASSAY THYROID STIM HORMONE: CPT | Performed by: FAMILY MEDICINE

## 2023-10-19 PROCEDURE — 3008F BODY MASS INDEX DOCD: CPT | Mod: CPTII,S$GLB,, | Performed by: FAMILY MEDICINE

## 2023-10-19 PROCEDURE — 80053 COMPREHEN METABOLIC PANEL: CPT | Performed by: FAMILY MEDICINE

## 2023-10-19 PROCEDURE — 85027 COMPLETE CBC AUTOMATED: CPT | Performed by: FAMILY MEDICINE

## 2023-10-19 PROCEDURE — 90715 TDAP VACCINE 7 YRS/> IM: CPT | Mod: S$GLB,,, | Performed by: FAMILY MEDICINE

## 2023-10-19 PROCEDURE — 90471 TDAP VACCINE GREATER THAN OR EQUAL TO 7YO IM: ICD-10-PCS | Mod: S$GLB,,, | Performed by: FAMILY MEDICINE

## 2023-10-19 PROCEDURE — 1160F PR REVIEW ALL MEDS BY PRESCRIBER/CLIN PHARMACIST DOCUMENTED: ICD-10-PCS | Mod: CPTII,S$GLB,, | Performed by: FAMILY MEDICINE

## 2023-10-19 PROCEDURE — 3079F DIAST BP 80-89 MM HG: CPT | Mod: CPTII,S$GLB,, | Performed by: FAMILY MEDICINE

## 2023-10-19 PROCEDURE — 1159F PR MEDICATION LIST DOCUMENTED IN MEDICAL RECORD: ICD-10-PCS | Mod: CPTII,S$GLB,, | Performed by: FAMILY MEDICINE

## 2023-10-19 PROCEDURE — 1160F RVW MEDS BY RX/DR IN RCRD: CPT | Mod: CPTII,S$GLB,, | Performed by: FAMILY MEDICINE

## 2023-10-19 PROCEDURE — 3074F PR MOST RECENT SYSTOLIC BLOOD PRESSURE < 130 MM HG: ICD-10-PCS | Mod: CPTII,S$GLB,, | Performed by: FAMILY MEDICINE

## 2023-10-19 PROCEDURE — 1159F MED LIST DOCD IN RCRD: CPT | Mod: CPTII,S$GLB,, | Performed by: FAMILY MEDICINE

## 2023-10-19 PROCEDURE — 3008F PR BODY MASS INDEX (BMI) DOCUMENTED: ICD-10-PCS | Mod: CPTII,S$GLB,, | Performed by: FAMILY MEDICINE

## 2023-10-19 PROCEDURE — 99396 PR PREVENTIVE VISIT,EST,40-64: ICD-10-PCS | Mod: 25,S$GLB,, | Performed by: FAMILY MEDICINE

## 2023-10-19 PROCEDURE — 80061 LIPID PANEL: CPT | Performed by: FAMILY MEDICINE

## 2023-10-19 PROCEDURE — 99999 PR PBB SHADOW E&M-EST. PATIENT-LVL IV: CPT | Mod: PBBFAC,,, | Performed by: FAMILY MEDICINE

## 2023-10-19 PROCEDURE — 3079F PR MOST RECENT DIASTOLIC BLOOD PRESSURE 80-89 MM HG: ICD-10-PCS | Mod: CPTII,S$GLB,, | Performed by: FAMILY MEDICINE

## 2023-10-19 PROCEDURE — 90715 TDAP VACCINE GREATER THAN OR EQUAL TO 7YO IM: ICD-10-PCS | Mod: S$GLB,,, | Performed by: FAMILY MEDICINE

## 2023-10-19 PROCEDURE — 3074F SYST BP LT 130 MM HG: CPT | Mod: CPTII,S$GLB,, | Performed by: FAMILY MEDICINE

## 2023-10-19 PROCEDURE — 99999 PR PBB SHADOW E&M-EST. PATIENT-LVL IV: ICD-10-PCS | Mod: PBBFAC,,, | Performed by: FAMILY MEDICINE

## 2023-10-19 PROCEDURE — 99396 PREV VISIT EST AGE 40-64: CPT | Mod: 25,S$GLB,, | Performed by: FAMILY MEDICINE

## 2023-10-19 PROCEDURE — 36415 COLL VENOUS BLD VENIPUNCTURE: CPT | Performed by: FAMILY MEDICINE

## 2023-10-19 PROCEDURE — 83036 HEMOGLOBIN GLYCOSYLATED A1C: CPT | Performed by: FAMILY MEDICINE

## 2023-10-19 PROCEDURE — 90471 IMMUNIZATION ADMIN: CPT | Mod: S$GLB,,, | Performed by: FAMILY MEDICINE

## 2023-10-19 RX ORDER — DEXTROAMPHETAMINE SACCHARATE, AMPHETAMINE ASPARTATE, DEXTROAMPHETAMINE SULFATE AND AMPHETAMINE SULFATE 5; 5; 5; 5 MG/1; MG/1; MG/1; MG/1
TABLET ORAL
Qty: 30 TABLET | Refills: 0 | Status: SHIPPED | OUTPATIENT
Start: 2023-11-16 | End: 2023-12-21

## 2023-10-19 RX ORDER — DEXTROAMPHETAMINE SACCHARATE, AMPHETAMINE ASPARTATE, DEXTROAMPHETAMINE SULFATE AND AMPHETAMINE SULFATE 5; 5; 5; 5 MG/1; MG/1; MG/1; MG/1
TABLET ORAL
Qty: 30 TABLET | Refills: 0 | Status: SHIPPED | OUTPATIENT
Start: 2023-12-14 | End: 2023-12-21

## 2023-10-19 RX ORDER — DEXTROAMPHETAMINE SACCHARATE, AMPHETAMINE ASPARTATE, DEXTROAMPHETAMINE SULFATE AND AMPHETAMINE SULFATE 5; 5; 5; 5 MG/1; MG/1; MG/1; MG/1
TABLET ORAL
Qty: 30 TABLET | Refills: 0 | Status: SHIPPED | OUTPATIENT
Start: 2023-10-19 | End: 2023-12-20 | Stop reason: SDUPTHER

## 2023-10-19 RX ORDER — LUBIPROSTONE 24 UG/1
24 CAPSULE ORAL 2 TIMES DAILY WITH MEALS
Qty: 60 CAPSULE | Refills: 0 | Status: SHIPPED | OUTPATIENT
Start: 2023-10-19 | End: 2023-11-19

## 2023-10-19 RX ORDER — HYDROCODONE BITARTRATE AND ACETAMINOPHEN 7.5; 325 MG/1; MG/1
1 TABLET ORAL EVERY 6 HOURS PRN
Qty: 120 TABLET | Refills: 0 | Status: SHIPPED | OUTPATIENT
Start: 2023-10-19 | End: 2023-11-20 | Stop reason: SDUPTHER

## 2023-10-19 NOTE — PROGRESS NOTES
Subjective:   Patient ID: Dax Carlisle is a 47 y.o. male.  Chief Complaint:  3mth f/u    Presents for annual physical exam and follow-up on ADD, depression, and chronic pain     Last annual physical exam November 2022  CBC test shows a stable mild anemia. Take a daily multivitamin with iron.   Sugar, Kidney, Liver, and Electrolyte tests are all normal.  Cholesterol tests are normal. 10-year risk of a heart disease or stroke is 2%. Aspirin or Statin cholesterol medications are not recommended.  A1c is in a normal range. No Prediabtes or Diabetes  Thyroid testing is normal.  Okay to continue all current medications   Recheck labs 1 year    - ADHD  On Adderall 10 mg twice a day to help with focus/ADD/ADHD related issues despite compliance with his other mental health medications  Effective with symptoms controlled on present medication and dose helping compensate for deficits at work  Duration is appropriate.    No mood instability, tics, or disordered sleep, or other apparent adverse effects.  No increased blood pressure, heart, or other cardiovascular side effects.    Tolerating current treatment well.   No behaviors to suggest inappropriate use of prescribed medications.  [place] Board [employer] Controlled Prescription Drug Monitoring database was queried and showed no activity to suggest abuse, diversion, or other inappropriate use of prescription medications.      - Chronic Pain  On Norco 7.5/325 mg every 6 hours as needed.  Averages 120 pills per month.  No behaviors to suggest inappropriate use of prescribed medications.  [place] [employer] Controlled Prescription Drug Monitoring database was queried and showed no activity to suggest abuse, diversion, or other inappropriate use of prescription medications.      - Major depressive disorder  On Abilify 10 mg daily and Lexapro 20 mg daily   Remains stable, no side effects, mood and symptoms well controlled on present medication    - Chronic  "constipation  Currently not controlled Linzess 290 mcg daily   Would like to try Amitiza    - Erectile dysfunction  Cialis effective for symptom control   Only had wants   No significant side effects   Happy with medication   Wants to continue as needed        Health maintenance:  - Colon cancer screening up-to-date   - Needs tetanus booster, COVID booster, and flu vaccine    Review of Systems   Gastrointestinal:  Positive for constipation.   Musculoskeletal:  Positive for arthralgias, back pain and myalgias.   Psychiatric/Behavioral:  Negative for agitation, confusion, decreased concentration, dysphoric mood, hallucinations and sleep disturbance. The patient is not nervous/anxious and is not hyperactive.        Objective:   /82 (BP Location: Right arm, Patient Position: Sitting, BP Method: Large (Manual))   Pulse 78   Temp 98 °F (36.7 °C) (Tympanic)   Ht 5' 10" (1.778 m)   Wt 110.7 kg (244 lb 0.8 oz)   SpO2 95%   BMI 35.02 kg/m²     Physical Exam  Vitals and nursing note reviewed.   Constitutional:       Appearance: Normal appearance. He is well-developed. He is obese.   Neck:      Thyroid: No thyroid mass, thyromegaly or thyroid tenderness.   Cardiovascular:      Rate and Rhythm: Normal rate and regular rhythm.      Heart sounds: Normal heart sounds.   Pulmonary:      Effort: Pulmonary effort is normal.      Breath sounds: Normal breath sounds.   Abdominal:      General: There is no distension.      Palpations: Abdomen is soft.      Tenderness: There is no abdominal tenderness.   Skin:     Findings: No rash.   Psychiatric:         Mood and Affect: Mood and affect normal.         Assessment:       ICD-10-CM ICD-9-CM   1. Annual physical exam  Z00.00 V70.0   2. Need for Tdap vaccination  Z23 V06.1   3. Chronic pain syndrome  G89.4 338.4   4. Attention deficit disorder (ADD) without hyperactivity  F98.8 314.00   5. Major depressive disorder with single episode, in remission  F32.5 296.25   6. SVT " (supraventricular tachycardia)  I47.10 427.89   7. Mixed hyperlipidemia  E78.2 272.2   8. Chronic constipation  K59.09 564.00   9. Vascular disorder of intestine, unspecified  K55.9 569.9   10. Status post Kirsten procedure  Z93.3 V44.3   11. Diverticulitis of large intestine with perforation and abscess without bleeding  K57.20 562.11   12. Family history of colonic polyps  Z83.719 V18.51   13. Severe obesity (BMI 35.0-39.9) with comorbidity  E66.01 278.01     Plan:   Annual physical exam  Need for Tdap vaccination  -     (In Office Administered) Tdap Vaccine  -     CBC Without Differential; Future; Expected date: 10/19/2023  -     Comprehensive Metabolic Panel; Future; Expected date: 10/19/2023  -     Lipid Panel; Future; Expected date: 10/19/2023  -     TSH; Future; Expected date: 10/19/2023  -     Hemoglobin A1C; Future; Expected date: 10/19/2023  -     (In Office Administered) Tdap Vaccine  Blood pressure normal.  BMI 35.  Remainder exam stable.    Check labs.  Treat as indicated.    Colon cancer screening up-to-date  Tetanus booster today   Recommend COVID booster through pharmacy  Declines flu vaccine today    Chronic pain syndrome  -     HYDROcodone-acetaminophen (NORCO) 7.5-325 mg per tablet; Take 1 tablet by mouth every 6 (six) hours as needed for Pain.  Dispense: 120 tablet; Refill: 0  Pain and symptoms remain well controlled on current medication   Continue Topamax 25 mg twice a day.  Continue Norco 7.5/325 every 6 hours as needed for pain  Averaging 4 per day and stable pattern  Risk of discontinuation of long-term narcotics higher than risk of continuing long-term narcotics at current dose  Okay to refill monthly x 2    Attention deficit disorder (ADD) without hyperactivity  -     dextroamphetamine-amphetamine (ADDERALL) 20 mg tablet; Take 0.5 tablet (10 mg) by mouth 2 (two) times a day as needed  Dispense: 30 tablet; Refill: 0  -     dextroamphetamine-amphetamine (ADDERALL) 20 mg tablet; Take 0.5  tablet (10mg) by mouth 2 (two) times a day as needed  Dispense: 30 tablet; Refill: 0  -     dextroamphetamine-amphetamine (ADDERALL) 20 mg tablet; Take 1/2 tablet (10mg) by mouth 2 (two) times a day as needed  Dispense: 30 tablet; Refill: 0  ADHD, stable, no side effects, controlled on present medication  Continue stimulant at present dose  OK to refill monthly x 2     Major depressive disorder with single episode, in remission  Stable, no side effects, mood and symptoms well controlled on present medication .  Continue current medications     SVT (supraventricular tachycardia)  Stable.  Asymptomatic.  Normal rate rhythm.    Continue Toprol-XL 25 mg daily     Mixed hyperlipidemia  Asymptomatic   Repeat lipid panel today  Start statin if indicated     Chronic constipation  Vascular disorder of intestine, unspecified  Status post Kirsten procedure  -     lubiprostone (AMITIZA) 24 MCG Cap; Take 1 capsule (24 mcg total) by mouth 2 (two) times daily with meals.  Dispense: 60 capsule; Refill: 0  Discontinue Linzess   Trial of Amitiza     Diverticulitis of large intestine with perforation and abscess without bleeding  Family history of colonic polyps  Colon cancer screening up-to-date     Severe obesity (BMI 35.0-39.9) with comorbidity  Discussed increased BMI, Increased health risks, Need for weight loss, Lifestyle modifications.    Return to clinic 3 months or sooner if needed

## 2023-11-06 ENCOUNTER — OFFICE VISIT (OUTPATIENT)
Dept: OTOLARYNGOLOGY | Facility: CLINIC | Age: 48
End: 2023-11-06
Payer: COMMERCIAL

## 2023-11-06 ENCOUNTER — OFFICE VISIT (OUTPATIENT)
Dept: SURGERY | Facility: CLINIC | Age: 48
End: 2023-11-06
Payer: COMMERCIAL

## 2023-11-06 VITALS
BODY MASS INDEX: 35.01 KG/M2 | OXYGEN SATURATION: 98 % | DIASTOLIC BLOOD PRESSURE: 85 MMHG | HEART RATE: 109 BPM | WEIGHT: 244 LBS | RESPIRATION RATE: 19 BRPM | SYSTOLIC BLOOD PRESSURE: 133 MMHG

## 2023-11-06 VITALS — BODY MASS INDEX: 35.03 KG/M2 | HEIGHT: 70 IN | WEIGHT: 244.69 LBS | TEMPERATURE: 98 F

## 2023-11-06 DIAGNOSIS — Z96.22 HISTORY OF TYMPANOSTOMY TUBE PLACEMENT: ICD-10-CM

## 2023-11-06 DIAGNOSIS — K60.4 PERIRECTAL FISTULA: Primary | ICD-10-CM

## 2023-11-06 DIAGNOSIS — D36.9 INVERTED PAPILLOMA: Primary | ICD-10-CM

## 2023-11-06 PROCEDURE — 3044F HG A1C LEVEL LT 7.0%: CPT | Mod: CPTII,S$GLB,, | Performed by: STUDENT IN AN ORGANIZED HEALTH CARE EDUCATION/TRAINING PROGRAM

## 2023-11-06 PROCEDURE — 3008F BODY MASS INDEX DOCD: CPT | Mod: CPTII,S$GLB,, | Performed by: STUDENT IN AN ORGANIZED HEALTH CARE EDUCATION/TRAINING PROGRAM

## 2023-11-06 PROCEDURE — 99213 OFFICE O/P EST LOW 20 MIN: CPT | Mod: 25,S$GLB,, | Performed by: STUDENT IN AN ORGANIZED HEALTH CARE EDUCATION/TRAINING PROGRAM

## 2023-11-06 PROCEDURE — 99999 PR PBB SHADOW E&M-EST. PATIENT-LVL III: ICD-10-PCS | Mod: PBBFAC,,, | Performed by: COLON & RECTAL SURGERY

## 2023-11-06 PROCEDURE — 3044F PR MOST RECENT HEMOGLOBIN A1C LEVEL <7.0%: ICD-10-PCS | Mod: CPTII,S$GLB,, | Performed by: STUDENT IN AN ORGANIZED HEALTH CARE EDUCATION/TRAINING PROGRAM

## 2023-11-06 PROCEDURE — 31231 PR NASAL ENDOSCOPY, DX: ICD-10-PCS | Mod: S$GLB,,, | Performed by: STUDENT IN AN ORGANIZED HEALTH CARE EDUCATION/TRAINING PROGRAM

## 2023-11-06 PROCEDURE — 1159F PR MEDICATION LIST DOCUMENTED IN MEDICAL RECORD: ICD-10-PCS | Mod: CPTII,S$GLB,, | Performed by: STUDENT IN AN ORGANIZED HEALTH CARE EDUCATION/TRAINING PROGRAM

## 2023-11-06 PROCEDURE — 99024 PR POST-OP FOLLOW-UP VISIT: ICD-10-PCS | Mod: S$GLB,,, | Performed by: COLON & RECTAL SURGERY

## 2023-11-06 PROCEDURE — 3079F PR MOST RECENT DIASTOLIC BLOOD PRESSURE 80-89 MM HG: ICD-10-PCS | Mod: CPTII,S$GLB,, | Performed by: COLON & RECTAL SURGERY

## 2023-11-06 PROCEDURE — 1159F MED LIST DOCD IN RCRD: CPT | Mod: CPTII,S$GLB,, | Performed by: STUDENT IN AN ORGANIZED HEALTH CARE EDUCATION/TRAINING PROGRAM

## 2023-11-06 PROCEDURE — 3075F SYST BP GE 130 - 139MM HG: CPT | Mod: CPTII,S$GLB,, | Performed by: COLON & RECTAL SURGERY

## 2023-11-06 PROCEDURE — 3008F PR BODY MASS INDEX (BMI) DOCUMENTED: ICD-10-PCS | Mod: CPTII,S$GLB,, | Performed by: STUDENT IN AN ORGANIZED HEALTH CARE EDUCATION/TRAINING PROGRAM

## 2023-11-06 PROCEDURE — 3044F HG A1C LEVEL LT 7.0%: CPT | Mod: CPTII,S$GLB,, | Performed by: COLON & RECTAL SURGERY

## 2023-11-06 PROCEDURE — 31231 NASAL ENDOSCOPY DX: CPT | Mod: S$GLB,,, | Performed by: STUDENT IN AN ORGANIZED HEALTH CARE EDUCATION/TRAINING PROGRAM

## 2023-11-06 PROCEDURE — 1159F PR MEDICATION LIST DOCUMENTED IN MEDICAL RECORD: ICD-10-PCS | Mod: CPTII,S$GLB,, | Performed by: COLON & RECTAL SURGERY

## 2023-11-06 PROCEDURE — 3079F DIAST BP 80-89 MM HG: CPT | Mod: CPTII,S$GLB,, | Performed by: COLON & RECTAL SURGERY

## 2023-11-06 PROCEDURE — 3075F PR MOST RECENT SYSTOLIC BLOOD PRESS GE 130-139MM HG: ICD-10-PCS | Mod: CPTII,S$GLB,, | Performed by: COLON & RECTAL SURGERY

## 2023-11-06 PROCEDURE — 99213 PR OFFICE/OUTPT VISIT, EST, LEVL III, 20-29 MIN: ICD-10-PCS | Mod: 25,S$GLB,, | Performed by: STUDENT IN AN ORGANIZED HEALTH CARE EDUCATION/TRAINING PROGRAM

## 2023-11-06 PROCEDURE — 3044F PR MOST RECENT HEMOGLOBIN A1C LEVEL <7.0%: ICD-10-PCS | Mod: CPTII,S$GLB,, | Performed by: COLON & RECTAL SURGERY

## 2023-11-06 PROCEDURE — 99999 PR PBB SHADOW E&M-EST. PATIENT-LVL III: CPT | Mod: PBBFAC,,, | Performed by: COLON & RECTAL SURGERY

## 2023-11-06 PROCEDURE — 99999 PR PBB SHADOW E&M-EST. PATIENT-LVL III: CPT | Mod: PBBFAC,,, | Performed by: STUDENT IN AN ORGANIZED HEALTH CARE EDUCATION/TRAINING PROGRAM

## 2023-11-06 PROCEDURE — 1159F MED LIST DOCD IN RCRD: CPT | Mod: CPTII,S$GLB,, | Performed by: COLON & RECTAL SURGERY

## 2023-11-06 PROCEDURE — 99024 POSTOP FOLLOW-UP VISIT: CPT | Mod: S$GLB,,, | Performed by: COLON & RECTAL SURGERY

## 2023-11-06 PROCEDURE — 99999 PR PBB SHADOW E&M-EST. PATIENT-LVL III: ICD-10-PCS | Mod: PBBFAC,,, | Performed by: STUDENT IN AN ORGANIZED HEALTH CARE EDUCATION/TRAINING PROGRAM

## 2023-11-06 NOTE — PROGRESS NOTES
"  Referring Provider:    No referring provider defined for this encounter.  Subjective:   Patient: Dax Carlisle [id number], :1975   Visit date:2023 11:37 AM    Chief Complaint:  6 month f/u-inverted papilloma (No c/o at this time)      HPI:  Dax Carlisle is a 48 y.o. male with right sinonasal IP s/p resection 2023    Doing well. No obstruction, bleeding, change to facial sensation, purulent rhinorrhea  Denies otalgia, otorrhea, vertigo, tinnitus.      Objective:     Physical Exam:  Vitals:  Temp 98.2 °F (36.8 °C) (Temporal)   Ht 5' 10" (1.778 m)   Wt 111 kg (244 lb 11.4 oz)   BMI 35.11 kg/m²   General appearance:  Well developed, well nourished    Nose:  No masses/lesions of external nose, nasal mucosa, septum, and turbinates were within normal limits.    Ears: L - t-tube in place and patent. R - normal EAC, TM, clear middle ear    Mouth:  No mass/lesion of lips, teeth, gums, hard/soft palate, tongue, tonsils, or oropharynx.    Neck & Lymphatics:  No cervical lymphadenopathy, no neck mass/crepitus/ asymmetry, trachea is midline, no thyroid enlargement/tenderness/mass.        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   - Additional respiratory mucosa with submucosal edema and increased   eosinophils (up to 50 eosinophils per high power field, max count).   - No fungal organisms identified by GMS stain.   - Negative for dysplasia/carcinoma.   COMMENT:   This case was also reviewed by Cathleen Hanson M.D., who concurs with the   diagnosis.         NASAL ENDOSCOPY    Procedure: Risks, benefits, and alternatives of the procedure were discussed with the patient, and the patient consented to the nasal endoscopy.  The nasal cavity was sprayed with a topical decongestant and anesthetic (if needed). The endoscope was passed into each nostril and each nasal cavity was visualized.  On each side the nasal cavity, sinuses (if open), turbinates, and septum were examined with the findings described below. At the end of the examination, the scope was removed. The patient tolerated the procedure well with no complications.     Endoscopic Sinonasal Exam Findings:  -     Sinuses examined: right sphenoid            The right sinus(es) have normal mucosa            The left sinus(es) have normal mucosa  -     Nasal secretions: - no discolored secretions noted - bilaterally  -     Nasal septum: no significant deviation and no perforation appreciated   -     Inferior turbinate: - normal mucosa without significant hypertrophy - bilaterally  -     Middle turbinate: - normal mucosa without significant hypertrophy - bilaterally  -     Other findings: widely patent sphenoid ostium, well-healing mucosa at face of sphenoid, no evidence of recurrence, minimal granulation at inferior sphenoid ostium has now mucosalized                Assessment & Plan:   Inverted " papilloma    History of tympanostomy tube placement          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  No evidence of recurrence  We dicussed returning in 1 year for repeat exam, he elected to call us if there are any changes  L PET in place and patent. Return to clinic if change to ear symptoms noted

## 2023-11-06 NOTE — PROGRESS NOTES
History & Physical    SUBJECTIVE:     History of Present Illness:  Patient is a 48 y.o. male presents for evaluation colostomy reversal.  Patient had perforated sigmoid diverticulitis in October 2019 requiring Kirsten's procedure. He did have a postoperative abscess or required IR drainage but has recovered well since that time.  He is currently tolerating a regular diet and having good colostomy function.  He denies any leaks.  His last colonoscopy was in 2016 and he has no history of polyps.  He does have a positive family history of colorectal cancer in his grandmother as well as colon polyps in his mother.  He denies any current bleeding per rectum or from his ostomy.  He currently denies any fever, chills, nausea, vomiting.  He does endorse low transit constipation reports a history of IBS-C. Reports weak urine stream currently since last operation, as well as some lateral abdominal wall pain since surgery. I have discussed this case personally with his referring provider and reviewed all his previous medical records.    Oct 2019: Kirsten's procedure  1/30/2020: Kirsten's reversal with partial LAR due to rectal tear during surgery  9/10/2021: EUA, perirectal abscess internal I&D  01/06/2022: EUA, perirectal abscess I&D  08/24/2023: EUA with fistulotomy    Interval history:  Since the last visit, the patient states that he has had no change in symptoms over the last 4 weeks. Has been 10 weeks since EUA with fistulotomy. Continues to have serous drainage daily per fistulotomy site. Minimal pain daily, some stabbing pain per rectum with hard bowel movements. He is tolerating a regular diet and having normal bowel movements. Denies any fever, chills, nausea, vomiting, rectal bleeding.      Review of patient's allergies indicates:   Allergen Reactions    Codeine Other (See Comments)     violent    Iodinated contrast media Swelling     Tongue, right eye, lips swelling. Rash on abdomen and axilla    Nuts [tree nut]  Anaphylaxis    Dairy aid [lactase] Diarrhea and Nausea And Vomiting    Morphine      Tolerated hydromorphone in October 2019.        Current Outpatient Medications   Medication Sig Dispense Refill    acetaminophen (TYLENOL) 325 MG tablet Take 325 mg by mouth every 6 (six) hours as needed for Pain.      ARIPiprazole (ABILIFY) 10 MG Tab Take 1 tablet (10 mg total) by mouth every evening. 90 tablet 3    cyclobenzaprine (FLEXERIL) 10 MG tablet Take 1 tablet (10 mg total) by mouth 3 (three) times daily as needed for Muscle spasms. 90 tablet 3    dextroamphetamine-amphetamine (ADDERALL) 20 mg tablet Take 0.5 tablet (10 mg) by mouth 2 (two) times a day as needed 30 tablet 0    [START ON 12/14/2023] dextroamphetamine-amphetamine (ADDERALL) 20 mg tablet Take 0.5 tablet (10mg) by mouth 2 (two) times a day as needed 30 tablet 0    [START ON 11/16/2023] dextroamphetamine-amphetamine (ADDERALL) 20 mg tablet Take 1/2 tablet (10mg) by mouth 2 (two) times a day as needed 30 tablet 0    EScitalopram oxalate (LEXAPRO) 20 MG tablet Take 1 tablet (20 mg total) by mouth every evening. 90 tablet 3    HYDROcodone-acetaminophen (NORCO) 7.5-325 mg per tablet Take 1 tablet by mouth every 6 (six) hours as needed for Pain. 120 tablet 0    lubiprostone (AMITIZA) 24 MCG Cap Take 1 capsule (24 mcg total) by mouth 2 (two) times daily with meals. 60 capsule 0    metoprolol succinate (TOPROL-XL) 25 MG 24 hr tablet Take 1 tablet (25 mg total) by mouth every evening. 90 tablet 3    tadalafiL (CIALIS) 20 MG Tab Take 1 tablet (20 mg total) by mouth daily as needed. 10 tablet 0     No current facility-administered medications for this visit.       Past Medical History:   Diagnosis Date    Angioedema of lips 03/13/2015    IVP dye allergy - swelling lips, eye, tongue    Asthma     childhood    Depression     Diverticulitis 10/07/2019    Kidney stones     Sleep apnea     cpap not required/ patient states this has resolved with weight loss    SVT  (supraventricular tachycardia)      Past Surgical History:   Procedure Laterality Date    ABDOMINAL WASHOUT N/A 10/07/2019    Procedure: LAVAGE, PERITONEAL, THERAPEUTIC;  Surgeon: Mindy Goff MD;  Location: [facility] OR;  Service: General;  Laterality: N/A;  abdomen washout    ABSCESS DRAINAGE N/A 09/10/2021    Procedure: DRAINAGE, ABSCESS;  Surgeon: Kadeem Choi MD;  Location: [facility] OR;  Service: General;  Laterality: N/A;  perirectal    anal fistula repair  10/2023    APPENDECTOMY      BACK SURGERY      BIOPSY, INTRANASAL, ENDOSCOPIC N/A 02/15/2023    Procedure: BIOPSY, INTRANASAL, ENDOSCOPIC;  Surgeon: Uriel Vasquez MD;  Location: [facility] OR;  Service: ENT;  Laterality: N/A;    CHOLECYSTECTOMY  2005    COLONOSCOPY N/A 03/10/2016    Procedure: COLONOSCOPY;  Surgeon: Juvenal Dinero MD;  Location: [facility] ENDO;  Service: Endoscopy;  Laterality: N/A;    COLONOSCOPY N/A 01/17/2020    Procedure: COLONOSCOPY;  Surgeon: Kadeem Choi MD;  Location: [facility] ENDO;  Service: General;  Laterality: N/A;    COLOSTOMY N/A 10/07/2019    Procedure: CREATION, COLOSTOMY;  Surgeon: Mindy Goff MD;  Location: [facility] OR;  Service: General;  Laterality: N/A;    COLOSTOMY REVERSAL      EXAMINATION UNDER ANESTHESIA N/A 09/10/2021    Procedure: Exam under anesthesia;  Surgeon: Kadeem Choi MD;  Location: [facility] OR;  Service: General;  Laterality: N/A;    EXAMINATION UNDER ANESTHESIA N/A 01/06/2022    Procedure: Exam under anesthesia, perirectal abscess I&D;  Surgeon: Kadeem Choi MD;  Location: [facility] OR;  Service: General;  Laterality: N/A;    EXAMINATION UNDER ANESTHESIA N/A 08/24/2023    Procedure: EXAM UNDER ANESTHESIA;  Surgeon: Kadeem Choi MD;  Location: [facility] OR;  Service: General;  Laterality: N/A;  Exam under anesthesia, possible fistulotomy, possible seton placement (prone)    FISTULOTOMY  08/24/2023    Procedure: FISTULOTOMY;  Surgeon: Kadeem Choi MD;  Location: [facility] OR;  Service: General;;     FUNCTIONAL ENDOSCOPIC SINUS SURGERY (FESS) USING COMPUTER-ASSISTED NAVIGATION N/A 02/15/2023    Procedure: FESS, USING COMPUTER-ASSISTED NAVIGATION;  Surgeon: Uriel Vasquez MD;  Location: [facility] OR;  Service: ENT;  Laterality: N/A;    SIVAKUMAR PROCEDURE N/A 10/07/2019    Procedure: SIVAKUMAR PROCEDURE;  Surgeon: Mindy Goff MD;  Location: [facility] OR;  Service: General;  Laterality: N/A;    INJECTION OF ANESTHETIC AGENT AROUND GANGLION IMPAR N/A 03/15/2021    Procedure: BLOCK, GANGLION IMPAR;  Surgeon: Rubne Cabrera MD;  Location: [facility] MGT;  Service: Pain Management;  Laterality: N/A;    INJECTION OF ANESTHETIC AGENT AROUND PUDENDAL NERVE N/A 09/10/2021    Procedure: BLOCK, NERVE, PUDENDAL;  Surgeon: Kadeem Choi MD;  Location: [facility];  Service: General;  Laterality: N/A;    INJECTION OF ANESTHETIC AGENT AROUND PUDENDAL NERVE N/A 01/06/2022    Procedure: BLOCK, NERVE, PUDENDAL;  Surgeon: Kadeem Choi MD;  Location: [facility] OR;  Service: General;  Laterality: N/A;    INJECTION OF ANESTHETIC AGENT AROUND PUDENDAL NERVE N/A 08/24/2023    Procedure: BLOCK, NERVE, PUDENDAL;  Surgeon: Kadeem Choi MD;  Location: [facility];  Service: General;  Laterality: N/A;    INJECTION OF ANESTHETIC AGENT INTO TISSUE PLANE DEFINED BY TRANSVERSUS ABDOMINIS MUSCLE N/A 01/30/2020    Procedure: BLOCK, TRANSVERSUS ABDOMINIS PLANE;  Surgeon: Kadeem Choi MD;  Location: [facility];  Service: General;  Laterality: N/A;    KNEE SURGERY Right     KNEE SURGERY Left     LYSIS OF ADHESIONS N/A 01/30/2020    Procedure: LYSIS, ADHESIONS;  Surgeon: Kadeem Choi MD;  Location: [facility] OR;  Service: General;  Laterality: N/A;    PILONIDAL CYST DRAINAGE      pt has had 6 of these adn has had revisions    PROCTECTOMY N/A 01/30/2020    Procedure: PROCTECTOMY;  Surgeon: Kadeem Choi MD;  Location: [facility] OR;  Service: General;  Laterality: N/A;  Partial    SINUSOTOMY, SPHENOID SINUS, ENDOSCOPIC Right 02/15/2023     Procedure: SINUSOTOMY, SPHENOID SINUS, ENDOSCOPIC;  Surgeon: Uriel Vasquez MD;  Location: [facility] OR;  Service: ENT;  Laterality: Right;    SUBTOTAL COLECTOMY  01/30/2020    Procedure: COLECTOMY, PARTIAL;  Surgeon: Kadeem Choi MD;  Location: [facility] OR;  Service: General;;     Family History   Problem Relation Age of Onset    Diabetes Mother     Colon polyps Mother     Alzheimer's disease Mother     Heart disease Father         CABG age 66    Cancer Father         melanoma    Diabetes Father     Anesthesia problems Father         d/t polio    Colon polyps Sister     Diabetes Maternal Grandmother     Colon cancer Maternal Grandmother     Diabetes Maternal Grandfather     Diabetes Paternal Grandmother     Stroke Paternal Grandmother     Diabetes Paternal Grandfather      Social History     Tobacco Use    Smoking status: Never    Smokeless tobacco: Never   Substance Use Topics    Alcohol use: Never    Drug use: No        Review of Systems:  Review of Systems   Constitutional:  Negative for activity change, appetite change, chills, fatigue, fever and unexpected weight change.   HENT:  Negative for congestion, ear pain, sore throat and trouble swallowing.    Eyes:  Negative for pain, redness and itching.   Respiratory:  Negative for cough, shortness of breath and wheezing.    Cardiovascular:  Negative for chest pain, palpitations and leg swelling.   Gastrointestinal:  Negative for abdominal distention, abdominal pain, anal bleeding, blood in stool, constipation, diarrhea, nausea, rectal pain and vomiting.        +drainage and pain per fistulotomy site   Endocrine: Negative for cold intolerance, heat intolerance and polyuria.   Genitourinary:  Negative for dysuria, flank pain, frequency and hematuria.   Musculoskeletal:  Negative for back pain, gait problem, joint swelling and neck pain.   Skin:  Negative for color change, rash and wound.   Allergic/Immunologic: Negative for environmental allergies and  immunocompromised state.   Neurological:  Negative for dizziness, speech difficulty, weakness and numbness.   Psychiatric/Behavioral:  Negative for agitation, confusion and hallucinations.        OBJECTIVE:     Vital Signs (Most Recent)              Physical Exam:  Physical Exam  Exam conducted with a chaperone present.   Constitutional:       Appearance: He is well-developed.   HENT:      Head: Normocephalic and atraumatic.   Eyes:      Conjunctiva/sclera: Conjunctivae normal.   Neck:      Thyroid: No thyromegaly.   Cardiovascular:      Rate and Rhythm: Normal rate and regular rhythm.   Pulmonary:      Effort: Pulmonary effort is normal. No respiratory distress.   Abdominal:      General: There is no distension.      Palpations: Abdomen is soft. There is no mass.   Genitourinary:     Comments: Anorectal: left lateral/anterolateral mostly/partially healed fistulotomy site with granulation tissue; no evidence of external opening; KELLIE with no evidence of mass or lesions; Anoscopy: anoscope inserted and patient with healing tract from fistulotomy site without evidence of residual fistula.  Granulation tissue in the middle portion of the fistula tract near the anal verge.  Silver nitrate applied.  Musculoskeletal:         General: No tenderness. Normal range of motion.      Cervical back: Normal range of motion.   Skin:     General: Skin is warm and dry.      Capillary Refill: Capillary refill takes less than 2 seconds.      Findings: No rash.   Neurological:      Mental Status: He is alert and oriented to person, place, and time.       Pathology Results:  FINAL PATHOLOGIC DIAGNOSIS  1. Sigmoid colon (stitch marks perforation), segmental resection (20 cm in length):  - Gross and histologic evidence of transmural perforation (4 cm from nearest surgical margin)  - Associated acute chronic transmural inflammation with extension into pericolic fat resulting in abscess  formation  - Serosal adhesion formation with acute  chronic serositis  - Margins histologically viable  - Negative for dysplasia or malignancy    PATHOLOGY from Kirsten's reversal Jan 2020:  1. SIGMOID COLON AND UPPER RECTUM, PARTIAL PROCTECTOMY:  - Colorectal tissue with chronic inflammation and fibrous serosal adhesions  - Submucosal foreign-body giant cell reaction associated with acellular suture-like material, consistent with  prior surgical intervention  - No evidence of dysplasia or malignancy  2. COLOSTOMY, TAKEDOWN:  - Inflamed colo-cutaneous tissue with foreign-body giant cell reaction, consistent with ostomy  3. ANASTOMOTIC RING, EXCISION:  - Colonic tissue with chronic inflammation, vascular congestion, and hyperplastic mucosal changes  - No evidence of dysplasia or malignancy    ASSESSMENT/PLAN:     48 y.o. M with previous perforated sigmoid diverticulitis requiring Kirsten's procedure in October 2019 who is now s/p Kirsten's reversal and LAR on 1/30/2020 who recovered well postop but had new perianal/perirectal abscess requiring internal I&D in Sept 2021 and recurred in January 2022 requiring another I&D but continued cycles of swelling, pain and drainage consistent with perirectal fistula now s/p EUA with fistulotomy 08/24/23    - fistulotomy site with granulation tissue. No evidence of ongoing fistula on exam Silver nitrate applied today.  - RTC 4 weeks    Kadeem Choi MD  Colon and Rectal Surgery  [facility]

## 2023-11-20 DIAGNOSIS — G89.4 CHRONIC PAIN SYNDROME: ICD-10-CM

## 2023-11-20 NOTE — TELEPHONE ENCOUNTER
No care due was identified.  Health [facility] Embedded Care Due Messages. Reference number: [id number].   11/20/2023 5:40:17 PM CST

## 2023-11-21 RX ORDER — HYDROCODONE BITARTRATE AND ACETAMINOPHEN 7.5; 325 MG/1; MG/1
1 TABLET ORAL EVERY 6 HOURS PRN
Qty: 120 TABLET | Refills: 0 | Status: SHIPPED | OUTPATIENT
Start: 2023-11-21 | End: 2023-12-20 | Stop reason: SDUPTHER

## 2023-12-04 ENCOUNTER — OFFICE VISIT (OUTPATIENT)
Dept: SURGERY | Facility: CLINIC | Age: 48
End: 2023-12-04
Payer: COMMERCIAL

## 2023-12-04 VITALS
HEART RATE: 80 BPM | DIASTOLIC BLOOD PRESSURE: 82 MMHG | SYSTOLIC BLOOD PRESSURE: 124 MMHG | WEIGHT: 244 LBS | TEMPERATURE: 98 F | BODY MASS INDEX: 35.01 KG/M2

## 2023-12-04 DIAGNOSIS — K61.1 PERIRECTAL ABSCESS: ICD-10-CM

## 2023-12-04 DIAGNOSIS — K60.4 PERIRECTAL FISTULA: Primary | ICD-10-CM

## 2023-12-04 PROCEDURE — 3074F SYST BP LT 130 MM HG: CPT | Mod: CPTII,S$GLB,, | Performed by: COLON & RECTAL SURGERY

## 2023-12-04 PROCEDURE — 3044F PR MOST RECENT HEMOGLOBIN A1C LEVEL <7.0%: ICD-10-PCS | Mod: CPTII,S$GLB,, | Performed by: COLON & RECTAL SURGERY

## 2023-12-04 PROCEDURE — 3079F DIAST BP 80-89 MM HG: CPT | Mod: CPTII,S$GLB,, | Performed by: COLON & RECTAL SURGERY

## 2023-12-04 PROCEDURE — 3008F BODY MASS INDEX DOCD: CPT | Mod: CPTII,S$GLB,, | Performed by: COLON & RECTAL SURGERY

## 2023-12-04 PROCEDURE — 1159F MED LIST DOCD IN RCRD: CPT | Mod: CPTII,S$GLB,, | Performed by: COLON & RECTAL SURGERY

## 2023-12-04 PROCEDURE — 99999 PR PBB SHADOW E&M-EST. PATIENT-LVL III: CPT | Mod: PBBFAC,,, | Performed by: COLON & RECTAL SURGERY

## 2023-12-04 PROCEDURE — 3074F PR MOST RECENT SYSTOLIC BLOOD PRESSURE < 130 MM HG: ICD-10-PCS | Mod: CPTII,S$GLB,, | Performed by: COLON & RECTAL SURGERY

## 2023-12-04 PROCEDURE — 3079F PR MOST RECENT DIASTOLIC BLOOD PRESSURE 80-89 MM HG: ICD-10-PCS | Mod: CPTII,S$GLB,, | Performed by: COLON & RECTAL SURGERY

## 2023-12-04 PROCEDURE — 99999 PR PBB SHADOW E&M-EST. PATIENT-LVL III: ICD-10-PCS | Mod: PBBFAC,,, | Performed by: COLON & RECTAL SURGERY

## 2023-12-04 PROCEDURE — 99214 PR OFFICE/OUTPT VISIT, EST, LEVL IV, 30-39 MIN: ICD-10-PCS | Mod: S$GLB,,, | Performed by: COLON & RECTAL SURGERY

## 2023-12-04 PROCEDURE — 3008F PR BODY MASS INDEX (BMI) DOCUMENTED: ICD-10-PCS | Mod: CPTII,S$GLB,, | Performed by: COLON & RECTAL SURGERY

## 2023-12-04 PROCEDURE — 99214 OFFICE O/P EST MOD 30 MIN: CPT | Mod: S$GLB,,, | Performed by: COLON & RECTAL SURGERY

## 2023-12-04 PROCEDURE — 3044F HG A1C LEVEL LT 7.0%: CPT | Mod: CPTII,S$GLB,, | Performed by: COLON & RECTAL SURGERY

## 2023-12-04 PROCEDURE — 1159F PR MEDICATION LIST DOCUMENTED IN MEDICAL RECORD: ICD-10-PCS | Mod: CPTII,S$GLB,, | Performed by: COLON & RECTAL SURGERY

## 2023-12-04 NOTE — PROGRESS NOTES
History & Physical    SUBJECTIVE:     History of Present Illness:  Patient is a 48 y.o. male presents for evaluation colostomy reversal.  Patient had perforated sigmoid diverticulitis in October 2019 requiring Kirsten's procedure. He did have a postoperative abscess or required IR drainage but has recovered well since that time.  He is currently tolerating a regular diet and having good colostomy function.  He denies any leaks.  His last colonoscopy was in 2016 and he has no history of polyps.  He does have a positive family history of colorectal cancer in his grandmother as well as colon polyps in his mother.  He denies any current bleeding per rectum or from his ostomy.  He currently denies any fever, chills, nausea, vomiting.  He does endorse low transit constipation reports a history of IBS-C. Reports weak urine stream currently since last operation, as well as some lateral abdominal wall pain since surgery. I have discussed this case personally with his referring provider and reviewed all his previous medical records.    Oct 2019: Kirsten's procedure  1/30/2020: Kirsten's reversal with partial LAR due to rectal tear during surgery  9/10/2021: EUA, perirectal abscess internal I&D  01/06/2022: EUA, perirectal abscess I&D  08/24/2023: EUA with fistulotomy    Interval history:  Since the last visit, the patient has noticed significant improvement in pain and drainage since application of silver nitrate in clinic 4 weeks ago. He states that the amount of serous drainage from fistulotomy site has gotten less in quantity. Minimal pain daily, some stabbing pain per rectum with hard bowel movements. He is tolerating a regular diet and having normal bowel movements. Denies any fever, chills, nausea, vomiting, rectal bleeding.      Review of patient's allergies indicates:   Allergen Reactions    Codeine Other (See Comments)     violent    Iodinated contrast media Swelling     Tongue, right eye, lips swelling. Rash on  abdomen and axilla    Nuts [tree nut] Anaphylaxis    Dairy aid [lactase] Diarrhea and Nausea And Vomiting    Morphine      Tolerated hydromorphone in October 2019.        Current Outpatient Medications   Medication Sig Dispense Refill    acetaminophen (TYLENOL) 325 MG tablet Take 325 mg by mouth every 6 (six) hours as needed for Pain.      ARIPiprazole (ABILIFY) 10 MG Tab Take 1 tablet (10 mg total) by mouth every evening. 90 tablet 3    cyclobenzaprine (FLEXERIL) 10 MG tablet Take 1 tablet (10 mg total) by mouth 3 (three) times daily as needed for Muscle spasms. 90 tablet 3    dextroamphetamine-amphetamine (ADDERALL) 20 mg tablet Take 0.5 tablet (10 mg) by mouth 2 (two) times a day as needed 30 tablet 0    [START ON 12/14/2023] dextroamphetamine-amphetamine (ADDERALL) 20 mg tablet Take 0.5 tablet (10mg) by mouth 2 (two) times a day as needed 30 tablet 0    dextroamphetamine-amphetamine (ADDERALL) 20 mg tablet Take ½ tablet (10 mg) by mouth 2 (two) times a day as needed 30 tablet 0    EScitalopram oxalate (LEXAPRO) 20 MG tablet Take 1 tablet (20 mg total) by mouth every evening. 90 tablet 3    HYDROcodone-acetaminophen (NORCO) 7.5-325 mg per tablet Take 1 tablet by mouth every 6 (six) hours as needed for Pain. 120 tablet 0    metoprolol succinate (TOPROL-XL) 25 MG 24 hr tablet Take 1 tablet (25 mg total) by mouth every evening. 90 tablet 3    tadalafiL (CIALIS) 20 MG Tab Take 1 tablet (20 mg total) by mouth daily as needed. 10 tablet 0     No current facility-administered medications for this visit.       Past Medical History:   Diagnosis Date    Angioedema of lips 03/13/2015    IVP dye allergy - swelling lips, eye, tongue    Asthma     childhood    Depression     Diverticulitis 10/07/2019    Kidney stones     Sleep apnea     cpap not required/ patient states this has resolved with weight loss    SVT (supraventricular tachycardia)      Past Surgical History:   Procedure Laterality Date    ABDOMINAL WASHOUT N/A  10/07/2019    Procedure: LAVAGE, PERITONEAL, THERAPEUTIC;  Surgeon: Mindy Goff MD;  Location: [facility] OR;  Service: General;  Laterality: N/A;  abdomen washout    ABSCESS DRAINAGE N/A 09/10/2021    Procedure: DRAINAGE, ABSCESS;  Surgeon: Kadeem Choi MD;  Location: [facility] OR;  Service: General;  Laterality: N/A;  perirectal    anal fistula repair  10/2023    APPENDECTOMY      BACK SURGERY      BIOPSY, INTRANASAL, ENDOSCOPIC N/A 02/15/2023    Procedure: BIOPSY, INTRANASAL, ENDOSCOPIC;  Surgeon: Uriel Vasquez MD;  Location: [facility] OR;  Service: ENT;  Laterality: N/A;    CHOLECYSTECTOMY  2005    COLONOSCOPY N/A 03/10/2016    Procedure: COLONOSCOPY;  Surgeon: Juvenal Dinero MD;  Location: [facility] ENDO;  Service: Endoscopy;  Laterality: N/A;    COLONOSCOPY N/A 01/17/2020    Procedure: COLONOSCOPY;  Surgeon: Kadeem Choi MD;  Location: [facility] ENDO;  Service: General;  Laterality: N/A;    COLOSTOMY N/A 10/07/2019    Procedure: CREATION, COLOSTOMY;  Surgeon: Mindy Goff MD;  Location: [facility] OR;  Service: General;  Laterality: N/A;    COLOSTOMY REVERSAL      EXAMINATION UNDER ANESTHESIA N/A 09/10/2021    Procedure: Exam under anesthesia;  Surgeon: Kadeem Choi MD;  Location: [facility] OR;  Service: General;  Laterality: N/A;    EXAMINATION UNDER ANESTHESIA N/A 01/06/2022    Procedure: Exam under anesthesia, perirectal abscess I&D;  Surgeon: Kadeem Choi MD;  Location: [facility] OR;  Service: General;  Laterality: N/A;    EXAMINATION UNDER ANESTHESIA N/A 08/24/2023    Procedure: EXAM UNDER ANESTHESIA;  Surgeon: Kadeem Choi MD;  Location: [facility] OR;  Service: General;  Laterality: N/A;  Exam under anesthesia, possible fistulotomy, possible seton placement (prone)    FISTULOTOMY  08/24/2023    Procedure: FISTULOTOMY;  Surgeon: Kadeem Choi MD;  Location: [facility] OR;  Service: General;;    FUNCTIONAL ENDOSCOPIC SINUS SURGERY (FESS) USING COMPUTER-ASSISTED NAVIGATION N/A 02/15/2023    Procedure:  FESS, USING COMPUTER-ASSISTED NAVIGATION;  Surgeon: Uriel Vasquez MD;  Location: [facility] OR;  Service: ENT;  Laterality: N/A;    SIVAKUMAR PROCEDURE N/A 10/07/2019    Procedure: SIVAKUMAR PROCEDURE;  Surgeon: Mindy Goff MD;  Location: [facility] OR;  Service: General;  Laterality: N/A;    INJECTION OF ANESTHETIC AGENT AROUND GANGLION IMPAR N/A 03/15/2021    Procedure: BLOCK, GANGLION IMPAR;  Surgeon: Ruben Cabrera MD;  Location: [facility] PAIN MGT;  Service: Pain Management;  Laterality: N/A;    INJECTION OF ANESTHETIC AGENT AROUND PUDENDAL NERVE N/A 09/10/2021    Procedure: BLOCK, NERVE, PUDENDAL;  Surgeon: Kadeem Choi MD;  Location: [facility] OR;  Service: General;  Laterality: N/A;    INJECTION OF ANESTHETIC AGENT AROUND PUDENDAL NERVE N/A 01/06/2022    Procedure: BLOCK, NERVE, PUDENDAL;  Surgeon: Kadeem Choi MD;  Location: [facility] OR;  Service: General;  Laterality: N/A;    INJECTION OF ANESTHETIC AGENT AROUND PUDENDAL NERVE N/A 08/24/2023    Procedure: BLOCK, NERVE, PUDENDAL;  Surgeon: Kadeem Choi MD;  Location: [facility] OR;  Service: General;  Laterality: N/A;    INJECTION OF ANESTHETIC AGENT INTO TISSUE PLANE DEFINED BY TRANSVERSUS ABDOMINIS MUSCLE N/A 01/30/2020    Procedure: BLOCK, TRANSVERSUS ABDOMINIS PLANE;  Surgeon: Kadeem Choi MD;  Location: [facility] OR;  Service: General;  Laterality: N/A;    KNEE SURGERY Right     KNEE SURGERY Left     LYSIS OF ADHESIONS N/A 01/30/2020    Procedure: LYSIS, ADHESIONS;  Surgeon: Kadeem Choi MD;  Location: [facility] OR;  Service: General;  Laterality: N/A;    PILONIDAL CYST DRAINAGE      pt has had 6 of these adn has had revisions    PROCTECTOMY N/A 01/30/2020    Procedure: PROCTECTOMY;  Surgeon: Kadeem Choi MD;  Location: [facility] OR;  Service: General;  Laterality: N/A;  Partial    SINUSOTOMY, SPHENOID SINUS, ENDOSCOPIC Right 02/15/2023    Procedure: SINUSOTOMY, SPHENOID SINUS, ENDOSCOPIC;  Surgeon: Uriel Vasquez MD;  Location: [facility];  Service:  ENT;  Laterality: Right;    SUBTOTAL COLECTOMY  01/30/2020    Procedure: COLECTOMY, PARTIAL;  Surgeon: Kadeem Choi MD;  Location: [facility];  Service: General;;     Family History   Problem Relation Age of Onset    Diabetes Mother     Colon polyps Mother     Alzheimer's disease Mother     Heart disease Father         CABG age 66    Cancer Father         melanoma    Diabetes Father     Anesthesia problems Father         d/t polio    Colon polyps Sister     Diabetes Maternal Grandmother     Colon cancer Maternal Grandmother     Diabetes Maternal Grandfather     Diabetes Paternal Grandmother     Stroke Paternal Grandmother     Diabetes Paternal Grandfather      Social History     Tobacco Use    Smoking status: Never    Smokeless tobacco: Never   Substance Use Topics    Alcohol use: Never    Drug use: No        Review of Systems:  Review of Systems   Constitutional:  Negative for activity change, appetite change, chills, fatigue, fever and unexpected weight change.   HENT:  Negative for congestion, ear pain, sore throat and trouble swallowing.    Eyes:  Negative for pain, redness and itching.   Respiratory:  Negative for cough, shortness of breath and wheezing.    Cardiovascular:  Negative for chest pain, palpitations and leg swelling.   Gastrointestinal:  Negative for abdominal distention, abdominal pain, anal bleeding, blood in stool, constipation, diarrhea, nausea, rectal pain and vomiting.        +drainage and pain per fistulotomy site   Endocrine: Negative for cold intolerance, heat intolerance and polyuria.   Genitourinary:  Negative for dysuria, flank pain, frequency and hematuria.   Musculoskeletal:  Negative for back pain, gait problem, joint swelling and neck pain.   Skin:  Negative for color change, rash and wound.   Allergic/Immunologic: Negative for environmental allergies and immunocompromised state.   Neurological:  Negative for dizziness, speech difficulty, weakness and numbness.    Psychiatric/Behavioral:  Negative for agitation, confusion and hallucinations.        OBJECTIVE:     Vital Signs (Most Recent)  Temp: 97.9 °F (36.6 °C) (12/04/23 1111)  Pulse: 80 (12/04/23 1111)  BP: 124/82 (12/04/23 1111)     110.7 kg (244 lb)     Physical Exam:  Physical Exam  Vitals reviewed. Exam conducted with a chaperone present.   Constitutional:       Appearance: He is well-developed.   HENT:      Head: Normocephalic and atraumatic.   Eyes:      Conjunctiva/sclera: Conjunctivae normal.   Neck:      Thyroid: No thyromegaly.   Cardiovascular:      Rate and Rhythm: Normal rate.   Pulmonary:      Effort: Pulmonary effort is normal. No respiratory distress.   Abdominal:      General: There is no distension.      Palpations: Abdomen is soft. There is no mass.   Genitourinary:     Comments: Anorectal: left lateral/anterolateral mostly healed fistulotomy site with small amount of granulation tissue present; no evidence of external opening; KELLIE and anoscopy deferred. Silver nitrate applied.  Musculoskeletal:         General: No tenderness. Normal range of motion.      Cervical back: Normal range of motion.   Skin:     General: Skin is warm and dry.      Capillary Refill: Capillary refill takes less than 2 seconds.      Findings: No rash.   Neurological:      Mental Status: He is alert and oriented to person, place, and time.       Pathology Results:  FINAL PATHOLOGIC DIAGNOSIS  1. Sigmoid colon (stitch marks perforation), segmental resection (20 cm in length):  - Gross and histologic evidence of transmural perforation (4 cm from nearest surgical margin)  - Associated acute chronic transmural inflammation with extension into pericolic fat resulting in abscess  formation  - Serosal adhesion formation with acute chronic serositis  - Margins histologically viable  - Negative for dysplasia or malignancy    PATHOLOGY from Kirsten's reversal Jan 2020:  1. SIGMOID COLON AND UPPER RECTUM, PARTIAL PROCTECTOMY:  - Colorectal  tissue with chronic inflammation and fibrous serosal adhesions  - Submucosal foreign-body giant cell reaction associated with acellular suture-like material, consistent with  prior surgical intervention  - No evidence of dysplasia or malignancy  2. COLOSTOMY, TAKEDOWN:  - Inflamed colo-cutaneous tissue with foreign-body giant cell reaction, consistent with ostomy  3. ANASTOMOTIC RING, EXCISION:  - Colonic tissue with chronic inflammation, vascular congestion, and hyperplastic mucosal changes  - No evidence of dysplasia or malignancy    ASSESSMENT/PLAN:     48 y.o. M with previous perforated sigmoid diverticulitis requiring Kirsten's procedure in October 2019 who is now s/p Kirsten's reversal and LAR on 1/30/2020 who recovered well postop but had new perianal/perirectal abscess requiring internal I&D in Sept 2021 and recurred in January 2022 requiring another I&D but continued cycles of swelling, pain and drainage consistent with perirectal fistula now s/p EUA with fistulotomy 08/24/23    - fistulotomy site with small amount of granulation tissue. No evidence of ongoing fistula on exam. Silver nitrate applied today.  - RTC 4 weeks for repeat evaluation    Kadeem Choi MD  Colon and Rectal Surgery  [facility]

## 2023-12-20 DIAGNOSIS — G89.4 CHRONIC PAIN SYNDROME: ICD-10-CM

## 2023-12-20 DIAGNOSIS — F98.8 ATTENTION DEFICIT DISORDER (ADD) WITHOUT HYPERACTIVITY: ICD-10-CM

## 2023-12-21 RX ORDER — HYDROCODONE BITARTRATE AND ACETAMINOPHEN 7.5; 325 MG/1; MG/1
1 TABLET ORAL EVERY 6 HOURS PRN
Qty: 120 TABLET | Refills: 0 | Status: SHIPPED | OUTPATIENT
Start: 2023-12-21 | End: 2024-01-24 | Stop reason: SDUPTHER

## 2023-12-21 RX ORDER — DEXTROAMPHETAMINE SACCHARATE, AMPHETAMINE ASPARTATE, DEXTROAMPHETAMINE SULFATE AND AMPHETAMINE SULFATE 5; 5; 5; 5 MG/1; MG/1; MG/1; MG/1
TABLET ORAL
Qty: 30 TABLET | Refills: 0 | Status: SHIPPED | OUTPATIENT
Start: 2023-12-21 | End: 2024-01-24 | Stop reason: SDUPTHER

## 2023-12-21 NOTE — TELEPHONE ENCOUNTER
No care due was identified.  [facility] Embedded Care Due Messages. Reference number: [id number].   12/20/2023 8:31:47 PM CST

## 2023-12-26 RX ORDER — CYCLOBENZAPRINE HCL 10 MG
10 TABLET ORAL 3 TIMES DAILY PRN
Qty: 90 TABLET | Refills: 3 | Status: SHIPPED | OUTPATIENT
Start: 2023-12-26

## 2024-01-08 ENCOUNTER — TELEPHONE (OUTPATIENT)
Dept: SURGERY | Facility: CLINIC | Age: 49
End: 2024-01-08
Payer: COMMERCIAL

## 2024-01-08 NOTE — TELEPHONE ENCOUNTER
Called and spoke with patient in regards to rescheduling appointment. Patient stated he would like to reschedule appointment to next month. Informed patient he is scheduled on February 5th at 2:00 pm with Dr. Choi at the [facility] off of [address]. Patient verbalized understanding.    ----- Message from Diamone Speed sent at 1/8/2024  1:27 PM CST -----  Regarding: self  Type: Patient Call Back       Who called: self        What is the request in detail: pt stated he will not have insurance until a month from now and would like to reschedule        Can the clinic reply by [facility]? Yes       Would the patient rather a call back or a [facility] Call back       Best call back number:[phone]

## 2024-01-24 ENCOUNTER — OFFICE VISIT (OUTPATIENT)
Dept: INTERNAL MEDICINE | Facility: CLINIC | Age: 49
End: 2024-01-24
Payer: COMMERCIAL

## 2024-01-24 ENCOUNTER — LAB VISIT (OUTPATIENT)
Dept: LAB | Facility: HOSPITAL | Age: 49
End: 2024-01-24
Attending: FAMILY MEDICINE
Payer: COMMERCIAL

## 2024-01-24 VITALS
SYSTOLIC BLOOD PRESSURE: 120 MMHG | TEMPERATURE: 97 F | DIASTOLIC BLOOD PRESSURE: 72 MMHG | HEART RATE: 87 BPM | OXYGEN SATURATION: 95 % | BODY MASS INDEX: 35.89 KG/M2 | HEIGHT: 70 IN | WEIGHT: 250.69 LBS | RESPIRATION RATE: 16 BRPM

## 2024-01-24 DIAGNOSIS — R53.83 OTHER FATIGUE: ICD-10-CM

## 2024-01-24 DIAGNOSIS — F32.5 MAJOR DEPRESSIVE DISORDER WITH SINGLE EPISODE, IN REMISSION: ICD-10-CM

## 2024-01-24 DIAGNOSIS — F98.8 ATTENTION DEFICIT DISORDER (ADD) WITHOUT HYPERACTIVITY: ICD-10-CM

## 2024-01-24 DIAGNOSIS — G89.4 CHRONIC PAIN SYNDROME: Primary | ICD-10-CM

## 2024-01-24 PROCEDURE — 36415 COLL VENOUS BLD VENIPUNCTURE: CPT | Performed by: FAMILY MEDICINE

## 2024-01-24 PROCEDURE — 82040 ASSAY OF SERUM ALBUMIN: CPT | Performed by: FAMILY MEDICINE

## 2024-01-24 PROCEDURE — 99999 PR PBB SHADOW E&M-EST. PATIENT-LVL III: CPT | Mod: PBBFAC,,, | Performed by: FAMILY MEDICINE

## 2024-01-24 PROCEDURE — 99215 OFFICE O/P EST HI 40 MIN: CPT | Mod: S$GLB,,, | Performed by: FAMILY MEDICINE

## 2024-01-24 RX ORDER — DEXTROAMPHETAMINE SACCHARATE, AMPHETAMINE ASPARTATE, DEXTROAMPHETAMINE SULFATE AND AMPHETAMINE SULFATE 5; 5; 5; 5 MG/1; MG/1; MG/1; MG/1
TABLET ORAL
Qty: 30 TABLET | Refills: 0 | Status: SHIPPED | OUTPATIENT
Start: 2024-01-24 | End: 2024-02-20 | Stop reason: SDUPTHER

## 2024-01-24 RX ORDER — ARIPIPRAZOLE 10 MG/1
10 TABLET ORAL NIGHTLY
Qty: 90 TABLET | Refills: 3 | Status: SHIPPED | OUTPATIENT
Start: 2024-01-24 | End: 2024-04-24 | Stop reason: SDUPTHER

## 2024-01-24 RX ORDER — ESCITALOPRAM OXALATE 20 MG/1
20 TABLET ORAL NIGHTLY
Qty: 90 TABLET | Refills: 3 | Status: SHIPPED | OUTPATIENT
Start: 2024-01-24 | End: 2025-01-23

## 2024-01-24 RX ORDER — HYDROCODONE BITARTRATE AND ACETAMINOPHEN 7.5; 325 MG/1; MG/1
1 TABLET ORAL EVERY 6 HOURS PRN
Qty: 120 TABLET | Refills: 0 | Status: SHIPPED | OUTPATIENT
Start: 2024-01-24 | End: 2024-02-20 | Stop reason: SDUPTHER

## 2024-01-24 NOTE — PROGRESS NOTES
Subjective:   Patient ID: Dax Carlisle is a 48 y.o. male.  Chief Complaint:  No chief complaint on file.    Presents for follow-up on ADD, depression, and chronic pain     Last visit October 2023 for annual physical exam  Cholesterol tests are elevated. 10-year risk of a heart attack or stroke is 3%. Aspirin or Statin cholesterol medications are not recommended. A heart healthy cholesterol diet, regular exercise, avoid any nicotine, and weight loss is recommended.  CBC with stable anemia, otherwise normal white cell count and red cell counts  Sugar, Kidney, Liver, and Electrolyte tests are all normal.  A1c is in a normal range. No Prediabtes or Diabetes  Thyroid testing is normal.  Okay to continue all current medications  Recheck labs 1 year      - ADHD  On Adderall 10 mg twice a day to help with focus/ADD/ADHD related issues despite compliance with his other mental health medications  Effective with symptoms controlled on present medication and dose helping compensate for deficits at work  Duration is appropriate.    No mood instability, tics, or disordered sleep, or other apparent adverse effects.  No increased blood pressure, heart, or other cardiovascular side effects.    Tolerating current treatment well.   No behaviors to suggest inappropriate use of prescribed medications.  [place] Board of Pharmacy Controlled Prescription Drug Monitoring database was queried and showed no activity to suggest abuse, diversion, or other inappropriate use of prescription medications.      - Chronic Pain  On Norco 7.5/325 mg every 6 hours as needed.  Averages 120 pills per month.  No behaviors to suggest inappropriate use of prescribed medications.  [place] Board of Pharmacy Controlled Prescription Drug Monitoring database was queried and showed no activity to suggest abuse, diversion, or other inappropriate use of prescription medications.      - Major depressive disorder  On Abilify 10 mg daily and Lexapro 20  "mg daily   Remains stable, no side effects, reports mood and symptoms well controlled on present medication  However, does complain of significant increased fatigue and exercise intolerance since last visit   States never been the same since having a stress test done which was unremarkable from a cardiac standpoint  Of note, has history of LUNA previously treated with CPAP.  Lost weight.  Reports sleep study was normal so CPAP was discontinued.  Fatigue was not a primary complaint.  He has gained weight back to previous CPAP treatment levels.    Review of Systems   Constitutional:  Positive for fatigue.   Musculoskeletal:  Positive for arthralgias, back pain and myalgias.   Psychiatric/Behavioral:  Negative for agitation, confusion, decreased concentration, dysphoric mood, hallucinations and sleep disturbance. The patient is not nervous/anxious and is not hyperactive.      Objective:   /72 (BP Location: Left arm, Patient Position: Sitting, BP Method: Large (Manual))   Pulse 87   Temp 96.8 °F (36 °C) (Tympanic)   Resp 16   Ht 5' 10" (1.778 m)   Wt 113.7 kg (250 lb 10.6 oz)   SpO2 95%   BMI 35.97 kg/m²     Physical Exam  Vitals and nursing note reviewed.   Constitutional:       Appearance: Normal appearance. He is well-developed. He is obese.   Neck:      Thyroid: No thyroid mass, thyromegaly or thyroid tenderness.   Cardiovascular:      Rate and Rhythm: Normal rate and regular rhythm.      Heart sounds: Normal heart sounds.   Pulmonary:      Effort: Pulmonary effort is normal.      Breath sounds: Normal breath sounds.   Abdominal:      General: There is no distension.      Palpations: Abdomen is soft.      Tenderness: There is no abdominal tenderness.   Skin:     Findings: No rash.   Psychiatric:         Mood and Affect: Mood and affect normal.       Assessment:       ICD-10-CM ICD-9-CM   1. Chronic pain syndrome  G89.4 338.4   2. Attention deficit disorder (ADD) without hyperactivity  F98.8 314.00   3. " Major depressive disorder with single episode, in remission  F32.5 296.25   4. Other fatigue  R53.83 780.79     Plan:   Chronic pain syndrome  -     HYDROcodone-acetaminophen (NORCO) 7.5-325 mg per tablet; Take 1 tablet by mouth every 6 (six) hours as needed for Pain.  Dispense: 120 tablet; Refill: 0  Pain and symptoms remain well controlled on current medication   Continue Topamax 25 mg twice a day.  Continue Norco 7.5/325 every 6 hours as needed for pain  Averaging 4 per day and stable pattern  Risk of discontinuation of long-term narcotics higher than risk of continuing long-term narcotics at current dose  Okay to refill monthly x 2    Attention deficit disorder (ADD) without hyperactivity  -     dextroamphetamine-amphetamine (ADDERALL) 20 mg tablet; Take 0.5 tablet (10 mg) by mouth 2 (two) times a day as needed  Dispense: 30 tablet; Refill: 0  -     ARIPiprazole (ABILIFY) 10 MG Tab; Take 1 tablet (10 mg total) by mouth every evening.  Dispense: 90 tablet; Refill: 3  -     EScitalopram oxalate (LEXAPRO) 20 MG tablet; Take 1 tablet (20 mg total) by mouth every evening.  Dispense: 90 tablet; Refill: 3  ADHD stable, no side effects, symptoms well controlled on current medication  Continue stimulant at present dose  Okay to fill monthly x2    Major depressive disorder with single episode, in remission  -     ARIPiprazole (ABILIFY) 10 MG Tab; Take 1 tablet (10 mg total) by mouth every evening.  Dispense: 90 tablet; Refill: 3  -     EScitalopram oxalate (LEXAPRO) 20 MG tablet; Take 1 tablet (20 mg total) by mouth every evening.  Dispense: 90 tablet; Refill: 3  Continue current medications for now   Discussed fatigue could be related to poor control of depression despite compliance with above medications   If testosterone panel normal and sleep study normal, will need to adjust medications     Other fatigue  -     Testosterone Panel; Future; Expected date: 01/24/2024  Check testosterone panel.  Supplement if indicated.     If testosterone panel normal, repeat sleep study to see if needs to restart treatment with CPAP     Patient expresses agreement understanding to the above plan     Return to clinic 3 months or sooner as needed    40+ minutes of total time spent on the encounter, which includes face to face time and non-face to face time preparing to see the patient (eg, review of tests), Obtaining and/or reviewing separately obtained history, documenting clinical information in the electronic or other health record, independently interpreting results (not separately reported) and communicating results to the patient/family/caregiver, or Care coordination (not separately reported).

## 2024-02-05 ENCOUNTER — PATIENT MESSAGE (OUTPATIENT)
Dept: INTERNAL MEDICINE | Facility: CLINIC | Age: 49
End: 2024-02-05
Payer: COMMERCIAL

## 2024-02-06 LAB
ALBUMIN SERPL-MCNC: 4.5 G/DL (ref 3.6–5.1)
SHBG SERPL-SCNC: 20 NMOL/L (ref 10–50)
TESTOST FREE SERPL-MCNC: 65.5 PG/ML (ref 46–224)
TESTOST SERPL-MCNC: 348 NG/DL (ref 250–1100)
TESTOSTERONE.FREE+WB SERPL-MCNC: 134.6 NG/DL

## 2024-02-20 DIAGNOSIS — F98.8 ATTENTION DEFICIT DISORDER (ADD) WITHOUT HYPERACTIVITY: ICD-10-CM

## 2024-02-20 DIAGNOSIS — G89.4 CHRONIC PAIN SYNDROME: ICD-10-CM

## 2024-02-20 NOTE — TELEPHONE ENCOUNTER
Refill Routing Note   Medication(s) are not appropriate for processing by [facility] for the following reason(s):        Outside of protocol    [facility] action(s):  Route               Appointments  past 12m or future 3m with PCP    Date Provider   Last Visit   1/24/2024 Cody Enamorado MD   Next Visit   4/24/2024 Cody Enamorado MD   ED visits in past 90 days: 0        Note composed:5:54 PM 02/20/2024

## 2024-02-20 NOTE — TELEPHONE ENCOUNTER
No care due was identified.  [facility] Embedded Care Due Messages. Reference number: [id number].   2/20/2024 4:21:00 PM CST

## 2024-02-21 RX ORDER — DEXTROAMPHETAMINE SACCHARATE, AMPHETAMINE ASPARTATE, DEXTROAMPHETAMINE SULFATE AND AMPHETAMINE SULFATE 5; 5; 5; 5 MG/1; MG/1; MG/1; MG/1
TABLET ORAL
Qty: 30 TABLET | Refills: 0 | Status: SHIPPED | OUTPATIENT
Start: 2024-02-21 | End: 2024-03-24 | Stop reason: SDUPTHER

## 2024-02-21 RX ORDER — HYDROCODONE BITARTRATE AND ACETAMINOPHEN 7.5; 325 MG/1; MG/1
1 TABLET ORAL EVERY 6 HOURS PRN
Qty: 120 TABLET | Refills: 0 | Status: SHIPPED | OUTPATIENT
Start: 2024-02-21 | End: 2024-03-24 | Stop reason: SDUPTHER

## 2024-02-24 ENCOUNTER — OFFICE VISIT (OUTPATIENT)
Dept: URGENT CARE | Facility: CLINIC | Age: 49
End: 2024-02-24
Payer: COMMERCIAL

## 2024-02-24 VITALS
HEART RATE: 108 BPM | BODY MASS INDEX: 36.02 KG/M2 | TEMPERATURE: 99 F | WEIGHT: 251.56 LBS | HEIGHT: 70 IN | OXYGEN SATURATION: 98 % | RESPIRATION RATE: 18 BRPM | DIASTOLIC BLOOD PRESSURE: 62 MMHG | SYSTOLIC BLOOD PRESSURE: 119 MMHG

## 2024-02-24 DIAGNOSIS — R10.84 GENERALIZED ABDOMINAL PAIN: ICD-10-CM

## 2024-02-24 DIAGNOSIS — R19.7 NAUSEA VOMITING AND DIARRHEA: Primary | ICD-10-CM

## 2024-02-24 DIAGNOSIS — R11.2 NAUSEA VOMITING AND DIARRHEA: Primary | ICD-10-CM

## 2024-02-24 PROCEDURE — 99213 OFFICE O/P EST LOW 20 MIN: CPT | Mod: S$GLB,,,

## 2024-02-24 RX ORDER — ONDANSETRON 4 MG/1
4 TABLET, ORALLY DISINTEGRATING ORAL EVERY 6 HOURS PRN
Qty: 15 TABLET | Refills: 0 | Status: SHIPPED | OUTPATIENT
Start: 2024-02-24

## 2024-02-24 RX ORDER — ONDANSETRON 8 MG/1
8 TABLET, ORALLY DISINTEGRATING ORAL
Status: COMPLETED | OUTPATIENT
Start: 2024-02-24 | End: 2024-02-24

## 2024-02-24 RX ADMIN — ONDANSETRON 8 MG: 8 TABLET, ORALLY DISINTEGRATING ORAL at 04:02

## 2024-02-24 NOTE — LETTER
February 24, 2024      [facility]  [address], [address]  [facility] [address]  Phone: [phone]  Fax: [phone]       Patient: Dax Carlisle   YOB: 1975  Date of Visit: 02/24/2024    To Whom It May Concern:    Olga Lidia Carlisle  was at [facility] on 02/24/2024. He may return to work/school on 2/26/24 with no restrictions. If you have any questions or concerns, or if I can be of further assistance, please do not hesitate to contact me.    Sincerely,        Flori Osuna, RT

## 2024-02-24 NOTE — PROGRESS NOTES
"Subjective:      Patient ID: Dax Carlisle is a 48 y.o. male.    Vitals:  height is 5' 10" (1.778 m) and weight is 114.1 kg (251 lb 8.7 oz). His tympanic temperature is 98.9 °F (37.2 °C). His blood pressure is 119/62 and his pulse is 108. His respiration is 18 and oxygen saturation is 98%.     Chief Complaint: Nausea    49 yo male Patient presents with N/V/D starting this morning at 5:30.   Feels like a stomach virus, ate chili last night.  Still with nausea. Had 4 episodes of vomiting up food contents. Non blood emesis. States felt like his whole body was spasm ing while his body was retching. States had 2 episodes of watery diarrhea. Tried taking OTC nausea medicine but threw it up this morning. Hx of appendectomy, cholecystectomy, colostomy bag. Denies fever, chills, urinary symptoms. States still having abdominal tenderness primarily on left side once he completed throwing up. Allergic to codeine, dairy aid, morphine, and iodine contrast media.     Nausea  This is a new problem. The problem occurs intermittently. The problem has been gradually improving. Associated symptoms include a change in bowel habit, chills, nausea and vomiting. Pertinent negatives include no abdominal pain, anorexia, arthralgias, chest pain, congestion, coughing, diaphoresis, fatigue, fever, headaches, joint swelling, myalgias, neck pain, numbness, rash, sore throat, swollen glands, urinary symptoms, vertigo, visual change or weakness. Nothing aggravates the symptoms. He has tried acetaminophen for the symptoms. The treatment provided no relief.       Constitution: Positive for chills. Negative for sweating, fatigue and fever.   HENT:  Negative for congestion and sore throat.    Neck: Negative for neck pain.   Cardiovascular:  Negative for chest pain.   Respiratory:  Negative for cough.    Gastrointestinal:  Positive for history of abdominal surgery, nausea, vomiting and diarrhea. Negative for abdominal pain, bright red blood in " stool, dark colored stools and bowel incontinence.   Genitourinary:  Negative for dysuria, frequency, urgency and flank pain.   Musculoskeletal:  Negative for joint pain, joint swelling and muscle ache.   Skin:  Negative for rash.   Neurological:  Negative for history of vertigo, headaches and numbness.      Objective:     Vitals:    02/24/24 1537   BP: 119/62   Pulse: 108   Resp: 18   Temp: 98.9 °F (37.2 °C)       Physical Exam   Constitutional: He is oriented to person, place, and time. He appears well-developed.  Non-toxic appearance. He does not appear ill. No distress.   HENT:   Head: Normocephalic and atraumatic.   Ears:   Right Ear: External ear normal.   Left Ear: External ear normal.   Nose: Nose normal.   Mouth/Throat: Mucous membranes are normal. Mucous membranes are moist.   Eyes: Conjunctivae and lids are normal.   Neck: Trachea normal. Neck supple.   Cardiovascular: Normal rate, regular rhythm and normal heart sounds.   Pulmonary/Chest: Effort normal and breath sounds normal. No stridor. No respiratory distress. He has no wheezes. He has no rhonchi. He has no rales.   Abdominal: Normal appearance and bowel sounds are normal. He exhibits no distension and no mass. Soft. There is generalized abdominal tenderness. There is no rebound, no guarding, no left CVA tenderness and no right CVA tenderness.      Comments: Suspect mild abdominal tenderness to palpation due to muscle use. Low suspicion of pancreatitis, nephrolithiasis. Oral mucous membranes moist.    Musculoskeletal: Normal range of motion.         General: Normal range of motion.   Neurological: He is alert and oriented to person, place, and time. He has normal strength.   Skin: Skin is warm, dry, intact, not diaphoretic, not pale and no rash.   Psychiatric: His speech is normal and behavior is normal. Judgment and thought content normal.   Nursing note and vitals reviewed.      Assessment:     1. Nausea vomiting and diarrhea        Plan:        Nausea vomiting and diarrhea  -     ondansetron disintegrating tablet 8 mg  -     ondansetron (ZOFRAN-ODT) 4 MG TbDL; Take 1 tablet (4 mg total) by mouth every 6 (six) hours as needed (nausea).  Dispense: 15 tablet; Refill: 0        Patient Instructions   Abdominal Pain   If your condition worsens or fails to improve we recommend that you receive another evaluation at the ER immediately or contact your PCP to discuss your concerns or return here. You must understand that you've received an urgent care treatment only and that you may be released before all your medical problems are known or treated. You the patient will arrange for followup care as instructed.     PLEASE RETURN HERE OR GO TO THE EMERGENCY DEPARTMENT FOR ANY CONCERNS OR WORSENING OF YOUR CONDITION (I.E., BLOOD IN VOMIT OR STOOL, WORSENING ABDOMINAL PAIN, FEVER, WEAKNESS, PASSING OUT, INABILITY TO PASS GAS OR STOOL)    Diarrhea    If you have diarrhea you can use Pepto Bismol.  Avoid Imodium unless you have more than 6 episodes of diarrhea in 24 hours.     Avoid any greasy, spicy, fatty or acidic foods at this time.   Do not eat dairy products while you have symptoms of diarrhea, they can make the diarrhea worse.   Increase fluids and rest is important.    Start a clear liquid diet and progress to BRAT diet     Nausea & Vomiting    If you prescribed an anti-nausea medication, please take it as prescribed when needed. OTC anti-nausea Imitrol is available over the counter.   Increase fluids and rest is important   Start off with liquid diet and progress as tolerated (see below)  Water and clear liquids are important so you do not get dehydrated. Drink a small amount at a time.  Do not force yourself to eat, especially if you have cramps, vomiting, or diarrhea. When you finally decide to start eating, do not eat large amounts at a time, even if you are hungry.  If you eat, avoid fatty, greasy, spicy, or fried foods.      Watch for any increase pain,  fever, or continued vomiting or diarrhea.       You must understand that you have received an Urgent Care treatment only and that you may be released before all of your medical problems are known or treated.  WE CANNOT RULE OUT ALL POSSIBLE CAUSES OF YOUR SYMPTOMS IN THE URGENT CARE SETTING PLEASE GO TO THE ER IF YOU FEELS YOUR CONDITION IS WORSENING OR YOU WOULD LIKE EMERGENT EVALUATION.

## 2024-02-24 NOTE — PATIENT INSTRUCTIONS
Abdominal Pain   If your condition worsens or fails to improve we recommend that you receive another evaluation at the ER immediately or contact your PCP to discuss your concerns or return here. You must understand that you've received an urgent care treatment only and that you may be released before all your medical problems are known or treated. You the patient will arrange for followup care as instructed.     PLEASE RETURN HERE OR GO TO THE EMERGENCY DEPARTMENT FOR ANY CONCERNS OR WORSENING OF YOUR CONDITION (I.E., BLOOD IN VOMIT OR STOOL, WORSENING ABDOMINAL PAIN, FEVER, WEAKNESS, PASSING OUT, INABILITY TO PASS GAS OR STOOL)    Diarrhea    If you have diarrhea you can use Pepto Bismol.  Avoid Imodium unless you have more than 6 episodes of diarrhea in 24 hours.     Avoid any greasy, spicy, fatty or acidic foods at this time.   Do not eat dairy products while you have symptoms of diarrhea, they can make the diarrhea worse.   Increase fluids and rest is important.    Start a clear liquid diet and progress to BRAT diet     Nausea & Vomiting    If you prescribed an anti-nausea medication, please take it as prescribed when needed. OTC anti-nausea Imitrol is available over the counter.   Increase fluids and rest is important   Start off with liquid diet and progress as tolerated (see below)  Water and clear liquids are important so you do not get dehydrated. Drink a small amount at a time.  Do not force yourself to eat, especially if you have cramps, vomiting, or diarrhea. When you finally decide to start eating, do not eat large amounts at a time, even if you are hungry.  If you eat, avoid fatty, greasy, spicy, or fried foods.      Watch for any increase pain, fever, or continued vomiting or diarrhea.       You must understand that you have received an Urgent Care treatment only and that you may be released before all of your medical problems are known or treated.  WE CANNOT RULE OUT ALL POSSIBLE CAUSES OF YOUR  SYMPTOMS IN THE URGENT CARE SETTING PLEASE GO TO THE ER IF YOU FEELS YOUR CONDITION IS WORSENING OR YOU WOULD LIKE EMERGENT EVALUATION.

## 2024-03-13 NOTE — ANESTHESIA PREPROCEDURE EVALUATION
10/[birth year]2019  Dax Carlisle is a 43 y.o., male.    Pre-op Assessment    I have reviewed the Patient Summary Reports.     I have reviewed the Medications.     Review of Systems  Anesthesia Hx:  No problems with previous Anesthesia   Denies Personal Hx of Anesthesia complications.   Hematology/Oncology:  Hematology Normal   Oncology Normal     EENT/Dental:EENT/Dental Normal   Cardiovascular:  Cardiovascular Normal     Pulmonary:   Sleep Apnea    Renal/:   renal calculi    Hepatic/GI:   PUD, Ruptured Viscus   Musculoskeletal:  Musculoskeletal Normal    Neurological:  Neurology Normal    Endocrine:  Endocrine Normal    Dermatological:  Skin Normal    Psych:   Psychiatric History depression          Physical Exam  General:  Well nourished, Obesity    Airway/Jaw/Neck:  Airway Findings: Mouth Opening: Normal Tongue: Normal  Mallampati: I      Dental:  Dental Findings: In tact   Chest/Lungs:  Chest/Lungs Clear    Heart/Vascular:  Heart Findings: Normal Heart murmur: negative       Mental Status:  Mental Status Findings:  Cooperative, Alert and Oriented         Anesthesia Plan  Type of Anesthesia, risks & benefits discussed:  Anesthesia Type:  general  Patient's Preference:   Intra-op Monitoring Plan: standard ASA monitors  Intra-op Monitoring Plan Comments:   Post Op Pain Control Plan: multimodal analgesia  Post Op Pain Control Plan Comments:   Induction:   IV  Beta Blocker:  Patient is not currently on a Beta-Blocker (No further documentation required).       Informed Consent: Patient understands risks and agrees with Anesthesia plan.  Questions answered. Anesthesia consent signed with patient.  ASA Score: 3  emergent   Day of Surgery Review of History & Physical: I have interviewed and examined the patient. I have reviewed the patient's H&P dated:    H&P update referred to the surgeon.              Patient called to schedule her surgery. She said sometimes her phone doesn't work and she uses a friends ([phone]). Patient was in a hurry, so only scheduled for the right knee.     Surgery date 5/2/2024 - WAITLIST    Confirmed surgery at [facility]     Laterality confirmed: RIGHT KNEE  Nickel - no allergy  PCP - Dr. Benson Lockhart  Pain Management - No  ASA - 5 days - okay  NPO - okay  For Total Knee or Hip ask if patient has diabetes, if so do not drink Ensure - diabetic - NO ensure  Weight loss meds - 7 days - okay  Prehab - okay  [employer] - okay  Ride after - okay   Insurance - [facility]  1st Post Op - Scheduled on 5/16/24 @ 11am with MISAEL Mason  2nd Post OP - Scheduled on 6/12 @ 11:30am  lars Mata for TKA - okay    Case created - Done  Sent case message to [facility] to schedule.   Service to Family Practice - Done  Prehab - Done  ICE - Done and faxed to [facility]  Scheduled in [place]  Letter to Patient - Done and routed to in-basket to be mailed

## 2024-03-24 DIAGNOSIS — G89.4 CHRONIC PAIN SYNDROME: ICD-10-CM

## 2024-03-24 DIAGNOSIS — F98.8 ATTENTION DEFICIT DISORDER (ADD) WITHOUT HYPERACTIVITY: ICD-10-CM

## 2024-03-24 NOTE — TELEPHONE ENCOUNTER
No care due was identified.  [facility] Embedded Care Due Messages. Reference number: [id number].   3/24/2024 2:32:06 PM CDT

## 2024-03-24 NOTE — PLAN OF CARE
CM receive call from provider patient is ready for discharge. CM inform family had question yesterday was not able to speak with them hold discharge till CM speak with family.    CM spoke with patient daughter Clair [phone] she states her mother was leaving with her in [place] and has only been back to [place] for about a week state her dementia has getting worst and she is not able to provide her the care she needs because of her own health, but her brother is willing to take her in but family wanted a back-up plan in the event patient decline more. She states one brother lives in [place] and willing to take her to [place] inform family will need to have her Medicaid change to [place] Medicaid is not state to state, Discuss [employer] screening follow up with SS for assistance.  Inform we are able to assist with a UAI states \"we need to decide what to do\" Inform because of patient diagnose of dementia patient will need a memory unit sometimes called a lock unit for wanderers. She did not have any additional questions. Inform patient ready for discharge per daughter her brother Natalio is coming to  patient and he lives local.    CM inform provider of above.    Gem GARCIA    EKG done. Results handed to MARY Fierro.   Operative Note    Patient:Margarita Marin  MRN: [id number]    Date Of Surgery: 12/27/2022    Surgeon: Vance Stevenson MD    Assistant Surgeon: None    Pre Op Diagnosis:  Painful orthopaedic hardware [facility]) Sirena Sanchez    Post Op Diagnosis:   same    Procedures Performed:  Right foot hardware removal, 20680    Implants:   * No implants in log *    Anesthesia:  Monitor Anesthesia Care    Blood Loss:  minimal    Tourniquet:  Estimated calf 15 minutes    Pre Operative Abx:   Ancef 2g            Pre Operative Course:  Margarita Marin is a 61 y.o. female who has a history of right first MTP hardware pain. Operation In Detail:  Patient was evaluated in the preoperative area. We had a long discussion about the procedure and postoperative protocols. The patient was then brought back to the operating room suite and placed in the operating room table. A timeout was taken to identify the patient, procedure being performed, and laterality. After this the patient was prepped and draped in the normal sterile fashion using a Betadine solution and/or a ChloraPrep solution. A timeout was then taken to identify the patient his name, date of birth, laterality, and procedure being performed. We also identified allergies and any concerns about the operation. Attention was then placed to the operative extremity. During a preop surgical timeout the right lower extremity was identified as a correct surgical site and prepped and draped in the standard sterile fashion using ChloraPrep solution. A field block was obtained with 0.5% plain Marcaine. The patient's previous approach to the first MTP joint was then reopened. The underlying hardware was identified and removed without difficulty. There is no sign of infection and a solid fusion was noted. The wound was irrigated and closed using Monocryl nylon sutures. A sterile dressing was then applied. Anesthesia was discontinued.   The patient was transferred back to recovery bed.  She was taken to recovery in satisfactory condition. She appeared to tolerate the procedure well. There were no apparent surgical or anesthetic complications. All needle and sponge counts were correct. A sterile dressing was then applied to the leg and Hardole/Post op sandal.  They were awoken from anesthesia and returned to the PACU without difficulty.     Post Operative Plan:   1- WB status: As tolerated   2- Immobilization/assistive devices: crutches  3- DVT px: No VTE Prophylaxis Needed

## 2024-03-26 ENCOUNTER — TELEPHONE (OUTPATIENT)
Dept: INTERNAL MEDICINE | Facility: CLINIC | Age: 49
End: 2024-03-26
Payer: COMMERCIAL

## 2024-03-26 RX ORDER — DEXTROAMPHETAMINE SACCHARATE, AMPHETAMINE ASPARTATE, DEXTROAMPHETAMINE SULFATE AND AMPHETAMINE SULFATE 5; 5; 5; 5 MG/1; MG/1; MG/1; MG/1
TABLET ORAL
Qty: 30 TABLET | Refills: 0 | Status: SHIPPED | OUTPATIENT
Start: 2024-03-26 | End: 2024-04-24 | Stop reason: SDUPTHER

## 2024-03-26 RX ORDER — HYDROCODONE BITARTRATE AND ACETAMINOPHEN 7.5; 325 MG/1; MG/1
1 TABLET ORAL EVERY 6 HOURS PRN
Qty: 120 TABLET | Refills: 0 | Status: SHIPPED | OUTPATIENT
Start: 2024-03-26 | End: 2024-04-24 | Stop reason: SDUPTHER

## 2024-03-26 NOTE — TELEPHONE ENCOUNTER
Spoke with patient; patient stated he requested refills on his Adderall & Norco but medications hasn't be sent; pt stated he would like medication sent to [facility] but if a appt is needed will schedule /LD

## 2024-03-26 NOTE — TELEPHONE ENCOUNTER
I never received a request   I did refill the medications today  He can just follow-up next month as planned

## 2024-03-26 NOTE — TELEPHONE ENCOUNTER
----- Message from Mallory Bryan sent at 3/26/2024  4:24 PM CDT -----  Contact: Patient  Patient is calling to know if the doctor is on vacation. Please call patient at [phone]     Patient is wondering why is refill request haven't been done. He is out of meds.

## 2024-04-16 ENCOUNTER — TELEPHONE (OUTPATIENT)
Dept: INTERNAL MEDICINE | Facility: CLINIC | Age: 49
End: 2024-04-16
Payer: COMMERCIAL

## 2024-04-16 NOTE — TELEPHONE ENCOUNTER
----- Message from Suri Chance sent at 4/16/2024  4:28 PM CDT -----  Contact: Dax Carrillo is calling in regard to getting an appt scheduled due to depression.  Pt does have an appt scheduled on 04/24 but would like to be seen sooner.  Please call back at [phone]       Thanks

## 2024-04-16 NOTE — TELEPHONE ENCOUNTER
Spoke with patient; patient wanted to know if there was a sooner appt then scheduled; MA informed him there were no openings but offered to put his appt on the waitlist; pt states he will go to an Urgent Care to get evaluated /LD

## 2024-04-18 ENCOUNTER — OFFICE VISIT (OUTPATIENT)
Dept: URGENT CARE | Facility: CLINIC | Age: 49
End: 2024-04-18
Payer: COMMERCIAL

## 2024-04-18 VITALS
RESPIRATION RATE: 18 BRPM | TEMPERATURE: 98 F | BODY MASS INDEX: 36.08 KG/M2 | SYSTOLIC BLOOD PRESSURE: 127 MMHG | WEIGHT: 252 LBS | HEART RATE: 100 BPM | DIASTOLIC BLOOD PRESSURE: 74 MMHG | OXYGEN SATURATION: 100 % | HEIGHT: 70 IN

## 2024-04-18 DIAGNOSIS — S61.215A LACERATION OF LEFT RING FINGER WITHOUT FOREIGN BODY WITHOUT DAMAGE TO NAIL, INITIAL ENCOUNTER: Primary | ICD-10-CM

## 2024-04-18 PROCEDURE — 12042 INTMD RPR N-HF/GENIT2.6-7.5: CPT | Mod: S$GLB,,, | Performed by: NURSE PRACTITIONER

## 2024-04-18 PROCEDURE — 99499 UNLISTED E&M SERVICE: CPT | Mod: S$GLB,,, | Performed by: NURSE PRACTITIONER

## 2024-04-18 NOTE — PATIENT INSTRUCTIONS
What care is needed at home?   Ask your doctor what you need to do when you go home. Make sure you ask questions if you do not understand what the doctor says.  Do not pick at the skin glue. It will fall off on its own in 5 to 10 days.  Keep your wound clean and dry for the first 24 hours. After 24 hours, you can gently wash the wound with soap and water or take a shower. Gently pat the wound dry. Do not soak your wound by bathing or swimming.  Do not use an antibiotic ointment on the skin glue. If you want, you can cover your wound with a bandage. You can also leave it open to air if you prefer.  Wash your hands before and after you touch your wound or bandage.  If you still have skin glue in place after 10 days, you can use petroleum jelly or antibiotic ointment to loosen it.  Avoid activities that could hurt the area of your wound for a few weeks. If you hurt the same part of your body again, the wound can open up.    Please arrange follow up with your primary medical clinic as soon as possible. You must understand that you've received an Urgent Care treatment only and that you may be released before all of your medical problems are known or treated. You, the patient, will arrange for follow up as instructed. If your symptoms worsen or fail to improve you should go to the Emergency Room.         Go to the Emergency Department for any new or worsening symptoms including: worsening abdominal pain, dark\black\bloody bowel movements, vomiting blood, hard abdomen, fever, chest pain, shortness of breath, loss of consciousness or any other concerns.

## 2024-04-18 NOTE — PROGRESS NOTES
"Subjective:      Patient ID: Dax Carlisle is a 48 y.o. male.    Vitals:  height is 5' 9.8" (1.773 m) and weight is 114.3 kg (251 lb 15.8 oz). His tympanic temperature is 98 °F (36.7 °C). His blood pressure is 127/74 and his pulse is 100. His respiration is 18 and oxygen saturation is 100%.     Chief Complaint: Laceration    Presents with laceration to left 4th digit. Patient states he sliced his finger on lawn . No complaints of pain at this time. Incident occurred on 04/18/24 at 4:30 pm.     Laceration   The incident occurred less than 1 hour ago. The laceration is located on the Left hand. The laceration mechanism is unknown.The pain is at a severity of 0/10. The patient is experiencing no pain. He reports no foreign bodies present.       Skin:  Positive for laceration. Negative for erythema.      Objective:     Physical Exam   Constitutional: He is oriented to person, place, and time. He appears well-developed.   HENT:   Head: Normocephalic and atraumatic. Head is without abrasion, without contusion and without laceration.   Ears:   Right Ear: External ear normal.   Left Ear: External ear normal.   Nose: Nose normal.   Mouth/Throat: Oropharynx is clear and moist and mucous membranes are normal.   Eyes: Conjunctivae, EOM and lids are normal. Pupils are equal, round, and reactive to light.   Neck: Trachea normal and phonation normal. Neck supple.   Cardiovascular: Normal rate, regular rhythm and normal heart sounds.   Pulmonary/Chest: Effort normal and breath sounds normal. No stridor. No respiratory distress.   Musculoskeletal: Normal range of motion.         General: Normal range of motion.      Right hand: Motor /Testing: The patient has normal right wrist strength.      Left hand: He exhibits normal capillary refill. Decreased sensation is not present in the ulnar distribution, is not present in the medial redistribution and is not present in the radial distribution. Left ring finger: Exhibits " tenderness and swelling. Injuries: laceration. Motor /Testing: Little Finger: 3/5.        Hands:    Neurological: He is alert and oriented to person, place, and time.   Skin: Skin is warm, dry, intact and no rash. Capillary refill takes less than 2 seconds. No abrasion, No burn, No bruising, No erythema and No ecchymosis   Psychiatric: His speech is normal and behavior is normal. Judgment and thought content normal.   Nursing note and vitals reviewed.    Assessment:     1. Laceration of left ring finger without foreign body without damage to nail, initial encounter        Plan:       Laceration of left ring finger without foreign body without damage to nail, initial encounter        What care is needed at home?   Ask your doctor what you need to do when you go home. Make sure you ask questions if you do not understand what the doctor says.  Do not pick at the skin glue. It will fall off on its own in 5 to 10 days.  Keep your wound clean and dry for the first 24 hours. After 24 hours, you can gently wash the wound with soap and water or take a shower. Gently pat the wound dry. Do not soak your wound by bathing or swimming.  Do not use an antibiotic ointment on the skin glue. If you want, you can cover your wound with a bandage. You can also leave it open to air if you prefer.  Wash your hands before and after you touch your wound or bandage.  If you still have skin glue in place after 10 days, you can use petroleum jelly or antibiotic ointment to loosen it.  Avoid activities that could hurt the area of your wound for a few weeks. If you hurt the same part of your body again, the wound can open up.

## 2024-04-19 NOTE — PROCEDURES
Laceration Repair    Date/Time: 4/18/2024 1:00 PM    Performed by: Nichole Choudhary NP  Authorized by: Nichole Choudhary NP  Consent Done: Not Needed  Body area: upper extremity  Location details: left index finger  Laceration length: 3.5 cm  Foreign bodies: no foreign bodies  Tendon involvement: none  Nerve involvement: none  Vascular damage: no    Patient sedated: no  Preparation: Patient was prepped and draped in the usual sterile fashion.  Irrigation solution: saline  Irrigation method: syringe  Amount of cleaning: standard  Debridement: minimal  Degree of undermining: none  Skin closure: glue  Approximation: close  Approximation difficulty: simple  Dressing: Steri-Strips, splint for protection and adhesive bandage  Patient tolerance: Patient tolerated the procedure well with no immediate complications

## 2024-04-24 ENCOUNTER — PATIENT MESSAGE (OUTPATIENT)
Dept: INTERNAL MEDICINE | Facility: CLINIC | Age: 49
End: 2024-04-24

## 2024-04-24 ENCOUNTER — PATIENT MESSAGE (OUTPATIENT)
Dept: PSYCHIATRY | Facility: CLINIC | Age: 49
End: 2024-04-24
Payer: COMMERCIAL

## 2024-04-24 ENCOUNTER — OFFICE VISIT (OUTPATIENT)
Dept: INTERNAL MEDICINE | Facility: CLINIC | Age: 49
End: 2024-04-24
Payer: COMMERCIAL

## 2024-04-24 VITALS
HEIGHT: 70 IN | SYSTOLIC BLOOD PRESSURE: 124 MMHG | OXYGEN SATURATION: 97 % | HEART RATE: 88 BPM | BODY MASS INDEX: 36.04 KG/M2 | WEIGHT: 251.75 LBS | DIASTOLIC BLOOD PRESSURE: 84 MMHG

## 2024-04-24 DIAGNOSIS — Z02.9 ADMINISTRATIVE ENCOUNTER: ICD-10-CM

## 2024-04-24 DIAGNOSIS — F32.5 MAJOR DEPRESSIVE DISORDER WITH SINGLE EPISODE, IN REMISSION: Primary | ICD-10-CM

## 2024-04-24 DIAGNOSIS — I47.10 SVT (SUPRAVENTRICULAR TACHYCARDIA): Chronic | ICD-10-CM

## 2024-04-24 DIAGNOSIS — G89.4 CHRONIC PAIN SYNDROME: ICD-10-CM

## 2024-04-24 DIAGNOSIS — F98.8 ATTENTION DEFICIT DISORDER (ADD) WITHOUT HYPERACTIVITY: ICD-10-CM

## 2024-04-24 PROCEDURE — 99215 OFFICE O/P EST HI 40 MIN: CPT | Mod: S$GLB,,, | Performed by: FAMILY MEDICINE

## 2024-04-24 PROCEDURE — G2211 COMPLEX E/M VISIT ADD ON: HCPCS | Mod: S$GLB,,, | Performed by: FAMILY MEDICINE

## 2024-04-24 PROCEDURE — 99999 PR PBB SHADOW E&M-EST. PATIENT-LVL IV: CPT | Mod: PBBFAC,,, | Performed by: FAMILY MEDICINE

## 2024-04-24 RX ORDER — HYDROCODONE BITARTRATE AND ACETAMINOPHEN 7.5; 325 MG/1; MG/1
1 TABLET ORAL EVERY 6 HOURS PRN
Qty: 120 TABLET | Refills: 0 | Status: SHIPPED | OUTPATIENT
Start: 2024-04-24 | End: 2024-05-23 | Stop reason: SDUPTHER

## 2024-04-24 RX ORDER — DEXTROAMPHETAMINE SACCHARATE, AMPHETAMINE ASPARTATE, DEXTROAMPHETAMINE SULFATE AND AMPHETAMINE SULFATE 5; 5; 5; 5 MG/1; MG/1; MG/1; MG/1
TABLET ORAL
Qty: 30 TABLET | Refills: 0 | Status: SHIPPED | OUTPATIENT
Start: 2024-04-24 | End: 2024-05-23 | Stop reason: SDUPTHER

## 2024-04-24 RX ORDER — ARIPIPRAZOLE 15 MG/1
15 TABLET ORAL NIGHTLY
Qty: 30 TABLET | Refills: 0 | Status: SHIPPED | OUTPATIENT
Start: 2024-04-24 | End: 2024-05-23 | Stop reason: SDUPTHER

## 2024-04-24 RX ORDER — PROPRANOLOL HYDROCHLORIDE 20 MG/1
20 TABLET ORAL 3 TIMES DAILY
Qty: 90 TABLET | Refills: 0 | Status: SHIPPED | OUTPATIENT
Start: 2024-04-24 | End: 2024-05-23 | Stop reason: SDUPTHER

## 2024-04-24 NOTE — PROGRESS NOTES
Subjective:   Patient ID: Dax Carlisle is a 48 y.o. male.  Chief Complaint:  Anxiety, Depression, and Chest Pain    Presents for follow-up on ADD, depression, and chronic pain    Last visit January 2024   ADHD stable   Complains of increased fatigue and more depressive symptoms.  Testosterone level checked in advised if normal could be worsening of his depression may need medication adjustment  Levels normal   Today reports continue worsening/exacerbation of his symptoms    Last visit October 2023 for annual physical exam  Cholesterol tests are elevated. 10-year risk of a heart attack or stroke is 3%. Aspirin or Statin cholesterol medications are not recommended. A heart healthy cholesterol diet, regular exercise, avoid any nicotine, and weight loss is recommended.  CBC with stable anemia, otherwise normal white cell count and red cell counts  Sugar, Kidney, Liver, and Electrolyte tests are all normal.  A1c is in a normal range. No Prediabtes or Diabetes  Thyroid testing is normal.  Okay to continue all current medications  Recheck labs 1 year     - ADHD  On Adderall 10 mg twice a day to help with focus/ADD/ADHD related issues despite compliance with his other mental health medications  Effective with symptoms controlled on present medication and dose helping compensate for deficits at work  Duration is appropriate.    No mood instability, tics, or disordered sleep, or other apparent adverse effects.  No increased blood pressure, heart, or other cardiovascular side effects.    Tolerating current treatment well.   No behaviors to suggest inappropriate use of prescribed medications.  [place] Board [employer] Controlled Prescription Drug Monitoring database was queried and showed no activity to suggest abuse, diversion, or other inappropriate use of prescription medications.      - Chronic Pain  On Norco 7.5/325 mg every 6 hours as needed.  Averages 120 pills per month.  No behaviors to suggest  inappropriate use of prescribed medications.  [place] Board [place] [employer]acy Controlled Prescription Drug Monitoring database was queried and showed no activity to suggest abuse, diversion, or other inappropriate use of prescription medications.   Reports increased pain despite compliance with medication with overall worsening of his mood since last visit     - Major depressive disorder  On Abilify 10 mg daily and Lexapro 20 mg daily   Reports compliance.  Denies side effects.  Fatigue has worsened   Exercise intolerance is worsened  Overall depressive symptoms have worsening with increased and now positive screening questionnaires  Has also had increase in palpitations in addition to chest pain  Primarily occurring work   Significant stressors from work due to recent managerial change  Was advised okay to take medical leave   Started 4/14/2024   Needs completion of FMLA and short-term disability forms   Questions what medication changes could be made   Would like referral to Psychiatry and Psychology    Review of Systems   Constitutional:  Positive for activity change, diaphoresis and fatigue. Negative for appetite change and unexpected weight change.   Respiratory:  Positive for shortness of breath. Negative for wheezing.    Cardiovascular:  Positive for chest pain and palpitations. Negative for leg swelling.   Gastrointestinal:  Positive for abdominal distention and abdominal pain. Negative for diarrhea, nausea and vomiting.   Musculoskeletal:  Positive for arthralgias, back pain and myalgias.   Skin:  Negative for rash.   Neurological:  Negative for dizziness, weakness, light-headedness and headaches.   Psychiatric/Behavioral:  Positive for agitation, confusion, decreased concentration, dysphoric mood and sleep disturbance. Negative for behavioral problems, hallucinations, self-injury and suicidal ideas. The patient is nervous/anxious. The patient is not hyperactive.        Objective:   /84 (BP Location: Left  "arm, Patient Position: Sitting, BP Method: Large (Manual))   Pulse 88   Ht 5' 9.8" (1.773 m)   Wt 114.2 kg (251 lb 12.3 oz)   SpO2 97%   BMI 36.33 kg/m²     Physical Exam  Vitals and nursing note reviewed.   Constitutional:       Appearance: Normal appearance. He is well-developed. He is obese.   Neurological:      General: No focal deficit present.      Mental Status: He is alert and oriented to person, place, and time. Mental status is at baseline.   Psychiatric:         Attention and Perception: He is inattentive. He does not perceive auditory or visual hallucinations.         Mood and Affect: Mood is depressed. Mood is not anxious. Affect is flat.         Speech: Speech is delayed.         Behavior: Behavior is slowed and withdrawn. Behavior is cooperative.         Thought Content: Thought content normal. Thought content is not paranoid or delusional. Thought content does not include homicidal or suicidal ideation.         Cognition and Memory: Cognition and memory normal.         Judgment: Judgment normal. Judgment is not impulsive or inappropriate.         Assessment:       ICD-10-CM ICD-9-CM   1. Major depressive disorder with single episode, in remission  F32.5 296.25   2. SVT (supraventricular tachycardia)  I47.10 427.89   3. Attention deficit disorder (ADD) without hyperactivity  F98.8 314.00   4. Chronic pain syndrome  G89.4 338.4   5. Administrative encounter  Z02.9 V68.9     Plan:   Major depressive disorder with single episode, in remission  -     ARIPiprazole (ABILIFY) 15 MG Tab; Take 1 tablet (15 mg total) by mouth every evening.  Dispense: 30 tablet; Refill: 0  -     Ambulatory referral/consult to Psychiatry; Future; Expected date: 05/01/2024  -     Ambulatory referral/consult to Psychology; Future; Expected date: 05/01/2024  Symptoms increase to not adequately controlled on current regimen   Continue Lexapro 20 mg daily   Increase Abilify 15 mg daily   Referral to Psychiatry and Psychology "     SVT (supraventricular tachycardia)  -     propranoloL (INDERAL) 20 MG tablet; Take 1 tablet (20 mg total) by mouth 3 (three) times daily.  Dispense: 90 tablet; Refill: 0  Discontinue Toprol-XL   Start Inderal 20 mg 3 times a day which may also help with increased anxiety symptoms     Attention deficit disorder (ADD) without hyperactivity  -     dextroamphetamine-amphetamine (ADDERALL) 20 mg tablet; Take 1/2 tablet (10 mg) by mouth 2 (two) times a day as needed  Dispense: 30 tablet; Refill: 0  ADHD stable, no side effects, symptoms well controlled on current medication  Continue stimulant at present dose    Chronic pain syndrome  -     HYDROcodone-acetaminophen (NORCO) 7.5-325 mg per tablet; Take 1 tablet by mouth every 6 (six) hours as needed for Pain.  Dispense: 120 tablet; Refill: 0  Pain and symptoms remain increased since last visit despite compliance with current medication  Likely related to worsening depression   Continue Norco 7.5/325 every 6 hours as needed for pain  Averaging 4 per day and stable pattern  Risk of discontinuation of long-term narcotics higher than risk of continuing long-term narcotics at current dose    Administrative encounter  FMLA and short term disability forms completed     Return to clinic one-month    40+ minutes of total time spent on the encounter, which includes face to face time and non-face to face time preparing to see the patient (eg, review of tests), Obtaining and/or reviewing separately obtained history, documenting clinical information in the electronic or other health record, independently interpreting results (not separately reported) and communicating results to the patient/family/caregiver, or Care coordination (not separately reported).     Visit today included increased complexity associated with managing the longitudinal care of the patient due to the serious and/or complex managed problem(s) listed above.

## 2024-05-21 DIAGNOSIS — F32.5 MAJOR DEPRESSIVE DISORDER WITH SINGLE EPISODE, IN REMISSION: ICD-10-CM

## 2024-05-21 DIAGNOSIS — G89.4 CHRONIC PAIN SYNDROME: ICD-10-CM

## 2024-05-21 DIAGNOSIS — I47.10 SVT (SUPRAVENTRICULAR TACHYCARDIA): Chronic | ICD-10-CM

## 2024-05-21 DIAGNOSIS — F98.8 ATTENTION DEFICIT DISORDER (ADD) WITHOUT HYPERACTIVITY: ICD-10-CM

## 2024-05-21 RX ORDER — ARIPIPRAZOLE 15 MG/1
15 TABLET ORAL NIGHTLY
Qty: 30 TABLET | Refills: 0 | Status: CANCELLED | OUTPATIENT
Start: 2024-05-21 | End: 2024-06-20

## 2024-05-21 RX ORDER — HYDROCODONE BITARTRATE AND ACETAMINOPHEN 7.5; 325 MG/1; MG/1
1 TABLET ORAL EVERY 6 HOURS PRN
Qty: 120 TABLET | Refills: 0 | Status: CANCELLED | OUTPATIENT
Start: 2024-05-21

## 2024-05-21 RX ORDER — DEXTROAMPHETAMINE SACCHARATE, AMPHETAMINE ASPARTATE, DEXTROAMPHETAMINE SULFATE AND AMPHETAMINE SULFATE 5; 5; 5; 5 MG/1; MG/1; MG/1; MG/1
TABLET ORAL
Qty: 30 TABLET | Refills: 0 | Status: CANCELLED | OUTPATIENT
Start: 2024-05-21

## 2024-05-21 RX ORDER — PROPRANOLOL HYDROCHLORIDE 20 MG/1
20 TABLET ORAL 3 TIMES DAILY
Qty: 90 TABLET | Refills: 0 | Status: CANCELLED | OUTPATIENT
Start: 2024-05-21 | End: 2024-06-20

## 2024-05-21 NOTE — TELEPHONE ENCOUNTER
No care due was identified.  Health system Embedded Care Due Messages. Reference number: [id number].   5/21/2024 1:44:28 PM CDT

## 2024-05-21 NOTE — TELEPHONE ENCOUNTER
No care due was identified.  [facility] Embedded Care Due Messages. Reference number: [id number].   5/21/2024 1:44:54 PM CDT

## 2024-05-22 NOTE — TELEPHONE ENCOUNTER
----- Message from Promise Valladares sent at 5/22/2024 10:19 AM CDT -----  Regarding: Patient call back  .Type: Patient Call Back    Who called:self     What is the request in detail:would like a call back in regards to his appointment. Asking if possible can his virtual appointment be rescheduled for 05/24/2024 at 7:40 am(nothing in Epic at that time and date)    Can the clinic reply by [facility]?no     Would the patient rather a call back or a response via [facility]? Call     Best call back number:.[phone]      Additional Information:

## 2024-05-22 NOTE — TELEPHONE ENCOUNTER
Refill Routing Note   Refill Routing Note   Medication(s) are not appropriate for processing by [facility] for the following reason(s):        New or recently adjusted medication  Outside of protocol    [facility] action(s):  Route  Defer             Appointments  past 12m or future 3m with PCP    Date Provider   Last Visit   4/24/2024 Cody Enamorado MD   Next Visit   5/22/2024 Cody Enamorado MD   ED visits in past 90 days: 0        Note composed:12:35 AM 05/22/2024

## 2024-05-22 NOTE — TELEPHONE ENCOUNTER
Spoke with pt; pt was calling in regards to needing a doctors note to return to work before Friday; MA did double book PCP schedule for 0523; pt notified of date/time /LD

## 2024-05-23 ENCOUNTER — OFFICE VISIT (OUTPATIENT)
Dept: INTERNAL MEDICINE | Facility: CLINIC | Age: 49
End: 2024-05-23
Payer: COMMERCIAL

## 2024-05-23 VITALS
SYSTOLIC BLOOD PRESSURE: 124 MMHG | HEART RATE: 77 BPM | WEIGHT: 247.81 LBS | HEIGHT: 70 IN | OXYGEN SATURATION: 94 % | BODY MASS INDEX: 35.48 KG/M2 | DIASTOLIC BLOOD PRESSURE: 82 MMHG

## 2024-05-23 DIAGNOSIS — F32.5 MAJOR DEPRESSIVE DISORDER WITH SINGLE EPISODE, IN REMISSION: Primary | ICD-10-CM

## 2024-05-23 DIAGNOSIS — G89.4 CHRONIC PAIN SYNDROME: ICD-10-CM

## 2024-05-23 DIAGNOSIS — Z02.9 ADMINISTRATIVE ENCOUNTER: ICD-10-CM

## 2024-05-23 DIAGNOSIS — F98.8 ATTENTION DEFICIT DISORDER (ADD) WITHOUT HYPERACTIVITY: ICD-10-CM

## 2024-05-23 DIAGNOSIS — I47.10 SVT (SUPRAVENTRICULAR TACHYCARDIA): Chronic | ICD-10-CM

## 2024-05-23 PROCEDURE — 99214 OFFICE O/P EST MOD 30 MIN: CPT | Mod: S$GLB,,, | Performed by: FAMILY MEDICINE

## 2024-05-23 PROCEDURE — G2211 COMPLEX E/M VISIT ADD ON: HCPCS | Mod: S$GLB,,, | Performed by: FAMILY MEDICINE

## 2024-05-23 PROCEDURE — 99999 PR PBB SHADOW E&M-EST. PATIENT-LVL III: CPT | Mod: PBBFAC,,, | Performed by: FAMILY MEDICINE

## 2024-05-23 RX ORDER — DEXTROAMPHETAMINE SACCHARATE, AMPHETAMINE ASPARTATE, DEXTROAMPHETAMINE SULFATE AND AMPHETAMINE SULFATE 5; 5; 5; 5 MG/1; MG/1; MG/1; MG/1
TABLET ORAL
Qty: 30 TABLET | Refills: 0 | Status: SHIPPED | OUTPATIENT
Start: 2024-05-23

## 2024-05-23 RX ORDER — CYCLOBENZAPRINE HCL 10 MG
10 TABLET ORAL 3 TIMES DAILY PRN
Qty: 90 TABLET | Refills: 3 | Status: SHIPPED | OUTPATIENT
Start: 2024-05-23

## 2024-05-23 RX ORDER — HYDROCODONE BITARTRATE AND ACETAMINOPHEN 7.5; 325 MG/1; MG/1
1 TABLET ORAL EVERY 6 HOURS PRN
Qty: 120 TABLET | Refills: 0 | Status: SHIPPED | OUTPATIENT
Start: 2024-05-23

## 2024-05-23 RX ORDER — PROPRANOLOL HYDROCHLORIDE 10 MG/1
10 TABLET ORAL 3 TIMES DAILY
Qty: 90 TABLET | Refills: 0 | Status: SHIPPED | OUTPATIENT
Start: 2024-05-23 | End: 2024-06-23

## 2024-05-23 RX ORDER — ARIPIPRAZOLE 15 MG/1
15 TABLET ORAL NIGHTLY
Qty: 90 TABLET | Refills: 0 | Status: SHIPPED | OUTPATIENT
Start: 2024-05-23 | End: 2024-08-22

## 2024-05-23 NOTE — PROGRESS NOTES
Subjective:   Patient ID: Dax Carlisle is a 48 y.o. male.  Chief Complaint:  Follow-up    Presents for one-month follow-up visit     Last visit due to increased depression with anxiety symptoms underwent medication changes in had FMLA form completed   Reports compliance with changes   Significant improvement in symptoms   Feels like he is ready to return to work    - Major depressive disorder   Continued Lexapro 20 mg daily   Increased Abilify 15 mg daily   Change Toprol-XL to Inderal 20 mg 3 times a day to help with stress/anxiety type symptoms  Referral to Psychiatry and Psychology ordered, but not scheduled until October 2024   Reports compliance with changes   Denies side effects   Symptoms significantly improved, close to 100% back to normal   Ready to return to work     - SVT (supraventricular tachycardia)  No increased SVT symptoms with change from Toprol-XL 25 mg daily to propanolol 20 mg 3 times a day   Does reports some episodes of lightheadedness related to possible blood pressure when goes from sitting to standing     - Attention deficit disorder (ADD) without hyperactivity  On Adderall 20 mg half tablet twice a day  Effective with symptoms controlled on present medication and dose helping compensate for deficits at work/school.  Duration is appropriate.    No mood instability, tics, or disordered sleep, or other apparent adverse effects.    Tolerating current treatment well.   No behaviors to suggest inappropriate use of prescribed medications.  [place] Board [place] [employer]acy Controlled Prescription Drug Monitoring database was queried and showed no activity to suggest abuse, diversion, or other inappropriate use of prescription medications.     Chronic pain syndrome  On Norco 7.5/325 mg every 6 hours as needed pain Flexeril 10 mg 3 times a day as needed for muscle spasm   Increased symptoms last visit, pain decreased with improvement in depression  Increased pain today due to lifting something  "heavy yesterday   Otherwise doing well  Pain and symptoms remain increased since last visit despite compliance with current medication  Averaging 4 per day and stable pattern  [facility] Controlled Prescription Drug Monitoring database was queried and showed no activity to suggest abuse, diversion, or other inappropriate use of prescription medications.   No behaviors to suggest inappropriate use of prescribed medications.     No new complaints today    Review of Systems   Constitutional:  Negative for activity change, appetite change, diaphoresis, fatigue and unexpected weight change.   Respiratory:  Negative for shortness of breath and wheezing.    Cardiovascular:  Negative for chest pain, palpitations and leg swelling.   Gastrointestinal:  Negative for abdominal distention, abdominal pain, diarrhea, nausea and vomiting.   Musculoskeletal:  Positive for arthralgias, back pain and myalgias.   Skin:  Negative for rash.   Neurological:  Positive for light-headedness. Negative for dizziness, weakness and headaches.   Psychiatric/Behavioral:  Negative for agitation, behavioral problems, confusion, decreased concentration, dysphoric mood, hallucinations, self-injury, sleep disturbance and suicidal ideas. The patient is not nervous/anxious and is not hyperactive.        Objective:   /82 (BP Location: Left arm, Patient Position: Sitting, BP Method: Large (Manual))   Pulse 77   Ht 5' 9.8" (1.773 m)   Wt 112.4 kg (247 lb 12.8 oz)   SpO2 (!) 94%   BMI 35.76 kg/m²     Physical Exam  Vitals and nursing note reviewed.   Constitutional:       Appearance: Normal appearance. He is well-developed. He is obese.   Neurological:      General: No focal deficit present.      Mental Status: He is alert and oriented to person, place, and time. Mental status is at baseline.   Psychiatric:         Attention and Perception: Attention normal. He is attentive. He does not perceive auditory or visual hallucinations.  "        Mood and Affect: Mood and affect normal. Mood is not anxious or depressed. Affect is not flat.         Speech: Speech is not delayed.         Behavior: Behavior normal. Behavior is not slowed or withdrawn. Behavior is cooperative.         Thought Content: Thought content normal. Thought content is not paranoid or delusional. Thought content does not include homicidal or suicidal ideation.         Cognition and Memory: Cognition and memory normal.         Judgment: Judgment normal. Judgment is not impulsive or inappropriate.       Assessment:       ICD-10-CM ICD-9-CM   1. Major depressive disorder with single episode, in remission  F32.5 296.25   2. SVT (supraventricular tachycardia)  I47.10 427.89   3. Attention deficit disorder (ADD) without hyperactivity  F98.8 314.00   4. Chronic pain syndrome  G89.4 338.4   5. Administrative encounter  Z02.9 V68.9     Plan:   Major depressive disorder with single episode, in remission  -     ARIPiprazole (ABILIFY) 15 MG Tab; Take 1 tablet (15 mg total) by mouth every evening.  Dispense: 90 tablet; Refill: 0  Overall significant improvement in symptoms with medication changes   Continue Lexapro 20 mg daily   Continue Abilify 15 mg daily   Decrease Inderal 10 mg 3 times a day   Establish with Psychiatry and Psychology as planned     SVT (supraventricular tachycardia)  -     propranoloL (INDERAL) 10 MG tablet; Take 1 tablet (10 mg total) by mouth 3 (three) times daily.  Dispense: 90 tablet; Refill: 0  Stable   Possible orthostatic hypotension with recent medication change  Decrease Inderal 10 mg 3 times a day  At follow-up visit, can consider change back to Toprol-XL 25 mg daily if mood stable     Attention deficit disorder (ADD) without hyperactivity  -     dextroamphetamine-amphetamine (ADDERALL) 20 mg tablet; Take 1/2 tablet (10 mg) by mouth 2 (two) times a day as needed  Dispense: 30 tablet; Refill: 0  ADHD stable, no side effects, symptoms well controlled on current  medication  Continue stimulant at present dose    Chronic pain syndrome  -     HYDROcodone-acetaminophen (NORCO) 7.5-325 mg per tablet; Take 1 tablet by mouth every 6 (six) hours as needed for Pain.  Dispense: 120 tablet; Refill: 0  Continue Norco 7.5/325 every 6 hours as needed for pain  Continue Flexeril 10 mg 3 times a day as needed for pain  Averaging 4 per day and stable pattern  Risk of discontinuation of long-term narcotics higher than risk of continuing long-term narcotics at current dose  Okay to refill monthly x 2    Administrative encounter  Letter provided for patient to return to work with no restrictions effective for 5/24/2024    RTC in 3 months    Visit today included increased complexity associated with managing the longitudinal care of the patient due to the serious and/or complex managed problem(s) listed above.

## 2024-05-23 NOTE — LETTER
May 23, 2024      The [address]  [address] [facility]  [place] [address]  Phone: [phone]  Fax: [phone]       Patient: Dax Carlisle   YOB: 1975  Date of Visit: 05/23/2024    To Whom It May Concern:    Olga Lidia Carlisle  was at [facility] on 05/23/2024. The patient may return to work on 05/24/2024 with no restrictions. If you have any questions or concerns, or if I can be of further assistance, please do not hesitate to contact me.    Sincerely,    Cody Enamorado MD

## 2024-06-07 ENCOUNTER — TELEPHONE (OUTPATIENT)
Dept: PSYCHIATRY | Facility: CLINIC | Age: 49
End: 2024-06-07

## 2024-06-11 ENCOUNTER — OFFICE VISIT (OUTPATIENT)
Dept: PSYCHIATRY | Facility: CLINIC | Age: 49
End: 2024-06-11

## 2024-06-11 DIAGNOSIS — F43.21 COMPLICATED GRIEF: Primary | ICD-10-CM

## 2024-06-11 DIAGNOSIS — Z63.8 RELATIONSHIP PROBLEM WITH FAMILY MEMBER: ICD-10-CM

## 2024-06-11 DIAGNOSIS — F90.8 ADHD, ADULT RESIDUAL TYPE: ICD-10-CM

## 2024-06-11 DIAGNOSIS — F33.40 RECURRENT MAJOR DEPRESSION IN REMISSION: ICD-10-CM

## 2024-06-11 PROCEDURE — 99999 PR PBB SHADOW E&M-EST. PATIENT-LVL II: CPT | Mod: PBBFAC,,, | Performed by: SOCIAL WORKER

## 2024-06-11 PROCEDURE — 99212 OFFICE O/P EST SF 10 MIN: CPT | Mod: PBBFAC | Performed by: SOCIAL WORKER

## 2024-06-11 SDOH — SOCIAL DETERMINANTS OF HEALTH (SDOH): OTHER SPECIFIED PROBLEMS RELATED TO PRIMARY SUPPORT GROUP: Z63.8

## 2024-06-18 NOTE — PROGRESS NOTES
Psychiatry Initial Visit (PhD/LCSW)  Diagnostic Interview - CPT 48354    Date: 2024    Site: [place]    Referral source: Dr. Cody Enamorado    Clinical status of patient: Outpatient    Dax Carlisle, a 48 y.o. male, for initial evaluation visit.  Met with patient.    Chief complaint/reason for encounter: attention deficit, depression, anxiety, and grief and loss    History of present illness:  Late entry for 24. 48 year old male patient presented to clinic for initial assessment. Referred by primary care physician Cody Enamorado MD. The patient stated his chief complaint as struggles with grief and loss--his best friend recently by murder/suicide, and 2 other friends in the past 3 years from Covid. His sister  3 years ago from sepsis. Also, his mother has been in nursing care with advanced Alzheimer's disease for some 8 years. He had felt very close to her, particularly as he had and continues to have a difficult relationship with his father. Patient also with chronic pain from bulging spinal discs and gluteal neuropathy. Patient describes additional interpersonal tensions, primarily with father, whom he calls a narcissist. Stated recent work stress has culminated in him taking a new job, which he started just 3 weeks prior to this session, and he is feeling hopeful about that so far. The patient reported his and his wife took in his nephew to raise, once sister's illness rendered her unable. They had him living with them from age 18 to 23.  20 years, he stated he wife is supportive, loving, a good relationship. Stated she works as a , working from home. They have no biological children. No adopted children; just the 5 years of sheltering their nephew. Patient denied any suicidal or homicidal ideation, mood swings, anger issues, psychosis, cognitive deficit, or substance abuse issues. Reported he broke his back, for which he has a history of multiple surgeries. Currently  pleased with his antidepressant medication regimen; major depression seems to be in remission, with continued use of Lexapro and Abilify. The patient identified therapeutic goals of processing grief and relationship tensions with his father, and learning coping/communication skills/strategies.    Pain:  chronic, but presently reported as controlled    Symptoms:   Mood: grief, some frustration intolerance at times and resentment toward father  Anxiety: restlessness/keyed up, irritability, and muscle tension  Substance abuse: denied  Cognitive functioning: denied  Health behaviors:  noncontributory    Psychiatric history: psychotropic management by PCP    Medical history: history of broken back and multiple corrective surgeries on back; asthma, diverticulitis in 2019, past kidney stones, sleep apnea, supraventricular tachycardia    Family history of psychiatric illness:  father clinically depressed    Social history (marriage, employment, etc.): born and raised in [place], [place]. 1 sister, 5 years older, who  3 years ago at the age pf 48. Happy early childhood. Turned negative with puberty and tensions with father. He started on prescription Prozac at age 16, Zoloft a little later. Hated high school socially. 12th grade he discovered power lifting and focused on that as his refuse. Raised [facility]. Newly attending non-[employer] church. 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 Met wife in [place], [place], together 30 years and counting,  almost 20 of those. NO biological children; couple is infertile.    Substance use:   Alcohol: none   Drugs: none   Tobacco: none   Caffeine: not reported    Current medications and drug reactions (include  OTC, herbal): see medication list  --Lexapro, Abilify    Strengths and liabilities: Strength: Patient accepts guidance/feedback, Strength: Patient is expressive/articulate., Strength: Patient is motivated for change., Strength: Patient has positive support network., Strength: Patient has reasonable judgment., Liability: Patient has poor health.    Current Evaluation:     Mental Status Exam:  General Appearance:  unremarkable, age appropriate, casually dressed   Speech: normal tone, normal rate, normal pitch, normal volume      Level of Cooperation: cooperative      Thought Processes: normal and logical, goal-directed   Mood: anxious, grieving      Thought Content: normal, no suicidality, no homicidality, delusions, or paranoia   Affect: congruent and appropriate   Orientation: Oriented x3   Memory: Recent and remote memory intact   Attention Span & Concentration: intact   Fund of General Knowledge: intact and appropriate to age and level of education   Abstract Reasoning: Not formally assessed   Judgment & Insight: fair     Language  intact     Diagnostic Impression - Plan:       ICD-10-CM ICD-9-CM   1. Complicated grief  F43.21 309.0   2. Recurrent major depression in remission  F33.40 296.35   3. ADHD, adult residual type  F90.8 314.01   4. Relationship problem with family member  Z63.8 V61.8       Plan:individual psychotherapy and medication management by physician    Return to Clinic: as scheduled, 8/22/24    Length of Service (minutes): 45

## 2024-06-24 DIAGNOSIS — G89.4 CHRONIC PAIN SYNDROME: ICD-10-CM

## 2024-06-24 DIAGNOSIS — I47.10 SVT (SUPRAVENTRICULAR TACHYCARDIA): Chronic | ICD-10-CM

## 2024-06-24 DIAGNOSIS — F98.8 ATTENTION DEFICIT DISORDER (ADD) WITHOUT HYPERACTIVITY: ICD-10-CM

## 2024-06-24 DIAGNOSIS — F32.5 MAJOR DEPRESSIVE DISORDER WITH SINGLE EPISODE, IN REMISSION: ICD-10-CM

## 2024-06-24 NOTE — TELEPHONE ENCOUNTER
No care due was identified.  [facility] Embedded Care Due Messages. Reference number: [id number].   6/24/2024 10:31:09 AM CDT

## 2024-06-24 NOTE — TELEPHONE ENCOUNTER
Refill Routing Note   Medication(s) are not appropriate for processing by [facility] for the following reason(s):        New or recently adjusted medication: Inderal  Outside of protocol: Norco, Adderall, Abilify    [facility] action(s):  Defer  Route               Appointments  past 12m or future 3m with PCP    Date Provider   Last Visit   5/23/2024 Cody Enamorado MD   Next Visit   8/26/2024 Cody Enamorado MD   ED visits in past 90 days: 0        Note composed:1:56 PM 06/24/2024

## 2024-06-26 RX ORDER — DEXTROAMPHETAMINE SACCHARATE, AMPHETAMINE ASPARTATE, DEXTROAMPHETAMINE SULFATE AND AMPHETAMINE SULFATE 5; 5; 5; 5 MG/1; MG/1; MG/1; MG/1
TABLET ORAL
Qty: 30 TABLET | Refills: 0 | Status: SHIPPED | OUTPATIENT
Start: 2024-06-26

## 2024-06-26 RX ORDER — PROPRANOLOL HYDROCHLORIDE 10 MG/1
10 TABLET ORAL 3 TIMES DAILY
Qty: 270 TABLET | Refills: 3 | Status: SHIPPED | OUTPATIENT
Start: 2024-06-26 | End: 2025-06-26

## 2024-06-26 RX ORDER — HYDROCODONE BITARTRATE AND ACETAMINOPHEN 7.5; 325 MG/1; MG/1
1 TABLET ORAL EVERY 6 HOURS PRN
Qty: 120 TABLET | Refills: 0 | Status: SHIPPED | OUTPATIENT
Start: 2024-06-26

## 2024-06-26 RX ORDER — ARIPIPRAZOLE 15 MG/1
15 TABLET ORAL NIGHTLY
Qty: 30 TABLET | Refills: 11 | Status: SHIPPED | OUTPATIENT
Start: 2024-06-26 | End: 2025-06-26

## 2024-07-23 ENCOUNTER — OFFICE VISIT (OUTPATIENT)
Dept: PSYCHIATRY | Facility: CLINIC | Age: 49
End: 2024-07-23
Payer: COMMERCIAL

## 2024-07-23 DIAGNOSIS — F43.21 COMPLICATED GRIEF: Primary | ICD-10-CM

## 2024-07-23 DIAGNOSIS — F33.9 RECURRENT MAJOR DEPRESSIVE DISORDER, REMISSION STATUS UNSPECIFIED: ICD-10-CM

## 2024-07-23 DIAGNOSIS — Z63.8 RELATIONSHIP PROBLEM WITH FAMILY MEMBER: ICD-10-CM

## 2024-07-23 DIAGNOSIS — F90.8 ADHD, ADULT RESIDUAL TYPE: ICD-10-CM

## 2024-07-23 PROCEDURE — 99999 PR PBB SHADOW E&M-EST. PATIENT-LVL I: CPT | Mod: PBBFAC,,, | Performed by: SOCIAL WORKER

## 2024-07-23 PROCEDURE — 90834 PSYTX W PT 45 MINUTES: CPT | Mod: S$GLB,,, | Performed by: SOCIAL WORKER

## 2024-07-23 SDOH — SOCIAL DETERMINANTS OF HEALTH (SDOH): OTHER SPECIFIED PROBLEMS RELATED TO PRIMARY SUPPORT GROUP: Z63.8

## 2024-07-23 NOTE — PROGRESS NOTES
Individual Psychotherapy (PhD/LCSW)    7/23/2024    Site:   [facility]     Therapeutic Intervention: Met with patient.  Outpatient - Insight oriented psychotherapy 45 min - CPT code 48970 and Outpatient - Supportive psychotherapy 45 min - CPT Code 80213    Chief complaint/reason for encounter: depression, somatic, interpersonal, and grief     Interval history and content of current session:  48 year old male patient returning to clinic for second session. Identified issues of grief and loss, of sister 3 years ago, who was 5 years older than the patient, loss of two friends from Covid in the same year as his sister, 2020-21,.  His mother, in her early eighties, is still living, but in nursing home care in [place] the past 8 years and for at least the past 4 years completely disoriented and unable to recognize anyone or even remember how to swallow. Patient's father, also in [place], he described as an utter narcissist. Patient avoids interacting with him. 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 Patient said he essentially agrees with his wife's assessment. Said his is/was a family never inclined to talk about emotionally personal or difficult things. Patient tearful as he talked about his sister's difficult life, calling her good-hearted but someone who made a string of poor choices for herself. Said she was  4 times, generally not matches the patient approved of. He said his brother-in-law, his sister's ,  4 years, was not stable enough to provide; no steady functional income. Patient talked about his sister and brother-in-law camping out in the woods for a few years, due to inability to obtain affordable housing. Sister was obese and had chronic pain issues, somewhat like the patient himself. Also on narcotic  "pain medication like the patient, and both experienced life-threatening colon perforation and sepsis that nearly killed them, the patient in 2019 and his sister a year later in 2020. The patient remains with constant, difficult burning neuropathic pain in his back, saying his pain level stays between 5 and 7 out of 10, depending on whether or not his is on pain medication. Talked about his nephew he and his wife took in to raise from age 17, due to his sister's life instability and the nephew having dropped out of school. The couple saw that the nephew completed high school. He eventually went into the  and at age 31 now is out and on disability discharge from the .   The patient revealed he ruminates frequently on questions that disturb him about the unknown state of his sister's salvation, his own hannah being of central importance to him; he said he had seen her "accept Vitor" in her early teens, but he knew she had a late teens/early adulthood period when she was not active in [employer], and he never talked to her after that about the status of her spiritual outlook.  Denied any si/hi, mood swings, psychosis, or substance abuse.  Scheduled follow up on 9/5/24.    Treatment plan:  Target symptoms: depression, distractability, grief  Why chosen therapy is appropriate versus another modality: relevant to diagnosis; patient responsive to this modality.  Outcome monitoring methods:  self-report; observation.  Therapeutic intervention type: insight-oriented psychotherapy; supportive psychotherapy.    Risk parameters:  Patient reports no suicidal ideation  Patient reports no homicidal ideation  Patient reports no self-injurious behavior  Patient reports no violent behavior    Verbal deficits: None    Patient's response to intervention:  The patient's response to intervention is accepting    Progress toward goals and other mental status changes:  The patient's progress toward goals is  mixed .    Diagnosis: "     ICD-10-CM ICD-9-CM   1. Complicated grief  F43.21 309.0   2. Recurrent major depressive disorder, remission status unspecified  F33.9 296.30   3. ADHD, adult residual type  F90.8 314.01   4. Relationship problem with family member  Z63.8 V61.8       Plan:  individual psychotherapy    Return to clinic: as scheduled    Length of Service (minutes): 45

## 2024-07-24 DIAGNOSIS — F98.8 ATTENTION DEFICIT DISORDER (ADD) WITHOUT HYPERACTIVITY: ICD-10-CM

## 2024-07-24 DIAGNOSIS — G89.4 CHRONIC PAIN SYNDROME: ICD-10-CM

## 2024-07-24 NOTE — TELEPHONE ENCOUNTER
Refill Routing Note   Medication(s) are not appropriate for processing by [facility] for the following reason(s):        Outside of protocol    [facility] action(s):  Route               Appointments  past 12m or future 3m with PCP    Date Provider   Last Visit   5/23/2024 Cody Enamorado MD   Next Visit   8/26/2024 Cody Enamorado MD   ED visits in past 90 days: 0        Note composed:10:12 AM 07/24/2024

## 2024-07-24 NOTE — TELEPHONE ENCOUNTER
No care due was identified.  [facility] Embedded Care Due Messages. Reference number: [id number].   7/24/2024 9:39:35 AM CDT

## 2024-07-25 RX ORDER — HYDROCODONE BITARTRATE AND ACETAMINOPHEN 7.5; 325 MG/1; MG/1
1 TABLET ORAL EVERY 6 HOURS PRN
Qty: 120 TABLET | Refills: 0 | Status: SHIPPED | OUTPATIENT
Start: 2024-07-25

## 2024-07-25 RX ORDER — DEXTROAMPHETAMINE SACCHARATE, AMPHETAMINE ASPARTATE, DEXTROAMPHETAMINE SULFATE AND AMPHETAMINE SULFATE 5; 5; 5; 5 MG/1; MG/1; MG/1; MG/1
TABLET ORAL
Qty: 30 TABLET | Refills: 0 | Status: SHIPPED | OUTPATIENT
Start: 2024-07-25

## 2024-08-21 DIAGNOSIS — F98.8 ATTENTION DEFICIT DISORDER (ADD) WITHOUT HYPERACTIVITY: ICD-10-CM

## 2024-08-21 DIAGNOSIS — G89.4 CHRONIC PAIN SYNDROME: ICD-10-CM

## 2024-08-21 NOTE — TELEPHONE ENCOUNTER
No care due was identified.  [facility] Embedded Care Due Messages. Reference number: [id number].   8/21/2024 8:23:36 AM CDT

## 2024-08-22 RX ORDER — HYDROCODONE BITARTRATE AND ACETAMINOPHEN 7.5; 325 MG/1; MG/1
1 TABLET ORAL EVERY 6 HOURS PRN
Qty: 120 TABLET | Refills: 0 | Status: SHIPPED | OUTPATIENT
Start: 2024-08-22

## 2024-08-22 RX ORDER — DEXTROAMPHETAMINE SACCHARATE, AMPHETAMINE ASPARTATE, DEXTROAMPHETAMINE SULFATE AND AMPHETAMINE SULFATE 5; 5; 5; 5 MG/1; MG/1; MG/1; MG/1
TABLET ORAL
Qty: 30 TABLET | Refills: 0 | Status: SHIPPED | OUTPATIENT
Start: 2024-08-22

## 2024-08-26 ENCOUNTER — OFFICE VISIT (OUTPATIENT)
Dept: INTERNAL MEDICINE | Facility: CLINIC | Age: 49
End: 2024-08-26
Payer: COMMERCIAL

## 2024-08-26 VITALS
WEIGHT: 243.63 LBS | HEART RATE: 88 BPM | TEMPERATURE: 98 F | HEIGHT: 70 IN | SYSTOLIC BLOOD PRESSURE: 128 MMHG | BODY MASS INDEX: 34.88 KG/M2 | OXYGEN SATURATION: 96 % | DIASTOLIC BLOOD PRESSURE: 82 MMHG

## 2024-08-26 DIAGNOSIS — F32.5 MAJOR DEPRESSIVE DISORDER WITH SINGLE EPISODE, IN REMISSION: ICD-10-CM

## 2024-08-26 DIAGNOSIS — F98.8 ATTENTION DEFICIT DISORDER (ADD) WITHOUT HYPERACTIVITY: Primary | ICD-10-CM

## 2024-08-26 DIAGNOSIS — B35.6 TINEA CRURIS: ICD-10-CM

## 2024-08-26 DIAGNOSIS — I47.10 SVT (SUPRAVENTRICULAR TACHYCARDIA): Chronic | ICD-10-CM

## 2024-08-26 DIAGNOSIS — G89.4 CHRONIC PAIN SYNDROME: ICD-10-CM

## 2024-08-26 PROCEDURE — G2211 COMPLEX E/M VISIT ADD ON: HCPCS | Mod: S$GLB,,, | Performed by: FAMILY MEDICINE

## 2024-08-26 PROCEDURE — 1160F RVW MEDS BY RX/DR IN RCRD: CPT | Mod: CPTII,S$GLB,, | Performed by: FAMILY MEDICINE

## 2024-08-26 PROCEDURE — 3074F SYST BP LT 130 MM HG: CPT | Mod: CPTII,S$GLB,, | Performed by: FAMILY MEDICINE

## 2024-08-26 PROCEDURE — 99999 PR PBB SHADOW E&M-EST. PATIENT-LVL III: CPT | Mod: PBBFAC,,, | Performed by: FAMILY MEDICINE

## 2024-08-26 PROCEDURE — 3008F BODY MASS INDEX DOCD: CPT | Mod: CPTII,S$GLB,, | Performed by: FAMILY MEDICINE

## 2024-08-26 PROCEDURE — 99214 OFFICE O/P EST MOD 30 MIN: CPT | Mod: S$GLB,,, | Performed by: FAMILY MEDICINE

## 2024-08-26 PROCEDURE — 1159F MED LIST DOCD IN RCRD: CPT | Mod: CPTII,S$GLB,, | Performed by: FAMILY MEDICINE

## 2024-08-26 PROCEDURE — 3079F DIAST BP 80-89 MM HG: CPT | Mod: CPTII,S$GLB,, | Performed by: FAMILY MEDICINE

## 2024-08-26 RX ORDER — ARIPIPRAZOLE 10 MG/1
10 TABLET ORAL NIGHTLY
Qty: 30 TABLET | Refills: 11 | Status: SHIPPED | OUTPATIENT
Start: 2024-08-26 | End: 2025-08-26

## 2024-08-26 RX ORDER — KETOCONAZOLE 20 MG/G
CREAM TOPICAL 2 TIMES DAILY
Qty: 60 G | Refills: 0 | Status: SHIPPED | OUTPATIENT
Start: 2024-08-26

## 2024-08-26 RX ORDER — METOPROLOL SUCCINATE 25 MG/1
25 TABLET, EXTENDED RELEASE ORAL NIGHTLY
Qty: 90 TABLET | Refills: 3 | Status: SHIPPED | OUTPATIENT
Start: 2024-08-26 | End: 2025-08-26

## 2024-08-26 RX ORDER — FLUCONAZOLE 150 MG/1
150 TABLET ORAL WEEKLY
Qty: 2 TABLET | Refills: 0 | Status: SHIPPED | OUTPATIENT
Start: 2024-08-26 | End: 2024-09-10

## 2024-08-26 NOTE — PROGRESS NOTES
Subjective:   Patient ID: Dax Carlisle is a 48 y.o. male.  Chief Complaint:  Medication Refill    Presents for three-month follow-up     - Major depressive disorder with single episode, in remission  Continued Lexapro 20 mg daily   Continued Abilify 15 mg daily   Decreased Inderal 10 mg 3 times a day   Referral to Psychiatry and Psychology  Establish with Psychology.  Two therapy sessions today.  Reports compliance with medications above   Denies side effects   Tolerated decrease Inderal dose without difficulty   Mood stable   Would like to try and go back to Abilify 10 mg daily       - SVT (supraventricular tachycardia)  Stable last visit  Possible orthostatic hypotension with recent medication change  Decreased Inderal 10 mg 3 times a day  No increase SVT at lower Inderal dose   No increased anxiety symptoms at lower Inderal dose   Would like to change back to previously prescribed Toprol-XL 25 mg daily     - Attention deficit disorder (ADD) without hyperactivity  On Adderall 20 mg half tablet twice a day  Effective with symptoms controlled on present medication and dose helping compensate for deficits at work/school.  Duration is appropriate.    No mood instability, tics, or disordered sleep, or other apparent adverse effects.    Tolerating current treatment well.   No behaviors to suggest inappropriate use of prescribed medications.  [place] [employer] Controlled Prescription Drug Monitoring database was queried and showed no activity to suggest abuse, diversion, or other inappropriate use of prescription medications.     - Chronic pain syndrome  Continued Norco 7.5/325 every 6 hours as needed for pain  [facility] Controlled Prescription Drug Monitoring database was queried and showed no activity to suggest abuse, diversion, or other inappropriate use of prescription medications.   Averaging 4 per day and stable pattern  Continued Flexeril 10 mg 3 times a day as needed for  "pain    Only new complaint today is recurrent tinea cruris   Last saw dermatology March 2023  Prescribed Diflucan pills weekly for 2 doses in addition to topical Nizoral cream with resolution of rash until 1-3 months ago   Would like refills     No additional complaints today    Review of Systems   Constitutional:  Negative for appetite change, fatigue and unexpected weight change.   Respiratory:  Negative for shortness of breath.    Cardiovascular:  Negative for chest pain, palpitations and leg swelling.   Gastrointestinal:  Negative for abdominal pain, constipation, diarrhea, nausea and vomiting.   Skin:  Positive for rash.   Neurological:  Negative for tremors, syncope, light-headedness and headaches.   Psychiatric/Behavioral:  Negative for agitation, behavioral problems, confusion, decreased concentration, dysphoric mood, hallucinations and sleep disturbance. The patient is not nervous/anxious and is not hyperactive.      Objective:   /82 (BP Location: Left arm, Patient Position: Sitting, BP Method: Large (Manual))   Pulse 88   Temp 97.9 °F (36.6 °C) (Tympanic)   Ht 5' 9.8" (1.773 m)   Wt 110.5 kg (243 lb 9.7 oz)   SpO2 96%   BMI 35.15 kg/m²     Physical Exam  Vitals and nursing note reviewed.   Constitutional:       Appearance: Normal appearance. He is well-developed. He is obese.   Neck:      Vascular: No JVD.   Cardiovascular:      Rate and Rhythm: Normal rate and regular rhythm.      Heart sounds: Normal heart sounds.   Pulmonary:      Effort: Pulmonary effort is normal.      Breath sounds: Normal breath sounds and air entry.   Abdominal:      General: There is no distension.      Palpations: Abdomen is soft.      Tenderness: There is no abdominal tenderness.   Musculoskeletal:      Right lower leg: No edema.      Left lower leg: No edema.   Neurological:      Mental Status: He is alert and oriented to person, place, and time.      Coordination: Coordination is intact.      Gait: Gait is intact. "   Psychiatric:         Attention and Perception: Attention normal. He is attentive.         Mood and Affect: Mood and affect normal. Mood is not anxious or depressed.         Speech: Speech normal.         Behavior: Behavior normal. Behavior is not hyperactive. Behavior is cooperative.         Thought Content: Thought content normal.         Cognition and Memory: Cognition normal.         Judgment: Judgment normal.       Assessment:       ICD-10-CM ICD-9-CM   1. Attention deficit disorder (ADD) without hyperactivity  F98.8 314.00   2. Major depressive disorder with single episode, in remission  F32.5 296.25   3. SVT (supraventricular tachycardia)  I47.10 427.89   4. Chronic pain syndrome  G89.4 338.4   5. Tinea cruris  B35.6 110.3     Plan:   Attention deficit disorder (ADD) without hyperactivity  ADHD stable, no side effects, symptoms well controlled on current medication  Continue Adderall 10 mg twice a day dose    Major depressive disorder with single episode, in remission  -     ARIPiprazole (ABILIFY) 10 MG Tab; Take 1 tablet (10 mg total) by mouth every evening.  Dispense: 30 tablet; Refill: 11  Stable, no side effects, mood and symptoms well controlled on present medication .  Continue Lexapro 20 mg daily   Decrease Abilify 10 mg daily   Discontinue Inderal  Follow-up Psychology as scheduled   Establish with Psychiatry as planned    SVT (supraventricular tachycardia)  -     metoprolol succinate (TOPROL-XL) 25 MG 24 hr tablet; Take 1 tablet (25 mg total) by mouth every evening.  Dispense: 90 tablet; Refill: 3  Stable   No recurrence with lower Inderal dose  Inderal no longer needed for anxiety symptoms   Discontinue Inderal   Restart Toprol-XL 25 mg daily     Chronic pain syndrome  Pain and symptoms remain well controlled on current medication regimen  Continue Norco 7.5/325 every 6 hours as needed for pain  Averaging 4 per day and stable pattern  Risk of discontinuation of long-term narcotics higher than risk  of continuing long-term narcotics at current dose    Tinea cruris  -     fluconazole (DIFLUCAN) 150 MG Tab; Take 1 tablet (150 mg total) by mouth once a week. for 2 doses  Dispense: 2 tablet; Refill: 0  -     ketoconazole (NIZORAL) 2 % cream; Apply topically 2 (two) times daily.  Dispense: 60 g; Refill: 0    Return to clinic 3 months or sooner as needed    Visit today included increased complexity associated with managing the longitudinal care of the patient due to the serious and/or complex managed problem(s) listed above.

## 2024-09-05 ENCOUNTER — OFFICE VISIT (OUTPATIENT)
Dept: PSYCHIATRY | Facility: CLINIC | Age: 49
End: 2024-09-05
Payer: COMMERCIAL

## 2024-09-05 DIAGNOSIS — F33.9 RECURRENT MAJOR DEPRESSIVE DISORDER, REMISSION STATUS UNSPECIFIED: ICD-10-CM

## 2024-09-05 DIAGNOSIS — Z63.8 RELATIONSHIP PROBLEM WITH FAMILY MEMBER: ICD-10-CM

## 2024-09-05 DIAGNOSIS — F43.21 COMPLICATED GRIEF: Primary | ICD-10-CM

## 2024-09-05 DIAGNOSIS — F90.8 ADHD, ADULT RESIDUAL TYPE: ICD-10-CM

## 2024-09-05 PROCEDURE — 90834 PSYTX W PT 45 MINUTES: CPT | Mod: S$GLB,,, | Performed by: SOCIAL WORKER

## 2024-09-05 SDOH — SOCIAL DETERMINANTS OF HEALTH (SDOH): OTHER SPECIFIED PROBLEMS RELATED TO PRIMARY SUPPORT GROUP: Z63.8

## 2024-09-11 NOTE — PROGRESS NOTES
"Individual Psychotherapy (PhD/LCSW)    9/5/2024    Site:   [facility]     Therapeutic Intervention: Met with patient.  Outpatient - Insight oriented psychotherapy 45 min - CPT code 58179 and Outpatient - Supportive psychotherapy 45 min - CPT Code 84437    Chief complaint/reason for encounter: depression, somatic, interpersonal, and grief     Interval history and content of current session:  48 year old male patient followed up in clinic for his third seccion. Chief issues of prolonged grief with some depression and ADHD. Chief stressor of chronic severe pain from bulging disc and gluteal neuropathy. Patient noting his wife is his most supportive emotional resource. Patient talked about Labor Day weekend, saying it was positive[facility]stric[facility] but also had a change to help his father with some safety-related installations in his home. He felt good about it overall, as his relationship with his father has been strained for a long time. Patient also talked about his mother-in-law, for whom he worries repeatedly, as she has stage 4 cancer and is an avowed atheist. He said he is quite fond of her, sees her as a good person, but cannot shake the mental image of her "burning in Hell." He said, as for his father-in-law, he worries for him how he will manage after his wife passes, as he has always seemed to depend on her to manage their lives together. Patient said he sees concern on his own wife's face regarding her parents, and he wishes he could do something more. Denied any si/hi, mood swings, psychosis, or substance abuse. Patient planning to schedule as able, aiming for within 3 to 4 weeks. tba    Treatment plan:  Target symptoms: depression, distractability, grief  Why chosen therapy is appropriate versus another modality: relevant to diagnosis; patient responsive to this modality.  Outcome monitoring methods:  self-report; observation.  Therapeutic intervention type: " insight-oriented psychotherapy; supportive psychotherapy.    Risk parameters:  Patient reports no suicidal ideation  Patient reports no homicidal ideation  Patient reports no self-injurious behavior  Patient reports no violent behavior    Verbal deficits: None    Patient's response to intervention:  The patient's response to intervention is accepting    Progress toward goals and other mental status changes:  The patient's progress toward goals is  mixed .    Diagnosis:     ICD-10-CM ICD-9-CM   1. Complicated grief  F43.21 309.0   2. Recurrent major depressive disorder, remission status unspecified  F33.9 296.30   3. ADHD, adult residual type  F90.8 314.01   4. Relationship problem with family member  Z63.8 V61.8       Plan:  individual psychotherapy    Return to clinic: 3 to 4 weeks, as able, tba    Length of Service (minutes): 45

## 2024-09-24 DIAGNOSIS — G89.4 CHRONIC PAIN SYNDROME: ICD-10-CM

## 2024-09-24 DIAGNOSIS — F98.8 ATTENTION DEFICIT DISORDER (ADD) WITHOUT HYPERACTIVITY: ICD-10-CM

## 2024-09-24 RX ORDER — DEXTROAMPHETAMINE SACCHARATE, AMPHETAMINE ASPARTATE, DEXTROAMPHETAMINE SULFATE AND AMPHETAMINE SULFATE 5; 5; 5; 5 MG/1; MG/1; MG/1; MG/1
TABLET ORAL
Qty: 30 TABLET | Refills: 0 | Status: CANCELLED | OUTPATIENT
Start: 2024-09-24

## 2024-09-25 NOTE — TELEPHONE ENCOUNTER
No care due was identified.  [facility] Embedded Care Due Messages. Reference number: [id number].   9/24/2024 9:19:44 PM CDT

## 2024-09-26 ENCOUNTER — PATIENT MESSAGE (OUTPATIENT)
Dept: INTERNAL MEDICINE | Facility: CLINIC | Age: 49
End: 2024-09-26
Payer: COMMERCIAL

## 2024-09-26 ENCOUNTER — TELEPHONE (OUTPATIENT)
Dept: INTERNAL MEDICINE | Facility: CLINIC | Age: 49
End: 2024-09-26
Payer: COMMERCIAL

## 2024-09-26 DIAGNOSIS — F98.8 ATTENTION DEFICIT DISORDER (ADD) WITHOUT HYPERACTIVITY: ICD-10-CM

## 2024-09-26 RX ORDER — DEXTROAMPHETAMINE SACCHARATE, AMPHETAMINE ASPARTATE, DEXTROAMPHETAMINE SULFATE AND AMPHETAMINE SULFATE 2.5; 2.5; 2.5; 2.5 MG/1; MG/1; MG/1; MG/1
10 TABLET ORAL 2 TIMES DAILY
Qty: 60 TABLET | Refills: 0 | Status: SHIPPED | OUTPATIENT
Start: 2024-09-26

## 2024-09-26 NOTE — TELEPHONE ENCOUNTER
----- Message from Yani Guillen sent at 9/26/2024  8:57 AM CDT -----  Contact: Dax Verduzco is calling in reference to refills that has not been called in. Dextroamphetamine-amphetamine (ADDERALL) 20 mg tablet please change form 1 pill a day to 2 pills once a day. Please call him at [phone].    Thanks   Sl

## 2024-09-30 RX ORDER — HYDROCODONE BITARTRATE AND ACETAMINOPHEN 7.5; 325 MG/1; MG/1
1 TABLET ORAL EVERY 6 HOURS PRN
Qty: 120 TABLET | Refills: 0 | Status: SHIPPED | OUTPATIENT
Start: 2024-09-30

## 2024-09-30 RX ORDER — CYCLOBENZAPRINE HCL 10 MG
10 TABLET ORAL 3 TIMES DAILY PRN
Qty: 90 TABLET | Refills: 3 | Status: SHIPPED | OUTPATIENT
Start: 2024-09-30

## 2024-10-14 ENCOUNTER — TELEPHONE (OUTPATIENT)
Dept: PSYCHIATRY | Facility: CLINIC | Age: 49
End: 2024-10-14
Payer: COMMERCIAL

## 2024-10-29 DIAGNOSIS — G89.4 CHRONIC PAIN SYNDROME: ICD-10-CM

## 2024-10-29 DIAGNOSIS — F98.8 ATTENTION DEFICIT DISORDER (ADD) WITHOUT HYPERACTIVITY: Primary | Chronic | ICD-10-CM

## 2024-10-29 RX ORDER — HYDROCODONE BITARTRATE AND ACETAMINOPHEN 7.5; 325 MG/1; MG/1
1 TABLET ORAL EVERY 6 HOURS PRN
Qty: 120 TABLET | Refills: 0 | Status: SHIPPED | OUTPATIENT
Start: 2024-10-29

## 2024-10-29 RX ORDER — DEXTROAMPHETAMINE SACCHARATE, AMPHETAMINE ASPARTATE, DEXTROAMPHETAMINE SULFATE AND AMPHETAMINE SULFATE 2.5; 2.5; 2.5; 2.5 MG/1; MG/1; MG/1; MG/1
10 TABLET ORAL 2 TIMES DAILY
Qty: 60 TABLET | Refills: 0 | Status: SHIPPED | OUTPATIENT
Start: 2024-10-29

## 2024-10-30 ENCOUNTER — OFFICE VISIT (OUTPATIENT)
Dept: INTERNAL MEDICINE | Facility: CLINIC | Age: 49
End: 2024-10-30
Payer: COMMERCIAL

## 2024-10-30 VITALS
DIASTOLIC BLOOD PRESSURE: 82 MMHG | SYSTOLIC BLOOD PRESSURE: 134 MMHG | HEIGHT: 70 IN | WEIGHT: 246.06 LBS | TEMPERATURE: 98 F | BODY MASS INDEX: 35.23 KG/M2 | OXYGEN SATURATION: 95 % | HEART RATE: 93 BPM

## 2024-10-30 DIAGNOSIS — G89.4 CHRONIC PAIN SYNDROME: Chronic | ICD-10-CM

## 2024-10-30 DIAGNOSIS — Z00.00 ANNUAL PHYSICAL EXAM: Primary | ICD-10-CM

## 2024-10-30 DIAGNOSIS — F98.8 ATTENTION DEFICIT DISORDER (ADD) WITHOUT HYPERACTIVITY: Chronic | ICD-10-CM

## 2024-10-30 DIAGNOSIS — E78.2 MIXED HYPERLIPIDEMIA: Chronic | ICD-10-CM

## 2024-10-30 DIAGNOSIS — I47.10 SVT (SUPRAVENTRICULAR TACHYCARDIA): Chronic | ICD-10-CM

## 2024-10-30 DIAGNOSIS — Z12.5 SCREENING FOR PROSTATE CANCER: ICD-10-CM

## 2024-10-30 DIAGNOSIS — Z23 NEED FOR INFLUENZA VACCINATION: ICD-10-CM

## 2024-10-30 DIAGNOSIS — F32.5 MAJOR DEPRESSIVE DISORDER WITH SINGLE EPISODE, IN REMISSION: Chronic | ICD-10-CM

## 2024-10-30 PROBLEM — G47.00 INSOMNIA: Status: RESOLVED | Noted: 2017-04-27 | Resolved: 2024-10-30

## 2024-10-30 PROBLEM — M53.3 COCCYDYNIA: Chronic | Status: RESOLVED | Noted: 2021-03-15 | Resolved: 2024-10-30

## 2024-10-30 PROCEDURE — 3079F DIAST BP 80-89 MM HG: CPT | Mod: CPTII,S$GLB,, | Performed by: FAMILY MEDICINE

## 2024-10-30 PROCEDURE — 1159F MED LIST DOCD IN RCRD: CPT | Mod: CPTII,S$GLB,, | Performed by: FAMILY MEDICINE

## 2024-10-30 PROCEDURE — 90471 IMMUNIZATION ADMIN: CPT | Mod: S$GLB,,, | Performed by: FAMILY MEDICINE

## 2024-10-30 PROCEDURE — 3075F SYST BP GE 130 - 139MM HG: CPT | Mod: CPTII,S$GLB,, | Performed by: FAMILY MEDICINE

## 2024-10-30 PROCEDURE — 99999 PR PBB SHADOW E&M-EST. PATIENT-LVL IV: CPT | Mod: PBBFAC,,, | Performed by: FAMILY MEDICINE

## 2024-10-30 PROCEDURE — 3008F BODY MASS INDEX DOCD: CPT | Mod: CPTII,S$GLB,, | Performed by: FAMILY MEDICINE

## 2024-10-30 PROCEDURE — 90656 IIV3 VACC NO PRSV 0.5 ML IM: CPT | Mod: S$GLB,,, | Performed by: FAMILY MEDICINE

## 2024-10-30 PROCEDURE — 99396 PREV VISIT EST AGE 40-64: CPT | Mod: 25,S$GLB,, | Performed by: FAMILY MEDICINE

## 2024-11-01 ENCOUNTER — LAB VISIT (OUTPATIENT)
Dept: LAB | Facility: HOSPITAL | Age: 49
End: 2024-11-01
Attending: FAMILY MEDICINE
Payer: COMMERCIAL

## 2024-11-01 DIAGNOSIS — I47.10 SVT (SUPRAVENTRICULAR TACHYCARDIA): Primary | Chronic | ICD-10-CM

## 2024-11-01 DIAGNOSIS — Z12.5 SCREENING FOR PROSTATE CANCER: ICD-10-CM

## 2024-11-01 DIAGNOSIS — Z00.00 ANNUAL PHYSICAL EXAM: ICD-10-CM

## 2024-11-01 LAB
ALBUMIN SERPL BCP-MCNC: 4.1 G/DL (ref 3.5–5.2)
ALP SERPL-CCNC: 75 U/L (ref 40–150)
ALT SERPL W/O P-5'-P-CCNC: 31 U/L (ref 10–44)
ANION GAP SERPL CALC-SCNC: 8 MMOL/L (ref 8–16)
AST SERPL-CCNC: 22 U/L (ref 10–40)
BILIRUB SERPL-MCNC: 0.4 MG/DL (ref 0.1–1)
BUN SERPL-MCNC: 13 MG/DL (ref 6–20)
CALCIUM SERPL-MCNC: 9.2 MG/DL (ref 8.7–10.5)
CHLORIDE SERPL-SCNC: 105 MMOL/L (ref 95–110)
CHOLEST SERPL-MCNC: 248 MG/DL (ref 120–199)
CHOLEST/HDLC SERPL: 5.3 {RATIO} (ref 2–5)
CO2 SERPL-SCNC: 24 MMOL/L (ref 23–29)
COMPLEXED PSA SERPL-MCNC: 0.48 NG/ML (ref 0–4)
CREAT SERPL-MCNC: 0.9 MG/DL (ref 0.5–1.4)
ERYTHROCYTE [DISTWIDTH] IN BLOOD BY AUTOMATED COUNT: 13.1 % (ref 11.5–14.5)
EST. GFR  (NO RACE VARIABLE): >60 ML/MIN/1.73 M^2
ESTIMATED AVG GLUCOSE: 103 MG/DL (ref 68–131)
GLUCOSE SERPL-MCNC: 102 MG/DL (ref 70–110)
HBA1C MFR BLD: 5.2 % (ref 4–5.6)
HCT VFR BLD AUTO: 41.5 % (ref 40–54)
HDLC SERPL-MCNC: 47 MG/DL (ref 40–75)
HDLC SERPL: 19 % (ref 20–50)
HGB BLD-MCNC: 13.7 G/DL (ref 14–18)
LDLC SERPL CALC-MCNC: 168 MG/DL (ref 63–159)
MCH RBC QN AUTO: 28.9 PG (ref 27–31)
MCHC RBC AUTO-ENTMCNC: 33 G/DL (ref 32–36)
MCV RBC AUTO: 88 FL (ref 82–98)
NONHDLC SERPL-MCNC: 201 MG/DL
PLATELET # BLD AUTO: 186 K/UL (ref 150–450)
PMV BLD AUTO: 12.3 FL (ref 9.2–12.9)
POTASSIUM SERPL-SCNC: 4.1 MMOL/L (ref 3.5–5.1)
PROT SERPL-MCNC: 7.2 G/DL (ref 6–8.4)
RBC # BLD AUTO: 4.74 M/UL (ref 4.6–6.2)
SODIUM SERPL-SCNC: 137 MMOL/L (ref 136–145)
TRIGL SERPL-MCNC: 165 MG/DL (ref 30–150)
TSH SERPL DL<=0.005 MIU/L-ACNC: 3.1 UIU/ML (ref 0.4–4)
WBC # BLD AUTO: 6.94 K/UL (ref 3.9–12.7)

## 2024-11-01 PROCEDURE — 85027 COMPLETE CBC AUTOMATED: CPT | Performed by: FAMILY MEDICINE

## 2024-11-01 PROCEDURE — 84443 ASSAY THYROID STIM HORMONE: CPT | Performed by: FAMILY MEDICINE

## 2024-11-01 PROCEDURE — 83036 HEMOGLOBIN GLYCOSYLATED A1C: CPT | Performed by: FAMILY MEDICINE

## 2024-11-01 PROCEDURE — 84153 ASSAY OF PSA TOTAL: CPT | Performed by: FAMILY MEDICINE

## 2024-11-01 PROCEDURE — 80053 COMPREHEN METABOLIC PANEL: CPT | Performed by: FAMILY MEDICINE

## 2024-11-01 PROCEDURE — 36415 COLL VENOUS BLD VENIPUNCTURE: CPT | Performed by: FAMILY MEDICINE

## 2024-11-01 PROCEDURE — 80061 LIPID PANEL: CPT | Performed by: FAMILY MEDICINE

## 2024-11-12 RX ORDER — METOPROLOL SUCCINATE 50 MG/1
50 TABLET, EXTENDED RELEASE ORAL DAILY
Qty: 90 TABLET | Refills: 3 | Status: SHIPPED | OUTPATIENT
Start: 2024-11-12 | End: 2025-11-12

## 2024-11-26 ENCOUNTER — TELEPHONE (OUTPATIENT)
Dept: SURGERY | Facility: CLINIC | Age: 49
End: 2024-11-26
Payer: COMMERCIAL

## 2024-11-26 NOTE — TELEPHONE ENCOUNTER
Called and spoke with patient regarding message about scheduling colonoscopy. Patient stated he is due for colonoscopy in January and would like to go ahead and schedule. Patient denies any symptoms. Informed patient a message will be sent to MD for case request and prep. Also informed patient I will call patient once colonoscopy is scheduled and prep is sent. Patient verbalized understanding.

## 2024-11-29 ENCOUNTER — PATIENT MESSAGE (OUTPATIENT)
Dept: SURGERY | Facility: CLINIC | Age: 49
End: 2024-11-29
Payer: COMMERCIAL

## 2024-11-29 DIAGNOSIS — G89.4 CHRONIC PAIN SYNDROME: ICD-10-CM

## 2024-11-29 DIAGNOSIS — Z12.11 ENCOUNTER FOR SCREENING FOR COLORECTAL CANCER IN HIGH RISK PATIENT: Primary | ICD-10-CM

## 2024-11-29 DIAGNOSIS — Z91.89 ENCOUNTER FOR SCREENING FOR COLORECTAL CANCER IN HIGH RISK PATIENT: Primary | ICD-10-CM

## 2024-11-29 DIAGNOSIS — Z12.12 ENCOUNTER FOR SCREENING FOR COLORECTAL CANCER IN HIGH RISK PATIENT: Primary | ICD-10-CM

## 2024-11-29 DIAGNOSIS — F98.8 ATTENTION DEFICIT DISORDER (ADD) WITHOUT HYPERACTIVITY: Chronic | ICD-10-CM

## 2024-11-29 RX ORDER — SODIUM, POTASSIUM,MAG SULFATES 17.5-3.13G
1 SOLUTION, RECONSTITUTED, ORAL ORAL DAILY
Qty: 354 ML | Refills: 0 | Status: SHIPPED | OUTPATIENT
Start: 2024-11-29 | End: 2024-12-01

## 2024-11-29 NOTE — TELEPHONE ENCOUNTER
No care due was identified.  [facility] Embedded Care Due Messages. Reference number: [id number].   11/29/2024 12:48:43 PM CST

## 2024-12-02 RX ORDER — DEXTROAMPHETAMINE SACCHARATE, AMPHETAMINE ASPARTATE, DEXTROAMPHETAMINE SULFATE AND AMPHETAMINE SULFATE 2.5; 2.5; 2.5; 2.5 MG/1; MG/1; MG/1; MG/1
10 TABLET ORAL 2 TIMES DAILY
Qty: 60 TABLET | Refills: 0 | Status: SHIPPED | OUTPATIENT
Start: 2024-12-02

## 2024-12-02 RX ORDER — HYDROCODONE BITARTRATE AND ACETAMINOPHEN 7.5; 325 MG/1; MG/1
1 TABLET ORAL EVERY 6 HOURS PRN
Qty: 120 TABLET | Refills: 0 | Status: SHIPPED | OUTPATIENT
Start: 2024-12-02

## 2025-01-06 DIAGNOSIS — F98.8 ATTENTION DEFICIT DISORDER (ADD) WITHOUT HYPERACTIVITY: Chronic | ICD-10-CM

## 2025-01-06 DIAGNOSIS — G89.4 CHRONIC PAIN SYNDROME: ICD-10-CM

## 2025-01-06 RX ORDER — HYDROCODONE BITARTRATE AND ACETAMINOPHEN 7.5; 325 MG/1; MG/1
1 TABLET ORAL EVERY 6 HOURS PRN
Qty: 120 TABLET | Refills: 0 | Status: SHIPPED | OUTPATIENT
Start: 2025-01-06

## 2025-01-06 RX ORDER — DEXTROAMPHETAMINE SACCHARATE, AMPHETAMINE ASPARTATE, DEXTROAMPHETAMINE SULFATE AND AMPHETAMINE SULFATE 2.5; 2.5; 2.5; 2.5 MG/1; MG/1; MG/1; MG/1
10 TABLET ORAL 2 TIMES DAILY
Qty: 60 TABLET | Refills: 0 | Status: SHIPPED | OUTPATIENT
Start: 2025-01-06

## 2025-01-06 NOTE — TELEPHONE ENCOUNTER
----- Message from Aicha sent at 1/6/2025  8:25 AM CST -----  Contact: Dax  Mr. Verduzco needs a call back at .[phone] in regards to his prescription refills he sent in on last week. He stated that he's currently out of the medicines he was just checking the status.    Thanks

## 2025-01-06 NOTE — TELEPHONE ENCOUNTER
No care due was identified.  [facility] Embedded Care Due Messages. Reference number: [id number].   1/06/2025 11:10:22 AM CST

## 2025-01-09 ENCOUNTER — OFFICE VISIT (OUTPATIENT)
Dept: INTERNAL MEDICINE | Facility: CLINIC | Age: 50
End: 2025-01-09
Payer: COMMERCIAL

## 2025-01-09 VITALS
HEART RATE: 83 BPM | BODY MASS INDEX: 35.29 KG/M2 | WEIGHT: 246.5 LBS | OXYGEN SATURATION: 97 % | SYSTOLIC BLOOD PRESSURE: 124 MMHG | DIASTOLIC BLOOD PRESSURE: 74 MMHG | HEIGHT: 70 IN

## 2025-01-09 DIAGNOSIS — G89.4 CHRONIC PAIN SYNDROME: ICD-10-CM

## 2025-01-09 DIAGNOSIS — Z86.0100 HISTORY OF COLON POLYPS: ICD-10-CM

## 2025-01-09 DIAGNOSIS — B35.6 TINEA CRURIS: ICD-10-CM

## 2025-01-09 DIAGNOSIS — I47.10 SVT (SUPRAVENTRICULAR TACHYCARDIA): Chronic | ICD-10-CM

## 2025-01-09 DIAGNOSIS — N52.9 ERECTILE DYSFUNCTION, UNSPECIFIED ERECTILE DYSFUNCTION TYPE: ICD-10-CM

## 2025-01-09 DIAGNOSIS — F32.5 MAJOR DEPRESSIVE DISORDER WITH SINGLE EPISODE, IN REMISSION: Chronic | ICD-10-CM

## 2025-01-09 DIAGNOSIS — F98.8 ATTENTION DEFICIT DISORDER (ADD) WITHOUT HYPERACTIVITY: Primary | ICD-10-CM

## 2025-01-09 DIAGNOSIS — E66.01 SEVERE OBESITY (BMI 35.0-39.9) WITH COMORBIDITY: ICD-10-CM

## 2025-01-09 DIAGNOSIS — Z93.3 STATUS POST HARTMANN PROCEDURE: ICD-10-CM

## 2025-01-09 DIAGNOSIS — E78.2 MODERATE MIXED HYPERLIPIDEMIA NOT REQUIRING STATIN THERAPY: ICD-10-CM

## 2025-01-09 PROCEDURE — 3078F DIAST BP <80 MM HG: CPT | Mod: CPTII,S$GLB,, | Performed by: FAMILY MEDICINE

## 2025-01-09 PROCEDURE — 3008F BODY MASS INDEX DOCD: CPT | Mod: CPTII,S$GLB,, | Performed by: FAMILY MEDICINE

## 2025-01-09 PROCEDURE — 3074F SYST BP LT 130 MM HG: CPT | Mod: CPTII,S$GLB,, | Performed by: FAMILY MEDICINE

## 2025-01-09 PROCEDURE — 1160F RVW MEDS BY RX/DR IN RCRD: CPT | Mod: CPTII,S$GLB,, | Performed by: FAMILY MEDICINE

## 2025-01-09 PROCEDURE — 99999 PR PBB SHADOW E&M-EST. PATIENT-LVL III: CPT | Mod: PBBFAC,,, | Performed by: FAMILY MEDICINE

## 2025-01-09 PROCEDURE — 1159F MED LIST DOCD IN RCRD: CPT | Mod: CPTII,S$GLB,, | Performed by: FAMILY MEDICINE

## 2025-01-09 PROCEDURE — 99214 OFFICE O/P EST MOD 30 MIN: CPT | Mod: S$GLB,,, | Performed by: FAMILY MEDICINE

## 2025-01-09 PROCEDURE — G2211 COMPLEX E/M VISIT ADD ON: HCPCS | Mod: S$GLB,,, | Performed by: FAMILY MEDICINE

## 2025-01-09 RX ORDER — CYCLOBENZAPRINE HCL 10 MG
10 TABLET ORAL 3 TIMES DAILY PRN
Qty: 90 TABLET | Refills: 3 | Status: CANCELLED | OUTPATIENT
Start: 2025-01-09

## 2025-01-09 RX ORDER — ESCITALOPRAM OXALATE 20 MG/1
20 TABLET ORAL NIGHTLY
Qty: 90 TABLET | Refills: 3 | Status: SHIPPED | OUTPATIENT
Start: 2025-01-09 | End: 2026-01-09

## 2025-01-09 RX ORDER — CLOTRIMAZOLE AND BETAMETHASONE DIPROPIONATE 10; .64 MG/G; MG/G
CREAM TOPICAL 2 TIMES DAILY
Qty: 45 G | Refills: 0 | Status: SHIPPED | OUTPATIENT
Start: 2025-01-09

## 2025-01-09 RX ORDER — SILDENAFIL 100 MG/1
50-100 TABLET, FILM COATED ORAL DAILY PRN
Qty: 10 TABLET | Refills: 3 | Status: SHIPPED | OUTPATIENT
Start: 2025-01-09

## 2025-01-09 NOTE — PROGRESS NOTES
"Subjective:   Patient ID: Dax Carlisle is a 49 y.o. male.  Chief Complaint:  Follow-up    Presents for three-month follow-up chronic medical conditions     Last visit October 2024 for annual exam   CBC with mild but stable anemia  Lipid panel with mild elevations, but 10 year cardiovascular risk less than 5%  PSA level normal.  No suspicions prostate cancer.    Thyroid hormone level normal  CMP with normal glucose, kidney, liver electrolytes  A1c normal.  No prediabetes or diabetes.    Medical History:  - ADHD.Stable, no side effects, symptoms well controlled on current medication. Continued Adderall 10 mg twice a day dose  - Major depressive disorder with single episode, in remission. On Lexapro 20 mg daily, Abilify 10 mg daily, and remains off Inderal.  Continuing counseling with Psychology.  Did not establish with Psychiatry. Mood and anxiety symptoms remain stable and well controlled on current regimen.  - SVT (supraventricular tachycardia). Stable.  When discontinue Inderal, restarted Toprol-XL 25 mg daily due to increased chest pain/palpitations with "heart monitor from watch" going all for fast heart rates with medication change.  Now stable and well controlled on Toprol-XL 25 mg daily  - Chronic pain syndrome. Pain and symptoms remain well controlled on current medication regimen.  On Norco 7.5/325 every 6 hours as needed for pain. Averaging 4 per day and stable pattern. Risk of discontinuation of long-term narcotics higher than risk of continuing long-term narcotics at current dose  - ED.  Symptoms stable and reasonably controlled on Cialis daily as needed.  Would like to try Viagra its place.  - Tinea occurs.  Not responding as well to ketoconazole.  Rash resolved but has some persistent itching.  Wants to try different cream.  - history  Colon polyps.  Due for repeat colonoscopy.  Scheduled with Dr. Wheat in March 2025     [place] Board [employer] Pharmacy Controlled Prescription Drug Monitoring " "database was queried and showed no activity to suggest abuse, diversion, or other inappropriate use of prescription medications.      No additional complaints or concerns today.              Review of Systems   Constitutional:  Negative for appetite change, fatigue and unexpected weight change.   Respiratory:  Negative for shortness of breath.    Cardiovascular:  Negative for chest pain, palpitations and leg swelling.   Gastrointestinal:  Negative for abdominal pain, constipation, diarrhea, nausea and vomiting.   Musculoskeletal:  Positive for back pain, myalgias, neck pain and neck stiffness.   Skin:  Positive for rash.   Neurological:  Negative for light-headedness and headaches.   Psychiatric/Behavioral:  Negative for agitation, behavioral problems, confusion, decreased concentration, dysphoric mood, hallucinations and sleep disturbance. The patient is not nervous/anxious and is not hyperactive.        Objective:   /74 (BP Location: Right arm, Patient Position: Sitting)   Pulse 83   Ht 5' 9.8" (1.773 m)   Wt 111.8 kg (246 lb 7.6 oz)   SpO2 97%   BMI 35.57 kg/m²     Physical Exam  Vitals and nursing note reviewed.   Constitutional:       Appearance: Normal appearance. He is well-developed. He is obese.   Cardiovascular:      Rate and Rhythm: Normal rate and regular rhythm.      Heart sounds: Normal heart sounds.   Pulmonary:      Effort: Pulmonary effort is normal.      Breath sounds: Normal breath sounds and air entry.   Abdominal:      General: There is no distension.      Palpations: Abdomen is soft.      Tenderness: There is no abdominal tenderness.   Neurological:      Mental Status: He is alert and oriented to person, place, and time.      Coordination: Coordination is intact.      Gait: Gait is intact.   Psychiatric:         Attention and Perception: Attention normal. He is attentive.         Mood and Affect: Mood and affect normal. Mood is not anxious or depressed.         Speech: Speech normal. "         Behavior: Behavior normal. Behavior is not hyperactive. Behavior is cooperative.         Thought Content: Thought content normal.         Cognition and Memory: Cognition normal.         Judgment: Judgment normal.         Assessment:       ICD-10-CM ICD-9-CM   1. Attention deficit disorder (ADD) without hyperactivity  F98.8 314.00   2. Chronic pain syndrome  G89.4 338.4   3. Major depressive disorder with single episode, in remission  F32.5 296.25   4. Mixed hyperlipidemia  E78.2 272.2   5. SVT (supraventricular tachycardia)  I47.10 427.89   6. Erectile dysfunction, unspecified erectile dysfunction type  N52.9 607.84   7. Tinea cruris  B35.6 110.3   8. Status post Kirsten procedure  Z93.3 V44.3   9. History of colon polyps  Z86.0100 V12.72   10. Severe obesity (BMI 35.0-39.9) with comorbidity  E66.01 278.01     Plan:   Attention deficit disorder (ADD) without hyperactivity  ADHD stable, no side effects, symptoms well controlled on current medication  Continue Adderall 10 mg twice a day    Chronic pain syndrome  Pain and symptoms remain well controlled on current medication regimen  Continue Norco 7.5/325 every 6 hours as needed for pain  Averaging 4 per day and stable pattern  Risk of discontinuation of long-term narcotics higher than risk of continuing long-term narcotics at current dose    Major depressive disorder with single episode, in remission  -     EScitalopram oxalate (LEXAPRO) 20 MG tablet; Take 1 tablet (20 mg total) by mouth every evening.  Dispense: 90 tablet; Refill: 3  Mood and symptoms stable and well controlled   Continue Lexapro 20 mg daily  Continue Abilify 10 mg daily     Moderate Mixed hyperlipidemia not requiring statin therapy   Asymptomatic   Ten year cardiovascular risk remains less than 10%   Okay remain off medication    SVT (supraventricular tachycardia)  Symptoms stable and well controlled on Toprol-XL 25 mg daily     Erectile dysfunction, unspecified erectile dysfunction  type  -     sildenafiL (VIAGRA) 100 MG tablet; Take ½-1 tablets ( mg total) by mouth daily as needed for Erectile Dysfunction.  Dispense: 10 tablet; Refill: 3    Tinea cruris  -     clotrimazole-betamethasone 1-0.05% (LOTRISONE) cream; Apply topically 2 (two) times daily.  Dispense: 45 g; Refill: 0    Status post Kirsten procedure  History of colon polyps  Colonoscopy scheduled for March 2025    Severe obesity (BMI 35.0-39.9) with comorbidity  Discussed increased BMI, Increased health risks, Need for weight loss, Lifestyle modifications.    Return to clinic 3 months or sooner as needed    Visit today included increased complexity associated with managing the longitudinal care of the patient due to the serious and/or complex managed problem(s) listed above.

## 2025-01-24 ENCOUNTER — PATIENT MESSAGE (OUTPATIENT)
Dept: INTERNAL MEDICINE | Facility: CLINIC | Age: 50
End: 2025-01-24
Payer: COMMERCIAL

## 2025-01-24 ENCOUNTER — TELEPHONE (OUTPATIENT)
Dept: INTERNAL MEDICINE | Facility: CLINIC | Age: 50
End: 2025-01-24
Payer: COMMERCIAL

## 2025-01-24 NOTE — TELEPHONE ENCOUNTER
----- Message from Gayle sent at 1/24/2025  2:36 PM CST -----  Contact: Dax  Type:  Patient Requesting a call back     Who Called:Dax   What is the call back request regarding?:Pt would like to send information over to MD and staff. Pt mentions he does not have a fax machine and wants to know other options   Would the patient rather a call back or a response via [facility]?call   Best Call Back Number:[phone]      Additional Information:

## 2025-01-24 NOTE — TELEPHONE ENCOUNTER
Spoke with pt; pt stated he has a form that PCP needs to sign but didn't have a fax machine and didn't want to drive pt was asking for other options to send form; MA advised pt he can send paperwork through [employer] and staff will print out form; pt verbalized understanding /LD

## 2025-01-31 DIAGNOSIS — G89.4 CHRONIC PAIN SYNDROME: ICD-10-CM

## 2025-01-31 DIAGNOSIS — F98.8 ATTENTION DEFICIT DISORDER (ADD) WITHOUT HYPERACTIVITY: Chronic | ICD-10-CM

## 2025-01-31 NOTE — TELEPHONE ENCOUNTER
No care due was identified.  [facility] Embedded Care Due Messages. Reference number: [id number].   1/31/2025 9:04:08 AM CST

## 2025-02-03 RX ORDER — HYDROCODONE BITARTRATE AND ACETAMINOPHEN 7.5; 325 MG/1; MG/1
1 TABLET ORAL EVERY 6 HOURS PRN
Qty: 120 TABLET | Refills: 0 | Status: SHIPPED | OUTPATIENT
Start: 2025-02-03

## 2025-02-03 RX ORDER — DEXTROAMPHETAMINE SACCHARATE, AMPHETAMINE ASPARTATE, DEXTROAMPHETAMINE SULFATE AND AMPHETAMINE SULFATE 2.5; 2.5; 2.5; 2.5 MG/1; MG/1; MG/1; MG/1
10 TABLET ORAL 2 TIMES DAILY
Qty: 60 TABLET | Refills: 0 | Status: SHIPPED | OUTPATIENT
Start: 2025-02-03

## 2025-02-17 NOTE — PROGRESS NOTES
Referring Provider:    No referring provider defined for this encounter.  Subjective:   Patient: Dax Carlisle [id number], :1975   Visit date:2025 11:37 AM    Chief Complaint:  Follow-up      HPI:  Dax Carlisle is a 49 y.o. 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  Since then, he has noted some popping in the left. Thinks tube may be clogged. No pain or drainage. Hearing ok    He has had a recurrent scab of the left nostril over the last few months. Tx has included none.    Doing well from sinonasal standpoint. No obstruction, bleeding, change to facial sensation, purulent rhinorrhea  Denies otalgia, otorrhea, vertigo, tinnitus.        Objective:     Physical Exam:  Vitals:  Wt 113.2 kg (249 lb 9 oz)   BMI 36.01 kg/m²   General appearance:  Well developed, well nourished    Nose:  No masses/lesions of external nose, nasal mucosa with scab to left nasal vestibule, septum, and turbinates were within normal limits.    Ears: L - t-tube in place with cerumen obstructing lumen, clear middle ear . R - normal EAC, TM, clear middle ear    Mouth:  No mass/lesion of lips, teeth, gums, hard/soft palate, tongue, tonsils, or oropharynx.    Neck & Lymphatics:  No cervical lymphadenopathy, no neck mass/crepitus/ asymmetry, trachea is midline, no thyroid enlargement/tenderness/mass.        [x]  Data Reviewed:    [x]  Independent review of test:    CT cervical spine   Visualized paranasal sinuses clear    CT sinus 22  Mass from right sphenoethmoidal recess extending into nasal cavity, abutting right middle turbinates  Skull base intact  Sinuses clear  Mastoids and middle ears clear      Op note reviewed, 2/15/23  PROCEDURE:   Nasal endoscopy with excision of sinonasal mass  Nasal endoscopy with right sphenoidotomy   Functional endoscopic sinus surgery with CT image guided electromagnetic system        OPERATIVE FINDINGS:   - large right sinonasal  "inverted papilloma stalked along the face of the right sphenoid removed in its entirety and the base removed with a combination of sphenoidotomy to widen the ostium and remove the face of the sphenoid and cauterization of mucosal edges to prevent recurrence    Pre-op      Post-resection          Pathology reviewed   Sinonasal mass, right, biopsy:   - Inverted papilloma   - Negative for malignancy     RELIAPATH DIAGNOSIS:   SPECIMEN DESIGNATED "RIGHT SINONASAL MASS":   - Sinonasal papilloma with inverted features, see comment.   - Additional respiratory mucosa with submucosal edema and increased   eosinophils (up to 50 eosinophils per high power field, max count).   - No fungal organisms identified by GMS stain.   - Negative for dysplasia/carcinoma.   COMMENT:   This case was also reviewed by Cathleen Hanson M.D., who concurs with the   diagnosis.         NASAL ENDOSCOPY    Procedure: Risks, benefits, and alternatives of the procedure were discussed with the patient, and the patient consented to the nasal endoscopy.  The nasal cavity was sprayed with a topical decongestant and anesthetic (if needed). The endoscope was passed into each nostril and each nasal cavity was visualized.  On each side the nasal cavity, sinuses (if open), turbinates, and septum were examined with the findings described below. At the end of the examination, the scope was removed. The patient tolerated the procedure well with no complications.     Endoscopic Sinonasal Exam Findings:  -     Sinuses examined: right sphenoid            The right sinus(es) have normal mucosa            The left sinus(es) have normal mucosa  -     Nasal secretions: - no discolored secretions noted - bilaterally  -     Nasal septum: no significant deviation and no perforation appreciated   -     Inferior turbinate: - normal mucosa without significant hypertrophy - bilaterally  -     Middle turbinate: - normal mucosa without significant hypertrophy - bilaterally  -   "   Other findings: widely patent sphenoid ostium, well-healing mucosa at face of sphenoid, no evidence of recurrence    Prior - similar today                Assessment & Plan:   There are no diagnoses linked to this encounter.        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  No evidence of recurrence after 2 years. Return to clinic as needed    L t-tube obstructed - drops x 2 weeks, Return to clinic for debridement in 2 weeks    Mucpirocin/ayr gel/avoid manipulation for nasal crusting/scab

## 2025-02-18 ENCOUNTER — OFFICE VISIT (OUTPATIENT)
Dept: OTOLARYNGOLOGY | Facility: CLINIC | Age: 50
End: 2025-02-18
Payer: COMMERCIAL

## 2025-02-18 VITALS — BODY MASS INDEX: 36.01 KG/M2 | WEIGHT: 249.56 LBS

## 2025-02-18 DIAGNOSIS — J34.89 NASAL VESTIBULITIS: Primary | ICD-10-CM

## 2025-02-18 DIAGNOSIS — Z96.22 HISTORY OF TYMPANOSTOMY TUBE PLACEMENT: ICD-10-CM

## 2025-02-18 DIAGNOSIS — D36.9 INVERTED PAPILLOMA: ICD-10-CM

## 2025-02-18 RX ORDER — CIPROFLOXACIN HYDROCHLORIDE 3 MG/ML
4 SOLUTION/ DROPS OPHTHALMIC 2 TIMES DAILY
Qty: 10 ML | Refills: 0 | Status: SHIPPED | OUTPATIENT
Start: 2025-02-18 | End: 2025-03-15

## 2025-02-18 RX ORDER — MUPIROCIN 20 MG/G
OINTMENT TOPICAL 3 TIMES DAILY
Qty: 22 G | Refills: 1 | Status: SHIPPED | OUTPATIENT
Start: 2025-02-18

## 2025-03-03 DIAGNOSIS — G89.4 CHRONIC PAIN SYNDROME: ICD-10-CM

## 2025-03-03 DIAGNOSIS — F98.8 ATTENTION DEFICIT DISORDER (ADD) WITHOUT HYPERACTIVITY: Chronic | ICD-10-CM

## 2025-03-03 NOTE — TELEPHONE ENCOUNTER
No care due was identified.  [facility] Embedded Care Due Messages. Reference number: [id number].   3/03/2025 1:40:45 PM CST

## 2025-03-04 ENCOUNTER — OFFICE VISIT (OUTPATIENT)
Dept: OTOLARYNGOLOGY | Facility: CLINIC | Age: 50
End: 2025-03-04
Payer: COMMERCIAL

## 2025-03-04 DIAGNOSIS — J34.89 NASAL VESTIBULITIS: Primary | ICD-10-CM

## 2025-03-04 DIAGNOSIS — Z96.22 HISTORY OF TYMPANOSTOMY TUBE PLACEMENT: ICD-10-CM

## 2025-03-04 DIAGNOSIS — H61.22 IMPACTED CERUMEN OF LEFT EAR: ICD-10-CM

## 2025-03-04 PROCEDURE — 99999 PR PBB SHADOW E&M-EST. PATIENT-LVL III: CPT | Mod: PBBFAC,,, | Performed by: STUDENT IN AN ORGANIZED HEALTH CARE EDUCATION/TRAINING PROGRAM

## 2025-03-04 RX ORDER — SULFAMETHOXAZOLE AND TRIMETHOPRIM 800; 160 MG/1; MG/1
1 TABLET ORAL
Status: CANCELLED | OUTPATIENT
Start: 2025-03-04

## 2025-03-04 NOTE — PROGRESS NOTES
Referring Provider:    No referring provider defined for this encounter.  Subjective:   Patient: Dax Carlisle [id number], :1975   Visit date:3/4/2025 11:37 AM    Chief Complaint:  Follow-up (Pt is coming in today for an ear cleaning )      HPI:  Dax Carlisle is a 49 y.o. 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  Since then, he has noted some popping in the left. Thinks tube may be clogged. No pain or drainage. Hearing ok    He has had a recurrent scab of the left nostril over the last few months. Tx has included none.    Doing well from sinonasal standpoint. No obstruction, bleeding, change to facial sensation, purulent rhinorrhea  Denies otalgia, otorrhea, vertigo, tinnitus.    Update 3/4/25  Using drops, ear still feels wet and clogged  Nasal sore improved      Objective:     Physical Exam:  Vitals:  There were no vitals taken for this visit.  General appearance:  Well developed, well nourished    Nose:  No masses/lesions of external nose, nasal mucosa with scab to left nasal vestibule, septum, and turbinates were within normal limits.    Ears: L - t-tube in place with cerumen obstructing lumen, clear middle ear . R - normal EAC, TM, clear middle ear    Mouth:  No mass/lesion of lips, teeth, gums, hard/soft palate, tongue, tonsils, or oropharynx.    Neck & Lymphatics:  No cervical lymphadenopathy, no neck mass/crepitus/ asymmetry, trachea is midline, no thyroid enlargement/tenderness/mass.        [x]  Data Reviewed:    [x]  Independent review of test:    CT cervical spine   Visualized paranasal sinuses clear    CT sinus 22  Mass from right sphenoethmoidal recess extending into nasal cavity, abutting right middle turbinates  Skull base intact  Sinuses clear  Mastoids and middle ears clear      Op note reviewed, 2/15/23  PROCEDURE:   Nasal endoscopy with excision of sinonasal mass  Nasal endoscopy with right sphenoidotomy   Functional  "endoscopic sinus surgery with CT image guided electromagnetic system        OPERATIVE FINDINGS:   - large right sinonasal inverted papilloma stalked along the face of the right sphenoid removed in its entirety and the base removed with a combination of sphenoidotomy to widen the ostium and remove the face of the sphenoid and cauterization of mucosal edges to prevent recurrence    Pre-op      Post-resection          Pathology reviewed   Sinonasal mass, right, biopsy:   - Inverted papilloma   - Negative for malignancy     RELIAPATH DIAGNOSIS:   SPECIMEN DESIGNATED "RIGHT SINONASAL MASS":   - Sinonasal papilloma with inverted features, see comment.   - Additional respiratory mucosa with submucosal edema and increased   eosinophils (up to 50 eosinophils per high power field, max count).   - No fungal organisms identified by GMS stain.   - Negative for dysplasia/carcinoma.   COMMENT:   This case was also reviewed by Cathleen Hanson M.D., who concurs with the   diagnosis.         NASAL ENDOSCOPY (2 year post op)      Procedure: ear microscopy with removal of cerumen    Description: The patient was in agreement with the examination and debridement of the ears. Removal of the cerumen required use of an operating microscope and multiple micro-instruments.     With the patient in the supine position, we used the operating microscope to examine both ears with the appropriate sized ear speculum.  A variety of sterile, micro-instruments were utilized to remove the cerumen atraumatically.  I performed the procedure which required a significant amount of time and effort. The tympanic membrane was then well visualized.  The patient tolerated the procedure well and there were no complications.    Findings:   Right ear had no wax, the EAC was normal, and the tympanic membrane was intact with no evidence of middle ear fluid.    Left ear had significant wax obstructing the t-tube, removed, revealing a patent PET with dry middle ear, " the EAC was normal, and the tympanic membrane was intact with no evidence of middle ear fluid.              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  No evidence of recurrence after 2 years. Return to clinic as needed    L t-tube obstructed - occlusion cleared today, hearing improved; Return to clinic if not resolved      Return to clinic 1 year for tube check, sooner if symptoms recur

## 2025-03-06 RX ORDER — DEXTROAMPHETAMINE SACCHARATE, AMPHETAMINE ASPARTATE, DEXTROAMPHETAMINE SULFATE AND AMPHETAMINE SULFATE 2.5; 2.5; 2.5; 2.5 MG/1; MG/1; MG/1; MG/1
10 TABLET ORAL 2 TIMES DAILY
Qty: 60 TABLET | Refills: 0 | Status: SHIPPED | OUTPATIENT
Start: 2025-03-06

## 2025-03-06 RX ORDER — HYDROCODONE BITARTRATE AND ACETAMINOPHEN 7.5; 325 MG/1; MG/1
1 TABLET ORAL EVERY 6 HOURS PRN
Qty: 120 TABLET | Refills: 0 | Status: SHIPPED | OUTPATIENT
Start: 2025-03-06

## 2025-03-12 ENCOUNTER — TELEPHONE (OUTPATIENT)
Dept: OTOLARYNGOLOGY | Facility: CLINIC | Age: 50
End: 2025-03-12
Payer: COMMERCIAL

## 2025-03-14 ENCOUNTER — HOSPITAL ENCOUNTER (OUTPATIENT)
Dept: ENDOSCOPY | Facility: HOSPITAL | Age: 50
Discharge: HOME | End: 2025-03-14
Attending: COLON & RECTAL SURGERY | Admitting: COLON & RECTAL SURGERY
Payer: COMMERCIAL

## 2025-03-14 ENCOUNTER — ANESTHESIA (OUTPATIENT)
Dept: ENDOSCOPY | Facility: HOSPITAL | Age: 50
End: 2025-03-14
Payer: COMMERCIAL

## 2025-03-14 ENCOUNTER — ANESTHESIA EVENT (OUTPATIENT)
Dept: ENDOSCOPY | Facility: HOSPITAL | Age: 50
End: 2025-03-14
Payer: COMMERCIAL

## 2025-03-14 VITALS
SYSTOLIC BLOOD PRESSURE: 127 MMHG | HEART RATE: 120 BPM | TEMPERATURE: 98 F | WEIGHT: 241 LBS | BODY MASS INDEX: 35.7 KG/M2 | DIASTOLIC BLOOD PRESSURE: 79 MMHG | OXYGEN SATURATION: 96 % | HEIGHT: 69 IN | RESPIRATION RATE: 20 BRPM

## 2025-03-14 DIAGNOSIS — Z91.89 HIGH RISK FOR COLON CANCER: ICD-10-CM

## 2025-03-14 PROCEDURE — G0121 COLON CA SCRN NOT HI RSK IND: HCPCS | Mod: ,,, | Performed by: COLON & RECTAL SURGERY

## 2025-03-14 PROCEDURE — G0121 COLON CA SCRN NOT HI RSK IND: HCPCS | Mod: 33 | Performed by: COLON & RECTAL SURGERY

## 2025-03-14 PROCEDURE — 63600175 PHARM REV CODE 636 W HCPCS: Performed by: NURSE ANESTHETIST, CERTIFIED REGISTERED

## 2025-03-14 PROCEDURE — 37000008 HC ANESTHESIA 1ST 15 MINUTES

## 2025-03-14 PROCEDURE — 37000009 HC ANESTHESIA EA ADD 15 MINS

## 2025-03-14 RX ORDER — PROPOFOL 10 MG/ML
VIAL (ML) INTRAVENOUS
Status: DISCONTINUED | OUTPATIENT
Start: 2025-03-14 | End: 2025-03-14

## 2025-03-14 RX ORDER — LIDOCAINE HYDROCHLORIDE 10 MG/ML
INJECTION, SOLUTION EPIDURAL; INFILTRATION; INTRACAUDAL; PERINEURAL
Status: DISCONTINUED | OUTPATIENT
Start: 2025-03-14 | End: 2025-03-14

## 2025-03-14 RX ORDER — SODIUM CHLORIDE, SODIUM LACTATE, POTASSIUM CHLORIDE, CALCIUM CHLORIDE 600; 310; 30; 20 MG/100ML; MG/100ML; MG/100ML; MG/100ML
INJECTION, SOLUTION INTRAVENOUS CONTINUOUS PRN
Status: DISCONTINUED | OUTPATIENT
Start: 2025-03-14 | End: 2025-03-14

## 2025-03-14 RX ORDER — FENTANYL CITRATE 50 UG/ML
INJECTION, SOLUTION INTRAMUSCULAR; INTRAVENOUS
Status: DISCONTINUED | OUTPATIENT
Start: 2025-03-14 | End: 2025-03-14

## 2025-03-14 RX ADMIN — PROPOFOL 500 MG: 10 INJECTION, EMULSION INTRAVENOUS at 07:03

## 2025-03-14 RX ADMIN — LIDOCAINE HYDROCHLORIDE 50 MG: 10 SOLUTION INTRAVENOUS at 07:03

## 2025-03-14 RX ADMIN — FENTANYL CITRATE 50 MCG: 50 INJECTION, SOLUTION INTRAMUSCULAR; INTRAVENOUS at 07:03

## 2025-03-14 NOTE — H&P
[facility] ([facility])  Colon and Rectal Surgery  History & Physical    Patient Name: Dax Carlisle  MRN: [id number]  Admission Date: 3/14/2025  Attending Physician: Kadeem Choi MD  Primary Care Provider: Cody Enamorado MD    Patient information was obtained from patient and medical records.    Subjective:     Chief Complaint/Reason for Admission: Here for Colonoscopy    History of Present Illness:  Patient is a 49 y.o. male presents for colonoscopy. Last cscope 5  years ago. No current hematochezia or melena. He does have a positive family history of colorectal cancer in his grandmother as well as colon polyps in his mother.     Current Outpatient Medications on File Prior to Encounter   Medication Sig    acetaminophen (TYLENOL) 325 MG tablet Take 325 mg by mouth every 6 (six) hours as needed for Pain.    ARIPiprazole (ABILIFY) 10 MG Tab Take 1 tablet (10 mg total) by mouth every evening.    dextroamphetamine-amphetamine (ADDERALL) 10 mg Tab Take 1 tablet (10 mg total) by mouth 2 (two) times a day.    EScitalopram oxalate (LEXAPRO) 20 MG tablet Take 1 tablet (20 mg total) by mouth every evening.    HYDROcodone-acetaminophen (NORCO) 7.5-325 mg per tablet Take 1 tablet by mouth every 6 (six) hours as needed for Pain.    metoprolol succinate (TOPROL-XL) 50 MG 24 hr tablet Take 1 tablet (50 mg total) by mouth once daily.    sildenafiL (VIAGRA) 100 MG tablet Take ½-1 tablets ( mg total) by mouth daily as needed for Erectile Dysfunction.    ciprofloxacin HCl (CILOXAN) 0.3 % ophthalmic solution Place 4 drops into the left ear 2 (two) times a day. for 14 days    clotrimazole-betamethasone 1-0.05% (LOTRISONE) cream Apply topically 2 (two) times daily.    mupirocin (BACTROBAN) 2 % ointment Apply topically 3 (three) times daily.     No current facility-administered medications on file prior to encounter.       Review of patient's allergies indicates:   Allergen Reactions    Codeine Other (See  Comments)     violent    Iodinated contrast media Swelling     Tongue, right eye, lips swelling. Rash on abdomen and axilla    Nuts [tree nut] Anaphylaxis    Dairy aid [lactase] Diarrhea and Nausea And Vomiting    Morphine      Tolerated hydromorphone in October 2019.        Past Medical History:   Diagnosis Date    Angioedema of lips 03/13/2015    IVP dye allergy - swelling lips, eye, tongue    Asthma     childhood    Depression     Diverticulitis 10/07/2019    Kidney stones     Sleep apnea     cpap not required/ patient states this has resolved with weight loss    SVT (supraventricular tachycardia)      Past Surgical History:   Procedure Laterality Date    ABDOMINAL WASHOUT N/A 10/07/2019    Procedure: LAVAGE, PERITONEAL, THERAPEUTIC;  Surgeon: Mindy Goff MD;  Location: [facility] OR;  Service: General;  Laterality: N/A;  abdomen washout    ABSCESS DRAINAGE N/A 09/10/2021    Procedure: DRAINAGE, ABSCESS;  Surgeon: Kadeem Choi MD;  Location: [facility] OR;  Service: General;  Laterality: N/A;  perirectal    anal fistula repair  10/2023    APPENDECTOMY      BACK SURGERY      BIOPSY, INTRANASAL, ENDOSCOPIC N/A 02/15/2023    Procedure: BIOPSY, INTRANASAL, ENDOSCOPIC;  Surgeon: Uriel Vasquez MD;  Location: [facility] OR;  Service: ENT;  Laterality: N/A;    CHOLECYSTECTOMY  2005    COLONOSCOPY N/A 03/10/2016    Procedure: COLONOSCOPY;  Surgeon: Juvenal Dinero MD;  Location: [facility];  Service: Endoscopy;  Laterality: N/A;    COLONOSCOPY N/A 01/17/2020    Procedure: COLONOSCOPY;  Surgeon: Kadeem Choi MD;  Location: [facility] ENDO;  Service: General;  Laterality: N/A;    COLOSTOMY N/A 10/07/2019    Procedure: CREATION, COLOSTOMY;  Surgeon: Mindy Goff MD;  Location: [facility] OR;  Service: General;  Laterality: N/A;    COLOSTOMY REVERSAL      EXAMINATION UNDER ANESTHESIA N/A 09/10/2021    Procedure: Exam under anesthesia;  Surgeon: Kadeem Choi MD;  Location: [facility] OR;  Service: General;  Laterality: N/A;     EXAMINATION UNDER ANESTHESIA N/A 01/06/2022    Procedure: Exam under anesthesia, perirectal abscess I&D;  Surgeon: Kadeem Choi MD;  Location: [facility] OR;  Service: General;  Laterality: N/A;    EXAMINATION UNDER ANESTHESIA N/A 08/24/2023    Procedure: EXAM UNDER ANESTHESIA;  Surgeon: Kadeem Choi MD;  Location: [facility] OR;  Service: General;  Laterality: N/A;  Exam under anesthesia, possible fistulotomy, possible seton placement (prone)    FISTULOTOMY  08/24/2023    Procedure: FISTULOTOMY;  Surgeon: Kadeem Choi MD;  Location: [facility] OR;  Service: General;;    FUNCTIONAL ENDOSCOPIC SINUS SURGERY (FESS) USING COMPUTER-ASSISTED NAVIGATION N/A 02/15/2023    Procedure: FESS, USING COMPUTER-ASSISTED NAVIGATION;  Surgeon: Uriel Vasquez MD;  Location: [facility] OR;  Service: ENT;  Laterality: N/A;    SIVAKUMAR PROCEDURE N/A 10/07/2019    Procedure: SIVAKUMAR PROCEDURE;  Surgeon: Mindy Goff MD;  Location: [facility] OR;  Service: General;  Laterality: N/A;    INJECTION OF ANESTHETIC AGENT AROUND GANGLION IMPAR N/A 03/15/2021    Procedure: BLOCK, GANGLION IMPAR;  Surgeon: Ruben Cabrera MD;  Location: [facility] MGT;  Service: Pain Management;  Laterality: N/A;    INJECTION OF ANESTHETIC AGENT AROUND PUDENDAL NERVE N/A 09/10/2021    Procedure: BLOCK, NERVE, PUDENDAL;  Surgeon: Kadeem Choi MD;  Location: [facility] OR;  Service: General;  Laterality: N/A;    INJECTION OF ANESTHETIC AGENT AROUND PUDENDAL NERVE N/A 01/06/2022    Procedure: BLOCK, NERVE, PUDENDAL;  Surgeon: Kadeem Choi MD;  Location: [facility] OR;  Service: General;  Laterality: N/A;    INJECTION OF ANESTHETIC AGENT AROUND PUDENDAL NERVE N/A 08/24/2023    Procedure: BLOCK, NERVE, PUDENDAL;  Surgeon: Kadeem Choi MD;  Location: [facility] OR;  Service: General;  Laterality: N/A;    INJECTION OF ANESTHETIC AGENT INTO TISSUE PLANE DEFINED BY TRANSVERSUS ABDOMINIS MUSCLE N/A 01/30/2020    Procedure: BLOCK, TRANSVERSUS ABDOMINIS PLANE;  Surgeon:  Kadeem Choi MD;  Location: [facility] OR;  Service: General;  Laterality: N/A;    KNEE SURGERY Right     KNEE SURGERY Left     LYSIS OF ADHESIONS N/A 01/30/2020    Procedure: LYSIS, ADHESIONS;  Surgeon: Kadeem Choi MD;  Location: [facility] OR;  Service: General;  Laterality: N/A;    PILONIDAL CYST DRAINAGE      pt has had 6 of these adn has had revisions    PROCTECTOMY N/A 01/30/2020    Procedure: PROCTECTOMY;  Surgeon: Kadeem Choi MD;  Location: [facility] OR;  Service: General;  Laterality: N/A;  Partial    SINUSOTOMY, SPHENOID SINUS, ENDOSCOPIC Right 02/15/2023    Procedure: SINUSOTOMY, SPHENOID SINUS, ENDOSCOPIC;  Surgeon: Uriel Vasquez MD;  Location: [facility] OR;  Service: ENT;  Laterality: Right;    SUBTOTAL COLECTOMY  01/30/2020    Procedure: COLECTOMY, PARTIAL;  Surgeon: Kadeem Choi MD;  Location: [facility] OR;  Service: General;;     Family History       Problem Relation (Age of Onset)    Alzheimer's disease Mother    Anesthesia problems Father    Cancer Father    Colon cancer Maternal Grandmother    Colon polyps Mother, Sister    Diabetes Mother, Father, Maternal Grandmother, Maternal Grandfather, Paternal Grandmother, Paternal Grandfather    Heart disease Father    Stroke Paternal Grandmother          Tobacco Use    Smoking status: Never    Smokeless tobacco: Never   Substance and Sexual Activity    Alcohol use: Never    Drug use: No    Sexual activity: Yes     Partners: Female     Review of Systems   Constitutional:  Negative for activity change, appetite change, chills, fatigue, fever and unexpected weight change.   HENT:  Negative for congestion, ear pain, sore throat and trouble swallowing.    Eyes:  Negative for pain, redness and itching.   Respiratory:  Negative for cough, shortness of breath and wheezing.    Cardiovascular:  Negative for chest pain, palpitations and leg swelling.   Gastrointestinal:  Negative for abdominal distention, abdominal pain, anal bleeding, blood in stool,  constipation, diarrhea, nausea, rectal pain and vomiting.   Endocrine: Negative for cold intolerance, heat intolerance and polyuria.   Genitourinary:  Negative for dysuria, flank pain, frequency and hematuria.   Musculoskeletal:  Negative for gait problem, joint swelling and neck pain.   Skin:  Negative for color change, rash and wound.   Allergic/Immunologic: Negative for environmental allergies and immunocompromised state.   Neurological:  Negative for dizziness, speech difficulty, weakness and numbness.   Psychiatric/Behavioral:  Negative for agitation, confusion and hallucinations.      Objective:     Vital Signs (Most Recent):  Temp: 97.9 °F (36.6 °C) (03/14/25 0610)  Pulse: 103 (03/14/25 0610)  Resp: 18 (03/14/25 0610)  BP: 134/85 (03/14/25 0610)  SpO2: 97 % (03/14/25 0610) Vital Signs (24h Range):  Temp:  [97.9 °F (36.6 °C)] 97.9 °F (36.6 °C)  Pulse:  [103] 103  Resp:  [18] 18  SpO2:  [97 %] 97 %  BP: (134)/(85) 134/85     Weight: 109.3 kg (241 lb)  Body mass index is 35.59 kg/m².    Physical Exam  Constitutional:       Appearance: He is well-developed.   HENT:      Head: Normocephalic and atraumatic.   Eyes:      Conjunctiva/sclera: Conjunctivae normal.   Neck:      Thyroid: No thyromegaly.   Cardiovascular:      Rate and Rhythm: Normal rate and regular rhythm.   Pulmonary:      Effort: Pulmonary effort is normal. No respiratory distress.   Abdominal:      General: There is no distension.      Palpations: Abdomen is soft. There is no mass.      Tenderness: There is no abdominal tenderness.   Musculoskeletal:         General: No tenderness. Normal range of motion.      Cervical back: Normal range of motion.   Skin:     General: Skin is warm and dry.      Capillary Refill: Capillary refill takes less than 2 seconds.      Findings: No rash.   Neurological:      Mental Status: He is alert and oriented to person, place, and time.           Assessment/Plan:     Patient is a 49 y.o. male who presents for  colonoscopy     - Ok to proceed to endoscopy suite for colonoscopy  - Consent obtained. All risks, benefits and alternatives fully explained to patient, including but not limited to bleeding, infection, perforation, and missed polyps. All questions appropriately answered to patient's satisfaction. Consent signed and placed on chart.    There are no hospital problems to display for this patient.    VTE Risk Mitigation (From admission, onward)      None            Kadeem Choi MD  Colon and Rectal Surgery  [facility] ([facility])

## 2025-03-14 NOTE — TRANSFER OF CARE
"Anesthesia Transfer of Care Note    Patient: Dax Carlisle    Procedure(s) Performed: * No procedures listed *    Patient location: PACU    Anesthesia Type: MAC    Transport from OR: Transported from OR on room air with adequate spontaneous ventilation    Post pain: adequate analgesia    Post assessment: no apparent anesthetic complications    Post vital signs: stable    Level of consciousness: awake    Nausea/Vomiting: no nausea/vomiting    Complications: none    Transfer of care protocol was followed      Last vitals: Visit Vitals  /79   Pulse (!) 120   Temp 36.7 °C (98.1 °F)   Resp 20   Ht 5' 9" (1.753 m)   Wt 109.3 kg (241 lb)   SpO2 96%   BMI 35.59 kg/m²     "

## 2025-03-14 NOTE — BRIEF OP NOTE
[facility] ([facility])  Brief Operative Note     SUMMARY     Surgery Date: 3/14/2025     Surgeon: Kadeem Choi MD    Pre-op Diagnosis:  Screen for Colon Cancer    Post-op Diagnosis:  Screen for Colon Cancer    Procedure: Colonoscopy    Anesthesia: MAC    Description of the findings of the procedure: no polyps    Estimated Blood Loss: minimal         Specimens:   Specimen (24h ago, onward)      None

## 2025-03-14 NOTE — PLAN OF CARE
Dr Choi at bedside to update patient on results and recs. AVS sent to [facility]. Post sedation/procedure precautions discussed. Verbalized understanding.

## 2025-03-14 NOTE — ANESTHESIA PREPROCEDURE EVALUATION
03/14/2025  Dax Carlisle is a 49 y.o., male.      Pre-op Assessment    I have reviewed the Patient Summary Reports.     I have reviewed the Nursing Notes. I have reviewed the NPO Status.   I have reviewed the Medications.     Review of Systems  Anesthesia Hx:  No problems with previous Anesthesia   History of prior surgery of interest to airway management or planning:  Previous anesthesia: General, MAC        Denies Family Hx of Anesthesia complications.   Personal Hx of Anesthesia complications (urinary retention after anesthesia)                    Social:  Non-Smoker       Cardiovascular:      Denies Hypertension.   Denies MI.     Dysrhythmias (SVT in 2019 during colon resection - no episodes in the last year - controlled with BB)          ECG has been reviewed.  Patient on beta blockers Beta blocker taken in last 24 hours                         Pulmonary:     Denies Asthma.     Denies Sleep Apnea. (h/o LUNA but denies currently)   Asthma:    Obstructive Sleep Apnea (LUNA).           Renal/:  Chronic Renal Disease renal calculi       Kidney Function/Disease             Hepatic/GI:     Bowel Conditions:  Diverticulitis        Neurological:  Neurology Normal                Chronic Pain: takes hydrocodone BID x 10 years.                         Endocrine:     BMI 35        Psych:  Psychiatric History  depression                Physical Exam  General: Well nourished, Cooperative, Alert and Oriented    Airway:  Mallampati: II   Mouth Opening: Normal  TM Distance: Normal  Tongue: Normal  Neck ROM: Normal ROM    Dental:  Intact        Anesthesia Plan  Type of Anesthesia, risks & benefits discussed:    Anesthesia Type: MAC  Intra-op Monitoring Plan: Standard ASA Monitors  Post Op Pain Control Plan: per primary service following discharge from PACU, multimodal analgesia and IV/PO Opioids PRN  Induction:   IV  Informed Consent: Informed consent signed with the Patient and all parties understand the risks and agree with anesthesia plan.  All questions answered.   ASA Score: 2  Day of Surgery Review of History & Physical: H&P Update referred to the surgeon/provider.  Anesthesia Plan Notes: Intubation:     Induction:  Intravenous    Intubated:  Postinduction    Mask Ventilation:  Easy mask    Attempts:  1    Attempted By:  CRNA    Method of Intubation:  Direct    Blade:  Boswell 2    Laryngeal View Grade: Grade I - full view of cords      Difficult Airway Encountered?: No      Complications:  None    Airway Device:  Oral endotracheal tube    Airway Device Size:  7.5    Style/Cuff Inflation:  Cuffed (inflated to minimal occlusive pressure)    Tube secured:  22    Secured at:  The lips    Placement Verified By:  Capnometry    Complicating Factors:  None    Findings Post-Intubation:  BS equal bilateral    Ready For Surgery From Anesthesia Perspective.     .

## 2025-03-14 NOTE — ANESTHESIA POSTPROCEDURE EVALUATION
Anesthesia Post Evaluation    Patient: Dax Carlisle    Procedure(s) Performed: * No procedures listed *    Final Anesthesia Type: MAC      Patient location during evaluation: GI PACU  Patient participation: Yes- Able to Participate  Level of consciousness: awake and alert  Post-procedure vital signs: reviewed and stable  Pain management: adequate  Airway patency: patent    PONV status at discharge: No PONV  Anesthetic complications: no      Cardiovascular status: hemodynamically stable  Respiratory status: unassisted, spontaneous ventilation and room air  Hydration status: euvolemic  Follow-up not needed.              Vitals Value Taken Time   /79 03/14/25 08:04   Temp 36.7 °C (98.1 °F) 03/14/25 08:04   Pulse 120 03/14/25 08:04   Resp 20 03/14/25 08:04   SpO2 96 % 03/14/25 08:04         Event Time   Out of Recovery 08:07:14         Pain/Corie Score: Corie Score: 10 (3/14/2025  7:57 AM)

## 2025-03-17 VITALS
BODY MASS INDEX: 35.7 KG/M2 | DIASTOLIC BLOOD PRESSURE: 79 MMHG | TEMPERATURE: 98 F | RESPIRATION RATE: 20 BRPM | WEIGHT: 241 LBS | HEIGHT: 69 IN | OXYGEN SATURATION: 96 % | SYSTOLIC BLOOD PRESSURE: 127 MMHG | HEART RATE: 120 BPM

## 2025-04-03 DIAGNOSIS — F98.8 ATTENTION DEFICIT DISORDER (ADD) WITHOUT HYPERACTIVITY: Chronic | ICD-10-CM

## 2025-04-03 DIAGNOSIS — G89.4 CHRONIC PAIN SYNDROME: ICD-10-CM

## 2025-04-03 NOTE — TELEPHONE ENCOUNTER
No care due was identified.  [facility] Embedded Care Due Messages. Reference number: [id number].   4/03/2025 6:43:22 PM CDT

## 2025-04-07 RX ORDER — HYDROCODONE BITARTRATE AND ACETAMINOPHEN 7.5; 325 MG/1; MG/1
1 TABLET ORAL EVERY 6 HOURS PRN
Qty: 120 TABLET | Refills: 0 | Status: SHIPPED | OUTPATIENT
Start: 2025-04-07

## 2025-04-07 RX ORDER — DEXTROAMPHETAMINE SACCHARATE, AMPHETAMINE ASPARTATE, DEXTROAMPHETAMINE SULFATE AND AMPHETAMINE SULFATE 2.5; 2.5; 2.5; 2.5 MG/1; MG/1; MG/1; MG/1
10 TABLET ORAL 2 TIMES DAILY
Qty: 60 TABLET | Refills: 0 | Status: SHIPPED | OUTPATIENT
Start: 2025-04-07

## 2025-04-09 ENCOUNTER — OFFICE VISIT (OUTPATIENT)
Dept: INTERNAL MEDICINE | Facility: CLINIC | Age: 50
End: 2025-04-09
Payer: COMMERCIAL

## 2025-04-09 VITALS
HEIGHT: 69 IN | SYSTOLIC BLOOD PRESSURE: 128 MMHG | OXYGEN SATURATION: 98 % | HEART RATE: 103 BPM | BODY MASS INDEX: 36.8 KG/M2 | WEIGHT: 248.44 LBS | DIASTOLIC BLOOD PRESSURE: 82 MMHG

## 2025-04-09 DIAGNOSIS — G47.30 SLEEP APNEA, UNSPECIFIED TYPE: ICD-10-CM

## 2025-04-09 DIAGNOSIS — G89.29 ACUTE EXACERBATION OF CHRONIC LOW BACK PAIN: ICD-10-CM

## 2025-04-09 DIAGNOSIS — E78.2 MODERATE MIXED HYPERLIPIDEMIA NOT REQUIRING STATIN THERAPY: ICD-10-CM

## 2025-04-09 DIAGNOSIS — M47.817 FACET ARTHRITIS OF LUMBOSACRAL REGION: ICD-10-CM

## 2025-04-09 DIAGNOSIS — F98.8 ATTENTION DEFICIT DISORDER (ADD) WITHOUT HYPERACTIVITY: Primary | ICD-10-CM

## 2025-04-09 DIAGNOSIS — E66.01 SEVERE OBESITY (BMI 35.0-39.9) WITH COMORBIDITY: ICD-10-CM

## 2025-04-09 DIAGNOSIS — M51.360 DEGENERATION OF INTERVERTEBRAL DISC OF LUMBAR REGION WITH DISCOGENIC BACK PAIN: ICD-10-CM

## 2025-04-09 DIAGNOSIS — M54.50 ACUTE EXACERBATION OF CHRONIC LOW BACK PAIN: ICD-10-CM

## 2025-04-09 DIAGNOSIS — I47.10 SVT (SUPRAVENTRICULAR TACHYCARDIA): ICD-10-CM

## 2025-04-09 DIAGNOSIS — G89.4 CHRONIC PAIN SYNDROME: ICD-10-CM

## 2025-04-09 DIAGNOSIS — F32.5 MAJOR DEPRESSIVE DISORDER WITH SINGLE EPISODE, IN REMISSION: ICD-10-CM

## 2025-04-09 PROBLEM — Z86.0100 HISTORY OF COLON POLYPS: Status: ACTIVE | Noted: 2025-04-09

## 2025-04-09 PROBLEM — Z91.89 HIGH RISK FOR COLON CANCER: Status: RESOLVED | Noted: 2025-03-14 | Resolved: 2025-04-09

## 2025-04-09 PROCEDURE — 99999 PR PBB SHADOW E&M-EST. PATIENT-LVL IV: CPT | Mod: PBBFAC,,, | Performed by: FAMILY MEDICINE

## 2025-04-09 RX ORDER — KETOROLAC TROMETHAMINE 30 MG/ML
60 INJECTION, SOLUTION INTRAMUSCULAR; INTRAVENOUS
Status: COMPLETED | OUTPATIENT
Start: 2025-04-09 | End: 2025-04-09

## 2025-04-09 RX ORDER — KETOROLAC TROMETHAMINE 10 MG/1
10 TABLET, FILM COATED ORAL 3 TIMES DAILY PRN
Qty: 10 TABLET | Refills: 0 | Status: SHIPPED | OUTPATIENT
Start: 2025-04-09

## 2025-04-09 RX ORDER — CYCLOBENZAPRINE HCL 10 MG
10 TABLET ORAL 3 TIMES DAILY
Qty: 30 TABLET | Refills: 0 | Status: SHIPPED | OUTPATIENT
Start: 2025-04-09

## 2025-04-09 RX ADMIN — KETOROLAC TROMETHAMINE 60 MG: 30 INJECTION, SOLUTION INTRAMUSCULAR; INTRAVENOUS at 09:04

## 2025-04-09 NOTE — PROGRESS NOTES
Subjective:   Patient ID: Dax Carlisle is a 49 y.o. male.  Chief Complaint:  Follow-up and Lumbar Spine Pain (L-Spine)    Presents for three-month follow-up chronic medical conditions      Last annual exam October 2024  Last clinic visit January 2025    Medical History:  - ADHD.  Adderall 10 mg twice a day.  Remains effective with symptoms controlled on present medication and dose helping compensate for deficits at work/school. Duration is appropriate. No mood instability, tics, or disordered sleep, or other apparent adverse effects. Tolerating current treatment well. No behaviors to suggest inappropriate use of prescribed medications. [place] [employer] Controlled Prescription Drug Monitoring database was queried and showed no activity to suggest abuse, diversion, or other inappropriate use of prescription medications.   - Major depressive disorder with single episode, in remission. On Lexapro 20 mg daily and Abilify 10 mg daily.  Participating in counseling with Psychology.  Has not established establish with Psychiatry. Mood and anxiety symptoms remain stable and well controlled on current regimen.  - SVT (supraventricular tachycardia).  On Toprol-XL 25 mg daily.  Currently asymptomatic with no reported chest pains or palpitations.  - Chronic pain syndrome. Pain and symptoms previously well controlled on current medication regimen.  On Norco 7.5/325 every 6 hours as needed for pain. Averaging 4 per day and stable pattern. Risk of discontinuation of long-term narcotics higher than risk of continuing long-term narcotics at current dose. No behaviors to suggest inappropriate use of prescribed medications. [place] [employer] Controlled Prescription Drug Monitoring database was queried and showed no activity to suggest abuse, diversion, or other inappropriate use of prescription medications.   - ED.  Symptoms stable and reasonably controlled on Cialis daily as needed.  Would like to try  "Viagra its place.  - history of Colon polyps.  Colonoscopy performed since last visit.  Colon cancer screening up-to-date until March 2030    Complains of increased low back pain with an acute exacerbation due to increased physical activity 3-4 weeks ago   Increased over-the-counter daily NSAIDs not helping pain   Also undergoing current inhalation  Similar to episode in September 2021 which is as last known flare-up   Treated with Toradol injection, Toradol pills, and Flexeril which improved symptoms   Imaging in past confirmed degenerative disc disease L5-S1 facet arthritis improvement  Concern will not improve this time and would like to see interventional pain management    Review of Systems   Constitutional:  Negative for appetite change, fatigue and unexpected weight change.   Respiratory:  Negative for shortness of breath.    Cardiovascular:  Negative for chest pain, palpitations and leg swelling.   Gastrointestinal:  Negative for abdominal pain, constipation, diarrhea, nausea and vomiting.   Musculoskeletal:  Positive for back pain, myalgias, neck pain and neck stiffness. Negative for arthralgias, gait problem and joint swelling.   Skin:  Negative for rash.   Neurological:  Negative for light-headedness and headaches.   Psychiatric/Behavioral:  Negative for agitation, behavioral problems, confusion, decreased concentration, dysphoric mood, hallucinations and sleep disturbance. The patient is not nervous/anxious and is not hyperactive.      Objective:   /82 (BP Location: Right arm, Patient Position: Sitting)   Pulse 103   Ht 5' 9" (1.753 m)   Wt 112.7 kg (248 lb 7.3 oz)   SpO2 98%   BMI 36.69 kg/m²     Physical Exam  Vitals and nursing note reviewed.   Constitutional:       Appearance: Normal appearance. He is well-developed. He is obese.   Cardiovascular:      Rate and Rhythm: Normal rate and regular rhythm.      Heart sounds: Normal heart sounds.   Pulmonary:      Effort: Pulmonary effort is " normal.      Breath sounds: Normal breath sounds and air entry.   Abdominal:      General: There is no distension.      Palpations: Abdomen is soft.      Tenderness: There is no abdominal tenderness.   Musculoskeletal:      Lumbar back: Spasms and tenderness present. No bony tenderness. Decreased range of motion. Negative right straight leg raise test and negative left straight leg raise test.   Neurological:      Mental Status: He is alert and oriented to person, place, and time.      Coordination: Coordination is intact.      Gait: Gait is intact.   Psychiatric:         Attention and Perception: Attention normal. He is attentive.         Mood and Affect: Mood and affect normal. Mood is not anxious or depressed.         Speech: Speech normal.         Behavior: Behavior normal. Behavior is not hyperactive. Behavior is cooperative.         Thought Content: Thought content normal.         Cognition and Memory: Cognition normal.         Judgment: Judgment normal.       Assessment:       ICD-10-CM ICD-9-CM   1. Attention deficit disorder (ADD) without hyperactivity  F98.8 314.00   2. Chronic pain syndrome  G89.4 338.4   3. Degeneration of intervertebral disc of lumbar region with discogenic back pain  M51.360 722.52   4. Facet arthritis of lumbosacral region  M47.817 721.3   5. Acute exacerbation of chronic low back pain  M54.50 724.2    G89.29 338.19     338.29   6. Major depressive disorder with single episode, in remission  F32.5 296.25   7. SVT (supraventricular tachycardia)  I47.10 427.89   8. Moderate mixed hyperlipidemia not requiring statin therapy  E78.2 272.2   9. Severe obesity (BMI 35.0-39.9) with comorbidity  E66.01 278.01   10. Sleep apnea, unspecified type  G47.30 780.57     Plan:   Attention deficit disorder (ADD) without hyperactivity  ADHD stable, no side effects, symptoms well controlled on current medication  Continue Adderall 10 mg twice a day    Chronic pain syndrome  Degeneration of intervertebral  disc of lumbar region with discogenic back pain  Facet arthritis of lumbosacral region  -     Ambulatory referral/consult to Pain Clinic; Future; Expected date: 04/16/2025  Previously well controlled on Norco 7.5/325 mg every 6 hours as needed   Currently with an acute exacerbation for greater than 2 weeks  Old imaging did confirm significant arthritic changes spine   Referral to interventional pain management for other treatment options case medicine below does not improve syncope    Acute exacerbation of chronic low back pain  -     ketorolac injection 60 mg  -     ketorolac (TORADOL) 10 mg tablet; Take 1 tablet (10 mg total) by mouth 3 (three) times daily as needed for Pain.  Dispense: 10 tablet; Refill: 0  -     cyclobenzaprine (FLEXERIL) 10 MG tablet; Take 1 tablet (10 mg total) by mouth 3 (three) times daily.  Dispense: 30 tablet; Refill: 0    Major depressive disorder with single episode, in remission  Symptoms stable and well controlled   Continue Lexapro 20 mg daily   Continue Abilify 10 mg daily     SVT (supraventricular tachycardia)  Symptoms stable and well controlled   Continue Toprol-XL 50 mg daily     Moderate mixed hyperlipidemia not requiring statin therapy  Stable on last lipid panel     Severe obesity (BMI 35.0-39.9) with comorbidity  Sleep apnea   Will discuss potential treatment with weekly Jose P1 injections at visit     Return to clinic 3 months or sooner as needed    Visit today included increased complexity associated with managing the longitudinal care of the patient due to the serious and/or complex managed problem(s) listed above.

## 2025-05-03 DIAGNOSIS — G89.4 CHRONIC PAIN SYNDROME: ICD-10-CM

## 2025-05-03 DIAGNOSIS — F98.8 ATTENTION DEFICIT DISORDER (ADD) WITHOUT HYPERACTIVITY: Chronic | ICD-10-CM

## 2025-05-03 DIAGNOSIS — M54.50 ACUTE EXACERBATION OF CHRONIC LOW BACK PAIN: ICD-10-CM

## 2025-05-03 DIAGNOSIS — G89.29 ACUTE EXACERBATION OF CHRONIC LOW BACK PAIN: ICD-10-CM

## 2025-05-03 NOTE — TELEPHONE ENCOUNTER
No care due was identified.  [facility] Embedded Care Due Messages. Reference number: [id number].   5/03/2025 3:53:44 PM CDT

## 2025-05-07 ENCOUNTER — PATIENT MESSAGE (OUTPATIENT)
Dept: INTERNAL MEDICINE | Facility: CLINIC | Age: 50
End: 2025-05-07
Payer: COMMERCIAL

## 2025-05-08 RX ORDER — CYCLOBENZAPRINE HCL 10 MG
10 TABLET ORAL 3 TIMES DAILY
Qty: 90 TABLET | Refills: 11 | Status: SHIPPED | OUTPATIENT
Start: 2025-05-08 | End: 2026-05-08

## 2025-05-08 RX ORDER — HYDROCODONE BITARTRATE AND ACETAMINOPHEN 7.5; 325 MG/1; MG/1
1 TABLET ORAL EVERY 6 HOURS PRN
Qty: 120 TABLET | Refills: 0 | Status: SHIPPED | OUTPATIENT
Start: 2025-05-08

## 2025-05-08 RX ORDER — DEXTROAMPHETAMINE SACCHARATE, AMPHETAMINE ASPARTATE, DEXTROAMPHETAMINE SULFATE AND AMPHETAMINE SULFATE 2.5; 2.5; 2.5; 2.5 MG/1; MG/1; MG/1; MG/1
10 TABLET ORAL 2 TIMES DAILY
Qty: 60 TABLET | Refills: 0 | Status: SHIPPED | OUTPATIENT
Start: 2025-05-08

## 2025-05-13 ENCOUNTER — OFFICE VISIT (OUTPATIENT)
Dept: PAIN MEDICINE | Facility: CLINIC | Age: 50
End: 2025-05-13
Payer: COMMERCIAL

## 2025-05-13 ENCOUNTER — HOSPITAL ENCOUNTER (OUTPATIENT)
Dept: RADIOLOGY | Facility: HOSPITAL | Age: 50
Discharge: HOME | End: 2025-05-13
Attending: PHYSICIAN ASSISTANT
Payer: COMMERCIAL

## 2025-05-13 VITALS
DIASTOLIC BLOOD PRESSURE: 88 MMHG | RESPIRATION RATE: 17 BRPM | BODY MASS INDEX: 37.11 KG/M2 | SYSTOLIC BLOOD PRESSURE: 118 MMHG | WEIGHT: 250.56 LBS | HEIGHT: 69 IN | HEART RATE: 80 BPM

## 2025-05-13 DIAGNOSIS — G89.4 CHRONIC PAIN SYNDROME: ICD-10-CM

## 2025-05-13 DIAGNOSIS — M54.16 LUMBAR RADICULOPATHY, CHRONIC: ICD-10-CM

## 2025-05-13 DIAGNOSIS — M51.360 DEGENERATION OF INTERVERTEBRAL DISC OF LUMBAR REGION WITH DISCOGENIC BACK PAIN: ICD-10-CM

## 2025-05-13 DIAGNOSIS — M54.9 DORSALGIA, UNSPECIFIED: ICD-10-CM

## 2025-05-13 DIAGNOSIS — M54.9 DORSALGIA, UNSPECIFIED: Primary | ICD-10-CM

## 2025-05-13 DIAGNOSIS — M47.817 FACET ARTHRITIS OF LUMBOSACRAL REGION: ICD-10-CM

## 2025-05-13 PROCEDURE — 3074F SYST BP LT 130 MM HG: CPT | Mod: CPTII,S$GLB,, | Performed by: PHYSICIAN ASSISTANT

## 2025-05-13 PROCEDURE — 73521 X-RAY EXAM HIPS BI 2 VIEWS: CPT | Mod: TC

## 2025-05-13 PROCEDURE — 73521 X-RAY EXAM HIPS BI 2 VIEWS: CPT | Mod: 26,,, | Performed by: RADIOLOGY

## 2025-05-13 PROCEDURE — 3008F BODY MASS INDEX DOCD: CPT | Mod: CPTII,S$GLB,, | Performed by: PHYSICIAN ASSISTANT

## 2025-05-13 PROCEDURE — 3079F DIAST BP 80-89 MM HG: CPT | Mod: CPTII,S$GLB,, | Performed by: PHYSICIAN ASSISTANT

## 2025-05-13 PROCEDURE — 99204 OFFICE O/P NEW MOD 45 MIN: CPT | Mod: S$GLB,,, | Performed by: PHYSICIAN ASSISTANT

## 2025-05-13 PROCEDURE — 99999 PR PBB SHADOW E&M-EST. PATIENT-LVL III: CPT | Mod: PBBFAC,,, | Performed by: PHYSICIAN ASSISTANT

## 2025-05-13 RX ORDER — GABAPENTIN 400 MG/1
400 CAPSULE ORAL 3 TIMES DAILY
Qty: 90 CAPSULE | Refills: 1 | Status: SHIPPED | OUTPATIENT
Start: 2025-05-13

## 2025-05-13 NOTE — PROGRESS NOTES
"  New Patient Chronic Pain Note (Initial Visit)    Referring Physician: Cody Enamorado MD    PCP: Cody Enamorado MD    Chief Complaint:   Chief Complaint   Patient presents with    Low-back Pain        SUBJECTIVE:    Dax Carlisle is a 49 y.o. male who presents to the clinic for the evaluation of lower back pain. The pain started 27 years ago following powerlifting and symptoms have been worsening.The pain is located in the left lumbar area and radiates to the LLE (back of the leg down to the knee).  The pain is described as sharp and is rated as 5/10. The pain is rated with a score of  4/10 on the BEST day and a score of 7/10 on the WORST day.  Symptoms interfere with daily activity, sleeping, and work. The pain is exacerbated by Standing, Lifting, and sleeping on the left side.  If he lies on the Left side, he can barely put any weight on the leg. He has to sit and sleep on this right side. The pain is mitigated by chiropractor.       Patient denies bowel incontinence, significant weight loss, and loss of sensations. He has a h/o perforated bowel with reconstruction.   He has noticed that whenever he feels the urge to urinate, he has to go to the restroom quickly.      Patient is employed in security at [employer]. He frequently is up/down stairs, crawling, etc.     Pain Disability Index Review:         9/19/2019    12:06 PM   Last 3 PDI Scores   Pain Disability Index (PDI) 46       Non-Pharmacologic Treatments:  Physical Therapy/Home Exercise: yes  Ice/Heat:yes  TENS:yes- temporary relief ("only good when it's on")  Acupuncture: yes  Massage: no  Chiropractic: yes    Other: foam roller      Pain Medications:  - Opioids: Norco  - Adjuvant Medications: Alleve (Naproxen) and Toradol, Flexeril, lidoderm patches     report:  Reviewed and consistent with medication use as prescribed.    Pain Procedures:   No previous lumbar surgery    Previous ganglion impar block- LATRICE [facility]cinas Dr. " Cabrera:  -3/15/2021: ganglion impar block, minimal relief for a few days        Imaging/ Diagnostic Studies/ Labs (Reviewed on 5/13/2025):    Thoracolumbar X-rays  11/23/2021  XR THORACOLUMBAR SPINE AP LATERAL     CLINICAL HISTORY:  Wedge compression fracture of T10 vertebra, subsequent encounter for fracture with routine healing     TECHNIQUE:  AP/lateral views the thoracolumbar spine.     COMPARISON:  12/05/2019     FINDINGS:  Vertebral body heights unchanged.  Stable mild compression deformity of T10 noted.  No acute fracture appreciated.  No spondylolisthesis.  L5-S1 mild degenerative disc height loss present.  Lower lumbar spine facet arthropathy noted.  Soft tissues unremarkable.     Impression:     As above        Electronically signed by:Garfield Samson MD  Date:                                            11/23/2021  Time:                                           16:56    Past Medical History:   Diagnosis Date    Angioedema of lips 03/13/2015    IVP dye allergy - swelling lips, eye, tongue    Asthma     childhood    Attention deficit disorder (ADD)     Depression     Diverticulitis 10/07/2019    Kidney stones     Pain     Sleep apnea     cpap not required/ patient states this has resolved with weight loss    SVT (supraventricular tachycardia)      Past Surgical History:   Procedure Laterality Date    ABDOMINAL WASHOUT N/A 10/07/2019    Procedure: LAVAGE, PERITONEAL, THERAPEUTIC;  Surgeon: Mindy Goff MD;  Location: [facility] OR;  Service: General;  Laterality: N/A;  abdomen washout    ABSCESS DRAINAGE N/A 09/10/2021    Procedure: DRAINAGE, ABSCESS;  Surgeon: Kadeem Choi MD;  Location: [facility] OR;  Service: General;  Laterality: N/A;  perirectal    anal fistula repair  10/2023    APPENDECTOMY      BACK SURGERY      BIOPSY, INTRANASAL, ENDOSCOPIC N/A 02/15/2023    Procedure: BIOPSY, INTRANASAL, ENDOSCOPIC;  Surgeon: Uriel Vasquez MD;  Location: [facility] OR;  Service: ENT;  Laterality: N/A;     CHOLECYSTECTOMY  2005    COLONOSCOPY N/A 03/10/2016    Procedure: COLONOSCOPY;  Surgeon: Juvenal Dinero MD;  Location: [facility] ENDO;  Service: Endoscopy;  Laterality: N/A;    COLONOSCOPY N/A 01/17/2020    Procedure: COLONOSCOPY;  Surgeon: Kadeem Choi MD;  Location: [facility] ENDO;  Service: General;  Laterality: N/A;    COLOSTOMY N/A 10/07/2019    Procedure: CREATION, COLOSTOMY;  Surgeon: Mindy Goff MD;  Location: [facility] OR;  Service: General;  Laterality: N/A;    COLOSTOMY REVERSAL      EXAMINATION UNDER ANESTHESIA N/A 09/10/2021    Procedure: Exam under anesthesia;  Surgeon: Kadeem Choi MD;  Location: [facility] OR;  Service: General;  Laterality: N/A;    EXAMINATION UNDER ANESTHESIA N/A 01/06/2022    Procedure: Exam under anesthesia, perirectal abscess I&D;  Surgeon: Kadeem Choi MD;  Location: [facility] OR;  Service: General;  Laterality: N/A;    EXAMINATION UNDER ANESTHESIA N/A 08/24/2023    Procedure: EXAM UNDER ANESTHESIA;  Surgeon: Kadeem Choi MD;  Location: [facility] OR;  Service: General;  Laterality: N/A;  Exam under anesthesia, possible fistulotomy, possible seton placement (prone)    FISTULOTOMY  08/24/2023    Procedure: FISTULOTOMY;  Surgeon: Kadeem Choi MD;  Location: [facility] OR;  Service: General;;    FUNCTIONAL ENDOSCOPIC SINUS SURGERY (FESS) USING COMPUTER-ASSISTED NAVIGATION N/A 02/15/2023    Procedure: FESS, USING COMPUTER-ASSISTED NAVIGATION;  Surgeon: Uriel Vasquez MD;  Location: [facility] OR;  Service: ENT;  Laterality: N/A;    SIVAKUMAR PROCEDURE N/A 10/07/2019    Procedure: SIVAKUMAR PROCEDURE;  Surgeon: Mindy Goff MD;  Location: [facility] OR;  Service: General;  Laterality: N/A;    INJECTION OF ANESTHETIC AGENT AROUND GANGLION IMPAR N/A 03/15/2021    Procedure: BLOCK, GANGLION IMPAR;  Surgeon: Ruben Cabrera MD;  Location: [facility] PAIN MGT;  Service: Pain Management;  Laterality: N/A;    INJECTION OF ANESTHETIC AGENT AROUND PUDENDAL NERVE N/A 09/10/2021    Procedure:  BLOCK, NERVE, PUDENDAL;  Surgeon: Kadeem Choi MD;  Location: [facility] OR;  Service: General;  Laterality: N/A;    INJECTION OF ANESTHETIC AGENT AROUND PUDENDAL NERVE N/A 01/06/2022    Procedure: BLOCK, NERVE, PUDENDAL;  Surgeon: Kadeem Choi MD;  Location: [facility] OR;  Service: General;  Laterality: N/A;    INJECTION OF ANESTHETIC AGENT AROUND PUDENDAL NERVE N/A 08/24/2023    Procedure: BLOCK, NERVE, PUDENDAL;  Surgeon: Kadeem Choi MD;  Location: [facility] OR;  Service: General;  Laterality: N/A;    INJECTION OF ANESTHETIC AGENT INTO TISSUE PLANE DEFINED BY TRANSVERSUS ABDOMINIS MUSCLE N/A 01/30/2020    Procedure: BLOCK, TRANSVERSUS ABDOMINIS PLANE;  Surgeon: Kadeem Choi MD;  Location: [facility] OR;  Service: General;  Laterality: N/A;    KNEE SURGERY Right     KNEE SURGERY Left     LYSIS OF ADHESIONS N/A 01/30/2020    Procedure: LYSIS, ADHESIONS;  Surgeon: Kadeem Choi MD;  Location: [facility] OR;  Service: General;  Laterality: N/A;    PILONIDAL CYST DRAINAGE      pt has had 6 of these adn has had revisions    PROCTECTOMY N/A 01/30/2020    Procedure: PROCTECTOMY;  Surgeon: Kadeem Choi MD;  Location: [facility] OR;  Service: General;  Laterality: N/A;  Partial    SINUSOTOMY, SPHENOID SINUS, ENDOSCOPIC Right 02/15/2023    Procedure: SINUSOTOMY, SPHENOID SINUS, ENDOSCOPIC;  Surgeon: Uriel Vasquez MD;  Location: [facility] OR;  Service: ENT;  Laterality: Right;    SUBTOTAL COLECTOMY  01/30/2020    Procedure: COLECTOMY, PARTIAL;  Surgeon: Kadeem Choi MD;  Location: [facility] OR;  Service: General;;     Social History[1]  Family History   Problem Relation Name Age of Onset    Diabetes Mother      Colon polyps Mother      Alzheimer's disease Mother      Heart disease Father          CABG age 66    Cancer Father          melanoma    Diabetes Father      Anesthesia problems Father          d/t polio    Colon polyps Sister      Diabetes Maternal Grandmother      Colon cancer Maternal Grandmother      Diabetes  Maternal Grandfather      Diabetes Paternal Grandmother      Stroke Paternal Grandmother      Diabetes Paternal Grandfather         Review of patient's allergies indicates:   Allergen Reactions    Codeine Other (See Comments)     violent    Iodinated contrast media Swelling     Tongue, right eye, lips swelling. Rash on abdomen and axilla    Nuts [tree nut] Anaphylaxis    Dairy aid [lactase] Diarrhea and Nausea And Vomiting    Morphine      Tolerated hydromorphone in October 2019.        Current Outpatient Medications   Medication Sig    acetaminophen (TYLENOL) 325 MG tablet Take 325 mg by mouth every 6 (six) hours as needed for Pain.    ARIPiprazole (ABILIFY) 10 MG Tab Take 1 tablet (10 mg total) by mouth every evening.    clotrimazole-betamethasone 1-0.05% (LOTRISONE) cream Apply topically 2 (two) times daily.    cyclobenzaprine (FLEXERIL) 10 MG tablet Take 1 tablet (10 mg total) by mouth 3 (three) times daily.    dextroamphetamine-amphetamine (ADDERALL) 10 mg Tab Take 1 tablet (10 mg total) by mouth 2 (two) times a day.    EScitalopram oxalate (LEXAPRO) 20 MG tablet Take 1 tablet (20 mg total) by mouth every evening.    HYDROcodone-acetaminophen (NORCO) 7.5-325 mg per tablet Take 1 tablet by mouth every 6 (six) hours as needed for Pain.    ketorolac (TORADOL) 10 mg tablet Take 1 tablet (10 mg total) by mouth 3 (three) times daily as needed for Pain.    metoprolol succinate (TOPROL-XL) 50 MG 24 hr tablet Take 1 tablet (50 mg total) by mouth once daily.    mupirocin (BACTROBAN) 2 % ointment Apply topically 3 (three) times daily.    sildenafiL (VIAGRA) 100 MG tablet Take ½-1 tablets ( mg total) by mouth daily as needed for Erectile Dysfunction.     No current facility-administered medications for this visit.       Review of Systems     GENERAL:  No weight loss, malaise or fevers.  HEENT:   No recent changes in vision or hearing  NECK:  Negative for lumps, no difficulty with swallowing.  RESPIRATORY:  Negative  "for cough, wheezing or shortness of breath, patient denies any recent URI.  CARDIOVASCULAR:  Negative for chest pain, leg swelling or palpitations.  GI:  Negative for abdominal discomfort, blood in stools or black stools or change in bowel habits.  MUSCULOSKELETAL:  See HPI.  SKIN:  Negative for lesions, rash, and itching.  PSYCH:  No mood disorder or recent psychosocial stressors.  Patients sleep is not disturbed secondary to pain.  HEMATOLOGY/LYMPHOLOGY:  Negative for prolonged bleeding, bruising easily or swollen nodes.  Patient is not currently taking any anti-coagulants  NEURO:   No history of headaches, syncope, paralysis, seizures or tremors.  All other reviewed and negative other than HPI.    OBJECTIVE:    /88 (BP Location: Right arm, Patient Position: Sitting)   Pulse 80   Resp 17   Ht 5' 9" (1.753 m)   Wt 113.6 kg (250 lb 8.8 oz)   BMI 37.00 kg/m²     Physical Exam    GENERAL: Well appearing, in no acute distress, alert and oriented x3.  PSYCH:  Mood and affect appropriate.  SKIN: Skin color, texture, turgor normal, no rashes or lesions.  HEAD/FACE:  Normocephalic, atraumatic. Cranial nerves grossly intact.  NECK: No pain to palpation over the cervical paraspinous muscles. Spurling Negative. No pain with neck flexion, extension, or lateral flexion.   CV: RRR with palpation of the radial artery.  PULM: No evidence of respiratory difficulty, symmetric chest rise.  GI:  Soft and non-tender.  BACK: Straight leg raising in the sitting and supine positions is positive to radicular pain on the left. No pain to palpation over the facet joints of the lumbar spine or spinous processes. There is pain with lumbar flexion, extension.  EXTREMITIES: Peripheral joint ROM is full and pain free without obvious instability or laxity in all four extremities. No deformities, edema, or skin discoloration. Good capillary refill.  MUSCULOSKELETAL: Shoulder and knee provocative maneuvers are negative.  There is mild " pain with palpation over the sacroiliac joint on the left side.  FABERs test is positive on the left.  Bilateral upper and lower extremity strength is normal and symmetric.  No atrophy or tone abnormalities are noted.  NEURO: Bilateral upper and lower extremity coordination and muscle stretch reflexes are physiologic and symmetric.  Plantar response are downgoing. No clonus.  No loss of sensation is noted.  GAIT: normal.Able to heel, tip toe walk.      LABS:  Lab Results   Component Value Date    WBC 6.94 11/01/2024    HGB 13.7 (L) 11/01/2024    HCT 41.5 11/01/2024    MCV 88 11/01/2024     11/01/2024       CMP  Sodium   Date Value Ref Range Status   11/01/2024 137 136 - 145 mmol/L Final     Potassium   Date Value Ref Range Status   11/01/2024 4.1 3.5 - 5.1 mmol/L Final     Chloride   Date Value Ref Range Status   11/01/2024 105 95 - 110 mmol/L Final     CO2   Date Value Ref Range Status   11/01/2024 24 23 - 29 mmol/L Final     Glucose   Date Value Ref Range Status   11/01/2024 102 70 - 110 mg/dL Final     BUN   Date Value Ref Range Status   11/01/2024 13 6 - 20 mg/dL Final     Creatinine   Date Value Ref Range Status   11/01/2024 0.9 0.5 - 1.4 mg/dL Final     Calcium   Date Value Ref Range Status   11/01/2024 9.2 8.7 - 10.5 mg/dL Final     Total Protein   Date Value Ref Range Status   11/01/2024 7.2 6.0 - 8.4 g/dL Final     Albumin   Date Value Ref Range Status   11/01/2024 4.1 3.5 - 5.2 g/dL Final   01/24/2024 4.5 3.6 - 5.1 g/dL Final     Comment:     Test Performed at:  [facility]  [address] [place] [address]  [id number]     NIYAH Maxwell MD, PhD, JEOVANY       Total Bilirubin   Date Value Ref Range Status   11/01/2024 0.4 0.1 - 1.0 mg/dL Final     Comment:     For infants and newborns, interpretation of results should be based  on gestational age, weight and in agreement with clinical  observations.    Premature Infant recommended reference ranges:  Up to 24  hours.............<8.0 mg/dL  Up to 48 hours............<12.0 mg/dL  3-5 days..................<15.0 mg/dL  6-29 days.................<15.0 mg/dL       Alkaline Phosphatase   Date Value Ref Range Status   11/01/2024 75 40 - 150 U/L Final     AST   Date Value Ref Range Status   11/01/2024 22 10 - 40 U/L Final     ALT   Date Value Ref Range Status   11/01/2024 31 10 - 44 U/L Final     Anion Gap   Date Value Ref Range Status   11/01/2024 8 8 - 16 mmol/L Final     eGFR if    Date Value Ref Range Status   11/10/2021 >60.0 >60 mL/min/1.73 m^2 Final     eGFR if non    Date Value Ref Range Status   11/10/2021 >60.0 >60 mL/min/1.73 m^2 Final     Comment:     Calculation used to obtain the estimated glomerular filtration  rate (eGFR) is the CKD-EPI equation.          Lab Results   Component Value Date    HGBA1C 5.2 11/01/2024       ASSESSMENT: 49 y.o. year old male with lower back pain, consistent with     1. Dorsalgia, unspecified  MRI Lumbar Spine Without Contrast    X-Ray Hips Bilateral 2 View Inc AP Pelvis    gabapentin (NEURONTIN) 400 MG capsule      2. Chronic pain syndrome  Ambulatory referral/consult to Pain Clinic    MRI Lumbar Spine Without Contrast    X-Ray Hips Bilateral 2 View Inc AP Pelvis    gabapentin (NEURONTIN) 400 MG capsule      3. Degeneration of intervertebral disc of lumbar region with discogenic back pain  Ambulatory referral/consult to Pain Clinic    MRI Lumbar Spine Without Contrast    X-Ray Hips Bilateral 2 View Inc AP Pelvis    gabapentin (NEURONTIN) 400 MG capsule      4. Facet arthritis of lumbosacral region  Ambulatory referral/consult to Pain Clinic      5. Lumbar radiculopathy, chronic  MRI Lumbar Spine Without Contrast    X-Ray Hips Bilateral 2 View Inc AP Pelvis    gabapentin (NEURONTIN) 400 MG capsule            PLAN:   - Interventions: None at this time, will get updated imaging studies first.    - Anticoagulation use: None    - Medications: I have stressed  the importance of physical activity and a home exercise plan to help with pain and improve health. and Patient can continue with medications for now since they are providing benefits, using them appropriately, and without side effects.       Continue Norco 7.5/325 mg per PCP.    - Will start gabapentin 400 mg . Patient requested to start on a higher dose since he has taken in the past. Advised to start BID and then TID. We will further increase if does not provide adequate relief. Potential side effects of this medication include daytime somnolence, weight gain and peripheral edema.     report:  Reviewed and consistent with medication use as prescribed.      - Therapy: continue activities as tolerated.    - Imaging: Reviewed available imaging with patient and answered any questions they had regarding study.Order MRI of the Lumbar Spine to help evaluate possible source of pain. Also, order X-rays of hips/pelvis.    - Consults/Referrals: no new referrals at this time.    - Records:  Reviewed/Obtain old records from outside physicians and imaging    - Follow up visit: return to clinic after MRI to determine future plan of care      - Counseled patient regarding the importance of activity modification, constant sleeping habits, and physical therapy    - This condition does not require this patient to take time off of work, and the primary goal of our Pain Management services is to improve the patient's functional capacity.    - Patient Questions: Answered all of the patient's questions regarding diagnosis, therapy, and treatment        The above plan and management options were discussed at length with patient. Patient is in agreement with the above and verbalized understanding.    I discussed the goals of interventional chronic pain management with the patient on today's visit.  I explained the utility of injections for diagnostic and therapeutic purposes.  We discussed a multimodal approach to pain including  treating the patient's given worst pain at any given time.  We will use a systematic approach to addressing pain.  We will also adopt a multimodal approach that includes injections, adjuvant medications, physical therapy, at times psychiatry.  There may be a limited role for opioid use intermittently in the treatment of pain, more particularly for acute pain although no one approach can be used as a sole treatment modality.    I emphasized the importance of regular exercise, core strengthening and stretching, diet and weight loss as a cornerstone of long-term pain management.      Tiffany Hogan PA-C  Interventional Pain Management  [facility]           [1]   Social History  Socioeconomic History    Marital status:      Spouse name: Mary Alice    Number of children: 0   Tobacco Use    Smoking status: Never    Smokeless tobacco: Never   Substance and Sexual Activity    Alcohol use: Never    Drug use: No    Sexual activity: Yes     Partners: Female   Social History Narrative    Live w/ spouse and  1 dog      Social Drivers of Health     Stress: No Stress Concern Present (12/18/2019)    [employer] - Occupational Stress Questionnaire     Feeling of Stress : Only a little

## 2025-06-03 DIAGNOSIS — F98.8 ATTENTION DEFICIT DISORDER (ADD) WITHOUT HYPERACTIVITY: Chronic | ICD-10-CM

## 2025-06-03 DIAGNOSIS — G89.4 CHRONIC PAIN SYNDROME: ICD-10-CM

## 2025-06-04 ENCOUNTER — HOSPITAL ENCOUNTER (OUTPATIENT)
Dept: RADIOLOGY | Facility: HOSPITAL | Age: 50
Discharge: HOME | End: 2025-06-04
Attending: PHYSICIAN ASSISTANT
Payer: COMMERCIAL

## 2025-06-04 DIAGNOSIS — M51.360 DEGENERATION OF INTERVERTEBRAL DISC OF LUMBAR REGION WITH DISCOGENIC BACK PAIN: ICD-10-CM

## 2025-06-04 DIAGNOSIS — G89.4 CHRONIC PAIN SYNDROME: ICD-10-CM

## 2025-06-04 DIAGNOSIS — M54.9 DORSALGIA, UNSPECIFIED: ICD-10-CM

## 2025-06-04 DIAGNOSIS — M54.16 LUMBAR RADICULOPATHY, CHRONIC: ICD-10-CM

## 2025-06-04 PROCEDURE — 72148 MRI LUMBAR SPINE W/O DYE: CPT | Mod: TC

## 2025-06-04 PROCEDURE — 72148 MRI LUMBAR SPINE W/O DYE: CPT | Mod: 26,,, | Performed by: RADIOLOGY

## 2025-06-04 RX ORDER — DEXTROAMPHETAMINE SACCHARATE, AMPHETAMINE ASPARTATE, DEXTROAMPHETAMINE SULFATE AND AMPHETAMINE SULFATE 2.5; 2.5; 2.5; 2.5 MG/1; MG/1; MG/1; MG/1
10 TABLET ORAL 2 TIMES DAILY
Qty: 60 TABLET | Refills: 0 | Status: SHIPPED | OUTPATIENT
Start: 2025-06-04

## 2025-06-04 RX ORDER — HYDROCODONE BITARTRATE AND ACETAMINOPHEN 7.5; 325 MG/1; MG/1
1 TABLET ORAL EVERY 6 HOURS PRN
Qty: 120 TABLET | Refills: 0 | Status: SHIPPED | OUTPATIENT
Start: 2025-06-04

## 2025-06-11 ENCOUNTER — OFFICE VISIT (OUTPATIENT)
Dept: PAIN MEDICINE | Facility: CLINIC | Age: 50
End: 2025-06-11
Payer: COMMERCIAL

## 2025-06-11 VITALS
BODY MASS INDEX: 38.01 KG/M2 | SYSTOLIC BLOOD PRESSURE: 119 MMHG | WEIGHT: 256.63 LBS | HEART RATE: 86 BPM | DIASTOLIC BLOOD PRESSURE: 80 MMHG | HEIGHT: 69 IN

## 2025-06-11 DIAGNOSIS — M51.360 DEGENERATION OF INTERVERTEBRAL DISC OF LUMBAR REGION WITH DISCOGENIC BACK PAIN: Primary | ICD-10-CM

## 2025-06-11 DIAGNOSIS — M54.16 LUMBAR RADICULOPATHY, CHRONIC: ICD-10-CM

## 2025-06-11 DIAGNOSIS — M54.9 DORSALGIA, UNSPECIFIED: ICD-10-CM

## 2025-06-11 DIAGNOSIS — G89.4 CHRONIC PAIN SYNDROME: ICD-10-CM

## 2025-06-11 DIAGNOSIS — M47.817 FACET ARTHRITIS OF LUMBOSACRAL REGION: ICD-10-CM

## 2025-06-11 PROCEDURE — 1159F MED LIST DOCD IN RCRD: CPT | Mod: CPTII,S$GLB,, | Performed by: PHYSICIAN ASSISTANT

## 2025-06-11 PROCEDURE — 3074F SYST BP LT 130 MM HG: CPT | Mod: CPTII,S$GLB,, | Performed by: PHYSICIAN ASSISTANT

## 2025-06-11 PROCEDURE — 3008F BODY MASS INDEX DOCD: CPT | Mod: CPTII,S$GLB,, | Performed by: PHYSICIAN ASSISTANT

## 2025-06-11 PROCEDURE — 99214 OFFICE O/P EST MOD 30 MIN: CPT | Mod: S$GLB,,, | Performed by: PHYSICIAN ASSISTANT

## 2025-06-11 PROCEDURE — 3079F DIAST BP 80-89 MM HG: CPT | Mod: CPTII,S$GLB,, | Performed by: PHYSICIAN ASSISTANT

## 2025-06-11 PROCEDURE — 99999 PR PBB SHADOW E&M-EST. PATIENT-LVL III: CPT | Mod: PBBFAC,,, | Performed by: PHYSICIAN ASSISTANT

## 2025-06-11 RX ORDER — GABAPENTIN 400 MG/1
400 CAPSULE ORAL 3 TIMES DAILY
Qty: 270 CAPSULE | Refills: 0 | Status: SHIPPED | OUTPATIENT
Start: 2025-06-11

## 2025-06-11 NOTE — PROGRESS NOTES
Established Patient Chronic Pain Note (Follow up Visit)    Referring Physician: No ref. provider found    PCP: Cody Enamorado MD    Chief Complaint:   Chief Complaint   Patient presents with    Follow-up        SUBJECTIVE:  Interval History (6/11/2025):  Dax Carlisle presents today for follow-up visit.  Patient was last seen on 5/13/2025. At that visit, the plan was to complete imaging studies and start Gabapentin. Patient reports pain as 5/10 today. He denies recent changes following his last visit.   He is currently taking Gabapentin 400 mg TID and Flexeril TID. Reports good relief with medications so far. Denies unpleasant side effects.    Initial HPI (5/13/2025):  Dax Carlisle is a 49 y.o. male who presents to the clinic for the evaluation of lower back pain. The pain started 27 years ago following powerlifting and symptoms have been worsening.The pain is located in the left lumbar area and radiates to the LLE (back of the leg down to the knee).  The pain is described as sharp and is rated as 5/10. The pain is rated with a score of  4/10 on the BEST day and a score of 7/10 on the WORST day.  Symptoms interfere with daily activity, sleeping, and work. The pain is exacerbated by Standing, Lifting, and sleeping on the left side.  If he lies on the Left side, he can barely put any weight on the leg. He has to sit and sleep on this right side. The pain is mitigated by chiropractor.       Patient denies bowel incontinence, significant weight loss, and loss of sensations. He has a h/o perforated bowel with reconstruction.   He has noticed that whenever he feels the urge to urinate, he has to go to the restroom quickly.      Patient is employed in security at [employer]. He frequently is up/down stairs, crawling, etc.     Pain Disability Index Review:         5/13/2025     9:44 AM 9/19/2019    12:06 PM   Last 3 PDI Scores   Pain Disability Index (PDI) 35 46       Non-Pharmacologic  "Treatments:  Physical Therapy/Home Exercise: yes  Ice/Heat:yes  TENS:yes- temporary relief ("only good when it's on")  Acupuncture: yes  Massage: no  Chiropractic: yes    Other: foam roller      Pain Medications:  - Opioids: Norco  - Adjuvant Medications: Alleve (Naproxen) and Toradol, Flexeril, lidoderm patches     report:  Reviewed and consistent with medication use as prescribed.    Pain Procedures:   No previous lumbar surgery    Previous ganglion impar block- [facility] [address] Dr[address]  -3/15/2021: ganglion impar block, minimal relief for a few days        Imaging/ Diagnostic Studies/ Labs (Reviewed on 6/11/2025):      Results for orders placed during the hospital encounter of 06/04/25    MRI Lumbar Spine Without Contrast    Narrative  EXAM: MRI LUMBAR SPINE WITHOUT CONTRAST    CLINICAL HISTORY: Lower back pain, symptoms persist, greater than 6 weeks, conservative treatment.  Lumbar radiculopathy.  Chronic pain syndrome.    TECHNIQUE: Standard multiplanar pulse sequences obtained without IV contrast.    COMPARISON: None.    FINDINGS:    5 lumbar type vertebral bodies. Normal anatomic alignment. Normal lumbar vertebral body heights. Mild Modic type II degenerative endplate marrow changes L3-4, L4-5 and L5-S1.  No fracture.  No suspicious osseous lesion. Visualized distal spinal cord and conus medullaris are normal.  Conus terminates at the L1 L1-L2 level.    T12-L1: Normal.    L1-L2: Normal.    L2-L3: Normal.    L3-L4: Minimal disc bulging and mild facet arthropathy.  Central canal patent.  Mild neural foraminal narrowing bilaterally.    L4-L5: Desiccation with mild generalized disc bulging, asymmetric to the left.  Possible contact with exiting left L4 nerve root.  Mild facet arthropathy and mild ligamentum flavum hypertrophy.  Mild posterior pleural lipomatosis.  Moderate central canal narrowing with AP diameter of the dural sac reduced to 7.1 mm.  Mild bilateral neural foraminal stenosis, worse on the " left    L5-S1: Generalized disc bulging, slightly asymmetric to the left with impingement of the descending left S1 nerve root and mild contact descending right S1 nerve root.  Mild facet arthropathy.  Central canal patent.  Mild left and moderate right neural foraminal stenosis.    Other findings: Soft tissues and musculature are normal.  No evidence of muscle strain.  Visualized intra-abdominal and intrapelvic contents are normal.    Impression  Asymmetric to left disc bulge L4-5 with possible contact with exiting left L4 nerve root.  Moderate central canal stenosis and mild neural foraminal stenosis.    Asymmetric to the left generalized disc bulging L5-S1 with impingement of descending left S1 nerve root.  Mild contact right S1 nerve root.    Finalized on: 6/5/2025 9:27 AM By:  Latrell Swan MD  [facility]# [id number]      2025-06-05 09:29:27.725     [facility]       Hip X-rays  5/13/2025  EXAM: XR HIPS BILATERAL 2 VIEW INCL AP PELVIS     CLINICAL HISTORY: Chronic bilateral hip pain     FINDINGS:  No pelvic fracture or dislocation.  There is no fracture or dislocation or arthritic change or avascular process of the femoral head of either hip.        Impression:   Normal pelvic and bilateral hip x-rays.     Finalized on: 5/13/2025 8:15 PM By:  Enrrique Gutierrez MD  [facility]# [id number]      2025-05-13 20:17:18.222     [facility]    Thoracolumbar X-rays  11/23/2021  XR THORACOLUMBAR SPINE AP LATERAL     CLINICAL HISTORY:  Wedge compression fracture of T10 vertebra, subsequent encounter for fracture with routine healing     TECHNIQUE:  AP/lateral views the thoracolumbar spine.     COMPARISON:  12/05/2019     FINDINGS:  Vertebral body heights unchanged.  Stable mild compression deformity of T10 noted.  No acute fracture appreciated.  No spondylolisthesis.  L5-S1 mild degenerative disc height loss present.  Lower lumbar spine facet arthropathy noted.  Soft tissues unremarkable.     Impression:     As above        Electronically signed  by:Garfield Samson MD  Date:                                            11/23/2021  Time:                                           16:56    Past Medical History:   Diagnosis Date    Angioedema of lips 03/13/2015    IVP dye allergy - swelling lips, eye, tongue    Asthma     childhood    Attention deficit disorder (ADD)     Depression     Diverticulitis 10/07/2019    Kidney stones     Pain     Sleep apnea     cpap not required/ patient states this has resolved with weight loss    SVT (supraventricular tachycardia)      Past Surgical History:   Procedure Laterality Date    ABDOMINAL WASHOUT N/A 10/07/2019    Procedure: LAVAGE, PERITONEAL, THERAPEUTIC;  Surgeon: Mindy Goff MD;  Location: [facility] OR;  Service: General;  Laterality: N/A;  abdomen washout    ABSCESS DRAINAGE N/A 09/10/2021    Procedure: DRAINAGE, ABSCESS;  Surgeon: Kadeem Choi MD;  Location: [facility] OR;  Service: General;  Laterality: N/A;  perirectal    anal fistula repair  10/2023    APPENDECTOMY      BACK SURGERY      BIOPSY, INTRANASAL, ENDOSCOPIC N/A 02/15/2023    Procedure: BIOPSY, INTRANASAL, ENDOSCOPIC;  Surgeon: Uriel Vasquez MD;  Location: [facility] OR;  Service: ENT;  Laterality: N/A;    CHOLECYSTECTOMY  2005    COLONOSCOPY N/A 03/10/2016    Procedure: COLONOSCOPY;  Surgeon: Juvenal Dinero MD;  Location: [facility] ENDO;  Service: Endoscopy;  Laterality: N/A;    COLONOSCOPY N/A 01/17/2020    Procedure: COLONOSCOPY;  Surgeon: Kadeem Choi MD;  Location: [facility];  Service: General;  Laterality: N/A;    COLOSTOMY N/A 10/07/2019    Procedure: CREATION, COLOSTOMY;  Surgeon: Mindy Goff MD;  Location: [facility] OR;  Service: General;  Laterality: N/A;    COLOSTOMY REVERSAL      EXAMINATION UNDER ANESTHESIA N/A 09/10/2021    Procedure: Exam under anesthesia;  Surgeon: Kadeem Choi MD;  Location: [facility] OR;  Service: General;  Laterality: N/A;    EXAMINATION UNDER ANESTHESIA N/A 01/06/2022    Procedure: Exam under anesthesia,  perirectal abscess I&D;  Surgeon: Kadeem Choi MD;  Location: [facility] OR;  Service: General;  Laterality: N/A;    EXAMINATION UNDER ANESTHESIA N/A 08/24/2023    Procedure: EXAM UNDER ANESTHESIA;  Surgeon: Kadeem Choi MD;  Location: [facility] OR;  Service: General;  Laterality: N/A;  Exam under anesthesia, possible fistulotomy, possible seton placement (prone)    FISTULOTOMY  08/24/2023    Procedure: FISTULOTOMY;  Surgeon: Kadeem Choi MD;  Location: [facility] OR;  Service: General;;    FUNCTIONAL ENDOSCOPIC SINUS SURGERY (FESS) USING COMPUTER-ASSISTED NAVIGATION N/A 02/15/2023    Procedure: FESS, USING COMPUTER-ASSISTED NAVIGATION;  Surgeon: Uriel Vasquez MD;  Location: [facility] OR;  Service: ENT;  Laterality: N/A;    SIVAKUMAR PROCEDURE N/A 10/07/2019    Procedure: SIVAKUMAR PROCEDURE;  Surgeon: Mindy Goff MD;  Location: [facility] OR;  Service: General;  Laterality: N/A;    INJECTION OF ANESTHETIC AGENT AROUND GANGLION IMPAR N/A 03/15/2021    Procedure: BLOCK, GANGLION IMPAR;  Surgeon: Ruben Cabrera MD;  [facility];  Service: Pain Management;  Laterality: N/A;    INJECTION OF ANESTHETIC AGENT AROUND PUDENDAL NERVE N/A 09/10/2021    Procedure: BLOCK, NERVE, PUDENDAL;  Surgeon: Kadeem Choi MD;  Location: [facility] OR;  Service: General;  Laterality: N/A;    INJECTION OF ANESTHETIC AGENT AROUND PUDENDAL NERVE N/A 01/06/2022    Procedure: BLOCK, NERVE, PUDENDAL;  Surgeon: Kadeem Choi MD;  Location: [facility] OR;  Service: General;  Laterality: N/A;    INJECTION OF ANESTHETIC AGENT AROUND PUDENDAL NERVE N/A 08/24/2023    Procedure: BLOCK, NERVE, PUDENDAL;  Surgeon: Kadeem Choi MD;  Location: [facility] OR;  Service: General;  Laterality: N/A;    INJECTION OF ANESTHETIC AGENT INTO TISSUE PLANE DEFINED BY TRANSVERSUS ABDOMINIS MUSCLE N/A 01/30/2020    Procedure: BLOCK, TRANSVERSUS ABDOMINIS PLANE;  Surgeon: Kadeem Choi MD;  Location: [facility];  Service: General;  Laterality: N/A;     KNEE SURGERY Right     KNEE SURGERY Left     LYSIS OF ADHESIONS N/A 01/30/2020    Procedure: LYSIS, ADHESIONS;  Surgeon: Kadeem Choi MD;  Location: [facility] OR;  Service: General;  Laterality: N/A;    PILONIDAL CYST DRAINAGE      pt has had 6 of these adn has had revisions    PROCTECTOMY N/A 01/30/2020    Procedure: PROCTECTOMY;  Surgeon: Kadeem Choi MD;  Location: [facility] [place] OR;  Service: General;  Laterality: N/A;  Partial    SINUSOTOMY, SPHENOID SINUS, ENDOSCOPIC Right 02/15/2023    Procedure: SINUSOTOMY, SPHENOID SINUS, ENDOSCOPIC;  Surgeon: Uriel Vasquez MD;  Location: [facility] OR;  Service: ENT;  Laterality: Right;    SUBTOTAL COLECTOMY  01/30/2020    Procedure: COLECTOMY, PARTIAL;  Surgeon: Kadeem Choi MD;  Location: [facility] [place] OR;  Service: General;;     Social History[1]  Family History   Problem Relation Name Age of Onset    Diabetes Mother      Colon polyps Mother      Alzheimer's disease Mother      Heart disease Father          CABG age 66    Cancer Father          melanoma    Diabetes Father      Anesthesia problems Father          d/t polio    Colon polyps Sister      Diabetes Maternal Grandmother      Colon cancer Maternal Grandmother      Diabetes Maternal Grandfather      Diabetes Paternal Grandmother      Stroke Paternal Grandmother      Diabetes Paternal Grandfather         Review of patient's allergies indicates:   Allergen Reactions    Codeine Other (See Comments)     violent    Iodinated contrast media Swelling     Tongue, right eye, lips swelling. Rash on abdomen and axilla    Nuts [tree nut] Anaphylaxis    Dairy aid [lactase] Diarrhea and Nausea And Vomiting    Morphine      Tolerated hydromorphone in October 2019.        Current Outpatient Medications   Medication Sig    acetaminophen (TYLENOL) 325 MG tablet Take 325 mg by mouth every 6 (six) hours as needed for Pain.    ARIPiprazole (ABILIFY) 10 MG Tab Take 1 tablet (10 mg total) by mouth every evening.    clotrimazole-betamethasone  1-0.05% (LOTRISONE) cream Apply topically 2 (two) times daily.    cyclobenzaprine (FLEXERIL) 10 MG tablet Take 1 tablet (10 mg total) by mouth 3 (three) times daily.    dextroamphetamine-amphetamine (ADDERALL) 10 mg Tab Take 1 tablet (10 mg total) by mouth 2 (two) times a day.    EScitalopram oxalate (LEXAPRO) 20 MG tablet Take 1 tablet (20 mg total) by mouth every evening.    gabapentin (NEURONTIN) 400 MG capsule Take 1 capsule (400 mg total) by mouth 3 (three) times daily.    HYDROcodone-acetaminophen (NORCO) 7.5-325 mg per tablet Take 1 tablet by mouth every 6 (six) hours as needed for Pain.    ketorolac (TORADOL) 10 mg tablet Take 1 tablet (10 mg total) by mouth 3 (three) times daily as needed for Pain.    metoprolol succinate (TOPROL-XL) 50 MG 24 hr tablet Take 1 tablet (50 mg total) by mouth once daily.    mupirocin (BACTROBAN) 2 % ointment Apply topically 3 (three) times daily.    sildenafiL (VIAGRA) 100 MG tablet Take ½-1 tablets ( mg total) by mouth daily as needed for Erectile Dysfunction.     No current facility-administered medications for this visit.       Review of Systems     GENERAL:  No weight loss, malaise or fevers.  HEENT:   No recent changes in vision or hearing  NECK:  Negative for lumps, no difficulty with swallowing.  RESPIRATORY:  Negative for cough, wheezing or shortness of breath, patient denies any recent URI.  CARDIOVASCULAR:  Negative for chest pain, leg swelling or palpitations.  GI:  Negative for abdominal discomfort, blood in stools or black stools or change in bowel habits.  MUSCULOSKELETAL:  See HPI.  SKIN:  Negative for lesions, rash, and itching.  PSYCH:  No mood disorder or recent psychosocial stressors.  Patients sleep is not disturbed secondary to pain.  HEMATOLOGY/LYMPHOLOGY:  Negative for prolonged bleeding, bruising easily or swollen nodes.  Patient is not currently taking any anti-coagulants  NEURO:   No history of headaches, syncope, paralysis, seizures or  "tremors.  All other reviewed and negative other than HPI.    OBJECTIVE:    /80   Pulse 86   Ht 5' 9" (1.753 m)   Wt 116.4 kg (256 lb 9.9 oz)   BMI 37.90 kg/m²     Physical Exam    Last office visit exam 5/13/25--  GENERAL: Well appearing, in no acute distress, alert and oriented x3.  PSYCH:  Mood and affect appropriate.  SKIN: Skin color, texture, turgor normal, no rashes or lesions.  HEAD/FACE:  Normocephalic, atraumatic. Cranial nerves grossly intact.  NECK: No pain to palpation over the cervical paraspinous muscles. Spurling Negative. No pain with neck flexion, extension, or lateral flexion.   CV: RRR with palpation of the radial artery.  PULM: No evidence of respiratory difficulty, symmetric chest rise.  GI:  Soft and non-tender.  BACK: Straight leg raising in the sitting and supine positions is positive to radicular pain on the left. No pain to palpation over the facet joints of the lumbar spine or spinous processes. There is pain with lumbar flexion, extension.  EXTREMITIES: Peripheral joint ROM is full and pain free without obvious instability or laxity in all four extremities. No deformities, edema, or skin discoloration. Good capillary refill.  MUSCULOSKELETAL: Shoulder and knee provocative maneuvers are negative.  There is mild pain with palpation over the sacroiliac joint on the left side.  FABERs test is positive on the left.  Bilateral upper and lower extremity strength is normal and symmetric.  No atrophy or tone abnormalities are noted.  NEURO: Bilateral upper and lower extremity coordination and muscle stretch reflexes are physiologic and symmetric.  Plantar response are downgoing. No clonus.  No loss of sensation is noted.  GAIT: normal.Able to heel, tip toe walk.      LABS:  Lab Results   Component Value Date    WBC 6.94 11/01/2024    HGB 13.7 (L) 11/01/2024    HCT 41.5 11/01/2024    MCV 88 11/01/2024     11/01/2024       CMP  Sodium   Date Value Ref Range Status   11/01/2024 137 " 136 - 145 mmol/L Final     Potassium   Date Value Ref Range Status   11/01/2024 4.1 3.5 - 5.1 mmol/L Final     Chloride   Date Value Ref Range Status   11/01/2024 105 95 - 110 mmol/L Final     CO2   Date Value Ref Range Status   11/01/2024 24 23 - 29 mmol/L Final     Glucose   Date Value Ref Range Status   11/01/2024 102 70 - 110 mg/dL Final     BUN   Date Value Ref Range Status   11/01/2024 13 6 - 20 mg/dL Final     Creatinine   Date Value Ref Range Status   11/01/2024 0.9 0.5 - 1.4 mg/dL Final     Calcium   Date Value Ref Range Status   11/01/2024 9.2 8.7 - 10.5 mg/dL Final     Total Protein   Date Value Ref Range Status   11/01/2024 7.2 6.0 - 8.4 g/dL Final     Albumin   Date Value Ref Range Status   11/01/2024 4.1 3.5 - 5.2 g/dL Final   01/24/2024 4.5 3.6 - 5.1 g/dL Final     Comment:     Test Performed at:  [facility] [address]  [id number]     NIYAH Maxwell MD, PhD, JEOVANY       Total Bilirubin   Date Value Ref Range Status   11/01/2024 0.4 0.1 - 1.0 mg/dL Final     Comment:     For infants and newborns, interpretation of results should be based  on gestational age, weight and in agreement with clinical  observations.    Premature Infant recommended reference ranges:  Up to 24 hours.............<8.0 mg/dL  Up to 48 hours............<12.0 mg/dL  3-5 days..................<15.0 mg/dL  6-29 days.................<15.0 mg/dL       Alkaline Phosphatase   Date Value Ref Range Status   11/01/2024 75 40 - 150 U/L Final     AST   Date Value Ref Range Status   11/01/2024 22 10 - 40 U/L Final     ALT   Date Value Ref Range Status   11/01/2024 31 10 - 44 U/L Final     Anion Gap   Date Value Ref Range Status   11/01/2024 8 8 - 16 mmol/L Final     eGFR if    Date Value Ref Range Status   11/10/2021 >60.0 >60 mL/min/1.73 m^2 Final     eGFR if non    Date Value Ref Range Status   11/10/2021 >60.0 >60 mL/min/1.73 m^2 Final     Comment:      Calculation used to obtain the estimated glomerular filtration  rate (eGFR) is the CKD-EPI equation.          Lab Results   Component Value Date    HGBA1C 5.2 11/01/2024       ASSESSMENT: 49 y.o. year old male with lower back pain, consistent with     1. Degeneration of intervertebral disc of lumbar region with discogenic back pain  Ambulatory Referral/Consult to Physical Therapy    gabapentin (NEURONTIN) 400 MG capsule      2. Facet arthritis of lumbosacral region  gabapentin (NEURONTIN) 400 MG capsule      3. Dorsalgia, unspecified  Ambulatory Referral/Consult to Physical Therapy    gabapentin (NEURONTIN) 400 MG capsule      4. Lumbar radiculopathy, chronic  Ambulatory Referral/Consult to Physical Therapy    gabapentin (NEURONTIN) 400 MG capsule      5. Chronic pain syndrome  gabapentin (NEURONTIN) 400 MG capsule              PLAN:   - Interventions: Consider Left Lumbar L4/5 + L5/S1 TF FAM in the future. Will hold for now and focus on conservative efforts including physical therapy .    - Anticoagulation use: None    - Medications: I have stressed the importance of physical activity and a home exercise plan to help with pain and improve health. and Patient can continue with medications for now since they are providing benefits, using them appropriately, and without side effects.       Continue Norco 7.5/325 mg per PCP.    - continue gabapentin 400 mg TID. We will further increase if does not provide adequate relief. Potential side effects of this medication include daytime somnolence, weight gain and peripheral edema. 90 day supply given today.    - Continue Flexeril 10 mg TID PRN.     report:  Reviewed and consistent with medication use as prescribed.      - Therapy: continue activities as tolerated. Referral to outpatient physical therapy at [facility] for evaluation, treatment and dry needling.    - Imaging: Reviewed available imaging with patient and answered any questions they had regarding study.  MRI significant for: Asymmetric to left disc bulge L4-5 with possible contact with exiting left L4 nerve root.  Moderate central canal stenosis and mild neural foraminal stenosis.  Asymmetric to the left generalized disc bulging L5-S1 with impingement of descending left S1 nerve root.  Mild contact right S1 nerve root.      - Consults/Referrals: Referral to physical therapy .    - Records:  Reviewed/Obtain old records from outside physicians and imaging    - Follow up visit: return to clinic 6 weeks after physical therapy or as needed.    - Counseled patient regarding the importance of activity modification, constant sleeping habits, and physical therapy    - This condition does not require this patient to take time off of work, and the primary goal of our Pain Management services is to improve the patient's functional capacity.    - Patient Questions: Answered all of the patient's questions regarding diagnosis, therapy, and treatment        The above plan and management options were discussed at length with patient. Patient is in agreement with the above and verbalized understanding.    I discussed the goals of interventional chronic pain management with the patient on today's visit.  I explained the utility of injections for diagnostic and therapeutic purposes.  We discussed a multimodal approach to pain including treating the patient's given worst pain at any given time.  We will use a systematic approach to addressing pain.  We will also adopt a multimodal approach that includes injections, adjuvant medications, physical therapy, at times psychiatry.  There may be a limited role for opioid use intermittently in the treatment of pain, more particularly for acute pain although no one approach can be used as a sole treatment modality.    I emphasized the importance of regular exercise, core strengthening and stretching, diet and weight loss as a cornerstone of long-term pain management.      Tiffany Hogan,  ISRRAEL  Interventional Pain Management  [facility]             [1]   Social History  Socioeconomic History    Marital status:      Spouse name: Mary Alice    Number of children: 0   Tobacco Use    Smoking status: Never    Smokeless tobacco: Never   Substance and Sexual Activity    Alcohol use: Never    Drug use: No    Sexual activity: Yes     Partners: Female   Social History Narrative    Live w/ spouse and  1 dog      Social Drivers of Health     Stress: No Stress Concern Present (12/18/2019)    [employer] - Occupational Stress Questionnaire     Feeling of Stress : Only a little

## 2025-07-02 DIAGNOSIS — F98.8 ATTENTION DEFICIT DISORDER (ADD) WITHOUT HYPERACTIVITY: Chronic | ICD-10-CM

## 2025-07-02 DIAGNOSIS — G89.4 CHRONIC PAIN SYNDROME: ICD-10-CM

## 2025-07-02 NOTE — TELEPHONE ENCOUNTER
No care due was identified.  [facility] Embedded Care Due Messages. Reference number: [id number].   7/02/2025 5:53:34 PM CDT

## 2025-07-03 RX ORDER — HYDROCODONE BITARTRATE AND ACETAMINOPHEN 7.5; 325 MG/1; MG/1
1 TABLET ORAL EVERY 6 HOURS PRN
Qty: 120 TABLET | Refills: 0 | Status: SHIPPED | OUTPATIENT
Start: 2025-07-03

## 2025-07-03 RX ORDER — DEXTROAMPHETAMINE SACCHARATE, AMPHETAMINE ASPARTATE, DEXTROAMPHETAMINE SULFATE AND AMPHETAMINE SULFATE 2.5; 2.5; 2.5; 2.5 MG/1; MG/1; MG/1; MG/1
10 TABLET ORAL 2 TIMES DAILY
Qty: 60 TABLET | Refills: 0 | Status: SHIPPED | OUTPATIENT
Start: 2025-07-03

## 2025-07-22 ENCOUNTER — OFFICE VISIT (OUTPATIENT)
Dept: INTERNAL MEDICINE | Facility: CLINIC | Age: 50
End: 2025-07-22
Payer: COMMERCIAL

## 2025-07-22 DIAGNOSIS — F98.8 ATTENTION DEFICIT DISORDER (ADD) WITHOUT HYPERACTIVITY: Primary | Chronic | ICD-10-CM

## 2025-07-22 DIAGNOSIS — F32.5 MAJOR DEPRESSIVE DISORDER WITH SINGLE EPISODE, IN REMISSION: ICD-10-CM

## 2025-07-22 DIAGNOSIS — E66.01 SEVERE OBESITY (BMI 35.0-39.9) WITH COMORBIDITY: Chronic | ICD-10-CM

## 2025-07-22 DIAGNOSIS — G47.33 OBSTRUCTIVE SLEEP APNEA SYNDROME: ICD-10-CM

## 2025-07-22 DIAGNOSIS — R39.198 DIFFICULTY URINATING: ICD-10-CM

## 2025-07-22 DIAGNOSIS — H60.312 CHRONIC DIFFUSE OTITIS EXTERNA OF LEFT EAR: ICD-10-CM

## 2025-07-22 DIAGNOSIS — G89.4 CHRONIC PAIN SYNDROME: ICD-10-CM

## 2025-07-22 PROCEDURE — 1160F RVW MEDS BY RX/DR IN RCRD: CPT | Mod: CPTII,95,, | Performed by: FAMILY MEDICINE

## 2025-07-22 PROCEDURE — 98006 SYNCH AUDIO-VIDEO EST MOD 30: CPT | Mod: 95,,, | Performed by: FAMILY MEDICINE

## 2025-07-22 PROCEDURE — 1159F MED LIST DOCD IN RCRD: CPT | Mod: CPTII,95,, | Performed by: FAMILY MEDICINE

## 2025-07-22 RX ORDER — HYDROCODONE BITARTRATE AND ACETAMINOPHEN 7.5; 325 MG/1; MG/1
1 TABLET ORAL EVERY 6 HOURS PRN
Qty: 120 TABLET | Refills: 0 | Status: SHIPPED | OUTPATIENT
Start: 2025-08-05

## 2025-07-22 RX ORDER — CIPROFLOXACIN AND DEXAMETHASONE 3; 1 MG/ML; MG/ML
4 SUSPENSION/ DROPS AURICULAR (OTIC) 2 TIMES DAILY
Qty: 7.5 ML | Refills: 0 | Status: SHIPPED | OUTPATIENT
Start: 2025-07-22

## 2025-07-22 RX ORDER — DEXTROAMPHETAMINE SACCHARATE, AMPHETAMINE ASPARTATE, DEXTROAMPHETAMINE SULFATE AND AMPHETAMINE SULFATE 2.5; 2.5; 2.5; 2.5 MG/1; MG/1; MG/1; MG/1
10 TABLET ORAL 2 TIMES DAILY
Qty: 60 TABLET | Refills: 0 | Status: SHIPPED | OUTPATIENT
Start: 2025-09-02

## 2025-07-22 RX ORDER — ARIPIPRAZOLE 10 MG/1
10 TABLET ORAL NIGHTLY
Qty: 30 TABLET | Refills: 11 | Status: SHIPPED | OUTPATIENT
Start: 2025-07-22 | End: 2026-07-22

## 2025-07-22 RX ORDER — DEXTROAMPHETAMINE SACCHARATE, AMPHETAMINE ASPARTATE, DEXTROAMPHETAMINE SULFATE AND AMPHETAMINE SULFATE 2.5; 2.5; 2.5; 2.5 MG/1; MG/1; MG/1; MG/1
10 TABLET ORAL 2 TIMES DAILY
Qty: 60 TABLET | Refills: 0 | Status: SHIPPED | OUTPATIENT
Start: 2025-09-30

## 2025-07-22 RX ORDER — DEXTROAMPHETAMINE SACCHARATE, AMPHETAMINE ASPARTATE, DEXTROAMPHETAMINE SULFATE AND AMPHETAMINE SULFATE 2.5; 2.5; 2.5; 2.5 MG/1; MG/1; MG/1; MG/1
10 TABLET ORAL 2 TIMES DAILY
Qty: 60 TABLET | Refills: 0 | Status: SHIPPED | OUTPATIENT
Start: 2025-08-05

## 2025-07-22 RX ORDER — TIRZEPATIDE 2.5 MG/.5ML
2.5 INJECTION, SOLUTION SUBCUTANEOUS
Qty: 4 PEN | Refills: 0 | Status: SHIPPED | OUTPATIENT
Start: 2025-07-22

## 2025-07-22 NOTE — PROGRESS NOTES
Subjective:   Patient ID: Dax Carlisle is a 49 y.o. male.  Chief Complaint:  Medication Refill and Ear Drainage    The patient location is: [place]  The chief complaint leading to consultation is: Follow Up    Visit type: audiovisual    Face to Face time with patient: 20 minutes  30 minutes of total time spent on the encounter, which includes face to face time and non-face to face time preparing to see the patient (eg, review of tests), Obtaining and/or reviewing separately obtained history, Documenting clinical information in the electronic or other health record, Independently interpreting results (not separately reported) and communicating results to the patient/family/caregiver, or Care coordination (not separately reported).     Each patient to whom he or she provides medical services by telemedicine is:  (1) informed of the relationship between the physician and patient and the respective role of any other health care provider with respect to management of the patient; and (2) notified that he or she may decline to receive medical services by telemedicine and may withdraw from such care at any time.    - Attention deficit disorder (ADD) without hyperactivity  On Adderall 10 mg twice a day   Remains effective with symptoms controlled on present medication and dose helping compensate for deficits at work/school.  Duration is appropriate.    No mood instability, tics, or disordered sleep, or other apparent adverse effects.    Tolerating current treatment well.   No behaviors to suggest inappropriate use of prescribed medications.  [place] Board [employer] Pharm[employer] Controlled Prescription Drug Monitoring database was queried and showed no activity to suggest abuse, diversion, or other inappropriate use of prescription medications.     - Chronic pain syndrome  - Degeneration of intervertebral disc of lumbar region with discogenic back pain  - Facet arthritis of lumbosacral region  Increase in pain pattern last  visit   Referral to interventional pain management   Added gabapentin 400 mg 3 times a day   Continue Norco 7.5/325 mg every 6 hours as needed  Reports overall mild improvement in pain pattern    - Major depressive disorder with single episode, in remission  On Lexapro 20 mg daily and Abilify 10 mg daily   Reports symptoms remain stable and very well controlled on current medication     - Severe obesity   - LUNA   Would like to see if can obtain coverage for Zepbound weekly injections.      New complaint of urinary difficulty  Primarily frequency, nocturia, and inability to control bladder   Unclear if related to enlarged prostate or previous abdominal/colon surgery   Did not tolerate Flomax in past due to orthostasis  PSA November 2024 normal    Finally, recurrent drainage from left ear where he has a PE tube placed with increased pain over the past 4872 hours   Needs refill of his previously prescribed your drops      Otalgia   There is pain in the left ear. This is a recurrent problem. The current episode started in the past 7 days. The problem occurs constantly. The problem has been rapidly worsening. There has been no fever. The fever has been present for Less than 1 day. The pain is at a severity of 3/10. The pain is mild. Associated symptoms include drainage, ear discharge and hearing loss. Pertinent negatives include no abdominal pain, coughing, diarrhea, headaches, neck pain, rash, rhinorrhea, sore throat or vomiting. He has tried acetaminophen and heat packs for the symptoms. The treatment provided mild relief. His past medical history is significant for a chronic ear infection, hearing loss and a tympanostomy tube.     Review of Systems   Constitutional:  Negative for appetite change, fatigue and unexpected weight change.   HENT:  Positive for ear discharge, ear pain and hearing loss. Negative for rhinorrhea and sore throat.    Respiratory:  Negative for cough and shortness of breath.    Cardiovascular:   Negative for chest pain, palpitations and leg swelling.   Gastrointestinal:  Negative for abdominal pain, constipation, diarrhea, nausea and vomiting.   Endocrine: Positive for polyuria. Negative for polydipsia and polyphagia.   Genitourinary:  Positive for difficulty urinating, frequency and urgency. Negative for decreased urine volume, dysuria, enuresis, flank pain, genital sores, hematuria, penile discharge, penile pain, penile swelling, scrotal swelling and testicular pain.   Musculoskeletal:  Positive for back pain. Negative for neck pain.   Skin:  Negative for rash.   Neurological:  Negative for light-headedness and headaches.   Psychiatric/Behavioral:  Negative for agitation, behavioral problems, confusion, decreased concentration, dysphoric mood, hallucinations and sleep disturbance. The patient is not nervous/anxious and is not hyperactive.      Objective:     Physical Exam  Constitutional:       Appearance: Normal appearance. He is obese.   Psychiatric:         Attention and Perception: Attention and perception normal. He is attentive.         Mood and Affect: Mood and affect normal.         Speech: Speech normal.         Behavior: Behavior normal. Behavior is not hyperactive. Behavior is cooperative.         Thought Content: Thought content normal.         Cognition and Memory: Cognition and memory normal.         Judgment: Judgment normal.       Assessment:       ICD-10-CM ICD-9-CM   1. Attention deficit disorder (ADD) without hyperactivity  F98.8 314.00   2. Chronic pain syndrome  G89.4 338.4   3. Major depressive disorder with single episode, in remission  F32.5 296.25   4. Chronic diffuse otitis externa of left ear  H60.312 380.23   5. Difficulty urinating  R39.198 788.99   6. Severe obesity (BMI 35.0-39.9) with comorbidity  E66.01 278.01   7. Obstructive sleep apnea syndrome  G47.33 327.23     Plan:   Attention deficit disorder (ADD) without hyperactivity  -     dextroamphetamine-amphetamine  (ADDERALL) 10 mg Tab; Take 1 tablet (10 mg total) by mouth 2 (two) times a day.  Dispense: 60 tablet; Refill: 0  -     dextroamphetamine-amphetamine (ADDERALL) 10 mg Tab; Take 1 tablet (10 mg total) by mouth 2 (two) times a day.  Dispense: 60 tablet; Refill: 0  -     dextroamphetamine-amphetamine (ADDERALL) 10 mg Tab; Take 1 tablet (10 mg total) by mouth 2 (two) times a day.  Dispense: 60 tablet; Refill: 0  ADHD stable, no side effects, symptoms well controlled on current medication  Continue stimulant at present dose    Chronic pain syndrome  -     HYDROcodone-acetaminophen (NORCO) 7.5-325 mg per tablet; Take 1 tablet by mouth every 6 (six) hours as needed for Pain.  Dispense: 120 tablet; Refill: 0  Pain and symptoms remain well controlled on current medication regimen  Continue gabapentin 400 mg 3 times a day   Follow-up interventional pain management as scheduled  Continue Norco 7.5/325 every 6 hours as needed for pain  Averaging 4 per day and stable pattern  Risk of discontinuation of long-term narcotics higher than risk of continuing long-term narcotics at current dose    Major depressive disorder with single episode, in remission  -     ARIPiprazole (ABILIFY) 10 MG Tab; Take 1 tablet (10 mg total) by mouth every evening.  Dispense: 30 tablet; Refill: 11  Stable, no side effects, mood and symptoms well controlled on present medication .  Continue Lexapro 20 mg daily   Continue Abilify 10 mg daily    Chronic diffuse otitis externa of left ear  -     ciprofloxacin-dexAMETHasone 0.3-0.1% (CIPRODEX) 0.3-0.1 % DrpS; Place 4 drops into both ears 2 (two) times daily.  Dispense: 7.5 mL; Refill: 0    Difficulty urinating  -     Ambulatory referral/consult to Urology; Future; Expected date: 07/29/2025    Severe obesity (BMI 35.0-39.9) with comorbidity  Obstructive sleep apnea syndrome  -     tirzepatide, weight loss, (ZEPBOUND) 2.5 mg/0.5 mL PnIj; Inject 2.5 mg into the skin every 7 days.  Dispense: 4 Pen; Refill:  0    Return to clinic 3 months for annual exam and lab work or sooner as needed

## 2025-07-23 ENCOUNTER — OFFICE VISIT (OUTPATIENT)
Dept: PAIN MEDICINE | Facility: CLINIC | Age: 50
End: 2025-07-23
Payer: COMMERCIAL

## 2025-07-23 VITALS
SYSTOLIC BLOOD PRESSURE: 119 MMHG | DIASTOLIC BLOOD PRESSURE: 83 MMHG | WEIGHT: 256.19 LBS | HEIGHT: 69 IN | BODY MASS INDEX: 37.95 KG/M2 | HEART RATE: 84 BPM

## 2025-07-23 DIAGNOSIS — M51.360 DEGENERATION OF INTERVERTEBRAL DISC OF LUMBAR REGION WITH DISCOGENIC BACK PAIN: ICD-10-CM

## 2025-07-23 DIAGNOSIS — M47.817 FACET ARTHRITIS OF LUMBOSACRAL REGION: ICD-10-CM

## 2025-07-23 DIAGNOSIS — M54.9 DORSALGIA, UNSPECIFIED: Primary | ICD-10-CM

## 2025-07-23 PROCEDURE — 3008F BODY MASS INDEX DOCD: CPT | Mod: CPTII,S$GLB,, | Performed by: PHYSICIAN ASSISTANT

## 2025-07-23 PROCEDURE — 99212 OFFICE O/P EST SF 10 MIN: CPT | Mod: S$GLB,,, | Performed by: PHYSICIAN ASSISTANT

## 2025-07-23 PROCEDURE — 1159F MED LIST DOCD IN RCRD: CPT | Mod: CPTII,S$GLB,, | Performed by: PHYSICIAN ASSISTANT

## 2025-07-23 PROCEDURE — 99999 PR PBB SHADOW E&M-EST. PATIENT-LVL III: CPT | Mod: PBBFAC,,, | Performed by: PHYSICIAN ASSISTANT

## 2025-07-23 PROCEDURE — 3079F DIAST BP 80-89 MM HG: CPT | Mod: CPTII,S$GLB,, | Performed by: PHYSICIAN ASSISTANT

## 2025-07-23 PROCEDURE — 3074F SYST BP LT 130 MM HG: CPT | Mod: CPTII,S$GLB,, | Performed by: PHYSICIAN ASSISTANT

## 2025-07-23 PROCEDURE — G2211 COMPLEX E/M VISIT ADD ON: HCPCS | Mod: S$GLB,,, | Performed by: PHYSICIAN ASSISTANT

## 2025-07-23 NOTE — PROGRESS NOTES
Established Patient Chronic Pain Note (Follow up Visit)    Referring Physician: No ref. provider found    PCP: Cody Enamorado MD    Chief Complaint:   Chief Complaint   Patient presents with    Back Pain        SUBJECTIVE:    Interval History (7/23/2025):  Dax Carlisle presents today for follow-up visit.  Patient was last seen on 6/11/2025. At that visit, the plan was to refer to outpatient physical therapy . Patient reports pain as 2/10 today.Patient states since starting physical therapy he is doing so much better. Pain with LLE has resolved after three weeks of therapy.   He denies any new changes or symptoms at this time.    Interval History (6/11/2025):  Dax Carlisle presents today for follow-up visit.  Patient was last seen on 5/13/2025. At that visit, the plan was to complete imaging studies and start Gabapentin. Patient reports pain as 5/10 today. He denies recent changes following his last visit.   He is currently taking Gabapentin 400 mg TID and Flexeril TID. Reports good relief with medications so far. Denies unpleasant side effects.    Initial HPI (5/13/2025):  Dax Carlisle is a 49 y.o. male who presents to the clinic for the evaluation of lower back pain. The pain started 27 years ago following powerlifting and symptoms have been worsening.The pain is located in the left lumbar area and radiates to the LLE (back of the leg down to the knee).  The pain is described as sharp and is rated as 5/10. The pain is rated with a score of  4/10 on the BEST day and a score of 7/10 on the WORST day.  Symptoms interfere with daily activity, sleeping, and work. The pain is exacerbated by Standing, Lifting, and sleeping on the left side.  If he lies on the Left side, he can barely put any weight on the leg. He has to sit and sleep on this right side. The pain is mitigated by chiropractor.       Patient denies bowel incontinence, significant weight loss, and loss of sensations. He has a  "h/o perforated bowel with reconstruction.   He has noticed that whenever he feels the urge to urinate, he has to go to the restroom quickly.      Patient is employed in security at [employer]. He frequently is up/down stairs, crawling, etc.     Pain Disability Index Review:         5/13/2025     9:44 AM 9/19/2019    12:06 PM   Last 3 PDI Scores   Pain Disability Index (PDI) 35 46       Non-Pharmacologic Treatments:  Physical Therapy/Home Exercise: yes  Ice/Heat:yes  TENS:yes- temporary relief ("only good when it's on")  Acupuncture: yes  Massage: no  Chiropractic: yes    Other: foam roller      Pain Medications:  - Opioids: Norco  - Adjuvant Medications: Alleve (Naproxen) and Toradol, Flexeril, lidoderm patches     report:  Reviewed and consistent with medication use as prescribed.    Pain Procedures:   No previous lumbar surgery    Previous ganglion impar block- [facility] [address] Dr[address]  -3/15/2021: ganglion impar block, minimal relief for a few days        Imaging/ Diagnostic Studies/ Labs (Reviewed on 7/23/2025):      Results for orders placed during the hospital encounter of 06/04/25    MRI Lumbar Spine Without Contrast    Narrative  EXAM: MRI LUMBAR SPINE WITHOUT CONTRAST    CLINICAL HISTORY: Lower back pain, symptoms persist, greater than 6 weeks, conservative treatment.  Lumbar radiculopathy.  Chronic pain syndrome.    TECHNIQUE: Standard multiplanar pulse sequences obtained without IV contrast.    COMPARISON: None.    FINDINGS:    5 lumbar type vertebral bodies. Normal anatomic alignment. Normal lumbar vertebral body heights. Mild Modic type II degenerative endplate marrow changes L3-4, L4-5 and L5-S1.  No fracture.  No suspicious osseous lesion. Visualized distal spinal cord and conus medullaris are normal.  Conus terminates at the L1 L1-L2 level.    T12-L1: Normal.    L1-L2: Normal.    L2-L3: Normal.    L3-L4: Minimal disc bulging and mild facet arthropathy.  Central canal patent.  Mild neural " foraminal narrowing bilaterally.    L4-L5: Desiccation with mild generalized disc bulging, asymmetric to the left.  Possible contact with exiting left L4 nerve root.  Mild facet arthropathy and mild ligamentum flavum hypertrophy.  Mild posterior pleural lipomatosis.  Moderate central canal narrowing with AP diameter of the dural sac reduced to 7.1 mm.  Mild bilateral neural foraminal stenosis, worse on the left    L5-S1: Generalized disc bulging, slightly asymmetric to the left with impingement of the descending left S1 nerve root and mild contact descending right S1 nerve root.  Mild facet arthropathy.  Central canal patent.  Mild left and moderate right neural foraminal stenosis.    Other findings: Soft tissues and musculature are normal.  No evidence of muscle strain.  Visualized intra-abdominal and intrapelvic contents are normal.    Impression  Asymmetric to left disc bulge L4-5 with possible contact with exiting left L4 nerve root.  Moderate central canal stenosis and mild neural foraminal stenosis.    Asymmetric to the left generalized disc bulging L5-S1 with impingement of descending left S1 nerve root.  Mild contact right S1 nerve root.    Finalized on: 6/5/2025 9:27 AM By:  Latrell Swan MD  [facility]# [id number]      2025-06-05 09:29:27.725     [facility]       Hip X-rays  5/13/2025  EXAM: XR HIPS BILATERAL 2 VIEW INCL AP PELVIS     CLINICAL HISTORY: Chronic bilateral hip pain     FINDINGS:  No pelvic fracture or dislocation.  There is no fracture or dislocation or arthritic change or avascular process of the femoral head of either hip.        Impression:   Normal pelvic and bilateral hip x-rays.     Finalized on: 5/13/2025 8:15 PM By:  Enrrique Gutierrez MD  [facility]# [id number]      2025-05-13 20:17:18.222     [facility]    Thoracolumbar X-rays  11/23/2021  XR THORACOLUMBAR SPINE AP LATERAL     CLINICAL HISTORY:  Wedge compression fracture of T10 vertebra, subsequent encounter for fracture with routine healing      TECHNIQUE:  AP/lateral views the thoracolumbar spine.     COMPARISON:  12/05/2019     FINDINGS:  Vertebral body heights unchanged.  Stable mild compression deformity of T10 noted.  No acute fracture appreciated.  No spondylolisthesis.  L5-S1 mild degenerative disc height loss present.  Lower lumbar spine facet arthropathy noted.  Soft tissues unremarkable.     Impression:     As above        Electronically signed by:Garfield Samson MD  Date:                                            11/23/2021  Time:                                           16:56    Past Medical History:   Diagnosis Date    Angioedema of lips 03/13/2015    IVP dye allergy - swelling lips, eye, tongue    Asthma     childhood    Attention deficit disorder (ADD)     Depression     Diverticulitis 10/07/2019    Kidney stones     Pain     Sleep apnea     cpap not required/ patient states this has resolved with weight loss    SVT (supraventricular tachycardia)      Past Surgical History:   Procedure Laterality Date    ABDOMINAL WASHOUT N/A 10/07/2019    Procedure: LAVAGE, PERITONEAL, THERAPEUTIC;  Surgeon: Mindy Goff MD;  Location: [facility] OR;  Service: General;  Laterality: N/A;  abdomen washout    ABSCESS DRAINAGE N/A 09/10/2021    Procedure: DRAINAGE, ABSCESS;  Surgeon: Kadeem Choi MD;  Location: [facility] OR;  Service: General;  Laterality: N/A;  perirectal    anal fistula repair  10/2023    APPENDECTOMY      BACK SURGERY      BIOPSY, INTRANASAL, ENDOSCOPIC N/A 02/15/2023    Procedure: BIOPSY, INTRANASAL, ENDOSCOPIC;  Surgeon: Uriel Vasquez MD;  Location: [facility] OR;  Service: ENT;  Laterality: N/A;    CHOLECYSTECTOMY  2005    COLONOSCOPY N/A 03/10/2016    Procedure: COLONOSCOPY;  Surgeon: Juvenal Dinero MD;  Location: [facility] ENDO;  Service: Endoscopy;  Laterality: N/A;    COLONOSCOPY N/A 01/17/2020    Procedure: COLONOSCOPY;  Surgeon: Kadeem Choi MD;  Location: [facility] ENDO;  Service: General;  Laterality: N/A;    COLOSTOMY N/A  10/07/2019    Procedure: CREATION, COLOSTOMY;  Surgeon: Mindy Goff MD;  Location: [facility] OR;  Service: General;  Laterality: N/A;    COLOSTOMY REVERSAL      EXAMINATION UNDER ANESTHESIA N/A 09/10/2021    Procedure: Exam under anesthesia;  Surgeon: Kadeem Choi MD;  Location: [facility] OR;  Service: General;  Laterality: N/A;    EXAMINATION UNDER ANESTHESIA N/A 01/06/2022    Procedure: Exam under anesthesia, perirectal abscess I&D;  Surgeon: Kadeem Choi MD;  Location: [facility] OR;  Service: General;  Laterality: N/A;    EXAMINATION UNDER ANESTHESIA N/A 08/24/2023    Procedure: EXAM UNDER ANESTHESIA;  Surgeon: Kadeem Choi MD;  Location: [facility] OR;  Service: General;  Laterality: N/A;  Exam under anesthesia, possible fistulotomy, possible seton placement (prone)    FISTULOTOMY  08/24/2023    Procedure: FISTULOTOMY;  Surgeon: Kadeem Choi MD;  Location: [facility] OR;  Service: General;;    FUNCTIONAL ENDOSCOPIC SINUS SURGERY (FESS) USING COMPUTER-ASSISTED NAVIGATION N/A 02/15/2023    Procedure: FESS, USING COMPUTER-ASSISTED NAVIGATION;  Surgeon: Uriel Vasquez MD;  Location: [facility] OR;  Service: ENT;  Laterality: N/A;    SIVAKUMAR PROCEDURE N/A 10/07/2019    Procedure: SIVAKUMAR PROCEDURE;  Surgeon: Mindy Goff MD;  Location: [facility] OR;  Service: General;  Laterality: N/A;    INJECTION OF ANESTHETIC AGENT AROUND GANGLION IMPAR N/A 03/15/2021    Procedure: BLOCK, GANGLION IMPAR;  Surgeon: Ruben Cabrera MD;  Location: [facility] PAIN MGT;  Service: Pain Management;  Laterality: N/A;    INJECTION OF ANESTHETIC AGENT AROUND PUDENDAL NERVE N/A 09/10/2021    Procedure: BLOCK, NERVE, PUDENDAL;  Surgeon: Kadeem Choi MD;  Location: [facility] OR;  Service: General;  Laterality: N/A;    INJECTION OF ANESTHETIC AGENT AROUND PUDENDAL NERVE N/A 01/06/2022    Procedure: BLOCK, NERVE, PUDENDAL;  Surgeon: Kadeem Choi MD;  Location: [facility];  Service: General;  Laterality: N/A;    INJECTION OF ANESTHETIC  AGENT AROUND PUDENDAL NERVE N/A 08/24/2023    Procedure: BLOCK, NERVE, PUDENDAL;  Surgeon: Kadeem Choi MD;  Location: [facility] OR;  Service: General;  Laterality: N/A;    INJECTION OF ANESTHETIC AGENT INTO TISSUE PLANE DEFINED BY TRANSVERSUS ABDOMINIS MUSCLE N/A 01/30/2020    Procedure: BLOCK, TRANSVERSUS ABDOMINIS PLANE;  Surgeon: Kadeem Choi MD;  Location: [facility] OR;  Service: General;  Laterality: N/A;    KNEE SURGERY Right     KNEE SURGERY Left     LYSIS OF ADHESIONS N/A 01/30/2020    Procedure: LYSIS, ADHESIONS;  Surgeon: Kadeem Choi MD;  Location: [facility] OR;  Service: General;  Laterality: N/A;    PILONIDAL CYST DRAINAGE      pt has had 6 of these adn has had revisions    PROCTECTOMY N/A 01/30/2020    Procedure: PROCTECTOMY;  Surgeon: Kadeem Choi MD;  Location: [facility] OR;  Service: General;  Laterality: N/A;  Partial    SINUSOTOMY, SPHENOID SINUS, ENDOSCOPIC Right 02/15/2023    Procedure: SINUSOTOMY, SPHENOID SINUS, ENDOSCOPIC;  Surgeon: Uriel Vasquez MD;  Location: [facility] OR;  Service: ENT;  Laterality: Right;    SUBTOTAL COLECTOMY  01/30/2020    Procedure: COLECTOMY, PARTIAL;  Surgeon: Kadeem Choi MD;  Location: [facility] OR;  Service: General;;     Social History[1]  Family History   Problem Relation Name Age of Onset    Diabetes Mother      Colon polyps Mother      Alzheimer's disease Mother      Heart disease Father          CABG age 66    Cancer Father          melanoma    Diabetes Father      Anesthesia problems Father          d/t polio    Colon polyps Sister      Diabetes Maternal Grandmother      Colon cancer Maternal Grandmother      Diabetes Maternal Grandfather      Diabetes Paternal Grandmother      Stroke Paternal Grandmother      Diabetes Paternal Grandfather         Review of patient's allergies indicates:   Allergen Reactions    Codeine Other (See Comments)     violent    Iodinated contrast media Swelling     Tongue, right eye, lips swelling. Rash on abdomen and axilla     Nuts [tree nut] Anaphylaxis    Dairy aid [lactase] Diarrhea and Nausea And Vomiting    Morphine      Tolerated hydromorphone in October 2019.        Current Outpatient Medications   Medication Sig    acetaminophen (TYLENOL) 325 MG tablet Take 325 mg by mouth every 6 (six) hours as needed for Pain.    ARIPiprazole (ABILIFY) 10 MG Tab Take 1 tablet (10 mg total) by mouth every evening.    ciprofloxacin-dexAMETHasone 0.3-0.1% (CIPRODEX) 0.3-0.1 % DrpS Place 4 drops into both ears 2 (two) times daily.    clotrimazole-betamethasone 1-0.05% (LOTRISONE) cream Apply topically 2 (two) times daily.    cyclobenzaprine (FLEXERIL) 10 MG tablet Take 1 tablet (10 mg total) by mouth 3 (three) times daily.    [START ON 8/5/2025] dextroamphetamine-amphetamine (ADDERALL) 10 mg Tab Take 1 tablet (10 mg total) by mouth 2 (two) times a day.    [START ON 9/2/2025] dextroamphetamine-amphetamine (ADDERALL) 10 mg Tab Take 1 tablet (10 mg total) by mouth 2 (two) times a day.    [START ON 9/30/2025] dextroamphetamine-amphetamine (ADDERALL) 10 mg Tab Take 1 tablet (10 mg total) by mouth 2 (two) times a day.    EScitalopram oxalate (LEXAPRO) 20 MG tablet Take 1 tablet (20 mg total) by mouth every evening.    gabapentin (NEURONTIN) 400 MG capsule Take 1 capsule (400 mg total) by mouth 3 (three) times daily.    [START ON 8/5/2025] HYDROcodone-acetaminophen (NORCO) 7.5-325 mg per tablet Take 1 tablet by mouth every 6 (six) hours as needed for Pain.    metoprolol succinate (TOPROL-XL) 50 MG 24 hr tablet Take 1 tablet (50 mg total) by mouth once daily.    mupirocin (BACTROBAN) 2 % ointment Apply topically 3 (three) times daily.    sildenafiL (VIAGRA) 100 MG tablet Take ½-1 tablets ( mg total) by mouth daily as needed for Erectile Dysfunction.    tirzepatide, weight loss, (ZEPBOUND) 2.5 mg/0.5 mL PnIj Inject 2.5 mg into the skin every 7 days.     No current facility-administered medications for this visit.       Review of Systems     GENERAL:  " No weight loss, malaise or fevers.  HEENT:   No recent changes in vision or hearing  NECK:  Negative for lumps, no difficulty with swallowing.  RESPIRATORY:  Negative for cough, wheezing or shortness of breath, patient denies any recent URI.  CARDIOVASCULAR:  Negative for chest pain, leg swelling or palpitations.  GI:  Negative for abdominal discomfort, blood in stools or black stools or change in bowel habits.  MUSCULOSKELETAL:  See HPI.  SKIN:  Negative for lesions, rash, and itching.  PSYCH:  No mood disorder or recent psychosocial stressors.  Patients sleep is not disturbed secondary to pain.  HEMATOLOGY/LYMPHOLOGY:  Negative for prolonged bleeding, bruising easily or swollen nodes.  Patient is not currently taking any anti-coagulants  NEURO:   No history of headaches, syncope, paralysis, seizures or tremors.  All other reviewed and negative other than HPI.    OBJECTIVE:    /83   Pulse 84   Ht 5' 9" (1.753 m)   Wt 116.2 kg (256 lb 2.8 oz)   BMI 37.83 kg/m²     Physical Exam    Last office visit exam 5/13/25--  GENERAL: Well appearing, in no acute distress, alert and oriented x3.  PSYCH:  Mood and affect appropriate.  SKIN: Skin color, texture, turgor normal, no rashes or lesions.  HEAD/FACE:  Normocephalic, atraumatic. Cranial nerves grossly intact.  NECK: No pain to palpation over the cervical paraspinous muscles. Spurling Negative. No pain with neck flexion, extension, or lateral flexion.   CV: RRR with palpation of the radial artery.  PULM: No evidence of respiratory difficulty, symmetric chest rise.  GI:  Soft and non-tender.  BACK: Straight leg raising in the sitting and supine positions is NEGATIVE to radicular pain on the left. No pain to palpation over the facet joints of the lumbar spine or spinous processes. There is pain with lumbar flexion, extension.  EXTREMITIES: Peripheral joint ROM is full and pain free without obvious instability or laxity in all four extremities. No deformities, " edema, or skin discoloration. Good capillary refill.  MUSCULOSKELETAL: Shoulder and knee provocative maneuvers are negative.  There is mild pain with palpation over the sacroiliac joint on the left side.  FABERs test is negative on the left.  Bilateral upper and lower extremity strength is normal and symmetric.  No atrophy or tone abnormalities are noted.  NEURO: Bilateral upper and lower extremity coordination and muscle stretch reflexes are physiologic and symmetric.  Plantar response are downgoing. No clonus.  No loss of sensation is noted.  GAIT: normal.Able to heel, tip toe walk.      LABS:  Lab Results   Component Value Date    WBC 6.94 11/01/2024    HGB 13.7 (L) 11/01/2024    HCT 41.5 11/01/2024    MCV 88 11/01/2024     11/01/2024       CMP  Sodium   Date Value Ref Range Status   11/01/2024 137 136 - 145 mmol/L Final     Potassium   Date Value Ref Range Status   11/01/2024 4.1 3.5 - 5.1 mmol/L Final     Chloride   Date Value Ref Range Status   11/01/2024 105 95 - 110 mmol/L Final     CO2   Date Value Ref Range Status   11/01/2024 24 23 - 29 mmol/L Final     Glucose   Date Value Ref Range Status   11/01/2024 102 70 - 110 mg/dL Final     BUN   Date Value Ref Range Status   11/01/2024 13 6 - 20 mg/dL Final     Creatinine   Date Value Ref Range Status   11/01/2024 0.9 0.5 - 1.4 mg/dL Final     Calcium   Date Value Ref Range Status   11/01/2024 9.2 8.7 - 10.5 mg/dL Final     Total Protein   Date Value Ref Range Status   11/01/2024 7.2 6.0 - 8.4 g/dL Final     Albumin   Date Value Ref Range Status   11/01/2024 4.1 3.5 - 5.2 g/dL Final   01/24/2024 4.5 3.6 - 5.1 g/dL Final     Comment:     Test Performed at:  [facility] [address]  [address]     NIYAH Maxwell MD, PhD, JEOVANY       Total Bilirubin   Date Value Ref Range Status   11/01/2024 0.4 0.1 - 1.0 mg/dL Final     Comment:     For infants and newborns, interpretation of results should be based  on  gestational age, weight and in agreement with clinical  observations.    Premature Infant recommended reference ranges:  Up to 24 hours.............<8.0 mg/dL  Up to 48 hours............<12.0 mg/dL  3-5 days..................<15.0 mg/dL  6-29 days.................<15.0 mg/dL       Alkaline Phosphatase   Date Value Ref Range Status   11/01/2024 75 40 - 150 U/L Final     AST   Date Value Ref Range Status   11/01/2024 22 10 - 40 U/L Final     ALT   Date Value Ref Range Status   11/01/2024 31 10 - 44 U/L Final     Anion Gap   Date Value Ref Range Status   11/01/2024 8 8 - 16 mmol/L Final     eGFR if    Date Value Ref Range Status   11/10/2021 >60.0 >60 mL/min/1.73 m^2 Final     eGFR if non    Date Value Ref Range Status   11/10/2021 >60.0 >60 mL/min/1.73 m^2 Final     Comment:     Calculation used to obtain the estimated glomerular filtration  rate (eGFR) is the CKD-EPI equation.          Lab Results   Component Value Date    HGBA1C 5.2 11/01/2024       ASSESSMENT: 49 y.o. year old male with lower back pain, consistent with     1. Dorsalgia, unspecified        2. Facet arthritis of lumbosacral region        3. Degeneration of intervertebral disc of lumbar region with discogenic back pain          PLAN:   - Interventions:  None at this time. Continue physical therapy as this is proving to be effective for patient.    - Anticoagulation use: None    - Medications: I have stressed the importance of physical activity and a home exercise plan to help with pain and improve health. and Patient can continue with medications for now since they are providing benefits, using them appropriately, and without side effects.       - Can Continue Norco 7.5/325 mg per PCP.    - continue gabapentin 400 mg TID. We will further increase if does not provide adequate relief. Potential side effects of this medication include daytime somnolence, weight gain and peripheral edema. If patient decides to wean in  future, he will inform me.    - Continue Flexeril 10 mg TID PRN.     report:  Reviewed and consistent with medication use as prescribed.      - Therapy: continue activities as tolerated. Will continue/resume outpatient physical therapy at [facility]. Currently attending on Tuesdays/Thursdays.    - Imaging: Reviewed available imaging with patient and answered any questions they had regarding study. MRI significant for: Asymmetric to left disc bulge L4-5 with possible contact with exiting left L4 nerve root.  Moderate central canal stenosis and mild neural foraminal stenosis.  Asymmetric to the left generalized disc bulging L5-S1 with impingement of descending left S1 nerve root.  Mild contact right S1 nerve root.      - Consults/Referrals: Referral to physical therapy .    - Records:  Reviewed/Obtain old records from outside physicians and imaging    - Follow up visit: return to clinic in 4 months after physical therapy or as needed.    - Counseled patient regarding the importance of activity modification, constant sleeping habits, and physical therapy    - This condition does not require this patient to take time off of work, and the primary goal of our Pain Management services is to improve the patient's functional capacity.    - Patient Questions: Answered all of the patient's questions regarding diagnosis, therapy, and treatment        The above plan and management options were discussed at length with patient. Patient is in agreement with the above and verbalized understanding.    I discussed the goals of interventional chronic pain management with the patient on today's visit.  I explained the utility of injections for diagnostic and therapeutic purposes.  We discussed a multimodal approach to pain including treating the patient's given worst pain at any given time.  We will use a systematic approach to addressing pain.  We will also adopt a multimodal approach that includes injections, adjuvant  medications, physical therapy, at times psychiatry.  There may be a limited role for opioid use intermittently in the treatment of pain, more particularly for acute pain although no one approach can be used as a sole treatment modality.    I emphasized the importance of regular exercise, core strengthening and stretching, diet and weight loss as a cornerstone of long-term pain management.    Visit today included increased complexity associated with the care of the episodic problem of chronic pain which was addressed and continue to manage the longitudinal care of the patient due to the serious and/or complex managed problem(s) listed above.       Tiffany Hogan PA-C  Interventional Pain Management  [facility]               [1]   Social History  Socioeconomic History    Marital status:      Spouse name: Mary Alice    Number of children: 0   Tobacco Use    Smoking status: Never    Smokeless tobacco: Never   Substance and Sexual Activity    Alcohol use: Never    Drug use: No    Sexual activity: Yes     Partners: Female   Social History Narrative    Live w/ spouse and  1 dog      Social Drivers of Health     Financial Resource Strain: Low Risk  (7/22/2025)    Overall Financial Resource Strain (CARDIA)     Difficulty of Paying Living Expenses: Not hard at all   Food Insecurity: No Food Insecurity (7/22/2025)    Hunger Vital Sign     Worried About Running Out of Food in the Last Year: Never true     Ran Out of Food in the Last Year: Never true   Transportation Needs: No Transportation Needs (7/22/2025)    PRAPARE - Transportation     Lack of Transportation (Medical): No     Lack of Transportation (Non-Medical): No   Physical Activity: Insufficiently Active (7/22/2025)    Exercise Vital Sign     Days of Exercise per Week: 5 days     Minutes of Exercise per Session: 10 min   Stress: Stress Concern Present (7/22/2025)    [place] [employer] - Occupational Stress Questionnaire     Feeling of  Stress : Rather much   Housing Stability: Low Risk  (7/22/2025)    Housing Stability Vital Sign     Unable to Pay for Housing in the Last Year: No     Number of Times Moved in the Last Year: 0     Homeless in the Last Year: No

## 2025-07-25 ENCOUNTER — OFFICE VISIT (OUTPATIENT)
Dept: UROLOGY | Facility: CLINIC | Age: 50
End: 2025-07-25
Payer: COMMERCIAL

## 2025-07-25 VITALS
TEMPERATURE: 99 F | RESPIRATION RATE: 16 BRPM | DIASTOLIC BLOOD PRESSURE: 82 MMHG | HEART RATE: 91 BPM | HEIGHT: 69 IN | WEIGHT: 256.19 LBS | BODY MASS INDEX: 37.95 KG/M2 | SYSTOLIC BLOOD PRESSURE: 122 MMHG

## 2025-07-25 DIAGNOSIS — R39.15 URGENCY OF URINATION: Primary | ICD-10-CM

## 2025-07-25 DIAGNOSIS — R39.198 DIFFICULTY URINATING: ICD-10-CM

## 2025-07-25 DIAGNOSIS — R31.29 MICROHEMATURIA: ICD-10-CM

## 2025-07-25 LAB
BACTERIA #/AREA URNS AUTO: NORMAL /HPF
BILIRUBIN, UA POC OHS: NEGATIVE
BLOOD, UA POC OHS: ABNORMAL
CLARITY, UA POC OHS: CLEAR
COLOR, UA POC OHS: YELLOW
GLUCOSE, UA POC OHS: NEGATIVE
KETONES, UA POC OHS: NEGATIVE
LEUKOCYTES, UA POC OHS: NEGATIVE
MICROSCOPIC COMMENT: NORMAL
NITRITE, UA POC OHS: NEGATIVE
PH, UA POC OHS: 6
POC RESIDUAL URINE VOLUME: 93 ML (ref 0–100)
PROTEIN, UA POC OHS: NEGATIVE
RBC #/AREA URNS AUTO: 1 /HPF (ref 0–4)
SPECIFIC GRAVITY, UA POC OHS: 1.01
UROBILINOGEN, UA POC OHS: 0.2
WBC #/AREA URNS AUTO: 1 /HPF (ref 0–5)

## 2025-07-25 PROCEDURE — 81001 URINALYSIS AUTO W/SCOPE: CPT | Performed by: UROLOGY

## 2025-07-25 PROCEDURE — 99999 PR PBB SHADOW E&M-EST. PATIENT-LVL V: CPT | Mod: PBBFAC,,, | Performed by: UROLOGY

## 2025-07-25 RX ORDER — ALFUZOSIN HYDROCHLORIDE 10 MG/1
10 TABLET, EXTENDED RELEASE ORAL
Qty: 30 TABLET | Refills: 0 | Status: SHIPPED | OUTPATIENT
Start: 2025-07-25 | End: 2025-09-23

## 2025-07-25 NOTE — PROGRESS NOTES
Chief Complaint:   Encounter Diagnoses   Name Primary?    Difficulty urinating     Urgency of urination Yes       HPI:  HPI Dax Carlisle pravin 49 y.o. male who presents with urinary urgency.  He states that this started a few months ago.  He is having near accidents but no active urge incontinence.  He does have frequency during the daytime.  He has also started having nocturia x1.  He also states he is having some postvoid dribbling.  He does have a remote history of kidney stones.  He denies any dysuria.  He has not had any gross hematuria.    History:  Social History[1]  Past Medical History:   Diagnosis Date    Angioedema of lips 03/13/2015    IVP dye allergy - swelling lips, eye, tongue    Asthma     childhood    Attention deficit disorder (ADD)     Depression     Diverticulitis 10/07/2019    Kidney stones     Pain     Sleep apnea     cpap not required/ patient states this has resolved with weight loss    SVT (supraventricular tachycardia)      Past Surgical History:   Procedure Laterality Date    ABDOMINAL WASHOUT N/A 10/07/2019    Procedure: LAVAGE, PERITONEAL, THERAPEUTIC;  Surgeon: Mindy Goff MD;  Location: [facility] OR;  Service: General;  Laterality: N/A;  abdomen washout    ABSCESS DRAINAGE N/A 09/10/2021    Procedure: DRAINAGE, ABSCESS;  Surgeon: Kadeem Choi MD;  Location: [facility] OR;  Service: General;  Laterality: N/A;  perirectal    anal fistula repair  10/2023    APPENDECTOMY      BACK SURGERY      BIOPSY, INTRANASAL, ENDOSCOPIC N/A 02/15/2023    Procedure: BIOPSY, INTRANASAL, ENDOSCOPIC;  Surgeon: Uriel Vasquez MD;  Location: [facility] OR;  Service: ENT;  Laterality: N/A;    CHOLECYSTECTOMY  2005    COLONOSCOPY N/A 03/10/2016    Procedure: COLONOSCOPY;  Surgeon: Juvenal Dinero MD;  Location: [facility] ENDO;  Service: Endoscopy;  Laterality: N/A;    COLONOSCOPY N/A 01/17/2020    Procedure: COLONOSCOPY;  Surgeon: Kadeem Choi MD;  Location: [facility] ENDO;  Service: General;  Laterality:  N/A;    COLOSTOMY N/A 10/07/2019    Procedure: CREATION, COLOSTOMY;  Surgeon: Mindy Goff MD;  Location: [facility] OR;  Service: General;  Laterality: N/A;    COLOSTOMY REVERSAL      EXAMINATION UNDER ANESTHESIA N/A 09/10/2021    Procedure: Exam under anesthesia;  Surgeon: Kadeem Choi MD;  Location: [facility] OR;  Service: General;  Laterality: N/A;    EXAMINATION UNDER ANESTHESIA N/A 01/06/2022    Procedure: Exam under anesthesia, perirectal abscess I&D;  Surgeon: Kadeem Choi MD;  Location: [facility] OR;  Service: General;  Laterality: N/A;    EXAMINATION UNDER ANESTHESIA N/A 08/24/2023    Procedure: EXAM UNDER ANESTHESIA;  Surgeon: Kadeem Choi MD;  Location: [facility] OR;  Service: General;  Laterality: N/A;  Exam under anesthesia, possible fistulotomy, possible seton placement (prone)    FISTULOTOMY  08/24/2023    Procedure: FISTULOTOMY;  Surgeon: Kadeem Choi MD;  Location: [facility] OR;  Service: General;;    FUNCTIONAL ENDOSCOPIC SINUS SURGERY (FESS) USING COMPUTER-ASSISTED NAVIGATION N/A 02/15/2023    Procedure: FESS, USING COMPUTER-ASSISTED NAVIGATION;  Surgeon: Uriel Vasquez MD;  Location: [facility] OR;  Service: ENT;  Laterality: N/A;    SIVAKUMAR PROCEDURE N/A 10/07/2019    Procedure: SIVAKUMAR PROCEDURE;  Surgeon: Mindy Goff MD;  Location: [facility] OR;  Service: General;  Laterality: N/A;    INJECTION OF ANESTHETIC AGENT AROUND GANGLION IMPAR N/A 03/15/2021    Procedure: BLOCK, GANGLION IMPAR;  Surgeon: Ruben Cabrera MD;  Location: [facility] PAIN MGT;  Service: Pain Management;  Laterality: N/A;    INJECTION OF ANESTHETIC AGENT AROUND PUDENDAL NERVE N/A 09/10/2021    Procedure: BLOCK, NERVE, PUDENDAL;  Surgeon: Kadeem Choi MD;  Location: [facility] OR;  Service: General;  Laterality: N/A;    INJECTION OF ANESTHETIC AGENT AROUND PUDENDAL NERVE N/A 01/06/2022    Procedure: BLOCK, NERVE, PUDENDAL;  Surgeon: Kadeem Choi MD;  Location: [facility];  Service: General;  Laterality: N/A;     "INJECTION OF ANESTHETIC AGENT AROUND PUDENDAL NERVE N/A 08/24/2023    Procedure: BLOCK, NERVE, PUDENDAL;  Surgeon: Kadeem Choi MD;  Location: [facility] OR;  Service: General;  Laterality: N/A;    INJECTION OF ANESTHETIC AGENT INTO TISSUE PLANE DEFINED BY TRANSVERSUS ABDOMINIS MUSCLE N/A 01/30/2020    Procedure: BLOCK, TRANSVERSUS ABDOMINIS PLANE;  Surgeon: Kadeem Choi MD;  Location: [facility] OR;  Service: General;  Laterality: N/A;    KNEE SURGERY Right     KNEE SURGERY Left     LYSIS OF ADHESIONS N/A 01/30/2020    Procedure: LYSIS, ADHESIONS;  Surgeon: Kadeem Choi MD;  Location: [facility] OR;  Service: General;  Laterality: N/A;    PILONIDAL CYST DRAINAGE      pt has had 6 of these adn has had revisions    PROCTECTOMY N/A 01/30/2020    Procedure: PROCTECTOMY;  Surgeon: Kadeem Choi MD;  Location: [facility] OR;  Service: General;  Laterality: N/A;  Partial    SINUSOTOMY, SPHENOID SINUS, ENDOSCOPIC Right 02/15/2023    Procedure: SINUSOTOMY, SPHENOID SINUS, ENDOSCOPIC;  Surgeon: Uriel Vasquez MD;  Location: [facility] OR;  Service: ENT;  Laterality: Right;    SUBTOTAL COLECTOMY  01/30/2020    Procedure: COLECTOMY, PARTIAL;  Surgeon: Kadeem Choi MD;  Location: [facility] OR;  Service: General;;     Family History   Problem Relation Name Age of Onset    Diabetes Mother      Colon polyps Mother      Alzheimer's disease Mother      Heart disease Father          CABG age 66    Cancer Father          melanoma    Diabetes Father      Anesthesia problems Father          d/t polio    Colon polyps Sister      Diabetes Maternal Grandmother      Colon cancer Maternal Grandmother      Diabetes Maternal Grandfather      Diabetes Paternal Grandmother      Stroke Paternal Grandmother      Diabetes Paternal Grandfather         Medications Ordered Prior to Encounter[2]     Objective:     Vitals:    07/25/25 0811   BP: 122/82   Pulse: 91   Resp: 16   Temp: 98.5 °F (36.9 °C)   Weight: 116.2 kg (256 lb 2.8 oz)   Height: 5' 9" " (1.753 m)      BMI Readings from Last 1 Encounters:   07/25/25 37.83 kg/m²          Physical Exam  No acute distress alert and oriented   Respirations even unlabored   Abdomen is soft nontender  Digital rectal exam was difficult to palpate but approximate 30 g prostate was noted  Lab Results   Component Value Date    PSA 0.48 11/01/2024        Lab Results   Component Value Date    CREATININE 0.9 11/01/2024      Assessment:       1. Urgency of urination    2. Difficulty urinating        Plan:     1. Urgency of urination    2. Difficulty urinating       Orders Placed This Encounter    Urinalysis Microscopic    POCT Urinalysis(Instrument)    POCT Bladder Scan    alfuzosin (UROXATRAL) 10 mg Tb24      BPH presumed diagnosis for urinary urgency.  No medications were diabetes to blame.  Recommend trial of alpha-blocker again.  Patient did have trouble with orthostasis prior.  Discussed considering bladder medication if he is unable to tolerate this medicine.  Microhematuria noted on urinalysis today.  We will send for microanalysis to determine if further evaluation is needed.       [1]   Social History  Tobacco Use    Smoking status: Never    Smokeless tobacco: Never   Substance Use Topics    Alcohol use: Never    Drug use: No   [2]   Current Outpatient Medications on File Prior to Visit   Medication Sig Dispense Refill    acetaminophen (TYLENOL) 325 MG tablet Take 325 mg by mouth every 6 (six) hours as needed for Pain.      ARIPiprazole (ABILIFY) 10 MG Tab Take 1 tablet (10 mg total) by mouth every evening. 30 tablet 11    ciprofloxacin-dexAMETHasone 0.3-0.1% (CIPRODEX) 0.3-0.1 % DrpS Place 4 drops into both ears 2 (two) times daily. 7.5 mL 0    clotrimazole-betamethasone 1-0.05% (LOTRISONE) cream Apply topically 2 (two) times daily. 45 g 0    cyclobenzaprine (FLEXERIL) 10 MG tablet Take 1 tablet (10 mg total) by mouth 3 (three) times daily. 90 tablet 11    [START ON 8/5/2025] dextroamphetamine-amphetamine (ADDERALL) 10  mg Tab Take 1 tablet (10 mg total) by mouth 2 (two) times a day. 60 tablet 0    [START ON 9/2/2025] dextroamphetamine-amphetamine (ADDERALL) 10 mg Tab Take 1 tablet (10 mg total) by mouth 2 (two) times a day. 60 tablet 0    [START ON 9/30/2025] dextroamphetamine-amphetamine (ADDERALL) 10 mg Tab Take 1 tablet (10 mg total) by mouth 2 (two) times a day. 60 tablet 0    EScitalopram oxalate (LEXAPRO) 20 MG tablet Take 1 tablet (20 mg total) by mouth every evening. 90 tablet 3    gabapentin (NEURONTIN) 400 MG capsule Take 1 capsule (400 mg total) by mouth 3 (three) times daily. 270 capsule 0    [START ON 8/5/2025] HYDROcodone-acetaminophen (NORCO) 7.5-325 mg per tablet Take 1 tablet by mouth every 6 (six) hours as needed for Pain. 120 tablet 0    metoprolol succinate (TOPROL-XL) 50 MG 24 hr tablet Take 1 tablet (50 mg total) by mouth once daily. 90 tablet 3    mupirocin (BACTROBAN) 2 % ointment Apply topically 3 (three) times daily. 22 g 1    sildenafiL (VIAGRA) 100 MG tablet Take ½-1 tablets ( mg total) by mouth daily as needed for Erectile Dysfunction. 10 tablet 3    tirzepatide, weight loss, (ZEPBOUND) 2.5 mg/0.5 mL PnIj Inject 2.5 mg into the skin every 7 days. 4 Pen 0     No current facility-administered medications on file prior to visit.

## 2025-08-10 DIAGNOSIS — B35.6 TINEA CRURIS: ICD-10-CM

## 2025-08-11 RX ORDER — CLOTRIMAZOLE AND BETAMETHASONE DIPROPIONATE 10; .64 MG/G; MG/G
CREAM TOPICAL 2 TIMES DAILY
Qty: 45 G | Refills: 0 | Status: SHIPPED | OUTPATIENT
Start: 2025-08-11

## 2025-08-29 ENCOUNTER — OFFICE VISIT (OUTPATIENT)
Dept: UROLOGY | Facility: CLINIC | Age: 50
End: 2025-08-29
Payer: COMMERCIAL

## 2025-08-29 VITALS
BODY MASS INDEX: 35.66 KG/M2 | SYSTOLIC BLOOD PRESSURE: 124 MMHG | DIASTOLIC BLOOD PRESSURE: 86 MMHG | HEART RATE: 74 BPM | WEIGHT: 240.75 LBS | HEIGHT: 69 IN | RESPIRATION RATE: 16 BRPM

## 2025-08-29 DIAGNOSIS — R39.15 URGENCY OF URINATION: Primary | ICD-10-CM

## 2025-08-29 DIAGNOSIS — R39.198 DIFFICULTY URINATING: ICD-10-CM

## 2025-08-29 PROCEDURE — 99999 PR PBB SHADOW E&M-EST. PATIENT-LVL IV: CPT | Mod: PBBFAC,,, | Performed by: UROLOGY

## 2025-08-29 RX ORDER — SOLIFENACIN SUCCINATE 5 MG/1
5 TABLET, FILM COATED ORAL DAILY
Qty: 30 TABLET | Refills: 3 | Status: SHIPPED | OUTPATIENT
Start: 2025-08-29 | End: 2025-12-27

## (undated) DEVICE — GAUZE SPONGE 4X4 12PLY

## (undated) DEVICE — ELECTRODE REM PLYHSV RETURN 9

## (undated) DEVICE — WARMER DRAPE STERILE LF

## (undated) DEVICE — SEE MEDLINE ITEM 157181

## (undated) DEVICE — SOL NORMAL USPCA 0.9%

## (undated) DEVICE — SEE MEDLINE ITEM 152740

## (undated) DEVICE — SCRUB 10% POVIDONE IODINE 4OZ

## (undated) DEVICE — GOWN POLY REINF BRTH SLV XL

## (undated) DEVICE — SUT PROLENE 2-0 SH 36IN BLU

## (undated) DEVICE — SUT SILK 3-0 STRANDS 30IN

## (undated) DEVICE — SEE MEDLINE ITEM 157027

## (undated) DEVICE — PACK BASIC SETUP SC BR

## (undated) DEVICE — SUT VICRYL PLUS 3-0 PS2 18

## (undated) DEVICE — HANDLE SCISSORS HS ACE 23CM

## (undated) DEVICE — TRACKER PATIENT NON INVASIVE

## (undated) DEVICE — CART STAPLE RELD PROXTX 60X3.5

## (undated) DEVICE — SUT 2/0 30IN SILK BLK BRAI

## (undated) DEVICE — SEE MEDLINE ITEM 157117

## (undated) DEVICE — SUT SILK 2-0 STRANDS 30IN

## (undated) DEVICE — MANIFOLD 4 PORT

## (undated) DEVICE — UNDERGLOVES BIOGEL PI SIZE 7.5

## (undated) DEVICE — SEE MEDLINE ITEM 146345

## (undated) DEVICE — DRAPE LITHOTOMY SHEET

## (undated) DEVICE — COVER OVERHEAD SURG LT BLUE

## (undated) DEVICE — PACK DRAPE PERI/GYN TIBURON

## (undated) DEVICE — SOL 9P NACL IRR PIC IL

## (undated) DEVICE — Device

## (undated) DEVICE — PAD ABDOMINAL STERILE 8X10IN

## (undated) DEVICE — SUT PDS II 96 1 VIO

## (undated) DEVICE — JELLY LUBRICATING STERILE 5 GR

## (undated) DEVICE — EVACUATOR WOUND BULB 100CC

## (undated) DEVICE — GLOVE SURGICAL LATEX SZ 7

## (undated) DEVICE — HANDSWITCH SUCTION COAG

## (undated) DEVICE — HEMOSTAT SURGICEL 4X8IN

## (undated) DEVICE — SUT VICRYL CTD 2-0 GI 27 SH

## (undated) DEVICE — SUT VICRYL PLUS 3-0 SH 18IN

## (undated) DEVICE — NDL SPINAL 25GX3.5 SPINOCAN

## (undated) DEVICE — SOL NS 1000CC

## (undated) DEVICE — SUT VICRYL 3-0 27 SH

## (undated) DEVICE — SPONGE PATTY SURGICAL .5X3IN

## (undated) DEVICE — SEE MEDLINE ITEM 152622

## (undated) DEVICE — DRAIN FLAT JP 10X4MM P

## (undated) DEVICE — BRIEF MESH LARGE

## (undated) DEVICE — GLOVE SURG BIOGEL LATEX SZ 7.5

## (undated) DEVICE — CART STPL RELD CNTOUR BLU

## (undated) DEVICE — SPONGE LAP 18X18 PREWASHED

## (undated) DEVICE — SYR ONLY LUER LOCK 20CC

## (undated) DEVICE — APPLICATOR CHLORAPREP ORN 26ML

## (undated) DEVICE — COVER LIGHT HANDLE 80/CA

## (undated) DEVICE — ADHESIVE MASTISOL VIAL 48/BX

## (undated) DEVICE — TUBING SUCTION STRAIGHT .25X20

## (undated) DEVICE — DRAPE STERI INSTRUMENT 1018

## (undated) DEVICE — TOWEL OR DISP STRL BLUE 4/PK

## (undated) DEVICE — SUT MONOCYRL 4-0 PS2 UND

## (undated) DEVICE — SUT CTD VICRYL 2-0 UND BR

## (undated) DEVICE — PAD ABD 8X10 STERILE

## (undated) DEVICE — ELECTRODE BLD EXT 6.50 ST DISP

## (undated) DEVICE — TIP SUC SIGMOIDOSCOPE 18F 12IN

## (undated) DEVICE — EVACUATOR PENCIL SMOKE NEPTUNE

## (undated) DEVICE — COVER CAMERA OPERATING ROOM

## (undated) DEVICE — TAPE SILK 3IN

## (undated) DEVICE — DRAPE UNDER BUTTOCKS SUC PORT

## (undated) DEVICE — SEE L#152161

## (undated) DEVICE — BAG POSTOP W/ACCESS CUT TO FIT

## (undated) DEVICE — DEVICE ENSEAL X1 LARGE JAW

## (undated) DEVICE — SUT 1 48IN PDS II VIO MONO

## (undated) DEVICE — TAPE ADH MEDIPORE 4 X 10YDS

## (undated) DEVICE — SUT VICRYL 0 SH

## (undated) DEVICE — TUBING MEDI-VAC 20FT .25IN

## (undated) DEVICE — BLADE TRICUT ROTATABLE 4MM

## (undated) DEVICE — SYR 10CC LUER LOCK

## (undated) DEVICE — SYR 50CC LL

## (undated) DEVICE — SEE MEDLINE ITEM 152739

## (undated) DEVICE — SUT SILK 0 SUTUPAK SA86H

## (undated) DEVICE — STAPLER INT PROX TX 60X3.5MM

## (undated) DEVICE — STAPLER INTERNL CONTOR CV BLU

## (undated) DEVICE — SEE MEDLINE ITEM 157148

## (undated) DEVICE — SUT PROLENE 0 MO6 30IN BLUE

## (undated) DEVICE — SUPPORT ULNA NERVE PROTECTOR

## (undated) DEVICE — SUT PROLENE 0 CT1 30IN BLUE

## (undated) DEVICE — TRACKER ENT INSTRUMENT

## (undated) DEVICE — SUT VICRYL+ 2-0 UR6 27 VIOL

## (undated) DEVICE — POSITIONER HEAD DONUT 9IN FOAM

## (undated) DEVICE — DRAIN PENROSE XRAY 12 X 1/4 ST

## (undated) DEVICE — STAPLER INT PROX TX 30X3.5MM

## (undated) DEVICE — NDL SPINAL 20GX3.5 HUB

## (undated) DEVICE — SUT SILK 3-0 SH 18IN BLACK

## (undated) DEVICE — NDL SAFETY 22G X 1.5 ECLIPSE

## (undated) DEVICE — JELLY SURGILUBE LUBE PKT 3GM

## (undated) DEVICE — GLOVE SURGICAL LATEX SZ 8

## (undated) DEVICE — INSERT CUSHIONPRONE VIEW LARGE

## (undated) DEVICE — SUT VICRYL+ 2-0 SH 18IN

## (undated) DEVICE — GOWN SMARTGOWN LVL4 X-LONG XL

## (undated) DEVICE — NDL HYPO 27G X 1 1/2

## (undated) DEVICE — STAPLER CIRCULAR 25MM

## (undated) DEVICE — DRAPE ORTH SPLIT 77X108IN

## (undated) DEVICE — SYR B-D DISP CONTROL 10CC100/C

## (undated) DEVICE — SPLINT NASAL AIRWAY SEPTAL SIL

## (undated) DEVICE — TUBING XPS IRRIG TO STRAIGHTSH

## (undated) DEVICE — PENCIL ELECTROCAUTERY W/ HLSTR

## (undated) DEVICE — TAPE SURG MEDIPORE 6X72IN

## (undated) DEVICE — SPONGE COTTON TRAY 4X4IN

## (undated) DEVICE — DECANTER 6 VIAL

## (undated) DEVICE — SUT 1 36IN PDS II VIO MONO

## (undated) DEVICE — PATIENT TRACKER ENT

## (undated) DEVICE — KIT ANTIFOG